# Patient Record
Sex: FEMALE | Race: WHITE | NOT HISPANIC OR LATINO | Employment: OTHER | URBAN - NONMETROPOLITAN AREA
[De-identification: names, ages, dates, MRNs, and addresses within clinical notes are randomized per-mention and may not be internally consistent; named-entity substitution may affect disease eponyms.]

---

## 2018-05-17 ENCOUNTER — TRANSCRIBE ORDERS (OUTPATIENT)
Dept: PHYSICAL THERAPY | Facility: HOSPITAL | Age: 43
End: 2018-05-17

## 2018-05-17 DIAGNOSIS — G89.29 OTHER CHRONIC PAIN: Primary | ICD-10-CM

## 2018-05-17 DIAGNOSIS — M25.511 RIGHT SHOULDER PAIN, UNSPECIFIED CHRONICITY: ICD-10-CM

## 2018-05-17 DIAGNOSIS — M54.5 LOW BACK PAIN, UNSPECIFIED BACK PAIN LATERALITY, UNSPECIFIED CHRONICITY, WITH SCIATICA PRESENCE UNSPECIFIED: ICD-10-CM

## 2018-05-25 ENCOUNTER — HOSPITAL ENCOUNTER (OUTPATIENT)
Dept: PHYSICAL THERAPY | Facility: HOSPITAL | Age: 43
Setting detail: THERAPIES SERIES
Discharge: HOME | End: 2018-05-25

## 2018-05-25 DIAGNOSIS — G89.29 OTHER CHRONIC PAIN: ICD-10-CM

## 2018-05-25 DIAGNOSIS — M54.5 LOW BACK PAIN, UNSPECIFIED BACK PAIN LATERALITY, UNSPECIFIED CHRONICITY, WITH SCIATICA PRESENCE UNSPECIFIED: Primary | ICD-10-CM

## 2018-05-25 DIAGNOSIS — M25.511 RIGHT SHOULDER PAIN, UNSPECIFIED CHRONICITY: ICD-10-CM

## 2018-05-25 PROCEDURE — 97161 PT EVAL LOW COMPLEX 20 MIN: CPT
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
## 2018-05-31 ENCOUNTER — APPOINTMENT (OUTPATIENT)
Dept: PHYSICAL THERAPY | Facility: HOSPITAL | Age: 43
End: 2018-05-31

## 2018-06-06 ENCOUNTER — APPOINTMENT (OUTPATIENT)
Dept: PHYSICAL THERAPY | Facility: HOSPITAL | Age: 43
End: 2018-06-06

## 2018-09-13 ENCOUNTER — OFFICE VISIT (OUTPATIENT)
Dept: CARDIOLOGY | Facility: CLINIC | Age: 43
End: 2018-09-13

## 2018-09-13 VITALS
WEIGHT: 193 LBS | HEIGHT: 62 IN | SYSTOLIC BLOOD PRESSURE: 118 MMHG | HEART RATE: 65 BPM | DIASTOLIC BLOOD PRESSURE: 70 MMHG | BODY MASS INDEX: 35.51 KG/M2

## 2018-09-13 DIAGNOSIS — R07.89 OTHER CHEST PAIN: Primary | ICD-10-CM

## 2018-09-13 DIAGNOSIS — Z72.0 NICOTINE ABUSE: ICD-10-CM

## 2018-09-13 PROCEDURE — 93000 ELECTROCARDIOGRAM COMPLETE: CPT | Performed by: INTERNAL MEDICINE

## 2018-09-13 PROCEDURE — 99204 OFFICE O/P NEW MOD 45 MIN: CPT | Performed by: INTERNAL MEDICINE

## 2018-09-13 RX ORDER — QUETIAPINE FUMARATE 50 MG/1
50 TABLET, FILM COATED ORAL NIGHTLY
COMMUNITY
End: 2019-02-21

## 2018-09-13 RX ORDER — TIZANIDINE 4 MG/1
4 TABLET ORAL NIGHTLY PRN
COMMUNITY
End: 2021-03-09 | Stop reason: HOSPADM

## 2018-09-13 RX ORDER — OXCARBAZEPINE 600 MG/1
600 TABLET, FILM COATED ORAL 2 TIMES DAILY
COMMUNITY
End: 2019-02-21

## 2018-09-13 NOTE — PROGRESS NOTES
[facility] Cardiology  OFFICE NOTE    Myles Shannon  43 y.o. female    09/13/2018  1. Other chest pain    2. Nicotine abuse        Chief complaint -chest pain    History of present Lajpaip-[id number]-old lady who is on multiple medications for back problems and muscle spasm and neuropathy and she is a smoker has been having some chest pain EKG showed sinus rhythm with nonspecific ST-T changes.  There is family history of CAD in her dad and mom and there are smokers also.  Never had any cardiac process in the past.  She takes medications for headache.  Denies any GI symptoms of CNS symptoms              Allergies   Allergen Reactions   • Penicillins GI Intolerance   • Buspirone Rash         Past Medical History:   Diagnosis Date   • Abdominal pain     Chronic RLQ, RUQ, R flank comes and goes.    • Abdominal pain, right lower quadrant    • Acute bilateral otitis media    • Allergic rhinitis    • Backache     Upper back.   • Candidiasis of skin    • Cellulitis of neck    • Chest pain    • Contraception    • Cough    • Dysfunction of eustachian tube    • Dyspareunia, female    • Excessive or frequent menstruation    • Flank pain    • Generalized aches and pains    • Headache    • Health maintenance examination    • Infection of skin and subcutaneous tissue    • Irregular periods    • Kidney stone     Poss   • Menorrhagia    • Nausea vomiting and diarrhea    • Obesity    • Open wound of abdominal wall    • Otalgia    • Pain in left foot    • Pain in unspecified hand    • Removed Impacted cerumen 06/05/2012   • Rhinitis    • Shoulder pain     right-sided-likely muscle strain      • Shoulder tendinitis    • Spider bite wound    • Staphylococcal infection of skin    • Swollen feet    • Tinea cruris    • Tobacco dependence syndrome    • Upper respiratory infection    • Urinary tract infectious disease    • Vertigo    • Visit for gynecologic examination    • Vulvovaginitis          Past Surgical  History:   Procedure Laterality Date   •  SECTION     • DILATATION AND CURETTAGE      Secondary to incomplete spontaneous    • INCISION AND DRAINAGE ABSCESS  2012   • INJECTION OF MEDICATION  2015    Phenergan (1)     • INJECTION OF MEDICATION  10/10/2013    Toradol (2)      • INJECTION OF MEDICATION  2014    Zofran (1)            Family History   Problem Relation Age of Onset   • Depression Other    • Cancer Other         Colorectal Cancer   • Endometrial cancer Other    • Lung cancer Other          Social History     Social History   • Marital status: Single     Spouse name: N/A   • Number of children: N/A   • Years of education: N/A     Occupational History   • Not on file.     Social History Main Topics   • Smoking status: Current Every Day Smoker     Packs/day: 0.50     Types: Cigarettes   • Smokeless tobacco: Never Used   • Alcohol use No   • Drug use: No   • Sexual activity: Defer     Other Topics Concern   • Not on file     Social History Narrative   • No narrative on file         Current Outpatient Prescriptions   Medication Sig Dispense Refill   • OXcarbazepine (TRILEPTAL) 600 MG tablet Take 600 mg by mouth 2 (Two) Times a Day.     • QUEtiapine (SEROquel) 50 MG tablet Take 50 mg by mouth Every Night.     • tiZANidine (ZANAFLEX) 4 MG tablet Take 4 mg by mouth At Night As Needed for Muscle Spasms.     • diclofenac sodium (VOTAREN XR) 100 MG 24 hr tablet Take 100 mg by mouth Daily.     • gabapentin (NEURONTIN) 600 MG tablet Take 600 mg by mouth 3 (Three) Times a Day.     • topiramate (TOPAMAX) 100 MG tablet Take 100 mg by mouth 2 (Two) Times a Day.     • traMADol (ULTRAM) 50 MG tablet Take 50 mg by mouth Every 6 (Six) Hours As Needed for moderate pain (4-6).       No current facility-administered medications for this visit.          Review of Systems     Constitution: Denies any fatigue, fever or chills    HENT: Denies any headache, hearing impairment,     Eyes: Denies  "any blurring of vision, or photophobia     Cardivascular - As per history of present illness     Respiratory system- denies any COPD,  shortness of breath, Sleep apnea     Endocrine:  No history of hyperlipidemia, diabetes,  or Hypothyroidism                       Musculoskeletal:   history of arthritis with musculoskeletal problems    Gastrointestinal: No nausea, vomiting, or melena    Genitourinary: No dysuria or hematuria    Neurological:   No history of seizure disorder, stroke, memory problems    Psychiatric/Behavioral:        No history of depression    Hematological- no history of easy bruising or any bleeding diathesis            OBJECTIVE    /70   Pulse 65   Ht 157.5 cm (62.01\")   Wt 87.5 kg (193 lb)   BMI 35.29 kg/m²       Physical Exam     Constitutional: is oriented to person, place, and time.     Skin-warm and dry    Well developed and nourished in no acute distress      Head: Normocephalic and atraumatic.     Eyes: Pupils are equal    Neck: Neck supple. No bruit in the carotids    Cardiovascular: Burt in the fifth intercostal space   Regular rate, and  Rhythm,    S1 greater than S2, no S3 or S4, no gallop     Pulmonary/Chest:   Air  Entry is equal on both sides  No wheezing or crackles,      Abdominal: Soft.  No hepatosplenomegaly, bowel sounds are present    Musculoskeletal: No kyphoscoliosis, no significant thickening of the joints    Neurological: is alert and oriented to person, place, and time.    cranial nerve are intact .   No motor or sensory deficit    Extremities-no edema, no radial femoral delay      Psychiatric: He has a normal mood and affect.                  His behavior is normal.             ECG 12 Lead  Date/Time: 9/13/2018 8:46 AM  Performed by: KRISTINE ROBERTSON  Authorized by: KRISTINE ROBERTSON   Comparison: not compared with previous ECG   Rhythm: sinus rhythm  Clinical impression: non-specific ECG                         A/P    Chest pain atypical in nature EKG is " normal was factors of tobacco use and BMI of 35.3 scheduled for exercise treadmill to rule out ischemia.    Tobacco use discussed with her about quitting smoking as it decreases the risk of heart attack.    Back problems and neuropathy she is on multiple other medicines followed by her family doctor.    If the treadmill and echo was unremarkable, then she just needs risk factor modification she can follow with her family doctor            This document has been electronically signed by Julio Baum MD on September 13, 2018 8:45 AM       EMR Dragon/Transcription disclaimer:   Some of this note may be an electronic transcription/translation of spoken language to printed text. The electronic translation of spoken language may permit erroneous, or at times, nonsensical words or phrases to be inadvertently transcribed; Although I have reviewed the note for such errors, some may still exist.

## 2018-09-14 ENCOUNTER — DOCUMENTATION (OUTPATIENT)
Dept: PHYSICAL THERAPY | Facility: HOSPITAL | Age: 43
End: 2018-09-14

## 2019-02-21 ENCOUNTER — APPOINTMENT (OUTPATIENT)
Dept: LAB | Facility: HOSPITAL | Age: 44
End: 2019-02-21

## 2019-02-21 ENCOUNTER — PROCEDURE VISIT (OUTPATIENT)
Dept: OBSTETRICS AND GYNECOLOGY | Facility: CLINIC | Age: 44
End: 2019-02-21

## 2019-02-21 VITALS
HEIGHT: 62 IN | WEIGHT: 193 LBS | HEART RATE: 70 BPM | DIASTOLIC BLOOD PRESSURE: 55 MMHG | BODY MASS INDEX: 35.51 KG/M2 | SYSTOLIC BLOOD PRESSURE: 108 MMHG

## 2019-02-21 DIAGNOSIS — N92.1 MENORRHAGIA WITH IRREGULAR CYCLE: ICD-10-CM

## 2019-02-21 DIAGNOSIS — Z01.419 ENCOUNTER FOR ANNUAL ROUTINE GYNECOLOGICAL EXAMINATION: Primary | ICD-10-CM

## 2019-02-21 DIAGNOSIS — R10.2 PELVIC PAIN: ICD-10-CM

## 2019-02-21 DIAGNOSIS — Z12.39 ENCOUNTER FOR SPECIAL SCREENING EXAMINATION FOR NEOPLASM OF BREAST: ICD-10-CM

## 2019-02-21 DIAGNOSIS — N94.10 DYSPAREUNIA IN FEMALE: ICD-10-CM

## 2019-02-21 DIAGNOSIS — N89.8 VAGINAL ITCHING: ICD-10-CM

## 2019-02-21 LAB
B-HCG UR QL: NEGATIVE
CANDIDA ALBICANS: NEGATIVE
DEPRECATED RDW RBC AUTO: 47.2 FL (ref 37–54)
ERYTHROCYTE [DISTWIDTH] IN BLOOD BY AUTOMATED COUNT: 13.3 % (ref 12.3–15.4)
GARDNERELLA VAGINALIS: POSITIVE
HCT VFR BLD AUTO: 45 % (ref 34–46.6)
HGB BLD-MCNC: 13.8 G/DL (ref 12–15.9)
MCH RBC QN AUTO: 29.5 PG (ref 26.6–33)
MCHC RBC AUTO-ENTMCNC: 30.7 G/DL (ref 31.5–35.7)
MCV RBC AUTO: 96.2 FL (ref 79–97)
PLATELET # BLD AUTO: 309 10*3/MM3 (ref 140–450)
PMV BLD AUTO: 12.8 FL (ref 6–12)
RBC # BLD AUTO: 4.68 10*6/MM3 (ref 3.77–5.28)
TRICHOMONAS VAGINALIS PCR: NEGATIVE
TSH SERPL DL<=0.05 MIU/L-ACNC: 3.17 MIU/ML (ref 0.46–4.68)
WBC NRBC COR # BLD: 8.62 10*3/MM3 (ref 3.4–10.8)

## 2019-02-21 PROCEDURE — 87624 HPV HI-RISK TYP POOLED RSLT: CPT | Performed by: NURSE PRACTITIONER

## 2019-02-21 PROCEDURE — 87660 TRICHOMONAS VAGIN DIR PROBE: CPT | Performed by: NURSE PRACTITIONER

## 2019-02-21 PROCEDURE — 87480 CANDIDA DNA DIR PROBE: CPT | Performed by: NURSE PRACTITIONER

## 2019-02-21 PROCEDURE — G0123 SCREEN CERV/VAG THIN LAYER: HCPCS | Performed by: NURSE PRACTITIONER

## 2019-02-21 PROCEDURE — 84443 ASSAY THYROID STIM HORMONE: CPT | Performed by: NURSE PRACTITIONER

## 2019-02-21 PROCEDURE — 87510 GARDNER VAG DNA DIR PROBE: CPT | Performed by: NURSE PRACTITIONER

## 2019-02-21 PROCEDURE — 99396 PREV VISIT EST AGE 40-64: CPT | Performed by: NURSE PRACTITIONER

## 2019-02-21 PROCEDURE — 88141 CYTOPATH C/V INTERPRET: CPT | Performed by: PATHOLOGY

## 2019-02-21 PROCEDURE — 81025 URINE PREGNANCY TEST: CPT | Performed by: NURSE PRACTITIONER

## 2019-02-21 PROCEDURE — 36415 COLL VENOUS BLD VENIPUNCTURE: CPT | Performed by: NURSE PRACTITIONER

## 2019-02-21 PROCEDURE — 85027 COMPLETE CBC AUTOMATED: CPT | Performed by: NURSE PRACTITIONER

## 2019-02-21 RX ORDER — LITHIUM CARBONATE 450 MG
450 TABLET, EXTENDED RELEASE ORAL DAILY
Refills: 2 | COMMUNITY
Start: 2019-02-06 | End: 2021-12-19 | Stop reason: HOSPADM

## 2019-02-21 RX ORDER — DIVALPROEX SODIUM 500 MG/1
500 TABLET, DELAYED RELEASE ORAL
Refills: 5 | COMMUNITY
Start: 2019-02-12 | End: 2021-12-19 | Stop reason: HOSPADM

## 2019-02-21 RX ORDER — PAROXETINE HYDROCHLORIDE 20 MG/1
TABLET, FILM COATED ORAL
Refills: 0 | COMMUNITY
Start: 2019-02-06 | End: 2021-03-09 | Stop reason: HOSPADM

## 2019-02-21 NOTE — PROGRESS NOTES
"Paulo Shannon is a 43 y.o. Annual exam, pap smear and mammogram, vaginal itching, heavy irregular periods, and pelvic pain.      Pap: 2015, normal  Mammo: 10/05/2016  LMP: 2019  BC: none    Pt reports for the last year she has been having heavy painful periods every couple of weeks lasting 3-4 days. Pt wears regular pad and is changing it every two hours, but sometimes more often.  She was scheduled to have a hysteroscopy with D&C and essure procedure by Dr. Schrader in 2016, but she canceled it because \"it wasn't good timing for me\".  She is also experiencing pelvic pain intermittently which is worsened during intercourse.  Pt is sexually active and not using any form of contraception because, \"the Internet said if you have irregular bleeding then you are sterile\".        Gynecologic Exam   The patient's primary symptoms include genital itching and pelvic pain. The patient's pertinent negatives include no genital lesions, genital odor, genital rash, missed menses, vaginal bleeding or vaginal discharge. Primary symptoms comment: itching started 2 weeks ago . This is a recurrent problem. The current episode started more than 1 year ago. The problem has been waxing and waning. The problem affects both sides. She is not pregnant. Associated symptoms include painful intercourse. Pertinent negatives include no abdominal pain, anorexia, back pain, chills, constipation, diarrhea, discolored urine, dysuria, fever, flank pain, frequency, headaches, hematuria, joint pain, joint swelling, nausea, rash, sore throat, urgency or vomiting. The symptoms are aggravated by intercourse. She has tried acetaminophen for the symptoms. The treatment provided mild relief. She is sexually active. No, her partner does not have an STD. She uses nothing for contraception. Her menstrual history has been irregular. Her past medical history is significant for a  section, miscarriage and vaginosis. There is " no history of an abdominal surgery, an ectopic pregnancy, endometriosis, a gynecological surgery, herpes simplex, menorrhagia, metrorrhagia, ovarian cysts, perineal abscess, PID, an STD or a terminated pregnancy.   Menstrual Problem   This is a recurrent problem. The current episode started more than 1 year ago. The problem occurs intermittently. The problem has been gradually worsening. Pertinent negatives include no abdominal pain, anorexia, chest pain, chills, fatigue, fever, headaches, nausea, rash, sore throat or vomiting. The symptoms are aggravated by intercourse. She has tried acetaminophen for the symptoms. The treatment provided mild relief.   Pelvic Pain   The patient's primary symptoms include genital itching and pelvic pain. The patient's pertinent negatives include no genital lesions, genital odor, genital rash, missed menses, vaginal bleeding or vaginal discharge. Primary symptoms comment: itching started 2 weeks ago . This is a new problem. The current episode started more than 1 year ago. The problem occurs intermittently. The problem has been waxing and waning. The pain is moderate. The problem affects both sides. She is not pregnant. Associated symptoms include painful intercourse. Pertinent negatives include no abdominal pain, anorexia, back pain, chills, constipation, diarrhea, discolored urine, dysuria, fever, flank pain, frequency, headaches, hematuria, joint pain, joint swelling, nausea, rash, sore throat, urgency or vomiting. The symptoms are aggravated by intercourse. She has tried acetaminophen for the symptoms. The treatment provided mild relief. She is sexually active. No, her partner does not have an STD. She uses nothing for contraception. Her menstrual history has been irregular. Her past medical history is significant for a  section, miscarriage and vaginosis. There is no history of an abdominal surgery, an ectopic pregnancy, endometriosis, a gynecological surgery, herpes  simplex, menorrhagia, metrorrhagia, ovarian cysts, perineal abscess, PID, an STD or a terminated pregnancy.       The following portions of the patient's history were reviewed and updated as appropriate: allergies, current medications, past family history, past medical history, past social history, past surgical history and problem list.    Review of Systems   Constitutional: Negative for chills, fatigue, fever and unexpected weight change.   HENT: Negative for sore throat.    Respiratory: Negative for apnea and shortness of breath.    Cardiovascular: Negative for chest pain and palpitations.   Gastrointestinal: Negative for abdominal pain, anorexia, constipation, diarrhea, nausea and vomiting.   Endocrine: Positive for heat intolerance. Negative for cold intolerance.        Night sweats    Genitourinary: Positive for dyspareunia, menstrual problem and pelvic pain. Negative for decreased urine volume, difficulty urinating, dysuria, enuresis, flank pain, frequency, genital sores, hematuria, menorrhagia, missed menses, urgency, vaginal bleeding, vaginal discharge and vaginal pain.        Pelvic pain felt bilaterally during intercourse.     Musculoskeletal: Negative for back pain and joint pain.   Skin: Negative for rash.   Neurological: Negative for headaches.   Psychiatric/Behavioral: Negative for sleep disturbance.         Objective   Physical Exam   Constitutional: She is oriented to person, place, and time. Vital signs are normal. She appears well-developed and well-nourished.   Neck: Normal range of motion. Neck supple. No thyromegaly present.   Cardiovascular: Normal rate, regular rhythm, normal heart sounds and intact distal pulses.   Pulmonary/Chest: Effort normal and breath sounds normal. Right breast exhibits no inverted nipple, no mass, no nipple discharge, no skin change and no tenderness. Left breast exhibits no inverted nipple, no mass, no nipple discharge, no skin change and no tenderness. Breasts are  symmetrical.   Abdominal: Soft. Bowel sounds are normal. She exhibits no distension. There is no tenderness.   Genitourinary: Uterus normal. No breast discharge or bleeding. Pelvic exam was performed with patient supine. There is no rash, tenderness, lesion or injury on the right labia. There is no rash, tenderness, lesion or injury on the left labia. Cervix exhibits no motion tenderness, no discharge and no friability. Right adnexum displays tenderness. Right adnexum displays no mass and no fullness. Left adnexum displays no mass, no tenderness and no fullness. Vaginal discharge found.   Genitourinary Comments: Pap and vag panel obtained.   Lymphadenopathy:     She has no axillary adenopathy.        Right: No inguinal adenopathy present.        Left: No inguinal adenopathy present.   Neurological: She is alert and oriented to person, place, and time. GCS eye subscore is 4. GCS verbal subscore is 5. GCS motor subscore is 6.   Skin: Skin is warm, dry and intact.   Psychiatric: Her speech is normal and behavior is normal. Her mood appears anxious.   Nursing note and vitals reviewed.        Assessment/Plan   Myles was seen today for gynecologic exam.    Diagnoses and all orders for this visit:    Encounter for annual routine gynecological examination  -     Liquid-based Pap Smear, Screening    Encounter for special screening examination for neoplasm of breast  -     Mammo Screening Digital Tomosynthesis Bilateral With CAD    Menorrhagia with irregular cycle  -     TSH  -     US Non-ob Transvaginal; Future  -     CBC (No Diff)    Pelvic pain  -     US Non-ob Transvaginal; Future  -     POC Pregnancy, Urine    Dyspareunia in female    Vaginal itching  -     Gardnerella vaginalis, Trichomonas vaginalis, Candida albicans, PCR - Swab, Vagina      Pt educated/encouraged to do SBE monthly. She can technically still get pregnant if not using any form of contraception.  Her last TVUS in 10/2016 impression: Slightly enlarged  uterus with possible leiomyomas.  Her heavy periods, pelvic pain, and dyspareunia are more than likely due to these leiomyomas.  She can continue to take tylenol for pain, but I would advise against NSAIDs because it could interact with her lithium medication.  If patient is saturating a pad every hour she needs to go to the ED.  Will f/u with plan pending lab and TVUS results.       This document has been electronically signed by ALPHONSE Youngblood on February 21, 2019 3:05 PM

## 2019-02-22 DIAGNOSIS — N76.0 BACTERIAL VAGINOSIS: Primary | ICD-10-CM

## 2019-02-22 DIAGNOSIS — B96.89 BACTERIAL VAGINOSIS: Primary | ICD-10-CM

## 2019-02-22 RX ORDER — METRONIDAZOLE 7.5 MG/G
GEL VAGINAL
Qty: 1 TUBE | Refills: 0 | Status: SHIPPED | OUTPATIENT
Start: 2019-02-22 | End: 2019-10-16

## 2019-02-22 RX ORDER — METRONIDAZOLE 500 MG/1
500 TABLET ORAL 2 TIMES DAILY
Qty: 14 TABLET | Refills: 0 | Status: SHIPPED | OUTPATIENT
Start: 2019-02-22 | End: 2019-03-01

## 2019-02-25 LAB
GEN CATEG CVX/VAG CYTO-IMP: NORMAL
LAB AP CASE REPORT: NORMAL
LAB AP GYN ADDITIONAL INFORMATION: NORMAL
LAB AP GYN OTHER FINDINGS: NORMAL
PATH INTERP SPEC-IMP: NORMAL
STAT OF ADQ CVX/VAG CYTO-IMP: NORMAL

## 2019-02-28 LAB — HPV I/H RISK 4 DNA CVX QL PROBE+SIG AMP: NEGATIVE

## 2019-03-07 ENCOUNTER — OFFICE VISIT (OUTPATIENT)
Dept: OBSTETRICS AND GYNECOLOGY | Facility: CLINIC | Age: 44
End: 2019-03-07

## 2019-03-07 VITALS
WEIGHT: 200 LBS | BODY MASS INDEX: 36.8 KG/M2 | SYSTOLIC BLOOD PRESSURE: 112 MMHG | HEIGHT: 62 IN | DIASTOLIC BLOOD PRESSURE: 65 MMHG

## 2019-03-07 DIAGNOSIS — R10.2 PELVIC PAIN: Primary | ICD-10-CM

## 2019-03-07 PROCEDURE — 99213 OFFICE O/P EST LOW 20 MIN: CPT | Performed by: NURSE PRACTITIONER

## 2019-03-07 NOTE — PROGRESS NOTES
Subjective   Myles Carito Shannon is a 43 y.o. F/u pelvic pain    LMP: 03/05/19    Pt completed metrogel for her bacterial vaginosis and feels like her infection is gone. She has no further questions or concerns today.        The following portions of the patient's history were reviewed and updated as appropriate: allergies, current medications, past family history, past medical history, past social history, past surgical history and problem list.    Review of Systems   Constitutional: Negative for chills, fatigue, fever and unexpected weight change.   Respiratory: Negative for apnea and shortness of breath.    Cardiovascular: Negative for chest pain and palpitations.   Gastrointestinal: Negative for constipation and diarrhea.   Genitourinary: Positive for pelvic pain. Negative for dyspareunia, dysuria, flank pain, menstrual problem and urgency.   Skin: Negative for rash.   Neurological: Negative for headaches.   Psychiatric/Behavioral: Negative for sleep disturbance.         Objective   Physical Exam   Constitutional: She is oriented to person, place, and time. Vital signs are normal. She appears well-developed and well-nourished. No distress.   Cardiovascular: Normal rate, regular rhythm and normal heart sounds.   Pulmonary/Chest: Effort normal and breath sounds normal.   Neurological: She is alert and oriented to person, place, and time.   Skin: Skin is warm and dry. She is not diaphoretic.   Psychiatric: She has a normal mood and affect. Her behavior is normal.   Nursing note and vitals reviewed.        Assessment/Plan   Myles was seen today for scan follow up.    Diagnoses and all orders for this visit:    Pelvic pain      Reviewed pt lab results. Pt cbc does not indicate she is anemic.  TSH was WNL.  TVUS today: uterus size 8.34x 4.35x 6.06cm, endometrium 4.1mm, right ovary 1.69 x 1.57x 1.53, left ovary not visualized, no free fluid noted.  TVUS non-remarkable.  Pt may take tylenol for menstrual cramps and  apply heating pad to lower abdomen for 15 minute intervals. F/u as needed.

## 2019-05-29 PROBLEM — M54.2 NECK PAIN: Status: ACTIVE | Noted: 2017-06-27

## 2019-05-29 PROBLEM — M54.30 SCIATIC LEG PAIN: Status: ACTIVE | Noted: 2017-03-22

## 2019-05-29 PROBLEM — J30.2 SEASONAL ALLERGIES: Status: ACTIVE | Noted: 2019-05-29

## 2019-05-29 PROBLEM — Z13.1 SCREENING FOR DIABETES MELLITUS (DM): Status: ACTIVE | Noted: 2017-03-22

## 2019-05-29 PROBLEM — N39.42 URINARY INCONTINENCE WITHOUT SENSORY AWARENESS: Status: ACTIVE | Noted: 2017-03-22

## 2019-05-29 PROBLEM — R10.9 FLANK PAIN: Status: ACTIVE | Noted: 2018-05-07

## 2019-05-29 PROBLEM — K92.1 HEMATOCHEZIA: Status: ACTIVE | Noted: 2017-03-22

## 2019-05-29 PROBLEM — G43.909 MIGRAINE WITHOUT STATUS MIGRAINOSUS, NOT INTRACTABLE: Status: ACTIVE | Noted: 2019-05-29

## 2019-05-29 PROBLEM — E55.9 VITAMIN D DEFICIENCY: Status: ACTIVE | Noted: 2019-05-29

## 2019-05-29 PROBLEM — J01.00 ACUTE MAXILLARY SINUSITIS: Status: ACTIVE | Noted: 2017-03-22

## 2019-05-29 PROBLEM — N20.0 NEPHROLITHIASIS: Status: ACTIVE | Noted: 2018-05-07

## 2019-05-29 PROBLEM — E78.00 HIGH CHOLESTEROL: Status: ACTIVE | Noted: 2019-05-29

## 2019-05-29 PROBLEM — G89.29 OTHER CHRONIC PAIN: Status: ACTIVE | Noted: 2019-05-29

## 2019-11-26 PROBLEM — G56.01 CARPAL TUNNEL SYNDROME OF RIGHT WRIST: Status: ACTIVE | Noted: 2019-08-15

## 2019-11-26 PROBLEM — R56.9 SEIZURE: Status: ACTIVE | Noted: 2019-08-15

## 2021-02-26 ENCOUNTER — TRANSCRIBE ORDERS (OUTPATIENT)
Dept: ORTHOPEDIC SURGERY | Facility: CLINIC | Age: 46
End: 2021-02-26

## 2021-02-26 DIAGNOSIS — G56.01 CARPAL TUNNEL SYNDROME ON RIGHT: Primary | ICD-10-CM

## 2021-03-01 DIAGNOSIS — M25.531 RIGHT WRIST PAIN: Primary | ICD-10-CM

## 2021-03-07 ENCOUNTER — APPOINTMENT (OUTPATIENT)
Dept: ULTRASOUND IMAGING | Facility: HOSPITAL | Age: 46
End: 2021-03-07

## 2021-03-07 ENCOUNTER — APPOINTMENT (OUTPATIENT)
Dept: CT IMAGING | Facility: HOSPITAL | Age: 46
End: 2021-03-07

## 2021-03-07 ENCOUNTER — HOSPITAL ENCOUNTER (OUTPATIENT)
Facility: HOSPITAL | Age: 46
Setting detail: OBSERVATION
Discharge: HOME HEALTH | End: 2021-03-09
Attending: EMERGENCY MEDICINE | Admitting: INTERNAL MEDICINE

## 2021-03-07 ENCOUNTER — APPOINTMENT (OUTPATIENT)
Dept: GENERAL RADIOLOGY | Facility: HOSPITAL | Age: 46
End: 2021-03-07

## 2021-03-07 DIAGNOSIS — N39.0 E. COLI UTI (URINARY TRACT INFECTION): Primary | ICD-10-CM

## 2021-03-07 DIAGNOSIS — Z74.09 IMPAIRED MOBILITY AND ADLS: ICD-10-CM

## 2021-03-07 DIAGNOSIS — K80.20 CALCULUS OF GALLBLADDER WITHOUT CHOLECYSTITIS WITHOUT OBSTRUCTION: ICD-10-CM

## 2021-03-07 DIAGNOSIS — A49.9 UTI (URINARY TRACT INFECTION), BACTERIAL: ICD-10-CM

## 2021-03-07 DIAGNOSIS — B96.20 E. COLI UTI (URINARY TRACT INFECTION): Primary | ICD-10-CM

## 2021-03-07 DIAGNOSIS — R11.2 INTRACTABLE VOMITING WITH NAUSEA, UNSPECIFIED VOMITING TYPE: ICD-10-CM

## 2021-03-07 DIAGNOSIS — N39.0 UTI (URINARY TRACT INFECTION), BACTERIAL: ICD-10-CM

## 2021-03-07 DIAGNOSIS — Z78.9 IMPAIRED MOBILITY AND ADLS: ICD-10-CM

## 2021-03-07 PROBLEM — R11.10 INTRACTABLE VOMITING: Status: ACTIVE | Noted: 2021-03-07

## 2021-03-07 PROBLEM — K92.2 GI BLEED: Status: ACTIVE | Noted: 2021-03-07

## 2021-03-07 LAB
ALBUMIN SERPL-MCNC: 3.3 G/DL (ref 3.5–5.2)
ALBUMIN/GLOB SERPL: 0.9 G/DL
ALP SERPL-CCNC: 100 U/L (ref 39–117)
ALT SERPL W P-5'-P-CCNC: 41 U/L (ref 1–33)
ANION GAP SERPL CALCULATED.3IONS-SCNC: 14 MMOL/L (ref 5–15)
APTT PPP: 34.9 SECONDS (ref 20–40.3)
AST SERPL-CCNC: 66 U/L (ref 1–32)
BACTERIA UR QL AUTO: ABNORMAL /HPF
BILIRUB SERPL-MCNC: 0.3 MG/DL (ref 0–1.2)
BILIRUB UR QL STRIP: NEGATIVE
BUN SERPL-MCNC: 19 MG/DL (ref 6–20)
BUN/CREAT SERPL: 15.1 (ref 7–25)
CALCIUM SPEC-SCNC: 9.3 MG/DL (ref 8.6–10.5)
CHLORIDE SERPL-SCNC: 102 MMOL/L (ref 98–107)
CLARITY UR: ABNORMAL
CO2 SERPL-SCNC: 19 MMOL/L (ref 22–29)
COLOR UR: ABNORMAL
CREAT SERPL-MCNC: 1.26 MG/DL (ref 0.57–1)
DEPRECATED RDW RBC AUTO: 42.5 FL (ref 37–54)
ERYTHROCYTE [DISTWIDTH] IN BLOOD BY AUTOMATED COUNT: 12.9 % (ref 12.3–15.4)
GFR SERPL CREATININE-BSD FRML MDRD: 46 ML/MIN/1.73
GLOBULIN UR ELPH-MCNC: 3.8 GM/DL
GLUCOSE SERPL-MCNC: 127 MG/DL (ref 65–99)
GLUCOSE UR STRIP-MCNC: NEGATIVE MG/DL
HCG SERPL QL: NEGATIVE
HCT VFR BLD AUTO: 36.3 % (ref 34–46.6)
HGB BLD-MCNC: 12.4 G/DL (ref 12–15.9)
HGB UR QL STRIP.AUTO: ABNORMAL
HYALINE CASTS UR QL AUTO: ABNORMAL /LPF
INR PPP: 1.2 (ref 0.8–1.2)
KETONES UR QL STRIP: NEGATIVE
LEUKOCYTE ESTERASE UR QL STRIP.AUTO: ABNORMAL
LIPASE SERPL-CCNC: 41 U/L (ref 13–60)
LITHIUM SERPL-SCNC: 0.1 MMOL/L (ref 0.6–1.2)
LYMPHOCYTES # BLD MANUAL: 1.22 10*3/MM3 (ref 0.7–3.1)
LYMPHOCYTES NFR BLD MANUAL: 11 % (ref 19.6–45.3)
LYMPHOCYTES NFR BLD MANUAL: 7 % (ref 5–12)
MCH RBC QN AUTO: 30.8 PG (ref 26.6–33)
MCHC RBC AUTO-ENTMCNC: 34.2 G/DL (ref 31.5–35.7)
MCV RBC AUTO: 90.1 FL (ref 79–97)
MONOCYTES # BLD AUTO: 0.77 10*3/MM3 (ref 0.1–0.9)
NEUTROPHILS # BLD AUTO: 9.07 10*3/MM3 (ref 1.7–7)
NEUTROPHILS NFR BLD MANUAL: 79 % (ref 42.7–76)
NEUTS BAND NFR BLD MANUAL: 3 % (ref 0–5)
NITRITE UR QL STRIP: POSITIVE
PH UR STRIP.AUTO: 6 [PH] (ref 5–9)
PLAT MORPH BLD: NORMAL
PLATELET # BLD AUTO: 232 10*3/MM3 (ref 140–450)
PMV BLD AUTO: 11.5 FL (ref 6–12)
POTASSIUM SERPL-SCNC: 3.6 MMOL/L (ref 3.5–5.2)
PROT SERPL-MCNC: 7.1 G/DL (ref 6–8.5)
PROT UR QL STRIP: ABNORMAL
PROTHROMBIN TIME: 15.8 SECONDS (ref 11.1–15.3)
RBC # BLD AUTO: 4.03 10*6/MM3 (ref 3.77–5.28)
RBC # UR: ABNORMAL /HPF
RBC MORPH BLD: NORMAL
REF LAB TEST METHOD: ABNORMAL
SODIUM SERPL-SCNC: 135 MMOL/L (ref 136–145)
SP GR UR STRIP: 1.01 (ref 1–1.03)
SQUAMOUS #/AREA URNS HPF: ABNORMAL /HPF
UROBILINOGEN UR QL STRIP: ABNORMAL
VALPROATE SERPL-MCNC: 3.8 MCG/ML (ref 50–125)
WBC # BLD AUTO: 11.06 10*3/MM3 (ref 3.4–10.8)
WBC MORPH BLD: NORMAL
WBC UR QL AUTO: ABNORMAL /HPF
WHOLE BLOOD HOLD SPECIMEN: NORMAL
YEAST URNS QL MICRO: ABNORMAL /HPF

## 2021-03-07 PROCEDURE — G0378 HOSPITAL OBSERVATION PER HR: HCPCS

## 2021-03-07 PROCEDURE — 74022 RADEX COMPL AQT ABD SERIES: CPT

## 2021-03-07 PROCEDURE — 96361 HYDRATE IV INFUSION ADD-ON: CPT

## 2021-03-07 PROCEDURE — 76705 ECHO EXAM OF ABDOMEN: CPT

## 2021-03-07 PROCEDURE — 25010000002 LEVOFLOXACIN PER 250 MG: Performed by: EMERGENCY MEDICINE

## 2021-03-07 PROCEDURE — 25010000002 CEFTRIAXONE PER 250 MG: Performed by: HOSPITALIST

## 2021-03-07 PROCEDURE — 87186 SC STD MICRODIL/AGAR DIL: CPT | Performed by: HOSPITALIST

## 2021-03-07 PROCEDURE — 85007 BL SMEAR W/DIFF WBC COUNT: CPT | Performed by: EMERGENCY MEDICINE

## 2021-03-07 PROCEDURE — 80053 COMPREHEN METABOLIC PANEL: CPT | Performed by: EMERGENCY MEDICINE

## 2021-03-07 PROCEDURE — 25010000002 ONDANSETRON PER 1 MG: Performed by: EMERGENCY MEDICINE

## 2021-03-07 PROCEDURE — 25010000002 MORPHINE PER 10 MG: Performed by: EMERGENCY MEDICINE

## 2021-03-07 PROCEDURE — 81001 URINALYSIS AUTO W/SCOPE: CPT | Performed by: EMERGENCY MEDICINE

## 2021-03-07 PROCEDURE — 85014 HEMATOCRIT: CPT | Performed by: HOSPITALIST

## 2021-03-07 PROCEDURE — 87181 SC STD AGAR DILUTION PER AGT: CPT | Performed by: HOSPITALIST

## 2021-03-07 PROCEDURE — 74176 CT ABD & PELVIS W/O CONTRAST: CPT

## 2021-03-07 PROCEDURE — 85730 THROMBOPLASTIN TIME PARTIAL: CPT | Performed by: EMERGENCY MEDICINE

## 2021-03-07 PROCEDURE — 96375 TX/PRO/DX INJ NEW DRUG ADDON: CPT

## 2021-03-07 PROCEDURE — 99284 EMERGENCY DEPT VISIT MOD MDM: CPT

## 2021-03-07 PROCEDURE — 80178 ASSAY OF LITHIUM: CPT | Performed by: EMERGENCY MEDICINE

## 2021-03-07 PROCEDURE — 87088 URINE BACTERIA CULTURE: CPT | Performed by: HOSPITALIST

## 2021-03-07 PROCEDURE — 85610 PROTHROMBIN TIME: CPT | Performed by: EMERGENCY MEDICINE

## 2021-03-07 PROCEDURE — 85018 HEMOGLOBIN: CPT | Performed by: HOSPITALIST

## 2021-03-07 PROCEDURE — 84703 CHORIONIC GONADOTROPIN ASSAY: CPT | Performed by: EMERGENCY MEDICINE

## 2021-03-07 PROCEDURE — 96365 THER/PROPH/DIAG IV INF INIT: CPT

## 2021-03-07 PROCEDURE — 83690 ASSAY OF LIPASE: CPT | Performed by: EMERGENCY MEDICINE

## 2021-03-07 PROCEDURE — 85025 COMPLETE CBC W/AUTO DIFF WBC: CPT | Performed by: EMERGENCY MEDICINE

## 2021-03-07 PROCEDURE — 80164 ASSAY DIPROPYLACETIC ACD TOT: CPT | Performed by: EMERGENCY MEDICINE

## 2021-03-07 PROCEDURE — 87086 URINE CULTURE/COLONY COUNT: CPT | Performed by: HOSPITALIST

## 2021-03-07 RX ORDER — LEVOFLOXACIN 5 MG/ML
750 INJECTION, SOLUTION INTRAVENOUS ONCE
Status: COMPLETED | OUTPATIENT
Start: 2021-03-07 | End: 2021-03-07

## 2021-03-07 RX ORDER — SODIUM CHLORIDE 0.9 % (FLUSH) 0.9 %
10 SYRINGE (ML) INJECTION AS NEEDED
Status: DISCONTINUED | OUTPATIENT
Start: 2021-03-07 | End: 2021-03-09 | Stop reason: HOSPADM

## 2021-03-07 RX ORDER — LEVETIRACETAM 250 MG/1
250 TABLET ORAL 2 TIMES DAILY
COMMUNITY
End: 2021-12-19 | Stop reason: HOSPADM

## 2021-03-07 RX ORDER — PAROXETINE HYDROCHLORIDE 20 MG/1
20 TABLET, FILM COATED ORAL DAILY
Status: DISCONTINUED | OUTPATIENT
Start: 2021-03-07 | End: 2021-03-07

## 2021-03-07 RX ORDER — ONDANSETRON 2 MG/ML
4 INJECTION INTRAMUSCULAR; INTRAVENOUS EVERY 6 HOURS PRN
Status: DISCONTINUED | OUTPATIENT
Start: 2021-03-07 | End: 2021-03-09 | Stop reason: HOSPADM

## 2021-03-07 RX ORDER — ACETAMINOPHEN 325 MG/1
650 TABLET ORAL EVERY 4 HOURS PRN
Status: DISCONTINUED | OUTPATIENT
Start: 2021-03-07 | End: 2021-03-09 | Stop reason: HOSPADM

## 2021-03-07 RX ORDER — ONDANSETRON 2 MG/ML
4 INJECTION INTRAMUSCULAR; INTRAVENOUS ONCE
Status: COMPLETED | OUTPATIENT
Start: 2021-03-07 | End: 2021-03-07

## 2021-03-07 RX ORDER — SODIUM CHLORIDE 0.9 % (FLUSH) 0.9 %
10 SYRINGE (ML) INJECTION EVERY 12 HOURS SCHEDULED
Status: DISCONTINUED | OUTPATIENT
Start: 2021-03-07 | End: 2021-03-09 | Stop reason: HOSPADM

## 2021-03-07 RX ORDER — PANTOPRAZOLE SODIUM 40 MG/10ML
40 INJECTION, POWDER, LYOPHILIZED, FOR SOLUTION INTRAVENOUS
Status: DISCONTINUED | OUTPATIENT
Start: 2021-03-07 | End: 2021-03-07

## 2021-03-07 RX ORDER — ACETAMINOPHEN 650 MG/1
650 SUPPOSITORY RECTAL EVERY 4 HOURS PRN
Status: DISCONTINUED | OUTPATIENT
Start: 2021-03-07 | End: 2021-03-09 | Stop reason: HOSPADM

## 2021-03-07 RX ORDER — TIZANIDINE 4 MG/1
4 TABLET ORAL NIGHTLY PRN
Status: DISCONTINUED | OUTPATIENT
Start: 2021-03-07 | End: 2021-03-07

## 2021-03-07 RX ORDER — LEVETIRACETAM 250 MG/1
250 TABLET ORAL 2 TIMES DAILY
Status: DISCONTINUED | OUTPATIENT
Start: 2021-03-07 | End: 2021-03-09 | Stop reason: HOSPADM

## 2021-03-07 RX ORDER — ACETAMINOPHEN 160 MG/5ML
650 SOLUTION ORAL EVERY 4 HOURS PRN
Status: DISCONTINUED | OUTPATIENT
Start: 2021-03-07 | End: 2021-03-09 | Stop reason: HOSPADM

## 2021-03-07 RX ORDER — PANTOPRAZOLE SODIUM 40 MG/10ML
40 INJECTION, POWDER, LYOPHILIZED, FOR SOLUTION INTRAVENOUS ONCE
Status: COMPLETED | OUTPATIENT
Start: 2021-03-07 | End: 2021-03-07

## 2021-03-07 RX ORDER — ONDANSETRON 4 MG/1
4 TABLET, FILM COATED ORAL EVERY 6 HOURS PRN
Status: DISCONTINUED | OUTPATIENT
Start: 2021-03-07 | End: 2021-03-09 | Stop reason: HOSPADM

## 2021-03-07 RX ORDER — HYDROCODONE BITARTRATE AND ACETAMINOPHEN 7.5; 325 MG/1; MG/1
2 TABLET ORAL EVERY 4 HOURS PRN
Status: DISCONTINUED | OUTPATIENT
Start: 2021-03-07 | End: 2021-03-08

## 2021-03-07 RX ORDER — DIVALPROEX SODIUM 250 MG/1
500 TABLET, DELAYED RELEASE ORAL NIGHTLY
Status: DISCONTINUED | OUTPATIENT
Start: 2021-03-07 | End: 2021-03-09 | Stop reason: HOSPADM

## 2021-03-07 RX ORDER — LITHIUM CARBONATE 450 MG
450 TABLET, EXTENDED RELEASE ORAL DAILY
Status: DISCONTINUED | OUTPATIENT
Start: 2021-03-07 | End: 2021-03-09 | Stop reason: HOSPADM

## 2021-03-07 RX ORDER — SODIUM CHLORIDE 9 MG/ML
125 INJECTION, SOLUTION INTRAVENOUS CONTINUOUS
Status: DISCONTINUED | OUTPATIENT
Start: 2021-03-07 | End: 2021-03-09 | Stop reason: HOSPADM

## 2021-03-07 RX ORDER — PANTOPRAZOLE SODIUM 40 MG/10ML
40 INJECTION, POWDER, LYOPHILIZED, FOR SOLUTION INTRAVENOUS
Status: DISCONTINUED | OUTPATIENT
Start: 2021-03-08 | End: 2021-03-09 | Stop reason: HOSPADM

## 2021-03-07 RX ORDER — MAGNESIUM SULFATE HEPTAHYDRATE 40 MG/ML
2 INJECTION, SOLUTION INTRAVENOUS ONCE
Status: DISCONTINUED | OUTPATIENT
Start: 2021-03-07 | End: 2021-03-07

## 2021-03-07 RX ORDER — SUMATRIPTAN 50 MG/1
50 TABLET, FILM COATED ORAL
Status: DISCONTINUED | OUTPATIENT
Start: 2021-03-07 | End: 2021-03-07

## 2021-03-07 RX ADMIN — SODIUM CHLORIDE, PRESERVATIVE FREE 10 ML: 5 INJECTION INTRAVENOUS at 21:32

## 2021-03-07 RX ADMIN — ONDANSETRON 4 MG: 2 INJECTION INTRAMUSCULAR; INTRAVENOUS at 15:53

## 2021-03-07 RX ADMIN — MORPHINE SULFATE 4 MG: 4 INJECTION INTRAVENOUS at 16:01

## 2021-03-07 RX ADMIN — LEVOFLOXACIN 750 MG: 5 INJECTION, SOLUTION INTRAVENOUS at 17:30

## 2021-03-07 RX ADMIN — SODIUM CHLORIDE 1000 ML: 900 INJECTION, SOLUTION INTRAVENOUS at 17:58

## 2021-03-07 RX ADMIN — PANTOPRAZOLE SODIUM 40 MG: 40 INJECTION, POWDER, FOR SOLUTION INTRAVENOUS at 15:54

## 2021-03-07 RX ADMIN — DIVALPROEX SODIUM 500 MG: 250 TABLET, DELAYED RELEASE ORAL at 21:31

## 2021-03-07 RX ADMIN — CEFTRIAXONE 1 G: 1 INJECTION, POWDER, FOR SOLUTION INTRAMUSCULAR; INTRAVENOUS at 19:31

## 2021-03-07 RX ADMIN — LITHIUM CARBONATE 450 MG: 450 TABLET, EXTENDED RELEASE ORAL at 21:32

## 2021-03-07 RX ADMIN — SODIUM CHLORIDE 125 ML/HR: 9 INJECTION, SOLUTION INTRAVENOUS at 19:22

## 2021-03-07 RX ADMIN — SODIUM CHLORIDE 125 ML/HR: 9 INJECTION, SOLUTION INTRAVENOUS at 15:57

## 2021-03-07 NOTE — ED PROVIDER NOTES
Subjective   44yo female pmh significant bipolar disorder/migraine/gerd/seizure presents ED c/o 1wk hx nausea, vomiting, with (1-2) episodes coffee ground emesis yesterday associated with (1) episode hematochoezia.  ROS (+) subjective fever/chills/fatigue.      History provided by:  Patient  Illness  Severity:  Moderate  Duration:  1 week  Chronicity:  New  Associated symptoms: diarrhea, fatigue, nausea and vomiting    Associated symptoms: no abdominal pain        Review of Systems   Constitutional: Positive for chills and fatigue.   HENT: Negative.    Respiratory: Negative.    Cardiovascular: Negative.    Gastrointestinal: Positive for blood in stool, diarrhea, nausea and vomiting. Negative for abdominal pain.   Musculoskeletal: Negative.    Allergic/Immunologic: Negative for immunocompromised state.   All other systems reviewed and are negative.      Past Medical History:   Diagnosis Date   • Abdominal pain     Chronic RLQ, RUQ, R flank comes and goes.    • Abdominal pain, right lower quadrant    • Acute bilateral otitis media    • Allergic rhinitis    • Backache     Upper back.   • Candidiasis of skin    • Cellulitis of neck    • Chest pain    • Contraception    • Cough    • Dysfunction of eustachian tube    • Dyspareunia, female    • Excessive or frequent menstruation    • Flank pain    • Generalized aches and pains    • Headache    • Health maintenance examination    • Infection of skin and subcutaneous tissue    • Irregular periods    • Kidney stone     Poss   • Menorrhagia    • Nausea vomiting and diarrhea    • Obesity    • Open wound of abdominal wall    • Otalgia    • Pain in left foot    • Pain in unspecified hand    • Removed Impacted cerumen 06/05/2012   • Rhinitis    • Shoulder pain     right-sided-likely muscle strain      • Shoulder tendinitis    • Spider bite wound    • Staphylococcal infection of skin    • Swollen feet    • Tinea cruris    • Tobacco dependence syndrome    • Upper respiratory  infection    • Urinary tract infectious disease    • Vertigo    • Visit for gynecologic examination    • Vulvovaginitis        Allergies   Allergen Reactions   • Abilify [Aripiprazole] Nausea Only   • Buspirone Rash   • Penicillins Hives and Nausea And Vomiting       Past Surgical History:   Procedure Laterality Date   •  SECTION     • DILATATION AND CURETTAGE      Secondary to incomplete spontaneous    • INCISION AND DRAINAGE ABSCESS  2012   • INJECTION OF MEDICATION  2015    Phenergan (1)     • INJECTION OF MEDICATION  10/10/2013    Toradol (2)      • INJECTION OF MEDICATION  2014    Zofran (1)          Family History   Problem Relation Age of Onset   • Depression Other    • Cancer Other         Colorectal Cancer   • Endometrial cancer Other    • Lung cancer Other    • Endometrial cancer Sister    • Breast cancer Maternal Grandmother        Social History     Socioeconomic History   • Marital status: Single     Spouse name: Not on file   • Number of children: Not on file   • Years of education: Not on file   • Highest education level: Not on file   Tobacco Use   • Smoking status: Current Every Day Smoker     Packs/day: 0.50     Types: Cigarettes   • Smokeless tobacco: Never Used   Substance and Sexual Activity   • Alcohol use: No   • Drug use: No   • Sexual activity: Defer           Objective   Physical Exam  Vitals and nursing note reviewed. Exam conducted with a chaperone present.   Constitutional:       Appearance: Normal appearance.   HENT:      Head: Normocephalic and atraumatic.      Mouth/Throat:      Mouth: Mucous membranes are moist.   Eyes:      Pupils: Pupils are equal, round, and reactive to light.   Cardiovascular:      Rate and Rhythm: Normal rate and regular rhythm.      Pulses: Normal pulses.      Heart sounds: Normal heart sounds. No murmur. No friction rub. No gallop.    Pulmonary:      Effort: Pulmonary effort is normal. No respiratory distress.      Breath  sounds: Normal breath sounds. No wheezing, rhonchi or rales.   Abdominal:      General: Abdomen is flat. Bowel sounds are normal.      Palpations: Abdomen is soft.      Tenderness: There is abdominal tenderness in the epigastric area. There is no right CVA tenderness, left CVA tenderness, guarding or rebound. Negative signs include Gifford's sign, Rovsing's sign and McBurney's sign.       Genitourinary:     Rectum: Normal. Guaiac result negative.   Musculoskeletal:         General: No swelling or deformity. Normal range of motion.      Cervical back: Normal range of motion and neck supple. No rigidity.   Lymphadenopathy:      Cervical: No cervical adenopathy.   Skin:     General: Skin is warm and dry.   Neurological:      General: No focal deficit present.      Mental Status: She is alert and oriented to person, place, and time.      GCS: GCS eye subscore is 4. GCS verbal subscore is 5. GCS motor subscore is 6.         Procedures           ED Course  ED Course as of Mar 07 1759   Sun Mar 07, 2021   1729 Dr. Hand paged    [SD]   1731 Dr. Hand consulted.    [SD]   1756 Patient evaluated by Dr. Hand. Patient without clinical evidence of cholecystitis.    [SD]      ED Course User Index  [SD] Chano Flynn MD      Labs Reviewed   COMPREHENSIVE METABOLIC PANEL - Abnormal; Notable for the following components:       Result Value    Glucose 127 (*)     Creatinine 1.26 (*)     Sodium 135 (*)     CO2 19.0 (*)     Albumin 3.30 (*)     ALT (SGPT) 41 (*)     AST (SGOT) 66 (*)     eGFR Non  Amer 46 (*)     All other components within normal limits    Narrative:     GFR Normal >60  Chronic Kidney Disease <60  Kidney Failure <15     PROTIME-INR - Abnormal; Notable for the following components:    Protime 15.8 (*)     All other components within normal limits    Narrative:     Therapeutic range for most indications is 2.0-3.0 INR,  or 2.5-3.5 for mechanical heart valves.   URINALYSIS W/ MICROSCOPIC IF  INDICATED (NO CULTURE) - Abnormal; Notable for the following components:    Appearance, UA Cloudy (*)     Blood, UA Large (3+) (*)     Protein,  mg/dL (2+) (*)     Leuk Esterase, UA Large (3+) (*)     Nitrite, UA Positive (*)     Urobilinogen, UA 4.0 E.U./dL (*)     All other components within normal limits   VALPROIC ACID LEVEL, TOTAL - Abnormal; Notable for the following components:    Valproic Acid 3.8 (*)     All other components within normal limits   LITHIUM LEVEL - Abnormal; Notable for the following components:    Lithium 0.1 (*)     All other components within normal limits   CBC WITH AUTO DIFFERENTIAL - Abnormal; Notable for the following components:    WBC 11.06 (*)     All other components within normal limits   URINALYSIS, MICROSCOPIC ONLY - Abnormal; Notable for the following components:    RBC, UA 13-20 (*)     WBC, UA Too Numerous to Count (*)     Bacteria, UA 4+ (*)     Squamous Epithelial Cells, UA 6-12 (*)     All other components within normal limits   MANUAL DIFFERENTIAL - Abnormal; Notable for the following components:    Neutrophil % 79.0 (*)     Lymphocyte % 11.0 (*)     Neutrophils Absolute 9.07 (*)     All other components within normal limits   APTT - Normal    Narrative:     The recommended Heparin therapeutic range is 68-97 seconds.   LIPASE - Normal   HCG, SERUM, QUALITATIVE - Normal   CBC AND DIFFERENTIAL    Narrative:     The following orders were created for panel order CBC & Differential.  Procedure                               Abnormality         Status                     ---------                               -----------         ------                     Scan Slide[[id number]]                                                                  CBC Auto Differential[[id number]]        Abnormal            Final result                 Please view results for these tests on the individual orders.   EXTRA TUBES    Narrative:     The following orders were created for panel order  Extra Tubes.  Procedure                               Abnormality         Status                     ---------                               -----------         ------                     Lavender Top[[id number]]                                     Final result                 Please view results for these tests on the individual orders.   LAVENDER TOP     XR Abdomen 2+ VW with Chest 1 VW    Result Date: 3/7/2021  Narrative: Acute Abdominal Series Withe Upright Chest. History: Vomiting Upright frontal film of the chest and supine and upright films of the abdomen were obtained. Comparison: January 16, 2014 FINDINGS:  The lungs are clear of an acute process. The heart is not enlarged. The pulmonary vasculature is not increased. No pleural effusion. No pneumothorax. No acute osseous abnormality. Degenerative changes are present in the thoracic spine. No free air. Nonspecific air-fluid levels in the small and large bowel. No mechanical bowel obstruction. No organomegaly. 6 mm and smaller bilateral renal calculi. Multiple pelvic phleboliths. No acute osseous abnormality.     Impression: Conclusion: No acute disease in the chest. No free air. Nonspecific air-fluid levels in the small and large bowel. No mechanical bowel obstruction. 6 mm and smaller bilateral renal calculi. [id number] Electronically signed by:  Christopher Self MD  3/7/2021 3:55 PM CST Workstation: [email or web address]    Result Date: 3/7/2021  Narrative: Ultrasound gallbladder HISTORY: Elevated liver function test. Vomiting. Ultrasound examination of the right upper quadrant was performed. COMPARISON: None FINDINGS: The visualized liver is of normal echotexture. No focal intrahepatic lesion. Somewhat contracted gallbladder containing several small calculi. Positive Gifford's sign. No wall thickening or pericholecystic fluid. Common bile duct is within normal limits at 5.0 mm. Images of the right kidney are unremarkable. Right kidney 12.48 cm in length  by 6.05 cm x 5.10 cm transverse. Pancreas partially obscured by bowel gas.     Impression: CONCLUSION: Somewhat contracted gallbladder containing several small calculi. Positive Gifford's sign. [id number] Electronically signed by:  Christopher Self MD  3/7/2021 5:19 PM [facility] Workstation: [id number]    Final diagnoses:   Calculus of gallbladder without cholecystitis without obstruction   UTI (urinary tract infection), bacterial   Intractable vomiting with nausea, unspecified vomiting type            Chano Flynn MD  03/07/21 1399

## 2021-03-08 PROBLEM — N20.0 NEPHROLITHIASIS: Chronic | Status: ACTIVE | Noted: 2021-03-08

## 2021-03-08 PROBLEM — G89.29 CHRONIC PAIN: Chronic | Status: ACTIVE | Noted: 2019-05-29

## 2021-03-08 PROBLEM — K80.20 CHOLELITHIASIS: Chronic | Status: ACTIVE | Noted: 2021-03-07

## 2021-03-08 LAB
ALBUMIN SERPL-MCNC: 3 G/DL (ref 3.5–5.2)
ALBUMIN/GLOB SERPL: 0.8 G/DL
ALP SERPL-CCNC: 107 U/L (ref 39–117)
ALT SERPL W P-5'-P-CCNC: 52 U/L (ref 1–33)
ANION GAP SERPL CALCULATED.3IONS-SCNC: 11 MMOL/L (ref 5–15)
AST SERPL-CCNC: 73 U/L (ref 1–32)
BASOPHILS # BLD AUTO: 0.05 10*3/MM3 (ref 0–0.2)
BASOPHILS NFR BLD AUTO: 0.5 % (ref 0–1.5)
BILIRUB SERPL-MCNC: 0.2 MG/DL (ref 0–1.2)
BUN SERPL-MCNC: 14 MG/DL (ref 6–20)
BUN/CREAT SERPL: 12.5 (ref 7–25)
CALCIUM SPEC-SCNC: 8.4 MG/DL (ref 8.6–10.5)
CHLORIDE SERPL-SCNC: 110 MMOL/L (ref 98–107)
CO2 SERPL-SCNC: 20 MMOL/L (ref 22–29)
CREAT SERPL-MCNC: 1.12 MG/DL (ref 0.57–1)
DEPRECATED RDW RBC AUTO: 43.2 FL (ref 37–54)
EOSINOPHIL # BLD AUTO: 0.12 10*3/MM3 (ref 0–0.4)
EOSINOPHIL NFR BLD AUTO: 1.1 % (ref 0.3–6.2)
ERYTHROCYTE [DISTWIDTH] IN BLOOD BY AUTOMATED COUNT: 13.2 % (ref 12.3–15.4)
GFR SERPL CREATININE-BSD FRML MDRD: 53 ML/MIN/1.73
GLOBULIN UR ELPH-MCNC: 3.8 GM/DL
GLUCOSE SERPL-MCNC: 89 MG/DL (ref 65–99)
HCT VFR BLD AUTO: 32.1 % (ref 34–46.6)
HCT VFR BLD AUTO: 33.2 % (ref 34–46.6)
HCT VFR BLD AUTO: 35.3 % (ref 34–46.6)
HCT VFR BLD AUTO: 37.4 % (ref 34–46.6)
HGB BLD-MCNC: 10.5 G/DL (ref 12–15.9)
HGB BLD-MCNC: 11 G/DL (ref 12–15.9)
HGB BLD-MCNC: 12 G/DL (ref 12–15.9)
HGB BLD-MCNC: 12.3 G/DL (ref 12–15.9)
IMM GRANULOCYTES # BLD AUTO: 0.26 10*3/MM3 (ref 0–0.05)
IMM GRANULOCYTES NFR BLD AUTO: 2.4 % (ref 0–0.5)
LIPASE SERPL-CCNC: 56 U/L (ref 13–60)
LYMPHOCYTES # BLD AUTO: 2.18 10*3/MM3 (ref 0.7–3.1)
LYMPHOCYTES NFR BLD AUTO: 20 % (ref 19.6–45.3)
MCH RBC QN AUTO: 30.8 PG (ref 26.6–33)
MCHC RBC AUTO-ENTMCNC: 34 G/DL (ref 31.5–35.7)
MCV RBC AUTO: 90.5 FL (ref 79–97)
MONOCYTES # BLD AUTO: 0.9 10*3/MM3 (ref 0.1–0.9)
MONOCYTES NFR BLD AUTO: 8.2 % (ref 5–12)
NEUTROPHILS NFR BLD AUTO: 67.8 % (ref 42.7–76)
NEUTROPHILS NFR BLD AUTO: 7.4 10*3/MM3 (ref 1.7–7)
NRBC BLD AUTO-RTO: 0 /100 WBC (ref 0–0.2)
PLATELET # BLD AUTO: 258 10*3/MM3 (ref 140–450)
PMV BLD AUTO: 11.7 FL (ref 6–12)
POTASSIUM SERPL-SCNC: 3.8 MMOL/L (ref 3.5–5.2)
PROT SERPL-MCNC: 6.8 G/DL (ref 6–8.5)
RBC # BLD AUTO: 3.9 10*6/MM3 (ref 3.77–5.28)
SODIUM SERPL-SCNC: 141 MMOL/L (ref 136–145)
WBC # BLD AUTO: 10.91 10*3/MM3 (ref 3.4–10.8)

## 2021-03-08 PROCEDURE — 96366 THER/PROPH/DIAG IV INF ADDON: CPT

## 2021-03-08 PROCEDURE — 36415 COLL VENOUS BLD VENIPUNCTURE: CPT | Performed by: HOSPITALIST

## 2021-03-08 PROCEDURE — 80053 COMPREHEN METABOLIC PANEL: CPT | Performed by: HOSPITALIST

## 2021-03-08 PROCEDURE — 25010000002 CEFTRIAXONE: Performed by: HOSPITALIST

## 2021-03-08 PROCEDURE — 85025 COMPLETE CBC W/AUTO DIFF WBC: CPT | Performed by: HOSPITALIST

## 2021-03-08 PROCEDURE — 85018 HEMOGLOBIN: CPT | Performed by: HOSPITALIST

## 2021-03-08 PROCEDURE — 25010000002 ONDANSETRON PER 1 MG: Performed by: HOSPITALIST

## 2021-03-08 PROCEDURE — 85014 HEMATOCRIT: CPT | Performed by: HOSPITALIST

## 2021-03-08 PROCEDURE — 96376 TX/PRO/DX INJ SAME DRUG ADON: CPT

## 2021-03-08 PROCEDURE — 96361 HYDRATE IV INFUSION ADD-ON: CPT

## 2021-03-08 PROCEDURE — G0378 HOSPITAL OBSERVATION PER HR: HCPCS

## 2021-03-08 PROCEDURE — 83690 ASSAY OF LIPASE: CPT | Performed by: HOSPITALIST

## 2021-03-08 RX ORDER — HYDROCODONE BITARTRATE AND ACETAMINOPHEN 10; 325 MG/1; MG/1
1 TABLET ORAL EVERY 4 HOURS PRN
Status: DISCONTINUED | OUTPATIENT
Start: 2021-03-08 | End: 2021-03-09 | Stop reason: HOSPADM

## 2021-03-08 RX ADMIN — PANTOPRAZOLE SODIUM 40 MG: 40 INJECTION, POWDER, FOR SOLUTION INTRAVENOUS at 08:41

## 2021-03-08 RX ADMIN — PANTOPRAZOLE SODIUM 40 MG: 40 INJECTION, POWDER, FOR SOLUTION INTRAVENOUS at 17:28

## 2021-03-08 RX ADMIN — CEFTRIAXONE 1 G: 1 INJECTION, POWDER, FOR SOLUTION INTRAMUSCULAR; INTRAVENOUS at 17:55

## 2021-03-08 RX ADMIN — LEVETIRACETAM 250 MG: 250 TABLET ORAL at 20:45

## 2021-03-08 RX ADMIN — ONDANSETRON 4 MG: 2 INJECTION INTRAMUSCULAR; INTRAVENOUS at 08:41

## 2021-03-08 RX ADMIN — DIVALPROEX SODIUM 500 MG: 250 TABLET, DELAYED RELEASE ORAL at 20:45

## 2021-03-08 RX ADMIN — SODIUM CHLORIDE 125 ML/HR: 9 INJECTION, SOLUTION INTRAVENOUS at 14:47

## 2021-03-08 RX ADMIN — SODIUM CHLORIDE 125 ML/HR: 9 INJECTION, SOLUTION INTRAVENOUS at 05:35

## 2021-03-08 RX ADMIN — LEVETIRACETAM 250 MG: 250 TABLET ORAL at 08:41

## 2021-03-08 RX ADMIN — LITHIUM CARBONATE 450 MG: 450 TABLET, EXTENDED RELEASE ORAL at 08:41

## 2021-03-08 RX ADMIN — SODIUM CHLORIDE, PRESERVATIVE FREE 10 ML: 5 INJECTION INTRAVENOUS at 08:42

## 2021-03-08 NOTE — PROGRESS NOTES
Progress Note  Aleksandar Jaime MD  Hospitalist    Date of visit: 3/8/2021     LOS: 0 days   Patient Care Team:  Nick Boateng MD as PCP - General (Family Medicine)    Chief Complaint: Nausea and vomiting    Subjective     Interval History:     Patient Complaints: Nausea and vomiting, some better    History taken from: patient    Medication Review:   Current Facility-Administered Medications   Medication Dose Route Frequency Provider Last Rate Last Admin   • acetaminophen (TYLENOL) tablet 650 mg  650 mg Oral Q4H PRN Juan Conroy MD        Or   • acetaminophen (TYLENOL) 160 MG/5ML solution 650 mg  650 mg Oral Q4H PRN Juan Cornoy MD        Or   • acetaminophen (TYLENOL) suppository 650 mg  650 mg Rectal Q4H PRN Juan Conroy MD       • cefTRIAXone (ROCEPHIN) 1 g/100 mL 0.9% NS (MBP)  1 g Intravenous Q24H Juan Conroy MD   Stopped at 03/07/21 2000   • divalproex (DEPAKOTE) DR tablet 500 mg  500 mg Oral Nightly Juan Conroy MD   500 mg at 03/07/21 2131   • HYDROcodone-acetaminophen (NORCO) 7.5-325 MG per tablet 2 tablet  2 tablet Oral Q4H PRN Juan Conroy MD       • influenza vac split quad (FLUZONE,FLUARIX,AFLURIA,FLULAVAL) injection 0.5 mL  0.5 mL Intramuscular During Hospitalization Santy Allison MD       • levETIRAcetam (KEPPRA) tablet 250 mg  250 mg Oral BID Santy Allison MD   250 mg at 03/08/21 0841   • lithium (ESKALITH) CR tablet 450 mg  450 mg Oral Daily Juan Cnoroy MD   450 mg at 03/08/21 0841   • ondansetron (ZOFRAN) tablet 4 mg  4 mg Oral Q6H PRN Juan Conroy MD        Or   • ondansetron (ZOFRAN) injection 4 mg  4 mg Intravenous Q6H PRN Juan Conroy MD   4 mg at 03/08/21 0841   • pantoprazole (PROTONIX) injection 40 mg  40 mg Intravenous BID AC Juan Conroy MD   40 mg at 03/08/21 0841   • sodium chloride 0.9 % flush 10 mL  10 mL Intravenous PRN Chano Flynn MD       • sodium chloride 0.9 % flush 10 mL  10 mL Intravenous Q12H Juan Conroy MD   10 mL at 03/08/21  0842   • sodium chloride 0.9 % flush 10 mL  10 mL Intravenous PRN Juan Conroy MD       • sodium chloride 0.9 % infusion  125 mL/hr Intravenous Continuous Chano Flynn  mL/hr at 03/08/21 0535 125 mL/hr at 03/08/21 0535       Review of Systems:   Review of Systems   Constitutional: Positive for fatigue. Negative for fever.   Respiratory: Negative for cough, shortness of breath, wheezing and stridor.    Cardiovascular: Negative for chest pain, palpitations and leg swelling.   Gastrointestinal: Positive for abdominal pain and nausea. Negative for abdominal distention, constipation and vomiting.   Musculoskeletal: Negative for arthralgias, back pain and gait problem.   Skin: Positive for pallor. Negative for color change and rash.   Neurological: Positive for weakness. Negative for seizures, speech difficulty, numbness and headaches.   Psychiatric/Behavioral: Negative for agitation, behavioral problems and confusion.   All other systems reviewed and are negative.      Objective     Vital Signs  Temp:  [96.1 °F (35.6 °C)-98.2 °F (36.8 °C)] 96.7 °F (35.9 °C)  Heart Rate:  [60-83] 65  Resp:  [18] 18  BP: ()/(55-76) 110/72    Physical Exam:  Physical Exam  Vitals reviewed.   Constitutional:       General: She is not in acute distress.     Appearance: She is ill-appearing.   HENT:      Head: Normocephalic and atraumatic.   Eyes:      General: No scleral icterus.     Extraocular Movements: Extraocular movements intact.      Pupils: Pupils are equal, round, and reactive to light.   Cardiovascular:      Rate and Rhythm: Normal rate and regular rhythm.   Pulmonary:      Effort: No respiratory distress.      Breath sounds: No stridor. No wheezing or rales.   Chest:      Chest wall: No tenderness.   Abdominal:      General: Abdomen is flat. Bowel sounds are normal. There is no distension.      Palpations: Abdomen is soft. There is no mass.      Tenderness: There is no abdominal tenderness. There is no right CVA  tenderness or left CVA tenderness.      Hernia: No hernia is present.   Musculoskeletal:         General: No swelling, tenderness or deformity.      Cervical back: Normal range of motion and neck supple. No rigidity.      Right lower leg: No edema.      Left lower leg: No edema.   Skin:     General: Skin is warm and dry.      Coloration: Skin is pale. Skin is not jaundiced.      Findings: No bruising or lesion.   Neurological:      Mental Status: She is alert and oriented to person, place, and time. Mental status is at baseline.      Cranial Nerves: No cranial nerve deficit.      Sensory: No sensory deficit.      Motor: No weakness.      Coordination: Coordination normal.      Gait: Gait normal.   Psychiatric:         Mood and Affect: Mood normal.         Behavior: Behavior normal.          Results Review:    Lab Results (last 24 hours)     Procedure Component Value Units Date/Time    Comprehensive Metabolic Panel [[id number]]  (Abnormal) Collected: 03/08/21 0536    Specimen: Blood Updated: 03/08/21 0701     Glucose 89 mg/dL      BUN 14 mg/dL      Creatinine 1.12 mg/dL      Sodium 141 mmol/L      Potassium 3.8 mmol/L      Chloride 110 mmol/L      CO2 20.0 mmol/L      Calcium 8.4 mg/dL      Total Protein 6.8 g/dL      Albumin 3.00 g/dL      ALT (SGPT) 52 U/L      AST (SGOT) 73 U/L      Alkaline Phosphatase 107 U/L      Total Bilirubin 0.2 mg/dL      eGFR Non African Amer 53 mL/min/1.73      Globulin 3.8 gm/dL      A/G Ratio 0.8 g/dL      BUN/Creatinine Ratio 12.5     Anion Gap 11.0 mmol/L     Narrative:      GFR Normal >60  Chronic Kidney Disease <60  Kidney Failure <15      Lipase [[id number]]  (Normal) Collected: 03/08/21 0536    Specimen: Blood Updated: 03/08/21 0701     Lipase 56 U/L     CBC & Differential [[id number]]  (Abnormal) Collected: 03/08/21 0536    Specimen: Blood Updated: 03/08/21 0633    Narrative:      The following orders were created for panel order CBC & Differential.  Procedure                                Abnormality         Status                     ---------                               -----------         ------                     Scan Slide[[id number]]                                                                  CBC Auto Differential[[id number]]        Abnormal            Final result                 Please view results for these tests on the individual orders.    CBC Auto Differential [[id number]]  (Abnormal) Collected: 03/08/21 0536    Specimen: Blood Updated: 03/08/21 0633     WBC 10.91 10*3/mm3      RBC 3.90 10*6/mm3      Hemoglobin 12.0 g/dL      Hematocrit 35.3 %      MCV 90.5 fL      MCH 30.8 pg      MCHC 34.0 g/dL      RDW 13.2 %      RDW-SD 43.2 fl      MPV 11.7 fL      Platelets 258 10*3/mm3      Neutrophil % 67.8 %      Lymphocyte % 20.0 %      Monocyte % 8.2 %      Eosinophil % 1.1 %      Basophil % 0.5 %      Immature Grans % 2.4 %      Neutrophils, Absolute 7.40 10*3/mm3      Lymphocytes, Absolute 2.18 10*3/mm3      Monocytes, Absolute 0.90 10*3/mm3      Eosinophils, Absolute 0.12 10*3/mm3      Basophils, Absolute 0.05 10*3/mm3      Immature Grans, Absolute 0.26 10*3/mm3      nRBC 0.0 /100 WBC     Hemoglobin & Hematocrit, Blood [[id number]]  (Normal) Collected: 03/07/21 2354    Specimen: Blood Updated: 03/08/21 0034     Hemoglobin 12.3 g/dL      Hematocrit 37.4 %           Imaging Results (Last 24 Hours)     Procedure Component Value Units Date/Time    CT Abdomen Pelvis Stone Protocol [[id number]] Collected: 03/07/21 2018     Updated: 03/07/21 2037    Narrative:      CT ABDOMEN PELVIS WITHOUT IV CONTRAST:  3/7/2021 8:18 PM CST    HISTORY:  Anemia.    COMPARISON:  None available.    TECHNIQUE: Noncontrast images of the abdomen and pelvis were  obtained.  Dose lowering techniques were utilized which include  adjusting the mA and/or kV to protocol and/or patient size.    ABDOMEN AND PELVIS:  LUNGS:  Visualized lung parenchyma is clear without mass or  infiltrate.  LIVER:  Normal with no focal  lesions.  SPLEEN:  Normal.  PANCREAS:  Normal.  ADRENAL GLANDS:  Normal.  KIDNEYS:  Normal.  GI TRACT:  There are multiple bilateral nonobstructing renal  stones.    There is mild prominence of the right collecting system as well  as mild prominence of the right ureter, the distal aspect of the  right ureter is not well visualized along its course there is a 3  mm stone that appears to be possibly in the region of the right  UVJ, this may be just inferior to the urinary bladder, further  evaluation with a contrast-enhanced CT with delayed images would  be helpful for better evaluation.  AORTA / IVC:  The aorta and IVC are normal in caliber.    URINARY BLADDER:  Normal.  OTHER FINDINGS:  None.    LYMPH NODES:  Normal.    OSSEOUS STRUCTURES AND SOFT TISSUES:  Normal.      Impression:      There are multiple bilateral nonobstructing renal stones.    There is mild prominence of the right collecting system as well  as mild prominence of the right ureter, the distal aspect of the  right ureter is not well visualized along its course there is a 3  mm stone that appears to be possibly in the region of the right  UVJ, this may be just inferior to the urinary bladder, further  evaluation with a contrast-enhanced CT with delayed images would  be helpful for better evaluation.    Electronically signed by:  Abhay Tarango MD  3/7/2021 8:36 PM CST  Workstation: [id number]          Assessment/Plan       Intractable vomiting    UTI (urinary tract infection)    Cholelithiasis    Chronic pain    Bipolar affective disorder (CMS/HCC)    Nephrolithiasis    Continue with the IV antibiotics, IV fluid, symptomatic relief.  Advance diet as tolerated.    Aleksandar Jaime MD  03/08/21  12:46 CST

## 2021-03-08 NOTE — CONSULTS
Adult Nutrition  Assessment    Patient Name:  Myles Shannon  YOB: 1975  MRN: [id number]  Admit Date:  3/7/2021    Assessment Date:  3/8/2021    Comments:  Pt admitted due to intractable vomiting.  Protonix, PRN Zofran prescribed.  GI Soft diet ordered this date.  Intake 100% - 1x, 100% Clear Liquids - 1x.  Pt indicates her appetite has improved and she is hungry.  Reports  6 lb wt loss the past week due to being sick.  Reports weighing 169 lb yesterday.  Reports difficulty chewing but wants regular consistency food.  Labs reviewed.  Will maintain pt on prescribed diet adding chocolate milk to meals.    Reason for Assessment     Row Name 03/08/21 1523          Reason for Assessment    Reason For Assessment  identified at risk by screening criteria     Identified At Risk by Screening Criteria  MST SCORE 2+             Labs/Tests/Procedures/Meds     Row Name 03/08/21 1523          Labs/Procedures/Meds    Lab Results Reviewed  reviewed        Medications    Pertinent Medications Reviewed  reviewed, pertinent           Estimated/Assessed Needs     Row Name 03/08/21 1524          Calculation Measurements    Weight Used For Calculations  54.6 kg (120 lb 5.9 oz)        Estimated/Assessed Needs    Additional Documentation  Calorie Requirements (Group);Fluid Requirements (Group);Ada-St. Jeor Equation (Group);Protein Requirements (Group)        Calorie Requirements    Estimated Calorie Requirement (kcal/day)  1466     Estimated Calorie Need Method  Ada-St Jeor        Ada-St. Jeor Equation    RMR (Ada-St. Jeor Equation)  1128.375        Protein Requirements    Weight Used For Protein Calculations  54.6 kg (120 lb 5.9 oz)     Est Protein Requirement Amount (gms/kg)  1.2 gm protein     Estimated Protein Requirements (gms/day)  65.52        Fluid Requirements    Fluid Requirements (mL/day)  1639     RDA Method (mL)  1639         Nutrition Prescription Ordered     Row Name 03/08/21 152           Nutrition Prescription PO    Current PO Diet  Regular     Common Modifiers  GI Soft/[place]         Evaluation of Received Nutrient/Fluid Intake     Row Name 03/08/21 1529          PO Evaluation    Number of Meals  2     % PO Intake  100% - 1x, 100% Clear Liquids - 1x               Electronically signed by:  Jeanine Barr RD  03/08/21 15:30 CST

## 2021-03-08 NOTE — PLAN OF CARE
Pt reports no pain at this time. Pt has had one episode of diarrhea. Pt is very unsteady and has been ambulating with a walker today.    Goal Outcome Evaluation:

## 2021-03-08 NOTE — PLAN OF CARE
Goal Outcome Evaluation:  Plan of Care Reviewed With: other (see comments)  Progress: no change  Outcome Summary: Initial assessment.  Intak 100% - 1x, 100% ClearLiquids - 1x.  Encoruge intake of meals and supplement.

## 2021-03-08 NOTE — NURSING NOTE
Notified Dr. Allison regarding changes in pt home medication list. Discontinued medications pt states she no longer takes and added one medication per Dr. Allison

## 2021-03-08 NOTE — H&P
HISTORY AND PHYSICAL   [facility]        Patient Identification:  Name: Myles Shannon  Age: 45 y.o.  Sex: female  :  1975  MRN: [id number]                     Primary Care Physician: Nick Boateng MD    Chief Complaint:  Nausea and vomiting with some coffee-ground emesis and possibly some bloody stools with back pain and diffuse abdominal pain    History of Present Illness:           The patient is a 45-year-old white female with history of bipolar disorder and tobacco abuse and history of kidney stones who is admitted with four 5-day history of having some nausea vomiting with some hematemesis and hematochezia with some back pain and diffuse abdominal pain and hurting all over with some chills but no definite fever.  The patient was feeling pretty weak and ended up going to the emergency room for further evaluation was found to have gallstones on gallbladder ultrasound and also had urinary tract infection.  The patient was given some IV Protonix and IV antibiotics and urine was cultured.  The patient is admitted for further evaluation treatment of her symptoms with diffuse abdominal pain back pain nausea and vomiting and generally just not feeling well.  Surgery was consulted from the ER and did not feel the patient had cholecystitis.    Past Medical History:  Past Medical History:   Diagnosis Date   • Abdominal pain     Chronic RLQ, RUQ, R flank comes and goes.    • Abdominal pain, right lower quadrant    • Acute bilateral otitis media    • Allergic rhinitis    • Backache     Upper back.   • Candidiasis of skin    • Cellulitis of neck    • Chest pain    • Contraception    • Cough    • Dysfunction of eustachian tube    • Dyspareunia, female    • Excessive or frequent menstruation    • Flank pain    • Generalized aches and pains    • Headache    • Health maintenance examination    • Infection of skin and subcutaneous tissue    • Irregular periods    • Kidney stone      Poss   • Menorrhagia    • Nausea vomiting and diarrhea    • Obesity    • Open wound of abdominal wall    • Otalgia    • Pain in left foot    • Pain in unspecified hand    • Removed Impacted cerumen 2012   • Rhinitis    • Shoulder pain     right-sided-likely muscle strain      • Shoulder tendinitis    • Spider bite wound    • Staphylococcal infection of skin    • Swollen feet    • Tinea cruris    • Tobacco dependence syndrome    • Upper respiratory infection    • Urinary tract infectious disease    • Vertigo    • Visit for gynecologic examination    • Vulvovaginitis      Past Surgical History:  Past Surgical History:   Procedure Laterality Date   •  SECTION     • DILATATION AND CURETTAGE      Secondary to incomplete spontaneous    • INCISION AND DRAINAGE ABSCESS  2012   • INJECTION OF MEDICATION  2015    Phenergan (1)     • INJECTION OF MEDICATION  10/10/2013    Toradol (2)      • INJECTION OF MEDICATION  2014    Zofran (1)         Home Meds:  Medications Prior to Admission   Medication Sig Dispense Refill Last Dose   • divalproex (DEPAKOTE) 500 MG DR tablet Take 500 mg by mouth every night at bedtime.  5    • lithium (ESKALITH) 450 MG CR tablet Take 450 mg by mouth Daily.  2    • PARoxetine (PAXIL) 20 MG tablet TAKE ONE TABLET BY MOUTH ONCE A DAY IN THE MORNING  0    • SUMAtriptan (IMITREX) 50 MG tablet TAKE ONE TABLET BY MOUTH EVERY TWO HOURS AS NEEDED FOR MIGRAINE  11    • tiZANidine (ZANAFLEX) 4 MG tablet Take 4 mg by mouth At Night As Needed for Muscle Spasms.        CURRENT MEDS    Current Facility-Administered Medications:   •  acetaminophen (TYLENOL) tablet 650 mg, 650 mg, Oral, Q4H PRN **OR** acetaminophen (TYLENOL) 160 MG/5ML solution 650 mg, 650 mg, Oral, Q4H PRN **OR** acetaminophen (TYLENOL) suppository 650 mg, 650 mg, Rectal, Q4H PRN, Juan Conroy MD  •  cefTRIAXone (ROCEPHIN) 1 g/100 mL 0.9% NS (MBP), 1 g, Intravenous, Q24H, Juan Conroy MD  •   HYDROcodone-acetaminophen (NORCO) 7.5-325 MG per tablet 2 tablet, 2 tablet, Oral, Q4H PRN, Juan Conroy MD  •  ondansetron (ZOFRAN) tablet 4 mg, 4 mg, Oral, Q6H PRN **OR** ondansetron (ZOFRAN) injection 4 mg, 4 mg, Intravenous, Q6H PRN, Juan Conroy MD  •  pantoprazole (PROTONIX) injection 40 mg, 40 mg, Intravenous, BID AC, Juan Conroy MD  •  [COMPLETED] Insert peripheral IV, , , Once **AND** sodium chloride 0.9 % flush 10 mL, 10 mL, Intravenous, PRN, Chano Flynn MD  •  sodium chloride 0.9 % flush 10 mL, 10 mL, Intravenous, Q12H, Juan Conroy MD  •  sodium chloride 0.9 % flush 10 mL, 10 mL, Intravenous, PRN, Juan Conroy MD  •  sodium chloride 0.9 % infusion, 125 mL/hr, Intravenous, Continuous, Chano Flynn MD, Last Rate: 125 mL/hr at 03/07/21 1557, 125 mL/hr at 03/07/21 1557  Allergies:  Allergies   Allergen Reactions   • Abilify [Aripiprazole] Nausea Only   • Buspirone Rash   • Penicillins Hives and Nausea And Vomiting     Immunizations:  Immunization History   Administered Date(s) Administered   • DTaP [birth year], 07/08/1978   • IPV 1975, 01/07/1976, 03/30/1976, 07/08/1978   • PPD Test 07/28/1979, 08/12/1983, 08/17/1984, 08/17/1984   • Tdap 08/17/1984, 04/06/1990, 03/21/2012     Social History:   Social History     Social History Narrative   • Not on file     Social History     Socioeconomic History   • Marital status: Single     Spouse name: Not on file   • Number of children: Not on file   • Years of education: Not on file   • Highest education level: Not on file   Tobacco Use   • Smoking status: Current Every Day Smoker     Packs/day: 0.50     Types: Cigarettes   • Smokeless tobacco: Never Used   Substance and Sexual Activity   • Alcohol use: No   • Drug use: No   • Sexual activity: Defer       Family History:  Family History   Problem Relation Age of Onset   • Depression Other    • Cancer Other         Colorectal Cancer   • Endometrial cancer Other    • Lung  "cancer Other    • Endometrial cancer Sister    • Breast cancer Maternal Grandmother         Review of Systems  See history of present illness and past medical history.  Patient denies headache, dizziness, syncope, falls, trauma, change in vision, change in hearing, change in taste, changes in weight, changes in appetite, focal weakness, numbness, or paresthesia.  Patient denies chest pain, palpitations, dyspnea, orthopnea, PND, cough, sinus pressure, rhinorrhea, epistaxis, hemoptysis,  diarrhea, constipation or hematchezia.  Denies cold or heat intolerance, polydipsia, polyuria, polyphagia. Denies hematuria, pyuria, dysuria, hesitancy, frequency or urgency.  Denies fever, chills, sweats, night sweats.  Denies missing any routine medications. Remainder of ROS is negative.    Objective:  tMax 24 hrs: Temp (24hrs), Av.7 °F (36.5 °C), Min:97.1 °F (36.2 °C), Max:98.2 °F (36.8 °C)    Vitals Ranges:   Temp:  [97.1 °F (36.2 °C)-98.2 °F (36.8 °C)] 97.1 °F (36.2 °C)  Heart Rate:  [60-83] 79  Resp:  [18] 18  BP: ()/(65-76) 119/68      Exam:  /68 (BP Location: Left arm, Patient Position: Lying)   Pulse 79   Temp 97.1 °F (36.2 °C) (Temporal)   Resp 18   Ht 154.9 cm (61\")   Wt 76.7 kg (169 lb)   LMP 2021   SpO2 99%   BMI 31.93 kg/m²     General Appearance:    Alert, cooperative, no distress, appears stated age   Head:    Normocephalic, without obvious abnormality, atraumatic   Eyes:    PERRL, conjunctiva/corneas clear, EOM's intact, both eyes   Ears:    Normal external ear canals, both ears   Nose:   Nares normal, septum midline, mucosa normal, no drainage    or sinus tenderness   Throat:   Lips, mucosa, and tongue normal   Neck:   Supple, symmetrical, trachea midline, no adenopathy;     thyroid:  no enlargement/tenderness/nodules; no carotid    bruit or JVD   Back:     Symmetric, no curvature, ROM normal, no CVA tenderness   Lungs:     Clear to auscultation bilaterally, respirations unlabored "   Chest Wall:    No tenderness or deformity    Heart:    Regular rate and rhythm, S1 and S2 normal, no murmur, rub   or gallop   Abdomen:     Soft, mild diffuse abdominal tenderness and some bilateral flank tenderness, bowel sounds active all four quadrants,     no masses, no hepatomegaly, no splenomegaly   Extremities:   Extremities normal, atraumatic, no cyanosis or edema   Pulses:   2+ and symmetric all extremities   Skin:   Skin color, texture, turgor normal, no rashes or lesions   Lymph nodes:   Cervical, supraclavicular, and axillary nodes normal   Neurologic:   CNII-XII intact, normal strength, sensation intact throughout      .    Data Review:  Lab Results (last 72 hours)     Procedure Component Value Units Date/Time    Extra Tubes [[id number]] Collected: 03/07/21 1549    Specimen: Blood, Venous Line Updated: 03/07/21 1700    Narrative:      The following orders were created for panel order Extra Tubes.  Procedure                               Abnormality         Status                     ---------                               -----------         ------                     Lavender Top[[id number]]                                     Final result                 Please view results for these tests on the individual orders.    Lavender Top [[id number]] Collected: 03/07/21 1549    Specimen: Blood Updated: 03/07/21 1700     Extra Tube hold for add-on     Comment: Auto resulted       Manual Differential [[id number]]  (Abnormal) Collected: 03/07/21 1549    Specimen: Blood Updated: 03/07/21 1635     Neutrophil % 79.0 %      Lymphocyte % 11.0 %      Monocyte % 7.0 %      Bands %  3.0 %      Neutrophils Absolute 9.07 10*3/mm3      Lymphocytes Absolute 1.22 10*3/mm3      Monocytes Absolute 0.77 10*3/mm3      RBC Morphology Normal     WBC Morphology Normal     Platelet Morphology Normal    Protime-INR [[id number]]  (Abnormal) Collected: 03/07/21 1549    Specimen: Blood Updated: 03/07/21 1623     Protime 15.8 Seconds       INR 1.20    Narrative:      Therapeutic range for most indications is 2.0-3.0 INR,  or 2.5-3.5 for mechanical heart valves.    aPTT [[id number]]  (Normal) Collected: 03/07/21 1549    Specimen: Blood Updated: 03/07/21 1623     PTT 34.9 seconds     Narrative:      The recommended Heparin therapeutic range is 68-97 seconds.    Urinalysis, Microscopic Only - Urine, Clean Catch [[id number]]  (Abnormal) Collected: 03/07/21 1538    Specimen: Urine, Clean Catch Updated: 03/07/21 1620     RBC, UA 13-20 /HPF      WBC, UA Too Numerous to Count /HPF      Bacteria, UA 4+ /HPF      Squamous Epithelial Cells, UA 6-12 /HPF      Yeast, UA Small/1+ Yeast /HPF      Hyaline Casts, UA 3-6 /LPF      Methodology Manual Light Microscopy    Lithium Level [[id number]]  (Abnormal) Collected: 03/07/21 1549    Specimen: Blood Updated: 03/07/21 1618     Lithium 0.1 mmol/L     Comprehensive Metabolic Panel [[id number]]  (Abnormal) Collected: 03/07/21 1549    Specimen: Blood Updated: 03/07/21 1613     Glucose 127 mg/dL      BUN 19 mg/dL      Creatinine 1.26 mg/dL      Sodium 135 mmol/L      Potassium 3.6 mmol/L      Chloride 102 mmol/L      CO2 19.0 mmol/L      Calcium 9.3 mg/dL      Total Protein 7.1 g/dL      Albumin 3.30 g/dL      ALT (SGPT) 41 U/L      AST (SGOT) 66 U/L      Alkaline Phosphatase 100 U/L      Total Bilirubin 0.3 mg/dL      eGFR Non African Amer 46 mL/min/1.73      Globulin 3.8 gm/dL      A/G Ratio 0.9 g/dL      BUN/Creatinine Ratio 15.1     Anion Gap 14.0 mmol/L     Narrative:      GFR Normal >60  Chronic Kidney Disease <60  Kidney Failure <15      Lipase [[id number]]  (Normal) Collected: 03/07/21 1549    Specimen: Blood Updated: 03/07/21 1613     Lipase 41 U/L     Valproic Acid Level, Total [[id number]]  (Abnormal) Collected: 03/07/21 1549    Specimen: Blood Updated: 03/07/21 1613     Valproic Acid 3.8 mcg/mL     hCG, Serum, Qualitative [[id number]]  (Normal) Collected: 03/07/21 1549    Specimen: Blood Updated: 03/07/21 1608     HCG  Qualitative Negative    CBC & Differential [[id number]]  (Abnormal) Collected: 03/07/21 1549    Specimen: Blood Updated: 03/07/21 1606    Narrative:      The following orders were created for panel order CBC & Differential.  Procedure                               Abnormality         Status                     ---------                               -----------         ------                     Scan Slide[[id number]]                                                                  CBC Auto Differential[[id number]]        Abnormal            Final result                 Please view results for these tests on the individual orders.    CBC Auto Differential [[id number]]  (Abnormal) Collected: 03/07/21 1549    Specimen: Blood Updated: 03/07/21 1606     WBC 11.06 10*3/mm3      RBC 4.03 10*6/mm3      Hemoglobin 12.4 g/dL      Hematocrit 36.3 %      MCV 90.1 fL      MCH 30.8 pg      MCHC 34.2 g/dL      RDW 12.9 %      RDW-SD 42.5 fl      MPV 11.5 fL      Platelets 232 10*3/mm3     Urinalysis With Microscopic If Indicated (No Culture) - Urine, Clean Catch [[id number]]  (Abnormal) Collected: 03/07/21 1538    Specimen: Urine, Clean Catch Updated: 03/07/21 1556     Color, UA Dark Yellow     Appearance, UA Cloudy     pH, UA 6.0     Specific Gravity, UA 1.015     Glucose, UA Negative     Ketones, UA Negative     Bilirubin, UA Negative     Blood, UA Large (3+)     Protein,  mg/dL (2+)     Leuk Esterase, UA Large (3+)     Nitrite, UA Positive     Urobilinogen, UA 4.0 E.U./dL                   Imaging Results (All)     Procedure Component Value Units Date/Time    US Gallbladder [[id number]] Collected: 03/07/21 1640     Updated: 03/07/21 1720    Narrative:        Ultrasound gallbladder    HISTORY: Elevated liver function test. Vomiting.    Ultrasound examination of the right upper quadrant was performed.    COMPARISON: None    FINDINGS:  The visualized liver is of normal echotexture.  No focal intrahepatic lesion.  Somewhat  contracted gallbladder containing several small calculi.  Positive Gifford's sign.  No wall thickening or pericholecystic fluid.  Common bile duct is within normal limits at 5.0 mm.  Images of the right kidney are unremarkable.  Right kidney 12.48 cm in length by 6.05 cm x 5.10 cm transverse.  Pancreas partially obscured by bowel gas.      Impression:      CONCLUSION:  Somewhat contracted gallbladder containing several small calculi.  Positive Gifford's sign.    [id number]    Electronically signed by:  Christopher Self MD  3/7/2021 5:19 PM CST  Workstation: [employer]    XR Abdomen 2+ VW with Chest 1 VW [[id number]] Collected: 03/07/21 1538     Updated: 03/07/21 1556    Narrative:      Acute Abdominal Series Withe Upright Chest.    History: Vomiting    Upright frontal film of the chest and supine and upright films of  the abdomen were obtained.    Comparison: January 16, 2014    FINDINGS:    The lungs are clear of an acute process.  The heart is not enlarged.  The pulmonary vasculature is not increased.  No pleural effusion.  No pneumothorax.  No acute osseous abnormality.  Degenerative changes are present in the thoracic spine.    No free air.  Nonspecific air-fluid levels in the small and large bowel.  No mechanical bowel obstruction.  No organomegaly.  6 mm and smaller bilateral renal calculi.  Multiple pelvic phleboliths.  No acute osseous abnormality.      Impression:      Conclusion:  No acute disease in the chest.  No free air.  Nonspecific air-fluid levels in the small and large bowel.  No mechanical bowel obstruction.  6 mm and smaller bilateral renal calculi.    [id number]    Electronically signed by:  Christopher Self MD  3/7/2021 3:55 PM CST  Workstation: [employer]        Patient Active Problem List   Diagnosis Code   • Nicotine abuse Z72.0   • Other chest pain R07.89   • Acute maxillary sinusitis J01.00   • Bipolar affective disorder (CMS/HCC) F31.9   • Decreased pulses in feet R09.89   • Flank pain R10.9   •  Hematochezia K92.1   • High cholesterol E78.00   • Migraine without aura and with status migrainosus, not intractable G43.001   • Migraine without status migrainosus, not intractable G43.909   • Neck pain M54.2   • Nephrolithiasis N20.0   • Neuropathy G62.9   • Other chronic pain G89.29   • Primary osteoarthritis involving multiple joints M89.49   • Sciatic leg pain M54.30   • Screening for diabetes mellitus (DM) Z13.1   • Seasonal allergies J30.2   • Stress F43.9   • Tobacco use disorder F17.200   • Urinary incontinence without sensory awareness N39.42   • Vitamin D deficiency E55.9   • Carpal tunnel syndrome of right wrist G56.01   • Seizure (CMS/HCC) R56.9   • Intractable vomiting R11.10   • UTI (urinary tract infection) N39.0   • Cholelithiasis K80.20   • GI bleed K92.2   • Kidney stone N20.0       Assessment:  Active Hospital Problems    Diagnosis  POA   • **Intractable vomiting [R11.10]  Yes   • UTI (urinary tract infection) [N39.0]  Unknown   • Cholelithiasis [K80.20]  Unknown   • GI bleed [K92.2]  Unknown   • Kidney stone [N20.0]  Unknown   • High cholesterol [E78.00]  Yes   • Other chronic pain [G89.29]  Yes   • Nicotine abuse [Z72.0]  Yes   • Bipolar affective disorder (CMS/HCC) [F31.9]  Yes   • Tobacco use disorder [F17.200]  Yes      Resolved Hospital Problems   No resolved problems to display.       Plan:  The patient is admitted to hospital we will continue with some IV fluid for hydration and medication for pain and nausea.  We will continue with IV Protonix and some IV antibiotics.  Will get CT abdomen pelvis stone protocol as she has history of kidney stones and blood in her urine.  General surgery has been consulted from the ER for further evaluation treatment.    Juan Conroy MD  3/7/2021  19:06 CST

## 2021-03-08 NOTE — PLAN OF CARE
Goal Outcome Evaluation:        Outcome Summary: Pt voiding frequently, states discomfort is improving while voiding, straining urine; ABX administered; on NS at 125mls/hr, pt unsteady, requires assistance X1; vital signs stable

## 2021-03-09 VITALS
WEIGHT: 166.8 LBS | SYSTOLIC BLOOD PRESSURE: 124 MMHG | HEART RATE: 69 BPM | RESPIRATION RATE: 18 BRPM | OXYGEN SATURATION: 99 % | TEMPERATURE: 97 F | BODY MASS INDEX: 31.49 KG/M2 | HEIGHT: 61 IN | DIASTOLIC BLOOD PRESSURE: 67 MMHG

## 2021-03-09 PROBLEM — B96.20 E. COLI UTI (URINARY TRACT INFECTION): Status: ACTIVE | Noted: 2021-03-07

## 2021-03-09 PROBLEM — N17.9 AKI (ACUTE KIDNEY INJURY) (HCC): Status: ACTIVE | Noted: 2021-03-09

## 2021-03-09 LAB
ANION GAP SERPL CALCULATED.3IONS-SCNC: 9 MMOL/L (ref 5–15)
BASOPHILS # BLD AUTO: 0.03 10*3/MM3 (ref 0–0.2)
BASOPHILS NFR BLD AUTO: 0.2 % (ref 0–1.5)
BUN SERPL-MCNC: 7 MG/DL (ref 6–20)
BUN/CREAT SERPL: 7.4 (ref 7–25)
CALCIUM SPEC-SCNC: 8.7 MG/DL (ref 8.6–10.5)
CHLORIDE SERPL-SCNC: 113 MMOL/L (ref 98–107)
CO2 SERPL-SCNC: 20 MMOL/L (ref 22–29)
CREAT SERPL-MCNC: 0.95 MG/DL (ref 0.57–1)
DEPRECATED RDW RBC AUTO: 47.5 FL (ref 37–54)
EOSINOPHIL # BLD AUTO: 0.28 10*3/MM3 (ref 0–0.4)
EOSINOPHIL NFR BLD AUTO: 2.3 % (ref 0.3–6.2)
ERYTHROCYTE [DISTWIDTH] IN BLOOD BY AUTOMATED COUNT: 13.9 % (ref 12.3–15.4)
GFR SERPL CREATININE-BSD FRML MDRD: 64 ML/MIN/1.73
GLUCOSE SERPL-MCNC: 92 MG/DL (ref 65–99)
HCT VFR BLD AUTO: 37.7 % (ref 34–46.6)
HCT VFR BLD AUTO: 37.7 % (ref 34–46.6)
HGB BLD-MCNC: 12.4 G/DL (ref 12–15.9)
HGB BLD-MCNC: 12.4 G/DL (ref 12–15.9)
IMM GRANULOCYTES # BLD AUTO: 0.15 10*3/MM3 (ref 0–0.05)
IMM GRANULOCYTES NFR BLD AUTO: 1.2 % (ref 0–0.5)
LYMPHOCYTES # BLD AUTO: 2.85 10*3/MM3 (ref 0.7–3.1)
LYMPHOCYTES NFR BLD AUTO: 23.1 % (ref 19.6–45.3)
MCH RBC QN AUTO: 30.8 PG (ref 26.6–33)
MCHC RBC AUTO-ENTMCNC: 32.9 G/DL (ref 31.5–35.7)
MCV RBC AUTO: 93.8 FL (ref 79–97)
MONOCYTES # BLD AUTO: 1.15 10*3/MM3 (ref 0.1–0.9)
MONOCYTES NFR BLD AUTO: 9.3 % (ref 5–12)
NEUTROPHILS NFR BLD AUTO: 63.9 % (ref 42.7–76)
NEUTROPHILS NFR BLD AUTO: 7.9 10*3/MM3 (ref 1.7–7)
NRBC BLD AUTO-RTO: 0 /100 WBC (ref 0–0.2)
PLATELET # BLD AUTO: 315 10*3/MM3 (ref 140–450)
PMV BLD AUTO: 11.4 FL (ref 6–12)
POTASSIUM SERPL-SCNC: 3.7 MMOL/L (ref 3.5–5.2)
RBC # BLD AUTO: 4.02 10*6/MM3 (ref 3.77–5.28)
SODIUM SERPL-SCNC: 142 MMOL/L (ref 136–145)
WBC # BLD AUTO: 12.36 10*3/MM3 (ref 3.4–10.8)

## 2021-03-09 PROCEDURE — 97166 OT EVAL MOD COMPLEX 45 MIN: CPT

## 2021-03-09 PROCEDURE — 80048 BASIC METABOLIC PNL TOTAL CA: CPT | Performed by: INTERNAL MEDICINE

## 2021-03-09 PROCEDURE — 96376 TX/PRO/DX INJ SAME DRUG ADON: CPT

## 2021-03-09 PROCEDURE — 96361 HYDRATE IV INFUSION ADD-ON: CPT

## 2021-03-09 PROCEDURE — 85014 HEMATOCRIT: CPT | Performed by: HOSPITALIST

## 2021-03-09 PROCEDURE — 85018 HEMOGLOBIN: CPT | Performed by: HOSPITALIST

## 2021-03-09 PROCEDURE — 85025 COMPLETE CBC W/AUTO DIFF WBC: CPT | Performed by: INTERNAL MEDICINE

## 2021-03-09 PROCEDURE — G0378 HOSPITAL OBSERVATION PER HR: HCPCS

## 2021-03-09 RX ORDER — LEVOFLOXACIN 500 MG/1
500 TABLET, FILM COATED ORAL DAILY
Qty: 3 TABLET | Refills: 0 | Status: SHIPPED | OUTPATIENT
Start: 2021-03-09 | End: 2021-12-19 | Stop reason: HOSPADM

## 2021-03-09 RX ORDER — ONDANSETRON 4 MG/1
4 TABLET, FILM COATED ORAL EVERY 6 HOURS PRN
Qty: 20 TABLET | Refills: 0 | Status: SHIPPED | OUTPATIENT
Start: 2021-03-09 | End: 2021-12-19 | Stop reason: HOSPADM

## 2021-03-09 RX ADMIN — PANTOPRAZOLE SODIUM 40 MG: 40 INJECTION, POWDER, FOR SOLUTION INTRAVENOUS at 08:30

## 2021-03-09 RX ADMIN — LEVETIRACETAM 250 MG: 250 TABLET ORAL at 08:30

## 2021-03-09 RX ADMIN — SODIUM CHLORIDE, PRESERVATIVE FREE 10 ML: 5 INJECTION INTRAVENOUS at 08:31

## 2021-03-09 RX ADMIN — LITHIUM CARBONATE 450 MG: 450 TABLET, EXTENDED RELEASE ORAL at 08:30

## 2021-03-09 NOTE — DISCHARGE SUMMARY
Discharge Summary  Aleksandar Jaime MD  Hospitalist     Date of Discharge:  3/10/2021    Discharge Diagnosis:      Infection due to ESBL-producing Escherichia coli    Acute UTI (urinary tract infection)    Intractable vomiting    ELBERT (acute kidney injury) (CMS/[facility])    Chronic pain    Cholelithiasis    Bipolar affective disorder (CMS/[facility])    Nephrolithiasis      Presenting Problem/History of Present Illness  Nausea and vomitinng    Hospital Course  Patient is a 45 y.o. female admitted for nausea / vomiting related to an E.Coli UTI, complaints that improved with symptomatic treatment, generous IV hydration, IV antibiotics. Her white count and creatinine have improved, she can tolerate now a regular diet, her vital signs are stable and she will be discharged home.    She will be followed by Home Health and she will see her family doctor within a week.    Consults:   Consults     No orders found from 2/6/2021 to 3/8/2021.        Pertinent Test Results: ESBL E.Coli isolated in the urine culture.    Condition on Discharge:  stable    Vital Signs       Physical Exam:  Physical Exam  Vitals reviewed.   Constitutional:       General: She is not in acute distress.     Appearance: She is obese. She is ill-appearing.   HENT:      Head: Normocephalic and atraumatic.   Eyes:      General: No scleral icterus.     Extraocular Movements: Extraocular movements intact.      Pupils: Pupils are equal, round, and reactive to light.   Cardiovascular:      Rate and Rhythm: Normal rate and regular rhythm.   Pulmonary:      Effort: No respiratory distress.      Breath sounds: No stridor. No wheezing or rales.   Chest:      Chest wall: No tenderness.   Abdominal:      General: Bowel sounds are normal. There is no distension.      Palpations: Abdomen is soft. There is no mass.      Tenderness: There is no abdominal tenderness. There is no right CVA tenderness or left CVA tenderness.   Musculoskeletal:         General: No swelling, tenderness or  deformity. Normal range of motion.      Cervical back: Normal range of motion and neck supple. No rigidity or tenderness.      Right lower leg: No edema.      Left lower leg: No edema.   Skin:     General: Skin is warm and dry.      Coloration: Skin is not jaundiced or pale.      Findings: No bruising, lesion or rash.   Neurological:      General: No focal deficit present.      Mental Status: She is alert and oriented to person, place, and time. Mental status is at baseline.      Cranial Nerves: No cranial nerve deficit.      Sensory: No sensory deficit.      Motor: No weakness.   Psychiatric:         Mood and Affect: Mood normal.         Behavior: Behavior normal.         [place] [facility] [place] Medications     Discharge Medications      New Medications      Instructions Start Date   Fosfomycin 3 Gram   3 Grams, Oral, Every 3 days x 3 doses      ondansetron 4 MG tablet  Commonly known as: ZOFRAN   4 mg, Oral, Every 6 Hours PRN         Continue These Medications      Instructions Start Date   divalproex 500 MG DR tablet  Commonly known as: DEPAKOTE   500 mg, Oral, Every Night at Bedtime      levETIRAcetam 250 MG tablet  Commonly known as: KEPPRA   250 mg, Oral, 2 Times Daily      lithium 450 MG CR tablet  Commonly known as: ESKALITH   450 mg, Oral, Daily      SUMAtriptan 50 MG tablet  Commonly known as: IMITREX   TAKE ONE TABLET BY MOUTH EVERY TWO HOURS AS NEEDED FOR MIGRAINE         Stop These Medications    PARoxetine 20 MG tablet  Commonly known as: PAXIL     tiZANidine 4 MG tablet  Commonly known as: ZANAFLEX            Discharge Diet:   Diet Instructions     Diet: Regular      Discharge Diet: Regular          Activity at Discharge:   Activity Instructions     Activity as Tolerated            Follow-up Appointments  No future appointments.  Additional Instructions for the Follow-ups that You Need to Schedule     Ambulatory Referral to Home Health   As directed      Face to Face  Visit Date: 3/9/2021    Follow-up provider for Plan of Care?: I treated the patient in an acute care facility and will not continue treatment after discharge.    Follow-up provider: MARCELLE BOATENG [[id number]]    Reason/Clinical Findings: uti    Describe mobility limitations that make leaving home difficult: uti    Nursing/Therapeutic Services Requested: Skilled Nursing Physical Therapy Occupational Therapy    Skilled nursing orders: Medication education    PT orders: Therapeutic exercise    Occupational orders: Strengthening    Frequency: 1 Week 1         Discharge Follow-up with PCP   As directed       Currently Documented PCP:    Marcelle Boateng MD    PCP Phone Number:    [phone]     Follow Up Details: in 1 week               Test Results Pending at Discharge       Aleksandar Jaime MD  03/10/21  15:01 CST
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
## 2021-03-09 NOTE — PLAN OF CARE
Goal Outcome Evaluation:  Plan of Care Reviewed With: patient     Outcome Summary: OT randall completed this date. pt alert and cooperative but struggled with attention, safety, direction following and independence. Pt was mod I with bed mobility. Pt SBA to CGA for sit to stand t/f with cues for RW; Pt min assist with RW for mobility. Pt SBA to don socks. Pt CGA for toileting. Pt CGA to min assist to stand at sink and wash hands. Pt could benefit from further skilled OT to reach PLOF/max independence with ADL. Recommend 24/7 care and further OT and PT at d/c. Pt will likely need a RW at d/c.

## 2021-03-09 NOTE — DISCHARGE PLACEMENT REQUEST
"    Please deliver walker to room 320. If questions, call Odilia at [phone]. Thank you    Myles Torres (45 y.o. Female)     Date of Birth Social Security Number Address Home Phone MRN    1975  [address] [id number] [phone] [id number]    Sikh Marital Status          Other Single       Admission Date Admission Type Admitting Provider Attending Provider Department, Room/Bed    3/7/21 Emergency Juan Conroy MD Williams, Kevin L, MD [address], 320/1    Discharge Date Discharge Disposition Discharge Destination         Home-Health Care [facility]              Attending Provider: Santy Allison MD    Allergies: Abilify [Aripiprazole], Buspirone, Penicillins    Isolation: None   Infection: None   Code Status: CPR    Ht: 154.9 cm (61\")   Wt: 75.7 kg (166 lb 12.8 oz)    Admission Cmt: None   Principal Problem: E. coli UTI (urinary tract infection) [N39.0,B96.20]                 Active Insurance as of 3/7/2021     Primary Coverage     Payor Plan Insurance Group Employer/Plan Group    [place]      Payor Plan Address Payor Plan Phone Number Payor Plan Fax Number Effective Dates    [address] [address] [phone]  10/17/2016 - None Entered    [facility] [id number]       Subscriber Name Subscriber Birth Date Member ID       MYLES TORRES 1975 [id number]                 Emergency Contacts      (Rel.) Home Phone Work Phone Mobile Phone    Arleen Torres (Mother) -- -- [phone]            Insurance Information                [facility] MEDICAID Phone: [phone]    Subscriber: Myles Torres Subscriber#: [id number]    Group#:  Precert#:              History & Physical      Juan Conroy MD at 21 1827          HISTORY AND PHYSICAL   [facility]        Patient Identification:  Name: Myles Torres  Age: 45 y.o.  Sex: female  :  1975  MRN: " [id number]                     Primary Care Physician: Nick Boateng MD    Chief Complaint:  Nausea and vomiting with some coffee-ground emesis and possibly some bloody stools with back pain and diffuse abdominal pain    History of Present Illness:           The patient is a 45-year-old white female with history of bipolar disorder and tobacco abuse and history of kidney stones who is admitted with four 5-day history of having some nausea vomiting with some hematemesis and hematochezia with some back pain and diffuse abdominal pain and hurting all over with some chills but no definite fever.  The patient was feeling pretty weak and ended up going to the emergency room for further evaluation was found to have gallstones on gallbladder ultrasound and also had urinary tract infection.  The patient was given some IV Protonix and IV antibiotics and urine was cultured.  The patient is admitted for further evaluation treatment of her symptoms with diffuse abdominal pain back pain nausea and vomiting and generally just not feeling well.  Surgery was consulted from the ER and did not feel the patient had cholecystitis.    Past Medical History:  Past Medical History:   Diagnosis Date   • Abdominal pain     Chronic RLQ, RUQ, R flank comes and goes.    • Abdominal pain, right lower quadrant    • Acute bilateral otitis media    • Allergic rhinitis    • Backache     Upper back.   • Candidiasis of skin    • Cellulitis of neck    • Chest pain    • Contraception    • Cough    • Dysfunction of eustachian tube    • Dyspareunia, female    • Excessive or frequent menstruation    • Flank pain    • Generalized aches and pains    • Headache    • Health maintenance examination    • Infection of skin and subcutaneous tissue    • Irregular periods    • Kidney stone     Poss   • Menorrhagia    • Nausea vomiting and diarrhea    • Obesity    • Open wound of abdominal wall    • Otalgia    • Pain in left foot    • Pain in unspecified  hand    • Removed Impacted cerumen 2012   • Rhinitis    • Shoulder pain     right-sided-likely muscle strain      • Shoulder tendinitis    • Spider bite wound    • Staphylococcal infection of skin    • Swollen feet    • Tinea cruris    • Tobacco dependence syndrome    • Upper respiratory infection    • Urinary tract infectious disease    • Vertigo    • Visit for gynecologic examination    • Vulvovaginitis      Past Surgical History:  Past Surgical History:   Procedure Laterality Date   •  SECTION     • DILATATION AND CURETTAGE      Secondary to incomplete spontaneous    • INCISION AND DRAINAGE ABSCESS  2012   • INJECTION OF MEDICATION  2015    Phenergan (1)     • INJECTION OF MEDICATION  10/10/2013    Toradol (2)      • INJECTION OF MEDICATION  2014    Zofran (1)         Home Meds:  Medications Prior to Admission   Medication Sig Dispense Refill Last Dose   • divalproex (DEPAKOTE) 500 MG DR tablet Take 500 mg by mouth every night at bedtime.  5    • lithium (ESKALITH) 450 MG CR tablet Take 450 mg by mouth Daily.  2    • PARoxetine (PAXIL) 20 MG tablet TAKE ONE TABLET BY MOUTH ONCE A DAY IN THE MORNING  0    • SUMAtriptan (IMITREX) 50 MG tablet TAKE ONE TABLET BY MOUTH EVERY TWO HOURS AS NEEDED FOR MIGRAINE  11    • tiZANidine (ZANAFLEX) 4 MG tablet Take 4 mg by mouth At Night As Needed for Muscle Spasms.        CURRENT MEDS    Current Facility-Administered Medications:   •  acetaminophen (TYLENOL) tablet 650 mg, 650 mg, Oral, Q4H PRN **OR** acetaminophen (TYLENOL) 160 MG/5ML solution 650 mg, 650 mg, Oral, Q4H PRN **OR** acetaminophen (TYLENOL) suppository 650 mg, 650 mg, Rectal, Q4H PRN, Juan Conroy MD  •  cefTRIAXone (ROCEPHIN) 1 g/100 mL 0.9% NS (MBP), 1 g, Intravenous, Q24H, Juan Conroy MD  •  HYDROcodone-acetaminophen (NORCO) 7.5-325 MG per tablet 2 tablet, 2 tablet, Oral, Q4H PRN, Juan Conroy MD  •  ondansetron (ZOFRAN) tablet 4 mg, 4 mg, Oral, Q6H PRN **OR**  ondansetron (ZOFRAN) injection 4 mg, 4 mg, Intravenous, Q6H PRN, Juan Conroy MD  •  pantoprazole (PROTONIX) injection 40 mg, 40 mg, Intravenous, BID AC, Juan Conroy MD  •  [COMPLETED] Insert peripheral IV, , , Once **AND** sodium chloride 0.9 % flush 10 mL, 10 mL, Intravenous, PRN, Chano Flynn MD  •  sodium chloride 0.9 % flush 10 mL, 10 mL, Intravenous, Q12H, Juan Conroy MD  •  sodium chloride 0.9 % flush 10 mL, 10 mL, Intravenous, PRN, Juan Conroy MD  •  sodium chloride 0.9 % infusion, 125 mL/hr, Intravenous, Continuous, Chano Flynn MD, Last Rate: 125 mL/hr at 03/07/21 1557, 125 mL/hr at 03/07/21 1557  Allergies:  Allergies   Allergen Reactions   • Abilify [Aripiprazole] Nausea Only   • Buspirone Rash   • Penicillins Hives and Nausea And Vomiting     Immunizations:  Immunization History   Administered Date(s) Administered   • DTaP [birth year], 07/08/1978   • IPV 1975, 01/07/1976, 03/30/1976, 07/08/1978   • PPD Test 07/28/1979, 08/12/1983, 08/17/1984, 08/17/1984   • Tdap 08/17/1984, 04/06/1990, 03/21/2012     Social History:   Social History     Social History Narrative   • Not on file     Social History     Socioeconomic History   • Marital status: Single     Spouse name: Not on file   • Number of children: Not on file   • Years of education: Not on file   • Highest education level: Not on file   Tobacco Use   • Smoking status: Current Every Day Smoker     Packs/day: 0.50     Types: Cigarettes   • Smokeless tobacco: Never Used   Substance and Sexual Activity   • Alcohol use: No   • Drug use: No   • Sexual activity: Defer       Family History:  Family History   Problem Relation Age of Onset   • Depression Other    • Cancer Other         Colorectal Cancer   • Endometrial cancer Other    • Lung cancer Other    • Endometrial cancer Sister    • Breast cancer Maternal Grandmother         Review of Systems  See history of present illness and past medical history.  Patient  "denies headache, dizziness, syncope, falls, trauma, change in vision, change in hearing, change in taste, changes in weight, changes in appetite, focal weakness, numbness, or paresthesia.  Patient denies chest pain, palpitations, dyspnea, orthopnea, PND, cough, sinus pressure, rhinorrhea, epistaxis, hemoptysis,  diarrhea, constipation or hematchezia.  Denies cold or heat intolerance, polydipsia, polyuria, polyphagia. Denies hematuria, pyuria, dysuria, hesitancy, frequency or urgency.  Denies fever, chills, sweats, night sweats.  Denies missing any routine medications. Remainder of ROS is negative.    Objective:  tMax 24 hrs: Temp (24hrs), Av.7 °F (36.5 °C), Min:97.1 °F (36.2 °C), Max:98.2 °F (36.8 °C)    Vitals Ranges:   Temp:  [97.1 °F (36.2 °C)-98.2 °F (36.8 °C)] 97.1 °F (36.2 °C)  Heart Rate:  [60-83] 79  Resp:  [18] 18  BP: ()/(65-76) 119/68      Exam:  /68 (BP Location: Left arm, Patient Position: Lying)   Pulse 79   Temp 97.1 °F (36.2 °C) (Temporal)   Resp 18   Ht 154.9 cm (61\")   Wt 76.7 kg (169 lb)   LMP 2021   SpO2 99%   BMI 31.93 kg/m²     General Appearance:    Alert, cooperative, no distress, appears stated age   Head:    Normocephalic, without obvious abnormality, atraumatic   Eyes:    PERRL, conjunctiva/corneas clear, EOM's intact, both eyes   Ears:    Normal external ear canals, both ears   Nose:   Nares normal, septum midline, mucosa normal, no drainage    or sinus tenderness   Throat:   Lips, mucosa, and tongue normal   Neck:   Supple, symmetrical, trachea midline, no adenopathy;     thyroid:  no enlargement/tenderness/nodules; no carotid    bruit or JVD   Back:     Symmetric, no curvature, ROM normal, no CVA tenderness   Lungs:     Clear to auscultation bilaterally, respirations unlabored   Chest Wall:    No tenderness or deformity    Heart:    Regular rate and rhythm, S1 and S2 normal, no murmur, rub   or gallop   Abdomen:     Soft, mild diffuse abdominal tenderness " and some bilateral flank tenderness, bowel sounds active all four quadrants,     no masses, no hepatomegaly, no splenomegaly   Extremities:   Extremities normal, atraumatic, no cyanosis or edema   Pulses:   2+ and symmetric all extremities   Skin:   Skin color, texture, turgor normal, no rashes or lesions   Lymph nodes:   Cervical, supraclavicular, and axillary nodes normal   Neurologic:   CNII-XII intact, normal strength, sensation intact throughout      .    Data Review:  Lab Results (last 72 hours)     Procedure Component Value Units Date/Time    Extra Tubes [[id number]] Collected: 03/07/21 1549    Specimen: Blood, Venous Line Updated: 03/07/21 1700    Narrative:      The following orders were created for panel order Extra Tubes.  Procedure                               Abnormality         Status                     ---------                               -----------         ------                     Lavender Top[[id number]]                                     Final result                 Please view results for these tests on the individual orders.    Lavender Top [[id number]] Collected: 03/07/21 1549    Specimen: Blood Updated: 03/07/21 1700     Extra Tube hold for add-on     Comment: Auto resulted       Manual Differential [[id number]]  (Abnormal) Collected: 03/07/21 1549    Specimen: Blood Updated: 03/07/21 1635     Neutrophil % 79.0 %      Lymphocyte % 11.0 %      Monocyte % 7.0 %      Bands %  3.0 %      Neutrophils Absolute 9.07 10*3/mm3      Lymphocytes Absolute 1.22 10*3/mm3      Monocytes Absolute 0.77 10*3/mm3      RBC Morphology Normal     WBC Morphology Normal     Platelet Morphology Normal    Protime-INR [[id number]]  (Abnormal) Collected: 03/07/21 1549    Specimen: Blood Updated: 03/07/21 1623     Protime 15.8 Seconds      INR 1.20    Narrative:      Therapeutic range for most indications is 2.0-3.0 INR,  or 2.5-3.5 for mechanical heart valves.    aPTT [[id number]]  (Normal) Collected: 03/07/21 1549     Specimen: Blood Updated: 03/07/21 1623     PTT 34.9 seconds     Narrative:      The recommended Heparin therapeutic range is 68-97 seconds.    Urinalysis, Microscopic Only - Urine, Clean Catch [[id number]]  (Abnormal) Collected: 03/07/21 1538    Specimen: Urine, Clean Catch Updated: 03/07/21 1620     RBC, UA 13-20 /HPF      WBC, UA Too Numerous to Count /HPF      Bacteria, UA 4+ /HPF      Squamous Epithelial Cells, UA 6-12 /HPF      Yeast, UA Small/1+ Yeast /HPF      Hyaline Casts, UA 3-6 /LPF      Methodology Manual Light Microscopy    Lithium Level [[id number]]  (Abnormal) Collected: 03/07/21 1549    Specimen: Blood Updated: 03/07/21 1618     Lithium 0.1 mmol/L     Comprehensive Metabolic Panel [[id number]]  (Abnormal) Collected: 03/07/21 1549    Specimen: Blood Updated: 03/07/21 1613     Glucose 127 mg/dL      BUN 19 mg/dL      Creatinine 1.26 mg/dL      Sodium 135 mmol/L      Potassium 3.6 mmol/L      Chloride 102 mmol/L      CO2 19.0 mmol/L      Calcium 9.3 mg/dL      Total Protein 7.1 g/dL      Albumin 3.30 g/dL      ALT (SGPT) 41 U/L      AST (SGOT) 66 U/L      Alkaline Phosphatase 100 U/L      Total Bilirubin 0.3 mg/dL      eGFR Non African Amer 46 mL/min/1.73      Globulin 3.8 gm/dL      A/G Ratio 0.9 g/dL      BUN/Creatinine Ratio 15.1     Anion Gap 14.0 mmol/L     Narrative:      GFR Normal >60  Chronic Kidney Disease <60  Kidney Failure <15      Lipase [[id number]]  (Normal) Collected: 03/07/21 1549    Specimen: Blood Updated: 03/07/21 1613     Lipase 41 U/L     Valproic Acid Level, Total [[id number]]  (Abnormal) Collected: 03/07/21 1549    Specimen: Blood Updated: 03/07/21 1613     Valproic Acid 3.8 mcg/mL     hCG, Serum, Qualitative [[id number]]  (Normal) Collected: 03/07/21 1549    Specimen: Blood Updated: 03/07/21 1608     HCG Qualitative Negative    CBC & Differential [[id number]]  (Abnormal) Collected: 03/07/21 1549    Specimen: Blood Updated: 03/07/21 1606    Narrative:      The following orders were  created for panel order CBC & Differential.  Procedure                               Abnormality         Status                     ---------                               -----------         ------                     Scan Slide[[id number]]                                                                  CBC Auto Differential[[id number]]        Abnormal            Final result                 Please view results for these tests on the individual orders.    CBC Auto Differential [[id number]]  (Abnormal) Collected: 03/07/21 1549    Specimen: Blood Updated: 03/07/21 1606     WBC 11.06 10*3/mm3      RBC 4.03 10*6/mm3      Hemoglobin 12.4 g/dL      Hematocrit 36.3 %      MCV 90.1 fL      MCH 30.8 pg      MCHC 34.2 g/dL      RDW 12.9 %      RDW-SD 42.5 fl      MPV 11.5 fL      Platelets 232 10*3/mm3     Urinalysis With Microscopic If Indicated (No Culture) - Urine, Clean Catch [[id number]]  (Abnormal) Collected: 03/07/21 1538    Specimen: Urine, Clean Catch Updated: 03/07/21 1556     Color, UA Dark Yellow     Appearance, UA Cloudy     pH, UA 6.0     Specific Gravity, UA 1.015     Glucose, UA Negative     Ketones, UA Negative     Bilirubin, UA Negative     Blood, UA Large (3+)     Protein,  mg/dL (2+)     Leuk Esterase, UA Large (3+)     Nitrite, UA Positive     Urobilinogen, UA 4.0 E.U./dL                   Imaging Results (All)     Procedure Component Value Units Date/Time    US Gallbladder [[id number]] Collected: 03/07/21 1640     Updated: 03/07/21 1720    Narrative:        Ultrasound gallbladder    HISTORY: Elevated liver function test. Vomiting.    Ultrasound examination of the right upper quadrant was performed.    COMPARISON: None    FINDINGS:  The visualized liver is of normal echotexture.  No focal intrahepatic lesion.  Somewhat contracted gallbladder containing several small calculi.  Positive Giffrod's sign.  No wall thickening or pericholecystic fluid.  Common bile duct is within normal limits at 5.0  mm.  Images of the right kidney are unremarkable.  Right kidney 12.48 cm in length by 6.05 cm x 5.10 cm transverse.  Pancreas partially obscured by bowel gas.      Impression:      CONCLUSION:  Somewhat contracted gallbladder containing several small calculi.  Positive Gifford's sign.    [id number]    Electronically signed by:  Christopher Self MD  3/7/2021 5:19 PM CST  Workstation: [employer]    XR Abdomen 2+ VW with Chest 1 VW [[id number]] Collected: 03/07/21 1538     Updated: 03/07/21 1556    Narrative:      Acute Abdominal Series Withe Upright Chest.    History: Vomiting    Upright frontal film of the chest and supine and upright films of  the abdomen were obtained.    Comparison: January 16, 2014    FINDINGS:    The lungs are clear of an acute process.  The heart is not enlarged.  The pulmonary vasculature is not increased.  No pleural effusion.  No pneumothorax.  No acute osseous abnormality.  Degenerative changes are present in the thoracic spine.    No free air.  Nonspecific air-fluid levels in the small and large bowel.  No mechanical bowel obstruction.  No organomegaly.  6 mm and smaller bilateral renal calculi.  Multiple pelvic phleboliths.  No acute osseous abnormality.      Impression:      Conclusion:  No acute disease in the chest.  No free air.  Nonspecific air-fluid levels in the small and large bowel.  No mechanical bowel obstruction.  6 mm and smaller bilateral renal calculi.    [id number]    Electronically signed by:  Christopher Self MD  3/7/2021 3:55 PM CST  Workstation: [employer]        Patient Active Problem List   Diagnosis Code   • Nicotine abuse Z72.0   • Other chest pain R07.89   • Acute maxillary sinusitis J01.00   • Bipolar affective disorder (CMS/HCC) F31.9   • Decreased pulses in feet R09.89   • Flank pain R10.9   • Hematochezia K92.1   • High cholesterol E78.00   • Migraine without aura and with status migrainosus, not intractable G43.001   • Migraine without status migrainosus, not  intractable G43.909   • Neck pain M54.2   • Nephrolithiasis N20.0   • Neuropathy G62.9   • Other chronic pain G89.29   • Primary osteoarthritis involving multiple joints M89.49   • Sciatic leg pain M54.30   • Screening for diabetes mellitus (DM) Z13.1   • Seasonal allergies J30.2   • Stress F43.9   • Tobacco use disorder F17.200   • Urinary incontinence without sensory awareness N39.42   • Vitamin D deficiency E55.9   • Carpal tunnel syndrome of right wrist G56.01   • Seizure (CMS/HCC) R56.9   • Intractable vomiting R11.10   • UTI (urinary tract infection) N39.0   • Cholelithiasis K80.20   • GI bleed K92.2   • Kidney stone N20.0       Assessment:  Active Hospital Problems    Diagnosis  POA   • **Intractable vomiting [R11.10]  Yes   • UTI (urinary tract infection) [N39.0]  Unknown   • Cholelithiasis [K80.20]  Unknown   • GI bleed [K92.2]  Unknown   • Kidney stone [N20.0]  Unknown   • High cholesterol [E78.00]  Yes   • Other chronic pain [G89.29]  Yes   • Nicotine abuse [Z72.0]  Yes   • Bipolar affective disorder (CMS/HCC) [F31.9]  Yes   • Tobacco use disorder [F17.200]  Yes      Resolved Hospital Problems   No resolved problems to display.       Plan:  The patient is admitted to hospital we will continue with some IV fluid for hydration and medication for pain and nausea.  We will continue with IV Protonix and some IV antibiotics.  Will get CT abdomen pelvis stone protocol as she has history of kidney stones and blood in her urine.  General surgery has been consulted from the ER for further evaluation treatment.    Juan Conroy MD  3/7/2021  19:06 CST        Electronically signed by Juan Conroy MD at 03/07/21 1906     [place] [] (Order [id number])  Order  Date: 3/9/2021 Department: [address] Ordering/Authorizing: Aleksandar Jaime MD   Order History  Outpatient  Date/Time Action Taken User Additional Information   03/09/21 1253 Sign Aleksandar Jaime MD    Order Details    Frequency  Duration Priority Order Class   None None Routine External   Start Date/Time    Start Date   03/09/21   Order Information    Order Date Service Start Date Start Time   03/09/21 Medicine 03/09/21    Reference Links    Associated Reports   View Encounter   Order Questions    Question Answer Comment   Equipment  Walker Folding with Wheels    Length of Need (99 Months = Lifetime) 99 Months = Lifetime    Note:  Custom values to enter length of need for DME          Source Order Set / Preference List    Order Set    IP GEN EXPRESS DISCHARGE [[id number]]   Clinical Indications     ICD-10-CM ICD-9-CM   E. coli UTI (urinary tract infection)  - Primary     N39.0  B96.20 599.0  041.49   Reprint Order Requisition    Walker (Order #[id number]) on 3/9/21   Encounter    View Encounter          Order Provider Info        Office phone Pager E-mail   Ordering User Aleksandar Jaime [phone] -- --   Authorizing Provider Aleksandar Jaime [phone] -- --   Attending Provider Santy Allison [phone] -- --   Linked Charges    No charges linked to this procedure   Tracking Reports  Cosign Tracking Order Transmittal Tracking   Authorized by:  Aleksandar Jaime MD  (NPI: [id number])       Lab Component SmartPhrase Guide    Walker (Order #[id number]) on 3/9/21

## 2021-03-09 NOTE — SIGNIFICANT NOTE
03/09/21 1707   OTHER   Discipline physical therapist   Rehab Time/Intention   Session Not Performed patient/family declined evaluation  (Offered PT to pt. Pt reports she is going home with HH therapy and prefers to wait until she is home to begin PT. Pt further reports her mother will be assisting her at home as well.)

## 2021-03-09 NOTE — DISCHARGE PLACEMENT REQUEST
"  Patient is going to her mothers at discharge. Her address is [address] [place], [place] [address].   Thank you      Myles Torres (45 y.o. Female)     Date of Birth Social Security Number Address Home Phone MRN    1975  [address] [facility] [address] [phone] [id number]    Restoration Marital Status          Other Single       Admission Date Admission Type Admitting Provider Attending Provider Department, Room/Bed    3/7/21 Emergency Juan Conroy MD Williams, Kevin L, MD [address], 320/1    Discharge Date Discharge Disposition Discharge Destination         Home-Health Care [facility] [place]              Attending Provider: Santy Allison MD    Allergies: Abilify [Aripiprazole], Buspirone, Penicillins    Isolation: None   Infection: None   Code Status: CPR    Ht: 154.9 cm (61\")   Wt: 75.7 kg (166 lb 12.8 oz)    Admission Cmt: None   Principal Problem: E. coli UTI (urinary tract infection) [N39.0,B96.20]                 Active Insurance as of 3/7/2021     Primary Coverage     Payor Plan Insurance Group Employer/Plan Group    [place]      Payor Plan Address Payor Plan Phone Number Payor Plan Fax Number Effective Dates    [address] [address] [phone]  10/17/2016 - None Entered    [facility] [id number]       Subscriber Name Subscriber Birth Date Member ID       MYLES TORRES 1975 [id number]                 Emergency Contacts      (Rel.) Home Phone Work Phone Mobile Phone    Arleen Torres (Mother) -- -- [phone]            Insurance Information                [facility] MEDICAID Phone: [phone]    Subscriber: Myles Torresmarc Subscriber#: [id number]    Group#:  Precert#:           "

## 2021-03-10 PROBLEM — A49.8 INFECTION DUE TO ESBL-PRODUCING ESCHERICHIA COLI: Status: ACTIVE | Noted: 2021-03-10

## 2021-03-10 PROBLEM — Z16.12 INFECTION DUE TO ESBL-PRODUCING ESCHERICHIA COLI: Status: ACTIVE | Noted: 2021-03-10

## 2021-03-10 LAB — BACTERIA SPEC AEROBE CULT: ABNORMAL

## 2021-03-11 NOTE — ED NOTES
PRIOR AUTHORIZATION DENIED    Medication: Stelara - Denied    Denial Date: 3/10/2021    Denial Rational: see below    Appeal Information: see below       Pt's mother left, she can be reached at [phone] Diana Asencio RN  03/07/21 3174

## 2021-12-15 ENCOUNTER — APPOINTMENT (OUTPATIENT)
Dept: MRI IMAGING | Facility: HOSPITAL | Age: 46
End: 2021-12-15

## 2021-12-15 ENCOUNTER — APPOINTMENT (OUTPATIENT)
Dept: GENERAL RADIOLOGY | Facility: HOSPITAL | Age: 46
End: 2021-12-15

## 2021-12-15 ENCOUNTER — APPOINTMENT (OUTPATIENT)
Dept: CT IMAGING | Facility: HOSPITAL | Age: 46
End: 2021-12-15

## 2021-12-15 ENCOUNTER — HOSPITAL ENCOUNTER (INPATIENT)
Facility: HOSPITAL | Age: 46
LOS: 3 days | Discharge: ANOTHER HOSPITAL | End: 2021-12-18
Attending: FAMILY MEDICINE | Admitting: HOSPITALIST

## 2021-12-15 DIAGNOSIS — R47.81 SLURRED SPEECH: ICD-10-CM

## 2021-12-15 DIAGNOSIS — I63.9 CEREBROVASCULAR ACCIDENT (CVA), UNSPECIFIED MECHANISM: Primary | ICD-10-CM

## 2021-12-15 DIAGNOSIS — Z74.09 IMPAIRED FUNCTIONAL MOBILITY, BALANCE, GAIT, AND ENDURANCE: ICD-10-CM

## 2021-12-15 DIAGNOSIS — Z78.9 IMPAIRED MOBILITY AND ACTIVITIES OF DAILY LIVING: ICD-10-CM

## 2021-12-15 DIAGNOSIS — R47.01 APHASIA DUE TO ACUTE CEREBROVASCULAR ACCIDENT (CVA): ICD-10-CM

## 2021-12-15 DIAGNOSIS — R13.12 OROPHARYNGEAL DYSPHAGIA: ICD-10-CM

## 2021-12-15 DIAGNOSIS — I63.9 APHASIA DUE TO ACUTE CEREBROVASCULAR ACCIDENT (CVA): ICD-10-CM

## 2021-12-15 DIAGNOSIS — Z74.09 IMPAIRED MOBILITY AND ACTIVITIES OF DAILY LIVING: ICD-10-CM

## 2021-12-15 LAB
ALBUMIN SERPL-MCNC: 4.5 G/DL (ref 3.5–5.2)
ALBUMIN/GLOB SERPL: 1.7 G/DL
ALP SERPL-CCNC: 82 U/L (ref 39–117)
ALT SERPL W P-5'-P-CCNC: 23 U/L (ref 1–33)
AMPHET+METHAMPHET UR QL: POSITIVE
AMPHETAMINES UR QL: POSITIVE
ANION GAP SERPL CALCULATED.3IONS-SCNC: 13 MMOL/L (ref 5–15)
APAP SERPL-MCNC: <5 MCG/ML (ref 0–30)
AST SERPL-CCNC: 34 U/L (ref 1–32)
BACTERIA UR QL AUTO: ABNORMAL /HPF
BARBITURATES UR QL SCN: NEGATIVE
BASOPHILS # BLD AUTO: 0.05 10*3/MM3 (ref 0–0.2)
BASOPHILS NFR BLD AUTO: 0.4 % (ref 0–1.5)
BENZODIAZ UR QL SCN: NEGATIVE
BILIRUB SERPL-MCNC: 0.5 MG/DL (ref 0–1.2)
BILIRUB UR QL STRIP: NEGATIVE
BUN SERPL-MCNC: 12 MG/DL (ref 6–20)
BUN/CREAT SERPL: 11.7 (ref 7–25)
BUPRENORPHINE SERPL-MCNC: NEGATIVE NG/ML
CALCIUM SPEC-SCNC: 9.6 MG/DL (ref 8.6–10.5)
CANNABINOIDS SERPL QL: NEGATIVE
CHLORIDE SERPL-SCNC: 103 MMOL/L (ref 98–107)
CK SERPL-CCNC: 45 U/L (ref 20–180)
CLARITY UR: CLEAR
CO2 SERPL-SCNC: 22 MMOL/L (ref 22–29)
COCAINE UR QL: NEGATIVE
COLOR UR: YELLOW
CREAT SERPL-MCNC: 1.03 MG/DL (ref 0.57–1)
DEPRECATED RDW RBC AUTO: 43.7 FL (ref 37–54)
EOSINOPHIL # BLD AUTO: 0.13 10*3/MM3 (ref 0–0.4)
EOSINOPHIL NFR BLD AUTO: 1.1 % (ref 0.3–6.2)
ERYTHROCYTE [DISTWIDTH] IN BLOOD BY AUTOMATED COUNT: 13.1 % (ref 12.3–15.4)
ETHANOL BLD-MCNC: <10 MG/DL (ref 0–10)
ETHANOL UR QL: <0.01 %
FLUAV RNA RESP QL NAA+PROBE: NOT DETECTED
FLUBV RNA RESP QL NAA+PROBE: NOT DETECTED
GFR SERPL CREATININE-BSD FRML MDRD: 58 ML/MIN/1.73
GLOBULIN UR ELPH-MCNC: 2.7 GM/DL
GLUCOSE SERPL-MCNC: 127 MG/DL (ref 65–99)
GLUCOSE UR STRIP-MCNC: NEGATIVE MG/DL
HCT VFR BLD AUTO: 42.7 % (ref 34–46.6)
HGB BLD-MCNC: 14.1 G/DL (ref 12–15.9)
HGB UR QL STRIP.AUTO: ABNORMAL
HOLD SPECIMEN: NORMAL
HYALINE CASTS UR QL AUTO: ABNORMAL /LPF
IMM GRANULOCYTES # BLD AUTO: 0.06 10*3/MM3 (ref 0–0.05)
IMM GRANULOCYTES NFR BLD AUTO: 0.5 % (ref 0–0.5)
INR PPP: 1 (ref 0.8–1.2)
KETONES UR QL STRIP: NEGATIVE
LEUKOCYTE ESTERASE UR QL STRIP.AUTO: ABNORMAL
LYMPHOCYTES # BLD AUTO: 1.9 10*3/MM3 (ref 0.7–3.1)
LYMPHOCYTES NFR BLD AUTO: 16.3 % (ref 19.6–45.3)
MAGNESIUM SERPL-MCNC: 1.8 MG/DL (ref 1.6–2.6)
MCH RBC QN AUTO: 30.3 PG (ref 26.6–33)
MCHC RBC AUTO-ENTMCNC: 33 G/DL (ref 31.5–35.7)
MCV RBC AUTO: 91.6 FL (ref 79–97)
METHADONE UR QL SCN: NEGATIVE
MONOCYTES # BLD AUTO: 0.8 10*3/MM3 (ref 0.1–0.9)
MONOCYTES NFR BLD AUTO: 6.9 % (ref 5–12)
NEUTROPHILS NFR BLD AUTO: 74.8 % (ref 42.7–76)
NEUTROPHILS NFR BLD AUTO: 8.7 10*3/MM3 (ref 1.7–7)
NITRITE UR QL STRIP: POSITIVE
NRBC BLD AUTO-RTO: 0 /100 WBC (ref 0–0.2)
OPIATES UR QL: NEGATIVE
OXYCODONE UR QL SCN: NEGATIVE
PCP UR QL SCN: NEGATIVE
PH UR STRIP.AUTO: 6 [PH] (ref 5–9)
PLATELET # BLD AUTO: 280 10*3/MM3 (ref 140–450)
PMV BLD AUTO: 10.5 FL (ref 6–12)
POTASSIUM SERPL-SCNC: 3.8 MMOL/L (ref 3.5–5.2)
PROPOXYPH UR QL: NEGATIVE
PROT SERPL-MCNC: 7.2 G/DL (ref 6–8.5)
PROT UR QL STRIP: NEGATIVE
PROTHROMBIN TIME: 13.1 SECONDS (ref 11.1–15.3)
RBC # BLD AUTO: 4.66 10*6/MM3 (ref 3.77–5.28)
RBC # UR STRIP: ABNORMAL /HPF
REF LAB TEST METHOD: ABNORMAL
SALICYLATES SERPL-MCNC: <0.3 MG/DL
SARS-COV-2 RNA RESP QL NAA+PROBE: NOT DETECTED
SODIUM SERPL-SCNC: 138 MMOL/L (ref 136–145)
SP GR UR STRIP: 1.01 (ref 1–1.03)
SQUAMOUS #/AREA URNS HPF: ABNORMAL /HPF
TRICYCLICS UR QL SCN: NEGATIVE
TROPONIN T SERPL-MCNC: <0.01 NG/ML (ref 0–0.03)
UROBILINOGEN UR QL STRIP: ABNORMAL
VALPROATE SERPL-MCNC: <2.8 MCG/ML (ref 50–125)
WBC # UR STRIP: ABNORMAL /HPF
WBC NRBC COR # BLD: 11.64 10*3/MM3 (ref 3.4–10.8)
WHOLE BLOOD HOLD SPECIMEN: NORMAL

## 2021-12-15 PROCEDURE — 85610 PROTHROMBIN TIME: CPT | Performed by: FAMILY MEDICINE

## 2021-12-15 PROCEDURE — 87636 SARSCOV2 & INF A&B AMP PRB: CPT | Performed by: FAMILY MEDICINE

## 2021-12-15 PROCEDURE — 80053 COMPREHEN METABOLIC PANEL: CPT | Performed by: FAMILY MEDICINE

## 2021-12-15 PROCEDURE — 70551 MRI BRAIN STEM W/O DYE: CPT

## 2021-12-15 PROCEDURE — 82962 GLUCOSE BLOOD TEST: CPT

## 2021-12-15 PROCEDURE — 70450 CT HEAD/BRAIN W/O DYE: CPT

## 2021-12-15 PROCEDURE — 70496 CT ANGIOGRAPHY HEAD: CPT

## 2021-12-15 PROCEDURE — 82550 ASSAY OF CK (CPK): CPT | Performed by: FAMILY MEDICINE

## 2021-12-15 PROCEDURE — 82077 ASSAY SPEC XCP UR&BREATH IA: CPT | Performed by: FAMILY MEDICINE

## 2021-12-15 PROCEDURE — 87077 CULTURE AEROBIC IDENTIFY: CPT | Performed by: FAMILY MEDICINE

## 2021-12-15 PROCEDURE — 70498 CT ANGIOGRAPHY NECK: CPT

## 2021-12-15 PROCEDURE — 99285 EMERGENCY DEPT VISIT HI MDM: CPT

## 2021-12-15 PROCEDURE — 84484 ASSAY OF TROPONIN QUANT: CPT | Performed by: FAMILY MEDICINE

## 2021-12-15 PROCEDURE — 87086 URINE CULTURE/COLONY COUNT: CPT | Performed by: FAMILY MEDICINE

## 2021-12-15 PROCEDURE — 99222 1ST HOSP IP/OBS MODERATE 55: CPT | Performed by: NURSE PRACTITIONER

## 2021-12-15 PROCEDURE — 80179 DRUG ASSAY SALICYLATE: CPT | Performed by: FAMILY MEDICINE

## 2021-12-15 PROCEDURE — 25010000002 ONDANSETRON PER 1 MG: Performed by: FAMILY MEDICINE

## 2021-12-15 PROCEDURE — 25010000002 LEVETRIRACETAM PER 10 MG: Performed by: INTERNAL MEDICINE

## 2021-12-15 PROCEDURE — 85025 COMPLETE CBC W/AUTO DIFF WBC: CPT | Performed by: FAMILY MEDICINE

## 2021-12-15 PROCEDURE — 81001 URINALYSIS AUTO W/SCOPE: CPT | Performed by: FAMILY MEDICINE

## 2021-12-15 PROCEDURE — 83735 ASSAY OF MAGNESIUM: CPT | Performed by: FAMILY MEDICINE

## 2021-12-15 PROCEDURE — 80164 ASSAY DIPROPYLACETIC ACD TOT: CPT | Performed by: FAMILY MEDICINE

## 2021-12-15 PROCEDURE — 71045 X-RAY EXAM CHEST 1 VIEW: CPT

## 2021-12-15 PROCEDURE — 80143 DRUG ASSAY ACETAMINOPHEN: CPT | Performed by: FAMILY MEDICINE

## 2021-12-15 PROCEDURE — 0 IOPAMIDOL PER 1 ML: Performed by: FAMILY MEDICINE

## 2021-12-15 PROCEDURE — 36415 COLL VENOUS BLD VENIPUNCTURE: CPT

## 2021-12-15 PROCEDURE — 87186 SC STD MICRODIL/AGAR DIL: CPT | Performed by: FAMILY MEDICINE

## 2021-12-15 PROCEDURE — 87181 SC STD AGAR DILUTION PER AGT: CPT | Performed by: FAMILY MEDICINE

## 2021-12-15 PROCEDURE — 80306 DRUG TEST PRSMV INSTRMNT: CPT | Performed by: FAMILY MEDICINE

## 2021-12-15 PROCEDURE — 25010000002 LORAZEPAM PER 2 MG

## 2021-12-15 PROCEDURE — 93005 ELECTROCARDIOGRAM TRACING: CPT | Performed by: FAMILY MEDICINE

## 2021-12-15 RX ORDER — ASPIRIN 300 MG/1
300 SUPPOSITORY RECTAL DAILY
Status: DISCONTINUED | OUTPATIENT
Start: 2021-12-15 | End: 2021-12-19 | Stop reason: HOSPADM

## 2021-12-15 RX ORDER — PANTOPRAZOLE SODIUM 40 MG/10ML
40 INJECTION, POWDER, LYOPHILIZED, FOR SOLUTION INTRAVENOUS
Status: DISCONTINUED | OUTPATIENT
Start: 2021-12-16 | End: 2021-12-19 | Stop reason: HOSPADM

## 2021-12-15 RX ORDER — ASPIRIN 325 MG
325 TABLET ORAL DAILY
Status: DISCONTINUED | OUTPATIENT
Start: 2021-12-15 | End: 2021-12-19 | Stop reason: HOSPADM

## 2021-12-15 RX ORDER — ONDANSETRON 2 MG/ML
4 INJECTION INTRAMUSCULAR; INTRAVENOUS ONCE
Status: COMPLETED | OUTPATIENT
Start: 2021-12-15 | End: 2021-12-15

## 2021-12-15 RX ORDER — LORAZEPAM 2 MG/ML
1 INJECTION INTRAMUSCULAR ONCE
Status: COMPLETED | OUTPATIENT
Start: 2021-12-15 | End: 2021-12-15

## 2021-12-15 RX ORDER — ACETAMINOPHEN 160 MG/5ML
650 SOLUTION ORAL EVERY 4 HOURS PRN
Status: DISCONTINUED | OUTPATIENT
Start: 2021-12-15 | End: 2021-12-19 | Stop reason: HOSPADM

## 2021-12-15 RX ORDER — SODIUM CHLORIDE 0.9 % (FLUSH) 0.9 %
10 SYRINGE (ML) INJECTION AS NEEDED
Status: DISCONTINUED | OUTPATIENT
Start: 2021-12-15 | End: 2021-12-19 | Stop reason: HOSPADM

## 2021-12-15 RX ORDER — ACETAMINOPHEN 650 MG/1
650 SUPPOSITORY RECTAL EVERY 4 HOURS PRN
Status: DISCONTINUED | OUTPATIENT
Start: 2021-12-15 | End: 2021-12-19 | Stop reason: HOSPADM

## 2021-12-15 RX ORDER — SODIUM CHLORIDE 9 MG/ML
75 INJECTION, SOLUTION INTRAVENOUS CONTINUOUS
Status: DISCONTINUED | OUTPATIENT
Start: 2021-12-15 | End: 2021-12-19 | Stop reason: HOSPADM

## 2021-12-15 RX ORDER — ONDANSETRON 4 MG/1
4 TABLET, FILM COATED ORAL EVERY 6 HOURS PRN
Status: DISCONTINUED | OUTPATIENT
Start: 2021-12-15 | End: 2021-12-19 | Stop reason: HOSPADM

## 2021-12-15 RX ORDER — ONDANSETRON 2 MG/ML
4 INJECTION INTRAMUSCULAR; INTRAVENOUS EVERY 6 HOURS PRN
Status: DISCONTINUED | OUTPATIENT
Start: 2021-12-15 | End: 2021-12-19 | Stop reason: HOSPADM

## 2021-12-15 RX ORDER — SODIUM CHLORIDE 0.9 % (FLUSH) 0.9 %
10 SYRINGE (ML) INJECTION EVERY 12 HOURS SCHEDULED
Status: DISCONTINUED | OUTPATIENT
Start: 2021-12-15 | End: 2021-12-19 | Stop reason: HOSPADM

## 2021-12-15 RX ORDER — ATORVASTATIN CALCIUM 40 MG/1
80 TABLET, FILM COATED ORAL NIGHTLY
Status: DISCONTINUED | OUTPATIENT
Start: 2021-12-15 | End: 2021-12-19 | Stop reason: HOSPADM

## 2021-12-15 RX ORDER — NICOTINE 21 MG/24HR
1 PATCH, TRANSDERMAL 24 HOURS TRANSDERMAL EVERY 24 HOURS
Status: DISCONTINUED | OUTPATIENT
Start: 2021-12-15 | End: 2021-12-19 | Stop reason: HOSPADM

## 2021-12-15 RX ORDER — LORAZEPAM 2 MG/ML
INJECTION INTRAMUSCULAR
Status: COMPLETED
Start: 2021-12-15 | End: 2021-12-15

## 2021-12-15 RX ORDER — ACETAMINOPHEN 325 MG/1
650 TABLET ORAL EVERY 4 HOURS PRN
Status: DISCONTINUED | OUTPATIENT
Start: 2021-12-15 | End: 2021-12-19 | Stop reason: HOSPADM

## 2021-12-15 RX ADMIN — ASPIRIN 300 MG: 300 SUPPOSITORY RECTAL at 17:31

## 2021-12-15 RX ADMIN — NICOTINE 1 PATCH: 21 PATCH, EXTENDED RELEASE TRANSDERMAL at 22:12

## 2021-12-15 RX ADMIN — SODIUM CHLORIDE, PRESERVATIVE FREE 10 ML: 5 INJECTION INTRAVENOUS at 21:14

## 2021-12-15 RX ADMIN — SODIUM CHLORIDE, PRESERVATIVE FREE 10 ML: 5 INJECTION INTRAVENOUS at 22:04

## 2021-12-15 RX ADMIN — LEVETIRACETAM 250 MG: 500 INJECTION, SOLUTION, CONCENTRATE INTRAVENOUS at 21:14

## 2021-12-15 RX ADMIN — ONDANSETRON 4 MG: 2 INJECTION INTRAMUSCULAR; INTRAVENOUS at 14:39

## 2021-12-15 RX ADMIN — LORAZEPAM 1 MG: 2 INJECTION INTRAMUSCULAR at 15:19

## 2021-12-15 RX ADMIN — IOPAMIDOL 90 ML: 755 INJECTION, SOLUTION INTRAVENOUS at 15:35

## 2021-12-15 RX ADMIN — LORAZEPAM 1 MG: 2 INJECTION INTRAMUSCULAR; INTRAVENOUS at 15:19

## 2021-12-15 RX ADMIN — SODIUM CHLORIDE 125 MG: 9 INJECTION, SOLUTION INTRAVENOUS at 17:32

## 2021-12-15 RX ADMIN — SODIUM CHLORIDE 75 ML/HR: 9 INJECTION, SOLUTION INTRAVENOUS at 22:10

## 2021-12-15 RX ADMIN — SODIUM CHLORIDE 1000 ML: 9 INJECTION, SOLUTION INTRAVENOUS at 14:38

## 2021-12-15 RX ADMIN — SODIUM CHLORIDE 125 MG: 9 INJECTION, SOLUTION INTRAVENOUS at 22:12

## 2021-12-16 ENCOUNTER — APPOINTMENT (OUTPATIENT)
Dept: CARDIOLOGY | Facility: HOSPITAL | Age: 46
End: 2021-12-16

## 2021-12-16 ENCOUNTER — APPOINTMENT (OUTPATIENT)
Dept: CT IMAGING | Facility: HOSPITAL | Age: 46
End: 2021-12-16

## 2021-12-16 LAB
ANION GAP SERPL CALCULATED.3IONS-SCNC: 10 MMOL/L (ref 5–15)
BASOPHILS # BLD AUTO: 0.03 10*3/MM3 (ref 0–0.2)
BASOPHILS NFR BLD AUTO: 0.4 % (ref 0–1.5)
BH CV ECHO MEAS - ACS: 1.6 CM
BH CV ECHO MEAS - AO ISTHMUS: 2.9 CM
BH CV ECHO MEAS - AO MAX PG (FULL): 6.6 MMHG
BH CV ECHO MEAS - AO MAX PG: 11.4 MMHG
BH CV ECHO MEAS - AO MEAN PG (FULL): 4 MMHG
BH CV ECHO MEAS - AO MEAN PG: 6 MMHG
BH CV ECHO MEAS - AO ROOT AREA (BSA CORRECTED): 1.6
BH CV ECHO MEAS - AO ROOT AREA: 6.2 CM^2
BH CV ECHO MEAS - AO ROOT DIAM: 2.8 CM
BH CV ECHO MEAS - AO V2 MAX: 169 CM/SEC
BH CV ECHO MEAS - AO V2 MEAN: 117 CM/SEC
BH CV ECHO MEAS - AO V2 VTI: 34.1 CM
BH CV ECHO MEAS - ASC AORTA: 2.9 CM
BH CV ECHO MEAS - AVA(I,A): 1.9 CM^2
BH CV ECHO MEAS - AVA(I,D): 1.9 CM^2
BH CV ECHO MEAS - AVA(V,A): 1.8 CM^2
BH CV ECHO MEAS - AVA(V,D): 1.8 CM^2
BH CV ECHO MEAS - BSA(HAYCOCK): 1.8 M^2
BH CV ECHO MEAS - BSA: 1.7 M^2
BH CV ECHO MEAS - BZI_BMI: 32 KILOGRAMS/M^2
BH CV ECHO MEAS - BZI_METRIC_HEIGHT: 152.4 CM
BH CV ECHO MEAS - BZI_METRIC_WEIGHT: 74.4 KG
BH CV ECHO MEAS - EDV(CUBED): 66.9 ML
BH CV ECHO MEAS - EDV(MOD-SP2): 77.9 ML
BH CV ECHO MEAS - EDV(MOD-SP4): 85 ML
BH CV ECHO MEAS - EDV(TEICH): 72.5 ML
BH CV ECHO MEAS - EF(CUBED): 77.8 %
BH CV ECHO MEAS - EF(MOD-SP2): 53.1 %
BH CV ECHO MEAS - EF(MOD-SP4): 67.3 %
BH CV ECHO MEAS - EF(TEICH): 70.4 %
BH CV ECHO MEAS - ESV(CUBED): 14.9 ML
BH CV ECHO MEAS - ESV(MOD-SP2): 36.5 ML
BH CV ECHO MEAS - ESV(MOD-SP4): 27.8 ML
BH CV ECHO MEAS - ESV(TEICH): 21.4 ML
BH CV ECHO MEAS - FS: 39.4 %
BH CV ECHO MEAS - IVS/LVPW: 1.6
BH CV ECHO MEAS - IVSD: 1.6 CM
BH CV ECHO MEAS - LA DIMENSION: 3.4 CM
BH CV ECHO MEAS - LA/AO: 1.2
BH CV ECHO MEAS - LV DIASTOLIC VOL/BSA (35-75): 49.5 ML/M^2
BH CV ECHO MEAS - LV MASS(C)D: 186.1 GRAMS
BH CV ECHO MEAS - LV MASS(C)DI: 108.5 GRAMS/M^2
BH CV ECHO MEAS - LV MAX PG: 4.8 MMHG
BH CV ECHO MEAS - LV MEAN PG: 2 MMHG
BH CV ECHO MEAS - LV SYSTOLIC VOL/BSA (12-30): 16.2 ML/M^2
BH CV ECHO MEAS - LV V1 MAX: 110 CM/SEC
BH CV ECHO MEAS - LV V1 MEAN: 71.3 CM/SEC
BH CV ECHO MEAS - LV V1 VTI: 22.3 CM
BH CV ECHO MEAS - LVIDD: 4.1 CM
BH CV ECHO MEAS - LVIDS: 2.5 CM
BH CV ECHO MEAS - LVLD AP2: 7.5 CM
BH CV ECHO MEAS - LVLD AP4: 7.5 CM
BH CV ECHO MEAS - LVLS AP2: 5.9 CM
BH CV ECHO MEAS - LVLS AP4: 6.1 CM
BH CV ECHO MEAS - LVOT AREA (M): 2.8 CM^2
BH CV ECHO MEAS - LVOT AREA: 2.8 CM^2
BH CV ECHO MEAS - LVOT DIAM: 1.9 CM
BH CV ECHO MEAS - LVPWD: 1 CM
BH CV ECHO MEAS - MR MAX PG: 81.4 MMHG
BH CV ECHO MEAS - MR MAX VEL: 451 CM/SEC
BH CV ECHO MEAS - MV A MAX VEL: 78.8 CM/SEC
BH CV ECHO MEAS - MV DEC SLOPE: 335 CM/SEC^2
BH CV ECHO MEAS - MV E MAX VEL: 94.3 CM/SEC
BH CV ECHO MEAS - MV E/A: 1.2
BH CV ECHO MEAS - MV MAX PG: 3.7 MMHG
BH CV ECHO MEAS - MV MEAN PG: 1 MMHG
BH CV ECHO MEAS - MV P1/2T MAX VEL: 96.6 CM/SEC
BH CV ECHO MEAS - MV P1/2T: 84.5 MSEC
BH CV ECHO MEAS - MV V2 MAX: 95.9 CM/SEC
BH CV ECHO MEAS - MV V2 MEAN: 49.1 CM/SEC
BH CV ECHO MEAS - MV V2 VTI: 26.4 CM
BH CV ECHO MEAS - MVA P1/2T LCG: 2.3 CM^2
BH CV ECHO MEAS - MVA(P1/2T): 2.6 CM^2
BH CV ECHO MEAS - MVA(VTI): 2.4 CM^2
BH CV ECHO MEAS - PA MAX PG: 4 MMHG
BH CV ECHO MEAS - PA V2 MAX: 100 CM/SEC
BH CV ECHO MEAS - RAP SYSTOLE: 10 MMHG
BH CV ECHO MEAS - RVDD: 2.8 CM
BH CV ECHO MEAS - RVSP: 36.2 MMHG
BH CV ECHO MEAS - SI(AO): 122.4 ML/M^2
BH CV ECHO MEAS - SI(CUBED): 30.3 ML/M^2
BH CV ECHO MEAS - SI(LVOT): 36.9 ML/M^2
BH CV ECHO MEAS - SI(MOD-SP2): 24.1 ML/M^2
BH CV ECHO MEAS - SI(MOD-SP4): 33.3 ML/M^2
BH CV ECHO MEAS - SI(TEICH): 29.8 ML/M^2
BH CV ECHO MEAS - SV(AO): 210 ML
BH CV ECHO MEAS - SV(CUBED): 52 ML
BH CV ECHO MEAS - SV(LVOT): 63.2 ML
BH CV ECHO MEAS - SV(MOD-SP2): 41.4 ML
BH CV ECHO MEAS - SV(MOD-SP4): 57.2 ML
BH CV ECHO MEAS - SV(TEICH): 51.1 ML
BH CV ECHO MEAS - TR MAX VEL: 256 CM/SEC
BUN SERPL-MCNC: 10 MG/DL (ref 6–20)
BUN/CREAT SERPL: 12.2 (ref 7–25)
CALCIUM SPEC-SCNC: 8.9 MG/DL (ref 8.6–10.5)
CHLORIDE SERPL-SCNC: 107 MMOL/L (ref 98–107)
CHOLEST SERPL-MCNC: 166 MG/DL (ref 0–200)
CO2 SERPL-SCNC: 24 MMOL/L (ref 22–29)
CREAT SERPL-MCNC: 0.82 MG/DL (ref 0.57–1)
CRP SERPL-MCNC: 1.95 MG/DL (ref 0–0.5)
DEPRECATED RDW RBC AUTO: 41.6 FL (ref 37–54)
EOSINOPHIL # BLD AUTO: 0.22 10*3/MM3 (ref 0–0.4)
EOSINOPHIL NFR BLD AUTO: 2.9 % (ref 0.3–6.2)
ERYTHROCYTE [DISTWIDTH] IN BLOOD BY AUTOMATED COUNT: 12.7 % (ref 12.3–15.4)
GFR SERPL CREATININE-BSD FRML MDRD: 75 ML/MIN/1.73
GLUCOSE BLDC GLUCOMTR-MCNC: 189 MG/DL (ref 70–130)
GLUCOSE BLDC GLUCOMTR-MCNC: 88 MG/DL (ref 70–130)
GLUCOSE SERPL-MCNC: 88 MG/DL (ref 65–99)
HBA1C MFR BLD: 4.9 % (ref 4.8–5.6)
HCT VFR BLD AUTO: 36.7 % (ref 34–46.6)
HDLC SERPL-MCNC: 40 MG/DL (ref 40–60)
HGB BLD-MCNC: 12.1 G/DL (ref 12–15.9)
IMM GRANULOCYTES # BLD AUTO: 0.02 10*3/MM3 (ref 0–0.05)
IMM GRANULOCYTES NFR BLD AUTO: 0.3 % (ref 0–0.5)
LDLC SERPL CALC-MCNC: 104 MG/DL (ref 0–100)
LDLC/HDLC SERPL: 2.56 {RATIO}
LYMPHOCYTES # BLD AUTO: 1.86 10*3/MM3 (ref 0.7–3.1)
LYMPHOCYTES NFR BLD AUTO: 24.3 % (ref 19.6–45.3)
MAXIMAL PREDICTED HEART RATE: 174 BPM
MCH RBC QN AUTO: 30.1 PG (ref 26.6–33)
MCHC RBC AUTO-ENTMCNC: 33 G/DL (ref 31.5–35.7)
MCV RBC AUTO: 91.3 FL (ref 79–97)
MONOCYTES # BLD AUTO: 0.51 10*3/MM3 (ref 0.1–0.9)
MONOCYTES NFR BLD AUTO: 6.7 % (ref 5–12)
NEUTROPHILS NFR BLD AUTO: 5 10*3/MM3 (ref 1.7–7)
NEUTROPHILS NFR BLD AUTO: 65.4 % (ref 42.7–76)
NRBC BLD AUTO-RTO: 0 /100 WBC (ref 0–0.2)
PLATELET # BLD AUTO: 248 10*3/MM3 (ref 140–450)
PMV BLD AUTO: 10.8 FL (ref 6–12)
POTASSIUM SERPL-SCNC: 3.8 MMOL/L (ref 3.5–5.2)
RBC # BLD AUTO: 4.02 10*6/MM3 (ref 3.77–5.28)
SODIUM SERPL-SCNC: 141 MMOL/L (ref 136–145)
STRESS TARGET HR: 148 BPM
TRIGL SERPL-MCNC: 119 MG/DL (ref 0–150)
VLDLC SERPL-MCNC: 22 MG/DL (ref 5–40)
WBC NRBC COR # BLD: 7.64 10*3/MM3 (ref 3.4–10.8)

## 2021-12-16 PROCEDURE — 93306 TTE W/DOPPLER COMPLETE: CPT | Performed by: INTERNAL MEDICINE

## 2021-12-16 PROCEDURE — 85025 COMPLETE CBC W/AUTO DIFF WBC: CPT | Performed by: INTERNAL MEDICINE

## 2021-12-16 PROCEDURE — 97162 PT EVAL MOD COMPLEX 30 MIN: CPT

## 2021-12-16 PROCEDURE — 80061 LIPID PANEL: CPT | Performed by: NURSE PRACTITIONER

## 2021-12-16 PROCEDURE — 0 LEVETIRACETAM IN NACL 0.75% 1000 MG/100ML SOLUTION: Performed by: NURSE PRACTITIONER

## 2021-12-16 PROCEDURE — 25010000002 LEVOFLOXACIN PER 250 MG: Performed by: INTERNAL MEDICINE

## 2021-12-16 PROCEDURE — 92610 EVALUATE SWALLOWING FUNCTION: CPT | Performed by: SPEECH-LANGUAGE PATHOLOGIST

## 2021-12-16 PROCEDURE — 93306 TTE W/DOPPLER COMPLETE: CPT

## 2021-12-16 PROCEDURE — 92523 SPEECH SOUND LANG COMPREHEN: CPT | Performed by: SPEECH-LANGUAGE PATHOLOGIST

## 2021-12-16 PROCEDURE — 82962 GLUCOSE BLOOD TEST: CPT

## 2021-12-16 PROCEDURE — 80048 BASIC METABOLIC PNL TOTAL CA: CPT | Performed by: INTERNAL MEDICINE

## 2021-12-16 PROCEDURE — 99233 SBSQ HOSP IP/OBS HIGH 50: CPT | Performed by: NURSE PRACTITIONER

## 2021-12-16 PROCEDURE — 83036 HEMOGLOBIN GLYCOSYLATED A1C: CPT | Performed by: NURSE PRACTITIONER

## 2021-12-16 PROCEDURE — 70450 CT HEAD/BRAIN W/O DYE: CPT

## 2021-12-16 PROCEDURE — 97166 OT EVAL MOD COMPLEX 45 MIN: CPT

## 2021-12-16 PROCEDURE — 86140 C-REACTIVE PROTEIN: CPT | Performed by: NURSE PRACTITIONER

## 2021-12-16 RX ORDER — LORAZEPAM 2 MG/ML
INJECTION INTRAMUSCULAR
Status: DISCONTINUED
Start: 2021-12-16 | End: 2021-12-16 | Stop reason: WASHOUT

## 2021-12-16 RX ORDER — DIVALPROEX SODIUM 250 MG/1
500 TABLET, DELAYED RELEASE ORAL EVERY 8 HOURS SCHEDULED
Status: DISCONTINUED | OUTPATIENT
Start: 2021-12-16 | End: 2021-12-17

## 2021-12-16 RX ORDER — LEVETIRACETAM 500 MG/1
500 TABLET ORAL EVERY 12 HOURS SCHEDULED
Status: DISCONTINUED | OUTPATIENT
Start: 2021-12-16 | End: 2021-12-18

## 2021-12-16 RX ORDER — LEVETIRACETAM 10 MG/ML
1000 INJECTION INTRAVASCULAR ONCE
Status: COMPLETED | OUTPATIENT
Start: 2021-12-16 | End: 2021-12-16

## 2021-12-16 RX ORDER — LEVETIRACETAM 5 MG/ML
500 INJECTION INTRAVASCULAR EVERY 12 HOURS SCHEDULED
Status: DISCONTINUED | OUTPATIENT
Start: 2021-12-16 | End: 2021-12-16

## 2021-12-16 RX ORDER — LEVOFLOXACIN 5 MG/ML
750 INJECTION, SOLUTION INTRAVENOUS EVERY 24 HOURS
Status: DISCONTINUED | OUTPATIENT
Start: 2021-12-16 | End: 2021-12-18

## 2021-12-16 RX ADMIN — ATORVASTATIN CALCIUM 80 MG: 40 TABLET, FILM COATED ORAL at 21:28

## 2021-12-16 RX ADMIN — DIVALPROEX SODIUM 500 MG: 250 TABLET, DELAYED RELEASE ORAL at 16:28

## 2021-12-16 RX ADMIN — SODIUM CHLORIDE 125 MG: 9 INJECTION, SOLUTION INTRAVENOUS at 10:43

## 2021-12-16 RX ADMIN — SODIUM CHLORIDE 750 MG: 9 INJECTION, SOLUTION INTRAVENOUS at 11:15

## 2021-12-16 RX ADMIN — SODIUM CHLORIDE, PRESERVATIVE FREE 10 ML: 5 INJECTION INTRAVENOUS at 21:28

## 2021-12-16 RX ADMIN — SODIUM CHLORIDE 75 ML/HR: 9 INJECTION, SOLUTION INTRAVENOUS at 15:07

## 2021-12-16 RX ADMIN — SODIUM CHLORIDE, PRESERVATIVE FREE 10 ML: 5 INJECTION INTRAVENOUS at 21:29

## 2021-12-16 RX ADMIN — ASPIRIN 325 MG: 325 TABLET ORAL at 09:51

## 2021-12-16 RX ADMIN — LEVETIRACETAM 1000 MG: 10 INJECTION INTRAVENOUS at 11:13

## 2021-12-16 RX ADMIN — NICOTINE 1 PATCH: 21 PATCH, EXTENDED RELEASE TRANSDERMAL at 21:29

## 2021-12-16 RX ADMIN — LEVETIRACETAM 500 MG: 500 TABLET ORAL at 21:28

## 2021-12-16 RX ADMIN — LEVOFLOXACIN 750 MG: 5 INJECTION, SOLUTION INTRAVENOUS at 09:51

## 2021-12-16 RX ADMIN — SODIUM CHLORIDE 125 MG: 9 INJECTION, SOLUTION INTRAVENOUS at 04:09

## 2021-12-16 RX ADMIN — SODIUM CHLORIDE 75 ML/HR: 9 INJECTION, SOLUTION INTRAVENOUS at 10:43

## 2021-12-17 ENCOUNTER — APPOINTMENT (OUTPATIENT)
Dept: PULMONOLOGY | Facility: HOSPITAL | Age: 46
End: 2021-12-17

## 2021-12-17 ENCOUNTER — APPOINTMENT (OUTPATIENT)
Dept: CT IMAGING | Facility: HOSPITAL | Age: 46
End: 2021-12-17

## 2021-12-17 LAB
ANION GAP SERPL CALCULATED.3IONS-SCNC: 9 MMOL/L (ref 5–15)
BASOPHILS # BLD AUTO: 0.04 10*3/MM3 (ref 0–0.2)
BASOPHILS NFR BLD AUTO: 0.5 % (ref 0–1.5)
BUN SERPL-MCNC: 7 MG/DL (ref 6–20)
BUN/CREAT SERPL: 8.5 (ref 7–25)
CALCIUM SPEC-SCNC: 9.1 MG/DL (ref 8.6–10.5)
CHLORIDE SERPL-SCNC: 107 MMOL/L (ref 98–107)
CO2 SERPL-SCNC: 24 MMOL/L (ref 22–29)
CREAT SERPL-MCNC: 0.82 MG/DL (ref 0.57–1)
DEPRECATED RDW RBC AUTO: 42.1 FL (ref 37–54)
EOSINOPHIL # BLD AUTO: 0.22 10*3/MM3 (ref 0–0.4)
EOSINOPHIL NFR BLD AUTO: 2.9 % (ref 0.3–6.2)
ERYTHROCYTE [DISTWIDTH] IN BLOOD BY AUTOMATED COUNT: 12.8 % (ref 12.3–15.4)
GFR SERPL CREATININE-BSD FRML MDRD: 75 ML/MIN/1.73
GLUCOSE BLDC GLUCOMTR-MCNC: 83 MG/DL (ref 70–130)
GLUCOSE SERPL-MCNC: 85 MG/DL (ref 65–99)
HCT VFR BLD AUTO: 39.4 % (ref 34–46.6)
HGB BLD-MCNC: 13 G/DL (ref 12–15.9)
IMM GRANULOCYTES # BLD AUTO: 0.03 10*3/MM3 (ref 0–0.05)
IMM GRANULOCYTES NFR BLD AUTO: 0.4 % (ref 0–0.5)
LYMPHOCYTES # BLD AUTO: 2.43 10*3/MM3 (ref 0.7–3.1)
LYMPHOCYTES NFR BLD AUTO: 32.1 % (ref 19.6–45.3)
MCH RBC QN AUTO: 30.1 PG (ref 26.6–33)
MCHC RBC AUTO-ENTMCNC: 33 G/DL (ref 31.5–35.7)
MCV RBC AUTO: 91.2 FL (ref 79–97)
MONOCYTES # BLD AUTO: 0.42 10*3/MM3 (ref 0.1–0.9)
MONOCYTES NFR BLD AUTO: 5.6 % (ref 5–12)
NEUTROPHILS NFR BLD AUTO: 4.42 10*3/MM3 (ref 1.7–7)
NEUTROPHILS NFR BLD AUTO: 58.5 % (ref 42.7–76)
NRBC BLD AUTO-RTO: 0 /100 WBC (ref 0–0.2)
PLATELET # BLD AUTO: 208 10*3/MM3 (ref 140–450)
PMV BLD AUTO: 11.5 FL (ref 6–12)
POTASSIUM SERPL-SCNC: 4 MMOL/L (ref 3.5–5.2)
RBC # BLD AUTO: 4.32 10*6/MM3 (ref 3.77–5.28)
SODIUM SERPL-SCNC: 140 MMOL/L (ref 136–145)
WBC NRBC COR # BLD: 7.56 10*3/MM3 (ref 3.4–10.8)

## 2021-12-17 PROCEDURE — 97110 THERAPEUTIC EXERCISES: CPT

## 2021-12-17 PROCEDURE — 99232 SBSQ HOSP IP/OBS MODERATE 35: CPT | Performed by: NURSE PRACTITIONER

## 2021-12-17 PROCEDURE — 92507 TX SP LANG VOICE COMM INDIV: CPT | Performed by: SPEECH-LANGUAGE PATHOLOGIST

## 2021-12-17 PROCEDURE — 25010000002 LEVOFLOXACIN PER 250 MG: Performed by: INTERNAL MEDICINE

## 2021-12-17 PROCEDURE — 82962 GLUCOSE BLOOD TEST: CPT

## 2021-12-17 PROCEDURE — 95816 EEG AWAKE AND DROWSY: CPT

## 2021-12-17 PROCEDURE — 95816 EEG AWAKE AND DROWSY: CPT | Performed by: NURSE PRACTITIONER

## 2021-12-17 PROCEDURE — 92526 ORAL FUNCTION THERAPY: CPT | Performed by: SPEECH-LANGUAGE PATHOLOGIST

## 2021-12-17 PROCEDURE — 85025 COMPLETE CBC W/AUTO DIFF WBC: CPT | Performed by: INTERNAL MEDICINE

## 2021-12-17 PROCEDURE — 70450 CT HEAD/BRAIN W/O DYE: CPT

## 2021-12-17 PROCEDURE — 36415 COLL VENOUS BLD VENIPUNCTURE: CPT | Performed by: INTERNAL MEDICINE

## 2021-12-17 PROCEDURE — 97530 THERAPEUTIC ACTIVITIES: CPT

## 2021-12-17 PROCEDURE — 80048 BASIC METABOLIC PNL TOTAL CA: CPT | Performed by: INTERNAL MEDICINE

## 2021-12-17 RX ADMIN — NICOTINE 1 PATCH: 21 PATCH, EXTENDED RELEASE TRANSDERMAL at 21:52

## 2021-12-17 RX ADMIN — ATORVASTATIN CALCIUM 80 MG: 40 TABLET, FILM COATED ORAL at 20:46

## 2021-12-17 RX ADMIN — VALPROATE SODIUM 500 MG: 100 INJECTION, SOLUTION INTRAVENOUS at 17:49

## 2021-12-17 RX ADMIN — LEVETIRACETAM 500 MG: 500 TABLET ORAL at 10:22

## 2021-12-17 RX ADMIN — PANTOPRAZOLE SODIUM 40 MG: 40 INJECTION, POWDER, FOR SOLUTION INTRAVENOUS at 05:19

## 2021-12-17 RX ADMIN — LEVETIRACETAM 500 MG: 500 TABLET ORAL at 20:46

## 2021-12-17 RX ADMIN — SODIUM CHLORIDE 75 ML/HR: 9 INJECTION, SOLUTION INTRAVENOUS at 04:29

## 2021-12-17 RX ADMIN — LEVOFLOXACIN 750 MG: 5 INJECTION, SOLUTION INTRAVENOUS at 10:22

## 2021-12-17 RX ADMIN — DIVALPROEX SODIUM 500 MG: 250 TABLET, DELAYED RELEASE ORAL at 05:19

## 2021-12-17 RX ADMIN — ASPIRIN 325 MG: 325 TABLET ORAL at 10:22

## 2021-12-17 RX ADMIN — SODIUM CHLORIDE, PRESERVATIVE FREE 10 ML: 5 INJECTION INTRAVENOUS at 10:22

## 2021-12-18 ENCOUNTER — APPOINTMENT (OUTPATIENT)
Dept: CT IMAGING | Facility: HOSPITAL | Age: 46
End: 2021-12-18

## 2021-12-18 VITALS
SYSTOLIC BLOOD PRESSURE: 164 MMHG | BODY MASS INDEX: 34.67 KG/M2 | HEART RATE: 72 BPM | RESPIRATION RATE: 18 BRPM | TEMPERATURE: 98 F | OXYGEN SATURATION: 100 % | WEIGHT: 176.59 LBS | DIASTOLIC BLOOD PRESSURE: 90 MMHG | HEIGHT: 60 IN

## 2021-12-18 PROBLEM — Z16.12 URINARY TRACT INFECTION DUE TO EXTENDED-SPECTRUM BETA LACTAMASE (ESBL) PRODUCING ESCHERICHIA COLI: Status: ACTIVE | Noted: 2021-12-18

## 2021-12-18 PROBLEM — F15.10 METHAMPHETAMINE ABUSE: Status: ACTIVE | Noted: 2021-12-18

## 2021-12-18 PROBLEM — G40.909 SEIZURE DISORDER: Status: ACTIVE | Noted: 2021-12-18

## 2021-12-18 PROBLEM — I63.9 CEREBROVASCULAR ACCIDENT (CVA): Status: ACTIVE | Noted: 2021-12-18

## 2021-12-18 PROBLEM — R47.81 SLURRED SPEECH: Status: ACTIVE | Noted: 2021-12-18

## 2021-12-18 PROBLEM — R13.12 OROPHARYNGEAL DYSPHAGIA: Status: ACTIVE | Noted: 2021-12-18

## 2021-12-18 PROBLEM — R47.01 APHASIA DUE TO ACUTE CEREBROVASCULAR ACCIDENT (CVA): Status: ACTIVE | Noted: 2021-12-18

## 2021-12-18 PROBLEM — B96.29 URINARY TRACT INFECTION DUE TO EXTENDED-SPECTRUM BETA LACTAMASE (ESBL) PRODUCING ESCHERICHIA COLI: Status: ACTIVE | Noted: 2021-12-18

## 2021-12-18 PROBLEM — T83.511A UTI (URINARY TRACT INFECTION) DUE TO URINARY INDWELLING CATHETER: Status: ACTIVE | Noted: 2021-12-18

## 2021-12-18 PROBLEM — N39.0 URINARY TRACT INFECTION DUE TO EXTENDED-SPECTRUM BETA LACTAMASE (ESBL) PRODUCING ESCHERICHIA COLI: Status: ACTIVE | Noted: 2021-12-18

## 2021-12-18 PROBLEM — N39.0 UTI (URINARY TRACT INFECTION) DUE TO URINARY INDWELLING CATHETER: Status: ACTIVE | Noted: 2021-12-18

## 2021-12-18 PROBLEM — I63.9 APHASIA DUE TO ACUTE CEREBROVASCULAR ACCIDENT (CVA): Status: ACTIVE | Noted: 2021-12-18

## 2021-12-18 LAB
ANION GAP SERPL CALCULATED.3IONS-SCNC: 11 MMOL/L (ref 5–15)
ARTERIAL PATENCY WRIST A: ABNORMAL
ATMOSPHERIC PRESS: 749 MMHG
BACTERIA SPEC AEROBE CULT: ABNORMAL
BASE EXCESS BLDA CALC-SCNC: -0.1 MMOL/L (ref 0–2)
BASOPHILS # BLD AUTO: 0.04 10*3/MM3 (ref 0–0.2)
BASOPHILS NFR BLD AUTO: 0.6 % (ref 0–1.5)
BDY SITE: ABNORMAL
BUN SERPL-MCNC: 8 MG/DL (ref 6–20)
BUN/CREAT SERPL: 9.1 (ref 7–25)
CALCIUM SPEC-SCNC: 9.1 MG/DL (ref 8.6–10.5)
CHLORIDE SERPL-SCNC: 105 MMOL/L (ref 98–107)
CO2 SERPL-SCNC: 24 MMOL/L (ref 22–29)
CREAT SERPL-MCNC: 0.88 MG/DL (ref 0.57–1)
DEPRECATED RDW RBC AUTO: 42 FL (ref 37–54)
EOSINOPHIL # BLD AUTO: 0.22 10*3/MM3 (ref 0–0.4)
EOSINOPHIL NFR BLD AUTO: 3.1 % (ref 0.3–6.2)
ERYTHROCYTE [DISTWIDTH] IN BLOOD BY AUTOMATED COUNT: 12.9 % (ref 12.3–15.4)
GAS FLOW AIRWAY: 2 LPM
GFR SERPL CREATININE-BSD FRML MDRD: 69 ML/MIN/1.73
GLUCOSE SERPL-MCNC: 85 MG/DL (ref 65–99)
HCO3 BLDA-SCNC: 23.4 MMOL/L (ref 20–26)
HCT VFR BLD AUTO: 38.5 % (ref 34–46.6)
HGB BLD-MCNC: 12.8 G/DL (ref 12–15.9)
IMM GRANULOCYTES # BLD AUTO: 0.02 10*3/MM3 (ref 0–0.05)
IMM GRANULOCYTES NFR BLD AUTO: 0.3 % (ref 0–0.5)
LYMPHOCYTES # BLD AUTO: 2.73 10*3/MM3 (ref 0.7–3.1)
LYMPHOCYTES NFR BLD AUTO: 38.2 % (ref 19.6–45.3)
Lab: ABNORMAL
MCH RBC QN AUTO: 30 PG (ref 26.6–33)
MCHC RBC AUTO-ENTMCNC: 33.2 G/DL (ref 31.5–35.7)
MCV RBC AUTO: 90.2 FL (ref 79–97)
MODALITY: ABNORMAL
MONOCYTES # BLD AUTO: 0.45 10*3/MM3 (ref 0.1–0.9)
MONOCYTES NFR BLD AUTO: 6.3 % (ref 5–12)
NEUTROPHILS NFR BLD AUTO: 3.68 10*3/MM3 (ref 1.7–7)
NEUTROPHILS NFR BLD AUTO: 51.5 % (ref 42.7–76)
NRBC BLD AUTO-RTO: 0 /100 WBC (ref 0–0.2)
PCO2 BLDA: 34 MM HG (ref 35–45)
PH BLDA: 7.45 PH UNITS (ref 7.35–7.45)
PLATELET # BLD AUTO: 220 10*3/MM3 (ref 140–450)
PMV BLD AUTO: 11.1 FL (ref 6–12)
PO2 BLDA: 119 MM HG (ref 83–108)
POTASSIUM SERPL-SCNC: 3.9 MMOL/L (ref 3.5–5.2)
RBC # BLD AUTO: 4.27 10*6/MM3 (ref 3.77–5.28)
SAO2 % BLDCOA: 99.2 % (ref 94–99)
SODIUM SERPL-SCNC: 140 MMOL/L (ref 136–145)
VALPROATE SERPL-MCNC: 84.9 MCG/ML (ref 50–125)
VENTILATOR MODE: ABNORMAL
WBC NRBC COR # BLD: 7.14 10*3/MM3 (ref 3.4–10.8)

## 2021-12-18 PROCEDURE — 80164 ASSAY DIPROPYLACETIC ACD TOT: CPT | Performed by: PSYCHIATRY & NEUROLOGY

## 2021-12-18 PROCEDURE — 80048 BASIC METABOLIC PNL TOTAL CA: CPT | Performed by: INTERNAL MEDICINE

## 2021-12-18 PROCEDURE — 25010000002 DEXAMETHASONE PER 1 MG: Performed by: INTERNAL MEDICINE

## 2021-12-18 PROCEDURE — 25010000002 MEROPENEM PER 100 MG: Performed by: HOSPITALIST

## 2021-12-18 PROCEDURE — 25010000002 LORAZEPAM PER 2 MG: Performed by: INTERNAL MEDICINE

## 2021-12-18 PROCEDURE — 70450 CT HEAD/BRAIN W/O DYE: CPT

## 2021-12-18 PROCEDURE — 97530 THERAPEUTIC ACTIVITIES: CPT

## 2021-12-18 PROCEDURE — 85025 COMPLETE CBC W/AUTO DIFF WBC: CPT | Performed by: INTERNAL MEDICINE

## 2021-12-18 PROCEDURE — 36600 WITHDRAWAL OF ARTERIAL BLOOD: CPT

## 2021-12-18 PROCEDURE — 82803 BLOOD GASES ANY COMBINATION: CPT

## 2021-12-18 PROCEDURE — 99233 SBSQ HOSP IP/OBS HIGH 50: CPT | Performed by: PSYCHIATRY & NEUROLOGY

## 2021-12-18 PROCEDURE — 25010000002 LEVOFLOXACIN PER 250 MG: Performed by: INTERNAL MEDICINE

## 2021-12-18 PROCEDURE — 97535 SELF CARE MNGMENT TRAINING: CPT

## 2021-12-18 PROCEDURE — 25010000002 LEVETIRACETAM IN NACL 0.82% 500 MG/100ML SOLUTION: Performed by: HOSPITALIST

## 2021-12-18 RX ORDER — ASPIRIN 300 MG/1
300 SUPPOSITORY RECTAL DAILY
Start: 2021-12-19 | End: 2021-12-23 | Stop reason: HOSPADM

## 2021-12-18 RX ORDER — PANTOPRAZOLE SODIUM 40 MG/10ML
40 INJECTION, POWDER, LYOPHILIZED, FOR SOLUTION INTRAVENOUS
Start: 2021-12-19 | End: 2021-12-23 | Stop reason: HOSPADM

## 2021-12-18 RX ORDER — LEVETIRACETAM 500 MG/1
500 TABLET ORAL EVERY 12 HOURS SCHEDULED
Start: 2021-12-18 | End: 2021-12-18 | Stop reason: HOSPADM

## 2021-12-18 RX ORDER — LORAZEPAM 2 MG/ML
2 INJECTION INTRAMUSCULAR ONCE
Status: COMPLETED | OUTPATIENT
Start: 2021-12-18 | End: 2021-12-18

## 2021-12-18 RX ORDER — ATORVASTATIN CALCIUM 80 MG/1
80 TABLET, FILM COATED ORAL NIGHTLY
Status: ON HOLD
Start: 2021-12-18 | End: 2023-03-09 | Stop reason: SDUPTHER

## 2021-12-18 RX ORDER — LEVETIRACETAM 5 MG/ML
500 INJECTION INTRAVASCULAR EVERY 12 HOURS SCHEDULED
Qty: 4000 ML
Start: 2021-12-18 | End: 2021-12-23 | Stop reason: HOSPADM

## 2021-12-18 RX ORDER — DEXAMETHASONE SODIUM PHOSPHATE 4 MG/ML
4 INJECTION, SOLUTION INTRA-ARTICULAR; INTRALESIONAL; INTRAMUSCULAR; INTRAVENOUS; SOFT TISSUE ONCE
Status: COMPLETED | OUTPATIENT
Start: 2021-12-18 | End: 2021-12-18

## 2021-12-18 RX ORDER — LORAZEPAM 2 MG/ML
2 INJECTION INTRAMUSCULAR ONCE
Status: DISCONTINUED | OUTPATIENT
Start: 2021-12-18 | End: 2021-12-18

## 2021-12-18 RX ORDER — LEVOFLOXACIN 5 MG/ML
750 INJECTION, SOLUTION INTRAVENOUS ONCE
Start: 2021-12-18 | End: 2021-12-18 | Stop reason: HOSPADM

## 2021-12-18 RX ORDER — LEVETIRACETAM 5 MG/ML
500 INJECTION INTRAVASCULAR EVERY 12 HOURS SCHEDULED
Status: DISCONTINUED | OUTPATIENT
Start: 2021-12-18 | End: 2021-12-19 | Stop reason: HOSPADM

## 2021-12-18 RX ADMIN — MEROPENEM 1 G: 1 INJECTION, POWDER, FOR SOLUTION INTRAVENOUS at 20:26

## 2021-12-18 RX ADMIN — DEXAMETHASONE SODIUM PHOSPHATE 4 MG: 4 INJECTION, SOLUTION INTRAMUSCULAR; INTRAVENOUS at 03:51

## 2021-12-18 RX ADMIN — LEVOFLOXACIN 750 MG: 5 INJECTION, SOLUTION INTRAVENOUS at 10:03

## 2021-12-18 RX ADMIN — NICOTINE 1 PATCH: 21 PATCH, EXTENDED RELEASE TRANSDERMAL at 21:21

## 2021-12-18 RX ADMIN — ASPIRIN 325 MG: 325 TABLET ORAL at 08:52

## 2021-12-18 RX ADMIN — LEVETIRACETAM 500 MG: 5 INJECTION INTRAVENOUS at 20:49

## 2021-12-18 RX ADMIN — LORAZEPAM 2 MG: 2 INJECTION INTRAMUSCULAR; INTRAVENOUS at 02:22

## 2021-12-18 RX ADMIN — ATORVASTATIN CALCIUM 80 MG: 40 TABLET, FILM COATED ORAL at 21:14

## 2021-12-18 RX ADMIN — MEROPENEM 1 G: 1 INJECTION, POWDER, FOR SOLUTION INTRAVENOUS at 15:34

## 2021-12-18 RX ADMIN — PANTOPRAZOLE SODIUM 40 MG: 40 INJECTION, POWDER, FOR SOLUTION INTRAVENOUS at 06:07

## 2021-12-18 RX ADMIN — VALPROATE SODIUM 500 MG: 100 INJECTION, SOLUTION INTRAVENOUS at 00:59

## 2021-12-18 RX ADMIN — SODIUM CHLORIDE 75 ML/HR: 9 INJECTION, SOLUTION INTRAVENOUS at 00:46

## 2021-12-18 RX ADMIN — VALPROATE SODIUM 500 MG: 100 INJECTION, SOLUTION INTRAVENOUS at 08:53

## 2021-12-18 RX ADMIN — VALPROATE SODIUM 500 MG: 100 INJECTION, SOLUTION INTRAVENOUS at 16:55

## 2021-12-18 RX ADMIN — LEVETIRACETAM 500 MG: 500 TABLET ORAL at 08:52

## 2021-12-19 ENCOUNTER — HOSPITAL ENCOUNTER (INPATIENT)
Facility: HOSPITAL | Age: 46
LOS: 4 days | Discharge: HOME HEALTH | End: 2021-12-23
Attending: INTERNAL MEDICINE | Admitting: INTERNAL MEDICINE

## 2021-12-19 DIAGNOSIS — I63.9 APHASIA DUE TO ACUTE CEREBROVASCULAR ACCIDENT (CVA): Primary | ICD-10-CM

## 2021-12-19 DIAGNOSIS — R47.01 APHASIA DUE TO ACUTE CEREBROVASCULAR ACCIDENT (CVA): Primary | ICD-10-CM

## 2021-12-19 PROBLEM — E87.20 METABOLIC ACIDOSIS, NAG, BICARBONATE LOSSES: Status: ACTIVE | Noted: 2021-12-19

## 2021-12-19 LAB
ANION GAP SERPL CALCULATED.3IONS-SCNC: 13.7 MMOL/L (ref 5–15)
BUN SERPL-MCNC: 8 MG/DL (ref 6–20)
BUN/CREAT SERPL: 10.7 (ref 7–25)
CALCIUM SPEC-SCNC: 9.4 MG/DL (ref 8.6–10.5)
CHLORIDE SERPL-SCNC: 107 MMOL/L (ref 98–107)
CHOLEST SERPL-MCNC: 136 MG/DL (ref 0–200)
CO2 SERPL-SCNC: 20.3 MMOL/L (ref 22–29)
CREAT SERPL-MCNC: 0.75 MG/DL (ref 0.57–1)
DEPRECATED RDW RBC AUTO: 41.7 FL (ref 37–54)
ERYTHROCYTE [DISTWIDTH] IN BLOOD BY AUTOMATED COUNT: 12.9 % (ref 12.3–15.4)
GFR SERPL CREATININE-BSD FRML MDRD: 83 ML/MIN/1.73
GLUCOSE BLDC GLUCOMTR-MCNC: 83 MG/DL (ref 70–130)
GLUCOSE BLDC GLUCOMTR-MCNC: 86 MG/DL (ref 70–130)
GLUCOSE BLDC GLUCOMTR-MCNC: 90 MG/DL (ref 70–130)
GLUCOSE BLDC GLUCOMTR-MCNC: 92 MG/DL (ref 70–130)
GLUCOSE BLDC GLUCOMTR-MCNC: 96 MG/DL (ref 70–130)
GLUCOSE SERPL-MCNC: 84 MG/DL (ref 65–99)
HCT VFR BLD AUTO: 38.4 % (ref 34–46.6)
HDLC SERPL-MCNC: 42 MG/DL (ref 40–60)
HGB BLD-MCNC: 12.8 G/DL (ref 12–15.9)
LDLC SERPL CALC-MCNC: 73 MG/DL (ref 0–100)
LDLC/HDLC SERPL: 1.69 {RATIO}
MCH RBC QN AUTO: 30.3 PG (ref 26.6–33)
MCHC RBC AUTO-ENTMCNC: 33.3 G/DL (ref 31.5–35.7)
MCV RBC AUTO: 90.8 FL (ref 79–97)
PLATELET # BLD AUTO: 248 10*3/MM3 (ref 140–450)
PMV BLD AUTO: 11.2 FL (ref 6–12)
POTASSIUM SERPL-SCNC: 3.5 MMOL/L (ref 3.5–5.2)
RBC # BLD AUTO: 4.23 10*6/MM3 (ref 3.77–5.28)
SODIUM SERPL-SCNC: 141 MMOL/L (ref 136–145)
T4 FREE SERPL-MCNC: 1.1 NG/DL (ref 0.93–1.7)
TRIGL SERPL-MCNC: 115 MG/DL (ref 0–150)
TSH SERPL DL<=0.05 MIU/L-ACNC: 3.15 UIU/ML (ref 0.27–4.2)
VALPROATE SERPL-MCNC: 18 MCG/ML (ref 50–125)
VLDLC SERPL-MCNC: 21 MG/DL (ref 5–40)
WBC NRBC COR # BLD: 7.76 10*3/MM3 (ref 3.4–10.8)

## 2021-12-19 PROCEDURE — 99233 SBSQ HOSP IP/OBS HIGH 50: CPT | Performed by: PSYCHIATRY & NEUROLOGY

## 2021-12-19 PROCEDURE — 80048 BASIC METABOLIC PNL TOTAL CA: CPT | Performed by: INTERNAL MEDICINE

## 2021-12-19 PROCEDURE — 25010000002 LEVETIRACETAM IN NACL 0.82% 500 MG/100ML SOLUTION: Performed by: PSYCHIATRY & NEUROLOGY

## 2021-12-19 PROCEDURE — 80164 ASSAY DIPROPYLACETIC ACD TOT: CPT | Performed by: INTERNAL MEDICINE

## 2021-12-19 PROCEDURE — 25010000002 HEPARIN (PORCINE) PER 1000 UNITS: Performed by: INTERNAL MEDICINE

## 2021-12-19 PROCEDURE — 84439 ASSAY OF FREE THYROXINE: CPT | Performed by: INTERNAL MEDICINE

## 2021-12-19 PROCEDURE — 25010000002 ERTAPENEM PER 500 MG: Performed by: INTERNAL MEDICINE

## 2021-12-19 PROCEDURE — 82962 GLUCOSE BLOOD TEST: CPT

## 2021-12-19 PROCEDURE — 84443 ASSAY THYROID STIM HORMONE: CPT | Performed by: INTERNAL MEDICINE

## 2021-12-19 PROCEDURE — 85027 COMPLETE CBC AUTOMATED: CPT | Performed by: INTERNAL MEDICINE

## 2021-12-19 PROCEDURE — 80061 LIPID PANEL: CPT | Performed by: INTERNAL MEDICINE

## 2021-12-19 RX ORDER — LEVETIRACETAM 5 MG/ML
500 INJECTION INTRAVASCULAR EVERY 12 HOURS SCHEDULED
Status: DISCONTINUED | OUTPATIENT
Start: 2021-12-19 | End: 2021-12-21

## 2021-12-19 RX ORDER — ACETAMINOPHEN 325 MG/1
650 TABLET ORAL EVERY 4 HOURS PRN
Status: DISCONTINUED | OUTPATIENT
Start: 2021-12-19 | End: 2021-12-23 | Stop reason: HOSPADM

## 2021-12-19 RX ORDER — AMOXICILLIN 250 MG
2 CAPSULE ORAL 2 TIMES DAILY
Status: DISCONTINUED | OUTPATIENT
Start: 2021-12-19 | End: 2021-12-23 | Stop reason: HOSPADM

## 2021-12-19 RX ORDER — BISACODYL 10 MG
10 SUPPOSITORY, RECTAL RECTAL DAILY PRN
Status: DISCONTINUED | OUTPATIENT
Start: 2021-12-19 | End: 2021-12-23 | Stop reason: HOSPADM

## 2021-12-19 RX ORDER — HEPARIN SODIUM 5000 [USP'U]/ML
5000 INJECTION, SOLUTION INTRAVENOUS; SUBCUTANEOUS EVERY 8 HOURS SCHEDULED
Status: DISCONTINUED | OUTPATIENT
Start: 2021-12-19 | End: 2021-12-23 | Stop reason: HOSPADM

## 2021-12-19 RX ORDER — ONDANSETRON 2 MG/ML
4 INJECTION INTRAMUSCULAR; INTRAVENOUS EVERY 6 HOURS PRN
Status: DISCONTINUED | OUTPATIENT
Start: 2021-12-19 | End: 2021-12-23 | Stop reason: HOSPADM

## 2021-12-19 RX ORDER — LEVETIRACETAM 5 MG/ML
500 INJECTION INTRAVASCULAR EVERY 12 HOURS SCHEDULED
Status: DISCONTINUED | OUTPATIENT
Start: 2021-12-19 | End: 2021-12-19

## 2021-12-19 RX ORDER — MAGNESIUM SULFATE HEPTAHYDRATE 40 MG/ML
4 INJECTION, SOLUTION INTRAVENOUS AS NEEDED
Status: DISCONTINUED | OUTPATIENT
Start: 2021-12-19 | End: 2021-12-23 | Stop reason: HOSPADM

## 2021-12-19 RX ORDER — SODIUM CHLORIDE 0.9 % (FLUSH) 0.9 %
10 SYRINGE (ML) INJECTION AS NEEDED
Status: DISCONTINUED | OUTPATIENT
Start: 2021-12-19 | End: 2021-12-23 | Stop reason: HOSPADM

## 2021-12-19 RX ORDER — BISACODYL 5 MG/1
5 TABLET, DELAYED RELEASE ORAL DAILY PRN
Status: DISCONTINUED | OUTPATIENT
Start: 2021-12-19 | End: 2021-12-23 | Stop reason: HOSPADM

## 2021-12-19 RX ORDER — MAGNESIUM SULFATE HEPTAHYDRATE 40 MG/ML
2 INJECTION, SOLUTION INTRAVENOUS AS NEEDED
Status: DISCONTINUED | OUTPATIENT
Start: 2021-12-19 | End: 2021-12-23 | Stop reason: HOSPADM

## 2021-12-19 RX ORDER — ASPIRIN 300 MG/1
300 SUPPOSITORY RECTAL DAILY
Status: DISCONTINUED | OUTPATIENT
Start: 2021-12-19 | End: 2021-12-21

## 2021-12-19 RX ORDER — POTASSIUM CHLORIDE 1.5 G/1.77G
40 POWDER, FOR SOLUTION ORAL AS NEEDED
Status: DISCONTINUED | OUTPATIENT
Start: 2021-12-19 | End: 2021-12-23 | Stop reason: HOSPADM

## 2021-12-19 RX ORDER — POLYETHYLENE GLYCOL 3350 17 G/17G
17 POWDER, FOR SOLUTION ORAL DAILY PRN
Status: DISCONTINUED | OUTPATIENT
Start: 2021-12-19 | End: 2021-12-23 | Stop reason: HOSPADM

## 2021-12-19 RX ORDER — LABETALOL HYDROCHLORIDE 5 MG/ML
20 INJECTION, SOLUTION INTRAVENOUS
Status: DISCONTINUED | OUTPATIENT
Start: 2021-12-19 | End: 2021-12-20

## 2021-12-19 RX ORDER — NICOTINE 21 MG/24HR
1 PATCH, TRANSDERMAL 24 HOURS TRANSDERMAL
Status: DISCONTINUED | OUTPATIENT
Start: 2021-12-19 | End: 2021-12-23 | Stop reason: HOSPADM

## 2021-12-19 RX ORDER — SODIUM CHLORIDE 0.9 % (FLUSH) 0.9 %
10 SYRINGE (ML) INJECTION EVERY 12 HOURS SCHEDULED
Status: DISCONTINUED | OUTPATIENT
Start: 2021-12-19 | End: 2021-12-23 | Stop reason: HOSPADM

## 2021-12-19 RX ORDER — ATORVASTATIN CALCIUM 80 MG/1
80 TABLET, FILM COATED ORAL NIGHTLY
Status: DISCONTINUED | OUTPATIENT
Start: 2021-12-19 | End: 2021-12-23 | Stop reason: HOSPADM

## 2021-12-19 RX ORDER — VALPROIC ACID 250 MG/1
500 CAPSULE, LIQUID FILLED ORAL EVERY 8 HOURS SCHEDULED
Status: DISCONTINUED | OUTPATIENT
Start: 2021-12-19 | End: 2021-12-23 | Stop reason: HOSPADM

## 2021-12-19 RX ORDER — POTASSIUM CHLORIDE 750 MG/1
40 TABLET, FILM COATED, EXTENDED RELEASE ORAL AS NEEDED
Status: DISCONTINUED | OUTPATIENT
Start: 2021-12-19 | End: 2021-12-23 | Stop reason: HOSPADM

## 2021-12-19 RX ORDER — POTASSIUM CHLORIDE 7.45 MG/ML
10 INJECTION INTRAVENOUS
Status: DISCONTINUED | OUTPATIENT
Start: 2021-12-19 | End: 2021-12-23 | Stop reason: HOSPADM

## 2021-12-19 RX ORDER — PANTOPRAZOLE SODIUM 40 MG/10ML
40 INJECTION, POWDER, LYOPHILIZED, FOR SOLUTION INTRAVENOUS
Status: DISCONTINUED | OUTPATIENT
Start: 2021-12-19 | End: 2021-12-23

## 2021-12-19 RX ORDER — CALCIUM GLUCONATE 20 MG/ML
1 INJECTION, SOLUTION INTRAVENOUS AS NEEDED
Status: DISCONTINUED | OUTPATIENT
Start: 2021-12-19 | End: 2021-12-23 | Stop reason: HOSPADM

## 2021-12-19 RX ORDER — SODIUM CHLORIDE, SODIUM LACTATE, POTASSIUM CHLORIDE, CALCIUM CHLORIDE 600; 310; 30; 20 MG/100ML; MG/100ML; MG/100ML; MG/100ML
100 INJECTION, SOLUTION INTRAVENOUS CONTINUOUS
Status: DISCONTINUED | OUTPATIENT
Start: 2021-12-19 | End: 2021-12-21

## 2021-12-19 RX ORDER — ACETAMINOPHEN 650 MG/1
650 SUPPOSITORY RECTAL EVERY 4 HOURS PRN
Status: DISCONTINUED | OUTPATIENT
Start: 2021-12-19 | End: 2021-12-23 | Stop reason: HOSPADM

## 2021-12-19 RX ADMIN — POTASSIUM CHLORIDE 40 MEQ: 1.5 POWDER, FOR SOLUTION ORAL at 20:57

## 2021-12-19 RX ADMIN — DOCUSATE SODIUM 50MG AND SENNOSIDES 8.6MG 2 TABLET: 8.6; 5 TABLET, FILM COATED ORAL at 09:01

## 2021-12-19 RX ADMIN — ERTAPENEM 1 G: 1 INJECTION INTRAMUSCULAR; INTRAVENOUS at 05:49

## 2021-12-19 RX ADMIN — VALPROIC ACID 500 MG: 250 CAPSULE, LIQUID FILLED ORAL at 14:43

## 2021-12-19 RX ADMIN — ASPIRIN 300 MG: 300 SUPPOSITORY RECTAL at 09:01

## 2021-12-19 RX ADMIN — POTASSIUM CHLORIDE 40 MEQ: 1.5 POWDER, FOR SOLUTION ORAL at 10:38

## 2021-12-19 RX ADMIN — Medication 1 PATCH: at 09:18

## 2021-12-19 RX ADMIN — LEVETIRACETAM 500 MG: 5 INJECTION INTRAVASCULAR at 05:40

## 2021-12-19 RX ADMIN — SODIUM CHLORIDE, PRESERVATIVE FREE 10 ML: 5 INJECTION INTRAVENOUS at 09:19

## 2021-12-19 RX ADMIN — SODIUM CHLORIDE, PRESERVATIVE FREE 10 ML: 5 INJECTION INTRAVENOUS at 20:52

## 2021-12-19 RX ADMIN — SODIUM CHLORIDE, POTASSIUM CHLORIDE, SODIUM LACTATE AND CALCIUM CHLORIDE 100 ML/HR: 600; 310; 30; 20 INJECTION, SOLUTION INTRAVENOUS at 14:36

## 2021-12-19 RX ADMIN — VALPROIC ACID 500 MG: 250 CAPSULE, LIQUID FILLED ORAL at 06:02

## 2021-12-19 RX ADMIN — VALPROIC ACID 500 MG: 250 CAPSULE, LIQUID FILLED ORAL at 21:03

## 2021-12-19 RX ADMIN — ATORVASTATIN CALCIUM 80 MG: 80 TABLET, FILM COATED ORAL at 20:57

## 2021-12-19 RX ADMIN — PANTOPRAZOLE SODIUM 40 MG: 40 INJECTION, POWDER, FOR SOLUTION INTRAVENOUS at 05:42

## 2021-12-19 RX ADMIN — DOCUSATE SODIUM 50MG AND SENNOSIDES 8.6MG 2 TABLET: 8.6; 5 TABLET, FILM COATED ORAL at 20:57

## 2021-12-19 RX ADMIN — SODIUM CHLORIDE, POTASSIUM CHLORIDE, SODIUM LACTATE AND CALCIUM CHLORIDE 100 ML/HR: 600; 310; 30; 20 INJECTION, SOLUTION INTRAVENOUS at 05:33

## 2021-12-19 RX ADMIN — HEPARIN SODIUM 5000 UNITS: 5000 INJECTION INTRAVENOUS; SUBCUTANEOUS at 14:43

## 2021-12-19 RX ADMIN — HEPARIN SODIUM 5000 UNITS: 5000 INJECTION INTRAVENOUS; SUBCUTANEOUS at 21:03

## 2021-12-19 RX ADMIN — HEPARIN SODIUM 5000 UNITS: 5000 INJECTION INTRAVENOUS; SUBCUTANEOUS at 05:42

## 2021-12-19 RX ADMIN — LEVETIRACETAM 500 MG: 5 INJECTION INTRAVASCULAR at 20:57

## 2021-12-20 LAB
ANION GAP SERPL CALCULATED.3IONS-SCNC: 12.4 MMOL/L (ref 5–15)
BUN SERPL-MCNC: 6 MG/DL (ref 6–20)
BUN/CREAT SERPL: 8.8 (ref 7–25)
CALCIUM SPEC-SCNC: 9.1 MG/DL (ref 8.6–10.5)
CHLORIDE SERPL-SCNC: 104 MMOL/L (ref 98–107)
CO2 SERPL-SCNC: 23.6 MMOL/L (ref 22–29)
CREAT SERPL-MCNC: 0.68 MG/DL (ref 0.57–1)
DEPRECATED RDW RBC AUTO: 41.6 FL (ref 37–54)
ERYTHROCYTE [DISTWIDTH] IN BLOOD BY AUTOMATED COUNT: 12.8 % (ref 12.3–15.4)
GFR SERPL CREATININE-BSD FRML MDRD: 93 ML/MIN/1.73
GLUCOSE SERPL-MCNC: 75 MG/DL (ref 65–99)
HCT VFR BLD AUTO: 37.9 % (ref 34–46.6)
HGB BLD-MCNC: 12.5 G/DL (ref 12–15.9)
MCH RBC QN AUTO: 29.6 PG (ref 26.6–33)
MCHC RBC AUTO-ENTMCNC: 33 G/DL (ref 31.5–35.7)
MCV RBC AUTO: 89.8 FL (ref 79–97)
PLATELET # BLD AUTO: 249 10*3/MM3 (ref 140–450)
PMV BLD AUTO: 11.7 FL (ref 6–12)
POTASSIUM SERPL-SCNC: 3.6 MMOL/L (ref 3.5–5.2)
RBC # BLD AUTO: 4.22 10*6/MM3 (ref 3.77–5.28)
SARS-COV-2 RNA RESP QL NAA+PROBE: NOT DETECTED
SODIUM SERPL-SCNC: 140 MMOL/L (ref 136–145)
WBC NRBC COR # BLD: 6.2 10*3/MM3 (ref 3.4–10.8)

## 2021-12-20 PROCEDURE — 97116 GAIT TRAINING THERAPY: CPT

## 2021-12-20 PROCEDURE — 97162 PT EVAL MOD COMPLEX 30 MIN: CPT

## 2021-12-20 PROCEDURE — 25010000002 LEVETIRACETAM IN NACL 0.82% 500 MG/100ML SOLUTION: Performed by: PSYCHIATRY & NEUROLOGY

## 2021-12-20 PROCEDURE — 25010000002 ERTAPENEM PER 500 MG: Performed by: INTERNAL MEDICINE

## 2021-12-20 PROCEDURE — 99232 SBSQ HOSP IP/OBS MODERATE 35: CPT | Performed by: STUDENT IN AN ORGANIZED HEALTH CARE EDUCATION/TRAINING PROGRAM

## 2021-12-20 PROCEDURE — 85027 COMPLETE CBC AUTOMATED: CPT | Performed by: NURSE PRACTITIONER

## 2021-12-20 PROCEDURE — 80048 BASIC METABOLIC PNL TOTAL CA: CPT | Performed by: NURSE PRACTITIONER

## 2021-12-20 PROCEDURE — 99222 1ST HOSP IP/OBS MODERATE 55: CPT | Performed by: INTERNAL MEDICINE

## 2021-12-20 PROCEDURE — U0003 INFECTIOUS AGENT DETECTION BY NUCLEIC ACID (DNA OR RNA); SEVERE ACUTE RESPIRATORY SYNDROME CORONAVIRUS 2 (SARS-COV-2) (CORONAVIRUS DISEASE [COVID-19]), AMPLIFIED PROBE TECHNIQUE, MAKING USE OF HIGH THROUGHPUT TECHNOLOGIES AS DESCRIBED BY CMS-2020-01-R: HCPCS | Performed by: INTERNAL MEDICINE

## 2021-12-20 PROCEDURE — 92610 EVALUATE SWALLOWING FUNCTION: CPT

## 2021-12-20 PROCEDURE — 97530 THERAPEUTIC ACTIVITIES: CPT

## 2021-12-20 PROCEDURE — 25010000002 HEPARIN (PORCINE) PER 1000 UNITS: Performed by: INTERNAL MEDICINE

## 2021-12-20 RX ORDER — NITROGLYCERIN 0.4 MG/1
0.4 TABLET SUBLINGUAL
Status: DISCONTINUED | OUTPATIENT
Start: 2021-12-20 | End: 2021-12-23 | Stop reason: HOSPADM

## 2021-12-20 RX ORDER — LABETALOL HYDROCHLORIDE 5 MG/ML
10 INJECTION, SOLUTION INTRAVENOUS
Status: DISCONTINUED | OUTPATIENT
Start: 2021-12-20 | End: 2021-12-23 | Stop reason: HOSPADM

## 2021-12-20 RX ORDER — NITROFURANTOIN 25; 75 MG/1; MG/1
100 CAPSULE ORAL EVERY 12 HOURS SCHEDULED
Status: DISCONTINUED | OUTPATIENT
Start: 2021-12-21 | End: 2021-12-23 | Stop reason: HOSPADM

## 2021-12-20 RX ADMIN — ERTAPENEM 1 G: 1 INJECTION INTRAMUSCULAR; INTRAVENOUS at 06:06

## 2021-12-20 RX ADMIN — LEVETIRACETAM 500 MG: 5 INJECTION INTRAVASCULAR at 09:19

## 2021-12-20 RX ADMIN — VALPROIC ACID 500 MG: 250 CAPSULE, LIQUID FILLED ORAL at 17:48

## 2021-12-20 RX ADMIN — SODIUM CHLORIDE, PRESERVATIVE FREE 10 ML: 5 INJECTION INTRAVENOUS at 09:20

## 2021-12-20 RX ADMIN — DOCUSATE SODIUM 50MG AND SENNOSIDES 8.6MG 2 TABLET: 8.6; 5 TABLET, FILM COATED ORAL at 20:49

## 2021-12-20 RX ADMIN — HEPARIN SODIUM 5000 UNITS: 5000 INJECTION INTRAVENOUS; SUBCUTANEOUS at 06:06

## 2021-12-20 RX ADMIN — LEVETIRACETAM 500 MG: 5 INJECTION INTRAVASCULAR at 20:49

## 2021-12-20 RX ADMIN — ASPIRIN 300 MG: 300 SUPPOSITORY RECTAL at 09:19

## 2021-12-20 RX ADMIN — ATORVASTATIN CALCIUM 80 MG: 80 TABLET, FILM COATED ORAL at 20:49

## 2021-12-20 RX ADMIN — Medication 1 PATCH: at 09:20

## 2021-12-20 RX ADMIN — SODIUM CHLORIDE, POTASSIUM CHLORIDE, SODIUM LACTATE AND CALCIUM CHLORIDE 100 ML/HR: 600; 310; 30; 20 INJECTION, SOLUTION INTRAVENOUS at 00:14

## 2021-12-20 RX ADMIN — HEPARIN SODIUM 5000 UNITS: 5000 INJECTION INTRAVENOUS; SUBCUTANEOUS at 16:58

## 2021-12-20 RX ADMIN — SODIUM CHLORIDE, PRESERVATIVE FREE 10 ML: 5 INJECTION INTRAVENOUS at 20:50

## 2021-12-20 RX ADMIN — HEPARIN SODIUM 5000 UNITS: 5000 INJECTION INTRAVENOUS; SUBCUTANEOUS at 20:51

## 2021-12-20 RX ADMIN — PANTOPRAZOLE SODIUM 40 MG: 40 INJECTION, POWDER, FOR SOLUTION INTRAVENOUS at 06:06

## 2021-12-20 RX ADMIN — VALPROIC ACID 500 MG: 250 CAPSULE, LIQUID FILLED ORAL at 22:26

## 2021-12-21 PROBLEM — I69.391 DYSPHAGIA DUE TO RECENT STROKE: Status: ACTIVE | Noted: 2021-12-18

## 2021-12-21 PROBLEM — R90.89 MIDLINE SHIFT OF BRAIN: Status: ACTIVE | Noted: 2021-12-21

## 2021-12-21 LAB
ANION GAP SERPL CALCULATED.3IONS-SCNC: 9.9 MMOL/L (ref 5–15)
BUN SERPL-MCNC: 7 MG/DL (ref 6–20)
BUN/CREAT SERPL: 9.7 (ref 7–25)
CALCIUM SPEC-SCNC: 9.3 MG/DL (ref 8.6–10.5)
CHLORIDE SERPL-SCNC: 102 MMOL/L (ref 98–107)
CO2 SERPL-SCNC: 24.1 MMOL/L (ref 22–29)
CREAT SERPL-MCNC: 0.72 MG/DL (ref 0.57–1)
DEPRECATED RDW RBC AUTO: 42.1 FL (ref 37–54)
ERYTHROCYTE [DISTWIDTH] IN BLOOD BY AUTOMATED COUNT: 13 % (ref 12.3–15.4)
GFR SERPL CREATININE-BSD FRML MDRD: 87 ML/MIN/1.73
GLUCOSE SERPL-MCNC: 91 MG/DL (ref 65–99)
HCT VFR BLD AUTO: 40.9 % (ref 34–46.6)
HGB BLD-MCNC: 13.5 G/DL (ref 12–15.9)
MAXIMAL PREDICTED HEART RATE: 174 BPM
MCH RBC QN AUTO: 29.9 PG (ref 26.6–33)
MCHC RBC AUTO-ENTMCNC: 33 G/DL (ref 31.5–35.7)
MCV RBC AUTO: 90.7 FL (ref 79–97)
PLATELET # BLD AUTO: 260 10*3/MM3 (ref 140–450)
PMV BLD AUTO: 11.9 FL (ref 6–12)
POTASSIUM SERPL-SCNC: 3.9 MMOL/L (ref 3.5–5.2)
RBC # BLD AUTO: 4.51 10*6/MM3 (ref 3.77–5.28)
SODIUM SERPL-SCNC: 136 MMOL/L (ref 136–145)
STRESS TARGET HR: 148 BPM
WBC NRBC COR # BLD: 8.65 10*3/MM3 (ref 3.4–10.8)

## 2021-12-21 PROCEDURE — 80048 BASIC METABOLIC PNL TOTAL CA: CPT | Performed by: NURSE PRACTITIONER

## 2021-12-21 PROCEDURE — 85027 COMPLETE CBC AUTOMATED: CPT | Performed by: NURSE PRACTITIONER

## 2021-12-21 PROCEDURE — 97530 THERAPEUTIC ACTIVITIES: CPT

## 2021-12-21 PROCEDURE — 97535 SELF CARE MNGMENT TRAINING: CPT

## 2021-12-21 PROCEDURE — 97166 OT EVAL MOD COMPLEX 45 MIN: CPT

## 2021-12-21 PROCEDURE — 97116 GAIT TRAINING THERAPY: CPT

## 2021-12-21 PROCEDURE — 25010000002 HEPARIN (PORCINE) PER 1000 UNITS: Performed by: INTERNAL MEDICINE

## 2021-12-21 PROCEDURE — 99233 SBSQ HOSP IP/OBS HIGH 50: CPT | Performed by: NURSE PRACTITIONER

## 2021-12-21 PROCEDURE — 99232 SBSQ HOSP IP/OBS MODERATE 35: CPT | Performed by: INTERNAL MEDICINE

## 2021-12-21 PROCEDURE — 25010000002 LEVETIRACETAM IN NACL 0.82% 500 MG/100ML SOLUTION: Performed by: PSYCHIATRY & NEUROLOGY

## 2021-12-21 RX ORDER — ASPIRIN 325 MG
325 TABLET ORAL DAILY
Status: DISCONTINUED | OUTPATIENT
Start: 2021-12-22 | End: 2021-12-23 | Stop reason: HOSPADM

## 2021-12-21 RX ORDER — SODIUM CHLORIDE 9 MG/ML
INJECTION, SOLUTION INTRAVENOUS
Status: COMPLETED | OUTPATIENT
Start: 2021-12-21 | End: 2021-12-21

## 2021-12-21 RX ORDER — LEVETIRACETAM 500 MG/1
500 TABLET ORAL EVERY 12 HOURS SCHEDULED
Status: DISCONTINUED | OUTPATIENT
Start: 2021-12-21 | End: 2021-12-23 | Stop reason: HOSPADM

## 2021-12-21 RX ORDER — LIDOCAINE HYDROCHLORIDE 20 MG/ML
SOLUTION OROPHARYNGEAL
Status: COMPLETED | OUTPATIENT
Start: 2021-12-21 | End: 2021-12-21

## 2021-12-21 RX ADMIN — VALPROIC ACID 500 MG: 250 CAPSULE, LIQUID FILLED ORAL at 21:03

## 2021-12-21 RX ADMIN — NITROFURANTOIN MONOHYDRATE/MACROCRYSTALLINE 100 MG: 25; 75 CAPSULE ORAL at 12:03

## 2021-12-21 RX ADMIN — NITROFURANTOIN MONOHYDRATE/MACROCRYSTALLINE 100 MG: 25; 75 CAPSULE ORAL at 21:04

## 2021-12-21 RX ADMIN — HEPARIN SODIUM 5000 UNITS: 5000 INJECTION INTRAVENOUS; SUBCUTANEOUS at 16:47

## 2021-12-21 RX ADMIN — LEVETIRACETAM 500 MG: 5 INJECTION INTRAVASCULAR at 09:43

## 2021-12-21 RX ADMIN — HEPARIN SODIUM 5000 UNITS: 5000 INJECTION INTRAVENOUS; SUBCUTANEOUS at 21:04

## 2021-12-21 RX ADMIN — VALPROIC ACID 500 MG: 250 CAPSULE, LIQUID FILLED ORAL at 06:03

## 2021-12-21 RX ADMIN — ATORVASTATIN CALCIUM 80 MG: 80 TABLET, FILM COATED ORAL at 21:04

## 2021-12-21 RX ADMIN — Medication 1 PATCH: at 12:04

## 2021-12-21 RX ADMIN — SODIUM CHLORIDE, POTASSIUM CHLORIDE, SODIUM LACTATE AND CALCIUM CHLORIDE 100 ML/HR: 600; 310; 30; 20 INJECTION, SOLUTION INTRAVENOUS at 06:07

## 2021-12-21 RX ADMIN — LIDOCAINE HYDROCHLORIDE 10 ML: 20 SOLUTION ORAL; TOPICAL at 08:58

## 2021-12-21 RX ADMIN — VALPROIC ACID 500 MG: 250 CAPSULE, LIQUID FILLED ORAL at 16:47

## 2021-12-21 RX ADMIN — PANTOPRAZOLE SODIUM 40 MG: 40 INJECTION, POWDER, FOR SOLUTION INTRAVENOUS at 06:03

## 2021-12-21 RX ADMIN — DOCUSATE SODIUM 50MG AND SENNOSIDES 8.6MG 2 TABLET: 8.6; 5 TABLET, FILM COATED ORAL at 21:03

## 2021-12-21 RX ADMIN — SODIUM CHLORIDE 50 ML/HR: 9 INJECTION, SOLUTION INTRAVENOUS at 08:58

## 2021-12-21 RX ADMIN — ASPIRIN 300 MG: 300 SUPPOSITORY RECTAL at 09:43

## 2021-12-21 RX ADMIN — LEVETIRACETAM 500 MG: 500 TABLET, FILM COATED ORAL at 21:03

## 2021-12-22 ENCOUNTER — HOME HEALTH ADMISSION (OUTPATIENT)
Dept: HOME HEALTH SERVICES | Facility: HOME HEALTHCARE | Age: 46
End: 2021-12-22

## 2021-12-22 LAB
D DIMER PPP FEU-MCNC: 0.3 MCGFEU/ML (ref 0–0.49)
HCYS SERPL-MCNC: 16 UMOL/L (ref 0–15)

## 2021-12-22 PROCEDURE — 86148 ANTI-PHOSPHOLIPID ANTIBODY: CPT | Performed by: NURSE PRACTITIONER

## 2021-12-22 PROCEDURE — 85379 FIBRIN DEGRADATION QUANT: CPT | Performed by: NURSE PRACTITIONER

## 2021-12-22 PROCEDURE — 83520 IMMUNOASSAY QUANT NOS NONAB: CPT | Performed by: NURSE PRACTITIONER

## 2021-12-22 PROCEDURE — 85613 RUSSELL VIPER VENOM DILUTED: CPT | Performed by: NURSE PRACTITIONER

## 2021-12-22 PROCEDURE — 85300 ANTITHROMBIN III ACTIVITY: CPT | Performed by: NURSE PRACTITIONER

## 2021-12-22 PROCEDURE — 85306 CLOT INHIBIT PROT S FREE: CPT | Performed by: NURSE PRACTITIONER

## 2021-12-22 PROCEDURE — 85220 BLOOC CLOT FACTOR V TEST: CPT | Performed by: NURSE PRACTITIONER

## 2021-12-22 PROCEDURE — 81240 F2 GENE: CPT | Performed by: NURSE PRACTITIONER

## 2021-12-22 PROCEDURE — 86038 ANTINUCLEAR ANTIBODIES: CPT | Performed by: NURSE PRACTITIONER

## 2021-12-22 PROCEDURE — 86146 BETA-2 GLYCOPROTEIN ANTIBODY: CPT | Performed by: NURSE PRACTITIONER

## 2021-12-22 PROCEDURE — 85732 THROMBOPLASTIN TIME PARTIAL: CPT | Performed by: NURSE PRACTITIONER

## 2021-12-22 PROCEDURE — 83090 ASSAY OF HOMOCYSTEINE: CPT | Performed by: NURSE PRACTITIONER

## 2021-12-22 PROCEDURE — 85705 THROMBOPLASTIN INHIBITION: CPT | Performed by: NURSE PRACTITIONER

## 2021-12-22 PROCEDURE — 81241 F5 GENE: CPT | Performed by: NURSE PRACTITIONER

## 2021-12-22 PROCEDURE — 85670 THROMBIN TIME PLASMA: CPT | Performed by: NURSE PRACTITIONER

## 2021-12-22 PROCEDURE — 85302 CLOT INHIBIT PROT C ANTIGEN: CPT | Performed by: NURSE PRACTITIONER

## 2021-12-22 PROCEDURE — 85303 CLOT INHIBIT PROT C ACTIVITY: CPT | Performed by: NURSE PRACTITIONER

## 2021-12-22 PROCEDURE — 25010000002 HEPARIN (PORCINE) PER 1000 UNITS: Performed by: INTERNAL MEDICINE

## 2021-12-22 PROCEDURE — 85305 CLOT INHIBIT PROT S TOTAL: CPT | Performed by: NURSE PRACTITIONER

## 2021-12-22 PROCEDURE — 86147 CARDIOLIPIN ANTIBODY EA IG: CPT | Performed by: NURSE PRACTITIONER

## 2021-12-22 RX ORDER — LEVETIRACETAM 500 MG/1
500 TABLET ORAL EVERY 12 HOURS SCHEDULED
Qty: 60 TABLET | Refills: 0 | Status: SHIPPED | OUTPATIENT
Start: 2021-12-22 | End: 2022-05-05 | Stop reason: HOSPADM

## 2021-12-22 RX ORDER — NICOTINE 21 MG/24HR
1 PATCH, TRANSDERMAL 24 HOURS TRANSDERMAL
Qty: 30 PATCH | Refills: 0 | Status: SHIPPED | OUTPATIENT
Start: 2021-12-23 | End: 2022-01-22

## 2021-12-22 RX ORDER — NITROFURANTOIN 25; 75 MG/1; MG/1
100 CAPSULE ORAL EVERY 12 HOURS SCHEDULED
Qty: 3 CAPSULE | Refills: 0 | Status: SHIPPED | OUTPATIENT
Start: 2021-12-22 | End: 2021-12-24

## 2021-12-22 RX ORDER — ASPIRIN 325 MG
325 TABLET ORAL DAILY
Qty: 30 TABLET | Refills: 0 | Status: SHIPPED | OUTPATIENT
Start: 2021-12-23 | End: 2022-01-22

## 2021-12-22 RX ORDER — PANTOPRAZOLE SODIUM 40 MG/1
40 TABLET, DELAYED RELEASE ORAL DAILY
Qty: 30 TABLET | Refills: 0 | Status: SHIPPED | OUTPATIENT
Start: 2021-12-22 | End: 2023-03-09 | Stop reason: HOSPADM

## 2021-12-22 RX ORDER — VALPROIC ACID 250 MG/1
500 CAPSULE, LIQUID FILLED ORAL EVERY 8 HOURS SCHEDULED
Qty: 180 CAPSULE | Refills: 0 | Status: SHIPPED | OUTPATIENT
Start: 2021-12-22 | End: 2022-03-13 | Stop reason: HOSPADM

## 2021-12-22 RX ADMIN — NITROFURANTOIN MONOHYDRATE/MACROCRYSTALLINE 100 MG: 25; 75 CAPSULE ORAL at 21:09

## 2021-12-22 RX ADMIN — ATORVASTATIN CALCIUM 80 MG: 80 TABLET, FILM COATED ORAL at 21:09

## 2021-12-22 RX ADMIN — SODIUM CHLORIDE, PRESERVATIVE FREE 10 ML: 5 INJECTION INTRAVENOUS at 08:49

## 2021-12-22 RX ADMIN — HEPARIN SODIUM 5000 UNITS: 5000 INJECTION INTRAVENOUS; SUBCUTANEOUS at 05:42

## 2021-12-22 RX ADMIN — VALPROIC ACID 500 MG: 250 CAPSULE, LIQUID FILLED ORAL at 14:51

## 2021-12-22 RX ADMIN — LEVETIRACETAM 500 MG: 500 TABLET, FILM COATED ORAL at 21:09

## 2021-12-22 RX ADMIN — HEPARIN SODIUM 5000 UNITS: 5000 INJECTION INTRAVENOUS; SUBCUTANEOUS at 21:09

## 2021-12-22 RX ADMIN — DOCUSATE SODIUM 50MG AND SENNOSIDES 8.6MG 2 TABLET: 8.6; 5 TABLET, FILM COATED ORAL at 21:09

## 2021-12-22 RX ADMIN — HEPARIN SODIUM 5000 UNITS: 5000 INJECTION INTRAVENOUS; SUBCUTANEOUS at 14:50

## 2021-12-22 RX ADMIN — LEVETIRACETAM 500 MG: 500 TABLET, FILM COATED ORAL at 08:49

## 2021-12-22 RX ADMIN — PANTOPRAZOLE SODIUM 40 MG: 40 INJECTION, POWDER, FOR SOLUTION INTRAVENOUS at 05:37

## 2021-12-22 RX ADMIN — ASPIRIN 325 MG: 325 TABLET ORAL at 08:49

## 2021-12-22 RX ADMIN — NITROFURANTOIN MONOHYDRATE/MACROCRYSTALLINE 100 MG: 25; 75 CAPSULE ORAL at 08:49

## 2021-12-22 RX ADMIN — VALPROIC ACID 500 MG: 250 CAPSULE, LIQUID FILLED ORAL at 05:37

## 2021-12-22 RX ADMIN — Medication 1 PATCH: at 08:50

## 2021-12-23 VITALS
OXYGEN SATURATION: 99 % | DIASTOLIC BLOOD PRESSURE: 83 MMHG | SYSTOLIC BLOOD PRESSURE: 119 MMHG | WEIGHT: 181.88 LBS | HEART RATE: 74 BPM | HEIGHT: 60 IN | BODY MASS INDEX: 35.71 KG/M2 | TEMPERATURE: 97.8 F | RESPIRATION RATE: 18 BRPM

## 2021-12-23 LAB
ANA SER QL: NEGATIVE
CARDIOLIPIN IGG SER IA-ACNC: <9 GPL U/ML (ref 0–14)
CARDIOLIPIN IGM SER IA-ACNC: 12 MPL U/ML (ref 0–12)
FACT V ACT/NOR PPP: 121 % (ref 70–150)

## 2021-12-23 PROCEDURE — 25010000002 HEPARIN (PORCINE) PER 1000 UNITS: Performed by: INTERNAL MEDICINE

## 2021-12-23 RX ORDER — PANTOPRAZOLE SODIUM 40 MG/1
40 TABLET, DELAYED RELEASE ORAL
Status: DISCONTINUED | OUTPATIENT
Start: 2021-12-23 | End: 2021-12-23 | Stop reason: HOSPADM

## 2021-12-23 RX ADMIN — ASPIRIN 325 MG: 325 TABLET ORAL at 08:55

## 2021-12-23 RX ADMIN — VALPROIC ACID 500 MG: 250 CAPSULE, LIQUID FILLED ORAL at 00:19

## 2021-12-23 RX ADMIN — Medication 1 PATCH: at 08:55

## 2021-12-23 RX ADMIN — HEPARIN SODIUM 5000 UNITS: 5000 INJECTION INTRAVENOUS; SUBCUTANEOUS at 06:56

## 2021-12-23 RX ADMIN — NITROFURANTOIN MONOHYDRATE/MACROCRYSTALLINE 100 MG: 25; 75 CAPSULE ORAL at 08:55

## 2021-12-23 RX ADMIN — VALPROIC ACID 500 MG: 250 CAPSULE, LIQUID FILLED ORAL at 09:37

## 2021-12-23 RX ADMIN — LEVETIRACETAM 500 MG: 500 TABLET, FILM COATED ORAL at 08:55

## 2021-12-24 ENCOUNTER — READMISSION MANAGEMENT (OUTPATIENT)
Dept: CALL CENTER | Facility: HOSPITAL | Age: 46
End: 2021-12-24

## 2021-12-24 LAB
APTT SCREEN TO CONFIRM RATIO: 1.06 RATIO (ref 0–1.34)
AT III PPP CHRO-ACNC: 100 % (ref 90–134)
B2 GLYCOPROT1 IGA SER-ACNC: <9 GPI IGA UNITS (ref 0–25)
B2 GLYCOPROT1 IGG SER-ACNC: <9 GPI IGG UNITS (ref 0–20)
B2 GLYCOPROT1 IGM SER-ACNC: <9 GPI IGM UNITS (ref 0–32)
CONFIRM APTT/NORMAL: 35.7 SEC (ref 0–47.6)
F5 GENE MUT ANL BLD/T: NORMAL
FACTOR II, DNA ANALYSIS: NORMAL
LA 2 SCREEN W REFLEX-IMP: NORMAL
PROT C ACT/NOR PPP: 98 % (ref 86–163)
PROT C AG ACT/NOR PPP IA: 87 % (ref 60–150)
PROT S ACT/NOR PPP: 115 % (ref 70–127)
PROT S AG ACT/NOR PPP IA: 128 % (ref 60–150)
PROT S FREE PPP-ACNC: 116 % (ref 49–138)
SCREEN APTT: 32 SEC (ref 0–51.9)
SCREEN DRVVT: 35.9 SEC (ref 0–47)
THROMBIN TIME: 18.4 SEC (ref 0–23)

## 2021-12-24 NOTE — OUTREACH NOTE
Prep Survey      Responses   [facility] patient discharged from? [facility]   Is LACE score < 7 ? No   Emergency Room discharge w/ pulse ox? No   Eligibility Readm Mgmt   Discharge diagnosis cerebral vascular accident   Does the patient have one of the following disease processes/diagnoses(primary or secondary)? Stroke (TIA)   Does the patient have Home health ordered? Yes   What is the Home health agency?  [facility]    Is there a DME ordered? No   Prep survey completed? Yes          Pamela Tapia RN

## 2021-12-25 LAB
PS IGG SER-ACNC: <9 UNITS (ref 0–30)
PS IGM SER-ACNC: <10 UNITS (ref 0–30)

## 2021-12-27 ENCOUNTER — HOME CARE VISIT (OUTPATIENT)
Dept: HOME HEALTH SERVICES | Facility: CLINIC | Age: 46
End: 2021-12-27

## 2021-12-27 PROCEDURE — G0153 HHCP-SVS OF S/L PATH,EA 15MN: HCPCS

## 2021-12-28 ENCOUNTER — HOME CARE VISIT (OUTPATIENT)
Dept: HOME HEALTH SERVICES | Facility: CLINIC | Age: 46
End: 2021-12-28

## 2021-12-28 ENCOUNTER — HOME CARE VISIT (OUTPATIENT)
Dept: HOME HEALTH SERVICES | Facility: HOME HEALTHCARE | Age: 46
End: 2021-12-28

## 2021-12-28 VITALS
OXYGEN SATURATION: 97 % | SYSTOLIC BLOOD PRESSURE: 120 MMHG | HEART RATE: 68 BPM | TEMPERATURE: 97 F | DIASTOLIC BLOOD PRESSURE: 62 MMHG | RESPIRATION RATE: 18 BRPM

## 2021-12-28 LAB
PS IGA SER-ACNC: <1 APS UNITS (ref 0–19)
PS IGG SER-ACNC: 9 UNITS (ref 0–30)
PS IGM SER-ACNC: 11 UNITS (ref 0–30)

## 2021-12-28 PROCEDURE — G0151 HHCP-SERV OF PT,EA 15 MIN: HCPCS

## 2021-12-28 PROCEDURE — G0152 HHCP-SERV OF OT,EA 15 MIN: HCPCS

## 2021-12-28 NOTE — HOME HEALTH
Reason for referral:  Pt presented to [facility] with AMS and speech difficulty.  She was found to have a left MCA CVA.  Pt was transferred to [facility] due to concern for evolving CVA needing further care with neurology.  Pt was found to have aphasia, dysarthria, and dysphagia.      Past medical history:  Pt with hx of bipolar disorder, smoking, migranes, seizures, hx of drug abuse.       PLOF:  Pt was living independently prior to CVA    Pt Goal:  Pt to communicate with others without diffiuclty     Medical Necessity:  Pt with CVA resulting in poor ability to communicate with others and impaired cognition.  Pt requiring skilled ST to assess, implement strategies/compensations, and educate to increase safety and independence.

## 2021-12-29 ENCOUNTER — READMISSION MANAGEMENT (OUTPATIENT)
Dept: CALL CENTER | Facility: HOSPITAL | Age: 46
End: 2021-12-29

## 2021-12-29 VITALS
RESPIRATION RATE: 18 BRPM | OXYGEN SATURATION: 99 % | HEART RATE: 110 BPM | DIASTOLIC BLOOD PRESSURE: 100 MMHG | SYSTOLIC BLOOD PRESSURE: 148 MMHG

## 2021-12-29 LAB
ANTI-PHOSPHATIDIC ACID: NORMAL
ANTI-PHOSPHATIDYL GLYCEROL: NORMAL
ANTI-PHOSPHATIDYL INOSITOL: NORMAL
ANTI-PHOSPHATIDYLETHANOLAMINE: NORMAL
PE IGA SER-ACNC: 2.7 U/ML
PE IGG SER-ACNC: 1.2 U/ML
PE IGM SER-ACNC: 10.8 U/ML
PG IGA SER-ACNC: 1.7 U/ML
PG IGG SER-ACNC: 2.9 U/ML
PG IGM SER-ACNC: 2.2 U/ML
PHOSPHATIDATE IGA SER-ACNC: 3.9 U/ML
PHOSPHATIDATE IGG SER-ACNC: 5.7 U/ML
PHOSPHATIDATE IGM SER-ACNC: 3.5 U/ML
PI IGA SER-ACNC: 2.6 U/ML
PI IGG SER-ACNC: 3.3 U/ML
PI IGM SER-ACNC: 3.3 U/ML

## 2021-12-29 NOTE — OUTREACH NOTE
Stroke Week 1 Survey      Responses   [facility] patient discharged from? [place]   Does the patient have one of the following disease processes/diagnoses(primary or secondary)? Stroke (TIA)   Week 1 attempt successful? No   Unsuccessful attempts Attempt 1          Rosalba Mendoza RN

## 2021-12-29 NOTE — HOME HEALTH
PATIENT WAS HOSPITALIZED SECONDARY TO A CVA. SHE WAS LIVING WITH HER BROTHER IN [place] AND STAYED IN HER ROOM FOR 2 DAYS. HER BROTHER THOUGHT SHE HAD BEEN SLEEPING BUT WHEN HE WENT IN TO FIND HER SHE WAS FOUND TO BE UNRESPONSIVE AND WAS TRANSPORTED TO THE HOSPITAL. FROM THERE SHE WAS TRANSPORTED TO [facility] WHERE SHE WAS KEPT AND TREATED THEN DISCHARGED HOME. UPON DISCHARGE HOME SHE WENT TO LIVE WITH HER FRIEND BUT NOW HER MOM AND HER FRIEND WOULD LIKE REHAB PLACEMENT AS PATIENT IS UNABLE TO REALLY CARE FOR HERSELF COGNITIVELY AND SHE NEEDS MORE ASSISTANCE AS THIS TIME    PMHX: DRUG ABUSE, UTI. SEIZURE    PATIENT GOAL; LIVE BY HERSLEF    PRIOR LEVEL OF FUNCTIONl PATIENT WAS LIVING WITH HER BROTHER IN A MOBILE HOME, SHE PROBABLY WAS NOT SAFE TO LIVE ALONE AT THIS TIME

## 2021-12-30 ENCOUNTER — HOME CARE VISIT (OUTPATIENT)
Dept: HOME HEALTH SERVICES | Facility: HOME HEALTHCARE | Age: 46
End: 2021-12-30

## 2022-01-03 ENCOUNTER — HOME CARE VISIT (OUTPATIENT)
Dept: HOME HEALTH SERVICES | Facility: CLINIC | Age: 47
End: 2022-01-03

## 2022-01-03 ENCOUNTER — HOME CARE VISIT (OUTPATIENT)
Dept: HOME HEALTH SERVICES | Facility: HOME HEALTHCARE | Age: 47
End: 2022-01-03

## 2022-01-03 VITALS
DIASTOLIC BLOOD PRESSURE: 98 MMHG | OXYGEN SATURATION: 99 % | TEMPERATURE: 98.3 F | RESPIRATION RATE: 18 BRPM | HEART RATE: 100 BPM | SYSTOLIC BLOOD PRESSURE: 146 MMHG

## 2022-01-03 PROCEDURE — G0153 HHCP-SVS OF S/L PATH,EA 15MN: HCPCS

## 2022-01-03 NOTE — HOME HEALTH
PATIENT WAS HOSPITALIZED SECONDARY TO A CVA. SHE WAS LIVING WITH HER BROTHER IN [place] AND STAYED IN HER ROOM FOR 2 DAYS. HER BROTHER THOUGHT SHE HAD BEEN SLEEPING BUT WHEN HE WENT IN TO FIND HER SHE WAS FOUND TO BE UNRESPONSIVE AND WAS TRANSPORTED TO THE HOSPITAL. FROM THERE SHE WAS TRANSPORTED TO [facility] WHERE SHE WAS KEPT AND TREATED THEN DISCHARGED HOME. UPON DISCHARGE HOME SHE WENT TO LIVE WITH HER FRIEND BUT NOW HER MOM AND HER FRIEND WOULD LIKE REHAB PLACEMENT AS PATIENT IS UNABLE TO REALLY CARE FOR HERSELF COGNITIVELY AND SHE NEEDS MORE ASSISTANCE AS THIS TIME   PMHX: DRUG ABUSE, UTI. SEIZURE PATIENT   GOAL; LIVE BY HERSELF   PRIOR LEVEL OF FUNCTIONl PATIENT WAS LIVING WITH HER BROTHER IN A MOBILE HOME.  ROM-WFL  MMT-4/5

## 2022-01-04 ENCOUNTER — READMISSION MANAGEMENT (OUTPATIENT)
Dept: CALL CENTER | Facility: HOSPITAL | Age: 47
End: 2022-01-04

## 2022-01-04 NOTE — OUTREACH NOTE
Stroke Week 1 Survey      Responses   [facility] patient discharged from? [place]   Does the patient have one of the following disease processes/diagnoses(primary or secondary)? Stroke (TIA)   Week 1 attempt successful? No   Unsuccessful attempts Attempt 2          Susi Sandoval RN

## 2022-01-05 ENCOUNTER — TELEPHONE (OUTPATIENT)
Dept: NEUROLOGY | Facility: CLINIC | Age: 47
End: 2022-01-05

## 2022-01-05 ENCOUNTER — HOME CARE VISIT (OUTPATIENT)
Dept: HOME HEALTH SERVICES | Facility: CLINIC | Age: 47
End: 2022-01-05

## 2022-01-05 PROCEDURE — G0153 HHCP-SVS OF S/L PATH,EA 15MN: HCPCS

## 2022-01-05 NOTE — TELEPHONE ENCOUNTER
----- Message from ALPHONSE Olivares sent at 1/3/2022 10:53 AM EST -----  Homocysteine level only very mildly elevated, can repeat this in 3 months when she follows up with me.

## 2022-01-06 ENCOUNTER — HOME CARE VISIT (OUTPATIENT)
Dept: HOME HEALTH SERVICES | Facility: CLINIC | Age: 47
End: 2022-01-06

## 2022-01-06 ENCOUNTER — HOME CARE VISIT (OUTPATIENT)
Dept: HOME HEALTH SERVICES | Facility: HOME HEALTHCARE | Age: 47
End: 2022-01-06

## 2022-01-07 NOTE — CASE COMMUNICATION
Pt missed a physical therapy appt on 1/6/22 due to inclement weather and icy road conditions. PT to resume the week of 1/10/22

## 2022-01-10 VITALS
SYSTOLIC BLOOD PRESSURE: 120 MMHG | OXYGEN SATURATION: 97 % | HEART RATE: 74 BPM | TEMPERATURE: 98 F | RESPIRATION RATE: 17 BRPM | DIASTOLIC BLOOD PRESSURE: 72 MMHG

## 2022-01-10 NOTE — HOME HEALTH
Subjective:  Pt was agreeable to ST.  Pt required reassurance frequently.      Medical necessity:  Pt with aphasia required skilled ST to implement strategies and compensations.

## 2022-01-10 NOTE — HOME HEALTH
Subjective:  Pt with increased frustration and anxiety this date.    Medical necessity:  Pt post CVA resulting in cognitive deficits and aphasia requiring skilled ST to improve communication and safety.

## 2022-01-12 ENCOUNTER — HOME CARE VISIT (OUTPATIENT)
Dept: HOME HEALTH SERVICES | Facility: CLINIC | Age: 47
End: 2022-01-12

## 2022-01-12 PROCEDURE — G0153 HHCP-SVS OF S/L PATH,EA 15MN: HCPCS

## 2022-01-13 ENCOUNTER — HOME CARE VISIT (OUTPATIENT)
Dept: HOME HEALTH SERVICES | Facility: HOME HEALTHCARE | Age: 47
End: 2022-01-13

## 2022-01-13 VITALS
TEMPERATURE: 98 F | HEART RATE: 78 BPM | OXYGEN SATURATION: 98 % | RESPIRATION RATE: 18 BRPM | DIASTOLIC BLOOD PRESSURE: 78 MMHG | SYSTOLIC BLOOD PRESSURE: 138 MMHG

## 2022-01-14 NOTE — HOME HEALTH
Subjective:  Pt was aggitated this date.  She was upset stating she didnt know why she had a stroke.      Medical necessity:  Pt with aphasia and dysphagia impacting her ability to understand spoken lanaguage and communicate to increase ability to participate in communication/interaction with others.

## 2022-01-19 ENCOUNTER — HOME CARE VISIT (OUTPATIENT)
Dept: HOME HEALTH SERVICES | Facility: HOME HEALTHCARE | Age: 47
End: 2022-01-19

## 2022-01-19 NOTE — CASE COMMUNICATION
Please forward to bertrand Clarke  MISSED VISIT  PATIENT MISSED A ST VISIT FROM [facility] ON 1/19/2022    REASON:     OTHER: pt mother canceled apt on 1/18 d/t relative in hospital.  Not available for tx on 1/19      For Your records only.  As per home health protocol, MD must be notified of missed/cancelled visits; therefore the prescribed frequency was not met.

## 2022-01-21 ENCOUNTER — OFFICE VISIT (OUTPATIENT)
Dept: NEUROLOGY | Facility: TELEHEALTH | Age: 47
End: 2022-01-21

## 2022-01-21 VITALS
SYSTOLIC BLOOD PRESSURE: 124 MMHG | DIASTOLIC BLOOD PRESSURE: 76 MMHG | HEART RATE: 82 BPM | WEIGHT: 200 LBS | BODY MASS INDEX: 39.27 KG/M2 | HEIGHT: 60 IN

## 2022-01-21 DIAGNOSIS — I63.512 ACUTE ISCHEMIC LEFT MCA STROKE: Primary | ICD-10-CM

## 2022-01-21 PROCEDURE — 99214 OFFICE O/P EST MOD 30 MIN: CPT | Performed by: NURSE PRACTITIONER

## 2022-01-21 NOTE — PROGRESS NOTES
Stroke Progress Note       Chief Complaint: Stroke follow-up    Subjective     HPI: Pt is a 46-yr-old right-handed white female with known diagnosis of hyperlipidemia, Bipolar, seizures, smoker, and mental disability who lives independently with daughter per mother who was hospitalized on 12/15 after a large left MCA stroke and was transferred to [facility] d/t worsening neurologic status r/t cerebral edema and midline shift. Today she states doing better. Her mom is with her and states she is able to do ADL's. Pt still has expressive aphasia but is improving. Her strengths are equal, visual field is intact, and states decreased sensation to right face, arm, and leg.       Review of Systems   HENT: Negative.    Eyes: Negative.    Respiratory: Negative.    Cardiovascular: Negative.    Gastrointestinal: Negative.    Genitourinary: Negative.    Musculoskeletal: Negative.    Skin: Negative.    Neurological: Positive for speech difficulty and numbness.   Psychiatric/Behavioral: Negative.         Objective      Heart Rate:  [82] 82  BP: (124)/(76) 124/76    Neurological Exam  Mental Status  Awake, alert and oriented to person, place and time.Alert. Speech is normal. Expressive aphasia present.    Cranial Nerves  CN II: Visual acuity is normal. Visual fields full to confrontation.  CN III, IV, VI: Extraocular movements intact bilaterally. Normal lids and orbits bilaterally. Pupils equal round and reactive to light bilaterally.  CN V:  Right: Diminished sensation of the entire right side of the face.  CN VII: Full and symmetric facial movement.  CN IX, X: Palate elevates symmetrically. Normal gag reflex.  CN XI: Shoulder shrug strength is normal.  CN XII: Tongue midline without atrophy or fasciculations.    Motor  Normal muscle bulk throughout. No fasciculations present. Strength is 5/5 throughout all four extremities.    Sensory  Light touch abnormality: Right face, arm, and leg.     Reflexes  Not  assessed.    Coordination  Finger-to-nose, rapid alternating movements and heel-to-shin normal bilaterally without dysmetria.    Gait  Normal casual, toe, heel and tandem gait.      Physical Exam  Vitals reviewed.   HENT:      Head: Normocephalic and atraumatic.   Eyes:      General: Lids are normal.      Extraocular Movements: Extraocular movements intact.      Pupils: Pupils are equal, round, and reactive to light.   Cardiovascular:      Rate and Rhythm: Normal rate.   Pulmonary:      Effort: Pulmonary effort is normal.   Musculoskeletal:         General: Normal range of motion.      Cervical back: Normal range of motion.   Skin:     General: Skin is warm and dry.   Neurological:      Mental Status: She is alert.      Sensory: Sensory deficit present.      Coordination: Coordination is intact.      Deep Tendon Reflexes: Strength normal.   Psychiatric:         Mood and Affect: Mood normal.         Speech: Speech normal.         Results Review:    I reviewed the patient's new clinical results.    Lab Results (last 24 hours)     ** No results found for the last 24 hours. **        No radiology results for the last day  Results for orders placed during the hospital encounter of 12/15/21    Adult Transthoracic Echo Complete W/ Cont if Necessary Per Protocol    Interpretation Summary  · Left ventricular ejection fraction appears to be 56 - 60%.  · Left ventricular diastolic function was normal.  · Left ventricular wall thickness is consistent with borderline concentric hypertrophy.  · Saline test results are negative.  · Estimated right ventricular systolic pressure from tricuspid regurgitation is normal (<35 mmHg).        Assessment/Plan     Assessment/Plan: Pt is a 46-yr-old right-handed white female with known diagnosis of hyperlipidemia, Bipolar, seizures, smoker, and mental disability who lives independently with daughter per mother who was hospitalized on 12/15 after a large left MCA stroke and was transferred to  "[facility] d/t worsening neurologic status r/t cerebral edema and midline shift. Today she states doing better. Her mom is with her and states she is able to do ADL's. Pt still has expressive aphasia but is improving. Her strengths are equal, visual field is intact, and states decreased sensation to right face, arm, and leg.   1. Left MCA stroke- Continue aspirin 325 mg/day and Lipitor 80 mg/day for secondary stroke prevention. Pt stated she has quit smoking and meth and \"I'm done with all that!\" She expressed the desire for more aggressive PT. She has been receiving home health PT/OT and SLP. Her mom stated she is in the process of getting her in a personal care home where she will receive rehab as well. Instructed on stroke s/s and prevention. Pt and mom verbalized understanding. Will see her back for a follow-up appointment in March.           Patient Active Problem List   Diagnosis   • Nicotine abuse   • Other chest pain   • Acute maxillary sinusitis   • Bipolar affective disorder (formerly [facility])   • Decreased pulses in feet   • Flank pain   • Hematochezia   • High cholesterol   • Migraine without aura and with status migrainosus, not intractable   • Migraine without status migrainosus, not intractable   • Neck pain   • Nephrolithiasis   • Neuropathy   • Chronic pain   • Primary osteoarthritis involving multiple joints   • Sciatic leg pain   • Screening for diabetes mellitus (DM)   • Seasonal allergies   • Stress   • Tobacco use disorder   • Urinary incontinence without sensory awareness   • Vitamin D deficiency   • Carpal tunnel syndrome of right wrist   • Seizure (formerly [facility])   • Intractable vomiting   • Acute UTI (urinary tract infection)   • Cholelithiasis   • GI bleed   • Kidney stone   • Nephrolithiasis   • ELBERT (acute kidney injury) (formerly [facility])   • Infection due to ESBL-producing Escherichia coli   • CVA (cerebral vascular accident) (formerly [facility])   • Dysphagia due to recent stroke   • Slurred speech   • Cerebrovascular " accident (CVA) ([facility])   • Aphasia due to acute cerebrovascular accident (CVA) ([facility])   • Methamphetamine abuse ([facility])   • Seizure disorder ([facility])   • UTI (urinary tract infection) due to urinary indwelling catheter ([facility])   • Urinary tract infection due to extended-spectrum beta lactamase (ESBL) producing Escherichia coli   • Urinary tract infection due to extended-spectrum beta lactamase (ESBL) producing Escherichia coli   • Metabolic acidosis, NAG, bicarbonate losses   • Midline shift of brain due to stroke           ALPHONSE Martinez  01/21/22  11:09 CST

## 2022-01-24 ENCOUNTER — HOME CARE VISIT (OUTPATIENT)
Dept: HOME HEALTH SERVICES | Facility: CLINIC | Age: 47
End: 2022-01-24

## 2022-01-24 VITALS
RESPIRATION RATE: 17 BRPM | OXYGEN SATURATION: 99 % | DIASTOLIC BLOOD PRESSURE: 70 MMHG | HEART RATE: 73 BPM | TEMPERATURE: 96.2 F | SYSTOLIC BLOOD PRESSURE: 110 MMHG

## 2022-01-24 PROCEDURE — G0153 HHCP-SVS OF S/L PATH,EA 15MN: HCPCS

## 2022-01-24 NOTE — HOME HEALTH
ST Recertification  Subjective: Pt was seen at her mom's house.  mother was present during session and provided information when Myles's communication broke down.  Myles has plans to move to [place] on 2/26/2022 at 11:00.  Her mom will transport her.  Myles stated she wanted to have snacks purchased before she went.    Medical necessity: Pt with aphasia impacting her ability to understand spoken lanaguage and  expressively communicate.  She requires skilled ST  to increase ability to participate in communication/interaction with others and return to her PLOF.    Myles's mom stated that she was independent prior to CVA and did not have speech impediment at baseline.  Myles is known to me from her acute hospitalization just after her CVA.  her speech  has improved since that time, but remains very aphasic with decreased ability to formulate sentences and understand complex directions /conversations.

## 2022-01-31 ENCOUNTER — HOME CARE VISIT (OUTPATIENT)
Dept: HOME HEALTH SERVICES | Facility: HOME HEALTHCARE | Age: 47
End: 2022-01-31

## 2022-01-31 ENCOUNTER — HOME CARE VISIT (OUTPATIENT)
Dept: HOME HEALTH SERVICES | Facility: CLINIC | Age: 47
End: 2022-01-31

## 2022-01-31 PROCEDURE — G0153 HHCP-SVS OF S/L PATH,EA 15MN: HCPCS

## 2022-02-03 VITALS
HEART RATE: 76 BPM | SYSTOLIC BLOOD PRESSURE: 170 MMHG | DIASTOLIC BLOOD PRESSURE: 88 MMHG | OXYGEN SATURATION: 98 % | TEMPERATURE: 98 F

## 2022-02-03 NOTE — HOME HEALTH
Subjective:  Pt was stating she was planning on going home for  a few days.     - SLP discussed concerns with Pt's mother voicing concerns for safety in her own enviornment.  SLP stated concerns for the Pt being able to care for herself.  Pt mother stated that britni had her mind made up and that she had people who could check on britni.  SLP suggested for her to stay with britni in her home for the time or let her be there for very short durations independently.      Medical necessity:  Pt with aphasia and cognitive deficits impacting Pt's safety and decision making. Skilled ST to edcate and implemnet strategies to improve Pt's ability to communicate and understand what is being said to her increasing her safety.

## 2022-02-09 ENCOUNTER — HOME CARE VISIT (OUTPATIENT)
Dept: HOME HEALTH SERVICES | Facility: CLINIC | Age: 47
End: 2022-02-09

## 2022-02-09 PROCEDURE — G0153 HHCP-SVS OF S/L PATH,EA 15MN: HCPCS

## 2022-02-17 ENCOUNTER — HOME CARE VISIT (OUTPATIENT)
Dept: HOME HEALTH SERVICES | Facility: CLINIC | Age: 47
End: 2022-02-17

## 2022-02-17 ENCOUNTER — HOME CARE VISIT (OUTPATIENT)
Dept: HOME HEALTH SERVICES | Facility: HOME HEALTHCARE | Age: 47
End: 2022-02-17

## 2022-02-17 VITALS
SYSTOLIC BLOOD PRESSURE: 140 MMHG | TEMPERATURE: 98 F | HEART RATE: 76 BPM | OXYGEN SATURATION: 98 % | RESPIRATION RATE: 17 BRPM | DIASTOLIC BLOOD PRESSURE: 86 MMHG

## 2022-02-17 PROCEDURE — G0153 HHCP-SVS OF S/L PATH,EA 15MN: HCPCS

## 2022-02-17 NOTE — CASE COMMUNICATION
Signee received message from OVIDIO Lopez to check on placement at [facility] for patient. Signee called and spoke with Wanda MAYORGA who confirmed that patient went to [facility] on 2/16/22. No further MSW needs.

## 2022-02-18 NOTE — HOME HEALTH
Pt was agreeable to tx this date.  Pt in good spirits this date.       Medical necessity:  Pt with aphasia impacting her ability to communicate and understand, requiring skilled ST to implement strategies and compensations to improve her quality of life and independence.

## 2022-02-21 ENCOUNTER — HOME CARE VISIT (OUTPATIENT)
Dept: HOME HEALTH SERVICES | Facility: CLINIC | Age: 47
End: 2022-02-21

## 2022-02-21 VITALS
RESPIRATION RATE: 18 BRPM | SYSTOLIC BLOOD PRESSURE: 140 MMHG | OXYGEN SATURATION: 98 % | TEMPERATURE: 98 F | HEART RATE: 76 BPM | DIASTOLIC BLOOD PRESSURE: 88 MMHG

## 2022-02-21 PROCEDURE — G0153 HHCP-SVS OF S/L PATH,EA 15MN: HCPCS

## 2022-02-23 ENCOUNTER — HOME CARE VISIT (OUTPATIENT)
Dept: HOME HEALTH SERVICES | Facility: CLINIC | Age: 47
End: 2022-02-23

## 2022-02-23 VITALS
SYSTOLIC BLOOD PRESSURE: 138 MMHG | HEART RATE: 82 BPM | DIASTOLIC BLOOD PRESSURE: 78 MMHG | RESPIRATION RATE: 18 BRPM | TEMPERATURE: 97.1 F | OXYGEN SATURATION: 98 %

## 2022-02-23 VITALS — SYSTOLIC BLOOD PRESSURE: 140 MMHG | DIASTOLIC BLOOD PRESSURE: 100 MMHG

## 2022-02-23 PROCEDURE — G0152 HHCP-SERV OF OT,EA 15 MIN: HCPCS

## 2022-02-23 NOTE — HOME HEALTH
REASON FOR REFERRAL:  47 y/o female hospitalzied for CVA 12/15/22-12/23/22 at [facility] after being found unresponsive for 2 days.  She was referred to home health OT after discharged home and then a 2nd time due to concerns for new onset of Right UE weakness.  Patient's mother took to her ED at [facility] however no acute findings noted.  She is currently a resident of a personal care home.  She is a poor historian and has decreased cognitive function and aphasia.  Patient reported that she has been experiencing intermittent muscle spasms in her right arm and right leg.  Her right arm is notable for decreased coordination with uncontrolled motions without intention.  She is also noted to be unsteady and giving to right LE with mobility this date.    PMH: UTI, drug abuse, tobacco abuse, seizure disorder, Bipolar affective disorder.    PLOF:  Independent with ADLs, functional transfers/functional mobility without AD.    HOME ENVIRONMENT:  Currently is a resident of personal care facility.    PRECAUTIONS: Fall risk, Decreased Cognition, Aphasia    MD APPTS:  3/18/22 neurology/Michael Hagan APRN.    DME: None     AROM B UES: L UE: WFL and R UE as follows: shoulder flexion: 100 degrees, shoulder abduction: 90 degrees, elbow/distal AROM: WFL except R hand flexion: unable to actively flex digits #4 and #5.      STRENGTH B UES: L UE: 4+/5 and R UE strength as follows: shoulder: 3-/5, elbow/wrist: 3/5    HAND DOMINANCE: R hand dominant, R  strength: 3/5.  L  strength: 4+/5.    COORDINATION:  R UE GROSS MOTOR COORDINATION: Moderate deficit and L UE: WFL                             R UE FINE MOTOR COORDINATON: Moderate deficit and L UE: WFL    REHAB POTENTIAL: Good for goals    WISH TO ADDRESS BATH/DRESSING WITH OT:  No    PATIENT'S GOAL: Get my arm and hand better.    MEDICAL NECESSITY:  Patient presents with decreased functional use of R dominant UE after recent CVA followed by acute onset of R sided weakness.  Patient  requires skilled home based OT services for R UE strengthening, R UE fine/gross motor coordination training, and patient/caregiver education for R UE HEP.

## 2022-02-23 NOTE — CASE COMMUNICATION
[facility]    Complete between days 46-50    MEDICAL NECESSITY:  Patient presents with decreased functional use of R dominant UE after recent CVA followed by acute onset of R sided weakness.  Patient requires skilled home based OT services for R UE strengthening, R UE fine/gross motor coordination training, and patient/caregiver education for R UE HEP.      Progress toward goals: Only received OT evaluation     Ba rriers to goal achievement: Decreased cognition    Recert or discharge?  Recert    Any payor source changes? No    Any changes needed to the focus of  care? OT for 2 week 2, 1 week 1 (in new cert begining 2/25/22).  PT to eval and treat in new cert beginning 2/25/22.

## 2022-02-24 NOTE — HOME HEALTH
Subjective :  Pt was resting when the SLP arrived.  Pt and mother voiced concerns about the Pt's hand/arm on right side.  Pt having difficulty with this hand/arm not working normally.        Medical necessity:  Pt with aphasia and cognitive deficits impacting her safety and independence.  Pt required skilled ST to assess and educate on strategies and compensations to improve indepdnence in her new environment.

## 2022-03-01 ENCOUNTER — HOME CARE VISIT (OUTPATIENT)
Dept: HOME HEALTH SERVICES | Facility: CLINIC | Age: 47
End: 2022-03-01

## 2022-03-01 VITALS
HEART RATE: 81 BPM | TEMPERATURE: 98.5 F | DIASTOLIC BLOOD PRESSURE: 78 MMHG | RESPIRATION RATE: 18 BRPM | OXYGEN SATURATION: 98 % | SYSTOLIC BLOOD PRESSURE: 112 MMHG

## 2022-03-01 PROCEDURE — G0158 HHC OT ASSISTANT EA 15: HCPCS

## 2022-03-02 ENCOUNTER — HOME CARE VISIT (OUTPATIENT)
Dept: HOME HEALTH SERVICES | Facility: CLINIC | Age: 47
End: 2022-03-02

## 2022-03-02 PROCEDURE — G0153 HHCP-SVS OF S/L PATH,EA 15MN: HCPCS

## 2022-03-03 ENCOUNTER — HOME CARE VISIT (OUTPATIENT)
Dept: HOME HEALTH SERVICES | Facility: CLINIC | Age: 47
End: 2022-03-03

## 2022-03-03 VITALS
RESPIRATION RATE: 18 BRPM | SYSTOLIC BLOOD PRESSURE: 122 MMHG | DIASTOLIC BLOOD PRESSURE: 60 MMHG | OXYGEN SATURATION: 99 % | HEART RATE: 70 BPM | TEMPERATURE: 87.8 F

## 2022-03-03 VITALS
OXYGEN SATURATION: 98 % | DIASTOLIC BLOOD PRESSURE: 78 MMHG | TEMPERATURE: 98 F | SYSTOLIC BLOOD PRESSURE: 136 MMHG | HEART RATE: 72 BPM | RESPIRATION RATE: 17 BRPM

## 2022-03-03 PROCEDURE — G0158 HHC OT ASSISTANT EA 15: HCPCS

## 2022-03-04 ENCOUNTER — APPOINTMENT (OUTPATIENT)
Dept: CT IMAGING | Facility: HOSPITAL | Age: 47
End: 2022-03-04

## 2022-03-04 ENCOUNTER — APPOINTMENT (OUTPATIENT)
Dept: GENERAL RADIOLOGY | Facility: HOSPITAL | Age: 47
End: 2022-03-04

## 2022-03-04 ENCOUNTER — HOSPITAL ENCOUNTER (OUTPATIENT)
Facility: HOSPITAL | Age: 47
Setting detail: OBSERVATION
Discharge: HOME HEALTH | End: 2022-03-05
Attending: STUDENT IN AN ORGANIZED HEALTH CARE EDUCATION/TRAINING PROGRAM | Admitting: FAMILY MEDICINE

## 2022-03-04 DIAGNOSIS — Z74.09 IMPAIRED FUNCTIONAL MOBILITY, BALANCE, GAIT, AND ENDURANCE: ICD-10-CM

## 2022-03-04 DIAGNOSIS — Z78.9 IMPAIRED MOBILITY AND ACTIVITIES OF DAILY LIVING: ICD-10-CM

## 2022-03-04 DIAGNOSIS — Z74.09 IMPAIRED MOBILITY AND ACTIVITIES OF DAILY LIVING: ICD-10-CM

## 2022-03-04 DIAGNOSIS — R51.9 NONINTRACTABLE HEADACHE, UNSPECIFIED CHRONICITY PATTERN, UNSPECIFIED HEADACHE TYPE: Primary | ICD-10-CM

## 2022-03-04 DIAGNOSIS — R47.1 DYSARTHRIA: ICD-10-CM

## 2022-03-04 DIAGNOSIS — R13.13 PHARYNGEAL DYSPHAGIA: ICD-10-CM

## 2022-03-04 LAB
ABO GROUP BLD: NORMAL
ALBUMIN SERPL-MCNC: 4.4 G/DL (ref 3.5–5.2)
ALBUMIN/GLOB SERPL: 2.1 G/DL
ALP SERPL-CCNC: 92 U/L (ref 39–117)
ALT SERPL W P-5'-P-CCNC: 17 U/L (ref 1–33)
ANION GAP SERPL CALCULATED.3IONS-SCNC: 11 MMOL/L (ref 5–15)
APTT PPP: 26.6 SECONDS (ref 20–40.3)
AST SERPL-CCNC: 17 U/L (ref 1–32)
BASOPHILS # BLD AUTO: 0.06 10*3/MM3 (ref 0–0.2)
BASOPHILS NFR BLD AUTO: 0.9 % (ref 0–1.5)
BILIRUB SERPL-MCNC: 0.2 MG/DL (ref 0–1.2)
BLD GP AB SCN SERPL QL: NEGATIVE
BUN SERPL-MCNC: 17 MG/DL (ref 6–20)
BUN/CREAT SERPL: 16.2 (ref 7–25)
CALCIUM SPEC-SCNC: 9.4 MG/DL (ref 8.6–10.5)
CHLORIDE SERPL-SCNC: 99 MMOL/L (ref 98–107)
CO2 SERPL-SCNC: 26 MMOL/L (ref 22–29)
CREAT SERPL-MCNC: 1.05 MG/DL (ref 0.57–1)
DEPRECATED RDW RBC AUTO: 42.2 FL (ref 37–54)
EGFRCR SERPLBLD CKD-EPI 2021: 66.5 ML/MIN/1.73
EOSINOPHIL # BLD AUTO: 0.26 10*3/MM3 (ref 0–0.4)
EOSINOPHIL NFR BLD AUTO: 3.7 % (ref 0.3–6.2)
ERYTHROCYTE [DISTWIDTH] IN BLOOD BY AUTOMATED COUNT: 12.8 % (ref 12.3–15.4)
GLOBULIN UR ELPH-MCNC: 2.1 GM/DL
GLUCOSE SERPL-MCNC: 100 MG/DL (ref 65–99)
HCG INTACT+B SERPL-ACNC: <0.1 MIU/ML
HCT VFR BLD AUTO: 38.9 % (ref 34–46.6)
HGB BLD-MCNC: 13.1 G/DL (ref 12–15.9)
HOLD SPECIMEN: NORMAL
IMM GRANULOCYTES # BLD AUTO: 0.03 10*3/MM3 (ref 0–0.05)
IMM GRANULOCYTES NFR BLD AUTO: 0.4 % (ref 0–0.5)
INR PPP: 1.05 (ref 0.8–1.2)
LYMPHOCYTES # BLD AUTO: 2.94 10*3/MM3 (ref 0.7–3.1)
LYMPHOCYTES NFR BLD AUTO: 42.3 % (ref 19.6–45.3)
MCH RBC QN AUTO: 30.8 PG (ref 26.6–33)
MCHC RBC AUTO-ENTMCNC: 33.7 G/DL (ref 31.5–35.7)
MCV RBC AUTO: 91.5 FL (ref 79–97)
MONOCYTES # BLD AUTO: 0.66 10*3/MM3 (ref 0.1–0.9)
MONOCYTES NFR BLD AUTO: 9.5 % (ref 5–12)
NEUTROPHILS NFR BLD AUTO: 3 10*3/MM3 (ref 1.7–7)
NEUTROPHILS NFR BLD AUTO: 43.2 % (ref 42.7–76)
NRBC BLD AUTO-RTO: 0 /100 WBC (ref 0–0.2)
PLATELET # BLD AUTO: 262 10*3/MM3 (ref 140–450)
PMV BLD AUTO: 11.2 FL (ref 6–12)
POTASSIUM SERPL-SCNC: 4.2 MMOL/L (ref 3.5–5.2)
PROT SERPL-MCNC: 6.5 G/DL (ref 6–8.5)
PROTHROMBIN TIME: 13.6 SECONDS (ref 11.1–15.3)
RBC # BLD AUTO: 4.25 10*6/MM3 (ref 3.77–5.28)
RH BLD: POSITIVE
SODIUM SERPL-SCNC: 136 MMOL/L (ref 136–145)
T&S EXPIRATION DATE: NORMAL
TROPONIN T SERPL-MCNC: <0.01 NG/ML (ref 0–0.03)
WBC NRBC COR # BLD: 6.95 10*3/MM3 (ref 3.4–10.8)
WHOLE BLOOD HOLD SPECIMEN: NORMAL
WHOLE BLOOD HOLD SPECIMEN: NORMAL

## 2022-03-04 PROCEDURE — 84702 CHORIONIC GONADOTROPIN TEST: CPT | Performed by: STUDENT IN AN ORGANIZED HEALTH CARE EDUCATION/TRAINING PROGRAM

## 2022-03-04 PROCEDURE — 80053 COMPREHEN METABOLIC PANEL: CPT | Performed by: STUDENT IN AN ORGANIZED HEALTH CARE EDUCATION/TRAINING PROGRAM

## 2022-03-04 PROCEDURE — 93010 ELECTROCARDIOGRAM REPORT: CPT | Performed by: INTERNAL MEDICINE

## 2022-03-04 PROCEDURE — 0 IOPAMIDOL PER 1 ML: Performed by: STUDENT IN AN ORGANIZED HEALTH CARE EDUCATION/TRAINING PROGRAM

## 2022-03-04 PROCEDURE — 0042T HC CT CEREBRAL PERFUSION W/WO CONTRAST: CPT

## 2022-03-04 PROCEDURE — 70498 CT ANGIOGRAPHY NECK: CPT

## 2022-03-04 PROCEDURE — 85025 COMPLETE CBC W/AUTO DIFF WBC: CPT | Performed by: STUDENT IN AN ORGANIZED HEALTH CARE EDUCATION/TRAINING PROGRAM

## 2022-03-04 PROCEDURE — 86901 BLOOD TYPING SEROLOGIC RH(D): CPT | Performed by: STUDENT IN AN ORGANIZED HEALTH CARE EDUCATION/TRAINING PROGRAM

## 2022-03-04 PROCEDURE — 70450 CT HEAD/BRAIN W/O DYE: CPT

## 2022-03-04 PROCEDURE — 84484 ASSAY OF TROPONIN QUANT: CPT | Performed by: STUDENT IN AN ORGANIZED HEALTH CARE EDUCATION/TRAINING PROGRAM

## 2022-03-04 PROCEDURE — 85610 PROTHROMBIN TIME: CPT | Performed by: STUDENT IN AN ORGANIZED HEALTH CARE EDUCATION/TRAINING PROGRAM

## 2022-03-04 PROCEDURE — 85730 THROMBOPLASTIN TIME PARTIAL: CPT | Performed by: STUDENT IN AN ORGANIZED HEALTH CARE EDUCATION/TRAINING PROGRAM

## 2022-03-04 PROCEDURE — 25010000002 PROCHLORPERAZINE 10 MG/2ML SOLUTION: Performed by: STUDENT IN AN ORGANIZED HEALTH CARE EDUCATION/TRAINING PROGRAM

## 2022-03-04 PROCEDURE — 70496 CT ANGIOGRAPHY HEAD: CPT

## 2022-03-04 PROCEDURE — 96374 THER/PROPH/DIAG INJ IV PUSH: CPT

## 2022-03-04 PROCEDURE — 86900 BLOOD TYPING SEROLOGIC ABO: CPT | Performed by: STUDENT IN AN ORGANIZED HEALTH CARE EDUCATION/TRAINING PROGRAM

## 2022-03-04 PROCEDURE — 86850 RBC ANTIBODY SCREEN: CPT | Performed by: STUDENT IN AN ORGANIZED HEALTH CARE EDUCATION/TRAINING PROGRAM

## 2022-03-04 PROCEDURE — 96361 HYDRATE IV INFUSION ADD-ON: CPT

## 2022-03-04 PROCEDURE — 99285 EMERGENCY DEPT VISIT HI MDM: CPT

## 2022-03-04 PROCEDURE — 71045 X-RAY EXAM CHEST 1 VIEW: CPT

## 2022-03-04 PROCEDURE — 96375 TX/PRO/DX INJ NEW DRUG ADDON: CPT

## 2022-03-04 PROCEDURE — 25010000002 DIPHENHYDRAMINE PER 50 MG: Performed by: STUDENT IN AN ORGANIZED HEALTH CARE EDUCATION/TRAINING PROGRAM

## 2022-03-04 PROCEDURE — 93005 ELECTROCARDIOGRAM TRACING: CPT | Performed by: STUDENT IN AN ORGANIZED HEALTH CARE EDUCATION/TRAINING PROGRAM

## 2022-03-04 PROCEDURE — 25010000002 KETOROLAC TROMETHAMINE PER 15 MG: Performed by: STUDENT IN AN ORGANIZED HEALTH CARE EDUCATION/TRAINING PROGRAM

## 2022-03-04 PROCEDURE — 99214 OFFICE O/P EST MOD 30 MIN: CPT | Performed by: PSYCHIATRY & NEUROLOGY

## 2022-03-04 RX ORDER — SODIUM CHLORIDE 9 MG/ML
125 INJECTION, SOLUTION INTRAVENOUS CONTINUOUS
Status: DISCONTINUED | OUTPATIENT
Start: 2022-03-04 | End: 2022-03-05 | Stop reason: HOSPADM

## 2022-03-04 RX ORDER — PROCHLORPERAZINE EDISYLATE 5 MG/ML
10 INJECTION INTRAMUSCULAR; INTRAVENOUS ONCE
Status: COMPLETED | OUTPATIENT
Start: 2022-03-04 | End: 2022-03-04

## 2022-03-04 RX ORDER — SODIUM CHLORIDE 0.9 % (FLUSH) 0.9 %
10 SYRINGE (ML) INJECTION AS NEEDED
Status: DISCONTINUED | OUTPATIENT
Start: 2022-03-04 | End: 2022-03-05 | Stop reason: HOSPADM

## 2022-03-04 RX ORDER — KETOROLAC TROMETHAMINE 30 MG/ML
30 INJECTION, SOLUTION INTRAMUSCULAR; INTRAVENOUS ONCE
Status: COMPLETED | OUTPATIENT
Start: 2022-03-04 | End: 2022-03-04

## 2022-03-04 RX ORDER — DIPHENHYDRAMINE HYDROCHLORIDE 50 MG/ML
25 INJECTION INTRAMUSCULAR; INTRAVENOUS ONCE
Status: COMPLETED | OUTPATIENT
Start: 2022-03-04 | End: 2022-03-04

## 2022-03-04 RX ADMIN — IOPAMIDOL 90 ML: 755 INJECTION, SOLUTION INTRAVENOUS at 20:16

## 2022-03-04 RX ADMIN — KETOROLAC TROMETHAMINE 30 MG: 30 INJECTION, SOLUTION INTRAMUSCULAR; INTRAVENOUS at 20:34

## 2022-03-04 RX ADMIN — DIPHENHYDRAMINE HYDROCHLORIDE 25 MG: 50 INJECTION INTRAMUSCULAR; INTRAVENOUS at 20:34

## 2022-03-04 RX ADMIN — IOPAMIDOL 50 ML: 755 INJECTION, SOLUTION INTRAVENOUS at 20:28

## 2022-03-04 RX ADMIN — SODIUM CHLORIDE 125 ML/HR: 9 INJECTION, SOLUTION INTRAVENOUS at 20:35

## 2022-03-04 RX ADMIN — PROCHLORPERAZINE EDISYLATE 10 MG: 5 INJECTION INTRAMUSCULAR; INTRAVENOUS at 20:35

## 2022-03-04 NOTE — HOME HEALTH
Subjective:  Pt was in good spirits this date.      Medical necessity:   Pt with cognitive communication deficits requiring skilled ST to educate and implement strategies to increase communication abilities.

## 2022-03-05 ENCOUNTER — APPOINTMENT (OUTPATIENT)
Dept: MRI IMAGING | Facility: HOSPITAL | Age: 47
End: 2022-03-05

## 2022-03-05 VITALS
SYSTOLIC BLOOD PRESSURE: 137 MMHG | TEMPERATURE: 98.5 F | HEIGHT: 61 IN | WEIGHT: 200.62 LBS | RESPIRATION RATE: 20 BRPM | DIASTOLIC BLOOD PRESSURE: 84 MMHG | HEART RATE: 66 BPM | BODY MASS INDEX: 37.88 KG/M2 | OXYGEN SATURATION: 99 %

## 2022-03-05 LAB
ANION GAP SERPL CALCULATED.3IONS-SCNC: 10 MMOL/L (ref 5–15)
BASOPHILS # BLD AUTO: 0.03 10*3/MM3 (ref 0–0.2)
BASOPHILS NFR BLD AUTO: 0.5 % (ref 0–1.5)
BUN SERPL-MCNC: 22 MG/DL (ref 6–20)
BUN/CREAT SERPL: 20.4 (ref 7–25)
CALCIUM SPEC-SCNC: 8.8 MG/DL (ref 8.6–10.5)
CHLORIDE SERPL-SCNC: 103 MMOL/L (ref 98–107)
CHOLEST SERPL-MCNC: 122 MG/DL (ref 0–200)
CO2 SERPL-SCNC: 24 MMOL/L (ref 22–29)
CRE SCREEN PCR: NOT DETECTED
CREAT SERPL-MCNC: 1.08 MG/DL (ref 0.57–1)
DEPRECATED RDW RBC AUTO: 42.7 FL (ref 37–54)
EGFRCR SERPLBLD CKD-EPI 2021: 64.3 ML/MIN/1.73
EOSINOPHIL # BLD AUTO: 0.26 10*3/MM3 (ref 0–0.4)
EOSINOPHIL NFR BLD AUTO: 4.2 % (ref 0.3–6.2)
ERYTHROCYTE [DISTWIDTH] IN BLOOD BY AUTOMATED COUNT: 12.8 % (ref 12.3–15.4)
FLUAV RNA RESP QL NAA+PROBE: NOT DETECTED
FLUBV RNA RESP QL NAA+PROBE: NOT DETECTED
GLUCOSE SERPL-MCNC: 104 MG/DL (ref 65–99)
HCT VFR BLD AUTO: 37.8 % (ref 34–46.6)
HDLC SERPL-MCNC: 61 MG/DL (ref 40–60)
HGB BLD-MCNC: 12.7 G/DL (ref 12–15.9)
IMM GRANULOCYTES # BLD AUTO: 0.02 10*3/MM3 (ref 0–0.05)
IMM GRANULOCYTES NFR BLD AUTO: 0.3 % (ref 0–0.5)
IMP STRAIN: NOT DETECTED
KPC STRAIN: NOT DETECTED
LDLC SERPL CALC-MCNC: 41 MG/DL (ref 0–100)
LDLC/HDLC SERPL: 0.62 {RATIO}
LYMPHOCYTES # BLD AUTO: 2.45 10*3/MM3 (ref 0.7–3.1)
LYMPHOCYTES NFR BLD AUTO: 39.2 % (ref 19.6–45.3)
Lab: NORMAL
MAGNESIUM SERPL-MCNC: 2 MG/DL (ref 1.6–2.6)
MCH RBC QN AUTO: 30.8 PG (ref 26.6–33)
MCHC RBC AUTO-ENTMCNC: 33.6 G/DL (ref 31.5–35.7)
MCV RBC AUTO: 91.5 FL (ref 79–97)
MONOCYTES # BLD AUTO: 0.46 10*3/MM3 (ref 0.1–0.9)
MONOCYTES NFR BLD AUTO: 7.4 % (ref 5–12)
NDM STRAIN: NOT DETECTED
NEUTROPHILS NFR BLD AUTO: 3.03 10*3/MM3 (ref 1.7–7)
NEUTROPHILS NFR BLD AUTO: 48.4 % (ref 42.7–76)
NRBC BLD AUTO-RTO: 0 /100 WBC (ref 0–0.2)
OXA 48 STRAIN: NOT DETECTED
PHOSPHATE SERPL-MCNC: 4.7 MG/DL (ref 2.5–4.5)
PLATELET # BLD AUTO: 245 10*3/MM3 (ref 140–450)
PMV BLD AUTO: 11.2 FL (ref 6–12)
POTASSIUM SERPL-SCNC: 4.1 MMOL/L (ref 3.5–5.2)
RBC # BLD AUTO: 4.13 10*6/MM3 (ref 3.77–5.28)
SARS-COV-2 RNA RESP QL NAA+PROBE: NOT DETECTED
SODIUM SERPL-SCNC: 137 MMOL/L (ref 136–145)
TRIGL SERPL-MCNC: 115 MG/DL (ref 0–150)
VIM STRAIN: NOT DETECTED
VLDLC SERPL-MCNC: 20 MG/DL (ref 5–40)
WBC NRBC COR # BLD: 6.25 10*3/MM3 (ref 3.4–10.8)

## 2022-03-05 PROCEDURE — 87081 CULTURE SCREEN ONLY: CPT | Performed by: INTERNAL MEDICINE

## 2022-03-05 PROCEDURE — 86900 BLOOD TYPING SEROLOGIC ABO: CPT

## 2022-03-05 PROCEDURE — 87636 SARSCOV2 & INF A&B AMP PRB: CPT | Performed by: INTERNAL MEDICINE

## 2022-03-05 PROCEDURE — 94760 N-INVAS EAR/PLS OXIMETRY 1: CPT

## 2022-03-05 PROCEDURE — 84100 ASSAY OF PHOSPHORUS: CPT | Performed by: INTERNAL MEDICINE

## 2022-03-05 PROCEDURE — G0378 HOSPITAL OBSERVATION PER HR: HCPCS

## 2022-03-05 PROCEDURE — 80048 BASIC METABOLIC PNL TOTAL CA: CPT | Performed by: INTERNAL MEDICINE

## 2022-03-05 PROCEDURE — 97162 PT EVAL MOD COMPLEX 30 MIN: CPT

## 2022-03-05 PROCEDURE — 97165 OT EVAL LOW COMPLEX 30 MIN: CPT

## 2022-03-05 PROCEDURE — 96372 THER/PROPH/DIAG INJ SC/IM: CPT

## 2022-03-05 PROCEDURE — 25010000002 ENOXAPARIN PER 10 MG: Performed by: INTERNAL MEDICINE

## 2022-03-05 PROCEDURE — 80061 LIPID PANEL: CPT | Performed by: INTERNAL MEDICINE

## 2022-03-05 PROCEDURE — 92523 SPEECH SOUND LANG COMPREHEN: CPT | Performed by: SPEECH-LANGUAGE PATHOLOGIST

## 2022-03-05 PROCEDURE — 83735 ASSAY OF MAGNESIUM: CPT | Performed by: INTERNAL MEDICINE

## 2022-03-05 PROCEDURE — 96361 HYDRATE IV INFUSION ADD-ON: CPT

## 2022-03-05 PROCEDURE — 92610 EVALUATE SWALLOWING FUNCTION: CPT | Performed by: SPEECH-LANGUAGE PATHOLOGIST

## 2022-03-05 PROCEDURE — 86901 BLOOD TYPING SEROLOGIC RH(D): CPT

## 2022-03-05 PROCEDURE — 85025 COMPLETE CBC W/AUTO DIFF WBC: CPT | Performed by: INTERNAL MEDICINE

## 2022-03-05 PROCEDURE — 70551 MRI BRAIN STEM W/O DYE: CPT

## 2022-03-05 PROCEDURE — 94799 UNLISTED PULMONARY SVC/PX: CPT

## 2022-03-05 RX ORDER — SODIUM CHLORIDE 9 MG/ML
150 INJECTION, SOLUTION INTRAVENOUS CONTINUOUS
Status: DISCONTINUED | OUTPATIENT
Start: 2022-03-05 | End: 2022-03-05 | Stop reason: HOSPADM

## 2022-03-05 RX ORDER — SODIUM CHLORIDE 0.9 % (FLUSH) 0.9 %
10 SYRINGE (ML) INJECTION EVERY 12 HOURS SCHEDULED
Status: DISCONTINUED | OUTPATIENT
Start: 2022-03-05 | End: 2022-03-05 | Stop reason: HOSPADM

## 2022-03-05 RX ORDER — ACETAMINOPHEN 325 MG/1
650 TABLET ORAL EVERY 6 HOURS PRN
Status: DISCONTINUED | OUTPATIENT
Start: 2022-03-05 | End: 2022-03-05 | Stop reason: HOSPADM

## 2022-03-05 RX ORDER — LANOLIN ALCOHOL/MO/W.PET/CERES
5 CREAM (GRAM) TOPICAL NIGHTLY PRN
Status: DISCONTINUED | OUTPATIENT
Start: 2022-03-05 | End: 2022-03-05 | Stop reason: HOSPADM

## 2022-03-05 RX ORDER — OXYCODONE HYDROCHLORIDE 5 MG/1
5 TABLET ORAL EVERY 4 HOURS PRN
Status: DISCONTINUED | OUTPATIENT
Start: 2022-03-05 | End: 2022-03-05 | Stop reason: HOSPADM

## 2022-03-05 RX ORDER — SODIUM CHLORIDE 0.9 % (FLUSH) 0.9 %
10 SYRINGE (ML) INJECTION AS NEEDED
Status: DISCONTINUED | OUTPATIENT
Start: 2022-03-05 | End: 2022-03-05 | Stop reason: HOSPADM

## 2022-03-05 RX ORDER — TRAMADOL HYDROCHLORIDE 50 MG/1
50 TABLET ORAL EVERY 6 HOURS PRN
Status: DISCONTINUED | OUTPATIENT
Start: 2022-03-05 | End: 2022-03-05 | Stop reason: HOSPADM

## 2022-03-05 RX ORDER — TRAZODONE HYDROCHLORIDE 50 MG/1
50 TABLET ORAL NIGHTLY
COMMUNITY
End: 2023-03-09 | Stop reason: HOSPADM

## 2022-03-05 RX ORDER — POTASSIUM CHLORIDE 750 MG/1
10 CAPSULE, EXTENDED RELEASE ORAL DAILY
COMMUNITY
End: 2022-04-27 | Stop reason: SDUPTHER

## 2022-03-05 RX ORDER — FUROSEMIDE 20 MG/1
20 TABLET ORAL DAILY
COMMUNITY
End: 2023-03-09 | Stop reason: HOSPADM

## 2022-03-05 RX ORDER — ONDANSETRON 2 MG/ML
4 INJECTION INTRAMUSCULAR; INTRAVENOUS EVERY 6 HOURS PRN
Status: DISCONTINUED | OUTPATIENT
Start: 2022-03-05 | End: 2022-03-05 | Stop reason: HOSPADM

## 2022-03-05 RX ORDER — ESCITALOPRAM OXALATE 20 MG/1
30 TABLET ORAL DAILY
Status: ON HOLD | COMMUNITY
End: 2023-03-09

## 2022-03-05 RX ORDER — SUMATRIPTAN 50 MG/1
50 TABLET, FILM COATED ORAL
Status: DISCONTINUED | OUTPATIENT
Start: 2022-03-05 | End: 2022-03-05 | Stop reason: HOSPADM

## 2022-03-05 RX ORDER — ACETAMINOPHEN 500 MG
1000 TABLET ORAL 3 TIMES DAILY
Status: DISCONTINUED | OUTPATIENT
Start: 2022-03-05 | End: 2022-03-05

## 2022-03-05 RX ORDER — ASPIRIN 325 MG
325 TABLET ORAL DAILY
COMMUNITY

## 2022-03-05 RX ORDER — HYDRALAZINE HYDROCHLORIDE 20 MG/ML
INJECTION INTRAMUSCULAR; INTRAVENOUS
Status: DISCONTINUED
Start: 2022-03-05 | End: 2022-03-05 | Stop reason: HOSPADM

## 2022-03-05 RX ORDER — CALCIUM CARBONATE 200(500)MG
1 TABLET,CHEWABLE ORAL 2 TIMES DAILY PRN
Status: DISCONTINUED | OUTPATIENT
Start: 2022-03-05 | End: 2022-03-05 | Stop reason: HOSPADM

## 2022-03-05 RX ADMIN — SODIUM CHLORIDE 125 ML/HR: 9 INJECTION, SOLUTION INTRAVENOUS at 10:28

## 2022-03-05 RX ADMIN — ACETAMINOPHEN 650 MG: 325 TABLET, FILM COATED ORAL at 13:52

## 2022-03-05 RX ADMIN — ENOXAPARIN SODIUM 40 MG: 40 INJECTION SUBCUTANEOUS at 03:58

## 2022-03-05 RX ADMIN — SUMATRIPTAN SUCCINATE 50 MG: 50 TABLET ORAL at 12:23

## 2022-03-05 NOTE — EXTERNAL PATIENT INSTRUCTIONS
Patient Education   Table of Contents       General Headache Without Cause     To view videos and all your education online visit,   [email or web address]   or scan this QR code with your smartphone.                  General Headache Without Cause     A headache is pain or discomfort felt around the head or neck area. The specific cause of a headache may not be found. There are many causes and types of headaches. A few common ones are:       Tension headaches.       Migraine headaches.       Cluster headaches.       Chronic daily headaches.     Follow these instructions at home:   Watch your condition for any changes. Let your health care provider know about them. Take these steps to help with your condition:   Managing pain               Take over-the-counter and prescription medicines only as told by your health care provider.       Lie down in a dark, quiet room when you have a headache.      If directed, put ice on your head and neck area:       Put ice in a plastic bag.       Place a towel between your skin and the bag.       Leave the ice on for 20 minutes, 2?3 times per day.      If directed, apply heat to the affected area. Use the heat source that your health care provider recommends, such as a moist heat pack or a heating pad.       Place a towel between your skin and the heat source.       Leave the heat on for 20?30 minutes.       Remove the heat if your skin turns bright red. This is especially important if you are unable to feel pain, heat, or cold. You may have a greater risk of getting burned.       Keep lights dim if bright lights bother you or make your headaches worse.       Eating and drinking         Eat meals on a regular schedule.      If you drink alcohol:      Limit how much you use to:       0?1 drink a day for women.       0?2 drinks a day for men.           Be aware of how much alcohol is in your drink. In the [place] one drink equals one 12 oz bottle of beer (355 mL), one 5 oz  glass of wine (148 mL), or one 1? oz glass of hard liquor (44 mL).         Stop drinking caffeine, or decrease the amount of caffeine you drink.   General instructions           Keep a headache journal to help find out what may trigger your headaches. For example, write down:       What you eat and drink.       How much sleep you get.       Any change to your diet or medicines.       Try massage or other relaxation techniques.       Limit stress.       Sit up straight, and do not  tense your muscles.      Do not  use any products that contain nicotine or tobacco, such as cigarettes, e-cigarettes, and chewing tobacco. If you need help quitting, ask your health care provider.       Exercise regularly as told by your health care provider.       Sleep on a regular schedule. Get 7?9 hours of sleep each night, or the amount recommended by your health care provider.       Keep all follow-up visits as told by your health care provider. This is important.         Contact a health care provider if:         Your symptoms are not helped by medicine.       You have a headache that is different from the usual headache.       You have nausea or you vomit.       You have a fever.     Get help right away if:         Your headache becomes severe quickly.       Your headache gets worse after moderate to intense physical activity.       You have repeated vomiting.       You have a stiff neck.       You have a loss of vision.       You have problems with speech.       You have pain in the eye or ear.       You have muscular weakness or loss of muscle control.       You lose your balance or have trouble walking.       You feel faint or pass out.       You have confusion.       You have a seizure.     Summary         A headache is pain or discomfort felt around the head or neck area.       There are many causes and types of headaches. In some cases, the cause may not be found.       Keep a headache journal to help find out what may  trigger your headaches. Watch your condition for any changes. Let your health care provider know about them.       Contact a health care provider if you have a headache that is different from the usual headache, or if your symptoms are not helped by medicine.       Get help right away if your headache becomes severe, you vomit, you have a loss of vision, you lose your balance, or you have a seizure.     This information is not intended to replace advice given to you by your health care provider. Make sure you discuss any questions you have with your health care provider.     Document Released: 12/18/2006Document Revised: 07/08/2019Document Reviewed: 07/08/2019     Elsevier Patient Education ? 2021 [employer]

## 2022-03-05 NOTE — ED PROVIDER NOTES
"Subjective   46-year-old female comes to the ER chief complaint of headache.  She reports she gets headaches \"all the time\".  Patient is difficulty speaking due to a prior stroke that she had in December 2021.  Patient says she has decreased strength in her arms and legs with decreased sensation on the left side.  She is unable to say if this is new or old.  Last known normal is unknown.  I could not find documented weakness/numbness symptoms from the patient's prior hospitalization for a stroke.      History provided by:  Patient  History limited by: poor historian.   used: No        Review of Systems   Reason unable to perform ROS: poor historian.   Neurological: Positive for headaches.       Past Medical History:   Diagnosis Date   • Abdominal pain     Chronic RLQ, RUQ, R flank comes and goes.    • Abdominal pain, right lower quadrant    • Acute bilateral otitis media    • Allergic rhinitis    • Backache     Upper back.   • Candidiasis of skin    • Cellulitis of neck    • Chest pain    • Contraception    • Cough    • Dysfunction of eustachian tube    • Dyspareunia, female    • Excessive or frequent menstruation    • Flank pain    • Generalized aches and pains    • Headache    • Health maintenance examination    • Infection of skin and subcutaneous tissue    • Irregular periods    • Kidney stone     Poss   • Menorrhagia    • Nausea vomiting and diarrhea    • Obesity    • Open wound of abdominal wall    • Otalgia    • Pain in left foot    • Pain in unspecified hand    • Removed Impacted cerumen 06/05/2012   • Rhinitis    • Seizure ([facility]) 8/15/2019   • Shoulder pain     right-sided-likely muscle strain      • Shoulder tendinitis    • Spider bite wound    • Staphylococcal infection of skin    • Swollen feet    • Tinea cruris    • Tobacco dependence syndrome    • Upper respiratory infection    • Urinary tract infectious disease    • Vertigo    • Visit for gynecologic examination    • Vulvovaginitis  "       Allergies   Allergen Reactions   • Abilify [Aripiprazole] Nausea Only   • Buspirone Rash   • Penicillins Hives and Nausea And Vomiting       Past Surgical History:   Procedure Laterality Date   •  SECTION     • DILATATION AND CURETTAGE      Secondary to incomplete spontaneous    • INCISION AND DRAINAGE ABSCESS  2012   • INJECTION OF MEDICATION  2015    Phenergan (1)     • INJECTION OF MEDICATION  10/10/2013    Toradol (2)      • INJECTION OF MEDICATION  2014    Zofran (1)          Family History   Problem Relation Age of Onset   • Depression Other    • Cancer Other         Colorectal Cancer   • Endometrial cancer Other    • Lung cancer Other    • Endometrial cancer Sister    • Breast cancer Maternal Grandmother        Social History     Socioeconomic History   • Marital status: Single   Tobacco Use   • Smoking status: Current Every Day Smoker     Packs/day: 0.50     Types: Cigarettes   • Smokeless tobacco: Never Used   Substance and Sexual Activity   • Alcohol use: No   • Drug use: No   • Sexual activity: Defer           Objective    Vitals:    22 2043 22 2157 22 2159 22 2328   BP:   114/70    Pulse: 62 64 68 64   Resp:       Temp:       SpO2: 99% 96% 99%    Weight:       Height:           Physical Exam  Vitals and nursing note reviewed.   Constitutional:       General: She is not in acute distress.     Appearance: She is well-developed. She is not ill-appearing, toxic-appearing or diaphoretic.   HENT:      Head: Normocephalic.      Right Ear: External ear normal.      Left Ear: External ear normal.   Eyes:      Extraocular Movements: Extraocular movements intact.      Pupils: Pupils are equal, round, and reactive to light.   Pulmonary:      Effort: Pulmonary effort is normal. No accessory muscle usage or respiratory distress.      Breath sounds: No wheezing.   Chest:      Chest wall: No tenderness.   Abdominal:      General: Bowel sounds are  normal.      Palpations: Abdomen is soft.      Tenderness: There is no abdominal tenderness (deep palpation).   Skin:     General: Skin is warm and dry.      Capillary Refill: Capillary refill takes less than 2 seconds.   Neurological:      Mental Status: She is alert.      GCS: GCS eye subscore is 4. GCS verbal subscore is 5. GCS motor subscore is 6.      Cranial Nerves: Dysarthria present. No facial asymmetry.      Sensory: Sensory deficit present.      Motor: Weakness present.      Coordination: Coordination abnormal.      Comments: Weakness left side. Decrease sensation left side.   Psychiatric:         Behavior: Behavior normal.         ECG 12 Lead      Date/Time: 3/5/2022 12:00 AM  Performed by: Juvenal Chand MD  Authorized by: Juvenal Chand MD   Interpreted by physician  Rhythm: sinus rhythm  Rate: normal  QRS axis: normal  ST Segments: ST segments normal  Clinical impression: normal ECG                 ED Course  ED Course as of 03/05/22 0000   Fri Mar 04, 2022   2002 No answer for tele-neuro. [BH]      ED Course User Index  [BH] Juvenal Chand MD      Results for orders placed or performed during the hospital encounter of 03/04/22   Comprehensive Metabolic Panel    Specimen: Blood   Result Value Ref Range    Glucose 100 (H) 65 - 99 mg/dL    BUN 17 6 - 20 mg/dL    Creatinine 1.05 (H) 0.57 - 1.00 mg/dL    Sodium 136 136 - 145 mmol/L    Potassium 4.2 3.5 - 5.2 mmol/L    Chloride 99 98 - 107 mmol/L    CO2 26.0 22.0 - 29.0 mmol/L    Calcium 9.4 8.6 - 10.5 mg/dL    Total Protein 6.5 6.0 - 8.5 g/dL    Albumin 4.40 3.50 - 5.20 g/dL    ALT (SGPT) 17 1 - 33 U/L    AST (SGOT) 17 1 - 32 U/L    Alkaline Phosphatase 92 39 - 117 U/L    Total Bilirubin 0.2 0.0 - 1.2 mg/dL    Globulin 2.1 gm/dL    A/G Ratio 2.1 g/dL    BUN/Creatinine Ratio 16.2 7.0 - 25.0    Anion Gap 11.0 5.0 - 15.0 mmol/L    eGFR 66.5 >60.0 mL/min/1.73   aPTT    Specimen: Blood   Result Value Ref Range    PTT 26.6 20.0 - 40.3 seconds    Protime-INR    Specimen: Blood   Result Value Ref Range    Protime 13.6 11.1 - 15.3 Seconds    INR 1.05 0.80 - 1.20   CBC Auto Differential    Specimen: Blood   Result Value Ref Range    WBC 6.95 3.40 - 10.80 10*3/mm3    RBC 4.25 3.77 - 5.28 10*6/mm3    Hemoglobin 13.1 12.0 - 15.9 g/dL    Hematocrit 38.9 34.0 - 46.6 %    MCV 91.5 79.0 - 97.0 fL    MCH 30.8 26.6 - 33.0 pg    MCHC 33.7 31.5 - 35.7 g/dL    RDW 12.8 12.3 - 15.4 %    RDW-SD 42.2 37.0 - 54.0 fl    MPV 11.2 6.0 - 12.0 fL    Platelets 262 140 - 450 10*3/mm3    Neutrophil % 43.2 42.7 - 76.0 %    Lymphocyte % 42.3 19.6 - 45.3 %    Monocyte % 9.5 5.0 - 12.0 %    Eosinophil % 3.7 0.3 - 6.2 %    Basophil % 0.9 0.0 - 1.5 %    Immature Grans % 0.4 0.0 - 0.5 %    Neutrophils, Absolute 3.00 1.70 - 7.00 10*3/mm3    Lymphocytes, Absolute 2.94 0.70 - 3.10 10*3/mm3    Monocytes, Absolute 0.66 0.10 - 0.90 10*3/mm3    Eosinophils, Absolute 0.26 0.00 - 0.40 10*3/mm3    Basophils, Absolute 0.06 0.00 - 0.20 10*3/mm3    Immature Grans, Absolute 0.03 0.00 - 0.05 10*3/mm3    nRBC 0.0 0.0 - 0.2 /100 WBC   Troponin    Specimen: Blood   Result Value Ref Range    Troponin T <0.010 0.000 - 0.030 ng/mL   hCG, Quantitative, Pregnancy    Specimen: Blood   Result Value Ref Range    HCG Quantitative <0.10 mIU/mL   Type & Screen    Specimen: Blood   Result Value Ref Range    ABO Type O     RH type Positive     Antibody Screen Negative     T&S Expiration Date 3/7/2022 11:59:59 PM    Green Top (Gel)   Result Value Ref Range    Extra Tube Hold for add-ons.    Lavender Top   Result Value Ref Range    Extra Tube hold for add-on    Light Blue Top   Result Value Ref Range    Extra Tube hold for add-on      XR Chest 1 View   Final Result   1.  Suboptimal evaluation of lung bases as described. Bibasilar   atelectasis/consolidation plus or minus small effusions cannot be   entirely excluded.   2.  Otherwise, no acute cardiopulmonary disease.      Electronically signed by:  Anil Espinal MD   3/4/2022 9:08 PM   CST Workstation: [id number]      CT CEREBRAL PERFUSION WITH & WITHOUT CONTRAST   Final Result   1.  No significant ischemia                     Electronically signed by:  Vinicio Swanson MD  3/4/2022 9:05 PM CST   Workstation: [id number]      CT Angiogram Head w AI Analysis of LVO   Final Result     No acute findings in the arteries of the head and neck. No   acute large vessel occlusion.      Electronically signed by:  Katie Awan MD  3/4/2022 9:00 PM   CST Workstation: [id number]      CT Angiogram Neck   Final Result     No acute findings in the arteries of the head and neck. No   acute large vessel occlusion.      Electronically signed by:  Katie Awan MD  3/4/2022 9:00 PM   CST Workstation: [id number]      CT Head Without Contrast Stroke Protocol   Final Result   No acute intracranial abnormality. Encephalomalacia, left MCA   territory.      Electronically signed by:  Katie Martinez MD  3/4/2022 8:30 PM CST   Workstation: [id number]            NIHSS (NIH Stroke Scale/Score) reviewed and/or performed as part of the patient evaluation and treatment planning process.  The result associated with this review/performance is: 8       MDM  Number of Diagnoses or Management Options  Nonintractable headache, unspecified chronicity pattern, unspecified headache type: new and requires workup  Diagnosis management comments: Vital signs are stable, afebrile.  Patient was a poor historian on exam and unable to say if her weakness and numbness are new or from her prior stroke.  CT head, CTA head and neck, CT perfusion obtained are negative for acute findings.  Chest x-ray shows no acute cardiopulmonary process.  Patient received migraine cocktail.  Neurology was consulted evaluate patient.  Recommended MRI of the brain to rule out ischemic pathology.  Spoke with the on-call hospitalist who agrees to admit due to being unable to obtain an MRI this evening.      Final diagnoses:   Nonintractable  headache, unspecified chronicity pattern, unspecified headache type       ED Disposition  ED Disposition     ED Disposition Condition Comment    Decision to Admit  Level of Care: Stepdown [25]   Diagnosis: Nonintractable headache, unspecified chronicity pattern, unspecified headache type [[id number]]   Admitting Physician: JOHNNIE DELGADO [[id number]]   Attending Physician: JOHNNIE DELGADO [[id number]]            No follow-up provider specified.       Medication List      No changes were made to your prescriptions during this visit.          Juvenal Chand MD  03/05/22 0000

## 2022-03-05 NOTE — THERAPY EVALUATION
Patient Name: Myles Shannon  : 1975    MRN: [id number]                              Today's Date: 3/5/2022     PT Evaluation  Admit Date: 3/4/2022    Visit Dx:     ICD-10-CM ICD-9-CM   1. Nonintractable headache, unspecified chronicity pattern, unspecified headache type  R51.9 784.0   2. Pharyngeal dysphagia  R13.13 787.23   3. Dysarthria  R47.1 784.51   4. Impaired mobility and activities of daily living  Z74.09 V49.89    Z78.9    5. Impaired functional mobility, balance, gait, and endurance  Z74.09 V49.89     Patient Active Problem List   Diagnosis   • Nicotine abuse   • Other chest pain   • Acute maxillary sinusitis   • Bipolar affective disorder ([facility])   • Decreased pulses in feet   • Flank pain   • Hematochezia   • High cholesterol   • Migraine without aura and with status migrainosus, not intractable   • Migraine without status migrainosus, not intractable   • Neck pain   • Nephrolithiasis   • Neuropathy   • Chronic pain   • Primary osteoarthritis involving multiple joints   • Sciatic leg pain   • Screening for diabetes mellitus (DM)   • Seasonal allergies   • Stress   • Tobacco use disorder   • Urinary incontinence without sensory awareness   • Vitamin D deficiency   • Carpal tunnel syndrome of right wrist   • Seizure ([facility])   • Intractable vomiting   • Acute UTI (urinary tract infection)   • Cholelithiasis   • GI bleed   • Kidney stone   • Nephrolithiasis   • ELBERT (acute kidney injury) ([facility])   • Infection due to ESBL-producing Escherichia coli   • CVA (cerebral vascular accident) ([facility])   • Dysphagia due to recent stroke   • Slurred speech   • Cerebrovascular accident (CVA) ([facility])   • Aphasia due to acute cerebrovascular accident (CVA) ([facility])   • Methamphetamine abuse ([facility])   • Seizure disorder ([facility])   • UTI (urinary tract infection) due to urinary indwelling catheter ([facility])   • Urinary tract infection due to extended-spectrum beta lactamase (ESBL) producing Escherichia coli   • Urinary tract  infection due to extended-spectrum beta lactamase (ESBL) producing Escherichia coli   • Metabolic acidosis, NAG, bicarbonate losses   • 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    • Abdominal pain, right lower quadrant    • Acute bilateral otitis media    • Allergic rhinitis    • Backache     Upper back.   • Candidiasis of skin    • Cellulitis of neck    • Chest pain    • Contraception    • Cough    • Dysfunction of eustachian tube    • Dyspareunia, female    • Excessive or frequent menstruation    • Flank pain    • Generalized aches and pains    • Headache    • Health maintenance examination    • Infection of skin and subcutaneous tissue    • Irregular periods    • Kidney stone     Poss   • Menorrhagia    • Nausea vomiting and diarrhea    • Obesity    • Open wound of abdominal wall    • Otalgia    • Pain in left foot    • Pain in unspecified hand    • Removed Impacted cerumen 2012   • Rhinitis    • Seizure ([facility]) 8/15/2019   • Shoulder pain     right-sided-likely muscle strain      • Shoulder tendinitis    • Spider bite wound    • Staphylococcal infection of skin    • Swollen feet    • Tinea cruris    • Tobacco dependence syndrome    • Upper respiratory infection    • Urinary tract infectious disease    • Vertigo    • Visit for gynecologic examination    • Vulvovaginitis      Past Surgical History:   Procedure Laterality Date   •  SECTION     • DILATATION AND CURETTAGE      Secondary to incomplete spontaneous    • INCISION AND DRAINAGE ABSCESS  2012   • INJECTION OF MEDICATION  2015    Phenergan (1)     • INJECTION OF MEDICATION  10/10/2013    Toradol (2)      • INJECTION OF MEDICATION  2014    Zofran (1)         General Information     Row Name 22 5553          Physical Therapy Time and Intention    Document Type evaluation  -GB      Mode of Treatment co-treatment;physical therapy;occupational therapy  -     Row Name 03/05/22 1340 03/05/22 0942       General Information    Patient Profile Reviewed yes  - --    Existing Precautions/Restrictions seizures;fall  [place] post cva 12.21  -GB seizures;fall  hx [place] post CVA 12/21;  -    Row Name 03/05/22 1340          Living Environment    [facility] facility [facility]  [facility]  -           User Key  (r) = Recorded By, (t) = Taken By, (c) = Cosigned By    Initials Name Provider Type    Susie Bundy, PT Physical Therapist               Mobility     Row Name 03/05/22 1416          Bed Mobility    Bed Mobility supine-sit;sit-supine  -GB     Supine-Sit [place] (Bed Mobility) contact guard  -     Assistive Device (Bed Mobility) bed rails;head of bed elevated  -     Row Name 03/05/22 1416          Sit-Stand Transfer    Sit-Stand [place] (Transfers) standby assist  -     Assistive Device (Sit-Stand Transfers) walker, front-wheeled  -GB     Comment, (Sit-Stand Transfer) 16.5,60 ft then 40 ft as mod indep w/ supervision to see if she can do it alone  -     Row Name 03/05/22 1416          Gait/Stairs (Locomotion)    [place] Level (Gait) modified independence;supervision;1 person assist  -     Assistive Device (Gait) walker, front-wheeled  -GB     Deviations/Abnormal Patterns (Gait) gait speed decreased;sammie decreased;stride length decreased  -     Comment, (Gait/Stairs) pt states she is comfortable walking; states she can walk at home  -           User Key  (r) = Recorded By, (t) = Taken By, (c) = Cosigned By    Initials Name Provider Type    Susie Bundy PT Physical Therapist               Obj/Interventions     Row Name 03/05/22 1343          Range of Motion Comprehensive    General Range of Motion no range of motion deficits identified  -     Row Name 03/05/22 1343          Strength Comprehensive (MMT)    Comment,  "General Manual Muscle Testing (MMT) Assessment june LEs grossly 4/5 june hip,knee, ankle flx/ext  -GB     Row Name 03/05/22 1343          Sensory Assessment (Somatosensory)    Sensory Assessment (Somatosensory) LE sensation intact  -GB           User Key  (r) = Recorded By, (t) = Taken By, (c) = Cosigned By    Initials Name Provider Type    GB Susie De La Vega, PT Physical Therapist                 Clinical Impression     Row Name 03/05/22 1343          Pain    Pretreatment Pain Rating 7/10  -GB     Posttreatment Pain Rating 7/10  -GB     Pain Location - Side/Orientation Right  -GB     Pain Location - extremity  -GB     Pain Intervention(s) Ambulation/increased activity;Medication (See MAR)  -GB     Row Name 03/05/22 1343          Plan of Care Review    Plan of Care Reviewed With patient  -GB     Progress improving  -     Outcome Evaluation PT Eval w/ OT completed. Pt is slow in all movement which is concerning for fall risk but she states she feels comfortable w/ her mobility and ready to return to \"home\", She initially was CGA to supervision in mobility but when asked to get up, walk and return to chair indep as if she were home, she demonstrated indep sit<>stand, gait 40 ft in room and stand to sit; She initially was SBA 1 gait to toilet 16.5 ft w FWRW and 60 ft gait w/ FWRW w/ SBA w/out LOB. She was indep w/ toiletting. She reports she wants to go home and felt ready to go there so we can recommend d/c to assisted living but  could benefit from HHPT there for increased mobility skill and tolerance.  -GB     Row Name 03/05/22 1343          Therapy Assessment/Plan (PT)    Patient/Family Therapy Goals Statement (PT) home  -GB     Rehab Potential (PT) good, to achieve stated therapy goals  -     Criteria for Skilled Interventions Met (PT) yes  -GB     Row Name 03/05/22 1343          Vital Signs    Pre Systolic BP Rehab 130  -GB     Pre Treatment Diastolic BP 71  -GB     Post Systolic BP Rehab 123  -GB     " Post Treatment Diastolic BP 86  -GB     Pretreatment Heart Rate (beats/min) 75  -GB     Posttreatment Heart Rate (beats/min) 70  -GB     Pre SpO2 (%) 99  -GB     O2 Delivery Pre Treatment room air  -GB     O2 Delivery Intra Treatment room air  -GB     Post SpO2 (%) 97  -GB     O2 Delivery Post Treatment room air  -GB     Pre Patient Position Supine  -GB     Intra Patient Position Standing  -GB     Post Patient Position Sitting  -GB     Row Name 03/05/22 1343          Positioning and Restraints    Pre-Treatment Position in bed  -GB     Post Treatment Position chair  -GB     In Chair notified nsg;sitting;call light within reach;encouraged to call for assist  -GB           User Key  (r) = Recorded By, (t) = Taken By, (c) = Cosigned By    Initials Name Provider Type    Susie Bundy, PT Physical Therapist               Outcome Measures     Row Name 03/05/22 1705          How much help from another person do you currently need...    Turning from your back to your side while in flat bed without using bedrails? 4  -GB     Moving from lying on back to sitting on the side of a flat bed without bedrails? 3  -GB     Moving to and from a bed to a chair (including a wheelchair)? 4  -GB     Standing up from a chair using your arms (e.g., wheelchair, bedside chair)? 4  -GB     Climbing 3-5 steps with a railing? 3  -GB     To walk in hospital room? 4  -GB     AM-PAC 6 Clicks Score (PT) 22  -GB     Row Name 03/05/22 1706          Modified Rolly Scale    Pre-Stroke Modified Dike Scale 1 - No significant disability despite symptoms.  Able to carry out all usual duties and activities.  -GB     Modified Dike Scale 1 - No significant disability despite symptoms.  Able to carry out all usual duties and activities.  -GB     Row Name 03/05/22 1706 03/05/22 1705       Functional Assessment    Outcome Measure Options Modified Rolly  -GB AM-PAC 6 Clicks Basic Mobility (PT)  -GB    Row Name 03/05/22 1332          Functional  "Assessment    Outcome Measure Options AM-PAC 6 Clicks Daily Activity (OT)  -RB           User Key  (r) = Recorded By, (t) = Taken By, (c) = Cosigned By    Initials Name Provider Type    RB Demian Conklin OT Occupational Therapist    Susie Bundy PT Physical Therapist                             Physical Therapy Education                 Title: PT OT SLP Therapies (In Progress)     Topic: Physical Therapy (Done)     Point: Mobility training (Done)     Learning Progress Summary           Patient Acceptance, E,D, VU,DU,NR by  at 3/5/2022 1708    Comment: POC; encouraged to call for help if needed;                   Point: Home exercise program (Done)     Learning Progress Summary           Patient Acceptance, E,D, VU,DU,NR by ANSELMO at 3/5/2022 1708    Comment: POC; encouraged to call for help if needed;                   Point: Body mechanics (Done)     Learning Progress Summary           Patient Acceptance, E,D, VU,DU,NR by ANSELMO at 3/5/2022 1708    Comment: POC; encouraged to call for help if needed;                   Point: Precautions (Done)     Learning Progress Summary           Patient Acceptance, E,D, VU,DU,NR by ANSELMO at 3/5/2022 1708    Comment: POC; encouraged to call for help if needed;                               User Key     Initials Effective Dates Name Provider Type Discipline    ANSELMO 06/16/21 -  Susie De La Vega PT Physical Therapist PT              PT Recommendation and Plan     Plan of Care Reviewed With: patient  Progress: improving  Outcome Evaluation: PT Eval w/ OT completed. Pt is slow in all movement which is concerning for fall risk but she states she feels comfortable w/ her mobility and ready to return to \"home\", She initially was CGA to supervision in mobility but when asked to get up, walk and return to chair indep as if she were home, she demonstrated indep sit<>stand, gait 40 ft in room and stand to sit; She initially was SBA 1 gait to toilet 16.5 ft w FWRW and 60 ft " gait w/ FWRW w/ SBA w/out LOB. She was indep w/ toiletting. She reports she wants to go home and felt ready to go there so we can recommend d/c to assisted living but  could benefit from HHPT there for increased mobility skill and tolerance.     Time Calculation:    PT Charges     Row Name 03/05/22 1340             Time Calculation    Start Time 1340  -GB      Stop Time 1433  -GB      Time Calculation (min) 53 min  -GB      PT Received On 03/05/22  -GB              Untimed Charges    PT Eval/Re-eval Minutes 53  -GB              Total Minutes    Untimed Charges Total Minutes 53  -GB       Total Minutes 53  -GB            User Key  (r) = Recorded By, (t) = Taken By, (c) = Cosigned By    Initials Name Provider Type    Susie Bundy, PT Physical Therapist              Therapy Charges for Today     Code Description Service Date Service Provider Modifiers Qty    90220946887 HC PT EVAL MOD COMPLEXITY 4 3/5/2022 Susie De La Vega, PT GP 1    [id number]  PT THER SUPP EA 15 MIN 3/5/2022 Susie De La Vega, PT GP 1          PT G-Codes  Outcome Measure Options: Modified Rolly  AM-PAC 6 Clicks Score (PT): 22  AM-PAC 6 Clicks Score (OT): 20  Modified Olga Scale: 1 - No significant disability despite symptoms.  Able to carry out all usual duties and activities.    Susie De La Vega PT  3/5/2022

## 2022-03-05 NOTE — PLAN OF CARE
"Goal Outcome Evaluation:  Plan of Care Reviewed With: patient           Outcome Evaluation: PT Eval w/ OT completed. Pt is slow in all movement which is concerning for fall risk but she states she feels comfortable w/ her mobility and ready to return to \"home\", She initially was CGA to supervision in mobility but when asked to get up, walk and return to chair indep as if she were home, she demonstrated indep sit<>stand, gait 40 ft in room and stand to sit; She initially was SBA 1 gait to toilet 16.5 ft w FWRW and 60 ft gait w/ FWRW w/ SBA w/out LOB. She was indep w/ toiletting. She reports she wants to go home and felt ready to go there so we can recommend d/c to assisted living but  could benefit from HHPT there for increased mobility skill and tolerance.  "

## 2022-03-05 NOTE — THERAPY EVALUATION
Acute Care - Speech Language Pathology Initial Evaluation  [facility]     Patient Name: Myles Shannon  : 1975  MRN: [id number]  Today's Date: 3/5/2022  Onset of Illness/Injury or Date of Surgery: 22     Referring Physician: ARNULFO Hdz MD      Admit Date: 3/4/2022     Visit Dx:    ICD-10-CM ICD-9-CM   1. Nonintractable headache, unspecified chronicity pattern, unspecified headache type  R51.9 784.0   2. Pharyngeal dysphagia  R13.13 787.23   3. Dysarthria  R47.1 784.51     Patient Active Problem List   Diagnosis   • Nicotine abuse   • Other chest pain   • Acute maxillary sinusitis   • Bipolar affective disorder ([facility])   • Decreased pulses in feet   • Flank pain   • Hematochezia   • High cholesterol   • Migraine without aura and with status migrainosus, not intractable   • Migraine without status migrainosus, not intractable   • Neck pain   • Nephrolithiasis   • Neuropathy   • Chronic pain   • Primary osteoarthritis involving multiple joints   • Sciatic leg pain   • Screening for diabetes mellitus (DM)   • Seasonal allergies   • Stress   • Tobacco use disorder   • Urinary incontinence without sensory awareness   • Vitamin D deficiency   • Carpal tunnel syndrome of right wrist   • Seizure ([facility])   • Intractable vomiting   • Acute UTI (urinary tract infection)   • Cholelithiasis   • GI bleed   • Kidney stone   • Nephrolithiasis   • ELBERT (acute kidney injury) ([facility])   • Infection due to ESBL-producing Escherichia coli   • CVA (cerebral vascular accident) ([facility])   • Dysphagia due to recent stroke   • Slurred speech   • Cerebrovascular accident (CVA) ([facility])   • Aphasia due to acute cerebrovascular accident (CVA) ([facility])   • Methamphetamine abuse ([facility])   • Seizure disorder ([facility])   • UTI (urinary tract infection) due to urinary indwelling catheter ([facility])   • Urinary tract infection due to extended-spectrum beta lactamase (ESBL) producing Escherichia coli   • Urinary tract infection due to extended-spectrum  beta lactamase (ESBL) producing Escherichia coli   • Metabolic acidosis, NAG, bicarbonate losses   • 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    • Abdominal pain, right lower quadrant    • Acute bilateral otitis media    • Allergic rhinitis    • Backache     Upper back.   • Candidiasis of skin    • Cellulitis of neck    • Chest pain    • Contraception    • Cough    • Dysfunction of eustachian tube    • Dyspareunia, female    • Excessive or frequent menstruation    • Flank pain    • Generalized aches and pains    • Headache    • Health maintenance examination    • Infection of skin and subcutaneous tissue    • Irregular periods    • Kidney stone     Poss   • Menorrhagia    • Nausea vomiting and diarrhea    • Obesity    • Open wound of abdominal wall    • Otalgia    • Pain in left foot    • Pain in unspecified hand    • Removed Impacted cerumen 2012   • Rhinitis    • Seizure ([facility]) 8/15/2019   • Shoulder pain     right-sided-likely muscle strain      • Shoulder tendinitis    • Spider bite wound    • Staphylococcal infection of skin    • Swollen feet    • Tinea cruris    • Tobacco dependence syndrome    • Upper respiratory infection    • Urinary tract infectious disease    • Vertigo    • Visit for gynecologic examination    • Vulvovaginitis      Past Surgical History:   Procedure Laterality Date   •  SECTION     • DILATATION AND CURETTAGE      Secondary to incomplete spontaneous    • INCISION AND DRAINAGE ABSCESS  2012   • INJECTION OF MEDICATION  2015    Phenergan (1)     • INJECTION OF MEDICATION  10/10/2013    Toradol (2)      • INJECTION OF MEDICATION  2014    Zofran (1)          SLP Recommendation and Plan  SLP Diagnosis: dysarthria (22 0911)        Swallow Criteria for Skilled Therapeutic Interventions Met: demonstrates skilled  criteria (03/05/22 0911)  [facility] Criteria for Skilled Therapy Interventions Met: yes (03/05/22 0911)        Therapy Frequency (Swallow): 1 day per week, 2 days per week (03/05/22 0911)                 Treatment Assessment (SLP): Speech and Swallow evaluation completed. Pt with good tolerance of regular solids and regular liquids by cup. Pt did display mild throat clear x2 with thin liquids via straw. Pt with no s/s of aspiration with thin cup. Pt with mild dysarthria with slow rate of speech, decreased articulation, and slow formulation of sentences. (03/05/22 0911)     Plan of Care Reviewed With: patient (03/05/22 0941)  Progress: improving (03/05/22 0941)  Outcome Evaluation: Value Information    Speech and Swallow evaluation completed. Pt with good tolerance of regular solids and regular liquids by cup. Pt did display mild throat clear x2 with thin liquids via straw. Pt with no s/s of aspiration with thin cup. Pt displays adequate mastication and bolus management with regualr solids however due to pt's right side weakness SLP initiated mechanical soft with chopped meat and thin liquids; no straw. Pt with mild dysarthria with slow rate of speech, decreased articulation, and slow formulation of sentences. (P)     Taken by: Rahel Camp CCC-SLP    at 3/5/22 1011 (today)    Recorded by: Rahel Camp CCC-SLP    at 3/5/22 1040 (today)                                  Flowsheet Information (03/05/22 0941)      SLP EVALUATION (last 72 hours)     SLP [facility] Evaluation     Row Name 03/05/22 0911                   Communication Assessment/Intervention    Document Type evaluation  -EK        Subjective Information no complaints  -EK        Patient Observations alert;cooperative;agree to therapy  -EK        Patient/Family/Caregiver Comments/Observations none  -EK        Patient Effort good  -EK                  General Information    Patient Profile Reviewed yes  -EK        Precautions/Limitations,  Vision WFL with corrective lenses  -EK        Precautions/Limitations, Hearing WFL  -EK        Plans/Goals Discussed with patient  -EK                  Pain    Additional Documentation Pain Scale: FACES Pre/Post-Treatment (Group)  -EK                  Pain Scale: Word Pre/Post-Treatment    Pain: Word Scale, Pretreatment 6 - moderate-severe pain  -EK        Posttreatment Pain Rating 6 - moderate-severe pain  -EK        Pain Location - back  -EK                  Comprehension Assessment/Intervention    Comprehension Assessment/Intervention Auditory Comprehension  -EK                  Auditory Comprehension Assessment/Intervention    Auditory Comprehension (Communication) WFL  -EK        Able to Identify Objects/Pictures (Communication) WFL  -EK        Answers Questions (Communication) WFL  -EK        Able to Follow Commands (Communication) WFL  -EK                  Expression Assessment/Intervention    Expression Assessment/Intervention verbal expression  -EK                  Verbal Expression Assessment/Intervention    Verbal Expression WFL  -EK        Automatic Speech (Communication) WFL  -EK        Repetition WFL  -EK        Phrase Completion WFL  -EK        Responsive Naming WFL  -EK        Confrontational Naming WFL  -EK        Spontaneous/Functional Words WFL  -EK        Sentence Formulation WFL  -EK        Conversational Discourse/Fluency mild impairment  -EK                  Oral Musculature and Cranial Nerve Assessment    Oral Motor General Assessment WFL  -EK                  Motor Speech Assessment/Intervention    Motor Speech Function mild impairment  -EK        Characteristics Consistent with Dysarthria decreased articulation;slow rate  -EK        Automatic Speech (Communication) mild impairment  -EK        Verbal Repetition (Communication) mild impairment  -EK        Conversational Speech (Communication) mild impairment  -EK                  Cognitive Assessment Intervention- SLP    Cognition, Comment  Unsure of cognitive baseline  -EK                  SLP Evaluation Clinical Impressions    SLP Diagnosis dysarthria  -EK        Rehab Potential/Prognosis good  -EK        SLC Criteria for Skilled Therapy Interventions Met yes  -EK                  Recommendations    Therapy Frequency (SLP SLC) 3 days per week;5 days per week  -EK                  Articulation Goal 1 (SLP)    Improve Articulation Goal 1 (SLP) by over-articulating at phrase level;100%;independently (over 90% accuracy)  -EK        Time Frame (Articulation Goal 1, SLP) by discharge  -EK        Barriers (Articulation Goal 1, SLP) old left mca; lack of dentition is affecting speech intelligiblity as well  -EK        Progress/Outcomes (Articulation Goal 1, SLP) other (see comments)  new goal  -EK                  Prosody Goal 1 (SLP)    Improve Prosody by Goal 1 (SLP) increasing rate;90%;with minimal cues (75-90%)  -EK        Time Frame (Prosody Goal 1, SLP) by discharge  -EK        Progress/Outcomes (Prosody Goal 1, SLP) other (see comments)  new goal  -EK              User Key  (r) = Recorded By, (t) = Taken By, (c) = Cosigned By    Initials Name Effective Dates    Rahel Edwards, CCC-SLP 06/16/21 -                    EDUCATION  The patient has been educated in the following areas:     Dysphagia (Swallowing Impairment).           SLP GOALS     Row Name 03/05/22 0911             Oral Nutrition/Hydration Goal 1 (SLP)    Oral Nutrition/Hydration Goal 1, SLP Pt to tolerate least restrictive diet with no s/s of aspiration for adequate nutrition and hydration.  -EK      Time Frame (Oral Nutrition/Hydration Goal 1, SLP) by discharge  -EK      Progress/Outcomes (Oral Nutrition/Hydration Goal 1, SLP) --  new goal  -EK              Articulation Goal 1 (SLP)    Improve Articulation Goal 1 (SLP) by over-articulating at phrase level;100%;independently (over 90% accuracy)  -EK      Time Frame (Articulation Goal 1, SLP) by discharge  -EK      Barriers  (Articulation Goal 1, SLP) old left mca; lack of dentition is affecting speech intelligiblity as well  -EK      Progress/Outcomes (Articulation Goal 1, SLP) other (see comments)  new goal  -EK              Prosody Goal 1 (SLP)    Improve Prosody by Goal 1 (SLP) increasing rate;90%;with minimal cues (75-90%)  -EK      Time Frame (Prosody Goal 1, SLP) by discharge  -EK      Progress/Outcomes (Prosody Goal 1, SLP) other (see comments)  new goal  -EK            User Key  (r) = Recorded By, (t) = Taken By, (c) = Cosigned By    Initials Name Provider Type    Rahel Edwards CCC-SLP Speech and Language Pathologist                        Time Calculation:      Time Calculation- SLP     Row Name 22 1358             Time Calculation- SLP    SLP Start Time 911  -EK      SLP Stop Time 942  -EK      SLP Time Calculation (min) 31 min  -EK      Total Timed Code Minutes- SLP 31 minute(s)  -EK      SLP Received On 22  -EK      SLP Goal Re-Cert Due Date 22  -EK            User Key  (r) = Recorded By, (t) = Taken By, (c) = Cosigned By    Initials Name Provider Type    Rahel Edwards CCC-SLP Speech and Language Pathologist                Therapy Charges for Today     Code Description Service Date Service Provider Modifiers Qty    03638653332 HC ST EVAL ORAL PHARYNG SWALLOW 1 3/5/2022 Rahel Camp CCC-SLP GN 1    [id number] HC ST EVAL SPEECH AND PROD W LANG  1 3/5/2022 Rahel Camp CCC-SLP GN 1                     DAHLIA Fine  3/5/2022 and Acute Care - Speech Language Pathology   Swallow Initial Evaluation [facility]     Patient Name: Myles Shannon  : 1975  MRN: [id number]  Today's Date: 3/5/2022  Onset of Illness/Injury or Date of Surgery: 22     Referring Physician: ARNULFO Hdz MD      Admit Date: 3/4/2022    Visit Dx:     ICD-10-CM ICD-9-CM   1. Nonintractable headache, unspecified chronicity  pattern, unspecified headache type  R51.9 784.0   2. Pharyngeal dysphagia  R13.13 787.23   3. Dysarthria  R47.1 784.51     Patient Active Problem List   Diagnosis   • Nicotine abuse   • Other chest pain   • Acute maxillary sinusitis   • Bipolar affective disorder ([facility])   • Decreased pulses in feet   • Flank pain   • Hematochezia   • High cholesterol   • Migraine without aura and with status migrainosus, not intractable   • Migraine without status migrainosus, not intractable   • Neck pain   • Nephrolithiasis   • Neuropathy   • Chronic pain   • Primary osteoarthritis involving multiple joints   • Sciatic leg pain   • Screening for diabetes mellitus (DM)   • Seasonal allergies   • Stress   • Tobacco use disorder   • Urinary incontinence without sensory awareness   • Vitamin D deficiency   • Carpal tunnel syndrome of right wrist   • Seizure ([facility])   • Intractable vomiting   • Acute UTI (urinary tract infection)   • Cholelithiasis   • GI bleed   • Kidney stone   • Nephrolithiasis   • ELBERT (acute kidney injury) ([facility])   • Infection due to ESBL-producing Escherichia coli   • CVA (cerebral vascular accident) ([facility])   • Dysphagia due to recent stroke   • Slurred speech   • Cerebrovascular accident (CVA) ([facility])   • Aphasia due to acute cerebrovascular accident (CVA) ([facility])   • Methamphetamine abuse ([facility])   • Seizure disorder ([facility])   • UTI (urinary tract infection) due to urinary indwelling catheter ([facility])   • Urinary tract infection due to extended-spectrum beta lactamase (ESBL) producing Escherichia coli   • Urinary tract infection due to extended-spectrum beta lactamase (ESBL) producing Escherichia coli   • Metabolic acidosis, NAG, bicarbonate losses   • 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    • Abdominal pain, right lower quadrant    • Acute bilateral otitis media    • Allergic rhinitis    •  Backache     Upper back.   • Candidiasis of skin    • Cellulitis of neck    • Chest pain    • Contraception    • Cough    • Dysfunction of eustachian tube    • Dyspareunia, female    • Excessive or frequent menstruation    • Flank pain    • Generalized aches and pains    • Headache    • Health maintenance examination    • Infection of skin and subcutaneous tissue    • Irregular periods    • Kidney stone     Poss   • Menorrhagia    • Nausea vomiting and diarrhea    • Obesity    • Open wound of abdominal wall    • Otalgia    • Pain in left foot    • Pain in unspecified hand    • Removed Impacted cerumen 2012   • Rhinitis    • Seizure ([facility]) 8/15/2019   • Shoulder pain     right-sided-likely muscle strain      • Shoulder tendinitis    • Spider bite wound    • Staphylococcal infection of skin    • Swollen feet    • Tinea cruris    • Tobacco dependence syndrome    • Upper respiratory infection    • Urinary tract infectious disease    • Vertigo    • Visit for gynecologic examination    • Vulvovaginitis      Past Surgical History:   Procedure Laterality Date   •  SECTION     • DILATATION AND CURETTAGE      Secondary to incomplete spontaneous    • INCISION AND DRAINAGE ABSCESS  2012   • INJECTION OF MEDICATION  2015    Phenergan (1)     • INJECTION OF MEDICATION  10/10/2013    Toradol (2)      • INJECTION OF MEDICATION  2014    Zofran (1)          SLP Recommendation and Plan  SLP Swallowing Diagnosis: mild, oral dysphagia (22)  SLP Diet Recommendation: soft textures, chopped, thin liquids (22)  Recommended Precautions and Strategies: no straw (22)  SLP Rec. for Method of Medication Administration: meds whole, with thin liquids, as tolerated (22)     Monitor for Signs of Aspiration: yes, notify SLP if any concerns (22)     Swallow Criteria for Skilled Therapeutic Interventions Met: demonstrates skilled criteria (22)      Rehab Potential/Prognosis, Swallowing: good, to achieve stated therapy goals (03/05/22 0911)  Therapy Frequency (Swallow): 1 day per week, 2 days per week (03/05/22 0911)                        Treatment Assessment (SLP): Speech and Swallow evaluation completed. Pt with good tolerance of regular solids and regular liquids by cup. Pt did display mild throat clear x2 with thin liquids via straw. Pt with no s/s of aspiration with thin cup. Pt with mild dysarthria with slow rate of speech, decreased articulation, and slow formulation of sentences. (03/05/22 0911)            Plan of Care Reviewed With: patient  Progress: improving  Outcome Evaluation: Value Information    Speech and Swallow evaluation completed. Pt with good tolerance of regular solids and regular liquids by cup. Pt did display mild throat clear x2 with thin liquids via straw. Pt with no s/s of aspiration with thin cup. Pt displays adequate mastication and bolus management with regualr solids however due to pt's right side weakness SLP initiated mechanical soft with chopped meat and thin liquids; no straw. Pt with mild dysarthria with slow rate of speech, decreased articulation, and slow formulation of sentences. (P)     Taken by: Rahel Camp CCC-SLP    at 3/5/22 1011 (today)    Recorded by: Rahel Camp CCC-SLP    at 3/5/22 1040 (today)                                  Flowsheet Information      SWALLOW EVALUATION (last 72 hours)     SLP Adult Swallow Evaluation     Row Name 03/05/22 0911                   Oral Motor Structure and Function    Dentition Assessment edentulous  -EK                  General Eating/Swallowing Observations    Respiratory Support Currently in Use room air  -EK        Eating/Swallowing Skills self-fed;fed by SLP  -EK        Positioning During Eating upright 90 degree;upright in bed  -EK        Utensils Used spoon;cup;straw  -EK        Consistencies Trialed regular textures;thin  liquids;pureed  -EK        Pre SpO2 (%) 100  -EK        Post SpO2 (%) 100  -EK                  Respiratory    Respiratory Status room air  -EK                  Clinical Swallow Eval    Oral Prep Phase WFL  -EK        Oral Transit WFL  -EK        Oral Residue WFL  -EK        Pharyngeal Phase suspected pharyngeal impairment  -EK        Esophageal Phase unremarkable  -EK                  Pharyngeal Phase Concerns    Pharyngeal Phase Concerns throat clear  -EK        Throat Clear other (see comments)  -EK        Pharyngeal Phase Concerns, Comment throat clear x 2 with no straw  -EK                  SLP Evaluation Clinical Impression    SLP Swallowing Diagnosis mild;oral dysphagia  -EK        Functional Impact no impact on function  -EK        Rehab Potential/Prognosis, Swallowing good, to achieve stated therapy goals  -EK        Swallow Criteria for Skilled Therapeutic Interventions Met demonstrates skilled criteria  -EK                  SLP Treatment Clinical Impressions    Treatment Assessment (SLP) Speech and Swallow evaluation completed. Pt with good tolerance of regular solids and regular liquids by cup. Pt did display mild throat clear x2 with thin liquids via straw. Pt with no s/s of aspiration with thin cup. Pt with mild dysarthria with slow rate of speech, decreased articulation, and slow formulation of sentences.  -EK        Barriers to Overall Progress (SLP) Baseline deficits  lack of dentition  -EK        Care Plan Review evaluation/treatment results reviewed  -EK                  Recommendations    Therapy Frequency (Swallow) 1 day per week;2 days per week  -EK        SLP Diet Recommendation soft textures;chopped;thin liquids  -EK        Recommended Precautions and Strategies no straw  -EK        Oral Care Recommendations Oral Care BID/PRN  -EK        SLP Rec. for Method of Medication Administration meds whole;with thin liquids;as tolerated  -EK        Monitor for Signs of Aspiration yes;notify SLP if any  concerns  -EK                  Swallow Goals (SLP)    Oral Nutrition/Hydration Goal Selection (SLP) oral nutrition/hydration, SLP goal 1  -EK                  Oral Nutrition/Hydration Goal 1 (SLP)    Oral Nutrition/Hydration Goal 1, SLP Pt to tolerate least restrictive diet with no s/s of aspiration for adequate nutrition and hydration.  -EK        Time Frame (Oral Nutrition/Hydration Goal 1, SLP) by discharge  -EK        Progress/Outcomes (Oral Nutrition/Hydration Goal 1, SLP) --  new goal  -EK              User Key  (r) = Recorded By, (t) = Taken By, (c) = Cosigned By    Initials Name Effective Dates    Rahel Edwards, CCC-SLP 06/16/21 -                 EDUCATION  The patient has been educated in the following areas:   Cognitive Impairment Communication Impairment Dysphagia (Swallowing Impairment).        SLP GOALS     Row Name 03/05/22 0911             Oral Nutrition/Hydration Goal 1 (SLP)    Oral Nutrition/Hydration Goal 1, SLP Pt to tolerate least restrictive diet with no s/s of aspiration for adequate nutrition and hydration.  -EK      Time Frame (Oral Nutrition/Hydration Goal 1, SLP) by discharge  -EK      Progress/Outcomes (Oral Nutrition/Hydration Goal 1, SLP) --  new goal  -EK              Articulation Goal 1 (SLP)    Improve Articulation Goal 1 (SLP) by over-articulating at phrase level;100%;independently (over 90% accuracy)  -EK      Time Frame (Articulation Goal 1, SLP) by discharge  -EK      Barriers (Articulation Goal 1, SLP) old left mca; lack of dentition is affecting speech intelligiblity as well  -EK      Progress/Outcomes (Articulation Goal 1, SLP) other (see comments)  new goal  -EK              Prosody Goal 1 (SLP)    Improve Prosody by Goal 1 (SLP) increasing rate;90%;with minimal cues (75-90%)  -EK      Time Frame (Prosody Goal 1, SLP) by discharge  -EK      Progress/Outcomes (Prosody Goal 1, SLP) other (see comments)  new goal  -EK            User Key  (r) = Recorded By, (t)  = Taken By, (c) = Cosigned By    Initials Name Provider Type    Rahel Edwards CCC-SLP Speech and Language Pathologist                   Time Calculation:    Time Calculation- SLP     Row Name 03/05/22 1358             Time Calculation- SLP    SLP Start Time 0911  -EK      SLP Stop Time 0942  -EK      SLP Time Calculation (min) 31 min  -EK      Total Timed Code Minutes- SLP 31 minute(s)  -EK      SLP Received On 03/05/22  -EK      SLP Goal Re-Cert Due Date 03/18/22  -EK            User Key  (r) = Recorded By, (t) = Taken By, (c) = Cosigned By    Initials Name Provider Type    Rahel Edwards CCC-SLP Speech and Language Pathologist                Therapy Charges for Today     Code Description Service Date Service Provider Modifiers Qty    62530846292 HC ST EVAL ORAL PHARYNG SWALLOW 1 3/5/2022 Rahel Camp CCC-SLP GN 1    [id number] HC ST EVAL SPEECH AND PROD W LANG  1 3/5/2022 Rahel Camp CCC-SLP GN 1               DAHLIA Fine  3/5/2022

## 2022-03-05 NOTE — PLAN OF CARE
Goal Outcome Evaluation:  Plan of Care Reviewed With: patient        Progress: improving  Outcome Evaluation: Value Information    Speech and Swallow evaluation completed. Pt with good tolerance of regular solids and regular liquids by cup. Pt did display mild throat clear x2 with thin liquids via straw. Pt with no s/s of aspiration with thin cup. Pt displays adequate mastication and bolus management with regualr solids however due to pt's right side weakness SLP initiated mechanical soft with chopped meat and thin liquids; no straw. Pt with mild dysarthria with slow rate of speech, decreased articulation, and slow formulation of sentences. (P)     Taken by: Rahel Camp CCC-SLP    at 3/5/22 1011 (today)    Recorded by: Rahel Camp CCC-SLP    at 3/5/22 1040 (today)                                  Flowsheet Information

## 2022-03-05 NOTE — DISCHARGE SUMMARY
[facility] Medicine Services  DISCHARGE SUMMARY       Date of Admission: 3/4/2022  Date of Discharge:  3/5/2022  Primary Care Physician: Nick Boateng MD    Presenting Problem/History of Present Illness:  Nonintractable headache, unspecified chronicity pattern, unspecified headache type [R51.9]     Final Discharge Diagnoses:  Active Hospital Problems    Diagnosis    • Nonintractable headache        Consults:   Consults     Date and Time Order Name Status Description    3/4/2022  8:09 PM Inpatient Neurology Consult Stroke            Procedures Performed:                 Pertinent Test Results:   Lab Results (most recent)     Procedure Component Value Units Date/Time    Basic Metabolic Panel [[id number]]  (Abnormal) Collected: 03/05/22 0402    Specimen: Blood Updated: 03/05/22 0446     Glucose 104 mg/dL      BUN 22 mg/dL      Creatinine 1.08 mg/dL      Sodium 137 mmol/L      Potassium 4.1 mmol/L      Chloride 103 mmol/L      CO2 24.0 mmol/L      Calcium 8.8 mg/dL      BUN/Creatinine Ratio 20.4     Anion Gap 10.0 mmol/L      eGFR 64.3 mL/min/1.73      Comment: National Kidney Foundation and American Society of Nephrology (ASN) Task Force recommended calculation based on the Chronic Kidney Disease Epidemiology Collaboration (CKD-EPI) equation refit without adjustment for race.       Narrative:      GFR Normal >60  Chronic Kidney Disease <60  Kidney Failure <15      Lipid Panel [[id number]]  (Abnormal) Collected: 03/05/22 0402    Specimen: Blood Updated: 03/05/22 0446     Total Cholesterol 122 mg/dL      Triglycerides 115 mg/dL      HDL Cholesterol 61 mg/dL      LDL Cholesterol  41 mg/dL      VLDL Cholesterol 20 mg/dL      LDL/HDL Ratio 0.62    Narrative:      Cholesterol Reference Ranges  (U.S. Department of Health and Human Services ATP III Classifications)    Desirable          <200 mg/dL  Borderline High    200-239 mg/dL  High Risk          >240  mg/dL      Triglyceride Reference Ranges  (U.S. Department of Health and Human Services ATP III Classifications)    Normal           <150 mg/dL  Borderline High  150-199 mg/dL  High             200-499 mg/dL  Very High        >500 mg/dL    HDL Reference Ranges  (U.S. Department of Health and Human Services ATP III Classifications)    Low     <40 mg/dl (major risk factor for CHD)  High    >60 mg/dl ('negative' risk factor for CHD)        LDL Reference Ranges  (U.S. Department of Health and Human Services ATP III Classifications)    Optimal          <100 mg/dL  Near Optimal     100-129 mg/dL  Borderline High  130-159 mg/dL  High             160-189 mg/dL  Very High        >189 mg/dL    Magnesium [[id number]]  (Normal) Collected: 03/05/22 0402    Specimen: Blood Updated: 03/05/22 0446     Magnesium 2.0 mg/dL     Phosphorus [[id number]]  (Abnormal) Collected: 03/05/22 0402    Specimen: Blood Updated: 03/05/22 0446     Phosphorus 4.7 mg/dL     CRE Screen by PCR - Swab, Large Intestine, Rectum [[id number]] Collected: 03/05/22 0248    Specimen: Swab from Large Intestine, Rectum Updated: 03/05/22 0419     CRE SCREEN Not Detected     Comment: Test performed by real-time polymerase chain reaction (qPCR).        OXA 48 Strain Not Detected     IMP STRAIN Not Detected     VIM STRAIN Not Detected     NDM Strain Not Detected     KPC Strain Not Detected    CBC & Differential [[id number]]  (Normal) Collected: 03/05/22 0402    Specimen: Blood Updated: 03/05/22 0418    Narrative:      The following orders were created for panel order CBC & Differential.  Procedure                               Abnormality         Status                     ---------                               -----------         ------                     CBC Auto Differential[[id number]]        Normal              Final result                 Please view results for these tests on the individual orders.    CBC Auto Differential [[id number]]  (Normal) Collected: 03/05/22  0402    Specimen: Blood Updated: 03/05/22 0418     WBC 6.25 10*3/mm3      RBC 4.13 10*6/mm3      Hemoglobin 12.7 g/dL      Hematocrit 37.8 %      MCV 91.5 fL      MCH 30.8 pg      MCHC 33.6 g/dL      RDW 12.8 %      RDW-SD 42.7 fl      MPV 11.2 fL      Platelets 245 10*3/mm3      Neutrophil % 48.4 %      Lymphocyte % 39.2 %      Monocyte % 7.4 %      Eosinophil % 4.2 %      Basophil % 0.5 %      Immature Grans % 0.3 %      Neutrophils, Absolute 3.03 10*3/mm3      Lymphocytes, Absolute 2.45 10*3/mm3      Monocytes, Absolute 0.46 10*3/mm3      Eosinophils, Absolute 0.26 10*3/mm3      Basophils, Absolute 0.03 10*3/mm3      Immature Grans, Absolute 0.02 10*3/mm3      nRBC 0.0 /100 WBC     COVID-19 and FLU A/B PCR - Swab, Nasopharynx [[id number]]  (Normal) Collected: 03/05/22 0310    Specimen: Swab from Nasopharynx Updated: 03/05/22 0343     COVID19 Not Detected     Influenza A PCR Not Detected     Influenza B PCR Not Detected    Narrative:      Fact sheet for providers: https://www.fda.gov/media/827109/download    Fact sheet for patients: https://www.fda.gov/media/416870/download    Test performed by PCR.    [place] Draw [[id number]] Collected: 03/04/22 2016    Specimen: Blood Updated: 03/04/22 2145    Narrative:      The following orders were created for panel order [place] Draw.  Procedure                               Abnormality         Status                     ---------                               -----------         ------                     Green Top (Gel)[[id number]]                                  Final result               Lavender Top[[id number]]                                     Final result               Gold Top - SST[[id number]]                                                              Light Blue Top[[id number]]                                   Final result                 Please view results for these tests on the individual orders.    Light Blue Top [[id number]] Collected: 03/04/22 2042     Specimen: Blood Updated: 03/04/22 2145     Extra Tube hold for add-on     Comment: Auto resulted       aPTT [[id number]]  (Normal) Collected: 03/04/22 2042    Specimen: Blood Updated: 03/04/22 2136     PTT 26.6 seconds     Narrative:      The recommended Heparin therapeutic range is 68-97 seconds.    Protime-INR [[id number]]  (Normal) Collected: 03/04/22 2042    Specimen: Blood Updated: 03/04/22 2136     Protime 13.6 Seconds      INR 1.05    Narrative:      Therapeutic range for most indications is 2.0-3.0 INR,  or 2.5-3.5 for mechanical heart valves.    Lavender Top [[id number]] Collected: 03/04/22 2016    Specimen: Blood Updated: 03/04/22 2130     Extra Tube hold for add-on     Comment: Auto resulted       Green Top (Gel) [[id number]] Collected: 03/04/22 2016    Specimen: Blood Updated: 03/04/22 2130     Extra Tube Hold for add-ons.     Comment: Auto resulted.       hCG, Quantitative, Pregnancy [[id number]] Collected: 03/04/22 2016    Specimen: Blood Updated: 03/04/22 2050     HCG Quantitative <0.10 mIU/mL     Narrative:      HCG Ranges by Gestational Age    Females - non-pregnant premenopausal   </= 1mIU/mL HCG  Females - postmenopausal               </= 7mIU/mL HCG    3 Weeks         5.8 -    71.2 mIU/mL  4 Weeks         9.5 -     750 mIU/mL  5 Weeks         217 -   7,138 mIU/mL  6 Weeks         158 -  31,795 mIU/mL  7 Weeks       3,697 - 163,563 mIU/mL  8 Weeks      32,065 - 149,571 mIU/mL  9 Weeks      63,803 - 151,410 mIU/mL  10 Weeks     46,509 - 186,977 mIU/mL  12 Weeks     27,832 - 210,612 mIU/mL  14 Weeks     13,950 -  62,530 mIU/mL  15 Weeks     12,039 -  70,971 mIU/mL  16 Weeks      9,040 -  56,451 mIU/mL  17 Weeks      8,175 -  55,868 mIU/mL  18 Weeks      8,099 -  58,176 mIU/mL  Results may be falsely decreased if patient taking Biotin.      Comprehensive Metabolic Panel [[id number]]  (Abnormal) Collected: 03/04/22 2016    Specimen: Blood Updated: 03/04/22 2044     Glucose 100 mg/dL      BUN 17 mg/dL       Creatinine 1.05 mg/dL      Sodium 136 mmol/L      Potassium 4.2 mmol/L      Chloride 99 mmol/L      CO2 26.0 mmol/L      Calcium 9.4 mg/dL      Total Protein 6.5 g/dL      Albumin 4.40 g/dL      ALT (SGPT) 17 U/L      AST (SGOT) 17 U/L      Alkaline Phosphatase 92 U/L      Total Bilirubin 0.2 mg/dL      Globulin 2.1 gm/dL      A/G Ratio 2.1 g/dL      BUN/Creatinine Ratio 16.2     Anion Gap 11.0 mmol/L      eGFR 66.5 mL/min/1.73      Comment: National Kidney Foundation and American Society of Nephrology (ASN) Task Force recommended calculation based on the Chronic Kidney Disease Epidemiology Collaboration (CKD-EPI) equation refit without adjustment for race.       Narrative:      GFR Normal >60  Chronic Kidney Disease <60  Kidney Failure <15      Troponin [[id number]]  (Normal) Collected: 03/04/22 2016    Specimen: Blood Updated: 03/04/22 2044     Troponin T <0.010 ng/mL     Narrative:      Troponin T Reference Range:  <= 0.03 ng/mL-   Negative for AMI  >0.03 ng/mL-     Abnormal for myocardial necrosis.  Clinicians would have to utilize clinical acumen, EKG, Troponin and serial changes to determine if it is an Acute Myocardial Infarction or myocardial injury due to an underlying chronic condition.       Results may be falsely decreased if patient taking Biotin.      CBC & Differential [[id number]]  (Normal) Collected: 03/04/22 2016    Specimen: Blood Updated: 03/04/22 2022    Narrative:      The following orders were created for panel order CBC & Differential.  Procedure                               Abnormality         Status                     ---------                               -----------         ------                     CBC Auto Differential[[id number]]        Normal              Final result                 Please view results for these tests on the individual orders.    CBC Auto Differential [[id number]]  (Normal) Collected: 03/04/22 2016    Specimen: Blood Updated: 03/04/22 2022     WBC 6.95 10*3/mm3       RBC 4.25 10*6/mm3      Hemoglobin 13.1 g/dL      Hematocrit 38.9 %      MCV 91.5 fL      MCH 30.8 pg      MCHC 33.7 g/dL      RDW 12.8 %      RDW-SD 42.2 fl      MPV 11.2 fL      Platelets 262 10*3/mm3      Neutrophil % 43.2 %      Lymphocyte % 42.3 %      Monocyte % 9.5 %      Eosinophil % 3.7 %      Basophil % 0.9 %      Immature Grans % 0.4 %      Neutrophils, Absolute 3.00 10*3/mm3      Lymphocytes, Absolute 2.94 10*3/mm3      Monocytes, Absolute 0.66 10*3/mm3      Eosinophils, Absolute 0.26 10*3/mm3      Basophils, Absolute 0.06 10*3/mm3      Immature Grans, Absolute 0.03 10*3/mm3      nRBC 0.0 /100 WBC         Imaging Results (Most Recent)     Procedure Component Value Units Date/Time    MRI Brain Without Contrast [[id number]] Collected: 03/05/22 0740     Updated: 03/05/22 1229    Narrative:        MRI of the brain without contrast    HISTORY: Headaches. Papilledema.    Multisequence multiplanar images of the brain were obtained  without contrast.    COMPARISON: December 15, 2021. Correlation CT March 4, 2022..     FINDINGS:   Minimal mucosal thickening maxillary, sphenoid and ethmoid  sinuses.    Old left middle cerebral artery distribution infarct.  Diffusion images do not demonstrate an acute infarct.  No hemorrhage.  No mass.  No abnormal areas of increased or decreased signal.  No midline shift and no abnormal extra-axial fluid collections.  Normal signal flow voids are present in the major vessels and  venous sinuses.      Impression:      CONCLUSION:  Old left middle cerebral artery distribution infarct.    [id number]    Electronically signed by:  Christopher Self MD  3/5/2022 12:27 PM [facility]  Workstation: [id number]    CT Head Without Contrast Stroke Protocol [[id number]] Collected: 03/04/22 2007     Updated: 03/05/22 0154    Addenda:         ADDENDUM   ADDENDUM #1       ADDENDUM: THIS REPORT CONTAINS FINDINGS THAT MAY BE CRITICAL TO  PATIENT CARE: The findings were verbally discussed via telephone  conference with  Dr. TAB CHARLES  by Dr. Katie Martinez on March 4, 2022 at 8:32 PM CST .The results were acknowledged and  understood.    Electronically signed by:  Katie Martinez MD  3/5/2022 1:13 AM CST  Workstation: [id number]    Signed: 03/05/22 0113 by Katie Martinez MD    Narrative:      EXAM:  CT Head Without Intravenous Contrast    CLINICAL HISTORY:  headache, hx of stroke    TECHNIQUE:  Axial computed tomography images of the head/brain  without intravenous contrast.  Sagittal and coronal reformatted  images were created and reviewed.  This CT exam was performed  using one or more of the following dose reduction techniques:   automated exposure control, adjustment of the mA and/or kV  according to patient size, and/or use of iterative reconstruction  technique.    COMPARISON: December 18, 2021    FINDINGS:    Brain:  Evolution of the prior left MCA infarction;  encephalomalacia. No intracranial hemorrhage or edema. No mass  effect.    Ventricles:  No ventriculomegaly.    Bones/joints:  No acute fracture or dislocation.  No aggressive  lesion.    Soft tissues:  Unremarkable.    Sinuses:  Clear.    Mastoid air cells:  Clear.      Impression:      No acute intracranial abnormality. Encephalomalacia, left MCA  territory.    Electronically signed by:  Katie Martinez MD  3/4/2022 8:30 PM CST  Workstation: [id number]    XR Chest 1 View [[id number]] Collected: 03/04/22 2028     Updated: 03/04/22 2111    Narrative:      EXAM DESCRIPTION:  XR CHEST 1 VW     CLINICAL HISTORY:  46 years Female, Acute Stroke Protocol (onset < 12 hrs)    TECHNIQUE: 1 view  (Single frontal view of the chest)    COMPARISON: 12/15/2021      FINDINGS:  LINES AND TUBES: Shallow inspiratory effort limiting evaluation  of lung bases.  CARDIOVASCULAR STRUCTURES:Normal heart size. No pulmonary venous  congestion.    LUNGS: No confluent areas of acute consolidation.    PLEURA:  No layering pleural effusions. No pneumothorax.  BONES: No acute osseous abnormality  of the thorax.           Impression:      1.  Suboptimal evaluation of lung bases as described. Bibasilar  atelectasis/consolidation plus or minus small effusions cannot be  entirely excluded.  2.  Otherwise, no acute cardiopulmonary disease.    Electronically signed by:  Anil Espinal MD  3/4/2022 9:08 PM  CST Workstation: [id number]    CT CEREBRAL PERFUSION WITH & WITHOUT CONTRAST [[id number]] Collected: 03/04/22 2010     Updated: 03/04/22 2107    Narrative:      EXAM DESCRIPTION:  Site:  CT CEREBRAL PERFUSION W WO CONTRAST  RP: CT HEAD PERFUSION WITHOUT AND WITH IV CONTRAST     CLINICAL HISTORY:   46 years  Female;  Neuro deficit, acute, stroke suspected    TECHNIQUE:   CT perfusion protocol performed through a limited portion of the  brain using intravenous  contrast.. The data acquired was  analyzed using deconvolution algorithms to find cerebral blood  volume(CBV), mean transit time(MTT), and cerebral blood flow(CBF)  for each voxel.   All CT scans at this facility use dose modulation, iterative  reconstruction, and/or weight based dosing when appropriate to  reduce radiation dose to as low as reasonably achievable.    COMPARISON: CT 03/04/2022    FINDINGS:     MTT:  No abnormal delay in mean transit time.      CBV:  There is no significant decrease in cerebral blood volume.       No significant perfusion asymmetry      Impression:      1.  No significant ischemia              Electronically signed by:  Vinicio Swanson MD  3/4/2022 9:05 PM CST  Workstation: [id number]    CT Angiogram Head w AI Analysis of LVO [[id number]] Collected: 03/04/22 2009     Updated: 03/04/22 2102    Narrative:      EXAM:    CT Angiography Head and Neck With Intravenous Contrast    CLINICAL HISTORY:    The patient is 46 years old and is Female; Acute Stroke    TECHNIQUE:    Axial computed tomographic angiography images of the head and  neck with intravenous contrast.  Sagittal and coronal reformatted  images were created and reviewed.   This CT exam was performed  using one or more of the following dose reduction techniques:   automated exposure control, adjustment of the mA and/or kV  according to patient size, and/or use of iterative reconstruction  technique.    MIP reconstructed images were created and reviewed.    COMPARISON:    CT head without contrast dated 3/4/2022, CTA had and neck dated  12/15/2021    FINDINGS:   HEAD:    RIGHT ANTERIOR CEREBRAL ARTERY:  Unremarkable.  No occlusion or  significant stenosis.  No aneurysm.    RIGHT MIDDLE CEREBRAL ARTERY:  Unremarkable.  No occlusion or  significant stenosis.  No aneurysm.    RIGHT POSTERIOR CEREBRAL ARTERY:  Unremarkable.  No occlusion  or significant stenosis.  No aneurysm.    RIGHT INTRACRANIAL INTERNAL CAROTID ARTERY:  Unremarkable.  No  significant stenosis.  No dissection or occlusion.    RIGHT INTRACRANIAL VERTEBRAL ARTERY:  Unremarkable.  No  significant stenosis.  No dissection or occlusion.      LEFT ANTERIOR CEREBRAL ARTERY:  Unremarkable.  No occlusion or  significant stenosis.  No aneurysm.    LEFT MIDDLE CEREBRAL ARTERY:  Decreased blood vessel density in  region of known chronic encephalomalacia in the left MCA  territory. No occlusion or significant stenosis.  No aneurysm.    LEFT POSTERIOR CEREBRAL ARTERY:  Unremarkable.  No occlusion or  significant stenosis.  No aneurysm.    LEFT INTRACRANIAL INTERNAL CAROTID ARTERY:  Unremarkable.  No  significant stenosis.  No dissection or occlusion.    LEFT INTRACRANIAL VERTEBRAL ARTERY:  Unremarkable.  No  significant stenosis.  No dissection or occlusion.      BASILAR ARTERY:  Unremarkable.  No occlusion or significant  stenosis.  No aneurysm.     NECK:    RIGHT COMMON CAROTID ARTERY:  Unremarkable.  No significant  stenosis.  No dissection or occlusion.    RIGHT EXTRACRANIAL INTERNAL CAROTID ARTERY:  Unremarkable.  No  significant stenosis.  No dissection or occlusion.    RIGHT EXTERNAL CAROTID ARTERY:  Unremarkable.  No  occlusion.    RIGHT EXTRACRANIAL VERTEBRAL ARTERY:  Unremarkable.  No  significant stenosis.  No dissection or occlusion.      LEFT COMMON CAROTID ARTERY:  Unremarkable.  No significant  stenosis.  No dissection or occlusion.    LEFT EXTRACRANIAL INTERNAL CAROTID ARTERY:  Unremarkable.  No  significant stenosis.  No dissection or occlusion.    LEFT EXTERNAL CAROTID ARTERY:  Unremarkable.  No occlusion.    LEFT EXTRACRANIAL VERTEBRAL ARTERY:  Unremarkable.  No  significant stenosis.  No dissection or occlusion.         HEAD and NECK:    BONES/JOINTS:  Degenerative changes. No acute fracture.  No  dislocation.    SOFT TISSUES:  Unremarkable as visualized.  No mass.    THYROID:  Right thyroid nodule measuring 0.3 cm in maximum  dimension.  No follow-up is necessary.     CAROTID STENOSIS REFERENCE USING NASCET CRITERIA:    % ICA stenosis = (1 - narrowest ICA diameter/diameter of distal  cervical ICA) x 100.    Mild - <50% stenosis.    Moderate - 50-69% stenosis.    Severe - 70-94% stenosis.    Near occlusion - 95-99% stenosis.    Occluded - 100% stenosis.      Impression:        No acute findings in the arteries of the head and neck. No  acute large vessel occlusion.    Electronically signed by:  Katie Awan MD  3/4/2022 9:00 PM  [facility] Workstation: [id number]    CT Angiogram Neck [[id number]] Collected: 03/04/22 2009     Updated: 03/04/22 2102    Narrative:      EXAM:    CT Angiography Head and Neck With Intravenous Contrast    CLINICAL HISTORY:    The patient is 46 years old and is Female; Acute Stroke    TECHNIQUE:    Axial computed tomographic angiography images of the head and  neck with intravenous contrast.  Sagittal and coronal reformatted  images were created and reviewed.  This CT exam was performed  using one or more of the following dose reduction techniques:   automated exposure control, adjustment of the mA and/or kV  according to patient size, and/or use of iterative reconstruction  technique.    MIP  reconstructed images were created and reviewed.    COMPARISON:    CT head without contrast dated 3/4/2022, CTA had and neck dated  12/15/2021    FINDINGS:   HEAD:    RIGHT ANTERIOR CEREBRAL ARTERY:  Unremarkable.  No occlusion or  significant stenosis.  No aneurysm.    RIGHT MIDDLE CEREBRAL ARTERY:  Unremarkable.  No occlusion or  significant stenosis.  No aneurysm.    RIGHT POSTERIOR CEREBRAL ARTERY:  Unremarkable.  No occlusion  or significant stenosis.  No aneurysm.    RIGHT INTRACRANIAL INTERNAL CAROTID ARTERY:  Unremarkable.  No  significant stenosis.  No dissection or occlusion.    RIGHT INTRACRANIAL VERTEBRAL ARTERY:  Unremarkable.  No  significant stenosis.  No dissection or occlusion.      LEFT ANTERIOR CEREBRAL ARTERY:  Unremarkable.  No occlusion or  significant stenosis.  No aneurysm.    LEFT MIDDLE CEREBRAL ARTERY:  Decreased blood vessel density in  region of known chronic encephalomalacia in the left MCA  territory. No occlusion or significant stenosis.  No aneurysm.    LEFT POSTERIOR CEREBRAL ARTERY:  Unremarkable.  No occlusion or  significant stenosis.  No aneurysm.    LEFT INTRACRANIAL INTERNAL CAROTID ARTERY:  Unremarkable.  No  significant stenosis.  No dissection or occlusion.    LEFT INTRACRANIAL VERTEBRAL ARTERY:  Unremarkable.  No  significant stenosis.  No dissection or occlusion.      BASILAR ARTERY:  Unremarkable.  No occlusion or significant  stenosis.  No aneurysm.     NECK:    RIGHT COMMON CAROTID ARTERY:  Unremarkable.  No significant  stenosis.  No dissection or occlusion.    RIGHT EXTRACRANIAL INTERNAL CAROTID ARTERY:  Unremarkable.  No  significant stenosis.  No dissection or occlusion.    RIGHT EXTERNAL CAROTID ARTERY:  Unremarkable.  No occlusion.    RIGHT EXTRACRANIAL VERTEBRAL ARTERY:  Unremarkable.  No  significant stenosis.  No dissection or occlusion.      LEFT COMMON CAROTID ARTERY:  Unremarkable.  No significant  stenosis.  No dissection or occlusion.    LEFT EXTRACRANIAL  "INTERNAL CAROTID ARTERY:  Unremarkable.  No  significant stenosis.  No dissection or occlusion.    LEFT EXTERNAL CAROTID ARTERY:  Unremarkable.  No occlusion.    LEFT EXTRACRANIAL VERTEBRAL ARTERY:  Unremarkable.  No  significant stenosis.  No dissection or occlusion.         HEAD and NECK:    BONES/JOINTS:  Degenerative changes. No acute fracture.  No  dislocation.    SOFT TISSUES:  Unremarkable as visualized.  No mass.    THYROID:  Right thyroid nodule measuring 0.3 cm in maximum  dimension.  No follow-up is necessary.     CAROTID STENOSIS REFERENCE USING NASCET CRITERIA:    % ICA stenosis = (1 - narrowest ICA diameter/diameter of distal  cervical ICA) x 100.    Mild - <50% stenosis.    Moderate - 50-69% stenosis.    Severe - 70-94% stenosis.    Near occlusion - 95-99% stenosis.    Occluded - 100% stenosis.      Impression:        No acute findings in the arteries of the head and neck. No  acute large vessel occlusion.    Electronically signed by:  Katie Awan MD  3/4/2022 9:00 PM  [facility] Workstation: [id number]          Chief Complaint on Day of Discharge: No new complaints    Hospital Course:  The patient is a 46 y.o. female with history of CVA December 2021, anxiety, seizures, and hyperlipidemia who presented to [facility] with worsening headache and concern for recurrent stroke.  Patient was evaluated by Neurology and stroke work up was negative for acute infarction.  She noted improvement in her symptoms on the day following admission. She was cleared by therapy for return to SNF and will be discharged in stable condition.       Condition on Discharge:  Stable    Physical Exam on Discharge:  /84   Pulse 66   Temp 98.5 °F (36.9 °C) (Oral)   Resp 20   Ht 154 cm (60.63\")   Wt 91 kg (200 lb 9.9 oz)   SpO2 99%   BMI 38.37 kg/m²   Physical Exam  Constitutional:       General: She is not in acute distress.     Appearance: She is not toxic-appearing.   HENT:      Head: Normocephalic " and atraumatic.      Right Ear: External ear normal.      Left Ear: External ear normal.      Nose: Nose normal.      Mouth/Throat:      Mouth: Mucous membranes are moist.      Pharynx: Oropharynx is clear.   Eyes:      Conjunctiva/sclera: Conjunctivae normal.   Cardiovascular:      Rate and Rhythm: Normal rate and regular rhythm.      Pulses: Normal pulses.      Heart sounds: Normal heart sounds.   Pulmonary:      Effort: Pulmonary effort is normal. No respiratory distress.      Breath sounds: Normal breath sounds.   Abdominal:      General: Bowel sounds are normal.      Palpations: Abdomen is soft.      Tenderness: There is no abdominal tenderness.   Musculoskeletal:         General: Normal range of motion.      Cervical back: Neck supple.   Skin:     General: Skin is warm and dry.      Capillary Refill: Capillary refill takes less than 2 seconds.   Neurological:      General: No focal deficit present.      Mental Status: She is alert and oriented to person, place, and time. Mental status is at baseline.   Psychiatric:         Behavior: Behavior normal.       Discharge Disposition:  Skilled Nursing Facility (DC - External)    Discharge Medications:     Discharge Medications      Continue These Medications      Instructions Start Date   aspirin 325 MG tablet   325 mg, Oral, Daily      atorvastatin 80 MG tablet  Commonly known as: LIPITOR   80 mg, Oral, Nightly      busPIRone 5 MG tablet  Commonly known as: BUSPAR   5 mg, Oral, 3 Times Daily      escitalopram 10 MG tablet  Commonly known as: LEXAPRO   10 mg, Oral, Daily      furosemide 20 MG tablet  Commonly known as: LASIX   20 mg, Oral, Daily      levETIRAcetam 500 MG tablet  Commonly known as: KEPPRA   500 mg, Oral, Every 12 Hours Scheduled      LOSARTAN POTASSIUM PO   50 mg, Oral      pantoprazole 40 MG EC tablet  Commonly known as: Protonix   40 mg, Oral, Daily      potassium chloride 10 MEQ CR capsule  Commonly known as: MICRO-K   10 mEq, Oral, Daily       traZODone 50 MG tablet  Commonly known as: DESYREL   50 mg, Oral, Nightly      valproic acid 250 MG capsule  Commonly known as: DEPAKENE   500 mg, Oral, Every 8 Hours Scheduled             Discharge Diet:   Diet Instructions     Diet: Soft Texture; Thin Liquids, No Restrictions; Chopped      Discharge Diet: Soft Texture    Fluid Consistency: Thin Liquids, No Restrictions    Soft Options: Chopped          Activity at Discharge:   Activity Instructions     Other Activity Instructions      Activity Instructions: per therapy at facility          Discharge Care Plan/Instructions: Take medications as prescribed, follow up with PCP, return for worsening symptoms.     Follow-up Appointments:   Future Appointments   Date Time Provider Department Center   3/7/2022 [place] [facility] [place]   3/7/2022 To Be Determined Edel Kerns [facility] [place]   3/9/2022 [place]ined [facility] [place]   3/14/2022 [place]ined [facility] [place]   3/14/2022 To Be Determined Edel Kerns [facility] [place]   3/18/2022 11:30 AM Michael Hagan APRN [facility] None   3/21/2022 To Be Determined Edel Kerns [facility] [place]   3/24/2022 10:30 AM Sola Power APRN [facility]   6/17/2022 11:30 AM Michael Hagan APRN [facility] None   12/16/2022 11:30 AM Michael Hagan APRN [facility] None       Test Results Pending at Discharge: None    Jaziel Posada MD    Time: 29 minutes

## 2022-03-05 NOTE — CONSULTS
Stroke Consult Note    Patient Name: Myles Shannon   MRN: [id number]  Age: 46 y.o.  Sex: female  : 1975    Primary Care Physician: Nick Boateng MD  Referring Physician:  Chano Flynn MD      Chief Complaint/Reason for Consultation: Headache    Subjective:  History obtained from patient and from medical staff, No family members at bedside.  No previous history of recent stroke or transient ischemic attack, no history of seizures or passing out. No history of focal weakness suggesting relapsing remitting MS.  Patient taken no anticoagulation therapy or antiplatelet therapy.  No history of head trauma or meningitis or strong family history of epilepsy.  No strong family history of aneurysm. No recent change in patient daily routine.  No recent new medication her symptoms started unprovoked.  Remote history of stroke, affecting her right side, patient came to the hospital for nonspecific headache for more than 48 hours, and slow speech.    Past Medical History:   Diagnosis Date   • Abdominal pain     Chronic RLQ, RUQ, R flank comes and goes.    • Abdominal pain, right lower quadrant    • Acute bilateral otitis media    • Allergic rhinitis    • Backache     Upper back.   • Candidiasis of skin    • Cellulitis of neck    • Chest pain    • Contraception    • Cough    • Dysfunction of eustachian tube    • Dyspareunia, female    • Excessive or frequent menstruation    • Flank pain    • Generalized aches and pains    • Headache    • Health maintenance examination    • Infection of skin and subcutaneous tissue    • Irregular periods    • Kidney stone     Poss   • Menorrhagia    • Nausea vomiting and diarrhea    • Obesity    • Open wound of abdominal wall    • Otalgia    • Pain in left foot    • Pain in unspecified hand    • Removed Impacted cerumen 2012   • Rhinitis    • Seizure ([facility]) 8/15/2019   • Shoulder pain     right-sided-likely muscle strain      • Shoulder tendinitis    • Spider  bite wound    • Staphylococcal infection of skin    • Swollen feet    • Tinea cruris    • Tobacco dependence syndrome    • Upper respiratory infection    • Urinary tract infectious disease    • Vertigo    • Visit for gynecologic examination    • Vulvovaginitis      Past Surgical History:   Procedure Laterality Date   •  SECTION     • DILATATION AND CURETTAGE      Secondary to incomplete spontaneous    • INCISION AND DRAINAGE ABSCESS  2012   • INJECTION OF MEDICATION  2015    Phenergan (1)     • INJECTION OF MEDICATION  10/10/2013    Toradol (2)      • INJECTION OF MEDICATION  2014    Zofran (1)        Family History   Problem Relation Age of Onset   • Depression Other    • Cancer Other         Colorectal Cancer   • Endometrial cancer Other    • Lung cancer Other    • Endometrial cancer Sister    • Breast cancer Maternal Grandmother      Social History     Socioeconomic History   • Marital status: Single   Tobacco Use   • Smoking status: Current Every Day Smoker     Packs/day: 0.50     Types: Cigarettes   • Smokeless tobacco: Never Used   Substance and Sexual Activity   • Alcohol use: No   • Drug use: No   • Sexual activity: Defer       Allergies   Allergen Reactions   • Abilify [Aripiprazole] Nausea Only   • Buspirone Rash   • Penicillins Hives and Nausea And Vomiting     Prior to Admission medications    Medication Sig Start Date End Date Taking? Authorizing Provider   atorvastatin (LIPITOR) 80 MG tablet Take 1 tablet by mouth Every Night. 21   Juan Conroy MD   busPIRone (BUSPAR) 5 MG tablet Take 5 mg by mouth 3 (Three) Times a Day. 1/10/22   ProviderRadha MD   levETIRAcetam (KEPPRA) 500 MG tablet Take 1 tablet by mouth Every 12 (Twelve) Hours for 30 days. 21  Buzz Vasquez APRN   pantoprazole (Protonix) 40 MG EC tablet Take 1 tablet by mouth Daily. 21   Buzz Vasquez APRN   valproic acid (DEPAKENE) 250 MG capsule Take 2 capsules by  mouth Every 8 (Eight) Hours for 30 days. 21  Buzz VasquezALPHONSE       Objective:    Temp:  [98 °F (36.7 °C)] 98 °F (36.7 °C)  Heart Rate:  [62-73] 64  Resp:  [18] 18  BP: (134-140)/(85-86) 134/86    Neuro Exam:   Drowsy lethargic follow simple command only, Minimal Tracking, No gaze deviation.  No visual filed cut . Pupils equal an reactive. No clear facial droop. No nystagmus. Verbal speak short sentences. Mod dysarthria. Power: Move all extremities without limitation, upper faster than lower.  Didn’t  do finger nose finger due to her mental state. Response to sensory stimulation. Didn't evaluate gait at this time. No abnormal movements.    Time: 22:03 CST  Person Administering Scale: Bashar A Mohsen, MD    1a  Level of consciousness: 1=not alert but arousable by minor stimulation to obey, answer or respond   1b. LOC questions:  1=Performs one task correctly   1c. LOC commands: 1=Performs one task correctly   2.  Best Gaze: 0=normal   3.  Visual: 0=No visual loss   4. Facial Palsy: 0=Normal symmetric movement   5a.  Motor left arm: 1=Drift, limb holds 90 (or 45) degrees but drifts down before full 10 seconds: does not hit bed   5b.  Motor right arm: 1=Drift, limb holds 90 (or 45) degrees but drifts down before full 10 seconds: does not hit bed   6a. motor left le=Drift, limb holds 90 (or 45) degrees but drifts down before full 10 seconds: does not hit bed   6b  Motor right le=Drift, limb holds 90 (or 45) degrees but drifts down before full 10 seconds: does not hit bed   7. Limb Ataxia: 0=Absent   8.  Sensory: 0=Normal; no sensory loss   9. Best Language:  0=No aphasia, normal   10. Dysarthria: 1=Mild to moderate, patient slurs at least some words and at worst, can be understood with some difficulty   11. Extinction and Inattention: 0=No abnormality    Total:   8       This was an audio and video enabled telemedicine encounter.       Hospital Meds:  Scheduled-    Infusions- sodium chloride,  125 mL/hr, Last Rate: 125 mL/hr (03/04/22 2119)       PRNs- •  sodium chloride    Results Reviewed:  I have personally reviewed current lab, radiology, and data and agree with results.    Radiology Results  Results for orders placed during the hospital encounter of 12/15/21    Adult Transthoracic Echo Complete W/ Cont if Necessary Per Protocol    Interpretation Summary  · Left ventricular ejection fraction appears to be 56 - 60%.  · Left ventricular diastolic function was normal.  · Left ventricular wall thickness is consistent with borderline concentric hypertrophy.  · Saline test results are negative.  · Estimated right ventricular systolic pressure from tricuspid regurgitation is normal (<35 mmHg).    Results for orders placed during the hospital encounter of 03/04/22    CT CEREBRAL PERFUSION WITH & WITHOUT CONTRAST    Narrative  EXAM DESCRIPTION:  Site:  CT CEREBRAL PERFUSION W WO CONTRAST  RP: CT HEAD PERFUSION WITHOUT AND WITH IV CONTRAST    CLINICAL HISTORY:  46 years  Female;  Neuro deficit, acute, stroke suspected    TECHNIQUE:  CT perfusion protocol performed through a limited portion of the  brain using intravenous  contrast.. The data acquired was  analyzed using deconvolution algorithms to find cerebral blood  volume(CBV), mean transit time(MTT), and cerebral blood flow(CBF)  for each voxel.  All CT scans at this facility use dose modulation, iterative  reconstruction, and/or weight based dosing when appropriate to  reduce radiation dose to as low as reasonably achievable.    COMPARISON: CT 03/04/2022    FINDINGS:    MTT:  No abnormal delay in mean transit time.    CBV:  There is no significant decrease in cerebral blood volume.      No significant perfusion asymmetry    Impression  1.  No significant ischemia              Electronically signed by:  Vinicio Swanson MD  3/4/2022 9:05 PM [facility]  Workstation: [id number]    Results for orders placed during the hospital encounter of 12/15/21    MRI Brain Without  Contrast    Narrative  PROCEDURE: MR BRAIN WITHOUT IV CONTRAST    COMPARISON: No comparison    HISTORY: Stroke, follow up, I63.9 Cerebral infarction,  unspecified R47.81 Slurred speech  TECHNIQUE: Multisequence multiplanar MR imaging of the brain is  obtained  CONTRAST: None    FINDINGS:  Large area of restricted diffusion in the left frontal, parietal,  and temporal lobes. Smaller area of restricted diffusion in the  left occipital region. Findings are compatible with  acute/subacute infarcts. There is mild effacement of some of the  cerebral sulci in the area.  There is no acute intracranial hemorrhage, hydrocephalus or  intracranial mass.  There is no vasogenic edema or mass effect.  Orbits appear unremarkable. Partial opacification of all of the  paranasal sinuses.    Impression  CONCLUSION:  Large acute/subacute infarct in the left frontal, temporal, and  parietal lobe. Small infarct in the posterior left  parieto-occipital region.  There is mild effacement of some of the cerebral sulci in the  area which likely indicates edema.  Partial opacification of all of the paranasal sinuses.    Critical results discussed with ROMEO JESSICA on 12/15/2021 5:03  PM CST    Electronically signed by:  Sunil Contreras MD  12/15/2021 5:03 PM  CST Workstation: [id number]      Lab Results  Lab Results   Lab Value Date/Time    CHOL 136 12/19/2021 0531    HDL 42 12/19/2021 0531    LDL 73 12/19/2021 0531    TRIG 115 12/19/2021 0531     Results from last 7 days   Lab Units 03/04/22 2016   WBC 10*3/mm3 6.95   HEMOGLOBIN g/dL 13.1   MCV fL 91.5   PLATELETS 10*3/mm3 262   NEUTROPHIL % % 43.2   LYMPHOCYTE % % 42.3   EOSINOPHIL % % 3.7   IMM GRAN % % 0.4   NEUTROS ABS 10*3/mm3 3.00   LYMPHS ABS 10*3/mm3 2.94   EOS ABS 10*3/mm3 0.26   IMMATURE GRANS (ABS) 10*3/mm3 0.03     Results from last 7 days   Lab Units 03/04/22 2016   SODIUM mmol/L 136   POTASSIUM mmol/L 4.2   CHLORIDE mmol/L 99   CO2 mmol/L 26.0   BUN mg/dL 17   CREATININE  mg/dL 1.05*   GLUCOSE mg/dL 100*   CALCIUM mg/dL 9.4       Assessment:  Headaches, for more than 48 hours, suspected status Migraine.    Plan:  MRI of the brain.  CT angiogram brain and neck noted.  Symptomatic management.  If symptoms persisted consider spinal tap.  Otherwise no further recommendation needed, please contact neurology for any question.  Time: 22:03 CST  Person Administering Scale: Bashar A Mohsen, MD        This was an audio and video enabled telemedicine encounter.       Electronically signed by Bashar A Mohsen, MD, 03/04/22, 10:03 PM CST.

## 2022-03-05 NOTE — NURSING NOTE
Report called to Stacie BURNETTE at SNF. Dc appointments, medications and instructions reviewed. No questions. IV dc per protocol without issues. Pt family made aware of dc. Awaiting transportation from SNF.

## 2022-03-05 NOTE — EXTERNAL PATIENT INSTRUCTIONS
Patient Education   Table of Contents       General Headache Without Cause     To view videos and all your education online visit,   [email or web address]   or scan this QR code with your smartphone.                  General Headache Without Cause     A headache is pain or discomfort that is felt around the head or neck area. There are many causes and types of headaches. In some cases, the cause may not be found.   Follow these instructions at home:   Watch your condition for any changes. Let your doctor know about them. Take these steps to help with your condition:   Managing pain               Take over-the-counter and prescription medicines only as told by your doctor.       Lie down in a dark, quiet room when you have a headache.      If told, put ice on your head and neck area:       Put ice in a plastic bag.       Place a towel between your skin and the bag.       Leave the ice on for 20 minutes, 2?3 times per day.      If told, put heat on the affected area. Use the heat source that your doctor recommends, such as a moist heat pack or a heating pad.       Place a towel between your skin and the heat source.       Leave the heat on for 20?30 minutes.       Remove the heat if your skin turns bright red. This is very important if you are unable to feel pain, heat, or cold. You may have a greater risk of getting burned.       Keep lights dim if bright lights bother you or make your headaches worse.       Eating and drinking         Eat meals on a regular schedule.      If you drink alcohol:      Limit how much you use to:       0?1 drink a day for women.       0?2 drinks a day for men.           Be aware of how much alcohol is in your drink. In the [place] one drink equals one 12 oz bottle of beer (355 mL), one 5 oz glass of wine (148 mL), or one 1? oz glass of hard liquor (44 mL).         Stop drinking caffeine, or reduce how much caffeine you drink.   General instructions           Keep a journal to  find out if certain things bring on headaches. For example, write down:       What you eat and drink.       How much sleep you get.       Any change to your diet or medicines.       Get a massage or try other ways to relax.       Limit stress.       Sit up straight. Do not  tighten (tense) your muscles.      Do not  use any products that contain nicotine or tobacco. This includes cigarettes, e-cigarettes, and chewing tobacco. If you need help quitting, ask your doctor.       Exercise regularly as told by your doctor.       Get enough sleep. This often means 7?9 hours of sleep each night.       Keep all follow-up visits as told by your doctor. This is important.         Contact a doctor if:         Your symptoms are not helped by medicine.       You have a headache that feels different than the other headaches.       You feel sick to your stomach (nauseous) or you throw up (vomit).       You have a fever.     Get help right away if:         Your headache gets very bad quickly.       Your headache gets worse after a lot of physical activity.       You keep throwing up.       You have a stiff neck.       You have trouble seeing.       You have trouble speaking.       You have pain in the eye or ear.       Your muscles are weak or you lose muscle control.       You lose your balance or have trouble walking.       You feel like you will pass out (faint) or you pass out.       You are mixed up (confused).       You have a seizure.     Summary         A headache is pain or discomfort that is felt around the head or neck area.       There are many causes and types of headaches. In some cases, the cause may not be found.       Keep a journal to help find out what causes your headaches. Watch your condition for any changes. Let your doctor know about them.       Contact a doctor if you have a headache that is different from usual, or if your headache is not helped by medicine.       Get help right away if your headache gets  very bad, you throw up, you have trouble seeing, you lose your balance, or you have a seizure.     This information is not intended to replace advice given to you by your health care provider. Make sure you discuss any questions you have with your health care provider.     Document Released: 09/26/2009Document Revised: 07/08/2019Document Reviewed: 07/08/2019     Elsevier Patient Education ? 2021 [employer]

## 2022-03-05 NOTE — THERAPY EVALUATION
Acute Care - Occupational Therapy Initial Evaluation  [facility]     Patient Name: Myles Shannon  : 1975  MRN: [id number]  Today's Date: 3/5/2022  Onset of Illness/Injury or Date of Surgery: 22  Date of Referral to OT: 22  Referring Physician: ARNULFO Hdz MD    Admit Date: 3/4/2022       ICD-10-CM ICD-9-CM   1. Nonintractable headache, unspecified chronicity pattern, unspecified headache type  R51.9 784.0   2. Pharyngeal dysphagia  R13.13 787.23   3. Dysarthria  R47.1 784.51   4. Impaired mobility and activities of daily living  Z74.09 V49.89    Z78.9      Patient Active Problem List   Diagnosis   • Nicotine abuse   • Other chest pain   • Acute maxillary sinusitis   • Bipolar affective disorder ([facility])   • Decreased pulses in feet   • Flank pain   • Hematochezia   • High cholesterol   • Migraine without aura and with status migrainosus, not intractable   • Migraine without status migrainosus, not intractable   • Neck pain   • Nephrolithiasis   • Neuropathy   • Chronic pain   • Primary osteoarthritis involving multiple joints   • Sciatic leg pain   • Screening for diabetes mellitus (DM)   • Seasonal allergies   • Stress   • Tobacco use disorder   • Urinary incontinence without sensory awareness   • Vitamin D deficiency   • Carpal tunnel syndrome of right wrist   • Seizure ([facility])   • Intractable vomiting   • Acute UTI (urinary tract infection)   • Cholelithiasis   • GI bleed   • Kidney stone   • Nephrolithiasis   • ELBERT (acute kidney injury) ([facility])   • Infection due to ESBL-producing Escherichia coli   • CVA (cerebral vascular accident) ([facility])   • Dysphagia due to recent stroke   • Slurred speech   • Cerebrovascular accident (CVA) ([facility])   • Aphasia due to acute cerebrovascular accident (CVA) ([facility])   • Methamphetamine abuse ([facility])   • Seizure disorder ([facility])   • UTI (urinary tract infection) due to urinary indwelling catheter ([facility])   • Urinary tract infection due to extended-spectrum beta  lactamase (ESBL) producing Escherichia coli   • Urinary tract infection due to extended-spectrum beta lactamase (ESBL) producing Escherichia coli   • Metabolic acidosis, NAG, bicarbonate losses   • 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    • Abdominal pain, right lower quadrant    • Acute bilateral otitis media    • Allergic rhinitis    • Backache     Upper back.   • Candidiasis of skin    • Cellulitis of neck    • Chest pain    • Contraception    • Cough    • Dysfunction of eustachian tube    • Dyspareunia, female    • Excessive or frequent menstruation    • Flank pain    • Generalized aches and pains    • Headache    • Health maintenance examination    • Infection of skin and subcutaneous tissue    • Irregular periods    • Kidney stone     Poss   • Menorrhagia    • Nausea vomiting and diarrhea    • Obesity    • Open wound of abdominal wall    • Otalgia    • Pain in left foot    • Pain in unspecified hand    • Removed Impacted cerumen 2012   • Rhinitis    • Seizure ([facility]) 8/15/2019   • Shoulder pain     right-sided-likely muscle strain      • Shoulder tendinitis    • Spider bite wound    • Staphylococcal infection of skin    • Swollen feet    • Tinea cruris    • Tobacco dependence syndrome    • Upper respiratory infection    • Urinary tract infectious disease    • Vertigo    • Visit for gynecologic examination    • Vulvovaginitis      Past Surgical History:   Procedure Laterality Date   •  SECTION     • DILATATION AND CURETTAGE      Secondary to incomplete spontaneous    • INCISION AND DRAINAGE ABSCESS  2012   • INJECTION OF MEDICATION  2015    Phenergan (1)     • INJECTION OF MEDICATION  10/10/2013    Toradol (2)      • INJECTION OF MEDICATION  2014    Zofran (1)            OT ASSESSMENT FLOWSHEET (last 12 hours)     OT Evaluation and  Treatment     Row Name 03/05/22 1332                   OT Time and Intention    Subjective Information complains of;pain  -RB        Document Type evaluation  -RB        Mode of Treatment co-treatment;physical therapy;occupational therapy  -RB        Patient Effort good  -RB                  General Information    Patient Profile Reviewed yes  -RB        Onset of Illness/Injury or Date of Surgery 03/04/22  -RB        Referring Physician ARNULFO Hdz MD  -RB        General Observations of Patient Supine in bed with IV and room air.  -RB        Prior Level of Function independent:;gait;ADL's  -RB        Existing Precautions/Restrictions fall  -RB        Equipment Issued to Patient gait belt  -RB                  Living Environment    Current Living Arrangements assisted living facility  -RB        People in Home facility resident  -RB                  Pain Assessment    Pretreatment Pain Rating 7/10  -RB        Posttreatment Pain Rating 7/10  -RB        Pain Location - Side/Orientation Right  -RB        Pain Location lateral  -RB        Pain Location - extremity  -RB        Pain Intervention(s) Ambulation/increased activity  -RB                  Cognition    Affect/Mental Status (Cognition) agitated  -RB        Orientation Status (Cognition) oriented x 4;verbal cues/prompts needed for orientation  -RB        Follows Commands (Cognition) WNL  -RB                  Range of Motion (ROM)    Range of Motion ROM is WNL;bilateral upper extremities  -RB                  Strength Comprehensive (MMT)    General Manual Muscle Testing (MMT) Assessment other (see comments)  -RB        Comment, General Manual Muscle Testing (MMT) Assessment B UE strength was grossly 4-/5.  -RB                  Bed Mobility    Bed Mobility supine-sit;sit-supine  -RB        Supine-Sit [place] (Bed Mobility) contact guard  -RB        Bed Mobility, Safety Issues impaired trunk control for bed mobility  -RB        Assistive Device (Bed Mobility) bed  rails;head of bed elevated  -RB                  Functional Mobility    Functional Mobility- Ind. Level contact guard assist  -RB        Functional Mobility- Device rolling walker  -RB        Functional Mobility- Safety Issues balance decreased during turns  -RB                  Transfer Assessment/Treatment    Transfers sit-stand transfer;stand-sit transfer;toilet transfer  -RB                  Transfers    Sit-Stand [place] (Transfers) contact guard  -RB        Stand-Sit [place] (Transfers) contact guard  -RB        [place] Level (Toilet Transfer) contact guard  -RB        Assistive Device (Toilet Transfer) grab bars/safety frame;raised toilet seat  -RB                  Sit-Stand Transfer    Assistive Device (Sit-Stand Transfers) walker, front-wheeled  -RB                  Stand-Sit Transfer    Assistive Device (Stand-Sit Transfers) walker, front-wheeled  -RB                  Toilet Transfer    Type (Toilet Transfer) sit-stand;stand-sit  -RB                  Safety Issues, Functional Mobility    Safety Issues Affecting Function (Mobility) judgment;safety precaution awareness;safety precautions follow-through/compliance  -RB                  Vital Signs    Pre Systolic BP Rehab 130  -RB        Pre Treatment Diastolic BP 71  -RB        Post Systolic BP Rehab 123  -RB        Post Treatment Diastolic BP 86  -RB        Pretreatment Heart Rate (beats/min) 70  -RB        Posttreatment Heart Rate (beats/min) 70  -RB        Pre SpO2 (%) 98  -RB        O2 Delivery Pre Treatment room air  -RB        Post SpO2 (%) 97  -RB        O2 Delivery Post Treatment room air  -RB        Pre Patient Position Supine  -RB        Intra Patient Position Standing  -RB        Post Patient Position Sitting  -RB                  Positioning and Restraints    Pre-Treatment Position in bed  -RB        Post Treatment Position chair  -RB        In Chair notified nsg;reclined;call light within reach  -RB                  Therapy  Assessment/Plan (OT)    Date of Referral to OT 03/05/22  -RB        Functional Level at Time of Evaluation (OT) Imp mob and ADLs.  -RB        OT Diagnosis Imp mob and ADLs.  -RB        Rehab Potential (OT) good, to achieve stated therapy goals  -RB        Criteria for Skilled Therapeutic Interventions Met (OT) yes;meets criteria  -RB        Therapy Frequency (OT) other (see comments)  3-7 days/wk  -RB        Predicted Duration of Therapy Intervention (OT) Until D/C or goals met.  -RB        Problem List (OT) problems related to;cognition;balance;coordination;mobility;strength;inability to direct their own care  -RB        Activity Limitations Related to Problem List (OT) unable to ambulate safely;unable to transfer safely;IADLs not performed adequately or safely;BADLs not performed adequately or safely  -RB        Planned Therapy Interventions (OT) activity tolerance training;adaptive equipment training;BADL retraining;cognitive/visual perception retraining;functional balance retraining;occupation/activity based interventions;patient/caregiver education/training;ROM/therapeutic exercise;strengthening exercise;transfer/mobility retraining  -RB                  Evaluation Complexity (OT)    Review Occupational Profile/Medical/Therapy History Complexity expanded/moderate complexity  -RB        Assessment, Occupational Performance/Identification of Deficit Complexity 3-5 performance deficits  -RB        Clinical Decision Making Complexity (OT) detailed assessment/moderate complexity  -RB        Overall Complexity of Evaluation (OT) moderate complexity  -RB                  Therapy Plan Review/Discharge Plan (OT)    Anticipated Discharge Disposition (OT) assisted living  -RB                  OT Goals    Transfer Goal Selection (OT) transfer, OT goal 1  -RB        Bathing Goal Selection (OT) bathing, OT goal 1  -RB        Dressing Goal Selection (OT) dressing, OT goal 1  -RB        Toileting Goal Selection (OT) toileting,  OT goal 1  -RB                  Transfer Goal 1 (OT)    Activity/Assistive Device (Transfer Goal 1, OT) transfers, all  -RB        [place] Level/Cues Needed (Transfer Goal 1, OT) modified independence  -RB        Time Frame (Transfer Goal 1, OT) long term goal (LTG)  -RB        Progress/Outcome (Transfer Goal 1, OT) goal not met  -RB                  Bathing Goal 1 (OT)    Activity/Device (Bathing Goal 1, OT) bathing skills, all  -RB        [place] Level/Cues Needed (Bathing Goal 1, OT) supervision required  -RB        Time Frame (Bathing Goal 1, OT) long term goal (LTG)  -RB        Progress/Outcomes (Bathing Goal 1, OT) goal not met  -RB                  Dressing Goal 1 (OT)    Activity/Device (Dressing Goal 1, OT) dressing skills, all  -RB        [place]/Cues Needed (Dressing Goal 1, OT) supervision required  -RB        Time Frame (Dressing Goal 1, OT) long term goal (LTG)  -RB        Progress/Outcome (Dressing Goal 1, OT) goal not met  -RB                  Toileting Goal 1 (OT)    Activity/Device (Toileting Goal 1, OT) toileting skills, all  -RB        [place] Level/Cues Needed (Toileting Goal 1, OT) modified independence;supervision required  -RB        Time Frame (Toileting Goal 1, OT) long term goal (LTG)  -RB        Progress/Outcome (Toileting Goal 1, OT) goal not met  -RB              User Key  (r) = Recorded By, (t) = Taken By, (c) = Cosigned By    Initials Name Effective Dates    RB Demian Conklin OT 06/16/21 -                  Occupational Therapy Education                 Title: PT OT SLP Therapies (In Progress)     Topic: Occupational Therapy (In Progress)     Point: ADL training (Not Started)     Description:   Instruct learner(s) on proper safety adaptation and remediation techniques during self care or transfers.   Instruct in proper use of assistive devices.              Learner Progress:  Not documented in this visit.          Point: Home exercise program (Not Started)      Description:   Instruct learner(s) on appropriate technique for monitoring, assisting and/or progressing therapeutic exercises/activities.              Learner Progress:  Not documented in this visit.          Point: Precautions (Done)     Description:   Instruct learner(s) on prescribed precautions during self-care and functional transfers.              Learning Progress Summary           Patient Acceptance, E, DU,NR by RB at 3/5/2022 5885    Comment: Edu pt on use of gait belt and non skid socks when OOB and no OOB without assist.                   Point: Body mechanics (Not Started)     Description:   Instruct learner(s) on proper positioning and spine alignment during self-care, functional mobility activities and/or exercises.              Learner Progress:  Not documented in this visit.                      User Key     Initials Effective Dates Name Provider Type Discipline     06/16/21 -  Demian Conklin, SAM Occupational Therapist OT                  OT Recommendation and Plan  Planned Therapy Interventions (OT): activity tolerance training, adaptive equipment training, BADL retraining, cognitive/visual perception retraining, functional balance retraining, occupation/activity based interventions, patient/caregiver education/training, ROM/therapeutic exercise, strengthening exercise, transfer/mobility retraining  Therapy Frequency (OT): other (see comments) (3-7 days/wk)  Plan of Care Review  Plan of Care Reviewed With: (P) patient  Outcome Evaluation: OT eval on this date as co-eval with PT.  Pt required sup to CGA for bed mobility.  She was CGA then supervision for 2nd ambulation trip.  Supervision for donning sock.  Plan of Care Reviewed With: (P) patient  Outcome Evaluation: OT eval on this date as co-eval with PT.  Pt required sup to CGA for bed mobility.  She was CGA then supervision for 2nd ambulation trip.  Supervision for donning sock.     Outcome Measures     Row Name 03/05/22 0292             How  much help from another is currently needed...    Putting on and taking off regular lower body clothing? 3  -RB      Bathing (including washing, rinsing, and drying) 3  -RB      Toileting (which includes using toilet bed pan or urinal) 3  -RB      Putting on and taking off regular upper body clothing 3  -RB      Taking care of personal grooming (such as brushing teeth) 4  -RB      Eating meals 4  -RB      AM-PAC 6 Clicks Score (OT) 20  -RB              Functional Assessment    Outcome Measure Options AM-PAC 6 Clicks Daily Activity (OT)  -RB            User Key  (r) = Recorded By, (t) = Taken By, (c) = Cosigned By    Initials Name Provider Type    RB Demian Conklin OT Occupational Therapist                Time Calculation:    Time Calculation- OT     Row Name 03/05/22 1655             Time Calculation- OT    OT Start Time 1332  -RB      OT Stop Time 1433  -RB      OT Time Calculation (min) 61 min  -RB      OT Received On 03/05/22  -RB      OT Goal Re-Cert Due Date 03/18/22  -RB            User Key  (r) = Recorded By, (t) = Taken By, (c) = Cosigned By    Initials Name Provider Type    RB Demian Conklin OT Occupational Therapist              Therapy Charges for Today     Code Description Service Date Service Provider Modifiers Qty    [id number] HC OT EVAL LOW COMPLEXITY 4 3/5/2022 Demian Conklin OT GO 1               Demian Conklin OT  3/5/2022

## 2022-03-05 NOTE — H&P
"    [facility]  HISTORY AND PHYSICAL      Date of Admission: 3/4/2022  Primary Care Physician: Nick Boateng MD    Subjective     Chief Complaint: Headache, slurred speech, weakness and history recent stroke    History of Present Illness  Patient with history of CVA December 2021, anxiety, seizures, and hyperlipidemia presents with headache and new weakness.  Patient extremely dysarthric and poor historian with no one present in emergency room at time of evaluation.  Patient states she regularly gets headaches, but was concerned about headache occurring tonight.  Patient describes headache as 8/10, sharp.  \"I thought I was having a stroke.\"  Patient also complains of new weakness, but also admits to residual right-sided weakness after December 2021 CVA episode.  Denies chest pain, palpitations, shortness of breath, abdominal pain, fevers, chills.  Very hard to interview, but able to follow commands and answer questions with 1-2 word sentences.  Patient evaluated by teleneurology in emergency room, with observation admission recommended for MRI in a.m.        Review of Systems   Constitutional: Positive for activity change and appetite change. Negative for chills and fever.   HENT: Negative for congestion, ear pain, nosebleeds, sinus pressure and sore throat.    Eyes: Negative for photophobia, discharge and visual disturbance.   Respiratory: Negative for chest tightness, shortness of breath and wheezing.    Cardiovascular: Negative for chest pain and palpitations.   Gastrointestinal: Negative for abdominal distention, abdominal pain, nausea and vomiting.   Endocrine: Negative for cold intolerance and heat intolerance.   Genitourinary: Negative for decreased urine volume, difficulty urinating and dysuria.   Musculoskeletal: Positive for gait problem.   Skin: Negative for color change and rash.   Allergic/Immunologic: Positive for environmental " allergies. Negative for immunocompromised state.   Neurological: Positive for dizziness, speech difficulty, weakness and light-headedness.   Hematological: Negative for adenopathy.   Psychiatric/Behavioral: Positive for decreased concentration. Negative for behavioral problems, hallucinations, self-injury and suicidal ideas.        Otherwise complete ROS reviewed and negative except as mentioned in the HPI.    Past Medical History:   Past Medical History:   Diagnosis Date   • Abdominal pain     Chronic RLQ, RUQ, R flank comes and goes.    • Abdominal pain, right lower quadrant    • Acute bilateral otitis media    • Allergic rhinitis    • Backache     Upper back.   • Candidiasis of skin    • Cellulitis of neck    • Chest pain    • Contraception    • Cough    • Dysfunction of eustachian tube    • Dyspareunia, female    • Excessive or frequent menstruation    • Flank pain    • Generalized aches and pains    • Headache    • Health maintenance examination    • Infection of skin and subcutaneous tissue    • Irregular periods    • Kidney stone     Poss   • Menorrhagia    • Nausea vomiting and diarrhea    • Obesity    • Open wound of abdominal wall    • Otalgia    • Pain in left foot    • Pain in unspecified hand    • Removed Impacted cerumen 2012   • Rhinitis    • Seizure ([facility]) 8/15/2019   • Shoulder pain     right-sided-likely muscle strain      • Shoulder tendinitis    • Spider bite wound    • Staphylococcal infection of skin    • Swollen feet    • Tinea cruris    • Tobacco dependence syndrome    • Upper respiratory infection    • Urinary tract infectious disease    • Vertigo    • Visit for gynecologic examination    • Vulvovaginitis      Past Surgical History:  Past Surgical History:   Procedure Laterality Date   •  SECTION     • DILATATION AND CURETTAGE      Secondary to incomplete spontaneous    • INCISION AND DRAINAGE ABSCESS  2012   • INJECTION OF MEDICATION  2015    Phenergan  "(1)     • INJECTION OF MEDICATION  10/10/2013    Toradol (2)      • INJECTION OF MEDICATION  01/16/2014    Zofran (1)        Social History:  reports that she has been smoking cigarettes. She has been smoking about 0.50 packs per day. She has never used smokeless tobacco. She reports that she does not drink alcohol and does not use drugs.    Family History: family history includes Breast cancer in her maternal grandmother; Cancer in an other family member; Depression in an other family member; Endometrial cancer in her sister and another family member; Lung cancer in an other family member.       Allergies:  Allergies   Allergen Reactions   • Abilify [Aripiprazole] Nausea Only   • Buspirone Rash   • Penicillins Hives and Nausea And Vomiting       Medications:  Prior to Admission medications    Medication Sig Start Date End Date Taking? Authorizing Provider   atorvastatin (LIPITOR) 80 MG tablet Take 1 tablet by mouth Every Night. 12/18/21   Juan Conroy MD   busPIRone (BUSPAR) 5 MG tablet Take 5 mg by mouth 3 (Three) Times a Day. 1/10/22   Provider, MD Radha   levETIRAcetam (KEPPRA) 500 MG tablet Take 1 tablet by mouth Every 12 (Twelve) Hours for 30 days. 12/22/21 1/21/22  Buzz Vasquez APRN   pantoprazole (Protonix) 40 MG EC tablet Take 1 tablet by mouth Daily. 12/22/21   Buzz Vasquez APRN   valproic acid (DEPAKENE) 250 MG capsule Take 2 capsules by mouth Every 8 (Eight) Hours for 30 days. 12/22/21 1/21/22  Buzz Vasquez APRN I have utilized all available immediate resources to obtain, update, and review the patient's current medications.    Objective     Vital Signs: /72   Pulse 63   Temp 98 °F (36.7 °C) (Oral)   Resp 18   Ht 154 cm (60.63\")   Wt 90.9 kg (200 lb 6.4 oz)   SpO2 99%   BMI 38.33 kg/m²   Physical Exam  Constitutional:       Appearance: Normal appearance. She is obese.   HENT:      Head: Normocephalic and atraumatic.      Nose: Nose normal.      Mouth/Throat:      " Mouth: Mucous membranes are moist.   Eyes:      Extraocular Movements: Extraocular movements intact.      Conjunctiva/sclera: Conjunctivae normal.      Pupils: Pupils are equal, round, and reactive to light.   Cardiovascular:      Rate and Rhythm: Normal rate and regular rhythm.      Pulses: Normal pulses.      Heart sounds: Normal heart sounds.   Pulmonary:      Effort: Pulmonary effort is normal.      Breath sounds: Normal breath sounds.   Abdominal:      General: Abdomen is flat. Bowel sounds are normal.      Palpations: Abdomen is soft.   Musculoskeletal:         General: Normal range of motion.      Cervical back: Normal range of motion and neck supple.   Skin:     General: Skin is warm and dry.      Capillary Refill: Capillary refill takes less than 2 seconds.   Neurological:      Mental Status: She is alert.      Sensory: Sensory deficit present.      Motor: Weakness present.      Gait: Gait abnormal.      Deep Tendon Reflexes: Reflexes abnormal.      Comments: Right-sided muscle strength markedly weaker than left sided muscle strength.  Decreased sensation left upper extremity.   Psychiatric:         Mood and Affect: Mood normal.         Behavior: Behavior normal.         Thought Content: Thought content normal.         Judgment: Judgment normal.              Results Reviewed:  Lab Results (last 24 hours)     Procedure Component Value Units Date/Time    COVID-19 and FLU A/B PCR - Swab, Nasopharynx [[id number]] Collected: 03/05/22 0310    Specimen: Swab from Nasopharynx Updated: 03/05/22 0310    [place] Draw [[id number]] Collected: 03/04/22 2016    Specimen: Blood Updated: 03/04/22 2145    Narrative:      The following orders were created for panel order [place] Draw.  Procedure                               Abnormality         Status                     ---------                               -----------         ------                     Green Top (Gel)[[id number]]                                  Final result                Lavender Top[[id number]]                                     Final result               Gold Top - SST[[id number]]                                                              Light Blue Top[[id number]]                                   Final result                 Please view results for these tests on the individual orders.    Light Blue Top [[id number]] Collected: 03/04/22 2042    Specimen: Blood Updated: 03/04/22 2145     Extra Tube hold for add-on     Comment: Auto resulted       aPTT [[id number]]  (Normal) Collected: 03/04/22 2042    Specimen: Blood Updated: 03/04/22 2136     PTT 26.6 seconds     Narrative:      The recommended Heparin therapeutic range is 68-97 seconds.    Protime-INR [[id number]]  (Normal) Collected: 03/04/22 2042    Specimen: Blood Updated: 03/04/22 2136     Protime 13.6 Seconds      INR 1.05    Narrative:      Therapeutic range for most indications is 2.0-3.0 INR,  or 2.5-3.5 for mechanical heart valves.    Lavender Top [[id number]] Collected: 03/04/22 2016    Specimen: Blood Updated: 03/04/22 2130     Extra Tube hold for add-on     Comment: Auto resulted       Green Top (Gel) [[id number]] Collected: 03/04/22 2016    Specimen: Blood Updated: 03/04/22 2130     Extra Tube Hold for add-ons.     Comment: Auto resulted.       hCG, Quantitative, Pregnancy [[id number]] Collected: 03/04/22 2016    Specimen: Blood Updated: 03/04/22 2050     HCG Quantitative <0.10 mIU/mL     Narrative:      HCG Ranges by Gestational Age    Females - non-pregnant premenopausal   </= 1mIU/mL HCG  Females - postmenopausal               </= 7mIU/mL HCG    3 Weeks         5.8 -    71.2 mIU/mL  4 Weeks         9.5 -     750 mIU/mL  5 Weeks         217 -   7,138 mIU/mL  6 Weeks         158 -  31,795 mIU/mL  7 Weeks       3,697 - 163,563 mIU/mL  8 Weeks      32,065 - 149,571 mIU/mL  9 Weeks      63,803 - 151,410 mIU/mL  10 Weeks     46,509 - 186,977 mIU/mL  12 Weeks     27,832 - 210,612 mIU/mL  14 Weeks     13,950 -   62,530 mIU/mL  15 Weeks     12,039 -  70,971 mIU/mL  16 Weeks      9,040 -  56,451 mIU/mL  17 Weeks      8,175 -  55,868 mIU/mL  18 Weeks      8,099 -  58,176 mIU/mL  Results may be falsely decreased if patient taking Biotin.      Comprehensive Metabolic Panel [[id number]]  (Abnormal) Collected: 03/04/22 2016    Specimen: Blood Updated: 03/04/22 2044     Glucose 100 mg/dL      BUN 17 mg/dL      Creatinine 1.05 mg/dL      Sodium 136 mmol/L      Potassium 4.2 mmol/L      Chloride 99 mmol/L      CO2 26.0 mmol/L      Calcium 9.4 mg/dL      Total Protein 6.5 g/dL      Albumin 4.40 g/dL      ALT (SGPT) 17 U/L      AST (SGOT) 17 U/L      Alkaline Phosphatase 92 U/L      Total Bilirubin 0.2 mg/dL      Globulin 2.1 gm/dL      A/G Ratio 2.1 g/dL      BUN/Creatinine Ratio 16.2     Anion Gap 11.0 mmol/L      eGFR 66.5 mL/min/1.73      Comment: National Kidney Foundation and American Society of Nephrology (ASN) Task Force recommended calculation based on the Chronic Kidney Disease Epidemiology Collaboration (CKD-EPI) equation refit without adjustment for race.       Narrative:      GFR Normal >60  Chronic Kidney Disease <60  Kidney Failure <15      Troponin [[id number]]  (Normal) Collected: 03/04/22 2016    Specimen: Blood Updated: 03/04/22 2044     Troponin T <0.010 ng/mL     Narrative:      Troponin T Reference Range:  <= 0.03 ng/mL-   Negative for AMI  >0.03 ng/mL-     Abnormal for myocardial necrosis.  Clinicians would have to utilize clinical acumen, EKG, Troponin and serial changes to determine if it is an Acute Myocardial Infarction or myocardial injury due to an underlying chronic condition.       Results may be falsely decreased if patient taking Biotin.      CBC & Differential [[id number]]  (Normal) Collected: 03/04/22 2016    Specimen: Blood Updated: 03/04/22 2022    Narrative:      The following orders were created for panel order CBC & Differential.  Procedure                               Abnormality         Status                      ---------                               -----------         ------                     CBC Auto Differential[[id number]]        Normal              Final result                 Please view results for these tests on the individual orders.    CBC Auto Differential [[id number]]  (Normal) Collected: 03/04/22 2016    Specimen: Blood Updated: 03/04/22 2022     WBC 6.95 10*3/mm3      RBC 4.25 10*6/mm3      Hemoglobin 13.1 g/dL      Hematocrit 38.9 %      MCV 91.5 fL      MCH 30.8 pg      MCHC 33.7 g/dL      RDW 12.8 %      RDW-SD 42.2 fl      MPV 11.2 fL      Platelets 262 10*3/mm3      Neutrophil % 43.2 %      Lymphocyte % 42.3 %      Monocyte % 9.5 %      Eosinophil % 3.7 %      Basophil % 0.9 %      Immature Grans % 0.4 %      Neutrophils, Absolute 3.00 10*3/mm3      Lymphocytes, Absolute 2.94 10*3/mm3      Monocytes, Absolute 0.66 10*3/mm3      Eosinophils, Absolute 0.26 10*3/mm3      Basophils, Absolute 0.06 10*3/mm3      Immature Grans, Absolute 0.03 10*3/mm3      nRBC 0.0 /100 WBC         Imaging Results (Last 24 Hours)     Procedure Component Value Units Date/Time    CT Head Without Contrast Stroke Protocol [[id number]] Collected: 03/04/22 2007     Updated: 03/05/22 0154    Addenda:         ADDENDUM   ADDENDUM #1       ADDENDUM: THIS REPORT CONTAINS FINDINGS THAT MAY BE CRITICAL TO  PATIENT CARE: The findings were verbally discussed via telephone  conference with Dr. TAB CHARLES  by Dr. Katie Martinez on March 4, 2022 at 8:32 PM [facility] .The results were acknowledged and  understood.    Electronically signed by:  Katie Martinez MD  3/5/2022 1:13 AM [facility]  Workstation: [id number]    Signed: 03/05/22 0113 by Katie Martinez MD    Narrative:      EXAM:  CT Head Without Intravenous Contrast    CLINICAL HISTORY:  headache, hx of stroke    TECHNIQUE:  Axial computed tomography images of the head/brain  without intravenous contrast.  Sagittal and coronal reformatted  images were created and reviewed.  This CT  exam was performed  using one or more of the following dose reduction techniques:   automated exposure control, adjustment of the mA and/or kV  according to patient size, and/or use of iterative reconstruction  technique.    COMPARISON: December 18, 2021    FINDINGS:    Brain:  Evolution of the prior left MCA infarction;  encephalomalacia. No intracranial hemorrhage or edema. No mass  effect.    Ventricles:  No ventriculomegaly.    Bones/joints:  No acute fracture or dislocation.  No aggressive  lesion.    Soft tissues:  Unremarkable.    Sinuses:  Clear.    Mastoid air cells:  Clear.      Impression:      No acute intracranial abnormality. Encephalomalacia, left MCA  territory.    Electronically signed by:  Katie Martinez MD  3/4/2022 8:30 PM [facility]  Workstation: [id number]    XR Chest 1 View [[id number]] Collected: 03/04/22 2028     Updated: 03/04/22 2111    Narrative:      EXAM DESCRIPTION:  XR CHEST 1 VW     CLINICAL HISTORY:  46 years Female, Acute Stroke Protocol (onset < 12 hrs)    TECHNIQUE: 1 view  (Single frontal view of the chest)    COMPARISON: 12/15/2021      FINDINGS:  LINES AND TUBES: Shallow inspiratory effort limiting evaluation  of lung bases.  CARDIOVASCULAR STRUCTURES:Normal heart size. No pulmonary venous  congestion.    LUNGS: No confluent areas of acute consolidation.    PLEURA:  No layering pleural effusions. No pneumothorax.  BONES: No acute osseous abnormality of the thorax.           Impression:      1.  Suboptimal evaluation of lung bases as described. Bibasilar  atelectasis/consolidation plus or minus small effusions cannot be  entirely excluded.  2.  Otherwise, no acute cardiopulmonary disease.    Electronically signed by:  Anil Espinal MD  3/4/2022 9:08 PM  [facility] Workstation: [id number]    CT CEREBRAL PERFUSION WITH & WITHOUT CONTRAST [[id number]] Collected: 03/04/22 2010     Updated: 03/04/22 2107    Narrative:      EXAM DESCRIPTION:  Site:  CT CEREBRAL PERFUSION W WO CONTRAST  RP: CT  HEAD PERFUSION WITHOUT AND WITH IV CONTRAST     CLINICAL HISTORY:   46 years  Female;  Neuro deficit, acute, stroke suspected    TECHNIQUE:   CT perfusion protocol performed through a limited portion of the  brain using intravenous  contrast.. The data acquired was  analyzed using deconvolution algorithms to find cerebral blood  volume(CBV), mean transit time(MTT), and cerebral blood flow(CBF)  for each voxel.   All CT scans at this facility use dose modulation, iterative  reconstruction, and/or weight based dosing when appropriate to  reduce radiation dose to as low as reasonably achievable.    COMPARISON: CT 03/04/2022    FINDINGS:     MTT:  No abnormal delay in mean transit time.      CBV:  There is no significant decrease in cerebral blood volume.       No significant perfusion asymmetry      Impression:      1.  No significant ischemia              Electronically signed by:  Vinicio Swanson MD  3/4/2022 9:05 PM [facility]  Workstation: [id number]    CT Angiogram Head w AI Analysis of LVO [[id number]] Collected: 03/04/22 2009     Updated: 03/04/22 2102    Narrative:      EXAM:    CT Angiography Head and Neck With Intravenous Contrast    CLINICAL HISTORY:    The patient is 46 years old and is Female; Acute Stroke    TECHNIQUE:    Axial computed tomographic angiography images of the head and  neck with intravenous contrast.  Sagittal and coronal reformatted  images were created and reviewed.  This CT exam was performed  using one or more of the following dose reduction techniques:   automated exposure control, adjustment of the mA and/or kV  according to patient size, and/or use of iterative reconstruction  technique.    MIP reconstructed images were created and reviewed.    COMPARISON:    CT head without contrast dated 3/4/2022, CTA had and neck dated  12/15/2021    FINDINGS:   HEAD:    RIGHT ANTERIOR CEREBRAL ARTERY:  Unremarkable.  No occlusion or  significant stenosis.  No aneurysm.    RIGHT MIDDLE CEREBRAL ARTERY:   Unremarkable.  No occlusion or  significant stenosis.  No aneurysm.    RIGHT POSTERIOR CEREBRAL ARTERY:  Unremarkable.  No occlusion  or significant stenosis.  No aneurysm.    RIGHT INTRACRANIAL INTERNAL CAROTID ARTERY:  Unremarkable.  No  significant stenosis.  No dissection or occlusion.    RIGHT INTRACRANIAL VERTEBRAL ARTERY:  Unremarkable.  No  significant stenosis.  No dissection or occlusion.      LEFT ANTERIOR CEREBRAL ARTERY:  Unremarkable.  No occlusion or  significant stenosis.  No aneurysm.    LEFT MIDDLE CEREBRAL ARTERY:  Decreased blood vessel density in  region of known chronic encephalomalacia in the left MCA  territory. No occlusion or significant stenosis.  No aneurysm.    LEFT POSTERIOR CEREBRAL ARTERY:  Unremarkable.  No occlusion or  significant stenosis.  No aneurysm.    LEFT INTRACRANIAL INTERNAL CAROTID ARTERY:  Unremarkable.  No  significant stenosis.  No dissection or occlusion.    LEFT INTRACRANIAL VERTEBRAL ARTERY:  Unremarkable.  No  significant stenosis.  No dissection or occlusion.      BASILAR ARTERY:  Unremarkable.  No occlusion or significant  stenosis.  No aneurysm.     NECK:    RIGHT COMMON CAROTID ARTERY:  Unremarkable.  No significant  stenosis.  No dissection or occlusion.    RIGHT EXTRACRANIAL INTERNAL CAROTID ARTERY:  Unremarkable.  No  significant stenosis.  No dissection or occlusion.    RIGHT EXTERNAL CAROTID ARTERY:  Unremarkable.  No occlusion.    RIGHT EXTRACRANIAL VERTEBRAL ARTERY:  Unremarkable.  No  significant stenosis.  No dissection or occlusion.      LEFT COMMON CAROTID ARTERY:  Unremarkable.  No significant  stenosis.  No dissection or occlusion.    LEFT EXTRACRANIAL INTERNAL CAROTID ARTERY:  Unremarkable.  No  significant stenosis.  No dissection or occlusion.    LEFT EXTERNAL CAROTID ARTERY:  Unremarkable.  No occlusion.    LEFT EXTRACRANIAL VERTEBRAL ARTERY:  Unremarkable.  No  significant stenosis.  No dissection or occlusion.         HEAD and NECK:     BONES/JOINTS:  Degenerative changes. No acute fracture.  No  dislocation.    SOFT TISSUES:  Unremarkable as visualized.  No mass.    THYROID:  Right thyroid nodule measuring 0.3 cm in maximum  dimension.  No follow-up is necessary.     CAROTID STENOSIS REFERENCE USING NASCET CRITERIA:    % ICA stenosis = (1 - narrowest ICA diameter/diameter of distal  cervical ICA) x 100.    Mild - <50% stenosis.    Moderate - 50-69% stenosis.    Severe - 70-94% stenosis.    Near occlusion - 95-99% stenosis.    Occluded - 100% stenosis.      Impression:        No acute findings in the arteries of the head and neck. No  acute large vessel occlusion.    Electronically signed by:  Katie Awan MD  3/4/2022 9:00 PM  [facility] Workstation: [id number]    CT Angiogram Neck [[id number]] Collected: 03/04/22 2009     Updated: 03/04/22 2102    Narrative:      EXAM:    CT Angiography Head and Neck With Intravenous Contrast    CLINICAL HISTORY:    The patient is 46 years old and is Female; Acute Stroke    TECHNIQUE:    Axial computed tomographic angiography images of the head and  neck with intravenous contrast.  Sagittal and coronal reformatted  images were created and reviewed.  This CT exam was performed  using one or more of the following dose reduction techniques:   automated exposure control, adjustment of the mA and/or kV  according to patient size, and/or use of iterative reconstruction  technique.    MIP reconstructed images were created and reviewed.    COMPARISON:    CT head without contrast dated 3/4/2022, CTA had and neck dated  12/15/2021    FINDINGS:   HEAD:    RIGHT ANTERIOR CEREBRAL ARTERY:  Unremarkable.  No occlusion or  significant stenosis.  No aneurysm.    RIGHT MIDDLE CEREBRAL ARTERY:  Unremarkable.  No occlusion or  significant stenosis.  No aneurysm.    RIGHT POSTERIOR CEREBRAL ARTERY:  Unremarkable.  No occlusion  or significant stenosis.  No aneurysm.    RIGHT INTRACRANIAL INTERNAL CAROTID ARTERY:  Unremarkable.   No  significant stenosis.  No dissection or occlusion.    RIGHT INTRACRANIAL VERTEBRAL ARTERY:  Unremarkable.  No  significant stenosis.  No dissection or occlusion.      LEFT ANTERIOR CEREBRAL ARTERY:  Unremarkable.  No occlusion or  significant stenosis.  No aneurysm.    LEFT MIDDLE CEREBRAL ARTERY:  Decreased blood vessel density in  region of known chronic encephalomalacia in the left MCA  territory. No occlusion or significant stenosis.  No aneurysm.    LEFT POSTERIOR CEREBRAL ARTERY:  Unremarkable.  No occlusion or  significant stenosis.  No aneurysm.    LEFT INTRACRANIAL INTERNAL CAROTID ARTERY:  Unremarkable.  No  significant stenosis.  No dissection or occlusion.    LEFT INTRACRANIAL VERTEBRAL ARTERY:  Unremarkable.  No  significant stenosis.  No dissection or occlusion.      BASILAR ARTERY:  Unremarkable.  No occlusion or significant  stenosis.  No aneurysm.     NECK:    RIGHT COMMON CAROTID ARTERY:  Unremarkable.  No significant  stenosis.  No dissection or occlusion.    RIGHT EXTRACRANIAL INTERNAL CAROTID ARTERY:  Unremarkable.  No  significant stenosis.  No dissection or occlusion.    RIGHT EXTERNAL CAROTID ARTERY:  Unremarkable.  No occlusion.    RIGHT EXTRACRANIAL VERTEBRAL ARTERY:  Unremarkable.  No  significant stenosis.  No dissection or occlusion.      LEFT COMMON CAROTID ARTERY:  Unremarkable.  No significant  stenosis.  No dissection or occlusion.    LEFT EXTRACRANIAL INTERNAL CAROTID ARTERY:  Unremarkable.  No  significant stenosis.  No dissection or occlusion.    LEFT EXTERNAL CAROTID ARTERY:  Unremarkable.  No occlusion.    LEFT EXTRACRANIAL VERTEBRAL ARTERY:  Unremarkable.  No  significant stenosis.  No dissection or occlusion.         HEAD and NECK:    BONES/JOINTS:  Degenerative changes. No acute fracture.  No  dislocation.    SOFT TISSUES:  Unremarkable as visualized.  No mass.    THYROID:  Right thyroid nodule measuring 0.3 cm in maximum  dimension.  No follow-up is necessary.      CAROTID STENOSIS REFERENCE USING NASCET CRITERIA:    % ICA stenosis = (1 - narrowest ICA diameter/diameter of distal  cervical ICA) x 100.    Mild - <50% stenosis.    Moderate - 50-69% stenosis.    Severe - 70-94% stenosis.    Near occlusion - 95-99% stenosis.    Occluded - 100% stenosis.      Impression:        No acute findings in the arteries of the head and neck. No  acute large vessel occlusion.    Electronically signed by:  Katie Awan MD  3/4/2022 9:00 PM  [facility] Workstation: [id number]        I have personally reviewed and interpreted the radiology studies and ECG obtained at time of admission.     Assessment / Plan     Assessment:   Active Hospital Problems    Diagnosis    • Nonintractable headache      Plan:   Headache/weakness from migraine versus TIA versus acute on chronic CVA:  MRI in a.m., therapy, Occupational Therapy, speech therapy.  Fall precautions, neurochecks every 4x4, telemetry.    FEN clears until speech therapy evaluation  PPX Lovenox subcutaneous          Code Status/Advanced Care Plan: Full    The patient's surrogate decision maker is known.     I discussed my findings and recommendations with the patient.    Estimated length of stay is 1 to 3 days.     The patient was seen and examined by me on 3/4/2022 at 2345.    Electronically signed by Frederick Verdin MD, 03/05/22, 03:11 CST.

## 2022-03-07 ENCOUNTER — HOME CARE VISIT (OUTPATIENT)
Dept: HOME HEALTH SERVICES | Facility: CLINIC | Age: 47
End: 2022-03-07

## 2022-03-07 VITALS
DIASTOLIC BLOOD PRESSURE: 70 MMHG | HEART RATE: 69 BPM | OXYGEN SATURATION: 98 % | RESPIRATION RATE: 16 BRPM | TEMPERATURE: 97.8 F | SYSTOLIC BLOOD PRESSURE: 110 MMHG

## 2022-03-07 PROCEDURE — G0158 HHC OT ASSISTANT EA 15: HCPCS

## 2022-03-09 ENCOUNTER — HOME CARE VISIT (OUTPATIENT)
Dept: HOME HEALTH SERVICES | Facility: CLINIC | Age: 47
End: 2022-03-09

## 2022-03-09 VITALS
TEMPERATURE: 98.8 F | DIASTOLIC BLOOD PRESSURE: 80 MMHG | OXYGEN SATURATION: 98 % | SYSTOLIC BLOOD PRESSURE: 116 MMHG | HEART RATE: 71 BPM | RESPIRATION RATE: 18 BRPM

## 2022-03-09 PROCEDURE — G0158 HHC OT ASSISTANT EA 15: HCPCS

## 2022-03-10 ENCOUNTER — APPOINTMENT (OUTPATIENT)
Dept: CT IMAGING | Facility: HOSPITAL | Age: 47
End: 2022-03-10

## 2022-03-10 ENCOUNTER — APPOINTMENT (OUTPATIENT)
Dept: MRI IMAGING | Facility: HOSPITAL | Age: 47
End: 2022-03-10

## 2022-03-10 ENCOUNTER — HOSPITAL ENCOUNTER (OUTPATIENT)
Facility: HOSPITAL | Age: 47
Setting detail: OBSERVATION
Discharge: HOME | End: 2022-03-13
Attending: EMERGENCY MEDICINE | Admitting: FAMILY MEDICINE

## 2022-03-10 ENCOUNTER — APPOINTMENT (OUTPATIENT)
Dept: GENERAL RADIOLOGY | Facility: HOSPITAL | Age: 47
End: 2022-03-10

## 2022-03-10 ENCOUNTER — HOME CARE VISIT (OUTPATIENT)
Dept: HOME HEALTH SERVICES | Facility: CLINIC | Age: 47
End: 2022-03-10

## 2022-03-10 ENCOUNTER — HOME CARE VISIT (OUTPATIENT)
Dept: HOME HEALTH SERVICES | Facility: HOME HEALTHCARE | Age: 47
End: 2022-03-10

## 2022-03-10 DIAGNOSIS — R26.9 GAIT DISTURBANCE: ICD-10-CM

## 2022-03-10 DIAGNOSIS — R53.1 WEAKNESS: Primary | ICD-10-CM

## 2022-03-10 DIAGNOSIS — Z78.9 IMPAIRED MOBILITY AND ADLS: ICD-10-CM

## 2022-03-10 DIAGNOSIS — N39.0 URINARY TRACT INFECTION WITHOUT HEMATURIA, SITE UNSPECIFIED: ICD-10-CM

## 2022-03-10 DIAGNOSIS — Z74.09 IMPAIRED FUNCTIONAL MOBILITY, BALANCE, GAIT, AND ENDURANCE: ICD-10-CM

## 2022-03-10 DIAGNOSIS — Z74.09 IMPAIRED MOBILITY AND ADLS: ICD-10-CM

## 2022-03-10 DIAGNOSIS — R29.90 STROKE-LIKE SYMPTOMS: ICD-10-CM

## 2022-03-10 LAB
ABO GROUP BLD: NORMAL
ALBUMIN SERPL-MCNC: 4.8 G/DL (ref 3.5–5.2)
ALBUMIN/GLOB SERPL: 1.8 G/DL
ALP SERPL-CCNC: 109 U/L (ref 39–117)
ALT SERPL W P-5'-P-CCNC: 24 U/L (ref 1–33)
AMPHET+METHAMPHET UR QL: NEGATIVE
AMPHETAMINES UR QL: NEGATIVE
ANION GAP SERPL CALCULATED.3IONS-SCNC: 14 MMOL/L (ref 5–15)
AST SERPL-CCNC: 24 U/L (ref 1–32)
BACTERIA UR QL AUTO: ABNORMAL /HPF
BARBITURATES UR QL SCN: NEGATIVE
BASOPHILS # BLD AUTO: 0.04 10*3/MM3 (ref 0–0.2)
BASOPHILS NFR BLD AUTO: 0.6 % (ref 0–1.5)
BENZODIAZ UR QL SCN: NEGATIVE
BILIRUB SERPL-MCNC: 0.2 MG/DL (ref 0–1.2)
BILIRUB UR QL STRIP: NEGATIVE
BLD GP AB SCN SERPL QL: NEGATIVE
BUN SERPL-MCNC: 15 MG/DL (ref 6–20)
BUN/CREAT SERPL: 12.2 (ref 7–25)
BUPRENORPHINE SERPL-MCNC: NEGATIVE NG/ML
CALCIUM SPEC-SCNC: 9.9 MG/DL (ref 8.6–10.5)
CANNABINOIDS SERPL QL: NEGATIVE
CHLORIDE SERPL-SCNC: 103 MMOL/L (ref 98–107)
CLARITY UR: CLEAR
CO2 SERPL-SCNC: 22 MMOL/L (ref 22–29)
COCAINE UR QL: NEGATIVE
COLOR UR: YELLOW
CREAT SERPL-MCNC: 1.23 MG/DL (ref 0.57–1)
DEPRECATED RDW RBC AUTO: 41.3 FL (ref 37–54)
EGFRCR SERPLBLD CKD-EPI 2021: 55 ML/MIN/1.73
EOSINOPHIL # BLD AUTO: 0.22 10*3/MM3 (ref 0–0.4)
EOSINOPHIL NFR BLD AUTO: 3.5 % (ref 0.3–6.2)
ERYTHROCYTE [DISTWIDTH] IN BLOOD BY AUTOMATED COUNT: 12.6 % (ref 12.3–15.4)
FLUAV SUBTYP SPEC NAA+PROBE: NOT DETECTED
FLUBV RNA ISLT QL NAA+PROBE: NOT DETECTED
GLOBULIN UR ELPH-MCNC: 2.7 GM/DL
GLUCOSE SERPL-MCNC: 119 MG/DL (ref 65–99)
GLUCOSE UR STRIP-MCNC: NEGATIVE MG/DL
HCT VFR BLD AUTO: 42 % (ref 34–46.6)
HGB BLD-MCNC: 14.3 G/DL (ref 12–15.9)
HGB UR QL STRIP.AUTO: NEGATIVE
HOLD SPECIMEN: NORMAL
HYALINE CASTS UR QL AUTO: ABNORMAL /LPF
IMM GRANULOCYTES # BLD AUTO: 0.02 10*3/MM3 (ref 0–0.05)
IMM GRANULOCYTES NFR BLD AUTO: 0.3 % (ref 0–0.5)
INR PPP: 0.96 (ref 0.8–1.2)
KETONES UR QL STRIP: NEGATIVE
LEUKOCYTE ESTERASE UR QL STRIP.AUTO: ABNORMAL
LYMPHOCYTES # BLD AUTO: 2.09 10*3/MM3 (ref 0.7–3.1)
LYMPHOCYTES NFR BLD AUTO: 33.3 % (ref 19.6–45.3)
Lab: NORMAL
MAGNESIUM SERPL-MCNC: 2.2 MG/DL (ref 1.6–2.6)
MCH RBC QN AUTO: 30.6 PG (ref 26.6–33)
MCHC RBC AUTO-ENTMCNC: 34 G/DL (ref 31.5–35.7)
MCV RBC AUTO: 89.7 FL (ref 79–97)
METHADONE UR QL SCN: NEGATIVE
MONOCYTES # BLD AUTO: 0.38 10*3/MM3 (ref 0.1–0.9)
MONOCYTES NFR BLD AUTO: 6.1 % (ref 5–12)
NEUTROPHILS NFR BLD AUTO: 3.53 10*3/MM3 (ref 1.7–7)
NEUTROPHILS NFR BLD AUTO: 56.2 % (ref 42.7–76)
NITRITE UR QL STRIP: NEGATIVE
NRBC BLD AUTO-RTO: 0 /100 WBC (ref 0–0.2)
OPIATES UR QL: NEGATIVE
OXYCODONE UR QL SCN: NEGATIVE
PCP UR QL SCN: NEGATIVE
PH UR STRIP.AUTO: 6.5 [PH] (ref 5–9)
PLATELET # BLD AUTO: 322 10*3/MM3 (ref 140–450)
PMV BLD AUTO: 11.7 FL (ref 6–12)
POTASSIUM SERPL-SCNC: 4.4 MMOL/L (ref 3.5–5.2)
PROPOXYPH UR QL: NEGATIVE
PROT SERPL-MCNC: 7.5 G/DL (ref 6–8.5)
PROT UR QL STRIP: NEGATIVE
PROTHROMBIN TIME: 12.7 SECONDS (ref 11.1–15.3)
QT INTERVAL: 456 MS
QTC INTERVAL: 451 MS
RBC # BLD AUTO: 4.68 10*6/MM3 (ref 3.77–5.28)
RBC # UR STRIP: ABNORMAL /HPF
REF LAB TEST METHOD: ABNORMAL
RH BLD: POSITIVE
SARS-COV-2 RNA PNL SPEC NAA+PROBE: NOT DETECTED
SODIUM SERPL-SCNC: 139 MMOL/L (ref 136–145)
SP GR UR STRIP: 1.01 (ref 1–1.03)
SQUAMOUS #/AREA URNS HPF: ABNORMAL /HPF
T&S EXPIRATION DATE: NORMAL
TRICYCLICS UR QL SCN: NEGATIVE
UROBILINOGEN UR QL STRIP: ABNORMAL
VALPROATE SERPL-MCNC: <2.8 MCG/ML (ref 50–125)
WBC # UR STRIP: ABNORMAL /HPF
WBC NRBC COR # BLD: 6.28 10*3/MM3 (ref 3.4–10.8)
WHOLE BLOOD HOLD SPECIMEN: NORMAL

## 2022-03-10 PROCEDURE — 93005 ELECTROCARDIOGRAM TRACING: CPT | Performed by: EMERGENCY MEDICINE

## 2022-03-10 PROCEDURE — 36415 COLL VENOUS BLD VENIPUNCTURE: CPT

## 2022-03-10 PROCEDURE — 70450 CT HEAD/BRAIN W/O DYE: CPT

## 2022-03-10 PROCEDURE — 96365 THER/PROPH/DIAG IV INF INIT: CPT

## 2022-03-10 PROCEDURE — 99285 EMERGENCY DEPT VISIT HI MDM: CPT

## 2022-03-10 PROCEDURE — 86900 BLOOD TYPING SEROLOGIC ABO: CPT | Performed by: EMERGENCY MEDICINE

## 2022-03-10 PROCEDURE — C9803 HOPD COVID-19 SPEC COLLECT: HCPCS

## 2022-03-10 PROCEDURE — G0378 HOSPITAL OBSERVATION PER HR: HCPCS

## 2022-03-10 PROCEDURE — 25010000002 CEFTRIAXONE PER 250 MG: Performed by: EMERGENCY MEDICINE

## 2022-03-10 PROCEDURE — 99282 EMERGENCY DEPT VISIT SF MDM: CPT | Performed by: NURSE PRACTITIONER

## 2022-03-10 PROCEDURE — 80164 ASSAY DIPROPYLACETIC ACD TOT: CPT | Performed by: EMERGENCY MEDICINE

## 2022-03-10 PROCEDURE — 71045 X-RAY EXAM CHEST 1 VIEW: CPT

## 2022-03-10 PROCEDURE — 70551 MRI BRAIN STEM W/O DYE: CPT

## 2022-03-10 PROCEDURE — 87636 SARSCOV2 & INF A&B AMP PRB: CPT | Performed by: EMERGENCY MEDICINE

## 2022-03-10 PROCEDURE — 86850 RBC ANTIBODY SCREEN: CPT | Performed by: EMERGENCY MEDICINE

## 2022-03-10 PROCEDURE — 85025 COMPLETE CBC W/AUTO DIFF WBC: CPT

## 2022-03-10 PROCEDURE — 85610 PROTHROMBIN TIME: CPT | Performed by: EMERGENCY MEDICINE

## 2022-03-10 PROCEDURE — 93010 ELECTROCARDIOGRAM REPORT: CPT | Performed by: INTERNAL MEDICINE

## 2022-03-10 PROCEDURE — 83735 ASSAY OF MAGNESIUM: CPT | Performed by: FAMILY MEDICINE

## 2022-03-10 PROCEDURE — 80053 COMPREHEN METABOLIC PANEL: CPT

## 2022-03-10 PROCEDURE — 80306 DRUG TEST PRSMV INSTRMNT: CPT | Performed by: EMERGENCY MEDICINE

## 2022-03-10 PROCEDURE — 86901 BLOOD TYPING SEROLOGIC RH(D): CPT | Performed by: EMERGENCY MEDICINE

## 2022-03-10 PROCEDURE — 81001 URINALYSIS AUTO W/SCOPE: CPT

## 2022-03-10 RX ORDER — DIVALPROEX SODIUM 250 MG/1
1500 TABLET, DELAYED RELEASE ORAL ONCE
Status: COMPLETED | OUTPATIENT
Start: 2022-03-10 | End: 2022-03-10

## 2022-03-10 RX ORDER — ONDANSETRON 2 MG/ML
4 INJECTION INTRAMUSCULAR; INTRAVENOUS EVERY 6 HOURS PRN
Status: DISCONTINUED | OUTPATIENT
Start: 2022-03-10 | End: 2022-03-13 | Stop reason: HOSPADM

## 2022-03-10 RX ORDER — MORPHINE SULFATE 2 MG/ML
1 INJECTION, SOLUTION INTRAMUSCULAR; INTRAVENOUS EVERY 4 HOURS PRN
Status: DISCONTINUED | OUTPATIENT
Start: 2022-03-10 | End: 2022-03-13 | Stop reason: HOSPADM

## 2022-03-10 RX ORDER — ATORVASTATIN CALCIUM 40 MG/1
80 TABLET, FILM COATED ORAL NIGHTLY
Status: DISCONTINUED | OUTPATIENT
Start: 2022-03-10 | End: 2022-03-13 | Stop reason: HOSPADM

## 2022-03-10 RX ORDER — ASPIRIN 325 MG
325 TABLET ORAL DAILY
Status: DISCONTINUED | OUTPATIENT
Start: 2022-03-11 | End: 2022-03-13 | Stop reason: HOSPADM

## 2022-03-10 RX ORDER — ACETAMINOPHEN 500 MG
1000 TABLET ORAL EVERY 8 HOURS PRN
COMMUNITY
End: 2023-03-09 | Stop reason: HOSPADM

## 2022-03-10 RX ORDER — FUROSEMIDE 20 MG/1
20 TABLET ORAL DAILY
Status: DISCONTINUED | OUTPATIENT
Start: 2022-03-11 | End: 2022-03-13 | Stop reason: HOSPADM

## 2022-03-10 RX ORDER — SODIUM CHLORIDE 0.9 % (FLUSH) 0.9 %
10 SYRINGE (ML) INJECTION EVERY 12 HOURS SCHEDULED
Status: DISCONTINUED | OUTPATIENT
Start: 2022-03-10 | End: 2022-03-13 | Stop reason: HOSPADM

## 2022-03-10 RX ORDER — TRAZODONE HYDROCHLORIDE 50 MG/1
50 TABLET ORAL NIGHTLY
Status: DISCONTINUED | OUTPATIENT
Start: 2022-03-10 | End: 2022-03-13 | Stop reason: HOSPADM

## 2022-03-10 RX ORDER — BUSPIRONE HYDROCHLORIDE 5 MG/1
5 TABLET ORAL 3 TIMES DAILY
Status: DISCONTINUED | OUTPATIENT
Start: 2022-03-10 | End: 2022-03-13 | Stop reason: HOSPADM

## 2022-03-10 RX ORDER — LEVETIRACETAM 500 MG/1
500 TABLET ORAL EVERY 12 HOURS SCHEDULED
Status: DISCONTINUED | OUTPATIENT
Start: 2022-03-10 | End: 2022-03-13 | Stop reason: HOSPADM

## 2022-03-10 RX ORDER — PANTOPRAZOLE SODIUM 40 MG/1
40 TABLET, DELAYED RELEASE ORAL DAILY
Status: DISCONTINUED | OUTPATIENT
Start: 2022-03-11 | End: 2022-03-13 | Stop reason: HOSPADM

## 2022-03-10 RX ORDER — SODIUM CHLORIDE 0.9 % (FLUSH) 0.9 %
10 SYRINGE (ML) INJECTION AS NEEDED
Status: DISCONTINUED | OUTPATIENT
Start: 2022-03-10 | End: 2022-03-13 | Stop reason: HOSPADM

## 2022-03-10 RX ORDER — NALOXONE HCL 0.4 MG/ML
0.4 VIAL (ML) INJECTION
Status: DISCONTINUED | OUTPATIENT
Start: 2022-03-10 | End: 2022-03-13 | Stop reason: HOSPADM

## 2022-03-10 RX ORDER — DICLOFENAC SODIUM AND MISOPROSTOL 75; 200 MG/1; UG/1
1 TABLET, DELAYED RELEASE ORAL 2 TIMES DAILY
COMMUNITY
End: 2022-03-13 | Stop reason: HOSPADM

## 2022-03-10 RX ORDER — CLONIDINE HYDROCHLORIDE 0.1 MG/1
0.1 TABLET ORAL AS NEEDED
COMMUNITY
End: 2022-03-13 | Stop reason: HOSPADM

## 2022-03-10 RX ORDER — ESCITALOPRAM OXALATE 10 MG/1
10 TABLET ORAL DAILY
Status: DISCONTINUED | OUTPATIENT
Start: 2022-03-11 | End: 2022-03-13 | Stop reason: HOSPADM

## 2022-03-10 RX ORDER — LEVETIRACETAM 500 MG/1
500 TABLET ORAL 2 TIMES DAILY
COMMUNITY
End: 2022-03-18

## 2022-03-10 RX ORDER — CHOLECALCIFEROL (VITAMIN D3) 125 MCG
5 CAPSULE ORAL NIGHTLY PRN
COMMUNITY
End: 2022-03-18

## 2022-03-10 RX ADMIN — CEFTRIAXONE SODIUM 1 G: 10 INJECTION, POWDER, FOR SOLUTION INTRAVENOUS at 15:59

## 2022-03-10 RX ADMIN — TRAZODONE HYDROCHLORIDE 50 MG: 50 TABLET ORAL at 22:09

## 2022-03-10 RX ADMIN — LEVETIRACETAM 500 MG: 500 TABLET, FILM COATED ORAL at 22:09

## 2022-03-10 RX ADMIN — BUSPIRONE HYDROCHLORIDE 5 MG: 5 TABLET ORAL at 22:09

## 2022-03-10 RX ADMIN — Medication 10 ML: at 22:10

## 2022-03-10 RX ADMIN — ATORVASTATIN CALCIUM 80 MG: 40 TABLET, FILM COATED ORAL at 22:09

## 2022-03-10 RX ADMIN — DIVALPROEX SODIUM 1500 MG: 250 TABLET, DELAYED RELEASE ORAL at 15:59

## 2022-03-10 NOTE — CONSULTS
Stroke Consult Note    Patient Name: Myles Shannon   MRN: [id number]  Age: 46 y.o.  Sex: female  : 1975    Primary Care Physician: Nick Boateng MD  Referring Physician:  Juvenal Chand MD    TIME STROKE TEAM CALLED: 13:30 CST     TIME PATIENT SEEN: 13:40 CST    Handedness: Right  Race: White    Chief Complaint/Reason for Consultation: Difficulty walking    HPI: Pt is a 46-year-old right-handed white female with known diagnosis of Left MCA stroke in December with right side deficits and expressive aphasia, bipolar disorder, smoking, migraines, seizures, and meth use who resides at [facility] presented stating at 8:45 this morning she became unable to walk. Upon exam her speech has improved some since seeing her in the clinic. Right strengths are 4/5 with no drift. Denies numbness, and visual field is intact.     Last Known Normal Date/Time: 8:45     Review of Systems   HENT: Negative.    Eyes: Negative.    Respiratory: Negative.    Cardiovascular: Negative.    Gastrointestinal: Negative.    Genitourinary: Negative.    Musculoskeletal: Negative.    Skin: Negative.    Neurological: Positive for speech difficulty and weakness.   Psychiatric/Behavioral: Negative.         Temp:  [97 °F (36.1 °C)] 97 °F (36.1 °C)  Heart Rate:  [86] 86  Resp:  [20] 20  BP: (147)/(88) 147/88    Neurological Exam  Mental Status  Awake, alert and oriented to person, place and time.Alert. Mild dysarthria present. Expressive aphasia present.    Cranial Nerves  CN II: Visual acuity is normal. Visual fields full to confrontation.  CN III, IV, VI: Extraocular movements intact bilaterally. Normal lids and orbits bilaterally. Pupils equal round and reactive to light bilaterally.  CN V: Facial sensation is normal.  CN VII: Full and symmetric facial movement.  CN IX, X: Palate elevates symmetrically. Normal gag reflex.  CN XI: Shoulder shrug strength is normal.  CN XII: Tongue midline without atrophy  or fasciculations.    Motor   Strength is 5/5 in all four extremities except as noted.  Right strengths 4/5.    Sensory  Sensation is intact to light touch, pinprick, vibration and proprioception in all four extremities.    Reflexes  Not assessed.    Coordination    Finger-to-nose, rapid alternating movements and heel-to-shin normal bilaterally without dysmetria.    Gait    Not assessed.      Physical Exam  Vitals and nursing note reviewed.   HENT:      Head: Normocephalic and atraumatic.   Eyes:      General: Lids are normal.      Extraocular Movements: Extraocular movements intact.      Pupils: Pupils are equal, round, and reactive to light.   Cardiovascular:      Rate and Rhythm: Normal rate.   Pulmonary:      Effort: Pulmonary effort is normal.   Musculoskeletal:         General: Normal range of motion.      Cervical back: Normal range of motion.   Skin:     General: Skin is warm and dry.   Neurological:      Mental Status: She is alert.      Cranial Nerves: Dysarthria present.      Motor: Weakness present.      Coordination: Coordination is intact.   Psychiatric:         Mood and Affect: Mood normal.         Acute Stroke Data    Alteplase (tPA) Inclusion / Exclusion Criteria    Time: 14:25 CST  Person Administering Scale: ALPHONSE Martinez    Inclusion Criteria  [x]   18 years of age or greater   []   Onset of symptoms < 4.5 hours before beginning treatment (stroke onset = time patient was last seen well or without symptoms).   []   Diagnosis of acute ischemic stroke causing measurable disabling deficit (Complete Hemianopia, Any Aphasia, Visual or Sensory Extinction, Any weakness limiting sustained effort against gravity)   []   Any remaining deficit considered potentially disabling in view of patient and practitioner   Exclusion criteria (Do not proceed with Alteplase if any are checked under exclusion criteria)  [x]   Onset unknown or GREATER than 4.5 hours   []   ICH on CT/MRI   []   CT demonstrates  hypodensity representing acute or subacute infarct   []   Significant head trauma or prior stroke in the previous 3 months   []   Symptoms suggestive of subarachnoid hemorrhage   []   History of un-ruptured intracranial aneurysm GREATER than 10 mm   []   Recent intracranial or intraspinal surgery within the last 3 months   []   Arterial puncture at a non-compressible site in the previous 7 days   []   Active internal bleeding   []   Acute bleeding tendency   []   Platelet count LESS than 100,000 for known hematological diseases such as leukemia, thrombocytopenia or chronic cirrhosis   []   Current use of anticoagulant with INR GREATER than 1.7 or PT GREATER than 15 seconds, aPTT GREATER than 40 seconds   []   Heparin received within 48 hours, resulting in abnormally elevated aPTT GREATER than upper limit of normal   []   Current use of direct thrombin inhibitors or direct factor Xa inhibitors in the past 48 hours   []   Elevated blood pressure refractory to treatment (systolic GREATER than 185 mm/Hg or diastolic  GREATER than 110 mm/Hg   []   Suspected infective endocarditis and aortic arch dissection   []   Current use of therapeutic treatment dose of low-molecular-weight heparin (LMWH) within the previous 24 hours   []   Structural GI malignancy or bleed   Relative exclusion for all patients  []   Only minor non-disabling symptoms   []   Pregnancy   []   Seizure at onset with postictal residual neurological impairments   []   Major surgery or previous trauma within past 14 days   []   History of previous spontaneous ICH, intracranial neoplasm, or AV malformation   []   Postpartum (within previous 14 days)   []   Recent GI or urinary tract hemorrhage (within previous 21 days)   []   Recent acute MI (within previous 3 months)   []   History of un-ruptured intracranial aneurysm LESS than 10 mm   []   History of ruptured intracranial aneurysm   []   Blood glucose LESS than 50 mg/dL (2.7 mmol/L)   []   Dural puncture  within the last 7 days   []   Known GREATER than 10 cerebral microbleeds   Additional exclusions for patients with symptoms onset between 3 and 4.5 hours.  []   Age > 80.   []   On any anticoagulants regardless of INR  >>> Warfarin (Coumadin), Heparin, Enoxaparin (Lovenox), fondaparinux (Arixtra), bivalirudin (Angiomax), Argatroban, dabigatran (Pradaxa), rivaroxaban (Xarelto), or apixaban (Eliquis)   []   Severe stroke (NIHSS > 25).   []   History of BOTH diabetes and previous ischemic stroke.   []   The risks and benefits have been discussed with the patient or family related to the administration of IV Alteplase for stroke symptoms.   []   I have discussed and reviewed the patient's case and imaging with the attending prior to IV Alteplase.   N/A Time Alteplase administered       Past Medical History:   Diagnosis Date   • Abdominal pain     Chronic RLQ, RUQ, R flank comes and goes.    • Abdominal pain, right lower quadrant    • Acute bilateral otitis media    • Allergic rhinitis    • Backache     Upper back.   • Candidiasis of skin    • Cellulitis of neck    • Chest pain    • Contraception    • Cough    • Dysfunction of eustachian tube    • Dyspareunia, female    • Excessive or frequent menstruation    • Flank pain    • Generalized aches and pains    • Headache    • Health maintenance examination    • Infection of skin and subcutaneous tissue    • Irregular periods    • Kidney stone     Poss   • Menorrhagia    • Nausea vomiting and diarrhea    • Obesity    • Open wound of abdominal wall    • Otalgia    • Pain in left foot    • Pain in unspecified hand    • Removed Impacted cerumen 06/05/2012   • Rhinitis    • Seizure (HCC) 8/15/2019   • Shoulder pain     right-sided-likely muscle strain      • Shoulder tendinitis    • Spider bite wound    • Staphylococcal infection of skin    • Swollen feet    • Tinea cruris    • Tobacco dependence syndrome    • Upper respiratory infection    • Urinary tract infectious disease     • Vertigo    • Visit for gynecologic examination    • Vulvovaginitis      Past Surgical History:   Procedure Laterality Date   •  SECTION     • DILATATION AND CURETTAGE      Secondary to incomplete spontaneous    • INCISION AND DRAINAGE ABSCESS  2012   • INJECTION OF MEDICATION  2015    Phenergan (1)     • INJECTION OF MEDICATION  10/10/2013    Toradol (2)      • INJECTION OF MEDICATION  2014    Zofran (1)        Family History   Problem Relation Age of Onset   • Depression Other    • Cancer Other         Colorectal Cancer   • Endometrial cancer Other    • Lung cancer Other    • Endometrial cancer Sister    • Breast cancer Maternal Grandmother      Social History     Socioeconomic History   • Marital status: Single   Tobacco Use   • Smoking status: Current Every Day Smoker     Packs/day: 0.50     Types: Cigarettes   • Smokeless tobacco: Never Used   Substance and Sexual Activity   • Alcohol use: No   • Drug use: No   • Sexual activity: Defer     Allergies   Allergen Reactions   • Abilify [Aripiprazole] Nausea Only   • Buspirone Rash   • Penicillins Hives and Nausea And Vomiting     Prior to Admission medications    Medication Sig Start Date End Date Taking? Authorizing Provider   aspirin 325 MG tablet Take 325 mg by mouth Daily.    Radha Zaragoza MD   atorvastatin (LIPITOR) 80 MG tablet Take 1 tablet by mouth Every Night. 21   Juan Conroy MD   busPIRone (BUSPAR) 5 MG tablet Take 5 mg by mouth 3 (Three) Times a Day. 1/10/22   Radha Zaragoza MD   escitalopram (LEXAPRO) 10 MG tablet Take 10 mg by mouth Daily.    Radha Zaragoza MD   furosemide (LASIX) 20 MG tablet Take 20 mg by mouth Daily.    Radha Zaragoza MD   levETIRAcetam (KEPPRA) 500 MG tablet Take 1 tablet by mouth Every 12 (Twelve) Hours for 30 days. 21  Buzz Vasquez APRN   LOSARTAN POTASSIUM PO Take 50 mg by mouth.    Radha Zaragoza MD   pantoprazole (Protonix)  40 MG EC tablet Take 1 tablet by mouth Daily. 21   Buzz Vasquez APRN   potassium chloride (MICRO-K) 10 MEQ CR capsule Take 10 mEq by mouth Daily.    Provider, MD Radha   traZODone (DESYREL) 50 MG tablet Take 50 mg by mouth Every Night.    Provider, MD Radha   valproic acid (DEPAKENE) 250 MG capsule Take 2 capsules by mouth Every 8 (Eight) Hours for 30 days. 21  Buzz Vasquez APRN       [facility] Meds:  Scheduled-    Infusions-     PRNs- •  sodium chloride    Functional Status Prior to Current Stroke/Rolly Score: 4    NIH Stroke Scale  Time: 14:25 CST  Person Administering Scale: ALPHONSE Martinez    1a  Level of consciousness: 0=alert; keenly responsive   1b. LOC questions:  0=Performs both tasks correctly   1c. LOC commands: 0=Performs both tasks correctly   2.  Best Gaze: 0=normal   3.  Visual: 0=No visual loss   4. Facial Palsy: 0=Normal symmetric movement   5a.  Motor left arm: 0=No drift, limb holds 90 (or 45) degrees for full 10 seconds   5b.  Motor right arm: 0=No drift, limb holds 90 (or 45) degrees for full 10 seconds   6a. motor left le=No drift, limb holds 90 (or 45) degrees for full 10 seconds   6b  Motor right le=No drift, limb holds 90 (or 45) degrees for full 10 seconds   7. Limb Ataxia: 0=Absent   8.  Sensory: 0=Normal; no sensory loss   9. Best Language:  1=Mild to moderate aphasia; some obvious loss of fluency or facility of comprehension without significant limitation on ideas expressed or form of expression.   10. Dysarthria: 1=Mild to moderate, patient slurs at least some words and at worst, can be understood with some difficulty   11. Extinction and Inattention: 0=No abnormality    Total:   2       Results Reviewed:  I have personally reviewed current lab, radiology, and data and agree with results.  Lab Results (last 24 hours)     Procedure Component Value Units Date/Time    Valproic Acid Level, Total [[id number]]  (Abnormal) Collected: 03/10/22  1300    Specimen: Blood from Hand, Right Updated: 03/10/22 1410     Valproic Acid <2.8 mcg/mL     Comprehensive Metabolic Panel [[id number]]  (Abnormal) Collected: 03/10/22 1300    Specimen: Blood from Hand, Right Updated: 03/10/22 1401     Glucose 119 mg/dL      BUN 15 mg/dL      Creatinine 1.23 mg/dL      Sodium 139 mmol/L      Potassium 4.4 mmol/L      Chloride 103 mmol/L      CO2 22.0 mmol/L      Calcium 9.9 mg/dL      Total Protein 7.5 g/dL      Albumin 4.80 g/dL      ALT (SGPT) 24 U/L      AST (SGOT) 24 U/L      Alkaline Phosphatase 109 U/L      Total Bilirubin 0.2 mg/dL      Globulin 2.7 gm/dL      A/G Ratio 1.8 g/dL      BUN/Creatinine Ratio 12.2     Anion Gap 14.0 mmol/L      eGFR 55.0 mL/min/1.73      Comment: National Kidney Foundation and American Society of Nephrology (ASN) Task Force recommended calculation based on the Chronic Kidney Disease Epidemiology Collaboration (CKD-EPI) equation refit without adjustment for race.       Narrative:      GFR Normal >60  Chronic Kidney Disease <60  Kidney Failure <15      Urinalysis, Microscopic Only - Urine, Clean Catch [[id number]]  (Abnormal) Collected: 03/10/22 1302    Specimen: Urine, Clean Catch Updated: 03/10/22 1333     RBC, UA 0-2 /HPF      WBC, UA 31-50 /HPF      Bacteria, UA 1+ /HPF      Squamous Epithelial Cells, UA 0-2 /HPF      Hyaline Casts, UA None Seen /LPF      Methodology Automated Microscopy    Urinalysis With Microscopic If Indicated (No Culture) - Urine, Clean Catch [[id number]]  (Abnormal) Collected: 03/10/22 1302    Specimen: Urine, Clean Catch Updated: 03/10/22 1328     Color, UA Yellow     Appearance, UA Clear     pH, UA 6.5     Specific Gravity, UA 1.008     Glucose, UA Negative     Ketones, UA Negative     Bilirubin, UA Negative     Blood, UA Negative     Protein, UA Negative     Leuk Esterase, UA Moderate (2+)     Nitrite, UA Negative     Urobilinogen, UA 0.2 E.U./dL    Extra Tubes [[id number]] Collected: 03/10/22 1325    Specimen: Blood,  Venous Line Updated: 03/10/22 1325    Narrative:      The following orders were created for panel order Extra Tubes.  Procedure                               Abnormality         Status                     ---------                               -----------         ------                     Gold Top - SST[[id number]]                                   In process                 Light Blue Top[[id number]]                                   In process                   Please view results for these tests on the individual orders.    Gold Top - SST [[id number]] Collected: 03/10/22 1325    Specimen: Blood Updated: 03/10/22 1325    Light Blue Top [[id number]] Collected: 03/10/22 1325    Specimen: Blood Updated: 03/10/22 1325    CBC & Differential [[id number]]  (Normal) Collected: 03/10/22 1300    Specimen: Blood from Hand, Right Updated: 03/10/22 1325    Narrative:      The following orders were created for panel order CBC & Differential.  Procedure                               Abnormality         Status                     ---------                               -----------         ------                     CBC Auto Differential[[id number]]        Normal              Final result                 Please view results for these tests on the individual orders.    CBC Auto Differential [[id number]]  (Normal) Collected: 03/10/22 1300    Specimen: Blood from Hand, Right Updated: 03/10/22 1325     WBC 6.28 10*3/mm3      RBC 4.68 10*6/mm3      Hemoglobin 14.3 g/dL      Hematocrit 42.0 %      MCV 89.7 fL      MCH 30.6 pg      MCHC 34.0 g/dL      RDW 12.6 %      RDW-SD 41.3 fl      MPV 11.7 fL      Platelets 322 10*3/mm3      Neutrophil % 56.2 %      Lymphocyte % 33.3 %      Monocyte % 6.1 %      Eosinophil % 3.5 %      Basophil % 0.6 %      Immature Grans % 0.3 %      Neutrophils, Absolute 3.53 10*3/mm3      Lymphocytes, Absolute 2.09 10*3/mm3      Monocytes, Absolute 0.38 10*3/mm3      Eosinophils, Absolute 0.22 10*3/mm3       Basophils, Absolute 0.04 10*3/mm3      Immature Grans, Absolute 0.02 10*3/mm3      nRBC 0.0 /100 WBC         Imaging Results (Last 24 Hours)     Procedure Component Value Units Date/Time    CT Head Without Contrast Stroke Protocol [[id number]] Collected: 03/10/22 1359     Updated: 03/10/22 1423    Narrative:      COMPARISON:     3/5/2022, 3/4/2022    INDICATION: Stroke follow-up    TECHNIQUE:  CT of the head was performed from the vertex thru the  skull base.  Reformats are obtained.    All CT scans at this location are performed using dose modulation  techniques as appropriate to a performed exam including the  following: automated exposure control; adjustment of the mA  and/or kV according to patient size (this includes techniques or  standardized protocols for targeted exams where dose is matched  to indication / reason for exam; i.e. extremities or head); use  of iterative reconstruction technique.    FINDINGS:     The ventricles sulci and CSF spaces are normal size  configuration.  There is encephalomalacia within the left MCA  distribution. There is no mass, mass effect, or midline shift.   There is no abnormal intra-axial or extra-axial fluid collection  or hematoma.  There is preservation of normal gray-white  differentiation.  The bony calvarium is intact.  Partial  opacification of left posterior ethmoid air cells. Remaining  paranasal sinuses and mastoid air cells are well-aerated.      Impression:      Left MCA distribution encephalomalacia. There is  decreased surrounding edema with resolution of previously noted  shift.    Electronically signed by:  Cameron Mcmahon MD  3/10/2022 2:21 PM CST  Workstation: [id number]    XR Chest 1 View [[id number]] Collected: 03/10/22 0130     Updated: 03/10/22 1419    Narrative:      Chest x-ray single view.         CLINICAL INDICATION: Shortness of breath. Stroke protocol    COMPARISON: Chest March 4, 2022    FINDINGS: Cardiac silhouette is normal in size. Pulmonary  vascularity  is unremarkable.     No focal infiltrate or consolidation.  No pleural effusion.  No  pneumothorax.  Increased markings right lung base similar to prior examination  probably related to poor inspiratory effort.        Impression:      No evidence of active disease. No change since prior  exam.       Electronically signed by:  John Galeano MD  3/10/2022 2:17 PM CST  Workstation: [id number]        Results for orders placed during the hospital encounter of 12/15/21    Adult Transthoracic Echo Complete W/ Cont if Necessary Per Protocol    Interpretation Summary  · Left ventricular ejection fraction appears to be 56 - 60%.  · Left ventricular diastolic function was normal.  · Left ventricular wall thickness is consistent with borderline concentric hypertrophy.  · Saline test results are negative.  · Estimated right ventricular systolic pressure from tricuspid regurgitation is normal (<35 mmHg).      Assessment/Plan: Pt is a 46-year-old right-handed white female with known diagnosis of Left MCA stroke in December with right side deficits and expressive aphasia, bipolar disorder, smoking, migraines, seizures, and meth use  who resides at [facility] presented stating at 8:45 this morning she became unable to walk. Upon exam her speech has improved some since seeing her in the clinic. Right strengths are 4/5 with no drift. Denies numbness, and visual field is intact.   1. Gait difficulty- CT shows no acute changes and decreased surrounding edema with resolution of previously noted shift. MRI is neg for acute stroke. No other stroke work up is needed.  2. Urinalysis shows UTI. IV antibiotics have been started by the ER physician.  3. Valproic level is <2.8, will give Depakote 1500 mg IV and continue home dose.    4. Seizures- Pt stated having a seizure last week that lasted about 5 minutes. Instructed on the importance of med compliance to reduce seizures.   5. Smoker- Instructed on the importance of cessation  to reduce stroke risk. Pt stated she is trying to quit.  Case was discussed with pt, Dr. Squires, ER physician, and nursing. Thank you for the consult.              ALPHONSE Martinez  March 10, 2022  14:25 CST            I spent 60 minutes caring for Myles  on this date of service. This time includes time spent by me in the following activities: reviewing tests, obtaining and/or reviewing a separately obtained history, performing a medically appropriate examination and/or evaluation, counseling and educating the patient/family/caregiver, ordering medications, tests, or procedures, referring and communicating with other health care professionals, documenting information in the medical record, independently interpreting results and communicating that information with the patient/family/caregiver and care coordination

## 2022-03-10 NOTE — ED PROVIDER NOTES
Subjective   Patient presents to the emergency department with complaint of right lower extremity weakness since about 8:45 AM central time.  Patient states she has been unable to ambulate since that time.  Patient also with speech difficulties right upper extremity symptoms low per the mother at the bedside the speech difficulties and right upper extremity weakness have been present since her stroke in December.  Patient appears to be on aspirin at this time and no other blood thinners.  Patient appears to be outside the TPA window and is in eligible with her recent stroke in December.          Review of Systems   Constitutional: Negative for appetite change, chills and fever.   HENT: Negative.  Negative for congestion.    Eyes: Negative.  Negative for photophobia and visual disturbance.   Respiratory: Negative.  Negative for cough, chest tightness and shortness of breath.    Cardiovascular: Negative.  Negative for chest pain and palpitations.   Gastrointestinal: Negative.  Negative for abdominal pain, constipation, diarrhea, nausea and vomiting.   Endocrine: Negative.    Genitourinary: Negative.  Negative for decreased urine volume, dysuria, flank pain and hematuria.   Musculoskeletal: Positive for gait problem. Negative for arthralgias, back pain, myalgias, neck pain and neck stiffness.   Skin: Negative.  Negative for pallor.   Neurological: Positive for weakness. Negative for dizziness, syncope, light-headedness, numbness and headaches.   Psychiatric/Behavioral: Negative.  Negative for confusion and suicidal ideas. The patient is not nervous/anxious.    All other systems reviewed and are negative.      Past Medical History:   Diagnosis Date   • Abdominal pain     Chronic RLQ, RUQ, R flank comes and goes.    • Abdominal pain, right lower quadrant    • Acute bilateral otitis media    • Allergic rhinitis    • Backache     Upper back.   • Candidiasis of skin    • Cellulitis of neck    • Chest pain    • Contraception     • Cough    • Dysfunction of eustachian tube    • Dyspareunia, female    • Excessive or frequent menstruation    • Flank pain    • Generalized aches and pains    • Headache    • Health maintenance examination    • Infection of skin and subcutaneous tissue    • Irregular periods    • Kidney stone     Poss   • Menorrhagia    • Nausea vomiting and diarrhea    • Obesity    • Open wound of abdominal wall    • Otalgia    • Pain in left foot    • Pain in unspecified hand    • Removed Impacted cerumen 2012   • Rhinitis    • Seizure (HCC) 8/15/2019   • Shoulder pain     right-sided-likely muscle strain      • Shoulder tendinitis    • Spider bite wound    • Staphylococcal infection of skin    • Swollen feet    • Tinea cruris    • Tobacco dependence syndrome    • Upper respiratory infection    • Urinary tract infectious disease    • Vertigo    • Visit for gynecologic examination    • Vulvovaginitis        Allergies   Allergen Reactions   • Abilify [Aripiprazole] Nausea Only   • Buspirone Rash   • Penicillins Hives and Nausea And Vomiting       Past Surgical History:   Procedure Laterality Date   •  SECTION     • DILATATION AND CURETTAGE      Secondary to incomplete spontaneous    • INCISION AND DRAINAGE ABSCESS  2012   • INJECTION OF MEDICATION  2015    Phenergan (1)     • INJECTION OF MEDICATION  10/10/2013    Toradol (2)      • INJECTION OF MEDICATION  2014    Zofran (1)          Family History   Problem Relation Age of Onset   • Depression Other    • Cancer Other         Colorectal Cancer   • Endometrial cancer Other    • Lung cancer Other    • Endometrial cancer Sister    • Breast cancer Maternal Grandmother        Social History     Socioeconomic History   • Marital status: Single   Tobacco Use   • Smoking status: Current Every Day Smoker     Packs/day: 0.50     Types: Cigarettes   • Smokeless tobacco: Never Used   Substance and Sexual Activity   • Alcohol use: No   • Drug  use: No   • Sexual activity: Defer           Objective   Physical Exam  Vitals and nursing note reviewed.   Constitutional:       General: She is not in acute distress.     Appearance: Normal appearance. She is well-developed. She is not ill-appearing or diaphoretic.   HENT:      Head: Normocephalic and atraumatic.      Nose: Nose normal.      Mouth/Throat:      Mouth: Mucous membranes are moist.   Eyes:      General: No scleral icterus.     Extraocular Movements: Extraocular movements intact.      Conjunctiva/sclera: Conjunctivae normal.   Neck:      Vascular: No JVD.   Cardiovascular:      Rate and Rhythm: Normal rate and regular rhythm.      Heart sounds: Normal heart sounds. No murmur heard.    No friction rub. No gallop.   Pulmonary:      Effort: Pulmonary effort is normal. No respiratory distress.      Breath sounds: No wheezing or rales.   Chest:      Chest wall: No tenderness.   Abdominal:      General: There is no distension.      Palpations: Abdomen is soft. There is no mass.      Tenderness: There is no abdominal tenderness. There is no guarding or rebound.   Musculoskeletal:         General: No tenderness or deformity. Normal range of motion.      Cervical back: Normal range of motion and neck supple.   Lymphadenopathy:      Cervical: No cervical adenopathy.   Skin:     General: Skin is warm and dry.      Capillary Refill: Capillary refill takes less than 2 seconds.      Coloration: Skin is not pale.      Findings: No erythema or rash.   Neurological:      General: No focal deficit present.      Mental Status: She is confused.      GCS: GCS eye subscore is 4. GCS verbal subscore is 4. GCS motor subscore is 6.      Cranial Nerves: No cranial nerve deficit.      Motor: No abnormal muscle tone.      Comments: Patient with foot drop on the right, though able to hold the leg at the hip for 10 seconds.  Patient with decreased  strength on the upper extremity with some drift, again this appears old per  discussion with the mother.  Patient with a mild to moderate word salad type verbal response to questioning.   Psychiatric:         Behavior: Behavior normal.         Thought Content: Thought content normal.         Judgment: Judgment normal.         Procedures           ED Course  ED Course as of 03/10/22 1602   Thu Mar 10, 2022   1347 Case discusssed with Dr. Wilks and Michael Hagan for evaluation.   [PC]      ED Course User Index  [PC] Vinay Hanna MD                                         Labs Reviewed   COMPREHENSIVE METABOLIC PANEL - Abnormal; Notable for the following components:       Result Value    Glucose 119 (*)     Creatinine 1.23 (*)     eGFR 55.0 (*)     All other components within normal limits    Narrative:     GFR Normal >60  Chronic Kidney Disease <60  Kidney Failure <15     URINALYSIS W/ MICROSCOPIC IF INDICATED (NO CULTURE) - Abnormal; Notable for the following components:    Leuk Esterase, UA Moderate (2+) (*)     All other components within normal limits   URINALYSIS, MICROSCOPIC ONLY - Abnormal; Notable for the following components:    RBC, UA 0-2 (*)     WBC, UA 31-50 (*)     Bacteria, UA 1+ (*)     All other components within normal limits   VALPROIC ACID LEVEL, TOTAL - Abnormal; Notable for the following components:    Valproic Acid <2.8 (*)     All other components within normal limits   CBC WITH AUTO DIFFERENTIAL - Normal   PROTIME-INR   RAINBOW DRAW    Narrative:     The following orders were created for panel order [facility] Draw.  Procedure                               Abnormality         Status                     ---------                               -----------         ------                     Green Top (Gel)[[id number]]                                                             Lavender Top[[id number]]                                                                Gold Top - SST[[id number]]                                                              Light Blue  Top[[id number]]                                                                Please view results for these tests on the individual orders.   URINE DRUG SCREEN   POCT GLUCOSE FINGERSTICK   TYPE AND SCREEN   CBC AND DIFFERENTIAL    Narrative:     The following orders were created for panel order CBC & Differential.  Procedure                               Abnormality         Status                     ---------                               -----------         ------                     CBC Auto Differential[[id number]]        Normal              Final result                 Please view results for these tests on the individual orders.   EXTRA TUBES    Narrative:     The following orders were created for panel order Extra Tubes.  Procedure                               Abnormality         Status                     ---------                               -----------         ------                     Gold Top - SST[[id number]]                                   Final result               Light Blue Top[[id number]]                                   Final result                 Please view results for these tests on the individual orders.   GOLD TOP - SST   LIGHT BLUE TOP   GREEN TOP   LAVENDER TOP   GOLD TOP - SST   LIGHT BLUE TOP       MRI Brain Without Contrast   Final Result   Old left middle cerebral artery distribution infarct.   No acute abnormality appreciated.      Electronically signed by:  Ernesto Jacobs MD  3/10/2022 3:57 PM   CST Workstation: [id number]      CT Head Without Contrast Stroke Protocol   Final Result   Left MCA distribution encephalomalacia. There is   decreased surrounding edema with resolution of previously noted   shift.      Electronically signed by:  Cameron Mcmahon MD  3/10/2022 2:21 PM CST   Workstation: [id number]      XR Chest 1 View   Final Result   No evidence of active disease. No change since prior   exam.          Electronically signed by:  John Galeano MD  3/10/2022 2:17 PM CST   Workstation:  [id number]                    [facility]    Final diagnoses:   Weakness   Gait disturbance   Urinary tract infection without hematuria, site unspecified   Stroke-like symptoms       ED Disposition  ED Disposition     ED Disposition   Decision to Admit    Condition   --    Comment   Level of Care: Telemetry [5]   Diagnosis: Weakness [[id number]]   Admitting Physician: TANIA COOK [[id number]]   Attending Physician: TANIA COOK [[id number]]               No follow-up provider specified.       Medication List      No changes were made to your prescriptions during this visit.          Vinay Hanna MD  03/10/22 1170

## 2022-03-10 NOTE — H&P
[facility] Medicine Admission      Date of Admission: 3/10/2022      Primary Care Physician: Nick Boateng MD      Chief Complaint: weakness     HPI:    This is a 46-year-old female with concurrent medical history of hypertension, hyperlipidemia, CVA presenting with expressive aphasia and unable to ambulate today at [place].  She denies any numbness tingling.  She has had previous left-sided MCA.    Past Medical History:  has a past medical history of Abdominal pain, Abdominal pain, right lower quadrant, Acute bilateral otitis media, Allergic rhinitis, Backache, Candidiasis of skin, Cellulitis of neck, Chest pain, Contraception, Cough, Dysfunction of eustachian tube, Dyspareunia, female, Excessive or frequent menstruation, Flank pain, Generalized aches and pains, Headache, Health maintenance examination, Infection of skin and subcutaneous tissue, Irregular periods, Kidney stone, Menorrhagia, Nausea vomiting and diarrhea, Obesity, Open wound of abdominal wall, Otalgia, Pain in left foot, Pain in unspecified hand, Removed Impacted cerumen (2012), Rhinitis, Seizure (HCC) (8/15/2019), Shoulder pain, Shoulder tendinitis, Spider bite wound, Staphylococcal infection of skin, Swollen feet, Tinea cruris, Tobacco dependence syndrome, Upper respiratory infection, Urinary tract infectious disease, Vertigo, Visit for gynecologic examination, and Vulvovaginitis.    Past Surgical History:  has a past surgical history that includes  section; Dilation and curettage of uterus (); Incision and Drainage Abscess (2012); Injection of Medication (2015); Injection of Medication (10/10/2013); and Injection of Medication (2014).    Family History: family history includes Breast cancer in her maternal grandmother; Cancer in an other family member; Depression in an other family member; Endometrial cancer in her sister and another family member; Lung  cancer in an other family member.    Social History:  reports that she has been smoking cigarettes. She has been smoking about 0.50 packs per day. She has never used smokeless tobacco. She reports that she does not drink alcohol and does not use drugs.    Allergies:   Allergies   Allergen Reactions   • Abilify [Aripiprazole] Nausea Only   • Buspirone Rash   • Penicillins Hives and Nausea And Vomiting       Medications: Scheduled Meds:   Continuous Infusions:   PRN Meds:.•  sodium chloride  No current facility-administered medications on file prior to encounter.     Current Outpatient Medications on File Prior to Encounter   Medication Sig Dispense Refill   • aspirin 325 MG tablet Take 325 mg by mouth Daily.     • atorvastatin (LIPITOR) 80 MG tablet Take 1 tablet by mouth Every Night.     • busPIRone (BUSPAR) 5 MG tablet Take 5 mg by mouth 3 (Three) Times a Day.     • escitalopram (LEXAPRO) 10 MG tablet Take 10 mg by mouth Daily.     • furosemide (LASIX) 20 MG tablet Take 20 mg by mouth Daily.     • levETIRAcetam (KEPPRA) 500 MG tablet Take 1 tablet by mouth Every 12 (Twelve) Hours for 30 days. 60 tablet 0   • LOSARTAN POTASSIUM PO Take 50 mg by mouth.     • pantoprazole (Protonix) 40 MG EC tablet Take 1 tablet by mouth Daily. 30 tablet 0   • potassium chloride (MICRO-K) 10 MEQ CR capsule Take 10 mEq by mouth Daily.     • traZODone (DESYREL) 50 MG tablet Take 50 mg by mouth Every Night.     • valproic acid (DEPAKENE) 250 MG capsule Take 2 capsules by mouth Every 8 (Eight) Hours for 30 days. 180 capsule 0       Review of Systems:  Review of Systems   Respiratory: Negative for shortness of breath.    Cardiovascular: Negative for chest pain.      Otherwise complete ROS is negative except as mentioned above.    Physical Exam:   Temp:  [97 °F (36.1 °C)] 97 °F (36.1 °C)  Heart Rate:  [64-86] 64  Resp:  [20] 20  BP: (118-147)/(67-88) 118/67  Physical Exam  Constitutional:       General: She is not in acute distress.      Appearance: She is well-developed. She is not diaphoretic.   HENT:      Head: Normocephalic and atraumatic.   Cardiovascular:      Rate and Rhythm: Normal rate.   Pulmonary:      Effort: Pulmonary effort is normal. No respiratory distress.      Breath sounds: No wheezing.   Abdominal:      General: There is no distension.      Palpations: Abdomen is soft.   Musculoskeletal:         General: Normal range of motion.   Skin:     General: Skin is warm and dry.   Neurological:      Mental Status: She is alert.      Cranial Nerves: No cranial nerve deficit.   Psychiatric:         Behavior: Behavior normal.         Thought Content: Thought content normal.         Judgment: Judgment normal.           Results Reviewed:  I have personally reviewed current lab, radiology, and data and agree with results.  Lab Results (last 24 hours)     Procedure Component Value Units Date/Time    Urine Drug Screen - Urine, Clean Catch [[id number]]  (Normal) Collected: 03/10/22 1302    Specimen: Urine, Clean Catch Updated: 03/10/22 1626     THC, Screen, Urine Negative     Phencyclidine (PCP), Urine Negative     Cocaine Screen, Urine Negative     Methamphetamine, Ur Negative     Opiate Screen Negative     Amphetamine Screen, Urine Negative     Benzodiazepine Screen, Urine Negative     Tricyclic Antidepressants Screen Negative     Methadone Screen, Urine Negative     Barbiturates Screen, Urine Negative     Oxycodone Screen, Urine Negative     Propoxyphene Screen Negative     Buprenorphine, Screen, Urine Negative    Narrative:      Cutoff For Drugs Screened:    Amphetamines               500 ng/ml  Barbiturates               200 ng/ml  Benzodiazepines            150 ng/ml  Cocaine                    150 ng/ml  Methadone                  200 ng/ml  Opiates                    100 ng/ml  Phencyclidine               25 ng/ml  THC                            50 ng/ml  Methamphetamine            500 ng/ml  Tricyclic Antidepressants  300 ng/ml  Oxycodone                   100 ng/ml  Propoxyphene               300 ng/ml  Buprenorphine               10 ng/ml    The normal value for all drugs tested is negative. This report includes unconfirmed screening results, with the cutoff values listed, to be used for medical treatment purposes only.  Unconfirmed results must not be used for non-medical purposes such as employment or legal testing.  Clinical consideration should be applied to any drug of abuse test, particularly when unconfirmed results are used.      Protime-INR [[id number]]  (Normal) Collected: 03/10/22 1325    Specimen: Blood Updated: 03/10/22 1618     Protime 12.7 Seconds      INR 0.96    Narrative:      Therapeutic range for most indications is 2.0-3.0 INR,  or 2.5-3.5 for mechanical heart valves.    Extra Tubes [[id number]] Collected: 03/10/22 1325    Specimen: Blood, Venous Line Updated: 03/10/22 1433    Narrative:      The following orders were created for panel order Extra Tubes.  Procedure                               Abnormality         Status                     ---------                               -----------         ------                     Gold Top - SST[[id number]]                                   Final result               Light Blue Top[[id number]]                                   Final result                 Please view results for these tests on the individual orders.    Gold Top - SST [[id number]] Collected: 03/10/22 1325    Specimen: Blood Updated: 03/10/22 1433     Extra Tube Hold for add-ons.     Comment: Auto resulted.       Light Blue Top [[id number]] Collected: 03/10/22 1325    Specimen: Blood Updated: 03/10/22 1433     Extra Tube hold for add-on     Comment: Auto resulted       Valproic Acid Level, Total [[id number]]  (Abnormal) Collected: 03/10/22 1300    Specimen: Blood from Hand, Right Updated: 03/10/22 1410     Valproic Acid <2.8 mcg/mL     Comprehensive Metabolic Panel [[id number]]  (Abnormal) Collected: 03/10/22 1300    Specimen:  Blood from Hand, Right Updated: 03/10/22 1401     Glucose 119 mg/dL      BUN 15 mg/dL      Creatinine 1.23 mg/dL      Sodium 139 mmol/L      Potassium 4.4 mmol/L      Chloride 103 mmol/L      CO2 22.0 mmol/L      Calcium 9.9 mg/dL      Total Protein 7.5 g/dL      Albumin 4.80 g/dL      ALT (SGPT) 24 U/L      AST (SGOT) 24 U/L      Alkaline Phosphatase 109 U/L      Total Bilirubin 0.2 mg/dL      Globulin 2.7 gm/dL      A/G Ratio 1.8 g/dL      BUN/Creatinine Ratio 12.2     Anion Gap 14.0 mmol/L      eGFR 55.0 mL/min/1.73      Comment: National Kidney Foundation and American Society of Nephrology (ASN) Task Force recommended calculation based on the Chronic Kidney Disease Epidemiology Collaboration (CKD-EPI) equation refit without adjustment for race.       Narrative:      GFR Normal >60  Chronic Kidney Disease <60  Kidney Failure <15      Urinalysis, Microscopic Only - Urine, Clean Catch [[id number]]  (Abnormal) Collected: 03/10/22 1302    Specimen: Urine, Clean Catch Updated: 03/10/22 1333     RBC, UA 0-2 /HPF      WBC, UA 31-50 /HPF      Bacteria, UA 1+ /HPF      Squamous Epithelial Cells, UA 0-2 /HPF      Hyaline Casts, UA None Seen /LPF      Methodology Automated Microscopy    Urinalysis With Microscopic If Indicated (No Culture) - Urine, Clean Catch [[id number]]  (Abnormal) Collected: 03/10/22 1302    Specimen: Urine, Clean Catch Updated: 03/10/22 1328     Color, UA Yellow     Appearance, UA Clear     pH, UA 6.5     Specific Gravity, UA 1.008     Glucose, UA Negative     Ketones, UA Negative     Bilirubin, UA Negative     Blood, UA Negative     Protein, UA Negative     Leuk Esterase, UA Moderate (2+)     Nitrite, UA Negative     Urobilinogen, UA 0.2 E.U./dL    CBC & Differential [[id number]]  (Normal) Collected: 03/10/22 1300    Specimen: Blood from Hand, Right Updated: 03/10/22 1325    Narrative:      The following orders were created for panel order CBC & Differential.  Procedure                                Abnormality         Status                     ---------                               -----------         ------                     CBC Auto Differential[[id number]]        Normal              Final result                 Please view results for these tests on the individual orders.    CBC Auto Differential [[id number]]  (Normal) Collected: 03/10/22 1300    Specimen: Blood from Hand, Right Updated: 03/10/22 1325     WBC 6.28 10*3/mm3      RBC 4.68 10*6/mm3      Hemoglobin 14.3 g/dL      Hematocrit 42.0 %      MCV 89.7 fL      MCH 30.6 pg      MCHC 34.0 g/dL      RDW 12.6 %      RDW-SD 41.3 fl      MPV 11.7 fL      Platelets 322 10*3/mm3      Neutrophil % 56.2 %      Lymphocyte % 33.3 %      Monocyte % 6.1 %      Eosinophil % 3.5 %      Basophil % 0.6 %      Immature Grans % 0.3 %      Neutrophils, Absolute 3.53 10*3/mm3      Lymphocytes, Absolute 2.09 10*3/mm3      Monocytes, Absolute 0.38 10*3/mm3      Eosinophils, Absolute 0.22 10*3/mm3      Basophils, Absolute 0.04 10*3/mm3      Immature Grans, Absolute 0.02 10*3/mm3      nRBC 0.0 /100 WBC         Imaging Results (Last 24 Hours)     Procedure Component Value Units Date/Time    MRI Brain Without Contrast [[id number]] Collected: 03/10/22 1425     Updated: 03/10/22 1559    Narrative:      MRI brain without IV contrast March 10, 2022    INDICATION: Acute onset neurologic deficit    Pulse sequences: Sagittal, coronal and multisequence axial  imaging without IV contrast    FINDINGS:  No mass effect, midline shift, hemorrhage, hydrocephalus or  extra-axial fluid collections.  Old left middle cerebral artery distribution perisylvian infarct  with encephalomalacia.  Mild asymmetric left-sided ventriculomegaly likely ex vacuo.  No acute ischemia noted on diffusion-weighted images.  No other areas of abnormal signal intensity.  Visualized mastoids and sinuses grossly clear.      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  No acute abnormality  appreciated.    Electronically signed by:  Ernesto Jacobs MD  3/10/2022 3:57 PM  CST Workstation: [id number]    CT Head Without Contrast Stroke Protocol [[id number]] Collected: 03/10/22 1359     Updated: 03/10/22 1423    Narrative:      COMPARISON:     3/5/2022, 3/4/2022    INDICATION: Stroke follow-up    TECHNIQUE:  CT of the head was performed from the vertex thru the  skull base.  Reformats are obtained.    All CT scans at this location are performed using dose modulation  techniques as appropriate to a performed exam including the  following: automated exposure control; adjustment of the mA  and/or kV according to patient size (this includes techniques or  standardized protocols for targeted exams where dose is matched  to indication / reason for exam; i.e. extremities or head); use  of iterative reconstruction technique.    FINDINGS:     The ventricles sulci and CSF spaces are normal size  configuration.  There is encephalomalacia within the left MCA  distribution. There is no mass, mass effect, or midline shift.   There is no abnormal intra-axial or extra-axial fluid collection  or hematoma.  There is preservation of normal gray-white  differentiation.  The bony calvarium is intact.  Partial  opacification of left posterior ethmoid air cells. Remaining  paranasal sinuses and mastoid air cells are well-aerated.      Impression:      Left MCA distribution encephalomalacia. There is  decreased surrounding edema with resolution of previously noted  shift.    Electronically signed by:  Cameron Mcmahon MD  3/10/2022 2:21 PM CST  Workstation: [id number]    XR Chest 1 View [[id number]] Collected: 03/10/22 0130     Updated: 03/10/22 1419    Narrative:      Chest x-ray single view.         CLINICAL INDICATION: Shortness of breath. Stroke protocol    COMPARISON: Chest March 4, 2022    FINDINGS: Cardiac silhouette is normal in size. Pulmonary  vascularity is unremarkable.     No focal infiltrate or consolidation.  No pleural  effusion.  No  pneumothorax.  Increased markings right lung base similar to prior examination  probably related to poor inspiratory effort.        Impression:      No evidence of active disease. No change since prior  exam.       Electronically signed by:  John Galeano MD  3/10/2022 2:17 PM CST  Workstation: [id number]            Assessment:    Active Hospital Problems    Diagnosis    • Weakness          Possible CVA-neurology has seen the patient and patient will be kept for monitoring.  CT shows no acute changes.  MRI negative for stroke.    Hypertension-continue to monitor blood pressure and resume home medication    Seizure disorder-continue monitoring and start on Depakote    Tobacco dependence-cessation advised    DVT prophylaxis-SCD    Patient full code    I confirmed that the patient's Advance Care Plan is present, code status is documented, or surrogate decision maker is listed in the patient's medical record.           Joel Bhakta MD  03/10/22  17:18 CST

## 2022-03-11 ENCOUNTER — APPOINTMENT (OUTPATIENT)
Dept: GENERAL RADIOLOGY | Facility: HOSPITAL | Age: 47
End: 2022-03-11

## 2022-03-11 LAB
ANION GAP SERPL CALCULATED.3IONS-SCNC: 14 MMOL/L (ref 5–15)
BASOPHILS # BLD AUTO: 0.06 10*3/MM3 (ref 0–0.2)
BASOPHILS NFR BLD AUTO: 0.4 % (ref 0–1.5)
BUN SERPL-MCNC: 19 MG/DL (ref 6–20)
BUN/CREAT SERPL: 15.3 (ref 7–25)
CALCIUM SPEC-SCNC: 9.2 MG/DL (ref 8.6–10.5)
CHLORIDE SERPL-SCNC: 102 MMOL/L (ref 98–107)
CO2 SERPL-SCNC: 22 MMOL/L (ref 22–29)
CREAT SERPL-MCNC: 1.24 MG/DL (ref 0.57–1)
DEPRECATED RDW RBC AUTO: 42.5 FL (ref 37–54)
EGFRCR SERPLBLD CKD-EPI 2021: 54.5 ML/MIN/1.73
EOSINOPHIL # BLD AUTO: 0.16 10*3/MM3 (ref 0–0.4)
EOSINOPHIL NFR BLD AUTO: 1 % (ref 0.3–6.2)
ERYTHROCYTE [DISTWIDTH] IN BLOOD BY AUTOMATED COUNT: 12.7 % (ref 12.3–15.4)
GLUCOSE SERPL-MCNC: 91 MG/DL (ref 65–99)
HCT VFR BLD AUTO: 42.1 % (ref 34–46.6)
HGB BLD-MCNC: 14 G/DL (ref 12–15.9)
IMM GRANULOCYTES # BLD AUTO: 0.07 10*3/MM3 (ref 0–0.05)
IMM GRANULOCYTES NFR BLD AUTO: 0.5 % (ref 0–0.5)
LYMPHOCYTES # BLD AUTO: 1.19 10*3/MM3 (ref 0.7–3.1)
LYMPHOCYTES NFR BLD AUTO: 7.8 % (ref 19.6–45.3)
MAGNESIUM SERPL-MCNC: 2.1 MG/DL (ref 1.6–2.6)
MCH RBC QN AUTO: 30.4 PG (ref 26.6–33)
MCHC RBC AUTO-ENTMCNC: 33.3 G/DL (ref 31.5–35.7)
MCV RBC AUTO: 91.3 FL (ref 79–97)
MONOCYTES # BLD AUTO: 0.8 10*3/MM3 (ref 0.1–0.9)
MONOCYTES NFR BLD AUTO: 5.2 % (ref 5–12)
NEUTROPHILS NFR BLD AUTO: 12.98 10*3/MM3 (ref 1.7–7)
NEUTROPHILS NFR BLD AUTO: 85.1 % (ref 42.7–76)
NRBC BLD AUTO-RTO: 0 /100 WBC (ref 0–0.2)
PLATELET # BLD AUTO: 257 10*3/MM3 (ref 140–450)
PMV BLD AUTO: 11 FL (ref 6–12)
POTASSIUM SERPL-SCNC: 4.6 MMOL/L (ref 3.5–5.2)
RBC # BLD AUTO: 4.61 10*6/MM3 (ref 3.77–5.28)
SODIUM SERPL-SCNC: 138 MMOL/L (ref 136–145)
WBC NRBC COR # BLD: 15.26 10*3/MM3 (ref 3.4–10.8)

## 2022-03-11 PROCEDURE — 96375 TX/PRO/DX INJ NEW DRUG ADDON: CPT

## 2022-03-11 PROCEDURE — 96376 TX/PRO/DX INJ SAME DRUG ADON: CPT

## 2022-03-11 PROCEDURE — 80048 BASIC METABOLIC PNL TOTAL CA: CPT | Performed by: FAMILY MEDICINE

## 2022-03-11 PROCEDURE — 25010000002 ONDANSETRON PER 1 MG: Performed by: FAMILY MEDICINE

## 2022-03-11 PROCEDURE — 85025 COMPLETE CBC W/AUTO DIFF WBC: CPT | Performed by: FAMILY MEDICINE

## 2022-03-11 PROCEDURE — 73560 X-RAY EXAM OF KNEE 1 OR 2: CPT

## 2022-03-11 PROCEDURE — 97166 OT EVAL MOD COMPLEX 45 MIN: CPT

## 2022-03-11 PROCEDURE — 97162 PT EVAL MOD COMPLEX 30 MIN: CPT

## 2022-03-11 PROCEDURE — G0378 HOSPITAL OBSERVATION PER HR: HCPCS

## 2022-03-11 PROCEDURE — 83735 ASSAY OF MAGNESIUM: CPT | Performed by: FAMILY MEDICINE

## 2022-03-11 RX ADMIN — ASPIRIN 325 MG: 325 TABLET ORAL at 09:10

## 2022-03-11 RX ADMIN — ESCITALOPRAM OXALATE 10 MG: 10 TABLET ORAL at 09:10

## 2022-03-11 RX ADMIN — ONDANSETRON 4 MG: 2 INJECTION INTRAMUSCULAR; INTRAVENOUS at 03:29

## 2022-03-11 RX ADMIN — BUSPIRONE HYDROCHLORIDE 5 MG: 5 TABLET ORAL at 15:54

## 2022-03-11 RX ADMIN — LEVETIRACETAM 500 MG: 500 TABLET, FILM COATED ORAL at 09:10

## 2022-03-11 RX ADMIN — ATORVASTATIN CALCIUM 80 MG: 40 TABLET, FILM COATED ORAL at 20:38

## 2022-03-11 RX ADMIN — ONDANSETRON 4 MG: 2 INJECTION INTRAMUSCULAR; INTRAVENOUS at 09:12

## 2022-03-11 RX ADMIN — BUSPIRONE HYDROCHLORIDE 5 MG: 5 TABLET ORAL at 09:10

## 2022-03-11 RX ADMIN — LEVETIRACETAM 500 MG: 500 TABLET, FILM COATED ORAL at 20:38

## 2022-03-11 RX ADMIN — Medication 10 ML: at 20:39

## 2022-03-11 RX ADMIN — BUSPIRONE HYDROCHLORIDE 5 MG: 5 TABLET ORAL at 20:38

## 2022-03-11 RX ADMIN — PANTOPRAZOLE SODIUM 40 MG: 40 TABLET, DELAYED RELEASE ORAL at 09:10

## 2022-03-11 RX ADMIN — TRAZODONE HYDROCHLORIDE 50 MG: 50 TABLET ORAL at 20:38

## 2022-03-11 NOTE — PLAN OF CARE
Problem: Adult Inpatient Plan of Care  Goal: Plan of Care Review  Outcome: Ongoing, Progressing  Flowsheets (Taken 3/11/2022 1434)  Plan of Care Reviewed With: patient   Goal Outcome Evaluation:  Plan of Care Reviewed With: patient             cardiac ADA diet, ate 75% lunch. Labs and meds noted. Noted 3/2021 166# and #? Accuracy and BMI 33.0- no edema noted. Pt likes 2%  Milk and requested at all meals. RD following.

## 2022-03-11 NOTE — THERAPY EVALUATION
Patient Name: Myles Shannon  : 1975    MRN: [id number]                              Today's Date: 3/11/2022       Admit Date: 3/10/2022    Visit Dx:     ICD-10-CM ICD-9-CM   1. Weakness  R53.1 780.79   2. Gait disturbance  R26.9 781.2   3. Urinary tract infection without hematuria, site unspecified  N39.0 599.0   4. Stroke-like symptoms  R29.90 781.99   5. Impaired mobility and ADLs  Z74.09 V49.89    Z78.9      Patient Active Problem List   Diagnosis   • Nicotine abuse   • Other chest pain   • Acute maxillary sinusitis   • Bipolar affective disorder ([facility])   • Decreased pulses in feet   • Flank pain   • Hematochezia   • High cholesterol   • Migraine without aura and with status migrainosus, not intractable   • Migraine without status migrainosus, not intractable   • Neck pain   • Nephrolithiasis   • Neuropathy   • Chronic pain   • Primary osteoarthritis involving multiple joints   • Sciatic leg pain   • Screening for diabetes mellitus (DM)   • Seasonal allergies   • Stress   • Tobacco use disorder   • Urinary incontinence without sensory awareness   • Vitamin D deficiency   • Carpal tunnel syndrome of right wrist   • Seizure ([facility])   • Intractable vomiting   • Acute UTI (urinary tract infection)   • Cholelithiasis   • GI bleed   • Kidney stone   • Nephrolithiasis   • ELBERT (acute kidney injury) ([facility])   • Infection due to ESBL-producing Escherichia coli   • CVA (cerebral vascular accident) ([facility])   • Dysphagia due to recent stroke   • Slurred speech   • Cerebrovascular accident (CVA) ([facility])   • Aphasia due to acute cerebrovascular accident (CVA) ([facility])   • Methamphetamine abuse ([facility])   • Seizure disorder ([facility])   • UTI (urinary tract infection) due to urinary indwelling catheter ([facility])   • Urinary tract infection due to extended-spectrum beta lactamase (ESBL) producing Escherichia coli   • Urinary tract infection due to extended-spectrum beta lactamase (ESBL) producing Escherichia coli   • Metabolic acidosis,  NAG, bicarbonate losses   • Midline shift of brain due to stroke   • Nonintractable headache   • Weakness     Past Medical History:   Diagnosis Date   • Abdominal pain     Chronic RLQ, RUQ, R flank comes and goes.    • Abdominal pain, right lower quadrant    • Acute bilateral otitis media    • Allergic rhinitis    • Backache     Upper back.   • Candidiasis of skin    • Cellulitis of neck    • Chest pain    • Contraception    • Cough    • Dysfunction of eustachian tube    • Dyspareunia, female    • Excessive or frequent menstruation    • Flank pain    • Generalized aches and pains    • Headache    • Health maintenance examination    • Infection of skin and subcutaneous tissue    • Irregular periods    • Kidney stone     Poss   • Menorrhagia    • Nausea vomiting and diarrhea    • Obesity    • Open wound of abdominal wall    • Otalgia    • Pain in left foot    • Pain in unspecified hand    • Removed Impacted cerumen 2012   • Rhinitis    • Seizure ([facility]) 8/15/2019   • Shoulder pain     right-sided-likely muscle strain      • Shoulder tendinitis    • Spider bite wound    • Staphylococcal infection of skin    • Swollen feet    • Tinea cruris    • Tobacco dependence syndrome    • Upper respiratory infection    • Urinary tract infectious disease    • Vertigo    • Visit for gynecologic examination    • Vulvovaginitis      Past Surgical History:   Procedure Laterality Date   •  SECTION     • DILATATION AND CURETTAGE      Secondary to incomplete spontaneous    • INCISION AND DRAINAGE ABSCESS  2012   • INJECTION OF MEDICATION  2015    Phenergan (1)     • INJECTION OF MEDICATION  10/10/2013    Toradol (2)      • INJECTION OF MEDICATION  2014    Zofran (1)         General Information     Row Name 22 0800          OT Time and Intention    Document Type evaluation  -ME     Mode of Treatment individual therapy;occupational therapy  -ME     Row Name 22 0800          General  Information    Patient Profile Reviewed yes  -ME     Existing Precautions/Restrictions fall  -ME     Barriers to Rehab medically complex;previous functional deficit;cognitive status  -ME     Row Name 03/11/22 0800          Living Environment    People in Home facility resident  -ME     Row Name 03/11/22 0800          Home Main Entrance    Number of Stairs, Main Entrance none  -ME     Row Name 03/11/22 0800          Stairs Within Home, Primary    Stairs, Within Home, Primary homestead; states that she is able to ambulate typically with no AD; requires some assistance with bathing and dressing  -ME     Number of Stairs, Within Home, Primary none  -ME     Row Name 03/11/22 0800          Cognition    Orientation Status (Cognition) oriented to;person;place;situation  -ME     Row Name 03/11/22 0800          Safety Issues, Functional Mobility    Safety Issues Affecting Function (Mobility) safety precaution awareness;safety precautions follow-through/compliance;awareness of need for assistance;ability to follow commands;impulsivity;insight into deficits/self-awareness;judgment;problem-solving  -ME     Impairments Affecting Function (Mobility) balance;endurance/activity tolerance;strength  -ME           User Key  (r) = Recorded By, (t) = Taken By, (c) = Cosigned By    Initials Name Provider Type    ME Lauren Fleming OTR/L Occupational Therapist                 Mobility/ADL's     Row Name 03/11/22 0800          Bed Mobility    Bed Mobility supine-sit;sit-supine  -ME     Supine-Sit [place] (Bed Mobility) minimum assist (75% patient effort)  -ME     Sit-Supine [place] (Bed Mobility) minimum assist (75% patient effort)  -ME     Assistive Device (Bed Mobility) bed rails;head of bed elevated  -ME     Row Name 03/11/22 0800          Transfers    Transfers toilet transfer;sit-stand transfer  -ME     Sit-Stand [place] (Transfers) moderate assist (50% patient effort)  -ME     [place] Level (Toilet Transfer)  minimum assist (75% patient effort);moderate assist (50% patient effort)  -ME     Assistive Device (Toilet Transfer) commode, bedside without drop arms  -ME     Row Name 03/11/22 0800          Toilet Transfer    Type (Toilet Transfer) sit-stand;stand-sit  -ME     Comment, (Toilet Transfer) [facility] transfer; attempted to transfer to chair but in the middle of the transfer pt stopped talking, and knees buckled, immediately returned back to bed, notified RN and charge nurse  -ME     Row Name 03/11/22 0800          Functional Mobility    Functional Mobility- Ind. Level minimum assist (75% patient effort)  -ME     Row Name 03/11/22 0800          Activities of Daily Living    BADL Assessment/Intervention toileting;upper body dressing  -ME     Row Name 03/11/22 0800          Toileting Assessment/Training    [place] Level (Toileting) adjust/manage clothing;perform perineal hygiene;minimum assist (75% patient effort)  -ME     Assistive Devices (Toileting) commode, bedside without drop arms  -ME     Row Name 03/11/22 0800          Upper Body Dressing Assessment/Training    [place] Level (Upper Body Dressing) doff;don;minimum assist (75% patient effort)  hospital gown  -ME     Position (Upper Body Dressing) edge of bed sitting  -ME           User Key  (r) = Recorded By, (t) = Taken By, (c) = Cosigned By    Initials Name Provider Type    Lauren Orozco, OTR/L Occupational Therapist               Obj/Interventions     Row Name 03/11/22 0800          Sensory Assessment (Somatosensory)    Sensory Assessment (Somatosensory) UE sensation intact  -ME     Row Name 03/11/22 0800          Range of Motion Comprehensive    General Range of Motion other (see comments)  -ME     Comment, General Range of Motion BUE ROM WFL grossly, pt does have residual R sided weakness/deficit from previous CVA  -ME     Row Name 03/11/22 0800          Strength Comprehensive (MMT)    General Manual Muscle Testing (MMT) Assessment other (see  comments)  -ME     Comment, General Manual Muscle Testing (MMT) Assessment BUE strength and bilateral  strength are grossly 3+ to 4-/5; pt is right handed  -ME           User Key  (r) = Recorded By, (t) = Taken By, (c) = Cosigned By    Initials Name Provider Type    ME Lauren Fleming, OTR/L Occupational Therapist               Goals/Plan     Row Name 03/11/22 0800          Transfer Goal 1 (OT)    Activity/Assistive Device (Transfer Goal 1, OT) toilet  -ME     [place] Level/Cues Needed (Transfer Goal 1, OT) supervision required  -ME     Time Frame (Transfer Goal 1, OT) long term goal (LTG);by discharge  -ME     Progress/Outcome (Transfer Goal 1, OT) goal not met  -ME     Row Name 03/11/22 0800          Bathing Goal 1 (OT)    Activity/Device (Bathing Goal 1, OT) lower body bathing  -ME     [place] Level/Cues Needed (Bathing Goal 1, OT) minimum assist (75% or more patient effort)  -ME     Time Frame (Bathing Goal 1, OT) long term goal (LTG);by discharge  -ME     Progress/Outcomes (Bathing Goal 1, OT) goal not met  -ME     Row Name 03/11/22 0800          Dressing Goal 1 (OT)    Activity/Device (Dressing Goal 1, OT) lower body dressing  -ME     [place]/Cues Needed (Dressing Goal 1, OT) minimum assist (75% or more patient effort)  -ME     Time Frame (Dressing Goal 1, OT) long term goal (LTG);by discharge  -ME     Progress/Outcome (Dressing Goal 1, OT) goal not met  -ME     Row Name 03/11/22 0800          Therapy Assessment/Plan (OT)    Planned Therapy Interventions (OT) activity tolerance training;adaptive equipment training;BADL retraining;functional balance retraining;IADL retraining;occupation/activity based interventions;patient/caregiver education/training;ROM/therapeutic exercise;strengthening exercise;transfer/mobility retraining;cognitive/visual perception retraining  -ME           User Key  (r) = Recorded By, (t) = Taken By, (c) = Cosigned By    Initials Name Provider Type    Lauren Orozco,  OTR/L Occupational Therapist               Clinical Impression     Row Name 03/11/22 0800          Pain Assessment    Pretreatment Pain Rating 7/10  -ME     Posttreatment Pain Rating 7/10  -ME     Pain Location - Side/Orientation Right  -ME     Pain Location - knee;hand  -ME     Pain Intervention(s) Repositioned;Ambulation/increased activity;Distraction  -ME     Row Name 03/11/22 0800          Plan of Care Review    Plan of Care Reviewed With patient  -[place] [facility] Name 03/11/22 0800          Therapy Assessment/Plan (OT)    Rehab Potential (OT) good, to achieve stated therapy goals  -ME     Criteria for Skilled Therapeutic Interventions Met (OT) yes;meets criteria;skilled treatment is necessary  -ME     Therapy Frequency (OT) other (see comments)  3-7 days per week  -ME     Predicted Duration of Therapy Intervention (OT) until d/c or all goals met  -[place] [facility] Name 03/11/22 0800          Therapy Plan Review/Discharge Plan (OT)    Anticipated Discharge Disposition (OT[facility]     Row Name 03/11/22 0800          Vital Signs    Pre Systolic BP Rehab 106  -ME     Pre Treatment Diastolic BP 75  -ME     Post Systolic BP Rehab 96  -ME     Post Treatment Diastolic BP 67  -ME     Pretreatment Heart Rate (beats/min) 87  -ME     Posttreatment Heart Rate (beats/min) 83  -ME     Pre SpO2 (%) 98  -ME     O2 Delivery Pre Treatment room air  -ME     Post SpO2 (%) 97  -ME     O2 Delivery Post Treatment room air  -ME     Pre Patient Position Supine  -ME     Post Patient Position Supine  -ME     Row Name 03/11/22 0800          Positioning and Restraints    Pre-Treatment Position in bed  -ME     Post Treatment Position bed  -ME     In Bed notified nsg;fowlers;call light within reach;encouraged to call for assist;exit alarm on  -ME           User Key  (r) = Recorded By, (t) = Taken By, (c) = Cosigned By    Initials Name Provider Type    ME Lauren Fleming OTR/L Occupational Therapist                Outcome Measures     Row Name 03/11/22 0800          How much help from another is currently needed...    Putting on and taking off regular lower body clothing? 2  -ME     Bathing (including washing, rinsing, and drying) 2  -ME     Toileting (which includes using toilet bed pan or urinal) 2  -ME     Putting on and taking off regular upper body clothing 3  -ME     Taking care of personal grooming (such as brushing teeth) 4  -ME     Eating meals 4  -ME     AM-PAC 6 Clicks Score (OT) 17  -ME     Row Name 03/11/22 0800          Functional Assessment    Outcome Measure Options AM-PAC 6 Clicks Daily Activity (OT)  -ME           User Key  (r) = Recorded By, (t) = Taken By, (c) = Cosigned By    Initials Name Provider Type    ME Lauren Fleming OTR/L Occupational Therapist                Occupational Therapy Education                 Title: PT OT SLP Therapies (In Progress)     Topic: Occupational Therapy (In Progress)     Point: ADL training (Not Started)     Description:   Instruct learner(s) on proper safety adaptation and remediation techniques during self care or transfers.   Instruct in proper use of assistive devices.              Learner Progress:  Not documented in this visit.          Point: Home exercise program (Not Started)     Description:   Instruct learner(s) on appropriate technique for monitoring, assisting and/or progressing therapeutic exercises/activities.              Learner Progress:  Not documented in this visit.          Point: Precautions (Done)     Description:   Instruct learner(s) on prescribed precautions during self-care and functional transfers.              Learning Progress Summary           Patient Acceptance, E, VU,NR by ME at 3/11/2022 1926    Comment: Educated on OT and POC. Educated to call for assistance. Educated on safety precautions.                   Point: Body mechanics (Done)     Description:   Instruct learner(s) on proper positioning and spine alignment during  self-care, functional mobility activities and/or exercises.              Learning Progress Summary           Patient Acceptance, E, VU,NR by ME at 3/11/2022 [id number]    Comment: Educated on OT and POC. Educated to call for assistance. Educated on safety precautions.                               User Key     Initials Effective Dates Name Provider Type Discipline    ME 06/16/21 -  Lauren Fleming, OTR/L Occupational Therapist OT              OT Recommendation and Plan  Planned Therapy Interventions (OT): activity tolerance training, adaptive equipment training, BADL retraining, functional balance retraining, IADL retraining, occupation/activity based interventions, patient/caregiver education/training, ROM/therapeutic exercise, strengthening exercise, transfer/mobility retraining, cognitive/visual perception retraining  Therapy Frequency (OT): other (see comments) (3-7 days per week)  Plan of Care Review  Plan of Care Reviewed With: patient  Outcome Evaluation: initial OT evaluation complete. pt was pleasant and cooperative throughout. speech is difficult to understand at times, and pt does become frustrated with this at times. BUE ROM WFL grossly. pt does continue to have some R sided strength deficit from previous CVA. pt was min A for bed mobility. mod A for transfers with no AD. mod I for rolling L And R. min A for toileting tasks. attempted to transfer to chair, in the middle of transfer pt became unable to respond, knees buckled, returned to supine at that time. vitals signs stable. RN aware. pt back to baseline at end of session. recommend further skilled OT services to address functional mobility and ADL deficits, as well as balance, strength, and endurance. recommend return to homestead at discharge. goals established.     Time Calculation:    Time Calculation- OT     Row Name 03/11/22 1336             Time Calculation- OT    OT Start Time 0800  -ME      OT Stop Time 0853  -ME      OT Time Calculation (min) 53  min  -ME      OT Received On 03/11/22  -ME      OT Goal Re-Cert Due Date 03/24/22  -ME              Untimed Charges    OT Eval/Re-eval Minutes 53  -ME              Total Minutes    Untimed Charges Total Minutes 53  -ME       Total Minutes 53  -ME            User Key  (r) = Recorded By, (t) = Taken By, (c) = Cosigned By    Initials Name Provider Type    ME Lauren Fleming, OTR/L Occupational Therapist              Therapy Charges for Today     Code Description Service Date Service Provider Modifiers Qty    [id number]  OT EVAL MOD COMPLEXITY 4 3/11/2022 Lauren Fleming, OTR/L GO 1               Lauren Fleming OTR/L  3/11/2022

## 2022-03-11 NOTE — PROGRESS NOTES
Adult Nutrition  Assessment    Patient Name:  Myles Shannon  YOB: 1975  MRN: [id number]  Admit Date:  3/10/2022    Assessment Date:  3/11/2022    Comments:  47yo female from [facility] admit w/ weakness, +UTI and possible stroke workup pending. Hx CVA Dec 2021, substance abuse.  Just advanced to cardiac ADA diet, ate 75% lunch. Labs and meds noted. Noted 3/2021 166# and #? Accuracy and BMI 33.0- no edema noted. Pt likes 2%  Milk and requested at all meals. RD to follow hospital course.       Reason for Assessment     Row Name 03/11/22 1426          Reason for Assessment    Reason For Assessment identified at risk by screening criteria     Diagnosis infection/sepsis;other (see comments)                Nutrition/Diet History     Row Name 03/11/22 1426          Nutrition/Diet History    Typical Intake (Food/Fluid/EN/PN) Pt states nkfa, no c/s problems. Ate approx 75% lunch. Had n/v this am but is better now.     Food Preferences Likes 2% milk and juice (OJ is the least liked)                Anthropometrics     Row Name 03/11/22 1427          Anthropometrics    Weight for Calculation 95.7 kg (211 lb)                Labs/Tests/Procedures/Meds     Row Name 03/11/22 1427          Labs/Procedures/Meds    Lab Results Reviewed reviewed     Lab Results Comments Glu 91, BUN 19/Cr 1.2, phos 4.7H and alb 4.8            Diagnostic Tests/Procedures    Diagnostic Test/Procedure Reviewed reviewed            Medications    Pertinent Medications Reviewed reviewed     Pertinent Medications Comments lasix 20 Qd                  Estimated/Assessed Needs - Anthropometrics     Row Name 03/11/22 1427          Anthropometrics    Weight for Calculation 95.7 kg (211 lb)            Estimated/Assessed Needs    Additional Documentation Fluid Requirements (Group);Estimated Calorie Needs (Group);KCAL/KG (Group);Protein Requirements (Group)            Estimated Calorie Needs    Estimated Calorie Requirement  (kcal/day) 1800            KCAL/KG    KCAL/KG 18 Kcal/Kg (kcal);20 Kcal/Kg (kcal)     18 Kcal/Kg (kcal) 1722.762     20 Kcal/Kg (kcal) 1914.18            Protein Requirements    Weight Used For Protein Calculations 95.7 kg (211 lb)     Est Protein Requirement Amount (gms/kg) 0.8 gm protein     Estimated Protein Requirements (gms/day) 76.57            Fluid Requirements    Fluid Requirements (mL/day) 1800     RDA Method (mL) 1800                Nutrition Prescription Ordered     Row Name 03/11/22 1429          Nutrition Prescription PO    Current PO Diet Regular     Common Modifiers Cardiac;Consistent Carbohydrate                       Electronically signed by:  Carmen Cardona RD  03/11/22 14:31 CST

## 2022-03-11 NOTE — PROGRESS NOTES
[facility]  INPATIENT PROGRESS NOTE    Length of Stay: 0  Date of Admission: 3/10/2022  Primary Care Physician: Nick Boateng MD    Subjective   Chief Complaint: Weakness  HPI:    Doing stable today.  Reports weakness in her arm on the right side.  Having difficulty with her speech which is baseline.    Review of Systems   Respiratory: Negative for shortness of breath.    Cardiovascular: Negative for chest pain.        All pertinent negatives and positives are as above. All other systems have been reviewed and are negative unless otherwise stated.     Objective    Temp:  [96.6 °F (35.9 °C)-98 °F (36.7 °C)] 97.9 °F (36.6 °C)  Heart Rate:  [64-88] 79  Resp:  [17-20] 18  BP: ()/(56-88) 96/56  Physical Exam  Vitals and nursing note reviewed.   Constitutional:       General: She is not in acute distress.     Appearance: She is well-developed. She is not diaphoretic.   HENT:      Head: Normocephalic and atraumatic.   Cardiovascular:      Rate and Rhythm: Normal rate.   Pulmonary:      Effort: Pulmonary effort is normal. No respiratory distress.      Breath sounds: No wheezing.   Abdominal:      General: There is no distension.      Palpations: Abdomen is soft.   Musculoskeletal:         General: Normal range of motion.   Skin:     General: Skin is warm and dry.   Neurological:      Mental Status: She is alert.      Cranial Nerves: No cranial nerve deficit.   Psychiatric:         Behavior: Behavior normal.         Thought Content: Thought content normal.         Judgment: Judgment normal.             Results Review:  I have reviewed the labs, radiology results, and diagnostic studies.    Laboratory Data:   Lab Results (last 24 hours)     Procedure Component Value Units Date/Time    Basic Metabolic Panel [[id number]]  (Abnormal) Collected: 03/11/22 0546    Specimen: Blood Updated: 03/11/22 0621     Glucose 91 mg/dL      BUN 19 mg/dL      Creatinine  1.24 mg/dL      Sodium 138 mmol/L      Potassium 4.6 mmol/L      Comment: Slight hemolysis detected by analyzer. Results may be affected.        Chloride 102 mmol/L      CO2 22.0 mmol/L      Calcium 9.2 mg/dL      BUN/Creatinine Ratio 15.3     Anion Gap 14.0 mmol/L      eGFR 54.5 mL/min/1.73      Comment: National Kidney Foundation and American Society of Nephrology (ASN) Task Force recommended calculation based on the Chronic Kidney Disease Epidemiology Collaboration (CKD-EPI) equation refit without adjustment for race.       Narrative:      GFR Normal >60  Chronic Kidney Disease <60  Kidney Failure <15      Magnesium [[id number]]  (Normal) Collected: 03/11/22 0546    Specimen: Blood Updated: 03/11/22 0618     Magnesium 2.1 mg/dL     CBC & Differential [[id number]]  (Abnormal) Collected: 03/11/22 0546    Specimen: Blood Updated: 03/11/22 0556    Narrative:      The following orders were created for panel order CBC & Differential.  Procedure                               Abnormality         Status                     ---------                               -----------         ------                     CBC Auto Differential[[id number]]        Abnormal            Final result                 Please view results for these tests on the individual orders.    CBC Auto Differential [[id number]]  (Abnormal) Collected: 03/11/22 0546    Specimen: Blood Updated: 03/11/22 0556     WBC 15.26 10*3/mm3      RBC 4.61 10*6/mm3      Hemoglobin 14.0 g/dL      Hematocrit 42.1 %      MCV 91.3 fL      MCH 30.4 pg      MCHC 33.3 g/dL      RDW 12.7 %      RDW-SD 42.5 fl      MPV 11.0 fL      Platelets 257 10*3/mm3      Neutrophil % 85.1 %      Lymphocyte % 7.8 %      Monocyte % 5.2 %      Eosinophil % 1.0 %      Basophil % 0.4 %      Immature Grans % 0.5 %      Neutrophils, Absolute 12.98 10*3/mm3      Lymphocytes, Absolute 1.19 10*3/mm3      Monocytes, Absolute 0.80 10*3/mm3      Eosinophils, Absolute 0.16 10*3/mm3      Basophils, Absolute  0.06 10*3/mm3      Immature Grans, Absolute 0.07 10*3/mm3      nRBC 0.0 /100 WBC     Magnesium [[id number]]  (Normal) Collected: 03/10/22 1300    Specimen: Blood from Hand, Right Updated: 03/10/22 2036     Magnesium 2.2 mg/dL     COVID-19 and FLU A/B PCR - Swab, Nasopharynx [[id number]]  (Normal) Collected: 03/10/22 1800    Specimen: Swab from Nasopharynx Updated: 03/10/22 1834     COVID19 Not Detected     Influenza A PCR Not Detected     Influenza B PCR Not Detected    Narrative:      Fact sheet for providers: https://www.fda.gov/media/287064/download    Fact sheet for patients: https://www.fda.gov/media/662383/download    Test performed by PCR.    Urine Drug Screen - Urine, Clean Catch [[id number]]  (Normal) Collected: 03/10/22 1302    Specimen: Urine, Clean Catch Updated: 03/10/22 1626     THC, Screen, Urine Negative     Phencyclidine (PCP), Urine Negative     Cocaine Screen, Urine Negative     Methamphetamine, Ur Negative     Opiate Screen Negative     Amphetamine Screen, Urine Negative     Benzodiazepine Screen, Urine Negative     Tricyclic Antidepressants Screen Negative     Methadone Screen, Urine Negative     Barbiturates Screen, Urine Negative     Oxycodone Screen, Urine Negative     Propoxyphene Screen Negative     Buprenorphine, Screen, Urine Negative    Narrative:      Cutoff For Drugs Screened:    Amphetamines               500 ng/ml  Barbiturates               200 ng/ml  Benzodiazepines            150 ng/ml  Cocaine                    150 ng/ml  Methadone                  200 ng/ml  Opiates                    100 ng/ml  Phencyclidine               25 ng/ml  THC                            50 ng/ml  Methamphetamine            500 ng/ml  Tricyclic Antidepressants  300 ng/ml  Oxycodone                  100 ng/ml  Propoxyphene               300 ng/ml  Buprenorphine               10 ng/ml    The normal value for all drugs tested is negative. This report includes unconfirmed screening results, with the cutoff  values listed, to be used for medical treatment purposes only.  Unconfirmed results must not be used for non-medical purposes such as employment or legal testing.  Clinical consideration should be applied to any drug of abuse test, particularly when unconfirmed results are used.      Protime-INR [[id number]]  (Normal) Collected: 03/10/22 1325    Specimen: Blood Updated: 03/10/22 1618     Protime 12.7 Seconds      INR 0.96    Narrative:      Therapeutic range for most indications is 2.0-3.0 INR,  or 2.5-3.5 for mechanical heart valves.    Extra Tubes [[id number]] Collected: 03/10/22 1325    Specimen: Blood, Venous Line Updated: 03/10/22 1433    Narrative:      The following orders were created for panel order Extra Tubes.  Procedure                               Abnormality         Status                     ---------                               -----------         ------                     Gold Top - SST[[id number]]                                   Final result               Light Blue Top[[id number]]                                   Final result                 Please view results for these tests on the individual orders.    Gold Top - SST [[id number]] Collected: 03/10/22 1325    Specimen: Blood Updated: 03/10/22 1433     Extra Tube Hold for add-ons.     Comment: Auto resulted.       Light Blue Top [[id number]] Collected: 03/10/22 1325    Specimen: Blood Updated: 03/10/22 1433     Extra Tube hold for add-on     Comment: Auto resulted       Valproic Acid Level, Total [[id number]]  (Abnormal) Collected: 03/10/22 1300    Specimen: Blood from Hand, Right Updated: 03/10/22 1410     Valproic Acid <2.8 mcg/mL     Comprehensive Metabolic Panel [[id number]]  (Abnormal) Collected: 03/10/22 1300    Specimen: Blood from Hand, Right Updated: 03/10/22 1401     Glucose 119 mg/dL      BUN 15 mg/dL      Creatinine 1.23 mg/dL      Sodium 139 mmol/L      Potassium 4.4 mmol/L      Chloride 103 mmol/L      CO2 22.0 mmol/L      Calcium 9.9  mg/dL      Total Protein 7.5 g/dL      Albumin 4.80 g/dL      ALT (SGPT) 24 U/L      AST (SGOT) 24 U/L      Alkaline Phosphatase 109 U/L      Total Bilirubin 0.2 mg/dL      Globulin 2.7 gm/dL      A/G Ratio 1.8 g/dL      BUN/Creatinine Ratio 12.2     Anion Gap 14.0 mmol/L      eGFR 55.0 mL/min/1.73      Comment: National Kidney Foundation and American Society of Nephrology (ASN) Task Force recommended calculation based on the Chronic Kidney Disease Epidemiology Collaboration (CKD-EPI) equation refit without adjustment for race.       Narrative:      GFR Normal >60  Chronic Kidney Disease <60  Kidney Failure <15      Urinalysis, Microscopic Only - Urine, Clean Catch [[id number]]  (Abnormal) Collected: 03/10/22 1302    Specimen: Urine, Clean Catch Updated: 03/10/22 1333     RBC, UA 0-2 /HPF      WBC, UA 31-50 /HPF      Bacteria, UA 1+ /HPF      Squamous Epithelial Cells, UA 0-2 /HPF      Hyaline Casts, UA None Seen /LPF      Methodology Automated Microscopy    Urinalysis With Microscopic If Indicated (No Culture) - Urine, Clean Catch [[id number]]  (Abnormal) Collected: 03/10/22 1302    Specimen: Urine, Clean Catch Updated: 03/10/22 1328     Color, UA Yellow     Appearance, UA Clear     pH, UA 6.5     Specific Gravity, UA 1.008     Glucose, UA Negative     Ketones, UA Negative     Bilirubin, UA Negative     Blood, UA Negative     Protein, UA Negative     Leuk Esterase, UA Moderate (2+)     Nitrite, UA Negative     Urobilinogen, UA 0.2 E.U./dL    CBC & Differential [[id number]]  (Normal) Collected: 03/10/22 1300    Specimen: Blood from Hand, Right Updated: 03/10/22 1325    Narrative:      The following orders were created for panel order CBC & Differential.  Procedure                               Abnormality         Status                     ---------                               -----------         ------                     CBC Auto Differential[[id number]]        Normal              Final result                 Please  view results for these tests on the individual orders.    CBC Auto Differential [[id number]]  (Normal) Collected: 03/10/22 1300    Specimen: Blood from Hand, Right Updated: 03/10/22 1325     WBC 6.28 10*3/mm3      RBC 4.68 10*6/mm3      Hemoglobin 14.3 g/dL      Hematocrit 42.0 %      MCV 89.7 fL      MCH 30.6 pg      MCHC 34.0 g/dL      RDW 12.6 %      RDW-SD 41.3 fl      MPV 11.7 fL      Platelets 322 10*3/mm3      Neutrophil % 56.2 %      Lymphocyte % 33.3 %      Monocyte % 6.1 %      Eosinophil % 3.5 %      Basophil % 0.6 %      Immature Grans % 0.3 %      Neutrophils, Absolute 3.53 10*3/mm3      Lymphocytes, Absolute 2.09 10*3/mm3      Monocytes, Absolute 0.38 10*3/mm3      Eosinophils, Absolute 0.22 10*3/mm3      Basophils, Absolute 0.04 10*3/mm3      Immature Grans, Absolute 0.02 10*3/mm3      nRBC 0.0 /100 WBC           Culture Data:   No results found for: BLOODCX  No results found for: URINECX  No results found for: RESPCX  No results found for: WOUNDCX  No results found for: STOOLCX  No components found for: BODYFLD    Radiology Data:   Imaging Results (Last 24 Hours)     Procedure Component Value Units Date/Time    MRI Brain Without Contrast [[id number]] Collected: 03/10/22 1425     Updated: 03/10/22 1559    Narrative:      MRI brain without IV contrast March 10, 2022    INDICATION: Acute onset neurologic deficit    Pulse sequences: Sagittal, coronal and multisequence axial  imaging without IV contrast    FINDINGS:  No mass effect, midline shift, hemorrhage, hydrocephalus or  extra-axial fluid collections.  Old left middle cerebral artery distribution perisylvian infarct  with encephalomalacia.  Mild asymmetric left-sided ventriculomegaly likely ex vacuo.  No acute ischemia noted on diffusion-weighted images.  No other areas of abnormal signal intensity.  Visualized mastoids and sinuses grossly clear.      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  No acute abnormality  appreciated.    Electronically signed by:  Ernesto Jacobs MD  3/10/2022 3:57 PM  CST Workstation: [id number]    CT Head Without Contrast Stroke Protocol [[id number]] Collected: 03/10/22 1359     Updated: 03/10/22 1423    Narrative:      COMPARISON:     3/5/2022, 3/4/2022    INDICATION: Stroke follow-up    TECHNIQUE:  CT of the head was performed from the vertex thru the  skull base.  Reformats are obtained.    All CT scans at this location are performed using dose modulation  techniques as appropriate to a performed exam including the  following: automated exposure control; adjustment of the mA  and/or kV according to patient size (this includes techniques or  standardized protocols for targeted exams where dose is matched  to indication / reason for exam; i.e. extremities or head); use  of iterative reconstruction technique.    FINDINGS:     The ventricles sulci and CSF spaces are normal size  configuration.  There is encephalomalacia within the left MCA  distribution. There is no mass, mass effect, or midline shift.   There is no abnormal intra-axial or extra-axial fluid collection  or hematoma.  There is preservation of normal gray-white  differentiation.  The bony calvarium is intact.  Partial  opacification of left posterior ethmoid air cells. Remaining  paranasal sinuses and mastoid air cells are well-aerated.      Impression:      Left MCA distribution encephalomalacia. There is  decreased surrounding edema with resolution of previously noted  shift.    Electronically signed by:  Cameron Mcmahon MD  3/10/2022 2:21 PM CST  Workstation: [id number]    XR Chest 1 View [[id number]] Collected: 03/10/22 0130     Updated: 03/10/22 1419    Narrative:      Chest x-ray single view.         CLINICAL INDICATION: Shortness of breath. Stroke protocol    COMPARISON: Chest March 4, 2022    FINDINGS: Cardiac silhouette is normal in size. Pulmonary  vascularity is unremarkable.     No focal infiltrate or consolidation.  No pleural  effusion.  No  pneumothorax.  Increased markings right lung base similar to prior examination  probably related to poor inspiratory effort.        Impression:      No evidence of active disease. No change since prior  exam.       Electronically signed by:  John Galeano MD  3/10/2022 2:17 PM CST  Workstation: [id number]          I have reviewed the patient's current medications.     Assessment/Plan     Active Hospital Problems    Diagnosis    • Weakness        Possible CVA-neurology has seen the patient and patient will be kept for monitoring.  CT shows no acute changes.  MRI negative for stroke.  No more stroke work-up has been recommended by neurology.  Will have PT OT evaluate her for discharge placement.     Hypertension-continue to monitor blood pressure and resume home medication     Seizure disorder-continue monitoring and start on Depakote     Tobacco dependence-cessation advised     DVT prophylaxis-SCD     Patient full code     I confirmed that the patient's Advance Care Plan is present, code status is documented, or surrogate decision maker is listed in the patient's medical record.             Joel Bhakta MD   03/11/22   09:46 CST

## 2022-03-11 NOTE — PLAN OF CARE
Goal Outcome Evaluation:           Progress: no change  Outcome Evaluation: New admit. Episode of N/V with emisis. PRN meds given. Will continue to monitor.

## 2022-03-11 NOTE — PLAN OF CARE
Goal Outcome Evaluation:  Plan of Care Reviewed With: patient           Outcome Evaluation: initial OT evaluation complete. pt was pleasant and cooperative throughout. speech is difficult to understand at times, and pt does become frustrated with this at times. BUE ROM WFL grossly. pt does continue to have some R sided strength deficit from previous CVA. pt was min A for bed mobility. mod A for transfers with no AD. mod I for rolling L And R. min A for toileting tasks. attempted to transfer to chair, in the middle of transfer pt became unable to respond, knees buckled, returned to supine at that time. vitals signs stable. RN aware. pt back to baseline at end of session. recommend further skilled OT services to address functional mobility and ADL deficits, as well as balance, strength, and endurance. recommend [facility] to [facility] at [facility]. goals established.

## 2022-03-12 LAB
ANION GAP SERPL CALCULATED.3IONS-SCNC: 14 MMOL/L (ref 5–15)
BASOPHILS # BLD AUTO: 0.02 10*3/MM3 (ref 0–0.2)
BASOPHILS NFR BLD AUTO: 0.3 % (ref 0–1.5)
BUN SERPL-MCNC: 19 MG/DL (ref 6–20)
BUN/CREAT SERPL: 16.2 (ref 7–25)
CALCIUM SPEC-SCNC: 8.7 MG/DL (ref 8.6–10.5)
CHLORIDE SERPL-SCNC: 101 MMOL/L (ref 98–107)
CO2 SERPL-SCNC: 23 MMOL/L (ref 22–29)
CREAT SERPL-MCNC: 1.17 MG/DL (ref 0.57–1)
DEPRECATED RDW RBC AUTO: 41.8 FL (ref 37–54)
EGFRCR SERPLBLD CKD-EPI 2021: 58.4 ML/MIN/1.73
EOSINOPHIL # BLD AUTO: 0.15 10*3/MM3 (ref 0–0.4)
EOSINOPHIL NFR BLD AUTO: 1.9 % (ref 0.3–6.2)
ERYTHROCYTE [DISTWIDTH] IN BLOOD BY AUTOMATED COUNT: 12.7 % (ref 12.3–15.4)
GLUCOSE SERPL-MCNC: 91 MG/DL (ref 65–99)
HCT VFR BLD AUTO: 37.9 % (ref 34–46.6)
HGB BLD-MCNC: 12.6 G/DL (ref 12–15.9)
IMM GRANULOCYTES # BLD AUTO: 0.04 10*3/MM3 (ref 0–0.05)
IMM GRANULOCYTES NFR BLD AUTO: 0.5 % (ref 0–0.5)
LYMPHOCYTES # BLD AUTO: 1.63 10*3/MM3 (ref 0.7–3.1)
LYMPHOCYTES NFR BLD AUTO: 21 % (ref 19.6–45.3)
MAGNESIUM SERPL-MCNC: 2.1 MG/DL (ref 1.6–2.6)
MCH RBC QN AUTO: 30.2 PG (ref 26.6–33)
MCHC RBC AUTO-ENTMCNC: 33.2 G/DL (ref 31.5–35.7)
MCV RBC AUTO: 90.9 FL (ref 79–97)
MONOCYTES # BLD AUTO: 0.53 10*3/MM3 (ref 0.1–0.9)
MONOCYTES NFR BLD AUTO: 6.8 % (ref 5–12)
NEUTROPHILS NFR BLD AUTO: 5.4 10*3/MM3 (ref 1.7–7)
NEUTROPHILS NFR BLD AUTO: 69.5 % (ref 42.7–76)
NRBC BLD AUTO-RTO: 0 /100 WBC (ref 0–0.2)
PLATELET # BLD AUTO: 222 10*3/MM3 (ref 140–450)
PMV BLD AUTO: 11.8 FL (ref 6–12)
POTASSIUM SERPL-SCNC: 4.1 MMOL/L (ref 3.5–5.2)
QT INTERVAL: 446 MS
QTC INTERVAL: 452 MS
RBC # BLD AUTO: 4.17 10*6/MM3 (ref 3.77–5.28)
SODIUM SERPL-SCNC: 138 MMOL/L (ref 136–145)
WBC NRBC COR # BLD: 7.77 10*3/MM3 (ref 3.4–10.8)

## 2022-03-12 PROCEDURE — 97110 THERAPEUTIC EXERCISES: CPT

## 2022-03-12 PROCEDURE — 25010000002 ONDANSETRON PER 1 MG: Performed by: FAMILY MEDICINE

## 2022-03-12 PROCEDURE — 85025 COMPLETE CBC W/AUTO DIFF WBC: CPT | Performed by: FAMILY MEDICINE

## 2022-03-12 PROCEDURE — 97116 GAIT TRAINING THERAPY: CPT

## 2022-03-12 PROCEDURE — G0378 HOSPITAL OBSERVATION PER HR: HCPCS

## 2022-03-12 PROCEDURE — 96376 TX/PRO/DX INJ SAME DRUG ADON: CPT

## 2022-03-12 PROCEDURE — 97530 THERAPEUTIC ACTIVITIES: CPT

## 2022-03-12 PROCEDURE — 83735 ASSAY OF MAGNESIUM: CPT | Performed by: FAMILY MEDICINE

## 2022-03-12 PROCEDURE — 97535 SELF CARE MNGMENT TRAINING: CPT

## 2022-03-12 PROCEDURE — 80048 BASIC METABOLIC PNL TOTAL CA: CPT | Performed by: FAMILY MEDICINE

## 2022-03-12 RX ADMIN — BUSPIRONE HYDROCHLORIDE 5 MG: 5 TABLET ORAL at 17:07

## 2022-03-12 RX ADMIN — TRAZODONE HYDROCHLORIDE 50 MG: 50 TABLET ORAL at 20:43

## 2022-03-12 RX ADMIN — LEVETIRACETAM 500 MG: 500 TABLET, FILM COATED ORAL at 09:15

## 2022-03-12 RX ADMIN — LEVETIRACETAM 500 MG: 500 TABLET, FILM COATED ORAL at 20:42

## 2022-03-12 RX ADMIN — ONDANSETRON 4 MG: 2 INJECTION INTRAMUSCULAR; INTRAVENOUS at 20:43

## 2022-03-12 RX ADMIN — Medication 10 ML: at 09:15

## 2022-03-12 RX ADMIN — ATORVASTATIN CALCIUM 80 MG: 40 TABLET, FILM COATED ORAL at 20:43

## 2022-03-12 RX ADMIN — BUSPIRONE HYDROCHLORIDE 5 MG: 5 TABLET ORAL at 20:43

## 2022-03-12 RX ADMIN — FUROSEMIDE 20 MG: 20 TABLET ORAL at 09:15

## 2022-03-12 RX ADMIN — PANTOPRAZOLE SODIUM 40 MG: 40 TABLET, DELAYED RELEASE ORAL at 09:15

## 2022-03-12 RX ADMIN — ESCITALOPRAM OXALATE 10 MG: 10 TABLET ORAL at 09:15

## 2022-03-12 RX ADMIN — Medication 10 ML: at 20:44

## 2022-03-12 RX ADMIN — ASPIRIN 325 MG: 325 TABLET ORAL at 09:15

## 2022-03-12 RX ADMIN — BUSPIRONE HYDROCHLORIDE 5 MG: 5 TABLET ORAL at 09:15

## 2022-03-12 NOTE — THERAPY EVALUATION
Patient Name: Myles Shannon  : 1975    MRN: [id number]                              Today's Date: 3/11/2022     PT Evaluation  Admit Date: 3/10/2022    Visit Dx:     ICD-10-CM ICD-9-CM   1. Weakness  R53.1 780.79   2. Gait disturbance  R26.9 781.2   3. Urinary tract infection without hematuria, site unspecified  N39.0 599.0   4. Stroke-like symptoms  R29.90 781.99   5. Impaired mobility and ADLs  Z74.09 V49.89    Z78.9    6. Impaired functional mobility, balance, gait, and endurance  Z74.09 V49.89     Patient Active Problem List   Diagnosis   • Nicotine abuse   • Other chest pain   • Acute maxillary sinusitis   • Bipolar affective disorder ([facility])   • Decreased pulses in feet   • Flank pain   • Hematochezia   • High cholesterol   • Migraine without aura and with status migrainosus, not intractable   • Migraine without status migrainosus, not intractable   • Neck pain   • Nephrolithiasis   • Neuropathy   • Chronic pain   • Primary osteoarthritis involving multiple joints   • Sciatic leg pain   • Screening for diabetes mellitus (DM)   • Seasonal allergies   • Stress   • Tobacco use disorder   • Urinary incontinence without sensory awareness   • Vitamin D deficiency   • Carpal tunnel syndrome of right wrist   • Seizure ([facility])   • Intractable vomiting   • Acute UTI (urinary tract infection)   • Cholelithiasis   • GI bleed   • Kidney stone   • Nephrolithiasis   • ELBERT (acute kidney injury) ([facility])   • Infection due to ESBL-producing Escherichia coli   • CVA (cerebral vascular accident) ([facility])   • Dysphagia due to recent stroke   • Slurred speech   • Cerebrovascular accident (CVA) ([facility])   • Aphasia due to acute cerebrovascular accident (CVA) ([facility])   • Methamphetamine abuse ([facility])   • Seizure disorder ([facility])   • UTI (urinary tract infection) due to urinary indwelling catheter ([facility])   • Urinary tract infection due to extended-spectrum beta lactamase (ESBL) producing Escherichia coli   • Urinary tract infection due  to extended-spectrum beta lactamase (ESBL) producing Escherichia coli   • Metabolic acidosis, NAG, bicarbonate losses   • Midline shift of brain due to stroke   • Nonintractable headache   • Weakness     Past Medical History:   Diagnosis Date   • Abdominal pain     Chronic RLQ, RUQ, R flank comes and goes.    • Abdominal pain, right lower quadrant    • Acute bilateral otitis media    • Allergic rhinitis    • Backache     Upper back.   • Candidiasis of skin    • Cellulitis of neck    • Chest pain    • Contraception    • Cough    • Dysfunction of eustachian tube    • Dyspareunia, female    • Excessive or frequent menstruation    • Flank pain    • Generalized aches and pains    • Headache    • Health maintenance examination    • Infection of skin and subcutaneous tissue    • Irregular periods    • Kidney stone     Poss   • Menorrhagia    • Nausea vomiting and diarrhea    • Obesity    • Open wound of abdominal wall    • Otalgia    • Pain in left foot    • Pain in unspecified hand    • Removed Impacted cerumen 2012   • Rhinitis    • Seizure ([facility]) 8/15/2019   • Shoulder pain     right-sided-likely muscle strain      • Shoulder tendinitis    • Spider bite wound    • Staphylococcal infection of skin    • Swollen feet    • Tinea cruris    • Tobacco dependence syndrome    • Upper respiratory infection    • Urinary tract infectious disease    • Vertigo    • Visit for gynecologic examination    • Vulvovaginitis      Past Surgical History:   Procedure Laterality Date   •  SECTION     • DILATATION AND CURETTAGE      Secondary to incomplete spontaneous    • INCISION AND DRAINAGE ABSCESS  2012   • INJECTION OF MEDICATION  2015    Phenergan (1)     • INJECTION OF MEDICATION  10/10/2013    Toradol (2)      • INJECTION OF MEDICATION  2014    Zofran (1)         General Information     Row Name 22 8638          Physical Therapy Time and Intention    Document Type evaluation  -GB     Mode  of Treatment physical therapy  -     Row Name 03/11/22 1455          General Information    Patient Profile Reviewed yes  -GB     Prior Level of Function independent:;all household mobility  -GB     Existing Precautions/Restrictions fall  R knee arthritis; beware of knee collapsing  -     Barriers to Rehab medically complex;previous functional deficit;cognitive status  -     Row Name 03/11/22 1455          Living Environment    People in Home facility resident  -     Row Name 03/11/22 1455          Stairs Within Home, Primary    Stairs, Within Home, Primary Home stead ; not using AD for gait ;states she doesn't have one; thinks it will help to have one  -     Row Name 03/11/22 1455          Cognition    Orientation Status (Cognition) oriented to;person;place;situation  -[facility] Name 03/11/22 1455          Safety Issues, Functional Mobility    Impairments Affecting Function (Mobility) balance;endurance/activity tolerance;strength  -GB     Comment, Safety Issues/Impairments (Mobility) states she had really bad weakness R hand and RLE anf fell on R knee which now hurts  -GB           User Key  (r) = Recorded By, (t) = Taken By, (c) = Cosigned By    Initials Name Provider Type    Susie Bundy, PT Physical Therapist               Mobility     Row Name 03/11/22 1519          Bed Mobility    Bed Mobility supine-sit;sit-supine  -GB     Supine-Sit [place] (Bed Mobility) minimum assist (75% patient effort)  -GB     Sit-Supine [place] (Bed Mobility) minimum assist (75% patient effort)  -GB     Assistive Device (Bed Mobility) bed rails;head of bed elevated  -     Comment, (Bed Mobility) sitting indep at EOB but guarded R knee w/ c/o pain;  -     Row Name 03/11/22 1519          Bed-Chair Transfer    Bed-Chair [place] (Transfers) not tested  -     Row Name 03/11/22 1519          Sit-Stand Transfer    Sit-Stand [place] (Transfers) not tested  -GB     Comment, (Sit-Stand  Transfer) deferred due to R knee pain  -     Row Name 03/11/22 1519          Gait/Stairs (Locomotion)    [place] Level (Gait) not tested  -           User Key  (r) = Recorded By, (t) = Taken By, (c) = Cosigned By    Initials Name Provider Type    Susie Bunyd PT Physical Therapist               Obj/Interventions     Row Name 03/11/22 1520          Range of Motion Comprehensive    Comment, General Range of Motion LLE AROM WFL: R hip flx/ext and DF/PF WFL: guarding R knee flx allowing only ~ 70 deg flc while sitting EOB ; R knee not hot but slighly more edema around joint line than L and c/o w any motion of RLE/R knee;  -     Row Name 03/11/22 1520          Strength Comprehensive (MMT)    Comment, General Manual Muscle Testing (MMT) Assessment inconsistent resistance in all LE MMT but was able to see some resistance LLE at 3+-4-/5 for hip, knee, ankle flx/ext; RLE did not complete R knee flx nor ext;  -     Row Name 03/11/22 1520          Sensory Assessment (Somatosensory)    Sensory Assessment (Somatosensory) LE sensation intact  -           User Key  (r) = Recorded By, (t) = Taken By, (c) = Cosigned By    Initials Name Provider Type    Susie Bundy PT Physical Therapist               Goals/Plan     Row Name 03/11/22 1925          Bed Mobility Goal 1 (PT)    Activity/Assistive Device (Bed Mobility Goal 1, PT) bed mobility activities, all  -GB     [place] Level/Cues Needed (Bed Mobility Goal 1, PT) independent  -GB     Progress/Outcomes (Bed Mobility Goal 1, PT) goal ongoing  -     Row Name 03/11/22 1925          Transfer Goal 1 (PT)    Activity/Assistive Device (Transfer Goal 1, PT) bed-to-chair/chair-to-bed;toilet  -     [place] Level/Cues Needed (Transfer Goal 1, PT) modified independence  -GB     Time Frame (Transfer Goal 1, PT) by discharge  -     Progress/Outcome (Transfer Goal 1, PT) goal not met  -     Row Name 03/11/22 1925          Gait  "Training Goal 1 (PT)    Activity/Assistive Device (Gait Training Goal 1, PT) gait (walking locomotion);assistive device use;walker, rolling  -GB     [place] Level (Gait Training Goal 1, PT) modified independence  -GB     Distance (Gait Training Goal 1, PT) 300 ft or more w/out LOB and w/ appropriate use of FWRW in narrow and broad spaces  -GB     Time Frame (Gait Training Goal 1, PT) 10 days  -GB     Progress/Outcome (Gait Training Goal 1, PT) goal not met  -GB     Row Name 03/11/22 1925          Stairs Goal 1 (PT)    Activity/Assistive Device (Stairs Goal 1, PT) ascending stairs;descending stairs  -GB     [place] Level/Cues Needed (Stairs Goal 1, PT) modified independence;supervision required  -GB     Number of Stairs (Stairs Goal 1, PT) 2 or more but watch for R knee giving way; only try steps after she is consistently strong in gait w/ FWRW 200 ft or more  -GB     Progress/Outcome (Stairs Goal 1, PT) goal not met  -GB     Row Name 03/11/22 1925          Problem Specific Goal 1 (PT)    Problem Specific Goal 1 (PT) if john w/out increased R knee pain may try sit to stand ex for eccentric control in mobilty as john;  -GB     Time Frame (Problem Specific Goal 1, PT) 2 weeks  -GB     Progress/Outcome (Problem Specific Goal 1, PT) goal not met  -GB           User Key  (r) = Recorded By, (t) = Taken By, (c) = Cosigned By    Initials Name Provider Type    Susie Bundy, PT Physical Therapist               Clinical Impression     Row Name 03/11/22 1501          Pain    Pretreatment Pain Rating 8/10  -GB     Pain Location - Side/Orientation Right  -GB     Pain Location - knee;head  -GB     Pre/Posttreatment Pain Comment has \"bad headaches again\"  -GB     Pain Intervention(s) Repositioned  -GB     Row Name 03/11/22 1501          Plan of Care Review    Plan of Care Reviewed With patient  -GB     Outcome Evaluation PT Eval with pt able to move in bed for mobility but hindered by her c/o R knee pain post " fall at assisted living; She also speaks of frustration w/ repeat hospitial visits. She had limited sustained contractions for MMT making scoring it difficult but demonstrated at least 4-/5 functionally in bed mobility, bridging and sup<>sit. She will need to be as indep at home as possible for assisted living facility. R knee films indicate tri compartment arthritis.  May benefit from more rehab to home before assisted living;She states she does not have a walker but would like one. Will follow for gait training and ther ex with caution for R knee in process as well as fall risk. Consider SNF for rehab vs assisted living w/ FWRW if she shows consistent indep w/ FWRW and mobilty;  -GB     Row Name 03/11/22 1501          Therapy Assessment/Plan (PT)    Rehab Potential (PT) good, to achieve stated therapy goals  -GB     Criteria for Skilled Interventions Met (PT) yes  -GB     Row Name 03/11/22 1501          Vital Signs    Pre Systolic BP Rehab 125  -GB     Pre Treatment Diastolic BP 75  -GB     Post Systolic BP Rehab 111  -GB     Post Treatment Diastolic BP 77  -GB     Pretreatment Heart Rate (beats/min) 70  -GB     Posttreatment Heart Rate (beats/min) 67  -GB     Pre SpO2 (%) 97  -GB     Post SpO2 (%) 98  -GB     O2 Delivery Post Treatment room air  -GB     Pre Patient Position Supine  -GB     Intra Patient Position Sitting  -GB     Post Patient Position Supine  -GB     Row Name 03/11/22 1501          Positioning and Restraints    Pre-Treatment Position in bed  -GB     Post Treatment Position bed  -GB     In Bed call light within reach;encouraged to call for assist;exit alarm on;side rails up x2;supine;notified nsg  -GB           User Key  (r) = Recorded By, (t) = Taken By, (c) = Cosigned By    Initials Name Provider Type    Susie Bundy, PT Physical Therapist               Outcome Measures     Row Name 03/11/22 1917          How much help from another person do you currently need...    Turning from  your back to your side while in flat bed without using bedrails? 3  -GB     Moving from lying on back to sitting on the side of a flat bed without bedrails? 3  -GB     Moving to and from a bed to a chair (including a wheelchair)? 3  -GB     Standing up from a chair using your arms (e.g., wheelchair, bedside chair)? 2  R knee pain  -GB     Climbing 3-5 steps with a railing? 2  knee pain R new  -GB     To walk in hospital room? 2  Knee pain R new  -GB     AM-PAC 6 Clicks Score (PT) 15  -GB     Row Name 03/11/22 1917 03/11/22 0800       Functional Assessment    Outcome Measure Options AM-PAC 6 Clicks Basic Mobility (PT)  - AM-PAC 6 Clicks Daily Activity (OT)  -ME          User Key  (r) = Recorded By, (t) = Taken By, (c) = Cosigned By    Initials Name Provider Type     Susie De La Vega, PT Physical Therapist    Lauren Orozco, OTR/L Occupational Therapist                             Physical Therapy Education                 Title: PT OT SLP Therapies (In Progress)     Topic: Physical Therapy (Done)     Point: Mobility training (Done)     Learning Progress Summary           Patient Acceptance, E, VU,NR by  at 3/11/2022 1918    Comment: POC;                   Point: Home exercise program (Done)     Learning Progress Summary           Patient Acceptance, E, VU,NR by  at 3/11/2022 1918    Comment: POC;                   Point: Body mechanics (Done)     Learning Progress Summary           Patient Acceptance, E, VU,NR by  at 3/11/2022 1918    Comment: POC;                   Point: Precautions (Done)     Learning Progress Summary           Patient Acceptance, E, VU,NR by  at 3/11/2022 1918    Comment: POC;                               User Key     Initials Effective Dates Name Provider Type Discipline     06/16/21 -  Susie De La Vega, PT Physical Therapist PT              PT Recommendation and Plan  Planned Therapy Interventions (PT): balance training, bed mobility training, gait training,  neuromuscular re-education, home exercise program, patient/family education, strengthening, stair training, transfer training  Plan of Care Reviewed With: patient  Outcome Evaluation: PT Eval with pt able to move in bed for mobility but hindered by her c/o R knee pain post fall at assisted living; She also speaks of frustration w/ repeat hospitial visits. She had limited sustained contractions for MMT making scoring it difficult but demonstrated at least 4-/5 functionally in bed mobility, bridging and sup<>sit. She will need to be as indep at home as possible for assisted living facility. R knee films indicate tri compartment arthritis.  May benefit from more rehab to home before assisted living;She states she does not have a walker but would like one. Will follow for gait training and ther ex with caution for R knee in process as well as fall risk. Consider SNF for rehab vs assisted living w/ FWRW if she shows consistent indep w/ FWRW and mobilty;     Time Calculation:    PT Charges     Row Name 03/11/22 1455             Time Calculation    Start Time 1455  -GB      Stop Time 1522  -GB      Time Calculation (min) 27 min  -GB      PT Received On 03/11/22  -GB      PT Goal Re-Cert Due Date 03/20/22  -GB              Untimed Charges    PT Eval/Re-eval Minutes 27  -GB              Total Minutes    Untimed Charges Total Minutes 27  -GB       Total Minutes 27  -GB            User Key  (r) = Recorded By, (t) = Taken By, (c) = Cosigned By    Initials Name Provider Type    GB Susie De La Vega, PT Physical Therapist              Therapy Charges for Today     Code Description Service Date Service Provider Modifiers Qty    59198355555 HC PT EVAL MOD COMPLEXITY 2 3/11/2022 Susie De La Vega, PT GP 1    27500077176 HC PT THER SUPP EA 15 MIN 3/11/2022 Susie De La Vega, PT GP 1          PT G-Codes  Outcome Measure Options: AM-PAC 6 Clicks Basic Mobility (PT)  AM-PAC 6 Clicks Score (PT): 15  AM-PAC 6  Clicks Score (OT): 17    Susie De La Vega, PT  3/11/2022

## 2022-03-12 NOTE — PLAN OF CARE
Goal Outcome Evaluation:  Plan of Care Reviewed With: patient        Progress: improving  Outcome Evaluation: Pt john tx well with improved effort and participation. Pt t/f sup-sit-sup with SBAx1 with HOB down and no bedrail. Sit-stand-sit with CGAx1. PT amb 2x110' with FWRW with CGA/Min Ax1 while followed with chair with R knee buckling x3 occassions all of which was self corrected. Pt performed B LE therex 2x10 each with max vcs for comprehension of therex for proper technique.

## 2022-03-12 NOTE — THERAPY TREATMENT NOTE
Patient Name: Myles Shannon  : 1975    MRN: [id number]                              Today's Date: 3/12/2022       Admit Date: 3/10/2022    Visit Dx:     ICD-10-CM ICD-9-CM   1. Weakness  R53.1 780.79   2. Gait disturbance  R26.9 781.2   3. Urinary tract infection without hematuria, site unspecified  N39.0 599.0   4. Stroke-like symptoms  R29.90 781.99   5. Impaired mobility and ADLs  Z74.09 V49.89    Z78.9    6. Impaired functional mobility, balance, gait, and endurance  Z74.09 V49.89     Patient Active Problem List   Diagnosis   • Nicotine abuse   • Other chest pain   • Acute maxillary sinusitis   • Bipolar affective disorder ([facility])   • Decreased pulses in feet   • Flank pain   • Hematochezia   • High cholesterol   • Migraine without aura and with status migrainosus, not intractable   • Migraine without status migrainosus, not intractable   • Neck pain   • Nephrolithiasis   • Neuropathy   • Chronic pain   • Primary osteoarthritis involving multiple joints   • Sciatic leg pain   • Screening for diabetes mellitus (DM)   • Seasonal allergies   • Stress   • Tobacco use disorder   • Urinary incontinence without sensory awareness   • Vitamin D deficiency   • Carpal tunnel syndrome of right wrist   • Seizure ([facility])   • Intractable vomiting   • Acute UTI (urinary tract infection)   • Cholelithiasis   • GI bleed   • Kidney stone   • Nephrolithiasis   • ELBERT (acute kidney injury) ([facility])   • Infection due to ESBL-producing Escherichia coli   • CVA (cerebral vascular accident) ([facility])   • Dysphagia due to recent stroke   • Slurred speech   • Cerebrovascular accident (CVA) ([facility])   • Aphasia due to acute cerebrovascular accident (CVA) ([facility])   • Methamphetamine abuse ([facility])   • Seizure disorder ([facility])   • UTI (urinary tract infection) due to urinary indwelling catheter ([facility])   • Urinary tract infection due to extended-spectrum beta lactamase (ESBL) producing Escherichia coli   • Urinary tract infection due to  extended-spectrum beta lactamase (ESBL) producing Escherichia coli   • Metabolic acidosis, NAG, bicarbonate losses   • Midline shift of brain due to stroke   • Nonintractable headache   • Weakness     Past Medical History:   Diagnosis Date   • Abdominal pain     Chronic RLQ, RUQ, R flank comes and goes.    • Abdominal pain, right lower quadrant    • Acute bilateral otitis media    • Allergic rhinitis    • Backache     Upper back.   • Candidiasis of skin    • Cellulitis of neck    • Chest pain    • Contraception    • Cough    • Dysfunction of eustachian tube    • Dyspareunia, female    • Excessive or frequent menstruation    • Flank pain    • Generalized aches and pains    • Headache    • Health maintenance examination    • Infection of skin and subcutaneous tissue    • Irregular periods    • Kidney stone     Poss   • Menorrhagia    • Nausea vomiting and diarrhea    • Obesity    • Open wound of abdominal wall    • Otalgia    • Pain in left foot    • Pain in unspecified hand    • Removed Impacted cerumen 2012   • Rhinitis    • Seizure ([facility]) 8/15/2019   • Shoulder pain     right-sided-likely muscle strain      • Shoulder tendinitis    • Spider bite wound    • Staphylococcal infection of skin    • Swollen feet    • Tinea cruris    • Tobacco dependence syndrome    • Upper respiratory infection    • Urinary tract infectious disease    • Vertigo    • Visit for gynecologic examination    • Vulvovaginitis      Past Surgical History:   Procedure Laterality Date   •  SECTION     • DILATATION AND CURETTAGE      Secondary to incomplete spontaneous    • INCISION AND DRAINAGE ABSCESS  2012   • INJECTION OF MEDICATION  2015    Phenergan (1)     • INJECTION OF MEDICATION  10/10/2013    Toradol (2)      • INJECTION OF MEDICATION  2014    Zofran (1)         General Information     Row Name 22 1446          OT Time and Intention    Mode of Treatment occupational therapy;individual  therapy  -     Row Name 03/12/22 1446          General Information    Patient Profile Reviewed yes  -     Existing Precautions/Restrictions fall  -     Row Name 03/12/22 1446          Cognition    Orientation Status (Cognition) oriented x 3  -     Row Name 03/12/22 1446          Safety Issues, Functional Mobility    Impairments Affecting Function (Mobility) balance;endurance/activity tolerance;strength  -           User Key  (r) = Recorded By, (t) = Taken By, (c) = Cosigned By    Initials Name Provider Type    Latisha Hutchison COTA Occupational Therapy Assistant                 Mobility/ADL's     Row Name 03/12/22 1125          Bed Mobility    Bed Mobility supine-sit;sit-supine  -LW     Supine-Sit [place] (Bed Mobility) supervision  -     Sit-Supine [place] (Bed Mobility) supervision  -     Assistive Device (Bed Mobility) bed rails;head of bed elevated  -     Row Name 03/12/22 1125          Transfers    Transfers toilet transfer  -     Sit-Stand [place] (Transfers) contact guard  -     [place] Level (Toilet Transfer) contact guard  -     Assistive Device (Toilet Transfer) commode;commode, bedside with drop arms  -     Row Name 03/12/22 1125          Toilet Transfer    Type (Toilet Transfer) stand-sit;sit-stand  -[facility] Name 03/12/22 1125          Activities of Daily Living    BADL Assessment/Intervention grooming;toileting  -     Row Name 03/12/22 1125          Toileting Assessment/Training    [place] Level (Toileting) adjust/manage clothing;perform perineal hygiene;contact guard assist  -     Row Name 03/12/22 1125          Grooming Assessment/Training    [place] Level (Grooming) hair care, combing/brushing;wash face, hands;supervision  -     Position (Grooming) long sitting  -           User Key  (r) = Recorded By, (t) = Taken By, (c) = Cosigned By    Initials Name Provider Type    Latisha Hutchison COTA Occupational Therapy Assistant                Obj/Interventions    No documentation.                Goals/Plan     Row Name 03/12/22 1125          Transfer Goal 1 (OT)    Activity/Assistive Device (Transfer Goal 1, OT) toilet  -LW     [place] Level/Cues Needed (Transfer Goal 1, OT) supervision required  -LW     Time Frame (Transfer Goal 1, OT) long term goal (LTG);by discharge  -LW     Progress/Outcome (Transfer Goal 1, OT) goal not met  -     Row Name 03/12/22 1125          Bathing Goal 1 (OT)    Activity/Device (Bathing Goal 1, OT) lower body bathing  -LW     [place] Level/Cues Needed (Bathing Goal 1, OT) minimum assist (75% or more patient effort)  -LW     Time Frame (Bathing Goal 1, OT) long term goal (LTG);by discharge  -LW     Progress/Outcomes (Bathing Goal 1, OT) goal not met  -     Row Name 03/12/22 1125          Dressing Goal 1 (OT)    Activity/Device (Dressing Goal 1, OT) lower body dressing  -LW     [place]/Cues Needed (Dressing Goal 1, OT) minimum assist (75% or more patient effort)  -LW     Time Frame (Dressing Goal 1, OT) long term goal (LTG);by discharge  -LW     Progress/Outcome (Dressing Goal 1, OT) goal not met  -LW           User Key  (r) = Recorded By, (t) = Taken By, (c) = Cosigned By    Initials Name Provider Type    LW Latisha Pratt COTA Occupational Therapy Assistant               Clinical Impression     Row Name 03/12/22 1125          Pain Assessment    Pretreatment Pain Rating 4/10  -     Posttreatment Pain Rating 4/10  -     Pain Location - knee  -LW     Pain Intervention(s) Repositioned  -LW     Row Name 03/12/22 1452 03/12/22 1126       Plan of Care Review    Plan of Care Reviewed With patient  -LW patient  -LW    Progress -- improving  -LW    Outcome Evaluation Pt supine to sit to EOB SBA. T/f to toilet with CGA. Pt completed toilet tasks with CGA. Grooming with SBA. Pt long sitting in bed all needs in reach.  -LW --    Row Name 03/12/22 1350          Positioning and Restraints    Pre-Treatment  Position in bed  -LW     Post Treatment Position bed  -LW     In Bed supine;call light within reach;encouraged to call for assist;exit alarm on;side rails up x2  -LW           User Key  (r) = Recorded By, (t) = Taken By, (c) = Cosigned By    Initials Name Provider Type    Latisha Hutchison COTA Occupational Therapy Assistant               Outcome Measures     Row Name 03/12/22 1125          How much help from another is currently needed...    Putting on and taking off regular lower body clothing? 2  -LW     Bathing (including washing, rinsing, and drying) 2  -LW     Toileting (which includes using toilet bed pan or urinal) 3  -LW     Putting on and taking off regular upper body clothing 3  -LW     Taking care of personal grooming (such as brushing teeth) 4  -LW     Eating meals 4  -LW     AM-PAC 6 Clicks Score (OT) 18  -LW           User Key  (r) = Recorded By, (t) = Taken By, (c) = Cosigned By    Initials Name Provider Type    Latisha Hutchison COTA Occupational Therapy Assistant                Occupational Therapy Education                 Title: PT OT SLP Therapies (Done)     Topic: Occupational Therapy (Done)     Point: ADL training (Done)     Description:   Instruct learner(s) on proper safety adaptation and remediation techniques during self care or transfers.   Instruct in proper use of assistive devices.              Learning Progress Summary           Patient Acceptance, E,TB, VU by LW at 3/12/2022 1455                   Point: Home exercise program (Done)     Description:   Instruct learner(s) on appropriate technique for monitoring, assisting and/or progressing therapeutic exercises/activities.              Learning Progress Summary           Patient Acceptance, E,TB, VU by ALIVIA at 3/12/2022 1455                   Point: Precautions (Done)     Description:   Instruct learner(s) on prescribed precautions during self-care and functional transfers.              Learning Progress Summary           Patient  Acceptance, E,TB, VU by  at 3/12/2022 1455    Acceptance, E, VU,NR by ME at 3/11/2022 1324    Comment: Educated on OT and POC. Educated to call for assistance. Educated on safety precautions.                   Point: Body mechanics (Done)     Description:   Instruct learner(s) on proper positioning and spine alignment during self-care, functional mobility activities and/or exercises.              Learning Progress Summary           Patient Acceptance, E,TB, VU by LW at 3/12/2022 1455    Acceptance, E, VU,NR by ME at 3/11/2022 1324    Comment: Educated on OT and POC. Educated to call for assistance. Educated on safety precautions.                               User Key     Initials Effective Dates Name Provider Type Discipline     06/16/21 -  Latisha Pratt COTA Occupational Therapy Assistant OT    ME 06/16/21 -  Lauren Fleming OTR/L Occupational Therapist OT              OT Recommendation and Plan     Plan of Care Review  Plan of Care Reviewed With: patient  Progress: improving  Outcome Evaluation: Pt supine to sit to EOB SBA. T/f to toilet with CGA. Pt completed toilet tasks with CGA. Grooming with SBA. Pt long sitting in bed all needs in reach.     Time Calculation:    Time Calculation- OT     Row Name 03/12/22 1125             Time Calculation- OT    OT Start Time 1125  -LW      OT Stop Time 1150  -LW      OT Time Calculation (min) 25 min  -LW      Total Timed Code Minutes- OT 25 minute(s)  -LW      OT Received On 03/12/22  -LW              Timed Charges    82934 - OT Self Care/Mgmt Minutes 25  -LW              Total Minutes    Timed Charges Total Minutes 25  -LW       Total Minutes 25  -LW            User Key  (r) = Recorded By, (t) = Taken By, (c) = Cosigned By    Initials Name Provider Type    LW Latisha Pratt COTA Occupational Therapy Assistant              Therapy Charges for Today     Code Description Service Date Service Provider Modifiers Qty    [id number]  OT SELF CARE/MGMT/TRAIN EA 15 MIN  3/12/2022 Latisha Pratt, SANDRA  2               SANDRA Mcpherson  3/12/2022

## 2022-03-12 NOTE — PLAN OF CARE
Problem: Adult Inpatient Plan of Care  Goal: Plan of Care Review  Outcome: Ongoing, Progressing  Flowsheets  Taken 3/12/2022 1455  Progress: improving  Plan of Care Reviewed With: patient  Taken 3/12/2022 1452  Plan of Care Reviewed With: patient  Outcome Evaluation: Pt supine to sit to EOB SBA. T/f to toilet with CGA. Pt completed toilet tasks with CGA. Grooming with SBA. Pt long sitting in bed all needs in reach.  Taken 3/12/2022 1125  Progress: improving  Plan of Care Reviewed With: patient   Goal Outcome Evaluation:  Plan of Care Reviewed With: patient        Progress: improving  Outcome Evaluation: Pt supine to sit to EOB SBA. T/f to toilet with CGA. Pt completed toilet tasks with CGA. Grooming with SBA. Pt long sitting in bed all needs in reach.

## 2022-03-12 NOTE — THERAPY TREATMENT NOTE
Acute Care - Physical Therapy Treatment Note  [facility]     Patient Name: Myles Shannon  : 1975  MRN: [id number]  Today's Date: 3/12/2022      Visit Dx:     ICD-10-CM ICD-9-CM   1. Weakness  R53.1 780.79   2. Gait disturbance  R26.9 781.2   3. Urinary tract infection without hematuria, site unspecified  N39.0 599.0   4. Stroke-like symptoms  R29.90 781.99   5. Impaired mobility and ADLs  Z74.09 V49.89    Z78.9    6. Impaired functional mobility, balance, gait, and endurance  Z74.09 V49.89     Patient Active Problem List   Diagnosis   • Nicotine abuse   • Other chest pain   • Acute maxillary sinusitis   • Bipolar affective disorder ([facility])   • Decreased pulses in feet   • Flank pain   • Hematochezia   • High cholesterol   • Migraine without aura and with status migrainosus, not intractable   • Migraine without status migrainosus, not intractable   • Neck pain   • Nephrolithiasis   • Neuropathy   • Chronic pain   • Primary osteoarthritis involving multiple joints   • Sciatic leg pain   • Screening for diabetes mellitus (DM)   • Seasonal allergies   • Stress   • Tobacco use disorder   • Urinary incontinence without sensory awareness   • Vitamin D deficiency   • Carpal tunnel syndrome of right wrist   • Seizure ([facility])   • Intractable vomiting   • Acute UTI (urinary tract infection)   • Cholelithiasis   • GI bleed   • Kidney stone   • Nephrolithiasis   • ELBERT (acute kidney injury) ([facility])   • Infection due to ESBL-producing Escherichia coli   • CVA (cerebral vascular accident) ([facility])   • Dysphagia due to recent stroke   • Slurred speech   • Cerebrovascular accident (CVA) ([facility])   • Aphasia due to acute cerebrovascular accident (CVA) ([facility])   • Methamphetamine abuse ([facility])   • Seizure disorder ([facility])   • UTI (urinary tract infection) due to urinary indwelling catheter ([facility])   • Urinary tract infection due to extended-spectrum beta lactamase (ESBL) producing Escherichia coli   • Urinary tract infection due to  extended-spectrum beta lactamase (ESBL) producing Escherichia coli   • Metabolic acidosis, NAG, bicarbonate losses   • Midline shift of brain due to stroke   • Nonintractable headache   • Weakness     Past Medical History:   Diagnosis Date   • Abdominal pain     Chronic RLQ, RUQ, R flank comes and goes.    • Abdominal pain, right lower quadrant    • Acute bilateral otitis media    • Allergic rhinitis    • Backache     Upper back.   • Candidiasis of skin    • Cellulitis of neck    • Chest pain    • Contraception    • Cough    • Dysfunction of eustachian tube    • Dyspareunia, female    • Excessive or frequent menstruation    • Flank pain    • Generalized aches and pains    • Headache    • Health maintenance examination    • Infection of skin and subcutaneous tissue    • Irregular periods    • Kidney stone     Poss   • Menorrhagia    • Nausea vomiting and diarrhea    • Obesity    • Open wound of abdominal wall    • Otalgia    • Pain in left foot    • Pain in unspecified hand    • Removed Impacted cerumen 2012   • Rhinitis    • Seizure ([facility]) 8/15/2019   • Shoulder pain     right-sided-likely muscle strain      • Shoulder tendinitis    • Spider bite wound    • Staphylococcal infection of skin    • Swollen feet    • Tinea cruris    • Tobacco dependence syndrome    • Upper respiratory infection    • Urinary tract infectious disease    • Vertigo    • Visit for gynecologic examination    • Vulvovaginitis      Past Surgical History:   Procedure Laterality Date   •  SECTION     • DILATATION AND CURETTAGE      Secondary to incomplete spontaneous    • INCISION AND DRAINAGE ABSCESS  2012   • INJECTION OF MEDICATION  2015    Phenergan (1)     • INJECTION OF MEDICATION  10/10/2013    Toradol (2)      • INJECTION OF MEDICATION  2014    Zofran (1)        PT Assessment (last 12 hours)     PT Evaluation and Treatment     Row Name 22 0932          Physical Therapy Time and Intention     Subjective Information no complaints  -EM     Document Type therapy note (daily note)  -EM     Mode of Treatment physical therapy;individual therapy  -EM     Patient Effort good  -EM     Row Name 03/12/22 0932          Pain    Pretreatment Pain Rating 5/10  -EM     Posttreatment Pain Rating 5/10  -EM     Pain Location - Side/Orientation Right  -EM     Pain Location - knee  -EM     Row Name 03/12/22 0932          Cognition    Affect/Mental Status (Cognition) unable/difficult to assess  -EM     Orientation Status (Cognition) oriented to;person;place;situation  -EM     Personal Safety Interventions fall prevention program maintained;gait belt;nonskid shoes/slippers when out of bed;supervised activity  -EM     Row Name 03/12/22 0932          Bed Mobility    Supine-Sit [place] (Bed Mobility) supervision  -EM     Sit-Supine [place] (Bed Mobility) supervision  -EM     Comment, (Bed Mobility) HOB down, no bedrail  -EM     Row Name 03/12/22 0932          Transfers    Sit-Stand [place] (Transfers) contact guard  -EM     Stand-Sit [place] (Transfers) contact guard  -EM     Row Name 03/12/22 0932          Stand-Sit Transfer    Assistive Device (Stand-Sit Transfers) walker, front-wheeled  -EM     Row Name 03/12/22 0932          Gait/Stairs (Locomotion)    [place] Level (Gait) contact guard;minimum assist (75% patient effort)  -EM     Assistive Device (Gait) walker, front-wheeled  -EM     Distance in Feet (Gait) 110', 110'  followed with chair  -EM     Pattern (Gait) step-through  -EM     Comment, (Gait/Stairs) 3 episodes of knee buckling-all self corrected.  -EM     Row Name 03/12/22 0932          Hip (Therapeutic Exercise)    Hip Strengthening (Therapeutic Exercise) 2 sets;10 repetitions;supine  Bridges  -EM     Row Name 03/12/22 0932          Knee (Therapeutic Exercise)    Knee Strengthening (Therapeutic Exercise) bilateral;SLR (straight leg raise);supine;LAQ (long arc quad);2 sets;10 repetitions  -EM      Row Name 03/12/22 0932          Vital Signs    Pre Systolic BP Rehab 112  -EM     Pre Treatment Diastolic BP 85  -EM     Post Systolic BP Rehab 109  -EM     Post Treatment Diastolic BP 70  -EM     Pretreatment Heart Rate (beats/min) 82  -EM     Posttreatment Heart Rate (beats/min) 74  -EM     Pre SpO2 (%) 100  -EM     O2 Delivery Pre Treatment room air  -EM     Post SpO2 (%) 97  -EM     O2 Delivery Post Treatment room air  -EM     Pre Patient Position Supine  -EM     Intra Patient Position Standing  -EM     Post Patient Position Supine  -EM     Row Name 03/12/22 0932          Positioning and Restraints    Pre-Treatment Position in bed  -EM     Post Treatment Position bed  -EM     Row Name 03/12/22 0932          Bed Mobility Goal 1 (PT)    Activity/Assistive Device (Bed Mobility Goal 1, PT) bed mobility activities, all  -EM     [place] Level/Cues Needed (Bed Mobility Goal 1, PT) independent  -EM     Progress/Outcomes (Bed Mobility Goal 1, PT) goal ongoing  -EM     Row Name 03/12/22 0932          Transfer Goal 1 (PT)    Activity/Assistive Device (Transfer Goal 1, PT) bed-to-chair/chair-to-bed;toilet  -EM     [place] Level/Cues Needed (Transfer Goal 1, PT) modified independence  -EM     Time Frame (Transfer Goal 1, PT) by discharge  -EM     Progress/Outcome (Transfer Goal 1, PT) goal not met  -EM     Row Name 03/12/22 0932          Gait Training Goal 1 (PT)    Activity/Assistive Device (Gait Training Goal 1, PT) gait (walking locomotion);assistive device use;walker, rolling  -EM     [place] Level (Gait Training Goal 1, PT) modified independence  -EM     Distance (Gait Training Goal 1, PT) 300 ft or more w/out LOB and w/ appropriate use of FWRW in narrow and broad spaces  -EM     Time Frame (Gait Training Goal 1, PT) 10 days  -EM     Progress/Outcome (Gait Training Goal 1, PT) goal not met  -EM     Row Name 03/12/22 0932          Stairs Goal 1 (PT)    Activity/Assistive Device (Stairs Goal 1, PT)  ascending stairs;descending stairs  -EM     [place] Level/Cues Needed (Stairs Goal 1, PT) modified independence;supervision required  -EM     Number of Stairs (Stairs Goal 1, PT) 2 or more but watch for R knee giving way; only try steps after she is consistently strong in gait w/ FWRW 200 ft or more  -EM     Progress/Outcome (Stairs Goal 1, PT) goal not met  -EM     Row Name 03/12/22 0932          Problem Specific Goal 1 (PT)    Problem Specific Goal 1 (PT) if john w/out increased R knee pain may try sit to stand ex for eccentric control in mobilty as john;  -EM     Time Frame (Problem Specific Goal 1, PT) 2 weeks  -EM     Progress/Outcome (Problem Specific Goal 1, PT) goal not met  -EM           User Key  (r) = Recorded By, (t) = Taken By, (c) = Cosigned By    Initials Name Provider Type    EM Hans Self, PTA Physical Therapy Assistant                Physical Therapy Education                 Title: PT OT SLP Therapies (In Progress)     Topic: Physical Therapy (Done)     Point: Mobility training (Done)     Learning Progress Summary           Patient Acceptance, E, VU,NR by  at 3/11/2022 1918    Comment: POC;                   Point: Home exercise program (Done)     Learning Progress Summary           Patient Acceptance, E, VU,NR by  at 3/11/2022 1918    Comment: POC;                   Point: Body mechanics (Done)     Learning Progress Summary           Patient Acceptance, E, VU,NR by  at 3/11/2022 1918    Comment: POC;                   Point: Precautions (Done)     Learning Progress Summary           Patient Acceptance, E, VU,NR by  at 3/11/2022 1918    Comment: POC;                               User Key     Initials Effective Dates Name Provider Type Discipline     06/16/21 -  Susie De La Vega E, PT Physical Therapist PT              PT Recommendation and Plan  Anticipated Discharge Disposition (PT): skilled nursing facility, home with 24/7 care, home with home health  Plan of Care  Reviewed With: patient  Progress: improving  Outcome Evaluation: Pt john tx well with improved effort and participation. Pt t/f sup-sit-sup with SBAx1 with HOB down and no bedrail. Sit-stand-sit with CGAx1. PT amb 2x110' with FWRW with CGA/Min Ax1 while followed with chair with R knee buckling x3 occassions all of which was self corrected. Pt performed B LE therex 2x10 each with max vcs for comprehension of therex for proper technique.       Time Calculation:    PT Charges     Row Name 03/12/22 1158             Time Calculation    Start Time 0932  -EM      Stop Time 1017  -EM      Time Calculation (min) 45 min  -EM              Time Calculation- PT    Total Timed Code Minutes- PT 45 minute(s)  -EM            User Key  (r) = Recorded By, (t) = Taken By, (c) = Cosigned By    Initials Name Provider Type    EM Hans Self PTA Physical Therapy Assistant              Therapy Charges for Today     Code Description Service Date Service Provider Modifiers Qty    05806619132 HC PT THER PROC EA 15 MIN 3/12/2022 Hans Self PTA GP, CQ 1    98279788730 HC PT THERAPEUTIC ACT EA 15 MIN 3/12/2022 Hans Self PTA GP, CQ 1    83161118675 HC GAIT TRAINING EA 15 MIN 3/12/2022 Hans Self PTA GP, CQ 1          PT G-Codes  Outcome Measure Options: AM-PAC 6 Clicks Basic Mobility (PT)  AM-PAC 6 Clicks Score (PT): 15  AM-PAC 6 Clicks Score (OT): 17    Hans Self PTA  3/12/2022

## 2022-03-12 NOTE — PLAN OF CARE
Goal Outcome Evaluation:  Plan of Care Reviewed With: patient           Outcome Evaluation: PT Eval with pt able to move in bed for mobility but hindered by her c/o R knee pain post fall at assisted living; She also speaks of frustration w/ repeat hospitial visits. She had limited sustained contractions for MMT making scoring it difficult but demonstrated at least 4-/5 functionally in bed mobility, bridging and sup<>sit. She will need to be as indep at home as possible for assisted living facility. R knee films indicate tri compartment arthritis.  May benefit from more rehab to home before assisted living;She states she does not have a walker but would like one. Will follow for gait training and ther ex with caution for R knee in process as well as fall risk. Consider SNF for rehab vs assisted living w/ FWRW if she shows consistent indep w/ FWRW and mobilty;

## 2022-03-13 VITALS
OXYGEN SATURATION: 100 % | DIASTOLIC BLOOD PRESSURE: 81 MMHG | TEMPERATURE: 97.4 F | HEIGHT: 67 IN | SYSTOLIC BLOOD PRESSURE: 116 MMHG | WEIGHT: 206.2 LBS | RESPIRATION RATE: 18 BRPM | HEART RATE: 68 BPM | BODY MASS INDEX: 32.36 KG/M2

## 2022-03-13 PROCEDURE — G0378 HOSPITAL OBSERVATION PER HR: HCPCS

## 2022-03-13 PROCEDURE — 97116 GAIT TRAINING THERAPY: CPT

## 2022-03-13 PROCEDURE — 97530 THERAPEUTIC ACTIVITIES: CPT

## 2022-03-13 RX ORDER — BUSPIRONE HYDROCHLORIDE 5 MG/1
5 TABLET ORAL 3 TIMES DAILY
Start: 2022-03-13 | End: 2022-04-27 | Stop reason: SDUPTHER

## 2022-03-13 RX ORDER — NITROFURANTOIN 25; 75 MG/1; MG/1
100 CAPSULE ORAL 2 TIMES DAILY
Qty: 10 CAPSULE | Refills: 0 | Status: SHIPPED | OUTPATIENT
Start: 2022-03-13 | End: 2022-04-07

## 2022-03-13 RX ADMIN — PANTOPRAZOLE SODIUM 40 MG: 40 TABLET, DELAYED RELEASE ORAL at 08:39

## 2022-03-13 RX ADMIN — Medication 10 ML: at 08:41

## 2022-03-13 RX ADMIN — FUROSEMIDE 20 MG: 20 TABLET ORAL at 08:39

## 2022-03-13 RX ADMIN — BUSPIRONE HYDROCHLORIDE 5 MG: 5 TABLET ORAL at 08:40

## 2022-03-13 RX ADMIN — ASPIRIN 325 MG: 325 TABLET ORAL at 08:40

## 2022-03-13 RX ADMIN — LEVETIRACETAM 500 MG: 500 TABLET, FILM COATED ORAL at 08:40

## 2022-03-13 RX ADMIN — ESCITALOPRAM OXALATE 10 MG: 10 TABLET ORAL at 08:39

## 2022-03-13 NOTE — PLAN OF CARE
Goal Outcome Evaluation:  Plan of Care Reviewed With: patient        Progress: improving  Outcome Evaluation: Pt john tx well. She reports she is eager to d/c and is feeling much better today. 0/10 pain noted with R knee pre and post tx. Pt t/f sup-sit with SBAx1. Sit-stand-sit with SBAx1. Pt amb 300' with FWRW with CGAx1 with no knee buckling noted this date. Pt up in chair post tx with tag alarm on. No goals met this tx, however, good progress is noted. Pt will benefit from home with 24/7 care and HHPT initially with possible OP PT as appropriate. Pt was issued a donated FWRW yesterday and will benefit from a elevated toilet seat and possibly a tub bench upon d/c.

## 2022-03-13 NOTE — DISCHARGE SUMMARY
DISCHARGE SUMMARY    NAME: Myles Shannon   PHYSICIAN: Umer Gillis MD  : 1975  MRN: [id number]    ADMITTED: 3/10/2022   DISCHARGED:  22    ADMISSION DIAGNOSES:   Present on Admission:  • Weakness    DISCHARGE DIAGNOSES:     Weakness      SERVICE: Medicine. Attending  Umer Gillis MD    CONSULTS:   Consult Orders (all) (From admission, onward)     Start     Ordered    03/10/22 2227  Inpatient Nutrition Consult  Once        Provider:  (Not yet assigned)    03/10/22 2226    03/10/22 2223  Inpatient Case Management  Consult  Once        Provider:  (Not yet assigned)    03/10/22 2223    03/10/22 1357  Inpatient Neurology Consult Stroke  Once        Specialty:  Neurology  Provider:  Kuldeep Beard MD    03/10/22 1356                PROCEDURES:   Imaging Results (Last 7 Days)     Procedure Component Value Units Date/Time    XR Knee 1 or 2 View Right [[id number]] Collected: 22 170     Updated: 22 172    Narrative:      Comparison: None    Indication: Knee pain    Findings: Two views of the right knee are obtained. There is  normal alignment. No fracture or dislocation. Tricompartmental  osteophytes. No significant effusion. Anterior soft tissue  swelling.      Impression:      Impression: Mild tricompartmental osteoarthritis right knee.    Electronically signed by:  Cameron Mcmahon MD  3/11/2022 5:19 PM CST  Workstation: [id number]    MRI Brain Without Contrast [[id number]] Collected: 03/10/22 1425     Updated: 03/10/22 1559    Narrative:      MRI brain without IV contrast March 10, 2022    INDICATION: Acute onset neurologic deficit    Pulse sequences: Sagittal, coronal and multisequence axial  imaging without IV contrast    FINDINGS:  No mass effect, midline shift, hemorrhage, hydrocephalus or  extra-axial fluid collections.  Old left middle cerebral artery distribution perisylvian infarct  with encephalomalacia.  Mild asymmetric left-sided ventriculomegaly likely ex  vacuo.  No acute ischemia noted on diffusion-weighted images.  No other areas of abnormal signal intensity.  Visualized mastoids and sinuses grossly clear.      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  No acute abnormality appreciated.    Electronically signed by:  Ernesto Jacobs MD  3/10/2022 3:57 PM  CST Workstation: [id number]    CT Head Without Contrast Stroke Protocol [[id number]] Collected: 03/10/22 1359     Updated: 03/10/22 1423    Narrative:      COMPARISON:     3/5/2022, 3/4/2022    INDICATION: Stroke follow-up    TECHNIQUE:  CT of the head was performed from the vertex thru the  skull base.  Reformats are obtained.    All CT scans at this location are performed using dose modulation  techniques as appropriate to a performed exam including the  following: automated exposure control; adjustment of the mA  and/or kV according to patient size (this includes techniques or  standardized protocols for targeted exams where dose is matched  to indication / reason for exam; i.e. extremities or head); use  of iterative reconstruction technique.    FINDINGS:     The ventricles sulci and CSF spaces are normal size  configuration.  There is encephalomalacia within the left MCA  distribution. There is no mass, mass effect, or midline shift.   There is no abnormal intra-axial or extra-axial fluid collection  or hematoma.  There is preservation of normal gray-white  differentiation.  The bony calvarium is intact.  Partial  opacification of left posterior ethmoid air cells. Remaining  paranasal sinuses and mastoid air cells are well-aerated.      Impression:      Left MCA distribution encephalomalacia. There is  decreased surrounding edema with resolution of previously noted  shift.    Electronically signed by:  Cameron Mcmahon MD  3/10/2022 2:21 PM CST  Workstation: [id number]    XR Chest 1 View [[id number]] Collected: 03/10/22 0130     Updated: 03/10/22 1419    Narrative:      Chest x-ray single view.          CLINICAL INDICATION: Shortness of breath. Stroke protocol    COMPARISON: Chest March 4, 2022    FINDINGS: Cardiac silhouette is normal in size. Pulmonary  vascularity is unremarkable.     No focal infiltrate or consolidation.  No pleural effusion.  No  pneumothorax.  Increased markings right lung base similar to prior examination  probably related to poor inspiratory effort.        Impression:      No evidence of active disease. No change since prior  exam.       Electronically signed by:  John Galeano MD  3/10/2022 2:17 PM CST  Workstation: [id number]          HISTORY OF PRESENT ILLNESS: *Copied from Joel Bhakta MD's H&P*  This is a 46-year-old female with concurrent medical history of hypertension, hyperlipidemia, CVA presenting with expressive aphasia and unable to ambulate today at [facility].  She denies any numbness tingling.  She has had previous left-sided MCA.    DIAGNOSTIC DATA:   Lab Results (last 7 days)     Procedure Component Value Units Date/Time    Basic Metabolic Panel [[id number]]  (Abnormal) Collected: 03/12/22 0550    Specimen: Blood Updated: 03/12/22 0656     Glucose 91 mg/dL      BUN 19 mg/dL      Creatinine 1.17 mg/dL      Sodium 138 mmol/L      Potassium 4.1 mmol/L      Comment: Slight hemolysis detected by analyzer. Results may be affected.        Chloride 101 mmol/L      CO2 23.0 mmol/L      Calcium 8.7 mg/dL      BUN/Creatinine Ratio 16.2     Anion Gap 14.0 mmol/L      eGFR 58.4 mL/min/1.73      Comment: National Kidney Foundation and American Society of Nephrology (ASN) Task Force recommended calculation based on the Chronic Kidney Disease Epidemiology Collaboration (CKD-EPI) equation refit without adjustment for race.       Narrative:      GFR Normal >60  Chronic Kidney Disease <60  Kidney Failure <15      Magnesium [[id number]]  (Normal) Collected: 03/12/22 0550    Specimen: Blood Updated: 03/12/22 0656     Magnesium 2.1 mg/dL     CBC & Differential [[id number]]  (Normal) Collected:  03/12/22 0550    Specimen: Blood Updated: 03/12/22 0631    Narrative:      The following orders were created for panel order CBC & Differential.  Procedure                               Abnormality         Status                     ---------                               -----------         ------                     CBC Auto Differential[[id number]]        Normal              Final result                 Please view results for these tests on the individual orders.    CBC Auto Differential [[id number]]  (Normal) Collected: 03/12/22 0550    Specimen: Blood Updated: 03/12/22 0631     WBC 7.77 10*3/mm3      RBC 4.17 10*6/mm3      Hemoglobin 12.6 g/dL      Hematocrit 37.9 %      MCV 90.9 fL      MCH 30.2 pg      MCHC 33.2 g/dL      RDW 12.7 %      RDW-SD 41.8 fl      MPV 11.8 fL      Platelets 222 10*3/mm3      Neutrophil % 69.5 %      Lymphocyte % 21.0 %      Monocyte % 6.8 %      Eosinophil % 1.9 %      Basophil % 0.3 %      Immature Grans % 0.5 %      Neutrophils, Absolute 5.40 10*3/mm3      Lymphocytes, Absolute 1.63 10*3/mm3      Monocytes, Absolute 0.53 10*3/mm3      Eosinophils, Absolute 0.15 10*3/mm3      Basophils, Absolute 0.02 10*3/mm3      Immature Grans, Absolute 0.04 10*3/mm3      nRBC 0.0 /100 WBC     Basic Metabolic Panel [[id number]]  (Abnormal) Collected: 03/11/22 0546    Specimen: Blood Updated: 03/11/22 0621     Glucose 91 mg/dL      BUN 19 mg/dL      Creatinine 1.24 mg/dL      Sodium 138 mmol/L      Potassium 4.6 mmol/L      Comment: Slight hemolysis detected by analyzer. Results may be affected.        Chloride 102 mmol/L      CO2 22.0 mmol/L      Calcium 9.2 mg/dL      BUN/Creatinine Ratio 15.3     Anion Gap 14.0 mmol/L      eGFR 54.5 mL/min/1.73      Comment: National Kidney Foundation and American Society of Nephrology (ASN) Task Force recommended calculation based on the Chronic Kidney Disease Epidemiology Collaboration (CKD-EPI) equation refit without adjustment for race.       Narrative:       GFR Normal >60  Chronic Kidney Disease <60  Kidney Failure <15      Magnesium [[id number]]  (Normal) Collected: 03/11/22 0546    Specimen: Blood Updated: 03/11/22 0618     Magnesium 2.1 mg/dL     CBC & Differential [[id number]]  (Abnormal) Collected: 03/11/22 0546    Specimen: Blood Updated: 03/11/22 0556    Narrative:      The following orders were created for panel order CBC & Differential.  Procedure                               Abnormality         Status                     ---------                               -----------         ------                     CBC Auto Differential[[id number]]        Abnormal            Final result                 Please view results for these tests on the individual orders.    CBC Auto Differential [[id number]]  (Abnormal) Collected: 03/11/22 0546    Specimen: Blood Updated: 03/11/22 0556     WBC 15.26 10*3/mm3      RBC 4.61 10*6/mm3      Hemoglobin 14.0 g/dL      Hematocrit 42.1 %      MCV 91.3 fL      MCH 30.4 pg      MCHC 33.3 g/dL      RDW 12.7 %      RDW-SD 42.5 fl      MPV 11.0 fL      Platelets 257 10*3/mm3      Neutrophil % 85.1 %      Lymphocyte % 7.8 %      Monocyte % 5.2 %      Eosinophil % 1.0 %      Basophil % 0.4 %      Immature Grans % 0.5 %      Neutrophils, Absolute 12.98 10*3/mm3      Lymphocytes, Absolute 1.19 10*3/mm3      Monocytes, Absolute 0.80 10*3/mm3      Eosinophils, Absolute 0.16 10*3/mm3      Basophils, Absolute 0.06 10*3/mm3      Immature Grans, Absolute 0.07 10*3/mm3      nRBC 0.0 /100 WBC     Magnesium [[id number]]  (Normal) Collected: 03/10/22 1300    Specimen: Blood from Hand, Right Updated: 03/10/22 2036     Magnesium 2.2 mg/dL     COVID-19 and FLU A/B PCR - Swab, Nasopharynx [[id number]]  (Normal) Collected: 03/10/22 1800    Specimen: Swab from Nasopharynx Updated: 03/10/22 1834     COVID19 Not Detected     Influenza A PCR Not Detected     Influenza B PCR Not Detected    Narrative:      Fact sheet for providers:  https://www.fda.gov/media/028744/download    Fact sheet for patients: https://www.fda.gov/media/521390/download    Test performed by PCR.    Urine Drug Screen - Urine, Clean Catch [[id number]]  (Normal) Collected: 03/10/22 1302    Specimen: Urine, Clean Catch Updated: 03/10/22 1626     THC, Screen, Urine Negative     Phencyclidine (PCP), Urine Negative     Cocaine Screen, Urine Negative     Methamphetamine, Ur Negative     Opiate Screen Negative     Amphetamine Screen, Urine Negative     Benzodiazepine Screen, Urine Negative     Tricyclic Antidepressants Screen Negative     Methadone Screen, Urine Negative     Barbiturates Screen, Urine Negative     Oxycodone Screen, Urine Negative     Propoxyphene Screen Negative     Buprenorphine, Screen, Urine Negative    Narrative:      Cutoff For Drugs Screened:    Amphetamines               500 ng/ml  Barbiturates               200 ng/ml  Benzodiazepines            150 ng/ml  Cocaine                    150 ng/ml  Methadone                  200 ng/ml  Opiates                    100 ng/ml  Phencyclidine               25 ng/ml  THC                            50 ng/ml  Methamphetamine            500 ng/ml  Tricyclic Antidepressants  300 ng/ml  Oxycodone                  100 ng/ml  Propoxyphene               300 ng/ml  Buprenorphine               10 ng/ml    The normal value for all drugs tested is negative. This report includes unconfirmed screening results, with the cutoff values listed, to be used for medical treatment purposes only.  Unconfirmed results must not be used for non-medical purposes such as employment or legal testing.  Clinical consideration should be applied to any drug of abuse test, particularly when unconfirmed results are used.      Protime-INR [[id number]]  (Normal) Collected: 03/10/22 1325    Specimen: Blood Updated: 03/10/22 1618     Protime 12.7 Seconds      INR 0.96    Narrative:      Therapeutic range for most indications is 2.0-3.0 INR,  or 2.5-3.5 for  mechanical heart valves.    Extra Tubes [[id number]] Collected: 03/10/22 1325    Specimen: Blood, Venous Line Updated: 03/10/22 1433    Narrative:      The following orders were created for panel order Extra Tubes.  Procedure                               Abnormality         Status                     ---------                               -----------         ------                     Gold Top - SST[[id number]]                                   Final result               Light Blue Top[[id number]]                                   Final result                 Please view results for these tests on the individual orders.    Gold Top - SST [[id number]] Collected: 03/10/22 1325    Specimen: Blood Updated: 03/10/22 1433     Extra Tube Hold for add-ons.     Comment: Auto resulted.       Light Blue Top [[id number]] Collected: 03/10/22 1325    Specimen: Blood Updated: 03/10/22 1433     Extra Tube hold for add-on     Comment: Auto resulted       Valproic Acid Level, Total [[id number]]  (Abnormal) Collected: 03/10/22 1300    Specimen: Blood from Hand, Right Updated: 03/10/22 1410     Valproic Acid <2.8 mcg/mL     Comprehensive Metabolic Panel [[id number]]  (Abnormal) Collected: 03/10/22 1300    Specimen: Blood from Hand, Right Updated: 03/10/22 1401     Glucose 119 mg/dL      BUN 15 mg/dL      Creatinine 1.23 mg/dL      Sodium 139 mmol/L      Potassium 4.4 mmol/L      Chloride 103 mmol/L      CO2 22.0 mmol/L      Calcium 9.9 mg/dL      Total Protein 7.5 g/dL      Albumin 4.80 g/dL      ALT (SGPT) 24 U/L      AST (SGOT) 24 U/L      Alkaline Phosphatase 109 U/L      Total Bilirubin 0.2 mg/dL      Globulin 2.7 gm/dL      A/G Ratio 1.8 g/dL      BUN/Creatinine Ratio 12.2     Anion Gap 14.0 mmol/L      eGFR 55.0 mL/min/1.73      Comment: National Kidney Foundation and American Society of Nephrology (ASN) Task Force recommended calculation based on the Chronic Kidney Disease Epidemiology Collaboration (CKD-EPI) equation refit without  adjustment for race.       Narrative:      GFR Normal >60  Chronic Kidney Disease <60  Kidney Failure <15      Urinalysis, Microscopic Only - Urine, Clean Catch [[id number]]  (Abnormal) Collected: 03/10/22 1302    Specimen: Urine, Clean Catch Updated: 03/10/22 1333     RBC, UA 0-2 /HPF      WBC, UA 31-50 /HPF      Bacteria, UA 1+ /HPF      Squamous Epithelial Cells, UA 0-2 /HPF      Hyaline Casts, UA None Seen /LPF      Methodology Automated Microscopy    Urinalysis With Microscopic If Indicated (No Culture) - Urine, Clean Catch [[id number]]  (Abnormal) Collected: 03/10/22 1302    Specimen: Urine, Clean Catch Updated: 03/10/22 1328     Color, UA Yellow     Appearance, UA Clear     pH, UA 6.5     Specific Gravity, UA 1.008     Glucose, UA Negative     Ketones, UA Negative     Bilirubin, UA Negative     Blood, UA Negative     Protein, UA Negative     Leuk Esterase, UA Moderate (2+)     Nitrite, UA Negative     Urobilinogen, UA 0.2 E.U./dL    CBC & Differential [[id number]]  (Normal) Collected: 03/10/22 1300    Specimen: Blood from Hand, Right Updated: 03/10/22 1325    Narrative:      The following orders were created for panel order CBC & Differential.  Procedure                               Abnormality         Status                     ---------                               -----------         ------                     CBC Auto Differential[[id number]]        Normal              Final result                 Please view results for these tests on the individual orders.    CBC Auto Differential [[id number]]  (Normal) Collected: 03/10/22 1300    Specimen: Blood from Hand, Right Updated: 03/10/22 1325     WBC 6.28 10*3/mm3      RBC 4.68 10*6/mm3      Hemoglobin 14.3 g/dL      Hematocrit 42.0 %      MCV 89.7 fL      MCH 30.6 pg      MCHC 34.0 g/dL      RDW 12.6 %      RDW-SD 41.3 fl      MPV 11.7 fL      Platelets 322 10*3/mm3      Neutrophil % 56.2 %      Lymphocyte % 33.3 %      Monocyte % 6.1 %      Eosinophil % 3.5 %   "    Basophil % 0.6 %      Immature Grans % 0.3 %      Neutrophils, Absolute 3.53 10*3/mm3      Lymphocytes, Absolute 2.09 10*3/mm3      Monocytes, Absolute 0.38 10*3/mm3      Eosinophils, Absolute 0.22 10*3/mm3      Basophils, Absolute 0.04 10*3/mm3      Immature Grans, Absolute 0.02 10*3/mm3      nRBC 0.0 /100 WBC           HOSPITAL COURSE:  Active Hospital Problems    Diagnosis  POA   • Weakness [R53.1]  Yes     The patient was admitted with stroke like symptoms. She was evaluated by neuro and had a negative CT or MRI. PT/OT evaluated the patient. No further workup recommended by neuro. She was continued on most of her home medications and was requesting discharge on 3/13 as she felt at baseline.     Macrobid sent on discharge to complete course for possible acute cystitis.    PHYSICAL EXAM ON DISCHARGE:  /81 (BP Location: Left arm, Patient Position: Lying)   Pulse 68   Temp 97.4 °F (36.3 °C) (Axillary)   Resp 18   Ht 170.2 cm (67\")   Wt 93.5 kg (206 lb 3.2 oz)   LMP  (LMP Unknown)   SpO2 100%   BMI 32.30 kg/m²      Physical Exam  Vitals and nursing note reviewed.   Constitutional:       Appearance: Normal appearance.   HENT:      Head: Normocephalic and atraumatic.   Cardiovascular:      Rate and Rhythm: Normal rate and regular rhythm.   Pulmonary:      Breath sounds: Normal breath sounds.   Abdominal:      General: Bowel sounds are normal.      Palpations: Abdomen is soft.   Skin:     General: Skin is warm and dry.   Neurological:      Mental Status: She is alert.   Psychiatric:         Mood and Affect: Mood normal.         Behavior: Behavior normal.         CONDITION ON DISCHARGE:   Stable    Review of Systems   Constitutional: Positive for activity change.   Gastrointestinal: Negative for abdominal pain.   Musculoskeletal: Positive for arthralgias.   Skin: Negative for color change and rash.   Psychiatric/Behavioral: Negative for agitation.       DISPOSITION:  Home or Self Care    DISCHARGE " MEDICATIONS     Discharge Medications      New Medications      Instructions Start Date   nitrofurantoin (macrocrystal-monohydrate) 100 MG capsule  Commonly known as: Macrobid   100 mg, Oral, 2 Times Daily         Changes to Medications      Instructions Start Date   busPIRone 5 MG tablet  Commonly known as: BUSPAR  What changed: how much to take   5 mg, Oral, 3 Times Daily         Continue These Medications      Instructions Start Date   acetaminophen 500 MG tablet  Commonly known as: TYLENOL   500 mg, Oral, Every 8 Hours PRN      aspirin 325 MG tablet   325 mg, Oral, Daily      atorvastatin 80 MG tablet  Commonly known as: LIPITOR   80 mg, Oral, Nightly      escitalopram 10 MG tablet  Commonly known as: LEXAPRO   10 mg, Oral, Daily      furosemide 20 MG tablet  Commonly known as: LASIX   20 mg, Oral, Daily      levETIRAcetam 500 MG tablet  Commonly known as: KEPPRA   500 mg, Oral, 2 Times Daily      levETIRAcetam 500 MG tablet  Commonly known as: KEPPRA   500 mg, Oral, Every 12 Hours Scheduled      melatonin 5 MG tablet tablet   5 mg, Oral, Nightly PRN      pantoprazole 40 MG EC tablet  Commonly known as: Protonix   40 mg, Oral, Daily      potassium chloride 10 MEQ CR capsule  Commonly known as: MICRO-K   10 mEq, Oral, Daily      traZODone 50 MG tablet  Commonly known as: DESYREL   50 mg, Oral, Nightly         Stop These Medications    cloNIDine 0.1 MG tablet  Commonly known as: CATAPRES     diclofenac-miSOPROStol 75-0.2 MG EC tablet  Commonly known as: ARTHROTEC 75     LOSARTAN POTASSIUM PO     valproic acid 250 MG capsule  Commonly known as: DEPAKENE            INSTRUCTIONS:  Activity:   Activity Instructions     Activity as Tolerated          Diet:   Diet Instructions     Advance Diet As Tolerated            Special instructions: Patient instructed to call MD or return to ED with worsening shortness of breath, chest pain, fever greater than 100.4 degrees F or any other medical concerns..    FOLLOW UP:    Additional Instructions for the Follow-ups that You Need to Schedule     Ambulatory Referral to Home Health   As directed      Face to Face Visit Date: 3/13/2022    Follow-up provider for Plan of Care?: I treated the patient in an acute care facility and will not continue treatment after discharge.    Follow-up provider: MARCELLE BOATENG [[id number]]    Reason/Clinical Findings: Deconditioning    Describe mobility limitations that make leaving home difficult: Deconditioning    Nursing/Therapeutic Services Requested: Physical Therapy Occupational Therapy    PT orders: Gait Training    Weight Bearing Status: As Tolerated    Occupational orders: Activities of daily living    Frequency: 1 Week 1            Contact information for follow-up providers     Marcelle Boateng MD Follow up.    Specialty: Family Medicine  Why: INFORMATION SENT TO OFFICE, PLEASE CALL MONDAY TO VERIFY  Contact information:  [address] [place] [id number] [phone]                   Contact information for after-discharge care     Home Medical Care     [facility] .    Service: Home Health Services  Contact information:  [address] [place] [address] [phone]                             PENDING TEST RESULTS AT DISCHARGE      Time: Discharge 30 min          This document has been electronically signed by Umer Gillis MD on March 13, 2022 13:13 CDT

## 2022-03-13 NOTE — PROGRESS NOTES
[facility] Medicine   INPATIENT PROGRESS NOTE      Patient Name: Myles Shannon  Date of Admission: 3/10/2022  Today's Date: 03/12/22  Length of Stay: 0  Primary Care Physician: Nick Boateng MD    Subjective   Chief Complaint: acute on chronic weakness  HPI   Patient suffering from acute on chronic weakness.  Repeat MRI brain shows no acute CVA.  She is scared if she goes home tonight that she return back to the emergency room immediately.        Review of Systems     All pertinent negatives and positives are as above. All other systems have been reviewed and are negative unless otherwise stated.     Objective    Temp:  [97 °F (36.1 °C)-98.2 °F (36.8 °C)] 97 °F (36.1 °C)  Heart Rate:  [67-84] 67  Resp:  [18-20] 18  BP: (105-133)/(53-85) 133/77  Physical Exam  Constitutional:       Appearance: Normal appearance. She is obese.   HENT:      Head: Normocephalic and atraumatic.      Nose: Nose normal.      Mouth/Throat:      Mouth: Mucous membranes are moist.      Pharynx: Oropharynx is clear.   Eyes:      Extraocular Movements: Extraocular movements intact.      Pupils: Pupils are equal, round, and reactive to light.   Cardiovascular:      Rate and Rhythm: Normal rate and regular rhythm.      Pulses: Normal pulses.      Heart sounds: Normal heart sounds.   Pulmonary:      Effort: Pulmonary effort is normal.      Breath sounds: Normal breath sounds.   Abdominal:      General: Abdomen is flat. Bowel sounds are normal.      Palpations: Abdomen is soft.   Musculoskeletal:         General: Normal range of motion.      Cervical back: Normal range of motion and neck supple.   Skin:     General: Skin is warm.      Capillary Refill: Capillary refill takes less than 2 seconds.   Neurological:      Mental Status: She is alert.      Comments: Muscle strength +4/5 RUE, -4/5 RLE             Results Review:  I have reviewed the labs, radiology results, and  diagnostic studies.    Laboratory Data:   Results from last 7 days   Lab Units 03/12/22  0550 03/11/22  0546 03/10/22  1300   WBC 10*3/mm3 7.77 15.26* 6.28   HEMOGLOBIN g/dL 12.6 14.0 14.3   HEMATOCRIT % 37.9 42.1 42.0   PLATELETS 10*3/mm3 222 257 322        Results from last 7 days   Lab Units 03/12/22  0550 03/11/22  0546 03/10/22  1300   SODIUM mmol/L 138 138 139   POTASSIUM mmol/L 4.1 4.6 4.4   CHLORIDE mmol/L 101 102 103   CO2 mmol/L 23.0 22.0 22.0   BUN mg/dL 19 19 15   CREATININE mg/dL 1.17* 1.24* 1.23*   CALCIUM mg/dL 8.7 9.2 9.9   BILIRUBIN mg/dL  --   --  0.2   ALK PHOS U/L  --   --  109   ALT (SGPT) U/L  --   --  24   AST (SGOT) U/L  --   --  24   GLUCOSE mg/dL 91 91 119*       Culture Data:   [unfilled]    Radiology Data:   Imaging Results (Last 24 Hours)     ** No results found for the last 24 hours. **          I have reviewed the patient's current medications.     Assessment/Plan     Active Hospital Problems    Diagnosis    • Weakness        Acute on chronic CVA:  Status post neurology evaluation with no further interventions recommended.  Patient worried that if she goes home she returned to emergency room complaining of similar symptoms.  S/p recent readmission within the past week with similar story.  Patient states she did feel comfortable going home tomorrow if no issues occur overnight.    HTN: cont home meds  Seizures: cont home meds      PPX SCD  Code Full  FEN diabetic             Discharge Planning: I expect the patient to be discharged to assisted living facility  in 1 to 3 days.    Electronically signed by Frederick Verdin MD, 03/12/22, 19:49 CST.

## 2022-03-13 NOTE — PLAN OF CARE
Goal Outcome Evaluation:         Pt did complain of some nausea at start of this shift, zofran given. No acute changes, vitals stable, 2 bms.

## 2022-03-13 NOTE — THERAPY TREATMENT NOTE
Acute Care - Physical Therapy Treatment Note  [facility]     Patient Name: Myles Shannon  : 1975  MRN: [id number]  Today's Date: 3/13/2022      Visit Dx:     ICD-10-CM ICD-9-CM   1. Weakness  R53.1 780.79   2. Gait disturbance  R26.9 781.2   3. Urinary tract infection without hematuria, site unspecified  N39.0 599.0   4. Stroke-like symptoms  R29.90 781.99   5. Impaired mobility and ADLs  Z74.09 V49.89    Z78.9    6. Impaired functional mobility, balance, gait, and endurance  Z74.09 V49.89     Patient Active Problem List   Diagnosis   • Nicotine abuse   • Other chest pain   • Acute maxillary sinusitis   • Bipolar affective disorder ([facility])   • Decreased pulses in feet   • Flank pain   • Hematochezia   • High cholesterol   • Migraine without aura and with status migrainosus, not intractable   • Migraine without status migrainosus, not intractable   • Neck pain   • Nephrolithiasis   • Neuropathy   • Chronic pain   • Primary osteoarthritis involving multiple joints   • Sciatic leg pain   • Screening for diabetes mellitus (DM)   • Seasonal allergies   • Stress   • Tobacco use disorder   • Urinary incontinence without sensory awareness   • Vitamin D deficiency   • Carpal tunnel syndrome of right wrist   • Seizure ([facility])   • Intractable vomiting   • Acute UTI (urinary tract infection)   • Cholelithiasis   • GI bleed   • Kidney stone   • Nephrolithiasis   • ELBERT (acute kidney injury) ([facility])   • Infection due to ESBL-producing Escherichia coli   • CVA (cerebral vascular accident) ([facility])   • Dysphagia due to recent stroke   • Slurred speech   • Cerebrovascular accident (CVA) ([facility])   • Aphasia due to acute cerebrovascular accident (CVA) ([facility])   • Methamphetamine abuse ([facility])   • Seizure disorder ([facility])   • UTI (urinary tract infection) due to urinary indwelling catheter ([facility])   • Urinary tract infection due to extended-spectrum beta lactamase (ESBL) producing Escherichia coli   • Urinary tract infection due to  extended-spectrum beta lactamase (ESBL) producing Escherichia coli   • Metabolic acidosis, NAG, bicarbonate losses   • Midline shift of brain due to stroke   • Nonintractable headache   • Weakness     Past Medical History:   Diagnosis Date   • Abdominal pain     Chronic RLQ, RUQ, R flank comes and goes.    • Abdominal pain, right lower quadrant    • Acute bilateral otitis media    • Allergic rhinitis    • Backache     Upper back.   • Candidiasis of skin    • Cellulitis of neck    • Chest pain    • Contraception    • Cough    • Dysfunction of eustachian tube    • Dyspareunia, female    • Excessive or frequent menstruation    • Flank pain    • Generalized aches and pains    • Headache    • Health maintenance examination    • Infection of skin and subcutaneous tissue    • Irregular periods    • Kidney stone     Poss   • Menorrhagia    • Nausea vomiting and diarrhea    • Obesity    • Open wound of abdominal wall    • Otalgia    • Pain in left foot    • Pain in unspecified hand    • Removed Impacted cerumen 2012   • Rhinitis    • Seizure ([facility]) 8/15/2019   • Shoulder pain     right-sided-likely muscle strain      • Shoulder tendinitis    • Spider bite wound    • Staphylococcal infection of skin    • Swollen feet    • Tinea cruris    • Tobacco dependence syndrome    • Upper respiratory infection    • Urinary tract infectious disease    • Vertigo    • Visit for gynecologic examination    • Vulvovaginitis      Past Surgical History:   Procedure Laterality Date   •  SECTION     • DILATATION AND CURETTAGE      Secondary to incomplete spontaneous    • INCISION AND DRAINAGE ABSCESS  2012   • INJECTION OF MEDICATION  2015    Phenergan (1)     • INJECTION OF MEDICATION  10/10/2013    Toradol (2)      • INJECTION OF MEDICATION  2014    Zofran (1)        PT Assessment (last 12 hours)     PT Evaluation and Treatment     Row Name 22 0853          Physical Therapy Time and Intention     "Subjective Information no complaints  -EM     Document Type therapy note (daily note)  -EM     Mode of Treatment individual therapy;physical therapy  -EM     Patient Effort excellent  -EM     Comment \"Im feeling good today. I'm ready to go home\"  -EM     Row Name 03/13/22 0853          Pain    Pretreatment Pain Rating 0/10 - no pain  -EM     Posttreatment Pain Rating 0/10 - no pain  -EM     Row Name 03/13/22 0853          Cognition    Affect/Mental Status (Cognition) WFL  -EM     Orientation Status (Cognition) oriented x 3  -EM     Follows Commands (Cognition) follows one-step commands  -EM     Personal Safety Interventions fall prevention program maintained;gait belt;nonskid shoes/slippers when out of bed;supervised activity  -EM     Row Name 03/13/22 0853          Bed Mobility    Supine-Sit Reynolds (Bed Mobility) standby assist  -EM     Sit-Supine Reynolds (Bed Mobility) standby assist  -EM     Assistive Device (Bed Mobility) bed rails;head of bed elevated  -EM     Row Name 03/13/22 0853          Transfers    Sit-Stand Reynolds (Transfers) standby assist  -EM     Stand-Sit Reynolds (Transfers) standby assist  -EM     Row Name 03/13/22 0853          Stand-Sit Transfer    Assistive Device (Stand-Sit Transfers) walker, front-wheeled  -EM     Row Name 03/13/22 0853          Gait/Stairs (Locomotion)    Reynolds Level (Gait) contact guard  -EM     Assistive Device (Gait) walker, front-wheeled  followed with chair  -EM     Distance in Feet (Gait) 300'  -EM     Comment, (Gait/Stairs) no knee buckling noted this date  -EM     Row Name 03/13/22 0853          Vital Signs    Pre Systolic BP Rehab 116  -EM     Pre Treatment Diastolic BP 81  -EM     Pretreatment Heart Rate (beats/min) 74  -EM     Pre SpO2 (%) 100  -EM     O2 Delivery Pre Treatment room air  -EM     Pre Patient Position Sitting  -EM     Row Name 03/13/22 0853          Bed Mobility Goal 1 (PT)    Activity/Assistive Device (Bed Mobility Goal " 1, PT) bed mobility activities, all  -EM     [place] Level/Cues Needed (Bed Mobility Goal 1, PT) independent  -EM     Progress/Outcomes (Bed Mobility Goal 1, PT) goal ongoing  -EM     Row Name 03/13/22 0853          Transfer Goal 1 (PT)    Activity/Assistive Device (Transfer Goal 1, PT) bed-to-chair/chair-to-bed;toilet  -EM     [place] Level/Cues Needed (Transfer Goal 1, PT) modified independence  -EM     Time Frame (Transfer Goal 1, PT) by discharge  -EM     Progress/Outcome (Transfer Goal 1, PT) goal not met  -EM     Row Name 03/13/22 0853          Gait Training Goal 1 (PT)    Activity/Assistive Device (Gait Training Goal 1, PT) gait (walking locomotion);assistive device use;walker, rolling  -EM     [place] Level (Gait Training Goal 1, PT) modified independence  -EM     Distance (Gait Training Goal 1, PT) 300 ft or more w/out LOB and w/ appropriate use of FWRW in narrow and broad spaces  -EM     Time Frame (Gait Training Goal 1, PT) 10 days  -EM     Progress/Outcome (Gait Training Goal 1, PT) goal not met  -EM     Row Name 03/13/22 0853          Stairs Goal 1 (PT)    Activity/Assistive Device (Stairs Goal 1, PT) ascending stairs;descending stairs  -EM     [place] Level/Cues Needed (Stairs Goal 1, PT) modified independence;supervision required  -EM     Number of Stairs (Stairs Goal 1, PT) 2 or more but watch for R knee giving way; only try steps after she is consistently strong in gait w/ FWRW 200 ft or more  -EM     Progress/Outcome (Stairs Goal 1, PT) goal not met  -EM     Row Name 03/13/22 0853          Problem Specific Goal 1 (PT)    Problem Specific Goal 1 (PT) if john w/out increased R knee pain may try sit to stand ex for eccentric control in mobilty as john;  -EM     Time Frame (Problem Specific Goal 1, PT) 2 weeks  -EM     Progress/Outcome (Problem Specific Goal 1, PT) goal not met  -EM           User Key  (r) = Recorded By, (t) = Taken By, (c) = Cosigned By    Initials Name Provider Type     Hans Hendrix, PTA Physical Therapy Assistant                Physical Therapy Education                 Title: PT OT SLP Therapies (Done)     Topic: Physical Therapy (Done)     Point: Mobility training (Done)     Learning Progress Summary           Patient Acceptance, E, VU,NR by  at 3/11/2022 1918    Comment: POC;                   Point: Home exercise program (Done)     Learning Progress Summary           Patient Acceptance, E, VU,NR by  at 3/11/2022 1918    Comment: POC;                   Point: Body mechanics (Done)     Learning Progress Summary           Patient Acceptance, E, VU,NR by  at 3/11/2022 1918    Comment: POC;                   Point: Precautions (Done)     Learning Progress Summary           Patient Acceptance, E, VU,NR by  at 3/11/2022 1918    Comment: POC;                               User Key     Initials Effective Dates Name Provider Type Discipline     06/16/21 -  Susie De La Vega, PT Physical Therapist PT              PT Recommendation and Plan  Anticipated Discharge Disposition (PT): home with 24/7 care, home with home health, home with outpatient therapy services  Plan of Care Reviewed With: patient  Progress: improving  Outcome Evaluation: Pt john tx well. She reports she is eager to d/c and is feeling much better today. 0/10 pain noted with R knee pre and post tx. Pt t/f sup-sit with SBAx1. Sit-stand-sit with SBAx1. Pt amb 300' with FWRW with CGAx1 with no knee buckling noted this date. Pt up in chair post tx with tag alarm on. No goals met this tx, however, good progress is noted. Pt will benefit from home with 24/7 care and HHPT initially with possible OP PT as appropriate. Pt was issued a donated FWRW yesterday and will benefit from a elevated toilet seat and possibly a tub bench upon d/c.       Time Calculation:    PT Charges     Row Name 03/13/22 1154             Time Calculation    Start Time 0853  -EM      Stop Time 0922  -EM      Time Calculation (min) 29  min  -EM              Time Calculation- PT    Total Timed Code Minutes- PT 29 minute(s)  -EM            User Key  (r) = Recorded By, (t) = Taken By, (c) = Cosigned By    Initials Name Provider Type    EM Hans Self PTA Physical Therapy Assistant              Therapy Charges for Today     Code Description Service Date Service Provider Modifiers Qty    03143031751 HC PT THER PROC EA 15 MIN 3/12/2022 Hans Self PTA GP, CQ 1    77153682815 HC PT THERAPEUTIC ACT EA 15 MIN 3/12/2022 Hans Self, BERTIN GP, CQ 1    56773028685 HC GAIT TRAINING EA 15 MIN 3/12/2022 Hans Self, BERTIN GP, CQ 1    70659962321 HC PT THERAPEUTIC ACT EA 15 MIN 3/13/2022 Hans Self, BERTIN GP, CQ 1    40617487927 HC GAIT TRAINING EA 15 MIN 3/13/2022 Hans Self, BERTIN GP, CQ 1          PT G-Codes  Outcome Measure Options: AM-PAC 6 Clicks Basic Mobility (PT)  AM-PAC 6 Clicks Score (PT): 15  AM-PAC 6 Clicks Score (OT): 18    Hans Self PTA  3/13/2022

## 2022-03-14 ENCOUNTER — HOME CARE VISIT (OUTPATIENT)
Dept: HOME HEALTH SERVICES | Facility: CLINIC | Age: 47
End: 2022-03-14

## 2022-03-14 VITALS
DIASTOLIC BLOOD PRESSURE: 48 MMHG | HEART RATE: 76 BPM | RESPIRATION RATE: 16 BRPM | TEMPERATURE: 97.9 F | SYSTOLIC BLOOD PRESSURE: 102 MMHG | OXYGEN SATURATION: 97 %

## 2022-03-14 PROCEDURE — G0158 HHC OT ASSISTANT EA 15: HCPCS

## 2022-03-14 NOTE — CASE COMMUNICATION
Wanting to add a few more visits to Mrs. Shannon if possible.  She was in the hospital over the weekend d/t weakness and med issues(depacote?).  She is inconsistent, however, she works hard and is wanting more.  She's making gains with strength and GMC, minimal gains with FMC.  She is currently using a R/W and will be getting PT.    Chris Dent

## 2022-03-15 ENCOUNTER — HOME CARE VISIT (OUTPATIENT)
Dept: HOME HEALTH SERVICES | Facility: CLINIC | Age: 47
End: 2022-03-15

## 2022-03-15 ENCOUNTER — HOME CARE VISIT (OUTPATIENT)
Dept: HOME HEALTH SERVICES | Facility: HOME HEALTHCARE | Age: 47
End: 2022-03-15

## 2022-03-15 VITALS
SYSTOLIC BLOOD PRESSURE: 152 MMHG | HEART RATE: 76 BPM | OXYGEN SATURATION: 97 % | TEMPERATURE: 97.4 F | DIASTOLIC BLOOD PRESSURE: 76 MMHG | RESPIRATION RATE: 18 BRPM

## 2022-03-15 PROCEDURE — G0152 HHCP-SERV OF OT,EA 15 MIN: HCPCS

## 2022-03-15 NOTE — HOME HEALTH
REASON FOR REASSESSMENT: 30 DAY REASSESSMENT    REASON FOR REFERRAL: 47 y/o female hospitalzied for CVA 12/15/22-12/23/22 at [facility] after being found unresponsive for 2 days. She was referred to home health OT after discharged home and then a 2nd time due to concerns for new onset of Right UE weakness. Patient's mother took to her ED at [facility] however no acute findings noted. She is currently a resident of a personal care home. She is a poor historian and has decreased cognitive function and aphasia. Patient reported that she has been experiencing intermittent muscle spasms in her right arm and right leg. Her right arm is notable for decreased coordination with uncontrolled motions without intention. She is also noted to be unsteady and giving to right LE with functional mobility/transfers.  Patient was participating well with OT services and she then was readmitted to [facility] 3/8/22-3/10/22 for new onset of worsening weakness and stroke-like symptoms however no additional problems noted with further testing and she was discharged back to personal care home.  She reported that she wants to work more on her R UE with OT.     REASSESSMENT SUMMARY    OBJECTIVE ASSESSMENT MEASURES / PATIENT'S CURRENT LEVEL OF PERFORMANCE COMPARED WITH  STATUS AT EVALUATION:  LEVEL AT EVALUATION:  AROM B UES: L UE: WFL and R UE as follows: shoulder flexion: 100 degrees, shoulder abduction: 90 degrees, elbow/distal AROM: WFL except R hand flexion: unable to actively flex digits #4 and #5.   STRENGTH B UES: L UE: 4+/5 and R UE strength as follows: shoulder: 3-/5, elbow/wrist: 3/5   HAND DOMINANCE: R hand dominant, R  strength: 3/5. L  strength: 4+/5.   COORDINATION: R UE GROSS MOTOR COORDINATION: Moderate deficit and L UE: WFL R UE FINE MOTOR COORDINATON: Moderate deficit and L UE: WFL   BATHING: MIN(A)  DRESSING: MIN(A)  FUNCTIONAL TRANSFERS/FUNCTIONAL MOBILITY: MIN(A)  GROOMING: MIN(A)  TOILETING: CG-MIN(A)    CURRENT  LEVEL:  AROM B UES: L UE: WFL and R UE as follows: shoulder flexion: 100 degrees, shoulder abduction: 90 degrees, elbow/distal AROM: WFL except R hand flexion: unable to actively flex digits #4 and #5.   STRENGTH B UES: L UE: 5/5 and R UE strength as follows: shoulder: 3/5, elbow/wrist: 3+/5   HAND DOMINANCE: R hand dominant, R  strength: 3+/5. L  strength: 4+/5.   COORDINATION: R UE GROSS MOTOR COORDINATION: MILD- Moderate deficit and L UE: WFL R UE FINE MOTOR COORDINATON: MILD-Moderate deficit and L UE: WFL   BATHING: CG(A)  DRESSING: CG-SB(A)  FUNCTIONAL TRANSFERS/FUNCTIONAL MOBILITY: SB(A)  GROOMING: MODIFIED INDEPENDENT  TOILETING: MODIFIED INDEPENDENT    3BARTHEL INDEX OF ACTIVITIES OF DAILY LIVING SCORE AT EVALUATION: 83/100  CURRENT BARTHEL INDEX SCORE AT REASSESSMENT: 89/100    SUMMARY OF CONTINUING FUNCTIONAL DEFICITS:  -DECREASED R AFFECTED UE STRENGTH  -DECREASED R AFFECTED UE FINE MOTOR COORDINATION  -DECREASED R AFFECTED UE GROSS MOTOR COORDINATION  -DECREASED BATHING INDEPENDENCE  -DECREASED DRESSING INDEPENDENCE    PLANS:  PLAN OF CARE REMAINS APPROPRIATE: YES WITH UPGRADED GOALS/POC  REVISIONS NEEDED: UPGRADED GOALS/POC  RECEIPT OF NEW ORDERS FROM MD: YES    FLORES SUPERVISORY NOTE  Was this patient seen by a FLORES during this 30 day period of care? YES

## 2022-03-16 ENCOUNTER — HOME CARE VISIT (OUTPATIENT)
Dept: HOME HEALTH SERVICES | Facility: CLINIC | Age: 47
End: 2022-03-16

## 2022-03-16 PROCEDURE — G0153 HHCP-SVS OF S/L PATH,EA 15MN: HCPCS

## 2022-03-18 ENCOUNTER — OFFICE VISIT (OUTPATIENT)
Dept: NEUROLOGY | Facility: TELEHEALTH | Age: 47
End: 2022-03-18

## 2022-03-18 ENCOUNTER — HOME CARE VISIT (OUTPATIENT)
Dept: HOME HEALTH SERVICES | Facility: CLINIC | Age: 47
End: 2022-03-18

## 2022-03-18 VITALS
SYSTOLIC BLOOD PRESSURE: 114 MMHG | BODY MASS INDEX: 32.33 KG/M2 | WEIGHT: 206 LBS | DIASTOLIC BLOOD PRESSURE: 74 MMHG | HEART RATE: 61 BPM | HEIGHT: 67 IN

## 2022-03-18 DIAGNOSIS — Z86.73 HISTORY OF ISCHEMIC LEFT MCA STROKE: Primary | ICD-10-CM

## 2022-03-18 PROCEDURE — G0151 HHCP-SERV OF PT,EA 15 MIN: HCPCS

## 2022-03-18 PROCEDURE — 99215 OFFICE O/P EST HI 40 MIN: CPT | Performed by: NURSE PRACTITIONER

## 2022-03-18 NOTE — PROGRESS NOTES
Stroke Progress Note       Chief Complaint: Stroke 3 month follow-up    Subjective     HPI: Pt is a 46-yr-old right-handed white female with known diagnosis of hyperlipidemia, Bipolar, seizures, smoker, and mental disability who lives independently with daughter per mother who was hospitalized on 12/15 after a large left MCA stroke. She currently resides at [facility] and enjoys being there. She is alert and oriented X3, strengths are equal 5/5, denies numbness, visual field is intact, and has expressive aphasia which has improved. NIHSS is 2, MRS 3, and PQ9 0.       Review of Systems   Eyes: Negative.    Respiratory: Negative.    Cardiovascular: Negative.    Gastrointestinal: Negative.    Genitourinary: Negative.    Musculoskeletal:        Right knee soreness   Skin: Negative.    Neurological: Positive for speech difficulty.   Psychiatric/Behavioral: Negative.         Objective      Heart Rate:  [61] 61  BP: (114)/(74) 114/74    Neurological Exam  Mental Status  Awake, alert and oriented to person, place and time.Alert. Oriented to person, place, and time. Mild dysarthria present. Expressive aphasia present.    Cranial Nerves  CN II: Visual acuity is normal. Visual fields full to confrontation.  CN III, IV, VI: Extraocular movements intact bilaterally. Normal lids and orbits bilaterally. Pupils equal round and reactive to light bilaterally.  CN V: Facial sensation is normal.  CN VII: Full and symmetric facial movement.  CN IX, X: Palate elevates symmetrically. Normal gag reflex.  CN XI: Shoulder shrug strength is normal.  CN XII: Tongue midline without atrophy or fasciculations.    Motor  Normal muscle bulk throughout. No fasciculations present. Strength is 5/5 throughout all four extremities.    Sensory  Sensation is intact to light touch, pinprick, vibration and proprioception in all four extremities.    Reflexes                                            Right                       Left  Brachioradialis                    1+                         1+  Biceps                                 1+                         1+  Patellar                                1+                         1+    Coordination    Finger-to-nose, rapid alternating movements and heel-to-shin normal bilaterally without dysmetria.    Gait    Not assessed today, she was in a wheelchair d/t sore right knee from fall..      Physical Exam  Vitals reviewed.   Constitutional:       Appearance: Normal appearance.   HENT:      Head: Normocephalic and atraumatic.   Eyes:      General: Lids are normal.      Extraocular Movements: Extraocular movements intact.      Pupils: Pupils are equal, round, and reactive to light.   Cardiovascular:      Rate and Rhythm: Normal rate.   Pulmonary:      Effort: Pulmonary effort is normal.   Musculoskeletal:         General: Normal range of motion.      Cervical back: Normal range of motion.   Skin:     General: Skin is warm and dry.   Neurological:      Mental Status: She is alert and oriented to person, place, and time.      Cranial Nerves: Dysarthria present.      Coordination: Coordination is intact.      Deep Tendon Reflexes: Strength normal.      Reflex Scores:       Bicep reflexes are 1+ on the right side and 1+ on the left side.       Brachioradialis reflexes are 1+ on the right side and 1+ on the left side.       Patellar reflexes are 1+ on the right side and 1+ on the left side.  Psychiatric:         Mood and Affect: Mood normal.         Results Review:    I reviewed the patient's new clinical results.    Lab Results (last 24 hours)     ** No results found for the last 24 hours. **        No radiology results for the last day  Results for orders placed during the hospital encounter of 12/15/21    Adult Transthoracic Echo Complete W/ Cont if Necessary Per Protocol    Interpretation Summary  · Left ventricular ejection fraction appears to be 56 - 60%.  · Left ventricular diastolic  function was normal.  · Left ventricular wall thickness is consistent with borderline concentric hypertrophy.  · Saline test results are negative.  · Estimated right ventricular systolic pressure from tricuspid regurgitation is normal (<35 mmHg).        Assessment/Plan     Assessment/Plan: Pt is a 46-yr-old right-handed white female with known diagnosis of hyperlipidemia, Bipolar, seizures, smoker, and mental disability who lives independently with daughter per mother who was hospitalized on 12/15 after a large left MCA stroke. She currently resides at [facility] and enjoys being there. She is alert and oriented X3, strengths are equal 5/5, denies numbness, visual field is intact, and has expressive aphasia which has improved. NIHSS is 2, MRS 3, and PQ9 0. She states she is compliant will all her medications. Her mom is with her today and states they help her with her medications at [facility]. Instructed her to continue taking aspirin 325 mg/day and Lipitor 80 mg/day for secondary stroke prevention. She stated having a recent fall and her right knee is sore but has been walking on it. Today she is in a wheelchair because she had so far to walk and her knee is tender. Instructed her to follow-up with PCP if it doesn't start improving and she stated she has an appointment soon. Instructed on stroke risk factors, prevention, and s/s to call 911. Pt and mother verbalized understanding. All questions and concerns were answered.           Patient Active Problem List   Diagnosis   • Nicotine abuse   • Other chest pain   • Acute maxillary sinusitis   • Bipolar affective disorder (HCC)   • Decreased pulses in feet   • Flank pain   • Hematochezia   • High cholesterol   • Migraine without aura and with status migrainosus, not intractable   • Migraine without status migrainosus, not intractable   • Neck pain   • Nephrolithiasis   • Neuropathy   • Chronic pain   • Primary osteoarthritis involving multiple  joints   • Sciatic leg pain   • Screening for diabetes mellitus (DM)   • Seasonal allergies   • Stress   • Tobacco use disorder   • Urinary incontinence without sensory awareness   • Vitamin D deficiency   • Carpal tunnel syndrome of right wrist   • Seizure ([facility])   • Intractable vomiting   • Acute UTI (urinary tract infection)   • Cholelithiasis   • GI bleed   • Kidney stone   • Nephrolithiasis   • ELBERT (acute kidney injury) ([facility])   • Infection due to ESBL-producing Escherichia coli   • CVA (cerebral vascular accident) ([facility])   • Dysphagia due to recent stroke   • Slurred speech   • Cerebrovascular accident (CVA) ([facility])   • Aphasia due to acute cerebrovascular accident (CVA) ([facility])   • Methamphetamine abuse ([facility])   • Seizure disorder ([facility])   • UTI (urinary tract infection) due to urinary indwelling catheter ([facility])   • Urinary tract infection due to extended-spectrum beta lactamase (ESBL) producing Escherichia coli   • Urinary tract infection due to extended-spectrum beta lactamase (ESBL) producing Escherichia coli   • Metabolic acidosis, NAG, bicarbonate losses   • Midline shift of brain due to stroke   • Nonintractable headache   • Weakness           ALPHONSE Martinez  03/18/22  11:46 CDT        I spent 40 minutes caring for Myles Shannon  on this date of service. This time includes time spent by me in the following activities: preparing for the visit, reviewing tests, obtaining and/or reviewing a separately obtained history, performing a medically appropriate examination and/or evaluation, counseling and educating the patient/family/caregiver, ordering medications, tests, or procedures, referring and communicating with other health care professionals, documenting information in the medical record, independently interpreting results and communicating that information with the patient/family/caregiver and care coordination

## 2022-03-20 VITALS
TEMPERATURE: 97.8 F | RESPIRATION RATE: 17 BRPM | DIASTOLIC BLOOD PRESSURE: 63 MMHG | OXYGEN SATURATION: 98 % | HEART RATE: 76 BPM | SYSTOLIC BLOOD PRESSURE: 128 MMHG

## 2022-03-21 ENCOUNTER — HOME CARE VISIT (OUTPATIENT)
Dept: HOME HEALTH SERVICES | Facility: CLINIC | Age: 47
End: 2022-03-21

## 2022-03-21 VITALS
DIASTOLIC BLOOD PRESSURE: 88 MMHG | OXYGEN SATURATION: 98 % | RESPIRATION RATE: 17 BRPM | HEART RATE: 74 BPM | TEMPERATURE: 98 F | SYSTOLIC BLOOD PRESSURE: 132 MMHG

## 2022-03-21 VITALS
HEART RATE: 68 BPM | RESPIRATION RATE: 16 BRPM | DIASTOLIC BLOOD PRESSURE: 80 MMHG | OXYGEN SATURATION: 98 % | SYSTOLIC BLOOD PRESSURE: 142 MMHG

## 2022-03-21 PROCEDURE — G0153 HHCP-SVS OF S/L PATH,EA 15MN: HCPCS

## 2022-03-21 PROCEDURE — G0158 HHC OT ASSISTANT EA 15: HCPCS

## 2022-03-21 NOTE — HOME HEALTH
"REASON FOR REFERRAL: PATIENT HAS A HISTORY OF A CVA. SHE WAS THEN PLACED AT THE [facility] AND WAS THOUGHT TO HAVE HAD A NOTHER CVA WHILE HERE. SHE HAS SOME INCREASED DIFFCILUTY WITH RIGHT LE, DIFFICULTY WITH AMBULATION AND DIFFICULTY WITH TRANSFERS. PATIENT HAS RECENTLY GOTTEN A WALKER AND SHE NEEDS SOME SKILLED PT SERVICES TO HELP HER WITH RIGHT LE STRENGTH AND SAFETY AND USE OF WALKER WITH GAIT    PMHX: CVA/HTN    PRIOR LEVEL OF FUNCTION: PATIENT WAS LIVING INDEPENDENTLY IN AN APARTMENT PRIOR TO HER FIRST CVA. SHE WAS DOING ALL THINGS INDEPENDENTLY AT THE TIME     PATIENT GOAL: \" GET WALKING BETTER, GET STRONG AGAIN, GET HER PROBLEMS WITH HER KNEES BETTER\"    PATIENT HAS A WALKER SHE LIVES AT FACILITY WITH [facility] AND WALK IN SHOWER"

## 2022-03-22 ENCOUNTER — HOME CARE VISIT (OUTPATIENT)
Dept: HOME HEALTH SERVICES | Facility: CLINIC | Age: 47
End: 2022-03-22

## 2022-03-22 VITALS
SYSTOLIC BLOOD PRESSURE: 100 MMHG | OXYGEN SATURATION: 98 % | HEART RATE: 62 BPM | DIASTOLIC BLOOD PRESSURE: 64 MMHG | RESPIRATION RATE: 18 BRPM | TEMPERATURE: 98.5 F

## 2022-03-22 PROCEDURE — G0157 HHC PT ASSISTANT EA 15: HCPCS

## 2022-03-23 NOTE — HOME HEALTH
Subjective:  Pt was agreeable to ST.  Pt felt she was making progress.      Medical necessity:  Pt with aphasia and cognitive deficits impacting safety and independence skilled ST required to implement strategies and compensations.

## 2022-03-27 ENCOUNTER — APPOINTMENT (OUTPATIENT)
Dept: GENERAL RADIOLOGY | Facility: HOSPITAL | Age: 47
End: 2022-03-27

## 2022-03-27 ENCOUNTER — HOSPITAL ENCOUNTER (EMERGENCY)
Facility: HOSPITAL | Age: 47
Discharge: HOME | End: 2022-03-27
Attending: STUDENT IN AN ORGANIZED HEALTH CARE EDUCATION/TRAINING PROGRAM | Admitting: STUDENT IN AN ORGANIZED HEALTH CARE EDUCATION/TRAINING PROGRAM

## 2022-03-27 VITALS
SYSTOLIC BLOOD PRESSURE: 110 MMHG | DIASTOLIC BLOOD PRESSURE: 80 MMHG | WEIGHT: 200 LBS | HEIGHT: 61 IN | BODY MASS INDEX: 37.76 KG/M2 | RESPIRATION RATE: 18 BRPM | HEART RATE: 67 BPM | TEMPERATURE: 98.3 F | OXYGEN SATURATION: 100 %

## 2022-03-27 DIAGNOSIS — N30.00 ACUTE CYSTITIS WITHOUT HEMATURIA: Primary | ICD-10-CM

## 2022-03-27 LAB
ALBUMIN SERPL-MCNC: 4.7 G/DL (ref 3.5–5.2)
ALBUMIN/GLOB SERPL: 2.2 G/DL
ALP SERPL-CCNC: 91 U/L (ref 39–117)
ALT SERPL W P-5'-P-CCNC: 27 U/L (ref 1–33)
ANION GAP SERPL CALCULATED.3IONS-SCNC: 9 MMOL/L (ref 5–15)
AST SERPL-CCNC: 22 U/L (ref 1–32)
BACTERIA UR QL AUTO: ABNORMAL /HPF
BASOPHILS # BLD AUTO: 0.04 10*3/MM3 (ref 0–0.2)
BASOPHILS NFR BLD AUTO: 0.6 % (ref 0–1.5)
BILIRUB SERPL-MCNC: 0.3 MG/DL (ref 0–1.2)
BILIRUB UR QL STRIP: NEGATIVE
BUN SERPL-MCNC: 17 MG/DL (ref 6–20)
BUN/CREAT SERPL: 15.3 (ref 7–25)
CALCIUM SPEC-SCNC: 9.3 MG/DL (ref 8.6–10.5)
CHLORIDE SERPL-SCNC: 104 MMOL/L (ref 98–107)
CLARITY UR: CLEAR
CO2 SERPL-SCNC: 27 MMOL/L (ref 22–29)
COLOR UR: YELLOW
CREAT SERPL-MCNC: 1.11 MG/DL (ref 0.57–1)
DEPRECATED RDW RBC AUTO: 42.7 FL (ref 37–54)
EGFRCR SERPLBLD CKD-EPI 2021: 62.2 ML/MIN/1.73
EOSINOPHIL # BLD AUTO: 0.27 10*3/MM3 (ref 0–0.4)
EOSINOPHIL NFR BLD AUTO: 4 % (ref 0.3–6.2)
ERYTHROCYTE [DISTWIDTH] IN BLOOD BY AUTOMATED COUNT: 12.8 % (ref 12.3–15.4)
GLOBULIN UR ELPH-MCNC: 2.1 GM/DL
GLUCOSE SERPL-MCNC: 96 MG/DL (ref 65–99)
GLUCOSE UR STRIP-MCNC: NEGATIVE MG/DL
HCG SERPL QL: NEGATIVE
HCT VFR BLD AUTO: 40 % (ref 34–46.6)
HGB BLD-MCNC: 13.4 G/DL (ref 12–15.9)
HGB UR QL STRIP.AUTO: NEGATIVE
HYALINE CASTS UR QL AUTO: ABNORMAL /LPF
IMM GRANULOCYTES # BLD AUTO: 0.01 10*3/MM3 (ref 0–0.05)
IMM GRANULOCYTES NFR BLD AUTO: 0.1 % (ref 0–0.5)
KETONES UR QL STRIP: NEGATIVE
LEUKOCYTE ESTERASE UR QL STRIP.AUTO: ABNORMAL
LYMPHOCYTES # BLD AUTO: 2.26 10*3/MM3 (ref 0.7–3.1)
LYMPHOCYTES NFR BLD AUTO: 33.3 % (ref 19.6–45.3)
MCH RBC QN AUTO: 30.7 PG (ref 26.6–33)
MCHC RBC AUTO-ENTMCNC: 33.5 G/DL (ref 31.5–35.7)
MCV RBC AUTO: 91.5 FL (ref 79–97)
MONOCYTES # BLD AUTO: 0.33 10*3/MM3 (ref 0.1–0.9)
MONOCYTES NFR BLD AUTO: 4.9 % (ref 5–12)
NEUTROPHILS NFR BLD AUTO: 3.88 10*3/MM3 (ref 1.7–7)
NEUTROPHILS NFR BLD AUTO: 57.1 % (ref 42.7–76)
NITRITE UR QL STRIP: NEGATIVE
NRBC BLD AUTO-RTO: 0 /100 WBC (ref 0–0.2)
PH UR STRIP.AUTO: 6 [PH] (ref 5–9)
PLATELET # BLD AUTO: 273 10*3/MM3 (ref 140–450)
PMV BLD AUTO: 10.8 FL (ref 6–12)
POTASSIUM SERPL-SCNC: 4.2 MMOL/L (ref 3.5–5.2)
PROT SERPL-MCNC: 6.8 G/DL (ref 6–8.5)
PROT UR QL STRIP: NEGATIVE
RBC # BLD AUTO: 4.37 10*6/MM3 (ref 3.77–5.28)
RBC # UR STRIP: ABNORMAL /HPF
REF LAB TEST METHOD: ABNORMAL
SARS-COV-2 RNA PNL SPEC NAA+PROBE: NOT DETECTED
SODIUM SERPL-SCNC: 140 MMOL/L (ref 136–145)
SP GR UR STRIP: 1.01 (ref 1–1.03)
SQUAMOUS #/AREA URNS HPF: ABNORMAL /HPF
UROBILINOGEN UR QL STRIP: ABNORMAL
WBC # UR STRIP: ABNORMAL /HPF
WBC NRBC COR # BLD: 6.79 10*3/MM3 (ref 3.4–10.8)

## 2022-03-27 PROCEDURE — 99283 EMERGENCY DEPT VISIT LOW MDM: CPT

## 2022-03-27 PROCEDURE — 72040 X-RAY EXAM NECK SPINE 2-3 VW: CPT

## 2022-03-27 PROCEDURE — 87186 SC STD MICRODIL/AGAR DIL: CPT | Performed by: STUDENT IN AN ORGANIZED HEALTH CARE EDUCATION/TRAINING PROGRAM

## 2022-03-27 PROCEDURE — 84703 CHORIONIC GONADOTROPIN ASSAY: CPT | Performed by: STUDENT IN AN ORGANIZED HEALTH CARE EDUCATION/TRAINING PROGRAM

## 2022-03-27 PROCEDURE — 85025 COMPLETE CBC W/AUTO DIFF WBC: CPT | Performed by: STUDENT IN AN ORGANIZED HEALTH CARE EDUCATION/TRAINING PROGRAM

## 2022-03-27 PROCEDURE — 71046 X-RAY EXAM CHEST 2 VIEWS: CPT

## 2022-03-27 PROCEDURE — 87077 CULTURE AEROBIC IDENTIFY: CPT | Performed by: STUDENT IN AN ORGANIZED HEALTH CARE EDUCATION/TRAINING PROGRAM

## 2022-03-27 PROCEDURE — 36415 COLL VENOUS BLD VENIPUNCTURE: CPT

## 2022-03-27 PROCEDURE — 73564 X-RAY EXAM KNEE 4 OR MORE: CPT

## 2022-03-27 PROCEDURE — 87086 URINE CULTURE/COLONY COUNT: CPT | Performed by: STUDENT IN AN ORGANIZED HEALTH CARE EDUCATION/TRAINING PROGRAM

## 2022-03-27 PROCEDURE — 81001 URINALYSIS AUTO W/SCOPE: CPT | Performed by: STUDENT IN AN ORGANIZED HEALTH CARE EDUCATION/TRAINING PROGRAM

## 2022-03-27 PROCEDURE — 87635 SARS-COV-2 COVID-19 AMP PRB: CPT | Performed by: STUDENT IN AN ORGANIZED HEALTH CARE EDUCATION/TRAINING PROGRAM

## 2022-03-27 PROCEDURE — 73502 X-RAY EXAM HIP UNI 2-3 VIEWS: CPT

## 2022-03-27 PROCEDURE — 99284 EMERGENCY DEPT VISIT MOD MDM: CPT

## 2022-03-27 PROCEDURE — 80053 COMPREHEN METABOLIC PANEL: CPT | Performed by: STUDENT IN AN ORGANIZED HEALTH CARE EDUCATION/TRAINING PROGRAM

## 2022-03-27 RX ORDER — CEPHALEXIN 500 MG/1
500 CAPSULE ORAL 2 TIMES DAILY
Qty: 10 CAPSULE | Refills: 0 | Status: SHIPPED | OUTPATIENT
Start: 2022-03-27 | End: 2022-03-27 | Stop reason: SDUPTHER

## 2022-03-27 RX ORDER — DICLOFENAC SODIUM AND MISOPROSTOL 75; 200 MG/1; UG/1
1 TABLET, DELAYED RELEASE ORAL 2 TIMES DAILY
Status: ON HOLD | COMMUNITY
End: 2022-05-03

## 2022-03-27 RX ORDER — IBUPROFEN 600 MG/1
600 TABLET ORAL ONCE
Status: COMPLETED | OUTPATIENT
Start: 2022-03-27 | End: 2022-03-27

## 2022-03-27 RX ORDER — CEPHALEXIN 500 MG/1
500 CAPSULE ORAL 2 TIMES DAILY
Qty: 10 CAPSULE | Refills: 0 | Status: SHIPPED | OUTPATIENT
Start: 2022-03-27 | End: 2022-04-01

## 2022-03-27 RX ADMIN — IBUPROFEN 600 MG: 600 TABLET, FILM COATED ORAL at 13:57

## 2022-03-29 ENCOUNTER — HOME CARE VISIT (OUTPATIENT)
Dept: HOME HEALTH SERVICES | Facility: CLINIC | Age: 47
End: 2022-03-29

## 2022-03-29 VITALS
RESPIRATION RATE: 18 BRPM | OXYGEN SATURATION: 97 % | SYSTOLIC BLOOD PRESSURE: 128 MMHG | DIASTOLIC BLOOD PRESSURE: 76 MMHG | HEART RATE: 74 BPM | TEMPERATURE: 98 F

## 2022-03-29 PROCEDURE — G0158 HHC OT ASSISTANT EA 15: HCPCS

## 2022-03-29 PROCEDURE — G0157 HHC PT ASSISTANT EA 15: HCPCS

## 2022-03-29 NOTE — CASE COMMUNICATION
Patient up for OT DC next session ([facility]).  Patient is inconsistent with all goals and requires both V/C and T/Cs to complete most task with RUE.  She does display minimal gains with [facility] with activities such as using phone, however, she is not consistent.  She is agreeable to a therapy stay at SNF if insurance would authorize.    Chris JENSEN  3/29/22

## 2022-03-30 LAB — BACTERIA SPEC AEROBE CULT: ABNORMAL

## 2022-03-31 VITALS
DIASTOLIC BLOOD PRESSURE: 70 MMHG | RESPIRATION RATE: 18 BRPM | TEMPERATURE: 98.9 F | HEART RATE: 84 BPM | SYSTOLIC BLOOD PRESSURE: 142 MMHG | OXYGEN SATURATION: 97 %

## 2022-04-02 ENCOUNTER — HOSPITAL ENCOUNTER (EMERGENCY)
Facility: HOSPITAL | Age: 47
Discharge: HOME | End: 2022-04-02
Attending: FAMILY MEDICINE | Admitting: FAMILY MEDICINE

## 2022-04-02 ENCOUNTER — APPOINTMENT (OUTPATIENT)
Dept: GENERAL RADIOLOGY | Facility: HOSPITAL | Age: 47
End: 2022-04-02

## 2022-04-02 VITALS
OXYGEN SATURATION: 99 % | RESPIRATION RATE: 18 BRPM | DIASTOLIC BLOOD PRESSURE: 69 MMHG | TEMPERATURE: 97.8 F | BODY MASS INDEX: 43.05 KG/M2 | SYSTOLIC BLOOD PRESSURE: 115 MMHG | HEART RATE: 64 BPM | HEIGHT: 61 IN | WEIGHT: 228 LBS

## 2022-04-02 DIAGNOSIS — N30.01 ACUTE CYSTITIS WITH HEMATURIA: Primary | ICD-10-CM

## 2022-04-02 DIAGNOSIS — R11.2 NAUSEA AND VOMITING, UNSPECIFIED VOMITING TYPE: ICD-10-CM

## 2022-04-02 LAB
ALBUMIN SERPL-MCNC: 4.2 G/DL (ref 3.5–5.2)
ALBUMIN/GLOB SERPL: 2 G/DL
ALP SERPL-CCNC: 87 U/L (ref 39–117)
ALT SERPL W P-5'-P-CCNC: 20 U/L (ref 1–33)
ANION GAP SERPL CALCULATED.3IONS-SCNC: 10 MMOL/L (ref 5–15)
AST SERPL-CCNC: 18 U/L (ref 1–32)
BACTERIA UR QL AUTO: ABNORMAL /HPF
BASOPHILS # BLD AUTO: 0.05 10*3/MM3 (ref 0–0.2)
BASOPHILS NFR BLD AUTO: 0.8 % (ref 0–1.5)
BILIRUB SERPL-MCNC: 0.2 MG/DL (ref 0–1.2)
BILIRUB UR QL STRIP: NEGATIVE
BUN SERPL-MCNC: 16 MG/DL (ref 6–20)
BUN/CREAT SERPL: 13.4 (ref 7–25)
CALCIUM SPEC-SCNC: 9.3 MG/DL (ref 8.6–10.5)
CHLORIDE SERPL-SCNC: 103 MMOL/L (ref 98–107)
CK SERPL-CCNC: 118 U/L (ref 20–180)
CLARITY UR: CLEAR
CO2 SERPL-SCNC: 26 MMOL/L (ref 22–29)
COLOR UR: YELLOW
CREAT SERPL-MCNC: 1.19 MG/DL (ref 0.57–1)
DEPRECATED RDW RBC AUTO: 42.7 FL (ref 37–54)
EGFRCR SERPLBLD CKD-EPI 2021: 57.2 ML/MIN/1.73
EOSINOPHIL # BLD AUTO: 0.3 10*3/MM3 (ref 0–0.4)
EOSINOPHIL NFR BLD AUTO: 4.9 % (ref 0.3–6.2)
ERYTHROCYTE [DISTWIDTH] IN BLOOD BY AUTOMATED COUNT: 12.9 % (ref 12.3–15.4)
GLOBULIN UR ELPH-MCNC: 2.1 GM/DL
GLUCOSE SERPL-MCNC: 98 MG/DL (ref 65–99)
GLUCOSE UR STRIP-MCNC: NEGATIVE MG/DL
HCG SERPL QL: NEGATIVE
HCT VFR BLD AUTO: 38.2 % (ref 34–46.6)
HGB BLD-MCNC: 12.6 G/DL (ref 12–15.9)
HGB UR QL STRIP.AUTO: ABNORMAL
HYALINE CASTS UR QL AUTO: ABNORMAL /LPF
IMM GRANULOCYTES # BLD AUTO: 0.01 10*3/MM3 (ref 0–0.05)
IMM GRANULOCYTES NFR BLD AUTO: 0.2 % (ref 0–0.5)
KETONES UR QL STRIP: ABNORMAL
LEUKOCYTE ESTERASE UR QL STRIP.AUTO: ABNORMAL
LIPASE SERPL-CCNC: 43 U/L (ref 13–60)
LYMPHOCYTES # BLD AUTO: 2.69 10*3/MM3 (ref 0.7–3.1)
LYMPHOCYTES NFR BLD AUTO: 43.5 % (ref 19.6–45.3)
MAGNESIUM SERPL-MCNC: 1.9 MG/DL (ref 1.6–2.6)
MCH RBC QN AUTO: 30.3 PG (ref 26.6–33)
MCHC RBC AUTO-ENTMCNC: 33 G/DL (ref 31.5–35.7)
MCV RBC AUTO: 91.8 FL (ref 79–97)
MONOCYTES # BLD AUTO: 0.46 10*3/MM3 (ref 0.1–0.9)
MONOCYTES NFR BLD AUTO: 7.4 % (ref 5–12)
NEUTROPHILS NFR BLD AUTO: 2.67 10*3/MM3 (ref 1.7–7)
NEUTROPHILS NFR BLD AUTO: 43.2 % (ref 42.7–76)
NITRITE UR QL STRIP: POSITIVE
NRBC BLD AUTO-RTO: 0 /100 WBC (ref 0–0.2)
PH UR STRIP.AUTO: 6.5 [PH] (ref 5–9)
PLATELET # BLD AUTO: 248 10*3/MM3 (ref 140–450)
PMV BLD AUTO: 11 FL (ref 6–12)
POTASSIUM SERPL-SCNC: 4.4 MMOL/L (ref 3.5–5.2)
PROT SERPL-MCNC: 6.3 G/DL (ref 6–8.5)
PROT UR QL STRIP: NEGATIVE
RBC # BLD AUTO: 4.16 10*6/MM3 (ref 3.77–5.28)
RBC # UR STRIP: ABNORMAL /HPF
REF LAB TEST METHOD: ABNORMAL
SODIUM SERPL-SCNC: 139 MMOL/L (ref 136–145)
SP GR UR STRIP: 1.02 (ref 1–1.03)
SQUAMOUS #/AREA URNS HPF: ABNORMAL /HPF
UROBILINOGEN UR QL STRIP: ABNORMAL
WBC # UR STRIP: ABNORMAL /HPF
WBC NRBC COR # BLD: 6.18 10*3/MM3 (ref 3.4–10.8)

## 2022-04-02 PROCEDURE — 99283 EMERGENCY DEPT VISIT LOW MDM: CPT

## 2022-04-02 PROCEDURE — 80053 COMPREHEN METABOLIC PANEL: CPT | Performed by: FAMILY MEDICINE

## 2022-04-02 PROCEDURE — 87088 URINE BACTERIA CULTURE: CPT | Performed by: FAMILY MEDICINE

## 2022-04-02 PROCEDURE — 83735 ASSAY OF MAGNESIUM: CPT | Performed by: FAMILY MEDICINE

## 2022-04-02 PROCEDURE — 96374 THER/PROPH/DIAG INJ IV PUSH: CPT

## 2022-04-02 PROCEDURE — 81001 URINALYSIS AUTO W/SCOPE: CPT | Performed by: FAMILY MEDICINE

## 2022-04-02 PROCEDURE — 82550 ASSAY OF CK (CPK): CPT | Performed by: FAMILY MEDICINE

## 2022-04-02 PROCEDURE — 87186 SC STD MICRODIL/AGAR DIL: CPT | Performed by: FAMILY MEDICINE

## 2022-04-02 PROCEDURE — 74022 RADEX COMPL AQT ABD SERIES: CPT

## 2022-04-02 PROCEDURE — 85025 COMPLETE CBC W/AUTO DIFF WBC: CPT | Performed by: FAMILY MEDICINE

## 2022-04-02 PROCEDURE — 84703 CHORIONIC GONADOTROPIN ASSAY: CPT | Performed by: FAMILY MEDICINE

## 2022-04-02 PROCEDURE — 83690 ASSAY OF LIPASE: CPT | Performed by: FAMILY MEDICINE

## 2022-04-02 PROCEDURE — 87086 URINE CULTURE/COLONY COUNT: CPT | Performed by: FAMILY MEDICINE

## 2022-04-02 PROCEDURE — 25010000002 ONDANSETRON PER 1 MG: Performed by: FAMILY MEDICINE

## 2022-04-02 RX ORDER — SULFAMETHOXAZOLE AND TRIMETHOPRIM 800; 160 MG/1; MG/1
1 TABLET ORAL ONCE
Status: COMPLETED | OUTPATIENT
Start: 2022-04-02 | End: 2022-04-02

## 2022-04-02 RX ORDER — LOSARTAN POTASSIUM 50 MG/1
50 TABLET ORAL DAILY
COMMUNITY

## 2022-04-02 RX ORDER — ONDANSETRON 2 MG/ML
4 INJECTION INTRAMUSCULAR; INTRAVENOUS ONCE
Status: COMPLETED | OUTPATIENT
Start: 2022-04-02 | End: 2022-04-02

## 2022-04-02 RX ORDER — SULFAMETHOXAZOLE AND TRIMETHOPRIM 800; 160 MG/1; MG/1
1 TABLET ORAL 2 TIMES DAILY
Qty: 14 TABLET | Refills: 0 | Status: SHIPPED | OUTPATIENT
Start: 2022-04-02 | End: 2022-04-09

## 2022-04-02 RX ORDER — CHOLECALCIFEROL (VITAMIN D3) 125 MCG
10 CAPSULE ORAL
COMMUNITY

## 2022-04-02 RX ORDER — ONDANSETRON 4 MG/1
4 TABLET, ORALLY DISINTEGRATING ORAL EVERY 6 HOURS PRN
Qty: 10 TABLET | Refills: 0 | Status: SHIPPED | OUTPATIENT
Start: 2022-04-02 | End: 2022-10-16

## 2022-04-02 RX ADMIN — ONDANSETRON 4 MG: 2 INJECTION INTRAMUSCULAR; INTRAVENOUS at 20:11

## 2022-04-02 RX ADMIN — SULFAMETHOXAZOLE AND TRIMETHOPRIM 1 TABLET: 800; 160 TABLET ORAL at 20:33

## 2022-04-02 RX ADMIN — SODIUM CHLORIDE 1000 ML: 9 INJECTION, SOLUTION INTRAVENOUS at 20:12

## 2022-04-03 NOTE — ED PROVIDER NOTES
Subjective   Patient presents to the emergency department with nausea and vomiting x4 days.  She also complains of back pain which is chronic.  Patient is a resident at the [facility].          Vomiting  The primary symptoms include nausea and vomiting. Primary symptoms do not include fever, fatigue, abdominal pain, diarrhea, dysuria, myalgias or rash. The illness began 3 to 5 days ago.   The illness does not include chills.   Abdominal Pain  Associated symptoms: nausea and vomiting    Associated symptoms: no chest pain, no chills, no cough, no diarrhea, no dysuria, no fatigue, no fever, no shortness of breath and no sore throat        Review of Systems   Constitutional: Positive for activity change. Negative for appetite change, chills, diaphoresis, fatigue and fever.   HENT: Negative for congestion, ear discharge, ear pain, nosebleeds, rhinorrhea, sinus pressure, sore throat and trouble swallowing.    Eyes: Negative for discharge and redness.   Respiratory: Negative for apnea, cough, chest tightness, shortness of breath and wheezing.    Cardiovascular: Negative for chest pain.   Gastrointestinal: Positive for nausea and vomiting. Negative for abdominal pain and diarrhea.   Endocrine: Negative for polyuria.   Genitourinary: Negative for dysuria, frequency and urgency.   Musculoskeletal: Negative for myalgias and neck pain.   Skin: Negative for color change and rash.   Allergic/Immunologic: Negative for immunocompromised state.   Neurological: Positive for dizziness. Negative for seizures, syncope, weakness, light-headedness and headaches.   Hematological: Negative for adenopathy. Does not bruise/bleed easily.   Psychiatric/Behavioral: Negative for behavioral problems and confusion.   All other systems reviewed and are negative.      Past Medical History:   Diagnosis Date   • Abdominal pain     Chronic RLQ, RUQ, R flank comes and goes.    • Abdominal pain, right lower quadrant    • Acute bilateral otitis media    •  Allergic rhinitis    • Backache     Upper back.   • Candidiasis of skin    • Cellulitis of neck    • Chest pain    • Contraception    • Cough    • Dysfunction of eustachian tube    • Dyspareunia, female    • Excessive or frequent menstruation    • Flank pain    • Generalized aches and pains    • Headache    • Health maintenance examination    • Infection of skin and subcutaneous tissue    • Irregular periods    • Kidney stone     Poss   • Menorrhagia    • Nausea vomiting and diarrhea    • Obesity    • Open wound of abdominal wall    • Otalgia    • Pain in left foot    • Pain in unspecified hand    • Removed Impacted cerumen 2012   • Rhinitis    • Seizure ([facility]) 8/15/2019   • Shoulder pain     right-sided-likely muscle strain      • Shoulder tendinitis    • Spider bite wound    • Staphylococcal infection of skin    • Swollen feet    • Tinea cruris    • Tobacco dependence syndrome    • Upper respiratory infection    • Urinary tract infectious disease    • Vertigo    • Visit for gynecologic examination    • Vulvovaginitis        Allergies   Allergen Reactions   • Abilify [Aripiprazole] Nausea Only   • Buspirone Rash   • Penicillins Hives and Nausea And Vomiting       Past Surgical History:   Procedure Laterality Date   •  SECTION     • DILATATION AND CURETTAGE      Secondary to incomplete spontaneous    • INCISION AND DRAINAGE ABSCESS  2012   • INJECTION OF MEDICATION  2015    Phenergan (1)     • INJECTION OF MEDICATION  10/10/2013    Toradol (2)      • INJECTION OF MEDICATION  2014    Zofran (1)          Family History   Problem Relation Age of Onset   • Breast cancer Mother    • Thyroid disease Mother    • Hypertension Mother    • Heart disease Mother    • Arthritis Mother    • Liver disease Father    • Endometrial cancer Sister    • Anxiety disorder Brother    • Mental illness Daughter    • Depression Daughter    • Diabetes Maternal Aunt    • Diabetes Maternal Uncle    •  Stroke Maternal Grandmother    • Breast cancer Maternal Grandmother    • Cancer Maternal Grandfather    • Stroke Paternal Grandmother    • Depression Other    • Cancer Other         Colorectal Cancer   • Endometrial cancer Other    • Lung cancer Other        Social History     Socioeconomic History   • Marital status: Single   Tobacco Use   • Smoking status: Current Every Day Smoker     Packs/day: 0.50     Types: Cigarettes   • Smokeless tobacco: Never Used   Vaping Use   • Vaping Use: Some days   • Substances: Nicotine   • Devices: Refillable tank   • Passive vaping exposure: Yes   Substance and Sexual Activity   • Alcohol use: No   • Drug use: No   • Sexual activity: Not Currently           Objective   Physical Exam  Vitals and nursing note reviewed.   Constitutional:       Appearance: She is well-developed.   HENT:      Head: Normocephalic and atraumatic.      Nose: Nose normal.   Eyes:      General: No scleral icterus.        Right eye: No discharge.         Left eye: No discharge.      Conjunctiva/sclera: Conjunctivae normal.      Pupils: Pupils are equal, round, and reactive to light.   Neck:      Trachea: No tracheal deviation.   Cardiovascular:      Rate and Rhythm: Normal rate and regular rhythm.      Heart sounds: Normal heart sounds. No murmur heard.  Pulmonary:      Effort: Pulmonary effort is normal. No respiratory distress.      Breath sounds: Normal breath sounds. No stridor. No wheezing or rales.   Abdominal:      General: Bowel sounds are normal. There is no distension.      Palpations: Abdomen is soft. There is no mass.      Tenderness: There is generalized abdominal tenderness (mild). There is no guarding or rebound.   Musculoskeletal:      Cervical back: Normal range of motion and neck supple.   Skin:     General: Skin is warm and dry.      Findings: No erythema or rash.   Neurological:      Mental Status: She is alert and oriented to person, place, and time.      Coordination: Coordination  normal.   Psychiatric:         Behavior: Behavior normal.         Thought Content: Thought content normal.         Procedures           ED Course  ED Course as of 04/30/22 0823   Thu Apr 07, 2022   1017 Spoke with [facility]. Advise according to culture patient will need to start Nirtrofuantoin and may stop her current antibiotics.  [SH]      ED Course User Index  [SH] Nanette aMi, ALPHONSE              Labs Reviewed   URINE CULTURE - Abnormal; Notable for the following components:       Result Value    Urine Culture >100,000 CFU/mL Escherichia coli ESBL (*)     All other components within normal limits   COMPREHENSIVE METABOLIC PANEL - Abnormal; Notable for the following components:    Creatinine 1.19 (*)     eGFR 57.2 (*)     All other components within normal limits    Narrative:     GFR Normal >60  Chronic Kidney Disease <60  Kidney Failure <15     URINALYSIS W/ CULTURE IF INDICATED - Abnormal; Notable for the following components:    Ketones, UA Trace (*)     Blood, UA Moderate (2+) (*)     Leuk Esterase, UA Moderate (2+) (*)     Nitrite, UA Positive (*)     All other components within normal limits   URINALYSIS, MICROSCOPIC ONLY - Abnormal; Notable for the following components:    RBC, UA 21-30 (*)     WBC, UA Too Numerous to Count (*)     Bacteria, UA 3+ (*)     Squamous Epithelial Cells, UA 6-12 (*)     All other components within normal limits   LIPASE - Normal   CBC WITH AUTO DIFFERENTIAL - Normal   HCG, SERUM, QUALITATIVE - Normal   CK - Normal   MAGNESIUM - Normal   CBC AND DIFFERENTIAL    Narrative:     The following orders were created for panel order CBC & Differential.  Procedure                               Abnormality         Status                     ---------                               -----------         ------                     CBC Auto Differential[[id number]]        Normal              Final result                 Please view results for these tests on the individual orders.        XR Abdomen 2+ VW with Chest 1 VW   Final Result      1.  Scattered air-fluid levels in the nondilated dilated colon   suggests underlying diarrheal illness/gastroenteritis.    2.  Left nephrolithiasis.   3.  No acute cardiopulmonary findings.      Electronically signed by:  Kendra Lyles  4/2/2022 8:23 PM CDT   Workstation: [id number]                                                  [facility]    Final diagnoses:   Acute cystitis with hematuria   Nausea and vomiting, unspecified vomiting type       ED Disposition  ED Disposition     ED Disposition   Discharge    Condition   Stable    Comment   --             Nick Boateng MD  [address] [address]  [phone]    In 2 days  Urine culture resuts         Medication List      New Prescriptions    ondansetron ODT 4 MG disintegrating tablet  Commonly known as: ZOFRAN-ODT  Place 1 tablet on the tongue Every 6 (Six) Hours As Needed for Nausea or Vomiting.        Stop    cephalexin 500 MG capsule  Commonly known as: KEFLEX        ASK your doctor about these medications    nitrofurantoin (macrocrystal-monohydrate) 100 MG capsule  Commonly known as: MACROBID  Take 1 capsule by mouth 2 (Two) Times a Day for 5 days.  Ask about: Should I take this medication?     sulfamethoxazole-trimethoprim 800-160 MG per tablet  Commonly known as: BACTRIM DS,SEPTRA DS  Take 1 tablet by mouth 2 (Two) Times a Day for 7 days.  Ask about: Should I take this medication?           Where to Get Your Medications      These medications were sent to [facility] - [place], [place] - [address] [place] [address] - [phone]  - [phone] [place] [address]    Phone: [phone]   · nitrofurantoin (macrocrystal-monohydrate) 100 MG capsule     These medications were sent to [facility] [place], [place] - [address] - [phone] PH - [phone]   [address] [address]    Phone: [phone]    · ondansetron ODT 4 MG disintegrating tablet  · sulfamethoxazole-trimethoprim 800-160 MG per tablet          Asher Grande MD  04/02/22 7839       Asher Grande MD  04/30/22 [id number]

## 2022-04-03 NOTE — ED NOTES
Patient is at risk for falls. Yellow arm band placed. Yellow non-skid socks applied. Gait belt is readidly accessible. Yellow light above door on. Call light within reach.

## 2022-04-05 LAB — BACTERIA SPEC AEROBE CULT: ABNORMAL

## 2022-04-06 ENCOUNTER — HOME CARE VISIT (OUTPATIENT)
Dept: HOME HEALTH SERVICES | Facility: CLINIC | Age: 47
End: 2022-04-06

## 2022-04-06 PROCEDURE — G0152 HHCP-SERV OF OT,EA 15 MIN: HCPCS

## 2022-04-07 ENCOUNTER — TELEPHONE (OUTPATIENT)
Dept: EMERGENCY DEPT | Facility: HOSPITAL | Age: 47
End: 2022-04-07

## 2022-04-07 RX ORDER — NITROFURANTOIN 25; 75 MG/1; MG/1
100 CAPSULE ORAL 2 TIMES DAILY
Qty: 10 CAPSULE | Refills: 0 | Status: SHIPPED | OUTPATIENT
Start: 2022-04-07 | End: 2022-04-12

## 2022-04-13 ENCOUNTER — APPOINTMENT (OUTPATIENT)
Dept: CT IMAGING | Facility: HOSPITAL | Age: 47
End: 2022-04-13

## 2022-04-13 ENCOUNTER — HOSPITAL ENCOUNTER (EMERGENCY)
Facility: HOSPITAL | Age: 47
Discharge: HOME | End: 2022-04-13
Attending: EMERGENCY MEDICINE | Admitting: EMERGENCY MEDICINE

## 2022-04-13 VITALS
RESPIRATION RATE: 18 BRPM | BODY MASS INDEX: 43.27 KG/M2 | DIASTOLIC BLOOD PRESSURE: 88 MMHG | SYSTOLIC BLOOD PRESSURE: 153 MMHG | OXYGEN SATURATION: 99 % | TEMPERATURE: 97.2 F | HEART RATE: 58 BPM | WEIGHT: 229 LBS

## 2022-04-13 DIAGNOSIS — N20.1 RIGHT URETERAL STONE: Primary | ICD-10-CM

## 2022-04-13 DIAGNOSIS — M62.830 LUMBAR PARASPINAL MUSCLE SPASM: ICD-10-CM

## 2022-04-13 LAB
ALBUMIN SERPL-MCNC: 4.2 G/DL (ref 3.5–5.2)
ALBUMIN/GLOB SERPL: 2 G/DL
ALP SERPL-CCNC: 77 U/L (ref 39–117)
ALT SERPL W P-5'-P-CCNC: 20 U/L (ref 1–33)
ANION GAP SERPL CALCULATED.3IONS-SCNC: 8 MMOL/L (ref 5–15)
AST SERPL-CCNC: 18 U/L (ref 1–32)
BACTERIA UR QL AUTO: ABNORMAL /HPF
BASOPHILS # BLD AUTO: 0.04 10*3/MM3 (ref 0–0.2)
BASOPHILS NFR BLD AUTO: 0.6 % (ref 0–1.5)
BILIRUB SERPL-MCNC: 0.2 MG/DL (ref 0–1.2)
BILIRUB UR QL STRIP: NEGATIVE
BUN SERPL-MCNC: 17 MG/DL (ref 6–20)
BUN/CREAT SERPL: 15.3 (ref 7–25)
CALCIUM SPEC-SCNC: 9.5 MG/DL (ref 8.6–10.5)
CHLORIDE SERPL-SCNC: 104 MMOL/L (ref 98–107)
CLARITY UR: ABNORMAL
CO2 SERPL-SCNC: 25 MMOL/L (ref 22–29)
COLOR UR: YELLOW
CREAT SERPL-MCNC: 1.11 MG/DL (ref 0.57–1)
DEPRECATED RDW RBC AUTO: 42.3 FL (ref 37–54)
EGFRCR SERPLBLD CKD-EPI 2021: 62.2 ML/MIN/1.73
EOSINOPHIL # BLD AUTO: 0.24 10*3/MM3 (ref 0–0.4)
EOSINOPHIL NFR BLD AUTO: 3.8 % (ref 0.3–6.2)
ERYTHROCYTE [DISTWIDTH] IN BLOOD BY AUTOMATED COUNT: 13 % (ref 12.3–15.4)
GLOBULIN UR ELPH-MCNC: 2.1 GM/DL
GLUCOSE SERPL-MCNC: 104 MG/DL (ref 65–99)
GLUCOSE UR STRIP-MCNC: NEGATIVE MG/DL
HCG SERPL QL: NEGATIVE
HCT VFR BLD AUTO: 36.2 % (ref 34–46.6)
HGB BLD-MCNC: 12.2 G/DL (ref 12–15.9)
HGB UR QL STRIP.AUTO: ABNORMAL
HOLD SPECIMEN: NORMAL
HYALINE CASTS UR QL AUTO: ABNORMAL /LPF
IMM GRANULOCYTES # BLD AUTO: 0.02 10*3/MM3 (ref 0–0.05)
IMM GRANULOCYTES NFR BLD AUTO: 0.3 % (ref 0–0.5)
KETONES UR QL STRIP: NEGATIVE
LEUKOCYTE ESTERASE UR QL STRIP.AUTO: ABNORMAL
LIPASE SERPL-CCNC: 38 U/L (ref 13–60)
LYMPHOCYTES # BLD AUTO: 2.45 10*3/MM3 (ref 0.7–3.1)
LYMPHOCYTES NFR BLD AUTO: 38.8 % (ref 19.6–45.3)
MCH RBC QN AUTO: 30.7 PG (ref 26.6–33)
MCHC RBC AUTO-ENTMCNC: 33.7 G/DL (ref 31.5–35.7)
MCV RBC AUTO: 91 FL (ref 79–97)
MONOCYTES # BLD AUTO: 0.35 10*3/MM3 (ref 0.1–0.9)
MONOCYTES NFR BLD AUTO: 5.5 % (ref 5–12)
NEUTROPHILS NFR BLD AUTO: 3.22 10*3/MM3 (ref 1.7–7)
NEUTROPHILS NFR BLD AUTO: 51 % (ref 42.7–76)
NITRITE UR QL STRIP: NEGATIVE
NRBC BLD AUTO-RTO: 0 /100 WBC (ref 0–0.2)
PH UR STRIP.AUTO: 6 [PH] (ref 5–9)
PLATELET # BLD AUTO: 226 10*3/MM3 (ref 140–450)
PMV BLD AUTO: 11 FL (ref 6–12)
POTASSIUM SERPL-SCNC: 4.2 MMOL/L (ref 3.5–5.2)
PROT SERPL-MCNC: 6.3 G/DL (ref 6–8.5)
PROT UR QL STRIP: NEGATIVE
RBC # BLD AUTO: 3.98 10*6/MM3 (ref 3.77–5.28)
RBC # UR STRIP: ABNORMAL /HPF
REF LAB TEST METHOD: ABNORMAL
SODIUM SERPL-SCNC: 137 MMOL/L (ref 136–145)
SP GR UR STRIP: 1.01 (ref 1–1.03)
SQUAMOUS #/AREA URNS HPF: ABNORMAL /HPF
UROBILINOGEN UR QL STRIP: ABNORMAL
WBC # UR STRIP: ABNORMAL /HPF
WBC NRBC COR # BLD: 6.32 10*3/MM3 (ref 3.4–10.8)
WHOLE BLOOD HOLD SPECIMEN: NORMAL
WHOLE BLOOD HOLD SPECIMEN: NORMAL

## 2022-04-13 PROCEDURE — 25010000002 ORPHENADRINE CITRATE PER 60 MG: Performed by: NURSE PRACTITIONER

## 2022-04-13 PROCEDURE — 74176 CT ABD & PELVIS W/O CONTRAST: CPT

## 2022-04-13 PROCEDURE — 25010000002 ONDANSETRON PER 1 MG: Performed by: NURSE PRACTITIONER

## 2022-04-13 PROCEDURE — 25010000002 KETOROLAC TROMETHAMINE PER 15 MG: Performed by: NURSE PRACTITIONER

## 2022-04-13 PROCEDURE — 96374 THER/PROPH/DIAG INJ IV PUSH: CPT

## 2022-04-13 PROCEDURE — 83690 ASSAY OF LIPASE: CPT | Performed by: NURSE PRACTITIONER

## 2022-04-13 PROCEDURE — 85025 COMPLETE CBC W/AUTO DIFF WBC: CPT | Performed by: NURSE PRACTITIONER

## 2022-04-13 PROCEDURE — 80053 COMPREHEN METABOLIC PANEL: CPT | Performed by: NURSE PRACTITIONER

## 2022-04-13 PROCEDURE — 81001 URINALYSIS AUTO W/SCOPE: CPT | Performed by: NURSE PRACTITIONER

## 2022-04-13 PROCEDURE — 84703 CHORIONIC GONADOTROPIN ASSAY: CPT | Performed by: NURSE PRACTITIONER

## 2022-04-13 PROCEDURE — 99283 EMERGENCY DEPT VISIT LOW MDM: CPT

## 2022-04-13 PROCEDURE — 96375 TX/PRO/DX INJ NEW DRUG ADDON: CPT

## 2022-04-13 RX ORDER — ONDANSETRON 2 MG/ML
4 INJECTION INTRAMUSCULAR; INTRAVENOUS ONCE
Status: COMPLETED | OUTPATIENT
Start: 2022-04-13 | End: 2022-04-13

## 2022-04-13 RX ORDER — ORPHENADRINE CITRATE 30 MG/ML
60 INJECTION INTRAMUSCULAR; INTRAVENOUS ONCE
Status: COMPLETED | OUTPATIENT
Start: 2022-04-13 | End: 2022-04-13

## 2022-04-13 RX ORDER — ORPHENADRINE CITRATE 100 MG/1
100 TABLET, EXTENDED RELEASE ORAL 2 TIMES DAILY PRN
Qty: 10 TABLET | Refills: 0 | Status: SHIPPED | OUTPATIENT
Start: 2022-04-13 | End: 2023-03-09 | Stop reason: HOSPADM

## 2022-04-13 RX ORDER — SODIUM CHLORIDE 0.9 % (FLUSH) 0.9 %
10 SYRINGE (ML) INJECTION AS NEEDED
Status: DISCONTINUED | OUTPATIENT
Start: 2022-04-13 | End: 2022-04-13 | Stop reason: HOSPADM

## 2022-04-13 RX ORDER — KETOROLAC TROMETHAMINE 30 MG/ML
30 INJECTION, SOLUTION INTRAMUSCULAR; INTRAVENOUS ONCE
Status: COMPLETED | OUTPATIENT
Start: 2022-04-13 | End: 2022-04-13

## 2022-04-13 RX ADMIN — ONDANSETRON 4 MG: 2 INJECTION INTRAMUSCULAR; INTRAVENOUS at 15:42

## 2022-04-13 RX ADMIN — SODIUM CHLORIDE 1000 ML: 9 INJECTION, SOLUTION INTRAVENOUS at 15:42

## 2022-04-13 RX ADMIN — ORPHENADRINE CITRATE 60 MG: 30 INJECTION INTRAMUSCULAR; INTRAVENOUS at 18:25

## 2022-04-13 RX ADMIN — KETOROLAC TROMETHAMINE 30 MG: 30 INJECTION, SOLUTION INTRAMUSCULAR; INTRAVENOUS at 17:00

## 2022-04-13 NOTE — DISCHARGE INSTRUCTIONS
Continue your home medications. Follow up with Dr. Flanagan for reevaluation of your right flank pain. Return to the ER if symptoms worsen or change in presentation.

## 2022-04-13 NOTE — ED PROVIDER NOTES
Subjective   Patient presents to the ER with c/o bilateral lower back pain (R>L) and generalized abdominal pain that started this morning and gradually worsened. Patient reports history of kidney stones. Reports LMP was in April. Reports nausea without vomiting. Denies changes to urination. Medication includes Tylenol at 1100 today without relief.           Review of Systems   Constitutional: Negative for chills, fatigue and fever.   HENT: Negative.    Respiratory: Negative.    Cardiovascular: Negative.    Gastrointestinal: Positive for abdominal pain and nausea. Negative for diarrhea and vomiting.   Genitourinary: Negative.    Musculoskeletal: Positive for back pain.   Skin: Negative.    Neurological: Negative.    Psychiatric/Behavioral: Negative.        Past Medical History:   Diagnosis Date   • Abdominal pain     Chronic RLQ, RUQ, R flank comes and goes.    • Abdominal pain, right lower quadrant    • Acute bilateral otitis media    • Allergic rhinitis    • Backache     Upper back.   • Candidiasis of skin    • Cellulitis of neck    • Chest pain    • Contraception    • Cough    • Dysfunction of eustachian tube    • Dyspareunia, female    • Excessive or frequent menstruation    • Flank pain    • Generalized aches and pains    • Headache    • Health maintenance examination    • Infection of skin and subcutaneous tissue    • Irregular periods    • Kidney stone     Poss   • Menorrhagia    • Nausea vomiting and diarrhea    • Obesity    • Open wound of abdominal wall    • Otalgia    • Pain in left foot    • Pain in unspecified hand    • Removed Impacted cerumen 06/05/2012   • Rhinitis    • Seizure ([facility]) 8/15/2019   • Shoulder pain     right-sided-likely muscle strain      • Shoulder tendinitis    • Spider bite wound    • Staphylococcal infection of skin    • Swollen feet    • Tinea cruris    • Tobacco dependence syndrome    • Upper respiratory infection    • Urinary tract infectious disease    • Vertigo    • Visit for  gynecologic examination    • Vulvovaginitis        Allergies   Allergen Reactions   • Abilify [Aripiprazole] Nausea Only   • Buspirone Rash   • Penicillins Hives and Nausea And Vomiting       Past Surgical History:   Procedure Laterality Date   •  SECTION     • DILATATION AND CURETTAGE      Secondary to incomplete spontaneous    • INCISION AND DRAINAGE ABSCESS  2012   • INJECTION OF MEDICATION  2015    Phenergan (1)     • INJECTION OF MEDICATION  10/10/2013    Toradol (2)      • INJECTION OF MEDICATION  2014    Zofran (1)          Family History   Problem Relation Age of Onset   • Depression Other    • Cancer Other         Colorectal Cancer   • Endometrial cancer Other    • Lung cancer Other    • Endometrial cancer Sister    • Breast cancer Maternal Grandmother        Social History     Socioeconomic History   • Marital status: Single   Tobacco Use   • Smoking status: Current Every Day Smoker     Packs/day: 0.50     Types: Cigarettes   • Smokeless tobacco: Never Used   Substance and Sexual Activity   • Alcohol use: No   • Drug use: No   • Sexual activity: Defer           Objective    /88   Pulse 58   Temp 97.2 °F (36.2 °C)   Resp 18   Wt 104 kg (229 lb)   LMP 2022 (Approximate)   SpO2 99%   BMI 43.27 kg/m²     Physical Exam  Vitals and nursing note reviewed.   Constitutional:       General: She is not in acute distress.     Appearance: She is well-developed. She is obese. She is not ill-appearing.   HENT:      Head: Normocephalic and atraumatic.   Cardiovascular:      Rate and Rhythm: Normal rate and regular rhythm.      Heart sounds: Normal heart sounds. No murmur heard.  Pulmonary:      Effort: Pulmonary effort is normal. No respiratory distress.      Breath sounds: Normal breath sounds. No wheezing.   Abdominal:      General: Bowel sounds are normal. There is no distension.      Palpations: Abdomen is soft.      Tenderness: There is generalized abdominal  tenderness. There is right CVA tenderness and left CVA tenderness. There is no guarding or rebound.   Musculoskeletal:         General: Normal range of motion.      Cervical back: Normal range of motion and neck supple.        Back:       Comments: Tenderness with light palpation.    Skin:     General: Skin is warm and dry.      Capillary Refill: Capillary refill takes less than 2 seconds.   Neurological:      Mental Status: She is alert and oriented to person, place, and time.      Coordination: Coordination normal.   Psychiatric:         Behavior: Behavior normal.         Thought Content: Thought content normal.         Judgment: Judgment normal.         Procedures  Results for orders placed or performed during the hospital encounter of 04/13/22   Urinalysis With Microscopic If Indicated (No Culture) - Urine, Clean Catch    Specimen: Urine, Clean Catch   Result Value Ref Range    Color, UA Yellow Yellow, Straw, Dark Yellow, Kavita    Appearance, UA Cloudy (A) Clear    pH, UA 6.0 5.0 - 9.0    Specific [place]oh, UA 1.015 1.003 - 1.030    Glucose, UA Negative Negative    Ketones, UA Negative Negative    Bilirubin, UA Negative Negative    Blood, UA Trace (A) Negative    Protein, UA Negative Negative    Leuk Esterase, UA Moderate (2+) (A) Negative    Nitrite, UA Negative Negative    Urobilinogen, UA 0.2 E.U./dL 0.2 - 1.0 E.U./dL   Comprehensive Metabolic Panel    Specimen: Blood   Result Value Ref Range    Glucose 104 (H) 65 - 99 mg/dL    BUN 17 6 - 20 mg/dL    Creatinine 1.11 (H) 0.57 - 1.00 mg/dL    Sodium 137 136 - 145 mmol/L    Potassium 4.2 3.5 - 5.2 mmol/L    Chloride 104 98 - 107 mmol/L    CO2 25.0 22.0 - 29.0 mmol/L    Calcium 9.5 8.6 - 10.5 mg/dL    Total Protein 6.3 6.0 - 8.5 g/dL    Albumin 4.20 3.50 - 5.20 g/dL    ALT (SGPT) 20 1 - 33 U/L    AST (SGOT) 18 1 - 32 U/L    Alkaline Phosphatase 77 39 - 117 U/L    Total Bilirubin 0.2 0.0 - 1.2 mg/dL    Globulin 2.1 gm/dL    A/G Ratio 2.0 g/dL    BUN/Creatinine  Ratio 15.3 7.0 - 25.0    Anion Gap 8.0 5.0 - 15.0 mmol/L    eGFR 62.2 >60.0 mL/min/1.73   hCG, Serum, Qualitative    Specimen: Blood   Result Value Ref Range    HCG Qualitative Negative Negative   CBC Auto Differential    Specimen: Blood   Result Value Ref Range    WBC 6.32 3.40 - 10.80 10*3/mm3    RBC 3.98 3.77 - 5.28 10*6/mm3    Hemoglobin 12.2 12.0 - 15.9 g/dL    Hematocrit 36.2 34.0 - 46.6 %    MCV 91.0 79.0 - 97.0 fL    MCH 30.7 26.6 - 33.0 pg    MCHC 33.7 31.5 - 35.7 g/dL    RDW 13.0 12.3 - 15.4 %    RDW-SD 42.3 37.0 - 54.0 fl    MPV 11.0 6.0 - 12.0 fL    Platelets 226 140 - 450 10*3/mm3    Neutrophil % 51.0 42.7 - 76.0 %    Lymphocyte % 38.8 19.6 - 45.3 %    Monocyte % 5.5 5.0 - 12.0 %    Eosinophil % 3.8 0.3 - 6.2 %    Basophil % 0.6 0.0 - 1.5 %    Immature Grans % 0.3 0.0 - 0.5 %    Neutrophils, Absolute 3.22 1.70 - 7.00 10*3/mm3    Lymphocytes, Absolute 2.45 0.70 - 3.10 10*3/mm3    Monocytes, Absolute 0.35 0.10 - 0.90 10*3/mm3    Eosinophils, Absolute 0.24 0.00 - 0.40 10*3/mm3    Basophils, Absolute 0.04 0.00 - 0.20 10*3/mm3    Immature Grans, Absolute 0.02 0.00 - 0.05 10*3/mm3    nRBC 0.0 0.0 - 0.2 /100 WBC   Lipase    Specimen: Blood   Result Value Ref Range    Lipase 38 13 - 60 U/L   Urinalysis, Microscopic Only - Urine, Clean Catch    Specimen: Urine, Clean Catch   Result Value Ref Range    RBC, UA 6-12 (A) None Seen /HPF    WBC, UA 21-30 (A) None Seen, 0-2, 3-5 /HPF    Bacteria, UA None Seen None Seen /HPF    Squamous Epithelial Cells, UA 3-5 (A) None Seen, 0-2 /HPF    Hyaline Casts, UA None Seen None Seen /LPF    Methodology Automated Microscopy    Lavender Top   Result Value Ref Range    Extra Tube hold for add-on    Gray Top   Result Value Ref Range    Extra Tube Hold for add-ons.    Light Blue Top   Result Value Ref Range    Extra Tube hold for add-on      CT Abdomen Pelvis Without Contrast    Result Date: 4/13/2022  Narrative: CT abdomen, pelvis without contrast. CLINICAL INDICATION: Right flank  pain.    COMPARISON: CT March 7, 2021.   TECHNIQUE: Noncontrast helical scanning with axial and coronal reformations. Soft tissue, lung, and bone windows reviewed. This exam was performed according to our departmental dose-optimization program, which includes automated exposure control, adjustment of the mA and/or kV according to patient size and/or use of iterative reconstruction technique. ABDOMEN CT FINDINGS: The visualized lung bases show minor dependent changes. Scan sensitivity for abnormalities of the organs is limited by the lack of IV contrast. Within this limitation the following observations are made. Liver, gallbladder, spleen, pancreas, adrenal glands are normal in appearance within the limitations of nonenhanced scanning. Nonobstructing stone left kidney. Tiny nonshadowing stone right kidney. There are no hydronephrotic changes. Tiny stone distal right ureter at the ureterovesical junction. Series 2 image 129. 0.39 cm. This is similar to prior exam. Bowel grossly unremarkable. Normal appendix. No evidence of pathologically enlarged nodes, free air, or free fluid. Normal lumbar  spine. The bones are grossly unremarkable. PELVIS CT FINDINGS: There are pelvic calcifications, phleboliths, unchanged since prior exam. No evidence of pathologically enlarged nodes, free air, or free fluid. The bones are grossly unremarkable.     Impression: Nonobstructing stones in both the right and left kidneys. CT abdomen pelvis without contrast is otherwise unremarkable. There are no hydronephrotic changes. Electronically signed by:  John Galeano MD  4/13/2022 5:22 PM CDT Workstation: [id number]    XR Spine Cervical 2 or 3 View    Result Date: 3/27/2022  Narrative: EXAM: XR CERVICAL SPINE 2-3 VIEWS ORDERING PROVIDER: MAL PALAFOX CLINICAL HISTORY: Pain COMPARISON STUDY: None. TECHNIQUE:  views of the cervical spine. FINDINGS: Mild reversal of the cervical lordosis which may be due to spasm. No fracture and normal posterior  element alignment. Cervical vertebral bodies are normally aligned. Disc spaces are narrowed with small osteophytes in the lower cervical spine, and vertebral body heights are well maintained. There are no lytic or sclerotic lesions. Craniocervical junction and odontoid are unremarkable. Perivertebral soft tissues are normal.     Impression: No acute displaced fracture, or traumatic subluxation based on current assessment.. Multilevel degenerative disc disease. Mild reversal of the cervical lordosis which may be due to spasm. Electronically signed by:  Aniket Solis MD  3/27/2022 3:26 PM CDT Workstation: [id number]    XR Knee 4+ View Left    Result Date: 3/27/2022  Narrative: EXAM: XR KNEE 4 OR MORE VIEWS ORDERING PROVIDER: MAL PALAFOX CLINICAL HISTORY: Pain TECHNIQUE: 4 images of the left knee were obtained. COMPARISON: FINDINGS: The soft tissues appear within normal limits. There is no significant suprapatellar effusion The patella is intact. The joint spaces are mildly narrowed. There is no loose body. The cortical surfaces and trabecular pattern appear within normal limits.     Impression: No acute displaced fracture, or traumatic subluxation based on current assessment. Mild osteoarthritis. Electronically signed by:  Aniket Solis MD  3/27/2022 3:17 PM CDT Workstation: [id number]    XR Abdomen 2+ VW with Chest 1 VW    Result Date: 4/2/2022  Narrative: XR ABDOMEN SUPINE AND ERECT WITH CHEST (ABD ACUTE SERIES) INDICATION: 46 years Female; abdominal pain, NVD TECHNIQUE: 2 views of the abdomen and single view of the chest were performed. Comparison: 3/27/2022. FINDINGS: Lungs/Pleura: Bilateral low lung volumes and vascular crowding. No acute airspace opacities. No pleural effusion or pneumothorax. Heart/Mediastinum: Unremarkable. Bowel: No bowel dilatation. Scattered air-fluid levels in the nondilated dilated colon suggests underlying diarrheal illness/gastroenteritis. No free air. Calcifications: Multiple  calcifications in the pelvis probably represent phleboliths. Calcifications in the left upper abdomen measuring 5 mm likely represents a left kidney stone. Lines/Tubes: None. Bones: Unremarkable. Soft tissues: Unremarkable. Incidental findings: None.     Impression: 1.  Scattered air-fluid levels in the nondilated dilated colon suggests underlying diarrheal illness/gastroenteritis. 2.  Left nephrolithiasis. 3.  No acute cardiopulmonary findings. Electronically signed by:  Kendra Lyles  4/2/2022 8:23 PM CDT Workstation: [id number]    XR Chest PA & Lateral    Result Date: 3/27/2022  Narrative: EXAM: XR CHEST 2 VIEWS HISTORY: lightheaded COMPARISON: 3/10/2022 TECHNIQUE: PA and left lateral views of the chest. FINDINGS: The lungs are well aerated. There is no pleural effusion. The heart size is within normal limits. The mediastinal contours are within normal limits. . No evidence of focal consolidation. Unremarkable bronchovascular markings. Unremarkable bilateral hemidiaphragms. Unremarkable visualized osseous structures.     Impression: 1.  There is no acute cardiopulmonary disease. Electronically signed by:  Aniket Solis MD  3/27/2022 3:23 PM CDT Workstation: [id number]    XR Hip With or Without Pelvis 2 - 3 View Left    Result Date: 3/27/2022  Narrative: EXAM: XR HIP 2 OR MORE VIEWS UNILATERAL WITH PELVIS ORDERING PROVIDER: MAL PALAFOX CLINICAL HISTORY: Left hip pain due to fall COMPARISON STUDY: TECHNIQUE: Three views of the pelvic girdle and also left hip joint. FINDINGS: There is no acute displaced fracture.  The alignment is anatomic.  No soft tissue abnormality, or radiopaque foreign body is seen. There is no joint effusion.     Impression: No acute displaced fracture, or traumatic subluxation based on current assessment. Electronically signed by:  Aniket Solis MD  3/27/2022 3:33 PM CDT Workstation: [id number]             ED Course  ED Course as of 04/13/22 1947 Wed Apr 13, 2022 1809 Work up reviewed with  patient. Pain has some improvement with treatment. Denies nausea. Continues to have back pain now presents more as spasms. Will provide muscle relaxer. Advised to follow up with urology for stable stones. Patient verbalized understanding.  [SH]      ED Course User Index  [SH] Nanette Mai APRN                                                 [facility]    Final diagnoses:   Right ureteral stone   Lumbar paraspinal muscle spasm       ED Disposition  ED Disposition     ED Disposition   Discharge    Condition   Stable    Comment   --             Chris Deshpande MD  [address]  [address]  [place] [address]  [phone]    Schedule an appointment as soon as possible for a visit   Flank pain    Nick Boateng MD  [address] [place] [address]  [phone]      ER follow up         Medication List      New Prescriptions    orphenadrine 100 MG 12 hr tablet  Commonly known as: NORFLEX  Take 1 tablet by mouth 2 (Two) Times a Day As Needed for Muscle Spasms or Mild Pain .        Changed    busPIRone 5 MG tablet  Commonly known as: BUSPAR  Take 1 tablet by mouth 3 (Three) Times a Day.  What changed: how much to take        Stop    nitrofurantoin (macrocrystal-monohydrate) 100 MG capsule  Commonly known as: MACROBID           Where to Get Your Medications      These medications were sent to [facility] [place], [place] - [address] [facility] - [phone] [facility] [phone]   [address] [place] [id number]    Phone: [phone]   · orphenadrine 100 MG 12 hr tablet          Nanette Mai APRN  04/13/22 1947

## 2022-04-18 VITALS
HEART RATE: 76 BPM | OXYGEN SATURATION: 97 % | DIASTOLIC BLOOD PRESSURE: 76 MMHG | RESPIRATION RATE: 18 BRPM | TEMPERATURE: 97.6 F | SYSTOLIC BLOOD PRESSURE: 150 MMHG

## 2022-04-18 NOTE — HOME HEALTH
REASON FOR REFERRAL: 45 y/o female hospitalized for CVA 12/15/22-12/23/22 at [facility] [place] after being found unresponsive for 2 days. She was referred to home health OT after discharged home and then a 2nd time due to concerns for new onset of Right UE weakness. Patient's mother took to her ED at [facility] however no acute findings noted. She is currently a resident of a personal care home. She is a poor historian and has decreased cognitive function and aphasia. Patient reported that she has been experiencing intermittent muscle spasms in her right arm and right leg. Her right arm is notable for decreased coordination with uncontrolled motions without intention. She is also noted to be unsteady and giving to right LE with functional mobility/transfers. Patient was participating well with OT services and she then was readmitted to [facility] [place] 3/8/22-3/10/22 for new onset of worsening weakness and stroke-like symptoms however no additional problems noted with further testing and she was discharged back to personal care home. She reported that she wants to work more on her R UE with OT DC SUMMARY OBJECTIVE ASSESSMENT MEASURES / PATIENT'S CURRENT LEVEL OF PERFORMANCE COMPARED WITH STATUS AT EVALUATION: LEVEL AT EVALUATION: AROM B UES: L UE: WFL and R UE as follows: shoulder flexion: 100 degrees, shoulder abduction: 90 degrees, elbow/distal AROM: WFL except R hand flexion: unable to actively flex digits #4 and #5. STRENGTH B UES: L UE: 4+/5 and R UE strength as follows: shoulder: 3-/5, elbow/wrist: 3/5 HAND DOMINANCE: R hand dominant, R  strength: 3/5. L  strength: 4+/5. COORDINATION: R UE GROSS MOTOR COORDINATION: Moderate deficit and L UE: WFL R UE FINE MOTOR COORDINATON: Moderate deficit and L UE: WFL BATHING: MIN(A) DRESSING: MIN(A) FUNCTIONAL TRANSFERS/FUNCTIONAL MOBILITY: MIN(A) GROOMING: MIN(A) TOILETING: CG-MIN(A) CURRENT LEVEL: AROM B UES: L UE: WFL and R UE as follows: shoulder flexion: 100 degrees, shoulder  abduction: 90 degrees, elbow/distal AROM: WFL except R hand flexion: unable to actively flex digits #4 and #5. STRENGTH B UES: L UE: 5/5 and R UE strength as follows: shoulder: 3/5, elbow/wrist: 3+/5 HAND DOMINANCE: R hand dominant, R  strength: 3+/5. L  strength: 4+/5. COORDINATION: R UE GROSS MOTOR COORDINATION: MILD- Moderate deficit and L UE: WFL R UE FINE MOTOR COORDINATON: MILD-Moderate deficit and L UE: WFL BATHING: CG(A) DRESSING: CG-SB(A) FUNCTIONAL TRANSFERS/FUNCTIONAL MOBILITY: SB(A) GROOMING: MODIFIED INDEPENDENT TOILETING: MODIFIED INDEPENDENT 3BARTHEL INDEX OF ACTIVITIES OF DAILY LIVING SCORE AT EVALUATION: 83/100 CURRENT BARTHEL INDEX SCORE AT REASSESSMENT: 89/100 SUMMARY OF CONTINUING FUNCTIONAL DEFICITS: -DECREASED R AFFECTED UE STRENGTH -DECREASED R AFFECTED UE FINE MOTOR COORDINATION -DECREASED R AFFECTED UE GROSS MOTOR COORDINATION -DECREASED BATHING INDEPENDENCE -DECREASED DRESSING INDEPENDENCE

## 2022-04-27 ENCOUNTER — OFFICE VISIT (OUTPATIENT)
Dept: FAMILY MEDICINE CLINIC | Facility: CLINIC | Age: 47
End: 2022-04-27

## 2022-04-27 VITALS
HEIGHT: 61 IN | WEIGHT: 216.19 LBS | DIASTOLIC BLOOD PRESSURE: 80 MMHG | BODY MASS INDEX: 40.82 KG/M2 | OXYGEN SATURATION: 99 % | SYSTOLIC BLOOD PRESSURE: 136 MMHG | HEART RATE: 83 BPM

## 2022-04-27 DIAGNOSIS — F41.9 ANXIETY: ICD-10-CM

## 2022-04-27 DIAGNOSIS — E78.00 HIGH CHOLESTEROL: Primary | ICD-10-CM

## 2022-04-27 DIAGNOSIS — G47.00 INSOMNIA, UNSPECIFIED TYPE: ICD-10-CM

## 2022-04-27 DIAGNOSIS — G40.909 SEIZURE DISORDER: ICD-10-CM

## 2022-04-27 DIAGNOSIS — F31.9 BIPOLAR AFFECTIVE DISORDER, REMISSION STATUS UNSPECIFIED: Chronic | ICD-10-CM

## 2022-04-27 PROCEDURE — 99214 OFFICE O/P EST MOD 30 MIN: CPT | Performed by: FAMILY MEDICINE

## 2022-04-27 RX ORDER — BUSPIRONE HYDROCHLORIDE 15 MG/1
15 TABLET ORAL 3 TIMES DAILY
Status: ON HOLD | COMMUNITY
Start: 2022-03-09 | End: 2022-12-10

## 2022-04-27 RX ORDER — DICLOFENAC SODIUM 75 MG/1
TABLET, DELAYED RELEASE ORAL
Status: ON HOLD | COMMUNITY
Start: 2022-03-09 | End: 2022-05-03

## 2022-04-27 RX ORDER — MECLIZINE HCL 12.5 MG/1
12.5 TABLET ORAL 3 TIMES DAILY PRN
COMMUNITY
Start: 2022-04-19 | End: 2023-03-09 | Stop reason: HOSPADM

## 2022-04-27 RX ORDER — POTASSIUM CHLORIDE 750 MG/1
10 TABLET, EXTENDED RELEASE ORAL DAILY
COMMUNITY
Start: 2022-03-01 | End: 2023-03-09 | Stop reason: HOSPADM

## 2022-04-27 RX ORDER — HYDROXYZINE PAMOATE 50 MG/1
50 CAPSULE ORAL 2 TIMES DAILY PRN
COMMUNITY
Start: 2022-04-05

## 2022-04-27 RX ORDER — SOLIFENACIN SUCCINATE 10 MG/1
10 TABLET, FILM COATED ORAL DAILY
Status: ON HOLD | COMMUNITY
Start: 2022-03-02 | End: 2022-12-10

## 2022-04-27 NOTE — PROGRESS NOTES
Paulo Shannon is a 46 y.o. female.   Cc: follow up of chronic medical issues    Hyperlipidemia  This is a chronic problem. The current episode started more than 1 year ago. The problem is resistant. Associated symptoms include myalgias. Pertinent negatives include no chest pain or shortness of breath. Current antihyperlipidemic treatment includes statins.   Anxiety  Presents for follow-up visit. Symptoms include compulsions, depressed mood, dry mouth, excessive worry, insomnia, muscle tension, nausea, nervous/anxious behavior and obsessions. Patient reports no chest pain, confusion, decreased concentration, dizziness, feeling of choking, irritability, malaise, palpitations, restlessness, shortness of breath or suicidal ideas.       Insomnia  The current episode started more than 1 year ago. The problem occurs daily. Associated symptoms include arthralgias, headaches, myalgias, nausea and urinary symptoms. Pertinent negatives include no abdominal pain, anorexia, change in bowel habit, chest pain, chills, congestion, coughing, diaphoresis, fatigue, fever, neck pain, numbness, rash, sore throat, swollen glands, vertigo or vomiting.     The patient comes in to [facility] care.She has a history of Seizure Disorder and Bipolar Disorder. This is stable.  The following portions of the patient's history were reviewed and updated as appropriate: allergies, current medications, past family history, past medical history, past social history, past surgical history and problem list.    Review of Systems   Constitutional: Negative for activity change, appetite change, chills, diaphoresis, fatigue, fever, irritability and unexpected weight change.   HENT: Positive for sinus pressure. Negative for congestion, ear pain, facial swelling, hearing loss, mouth sores, nosebleeds, postnasal drip, rhinorrhea, sneezing, sore throat and tinnitus.    Eyes: Negative for photophobia, pain, redness and itching.   Respiratory:  Negative for cough, choking, shortness of breath and wheezing.    Cardiovascular: Negative for chest pain, palpitations and leg swelling.   Gastrointestinal: Positive for nausea. Negative for abdominal pain, anorexia, blood in stool, change in bowel habit, constipation, diarrhea and vomiting.   Endocrine: Negative for cold intolerance, heat intolerance, polydipsia, polyphagia and polyuria.   Genitourinary: Positive for flank pain and frequency. Negative for dysuria, enuresis, hematuria, pelvic pain, vaginal bleeding, vaginal discharge and vaginal pain.   Musculoskeletal: Positive for arthralgias, back pain, gait problem and myalgias. Negative for neck pain.   Skin: Negative for rash.   Allergic/Immunologic: Negative for environmental allergies and food allergies.   Neurological: Positive for headaches. Negative for dizziness, vertigo, tremors, seizures, syncope, speech difficulty, light-headedness and numbness.   Psychiatric/Behavioral: Negative for agitation, behavioral problems, confusion, decreased concentration and suicidal ideas. The patient is nervous/anxious and has insomnia. The patient is not hyperactive.        Objective   Physical Exam  Vitals reviewed.   Constitutional:       Appearance: Normal appearance.   HENT:      Head: Normocephalic and atraumatic.      Right Ear: Tympanic membrane, ear canal and external ear normal.      Left Ear: Tympanic membrane, ear canal and external ear normal.      Nose: Nose normal.      Mouth/Throat:      Mouth: Mucous membranes are moist.      Pharynx: Oropharynx is clear.      Comments: Patient is edentulus.  Eyes:      Extraocular Movements: Extraocular movements intact.      Pupils: Pupils are equal, round, and reactive to light.   Cardiovascular:      Rate and Rhythm: Normal rate and regular rhythm.      Heart sounds: Normal heart sounds. No murmur heard.    No friction rub. No gallop.   Pulmonary:      Effort: Pulmonary effort is normal. No respiratory distress.      " Breath sounds: Normal breath sounds. No stridor. No wheezing, rhonchi or rales.   Chest:      Chest wall: No tenderness.   Abdominal:      General: Abdomen is flat. There is no distension.      Palpations: Abdomen is soft. There is no mass.      Tenderness: There is no abdominal tenderness. There is no guarding or rebound.      Hernia: No hernia is present.   Musculoskeletal:      Cervical back: Normal range of motion.      Comments: Back is non tender.   Skin:     General: Skin is warm and dry.   Neurological:      Mental Status: She is alert.      Cranial Nerves: No cranial nerve deficit.      Comments: She has a good  bilaterally. She walks with a walker and does well.           Visit Vitals  /80   Pulse 83   Ht 154.9 cm (61\")   Wt 98.1 kg (216 lb 3 oz)   LMP 02/15/2022 (Within Days)   SpO2 99%   Breastfeeding No   BMI 40.85 kg/m²     Body mass index is 40.85 kg/m².      Assessment/Plan   Diagnoses and all orders for this visit:    1. High cholesterol (Primary)    2. Anxiety    3. Bipolar affective disorder, remission status unspecified (HCC)    4. Seizure disorder (HCC)    5. Insomnia, unspecified type    I reviewed Magnesium,CBC,CMP,and Lipid Profile with the patient..  Return to the clinic in 3 month/s.  Will contact with results as needed.    "

## 2022-05-03 ENCOUNTER — APPOINTMENT (OUTPATIENT)
Dept: CT IMAGING | Facility: HOSPITAL | Age: 47
End: 2022-05-03

## 2022-05-03 ENCOUNTER — HOSPITAL ENCOUNTER (OUTPATIENT)
Facility: HOSPITAL | Age: 47
Setting detail: OBSERVATION
Discharge: HOME HEALTH | End: 2022-05-05
Attending: EMERGENCY MEDICINE | Admitting: INTERNAL MEDICINE

## 2022-05-03 ENCOUNTER — APPOINTMENT (OUTPATIENT)
Dept: MRI IMAGING | Facility: HOSPITAL | Age: 47
End: 2022-05-03

## 2022-05-03 ENCOUNTER — APPOINTMENT (OUTPATIENT)
Dept: GENERAL RADIOLOGY | Facility: HOSPITAL | Age: 47
End: 2022-05-03

## 2022-05-03 DIAGNOSIS — R53.1 WEAKNESS: ICD-10-CM

## 2022-05-03 DIAGNOSIS — Z74.09 IMPAIRED FUNCTIONAL MOBILITY, BALANCE, GAIT, AND ENDURANCE: ICD-10-CM

## 2022-05-03 DIAGNOSIS — R47.1 DYSARTHRIA: Primary | ICD-10-CM

## 2022-05-03 DIAGNOSIS — R41.82 ALTERED MENTAL STATUS, UNSPECIFIED ALTERED MENTAL STATUS TYPE: ICD-10-CM

## 2022-05-03 DIAGNOSIS — R13.10 DYSPHAGIA, UNSPECIFIED TYPE: ICD-10-CM

## 2022-05-03 LAB
ALBUMIN SERPL-MCNC: 4.2 G/DL (ref 3.5–5.2)
ALBUMIN/GLOB SERPL: 1.8 G/DL
ALP SERPL-CCNC: 87 U/L (ref 39–117)
ALT SERPL W P-5'-P-CCNC: 22 U/L (ref 1–33)
ANION GAP SERPL CALCULATED.3IONS-SCNC: 12 MMOL/L (ref 5–15)
APTT PPP: 25.3 SECONDS (ref 20–40.3)
AST SERPL-CCNC: 21 U/L (ref 1–32)
BASOPHILS # BLD AUTO: 0.04 10*3/MM3 (ref 0–0.2)
BASOPHILS NFR BLD AUTO: 0.5 % (ref 0–1.5)
BILIRUB SERPL-MCNC: 0.2 MG/DL (ref 0–1.2)
BUN SERPL-MCNC: 15 MG/DL (ref 6–20)
BUN/CREAT SERPL: 13.8 (ref 7–25)
CALCIUM SPEC-SCNC: 8.9 MG/DL (ref 8.6–10.5)
CHLORIDE SERPL-SCNC: 105 MMOL/L (ref 98–107)
CO2 SERPL-SCNC: 23 MMOL/L (ref 22–29)
CREAT SERPL-MCNC: 1.09 MG/DL (ref 0.57–1)
DEPRECATED RDW RBC AUTO: 44 FL (ref 37–54)
EGFRCR SERPLBLD CKD-EPI 2021: 63.6 ML/MIN/1.73
EOSINOPHIL # BLD AUTO: 0.4 10*3/MM3 (ref 0–0.4)
EOSINOPHIL NFR BLD AUTO: 5.5 % (ref 0.3–6.2)
ERYTHROCYTE [DISTWIDTH] IN BLOOD BY AUTOMATED COUNT: 13.1 % (ref 12.3–15.4)
FLUAV RNA RESP QL NAA+PROBE: NOT DETECTED
FLUBV RNA RESP QL NAA+PROBE: NOT DETECTED
GLOBULIN UR ELPH-MCNC: 2.4 GM/DL
GLUCOSE SERPL-MCNC: 94 MG/DL (ref 65–99)
HCG SERPL QL: NEGATIVE
HCT VFR BLD AUTO: 39.4 % (ref 34–46.6)
HGB BLD-MCNC: 13.4 G/DL (ref 12–15.9)
HOLD SPECIMEN: NORMAL
IMM GRANULOCYTES # BLD AUTO: 0.02 10*3/MM3 (ref 0–0.05)
IMM GRANULOCYTES NFR BLD AUTO: 0.3 % (ref 0–0.5)
INR PPP: 0.97 (ref 0.8–1.2)
LYMPHOCYTES # BLD AUTO: 2.25 10*3/MM3 (ref 0.7–3.1)
LYMPHOCYTES NFR BLD AUTO: 30.8 % (ref 19.6–45.3)
MCH RBC QN AUTO: 31.2 PG (ref 26.6–33)
MCHC RBC AUTO-ENTMCNC: 34 G/DL (ref 31.5–35.7)
MCV RBC AUTO: 91.8 FL (ref 79–97)
MONOCYTES # BLD AUTO: 0.51 10*3/MM3 (ref 0.1–0.9)
MONOCYTES NFR BLD AUTO: 7 % (ref 5–12)
NEUTROPHILS NFR BLD AUTO: 4.08 10*3/MM3 (ref 1.7–7)
NEUTROPHILS NFR BLD AUTO: 55.9 % (ref 42.7–76)
NRBC BLD AUTO-RTO: 0 /100 WBC (ref 0–0.2)
PLATELET # BLD AUTO: 248 10*3/MM3 (ref 140–450)
PMV BLD AUTO: 11.5 FL (ref 6–12)
POTASSIUM SERPL-SCNC: 4.6 MMOL/L (ref 3.5–5.2)
PROT SERPL-MCNC: 6.6 G/DL (ref 6–8.5)
PROTHROMBIN TIME: 12.7 SECONDS (ref 11.1–15.3)
RBC # BLD AUTO: 4.29 10*6/MM3 (ref 3.77–5.28)
SARS-COV-2 RNA RESP QL NAA+PROBE: NOT DETECTED
SODIUM SERPL-SCNC: 140 MMOL/L (ref 136–145)
TROPONIN T SERPL-MCNC: <0.01 NG/ML (ref 0–0.03)
TROPONIN T SERPL-MCNC: <0.01 NG/ML (ref 0–0.03)
WBC NRBC COR # BLD: 7.3 10*3/MM3 (ref 3.4–10.8)
WHOLE BLOOD HOLD SPECIMEN: NORMAL

## 2022-05-03 PROCEDURE — 85610 PROTHROMBIN TIME: CPT | Performed by: EMERGENCY MEDICINE

## 2022-05-03 PROCEDURE — G0378 HOSPITAL OBSERVATION PER HR: HCPCS

## 2022-05-03 PROCEDURE — 70498 CT ANGIOGRAPHY NECK: CPT

## 2022-05-03 PROCEDURE — 85025 COMPLETE CBC W/AUTO DIFF WBC: CPT | Performed by: EMERGENCY MEDICINE

## 2022-05-03 PROCEDURE — 71045 X-RAY EXAM CHEST 1 VIEW: CPT

## 2022-05-03 PROCEDURE — 0 IOPAMIDOL PER 1 ML: Performed by: EMERGENCY MEDICINE

## 2022-05-03 PROCEDURE — C9803 HOPD COVID-19 SPEC COLLECT: HCPCS

## 2022-05-03 PROCEDURE — 25010000002 ENOXAPARIN PER 10 MG: Performed by: HOSPITALIST

## 2022-05-03 PROCEDURE — 93005 ELECTROCARDIOGRAM TRACING: CPT | Performed by: EMERGENCY MEDICINE

## 2022-05-03 PROCEDURE — 85730 THROMBOPLASTIN TIME PARTIAL: CPT | Performed by: EMERGENCY MEDICINE

## 2022-05-03 PROCEDURE — 84703 CHORIONIC GONADOTROPIN ASSAY: CPT | Performed by: EMERGENCY MEDICINE

## 2022-05-03 PROCEDURE — 80053 COMPREHEN METABOLIC PANEL: CPT | Performed by: EMERGENCY MEDICINE

## 2022-05-03 PROCEDURE — 96361 HYDRATE IV INFUSION ADD-ON: CPT

## 2022-05-03 PROCEDURE — 70496 CT ANGIOGRAPHY HEAD: CPT

## 2022-05-03 PROCEDURE — 70450 CT HEAD/BRAIN W/O DYE: CPT

## 2022-05-03 PROCEDURE — 0042T HC CT CEREBRAL PERFUSION W/WO CONTRAST: CPT

## 2022-05-03 PROCEDURE — 96372 THER/PROPH/DIAG INJ SC/IM: CPT

## 2022-05-03 PROCEDURE — 93010 ELECTROCARDIOGRAM REPORT: CPT | Performed by: INTERNAL MEDICINE

## 2022-05-03 PROCEDURE — 87636 SARSCOV2 & INF A&B AMP PRB: CPT | Performed by: EMERGENCY MEDICINE

## 2022-05-03 PROCEDURE — 99284 EMERGENCY DEPT VISIT MOD MDM: CPT

## 2022-05-03 PROCEDURE — 36415 COLL VENOUS BLD VENIPUNCTURE: CPT | Performed by: EMERGENCY MEDICINE

## 2022-05-03 PROCEDURE — 96360 HYDRATION IV INFUSION INIT: CPT

## 2022-05-03 PROCEDURE — 93005 ELECTROCARDIOGRAM TRACING: CPT

## 2022-05-03 PROCEDURE — 84484 ASSAY OF TROPONIN QUANT: CPT | Performed by: EMERGENCY MEDICINE

## 2022-05-03 PROCEDURE — 70551 MRI BRAIN STEM W/O DYE: CPT

## 2022-05-03 RX ORDER — SODIUM CHLORIDE 9 MG/ML
100 INJECTION, SOLUTION INTRAVENOUS
Status: COMPLETED | OUTPATIENT
Start: 2022-05-03 | End: 2022-05-03

## 2022-05-03 RX ORDER — LOSARTAN POTASSIUM 50 MG/1
50 TABLET ORAL DAILY
Status: DISCONTINUED | OUTPATIENT
Start: 2022-05-04 | End: 2022-05-05 | Stop reason: HOSPADM

## 2022-05-03 RX ORDER — FUROSEMIDE 20 MG/1
20 TABLET ORAL DAILY
Status: DISCONTINUED | OUTPATIENT
Start: 2022-05-04 | End: 2022-05-05 | Stop reason: HOSPADM

## 2022-05-03 RX ORDER — SODIUM CHLORIDE 9 MG/ML
125 INJECTION, SOLUTION INTRAVENOUS CONTINUOUS
Status: DISCONTINUED | OUTPATIENT
Start: 2022-05-03 | End: 2022-05-04

## 2022-05-03 RX ORDER — TRAZODONE HYDROCHLORIDE 50 MG/1
50 TABLET ORAL NIGHTLY
Status: DISCONTINUED | OUTPATIENT
Start: 2022-05-03 | End: 2022-05-05 | Stop reason: HOSPADM

## 2022-05-03 RX ORDER — SODIUM CHLORIDE 0.9 % (FLUSH) 0.9 %
10 SYRINGE (ML) INJECTION AS NEEDED
Status: DISCONTINUED | OUTPATIENT
Start: 2022-05-03 | End: 2022-05-05 | Stop reason: HOSPADM

## 2022-05-03 RX ORDER — LANOLIN ALCOHOL/MO/W.PET/CERES
6 CREAM (GRAM) TOPICAL NIGHTLY
Status: DISCONTINUED | OUTPATIENT
Start: 2022-05-03 | End: 2022-05-05 | Stop reason: HOSPADM

## 2022-05-03 RX ORDER — MECLIZINE HCL 12.5 MG/1
12.5 TABLET ORAL 3 TIMES DAILY PRN
Status: DISCONTINUED | OUTPATIENT
Start: 2022-05-03 | End: 2022-05-05 | Stop reason: HOSPADM

## 2022-05-03 RX ORDER — ACETAMINOPHEN 500 MG
500 TABLET ORAL EVERY 8 HOURS PRN
Status: DISCONTINUED | OUTPATIENT
Start: 2022-05-03 | End: 2022-05-05 | Stop reason: HOSPADM

## 2022-05-03 RX ORDER — ENOXAPARIN SODIUM 100 MG/ML
40 INJECTION SUBCUTANEOUS EVERY 24 HOURS
Status: DISCONTINUED | OUTPATIENT
Start: 2022-05-03 | End: 2022-05-05 | Stop reason: HOSPADM

## 2022-05-03 RX ORDER — ONDANSETRON 4 MG/1
4 TABLET, ORALLY DISINTEGRATING ORAL EVERY 6 HOURS PRN
Status: DISCONTINUED | OUTPATIENT
Start: 2022-05-03 | End: 2022-05-05 | Stop reason: HOSPADM

## 2022-05-03 RX ORDER — ESCITALOPRAM OXALATE 10 MG/1
10 TABLET ORAL DAILY
Status: DISCONTINUED | OUTPATIENT
Start: 2022-05-04 | End: 2022-05-05 | Stop reason: HOSPADM

## 2022-05-03 RX ORDER — ASPIRIN 325 MG
325 TABLET ORAL DAILY
Status: DISCONTINUED | OUTPATIENT
Start: 2022-05-04 | End: 2022-05-05 | Stop reason: HOSPADM

## 2022-05-03 RX ORDER — BUSPIRONE HYDROCHLORIDE 15 MG/1
15 TABLET ORAL EVERY 12 HOURS SCHEDULED
Status: DISCONTINUED | OUTPATIENT
Start: 2022-05-03 | End: 2022-05-05 | Stop reason: HOSPADM

## 2022-05-03 RX ORDER — ORPHENADRINE CITRATE 100 MG/1
100 TABLET, EXTENDED RELEASE ORAL 2 TIMES DAILY PRN
Status: DISCONTINUED | OUTPATIENT
Start: 2022-05-03 | End: 2022-05-05 | Stop reason: HOSPADM

## 2022-05-03 RX ORDER — PANTOPRAZOLE SODIUM 40 MG/1
40 TABLET, DELAYED RELEASE ORAL DAILY
Status: DISCONTINUED | OUTPATIENT
Start: 2022-05-04 | End: 2022-05-05 | Stop reason: HOSPADM

## 2022-05-03 RX ORDER — OXYBUTYNIN CHLORIDE 10 MG/1
10 TABLET, EXTENDED RELEASE ORAL DAILY
Status: DISCONTINUED | OUTPATIENT
Start: 2022-05-04 | End: 2022-05-05 | Stop reason: HOSPADM

## 2022-05-03 RX ORDER — SODIUM CHLORIDE 0.9 % (FLUSH) 0.9 %
10 SYRINGE (ML) INJECTION EVERY 12 HOURS SCHEDULED
Status: DISCONTINUED | OUTPATIENT
Start: 2022-05-03 | End: 2022-05-05 | Stop reason: HOSPADM

## 2022-05-03 RX ORDER — ASPIRIN 300 MG/1
300 SUPPOSITORY RECTAL ONCE
Status: COMPLETED | OUTPATIENT
Start: 2022-05-03 | End: 2022-05-03

## 2022-05-03 RX ORDER — HYDROXYZINE PAMOATE 25 MG/1
50 CAPSULE ORAL 3 TIMES DAILY PRN
Status: DISCONTINUED | OUTPATIENT
Start: 2022-05-03 | End: 2022-05-05 | Stop reason: HOSPADM

## 2022-05-03 RX ORDER — ATORVASTATIN CALCIUM 40 MG/1
80 TABLET, FILM COATED ORAL NIGHTLY
Status: DISCONTINUED | OUTPATIENT
Start: 2022-05-03 | End: 2022-05-05 | Stop reason: HOSPADM

## 2022-05-03 RX ADMIN — IOPAMIDOL 90 ML: 755 INJECTION, SOLUTION INTRAVENOUS at 15:06

## 2022-05-03 RX ADMIN — MELATONIN 6 MG: 3 TAB ORAL at 20:41

## 2022-05-03 RX ADMIN — ENOXAPARIN SODIUM 40 MG: 40 INJECTION SUBCUTANEOUS at 20:43

## 2022-05-03 RX ADMIN — SODIUM CHLORIDE 100 ML: 9 INJECTION, SOLUTION INTRAVENOUS at 15:20

## 2022-05-03 RX ADMIN — ATORVASTATIN CALCIUM 80 MG: 40 TABLET, FILM COATED ORAL at 20:41

## 2022-05-03 RX ADMIN — SODIUM CHLORIDE 125 ML/HR: 9 INJECTION, SOLUTION INTRAVENOUS at 15:25

## 2022-05-03 RX ADMIN — IOPAMIDOL 50 ML: 755 INJECTION, SOLUTION INTRAVENOUS at 15:20

## 2022-05-03 RX ADMIN — ASPIRIN 300 MG: 300 SUPPOSITORY RECTAL at 17:41

## 2022-05-03 RX ADMIN — Medication 10 ML: at 20:42

## 2022-05-03 RX ADMIN — TRAZODONE HYDROCHLORIDE 50 MG: 50 TABLET ORAL at 20:41

## 2022-05-03 NOTE — ED NOTES
This RN called [facility] rehab center. Nurse reports from [place] pt's tongue started to swell around 1:20pm and appeared swollen. They report the pt had her tongue sticking out, speech incomprehensible, and had a near fall. Pt is normally able to speak clearly and walks independently after receiving therapy from previous stroke that affected her R side.

## 2022-05-03 NOTE — CONSULTS
Teleneurology Phone Consultation Note    Date of Consultation: 5/3/2022  Requesting Attending: Chano Flynn MD  Chief Complaint: Speech difficulty and Slurred speech  Location of patient: Emergency Room  Last known well date/Time if applicable: 5/3/2022 09:00  Date/Time Telestroke Doctor on phone: 5/3/2022 14:28 (I didn't get call back after CT return. Tried to connect with video around 1445 but cart was not connected on other side and waited for 5-10 minutes, eventually provided phone recs    Assessment and Plan:  TNK decision: No Outside window for tPA  Intervention Decision: No No LVO    Impression:   1) Myles Shannon is a 46 y.o. known diagnosis of bipolar disorder, smoking, migraines, seizures and recent left MCA ischemic stroke in December 2021 presented to ER with c/o altered mental status, slurred speech and difficulty speaking. Seems like from her recent stroke she has baseline aphasia but today became more altered. Initially I was told that her LKW is 0900 AM. I personally reviewed CTH and CTA head/neck. CTH shows moderate size encephalomalacia in left MCA territory consistent with old stroke. No acute abnormality. CTA head/neck negative for LVO or major stenosis. As per ER provider she has no other focal finding on exam.     Later on I got call around 1600 that as per sister LKW is probably around 1300 but given her minimal symptoms and that could be from previous stroke itself, we both agree that she is not a candidate for TPA.     D/d at this time could be recrudesnce of old stroke symptoms vs seizure vs toxic metabolic encephalopathy     Recommendation:   - Please obtain MRI brain wo contrast to r/o stroke.   - check TSH, B12, folate, UA, UDS  - - If MR negative then consider doing EEG  - Continue home AEDs  - PT/OT and speech  - Continue with home antiplateletes. Statin.     - If Mri brain shows stroke then consider doing TTE as well.     Amy Novak MD  If you have any questions about  the patient recommendations, please call [phone] at anytime and ask to speak with the neurologist on call. Press 1 for an emergent consult and 2 for a non-emergent consult.    I, Amy Novak MD, was consulted about this patient on 5/3/2022 for discussion over the phone regarding management of the patient's care via a provider to provider discussion for 10 minutes. The location of the patient was at [facility] My location was [place]. The recommendations are based off information I received from the consulting provider.

## 2022-05-03 NOTE — ED NOTES
"Pt presents to ED from [facility] d/t aphasia and \"tongue feeling weird\" since 0900. Pt is able to answer some questions appropriately and is able to follow commands. Pt keeping tongue out of mouth.  "

## 2022-05-03 NOTE — H&P
[facility] Medicine Admission      Date of Admission: 5/3/2022      Primary Care Physician: Jonel Cr MD      Chief Complaint: Difficulty speaking    HPI: Patient is a 46-year-old female with multiple medical problems including recent large left MCA stroke.  She has aphasia at baseline from the previous stroke, but apparently had some worsening aphasia today.  She was brought into the emergency department.  She has difficulty in expressing what her symptoms were.  From the medical record it appears that she had relatively sudden onset of worsening aphasia that is nearly back to normal.  It was difficult to tell if there are any alleviating or exacerbating factors, but there appear to be none.    Concurrent Medical History:  has a past medical history of Abdominal pain, Abdominal pain, right lower quadrant, Acute bilateral otitis media, Allergic rhinitis, Backache, Candidiasis of skin, Cellulitis of neck, Chest pain, Contraception, Cough, Dysfunction of eustachian tube, Dyspareunia, female, Elevated cholesterol, Excessive or frequent menstruation, Flank pain, Generalized aches and pains, Headache, Health maintenance examination, Hypertension, Infection of skin and subcutaneous tissue, Irregular periods, Kidney stone, Menorrhagia, Nausea vomiting and diarrhea, Obesity, Open wound of abdominal wall, Otalgia, Pain in left foot, Pain in unspecified hand, Removed Impacted cerumen (2012), Rhinitis, Seizure ([facility]) (08/15/2019), Shoulder pain, Shoulder tendinitis, Spider bite wound, Staphylococcal infection of skin, Stroke ([facility]), Swollen feet, Tinea cruris, Tobacco dependence syndrome, Upper respiratory infection, Urinary tract infectious disease, Vertigo, Visit for gynecologic examination, and Vulvovaginitis.    Past Surgical History:  has a past surgical history that includes  section; Dilation and curettage of uterus (); Incision and  Drainage Abscess (03/21/2012); Injection of Medication (05/08/2015); Injection of Medication (10/10/2013); and Injection of Medication (01/16/2014).    Family History: family history includes Anxiety disorder in her brother; Arthritis in her mother; Breast cancer in her maternal grandmother and mother; Cancer in her maternal grandfather and another family member; Depression in her daughter and another family member; Diabetes in her maternal aunt and maternal uncle; Endometrial cancer in her sister and another family member; Heart disease in her mother; Hypertension in her mother; Liver disease in her father; Lung cancer in an other family member; Mental illness in her daughter; Stroke in her maternal grandmother and paternal grandmother; Thyroid disease in her mother.     Social History:  reports that she has been smoking cigarettes. She has been smoking about 0.50 packs per day. She has never used smokeless tobacco. She reports that she does not drink alcohol and does not use drugs.    Allergies:   Allergies   Allergen Reactions   • Abilify [Aripiprazole] Nausea Only   • Buspirone Rash   • Penicillins Hives and Nausea And Vomiting       Medications:   Prior to Admission medications    Medication Sig Start Date End Date Taking? Authorizing Provider   acetaminophen (TYLENOL) 500 MG tablet Take 500 mg by mouth Every 8 (Eight) Hours As Needed for Mild Pain .   Yes Radha Zaragoza MD   aspirin 325 MG tablet Take 325 mg by mouth Daily.   Yes Radha Zaragoza MD   atorvastatin (LIPITOR) 80 MG tablet Take 1 tablet by mouth Every Night. 12/18/21  Yes Juan Conroy MD   busPIRone (BUSPAR) 15 MG tablet  3/9/22  Yes Sonya Crooks APRN   escitalopram (LEXAPRO) 10 MG tablet Take 10 mg by mouth Daily.   Yes Radha Zaragoza MD   furosemide (LASIX) 20 MG tablet Take 20 mg by mouth Daily.   Yes Radha Zaragoza MD   hydrOXYzine pamoate (VISTARIL) 50 MG capsule  4/5/22  Yes Sonya Crooks APRN    losartan (COZAAR) 50 MG tablet Take 50 mg by mouth Daily.   Yes Radha Zaragoza MD   meclizine (ANTIVERT) 12.5 MG tablet  4/19/22  Yes Sonya Crooks APRN   melatonin 5 MG tablet tablet Take 10 mg by mouth every night at bedtime.   Yes Radha Zaragoza MD   ondansetron ODT (ZOFRAN-ODT) 4 MG disintegrating tablet Place 1 tablet on the tongue Every 6 (Six) Hours As Needed for Nausea or Vomiting. 4/2/22  Yes Asher Grande MD   orphenadrine (NORFLEX) 100 MG 12 hr tablet Take 1 tablet by mouth 2 (Two) Times a Day As Needed for Muscle Spasms or Mild Pain . 4/13/22  Yes Nanette Mai APRN   pantoprazole (Protonix) 40 MG EC tablet Take 1 tablet by mouth Daily. 12/22/21  Yes Buzz Vasquez APRN   potassium chloride (K-DUR,KLOR-CON) 10 MEQ CR tablet  3/1/22  Yes Sonya Crooks APRN   traZODone (DESYREL) 50 MG tablet Take 50 mg by mouth Every Night.   Yes Radha Zaragoza MD   diclofenac (VOLTAREN) 75 MG EC tablet  3/9/22 5/3/22 Yes Sonya Crooks APRN   levETIRAcetam (KEPPRA) 500 MG tablet Take 1 tablet by mouth Every 12 (Twelve) Hours for 30 days. 12/22/21 1/21/22  Buzz Vasquez APRN   solifenacin (VESICARE) 10 MG tablet  3/2/22   Sonya Crooks APRN   diclofenac-miSOPROStol (ARTHROTEC 75) 75-0.2 MG EC tablet Take 1 tablet by mouth 2 (Two) Times a Day.  5/3/22  Radha Zaragoza MD       Review of Systems:  Review of Systems   Unable to perform ROS: Other   Expressive aphasia      Physical Exam:   Temp:  [97.6 °F (36.4 °C)-98.4 °F (36.9 °C)] 97.6 °F (36.4 °C)  Heart Rate:  [59-64] 62  Resp:  [18-19] 19  BP: (104-138)/(65-78) 124/78  Physical Exam  Constitutional:       Appearance: She is well-developed.   HENT:      Head: Normocephalic and atraumatic.      Nose: Nose normal.   Eyes:      General: Lids are normal. No scleral icterus.     Conjunctiva/sclera: Conjunctivae normal.      Pupils: Pupils are equal, round, and reactive to light.   Neck:       Vascular: No JVD.      Trachea: No tracheal tenderness or tracheal deviation.   Cardiovascular:      Rate and Rhythm: Normal rate and regular rhythm.      Pulses: Normal pulses.      Heart sounds: Normal heart sounds, S1 normal and S2 normal. No murmur heard.    No friction rub. No gallop.   Pulmonary:      Effort: Pulmonary effort is normal. No accessory muscle usage or respiratory distress.      Breath sounds: Normal breath sounds. No decreased breath sounds, wheezing or rales.   Chest:      Chest wall: No tenderness.   Abdominal:      General: Bowel sounds are normal. There is no distension.      Palpations: Abdomen is soft. There is no mass.      Tenderness: There is no abdominal tenderness. There is no guarding or rebound.   Musculoskeletal:         General: No tenderness.      Cervical back: Normal range of motion and neck supple. No spinous process tenderness.   Skin:     General: Skin is warm.      Coloration: Skin is not pale.      Findings: No rash.   Neurological:      Mental Status: She is alert and oriented to person, place, and time. Mental status is at baseline.      Cranial Nerves: Facial asymmetry present. No cranial nerve deficit.      Sensory: No sensory deficit.      Motor: Weakness present. No atrophy, abnormal muscle tone or seizure activity.      Coordination: Coordination abnormal. Finger-Nose-Finger Test abnormal.      Deep Tendon Reflexes: Reflexes are normal and symmetric. Reflexes normal.      Comments: Expressive aphasia   Psychiatric:         Behavior: Behavior normal.         Thought Content: Thought content normal.         Judgment: Judgment normal.       Results Reviewed:  I have personally reviewed current lab, radiology, and data and agree with results.  Lab Results (last 24 hours)     Procedure Component Value Units Date/Time    Troponin [[id number]]  (Normal) Collected: 05/03/22 1719    Specimen: Blood Updated: 05/03/22 1803     Troponin T <0.010 ng/mL     Narrative:      Troponin  T Reference Range:  <= 0.03 ng/mL-   Negative for AMI  >0.03 ng/mL-     Abnormal for myocardial necrosis.  Clinicians would have to utilize clinical acumen, EKG, Troponin and serial changes to determine if it is an Acute Myocardial Infarction or myocardial injury due to an underlying chronic condition.       Results may be falsely decreased if patient taking Biotin.      Extra Tubes [[id number]] Collected: 05/03/22 1447    Specimen: Blood, Venous Line Updated: 05/03/22 1604    Narrative:      The following orders were created for panel order Extra Tubes.  Procedure                               Abnormality         Status                     ---------                               -----------         ------                     Lavender Top[[id number]]                                     Final result               Verdin Top[[id number]]                                         In process                   Please view results for these tests on the individual orders.    Lavender Top [[id number]] Collected: 05/03/22 1447    Specimen: Blood Updated: 05/03/22 1604     Extra Tube hold for add-on     Comment: Auto resulted       Comprehensive Metabolic Panel [[id number]]  (Abnormal) Collected: 05/03/22 1437    Specimen: Blood Updated: 05/03/22 1506     Glucose 94 mg/dL      BUN 15 mg/dL      Creatinine 1.09 mg/dL      Sodium 140 mmol/L      Potassium 4.6 mmol/L      Comment: Slight hemolysis detected by analyzer. Results may be affected.        Chloride 105 mmol/L      CO2 23.0 mmol/L      Calcium 8.9 mg/dL      Total Protein 6.6 g/dL      Albumin 4.20 g/dL      ALT (SGPT) 22 U/L      AST (SGOT) 21 U/L      Alkaline Phosphatase 87 U/L      Total Bilirubin 0.2 mg/dL      Globulin 2.4 gm/dL      A/G Ratio 1.8 g/dL      BUN/Creatinine Ratio 13.8     Anion Gap 12.0 mmol/L      eGFR 63.6 mL/min/1.73      Comment: National Kidney Foundation and American Society of Nephrology (ASN) Task Force recommended calculation based on the Chronic  Kidney Disease Epidemiology Collaboration (CKD-EPI) equation refit without adjustment for race.       Narrative:      GFR Normal >60  Chronic Kidney Disease <60  Kidney Failure <15      Troponin [[id number]]  (Normal) Collected: 05/03/22 1437    Specimen: Blood Updated: 05/03/22 1506     Troponin T <0.010 ng/mL     Narrative:      Troponin T Reference Range:  <= 0.03 ng/mL-   Negative for AMI  >0.03 ng/mL-     Abnormal for myocardial necrosis.  Clinicians would have to utilize clinical acumen, EKG, Troponin and serial changes to determine if it is an Acute Myocardial Infarction or myocardial injury due to an underlying chronic condition.       Results may be falsely decreased if patient taking Biotin.      hCG, Serum, Qualitative [[id number]]  (Normal) Collected: 05/03/22 1437    Specimen: Blood Updated: 05/03/22 1506     HCG Qualitative Negative    Protime-INR [[id number]]  (Normal) Collected: 05/03/22 1437    Specimen: Blood Updated: 05/03/22 1505     Protime 12.7 Seconds      INR 0.97    Narrative:      Therapeutic range for most indications is 2.0-3.0 INR,  or 2.5-3.5 for mechanical heart valves.    aPTT [[id number]]  (Normal) Collected: 05/03/22 1437    Specimen: Blood Updated: 05/03/22 1505     PTT 25.3 seconds     Narrative:      The recommended Heparin therapeutic range is 68-97 seconds.    COVID-19 and FLU A/B PCR - Swab, Nasopharynx [[id number]]  (Normal) Collected: 05/03/22 1434    Specimen: Swab from Nasopharynx Updated: 05/03/22 1503     COVID19 Not Detected     Influenza A PCR Not Detected     Influenza B PCR Not Detected    Narrative:      Fact sheet for providers: https://www.fda.gov/media/748970/download    Fact sheet for patients: https://www.fda.gov/media/763257/download    Test performed by PCR.    CBC & Differential [[id number]]  (Normal) Collected: 05/03/22 1437    Specimen: Blood Updated: 05/03/22 1448    Narrative:      The following orders were created for panel order CBC &  Differential.  Procedure                               Abnormality         Status                     ---------                               -----------         ------                     CBC Auto Differential[[id number]]        Normal              Final result                 Please view results for these tests on the individual orders.    CBC Auto Differential [[id number]]  (Normal) Collected: 05/03/22 1437    Specimen: Blood Updated: 05/03/22 1448     WBC 7.30 10*3/mm3      RBC 4.29 10*6/mm3      Hemoglobin 13.4 g/dL      Hematocrit 39.4 %      MCV 91.8 fL      MCH 31.2 pg      MCHC 34.0 g/dL      RDW 13.1 %      RDW-SD 44.0 fl      MPV 11.5 fL      Platelets 248 10*3/mm3      Neutrophil % 55.9 %      Lymphocyte % 30.8 %      Monocyte % 7.0 %      Eosinophil % 5.5 %      Basophil % 0.5 %      Immature Grans % 0.3 %      Neutrophils, Absolute 4.08 10*3/mm3      Lymphocytes, Absolute 2.25 10*3/mm3      Monocytes, Absolute 0.51 10*3/mm3      Eosinophils, Absolute 0.40 10*3/mm3      Basophils, Absolute 0.04 10*3/mm3      Immature Grans, Absolute 0.02 10*3/mm3      nRBC 0.0 /100 WBC     Verdin Top [[id number]] Collected: 05/03/22 1447    Specimen: Blood Updated: 05/03/22 1448        Imaging Results (Last 24 Hours)     Procedure Component Value Units Date/Time    CT CEREBRAL PERFUSION WITH & WITHOUT CONTRAST [[id number]] Collected: 05/03/22 1440     Updated: 05/03/22 1746    Addenda:         ADDENDUM   ADDENDUM #1       Critical findings were communicated to  at 5:40 PM on  5/3/2022.    Electronically signed by:  Fernando Kirkpatrick MD  5/3/2022 5:44 PM CDT  Workstation: [id number]    Signed: 05/03/22 1744 by Fernando Kirkpatrick MD    Narrative:      INDICATION: cva    EXAMINATION: CT BRAIN PERFUSION WITH CONTRAST    TECHNIQUE:  Routine CT brain cerebral perfusion study was performed using IV  contrast.  Reformats and postprocessing were performed by the  technologist. A radiation dose optimization technique was used  for this  scan.  Study performed with AI or machine learning  algorithms  IV Contrast dosage and agent:    COMPARISON: None.    FINDINGS:     rCBF<30% vol = 7 mL in the posterior left frontal region  corresponding to chronic area of ischemia previously described.    Tmax<6sec vol = 0 mL    There is no evidence of any acute ischemia or infarct core      Impression:        Abnormal perfusion corresponds to chronic infarct in the left  hemisphere.    Electronically signed by:  Fernando Kirkpatrick MD  5/3/2022 5:30 PM CDT  Workstation: [id number]    MRI Brain Without Contrast [[id number]] Collected: 05/03/22 1601     Updated: 05/03/22 1722    Narrative:      EXAM:  MR BRAIN WITHOUT IV CONTRAST    ORDERING PROVIDER:  PEARL YANG    CLINICAL HISTORY:   Stroke    COMPARISON:   3/10/2022    TECHNIQUE:   Axial T1-weighted, T2-weighted and diffusion weighted, FLAIR, and  sagittal T1-weighted images were obtained without administration  of gadolinium .    FINDINGS:    CEREBRAL PARENCHYMA: Stable chronic ischemia of the left MCA  distribution with encephalomalacic change. No abnormal signal. No  encephalomalacia, mass or gliosis. No atrophy.      POSTERIOR FOSSA: No mass or abnormal signal. Unremarkable  craniocervical junction.    EXTRA-AXIAL SPACES: No mass.     PITUITARY: No mass or parasellar abnormality.    ORBITS: No mass. Unremarkable extraocular muscles, globe and  optic nerve.    CALVARIA AND SOFT TISSUES:  No mass, adenopathy, or abnormal  signal.    TEMPORAL BONE AND SKULL BASE: Unremarkable mastoid air cells,  inner and middle ear.    PARANASAL SINUSES: Unremarkable.     VASCULAR STRUCTURES: Expected flow voids. No aneurysm, stenosis  or dissection seen.    DIFFUSION WEIGHTED IMAGES: No restricted diffusion.      Impression:      Stable chronic ischemia of the left MCA distribution with  encephalomalacic change.   No acute ischemia.  No acute intracranial hemorrhage.  No significant change compared to recent MRI  study.    Electronically signed by:  Aniket Solis MD  5/3/2022 5:20 PM CDT  Workstation: [id number]    CT Angiogram Head w AI Analysis of LVO [[id number]] Collected: 05/03/22 1445     Updated: 05/03/22 1708    Narrative:      EXAM:  CT HEAD ANGIOGRAPHY WITHOUT THEN WITH IV CONTRAST, CT NECK  ANGIOGRAPHY WITH IV CONTRAST    ORDERING PROVIDER:  PEARL YANG    CLINICAL HISTORY:   Stroke    COMPARISON:      TECHNIQUE:   Nonenhanced CT of the head was performed and reformatted in the  sagittal and coronal planes, followed by high-resolution CT head  after administration of 90 mL of Isovue-370 contrast. 3-D MIP  images were created.     This examination was performed according to our departmental dose  optimization program which includes automated exposure control,  adjustment of the MA and kV according to patient size, and/or use  of iterative reconstruction technique.    FINDINGS:     Normal bilateral petrous carotid arteries.   No significant atherosclerotic change of right cavernous carotid  artery with a normal supraclinoid bifurcation.   No significant atherosclerotic change of left cavernous carotid  artery with a normal supraclinoid bifurcation.    Normal right A1 segments of the anterior cerebral artery.   Normal left A1 segments of the anterior cerebral artery.   Normal intact anterior communicating artery (ACOM). Normal  bilateral A2 segments of the anterior cerebral arteries.    Normal right M1 and M2 segments of the right middle cerebral  artery, with a normal M1 bifurcation. Normal left M1 and overall  less robust flow of left M2 segments of the left middle cerebral  artery, and this may be due to chronic ischemia also offset from  prior CT scan from 3/10/2022.    Normal right posterior communicating artery (PCOM). Normal left  posterior communicating artery (PCOM).    Normal bilateral vertebral arteries. Normal basilar artery with a  normal basilar bifurcation. The visualized bilateral superior  cerebellar  (SCA) arteries are normal.    Normal bilateral P1, P2 and visualized P3 segments of the  posterior cerebral arteries.    There is no demonstrated aneurysm of the [place].     EXAM:  CT HEAD ANGIOGRAPHY WITHOUT THEN WITH IV CONTRAST, CT NECK  ANGIOGRAPHY WITH IV CONTRAST    ORDERING PROVIDER:  PEARL YANG    CLINICAL HISTORY:   Stroke    COMPARISON:   None.    TECHNIQUE:    CT of the neck was obtained from the skullbase to the aortic arch  with sagittal and coronal reformats  after administration of 90  ml of Isovue 370 contrast. 3-D MIP images were created.      This examination was performed according to our departmental dose  optimization program which includes automated exposure control,  adjustment of the MA and kV according to patient size, and/or use  of iterative reconstruction technique.    Degree of stenoses in the cervical carotid systems determined  using NASCET criteria.    FINDINGS:    AORTIC ARCH:  Unremarkable visualized aortic arch. Bovine origins of the  brachiocephalic, and left common carotid together, and  unremarkable origin of left subclavian arteries.    RIGHT CAROTID ARTERIES:  Normal right common carotid artery (CCA). Normal right common  carotid bulb.  Normal origin of the right internal carotid (ICA) artery without  a  hemodynamically significant stenosis. Normal visualized cervical  portion  of the right internal carotid artery.  Normal origin of the right external carotid artery (ECA).    LEFT CAROTID ARTERIES:  Normal left common carotid artery (CCA). Normal left common  carotid bulb.  Normal origin of the left internal carotid (ICA) artery without a  hemodynamically significant stenosis. Normal visualized cervical  portion of the left internal carotid artery.  Normal origin of the left external carotid artery (ECA).    VERTEBRAL ARTERIES:  Normal bilateral vertebral arteries.]      Impression:        CT of the brain:  Less robust flow of left M2 segments of the left MCA due  to  chronic ischemia.  Normal flow in the left MONY, and left M1 segment of the left MCA.  Unremarkable right anterior circulation, and the bilateral  vertebral basilar system.   No evidence of aneurysm within the [place] of [place].    CTA of the neck:  No evidence of hemodynamically significant stenosis of the  visualized ICAs and bilateral vertebral arteries.  No evidence of dissection within the bilateral carotid or  vertebral arteries.          Electronically signed by:  Aniket Solis MD  5/3/2022 5:06 PM CDT  Workstation: [id number]    CT Angiogram Carotids [[id number]] Collected: 05/03/22 1445     Updated: 05/03/22 1708    Narrative:      EXAM:  CT HEAD ANGIOGRAPHY WITHOUT THEN WITH IV CONTRAST, CT NECK  ANGIOGRAPHY WITH IV CONTRAST    ORDERING PROVIDER:  PEARL YANG    CLINICAL HISTORY:   Stroke    COMPARISON:      TECHNIQUE:   Nonenhanced CT of the head was performed and reformatted in the  sagittal and coronal planes, followed by high-resolution CT head  after administration of 90 mL of Isovue-370 contrast. 3-D MIP  images were created.     This examination was performed according to our departmental dose  optimization program which includes automated exposure control,  adjustment of the MA and kV according to patient size, and/or use  of iterative reconstruction technique.    FINDINGS:     Normal bilateral petrous carotid arteries.   No significant atherosclerotic change of right cavernous carotid  artery with a normal supraclinoid bifurcation.   No significant atherosclerotic change of left cavernous carotid  artery with a normal supraclinoid bifurcation.    Normal right A1 segments of the anterior cerebral artery.   Normal left A1 segments of the anterior cerebral artery.   Normal intact anterior communicating artery (ACOM). Normal  bilateral A2 segments of the anterior cerebral arteries.    Normal right M1 and M2 segments of the right middle cerebral  artery, with a normal M1 bifurcation. Normal left M1 and  overall  less robust flow of left M2 segments of the left middle cerebral  artery, and this may be due to chronic ischemia also offset from  prior CT scan from 3/10/2022.    Normal right posterior communicating artery (PCOM). Normal left  posterior communicating artery (PCOM).    Normal bilateral vertebral arteries. Normal basilar artery with a  normal basilar bifurcation. The visualized bilateral superior  cerebellar (SCA) arteries are normal.    Normal bilateral P1, P2 and visualized P3 segments of the  posterior cerebral arteries.    There is no demonstrated aneurysm of the [place].     EXAM:  CT HEAD ANGIOGRAPHY WITHOUT THEN WITH IV CONTRAST, CT NECK  ANGIOGRAPHY WITH IV CONTRAST    ORDERING PROVIDER:  PEARL YANG    CLINICAL HISTORY:   Stroke    COMPARISON:   None.    TECHNIQUE:    CT of the neck was obtained from the skullbase to the aortic arch  with sagittal and coronal reformats  after administration of 90  ml of Isovue 370 contrast. 3-D MIP images were created.      This examination was performed according to our departmental dose  optimization program which includes automated exposure control,  adjustment of the MA and kV according to patient size, and/or use  of iterative reconstruction technique.    Degree of stenoses in the cervical carotid systems determined  using NASCET criteria.    FINDINGS:    AORTIC ARCH:  Unremarkable visualized aortic arch. Bovine origins of the  brachiocephalic, and left common carotid together, and  unremarkable origin of left subclavian arteries.    RIGHT CAROTID ARTERIES:  Normal right common carotid artery (CCA). Normal right common  carotid bulb.  Normal origin of the right internal carotid (ICA) artery without  a  hemodynamically significant stenosis. Normal visualized cervical  portion  of the right internal carotid artery.  Normal origin of the right external carotid artery (ECA).    LEFT CAROTID ARTERIES:  Normal left common carotid artery (CCA). Normal  left common  carotid bulb.  Normal origin of the left internal carotid (ICA) artery without a  hemodynamically significant stenosis. Normal visualized cervical  portion of the left internal carotid artery.  Normal origin of the left external carotid artery (ECA).    VERTEBRAL ARTERIES:  Normal bilateral vertebral arteries.]      Impression:        CT of the brain:  Less robust flow of left M2 segments of the left MCA due to  chronic ischemia.  Normal flow in the left MONY, and left M1 segment of the left MCA.  Unremarkable right anterior circulation, and the bilateral  vertebral basilar system.   No evidence of aneurysm within the [place] of [place].    CTA of the neck:  No evidence of hemodynamically significant stenosis of the  visualized ICAs and bilateral vertebral arteries.  No evidence of dissection within the bilateral carotid or  vertebral arteries.          Electronically signed by:  Aniket Solis MD  5/3/2022 5:06 PM CDT  Workstation: [id number]    XR Chest 1 View [[id number]] Collected: 05/03/22 1511     Updated: 05/03/22 1534    Narrative:      2:46 PM    INDICATION: Stroke protocol. Acute neurologic symptoms    FINDINGS:  Limited near expiratory film.  No significant areas of consolidation, acute congestive changes,  pneumothorax or pleural fluid.  Heart and mediastinal contours within normal limits.      Impression:      Limited near expiratory film.  No definite acute cardiopulmonary process.    Electronically signed by:  Ernesto Jacobs MD  5/3/2022 3:32 PM  CDT Workstation: [id number]    CT Head Without Contrast Stroke Protocol [[id number]] Collected: 05/03/22 1445     Updated: 05/03/22 1521    Narrative:      CT head without IV contrast stroke protocol May 3, 2022    INDICATION: Acute cerebrovascular disease/acute neurologic  symptoms    TECHNIQUE: Spiral images obtained from foramen magnum through  vertex without IV contrast. Sagittal, axial and coronal  reformatted images generated  retrospectively.  Encephalomalacia consistent with old infarct left perisylvian  region.  Mild atrophy for a patient of this age.  No definite acute parenchymal pathology.  Right sphenoid and left ethmoid sinus inflammation.  Mastoids grossly clear.  No focal or acute bony abnormality.      Impression:      Old left middle cerebral artery distribution infarct with  associated encephalomalacia stable compared with March 10 CT  scan.  Atrophy for a patient of this age.  No acute intracranial pathology.  Sinus inflammation as above.    Electronically signed by:  Ernesto Jacobs MD  5/3/2022 3:19 PM  CDT Workstation: [id number]            Assessment:    Active Hospital Problems    Diagnosis    • Dysarthria              Plan:  1.  Dysphasia:  Difficulty speaking due to previous stroke.  Apparently was some worse temporarily this afternoon.  Seems to be back to baseline now.  CT scan including perfusion does not show any new perfusion deficits.  Plan MRI in the morning.  2.  Hypertension: Continue home medication.  3.  Hyperlipidemia: Continue home medication.  4.  Recent left MCA stroke: Continue dual antiplatelet therapy.  5.  DVT prophylaxis: Lovenox.    I confirmed that the patient's Advance Care Plan is present, code status is documented, or surrogate decision maker is listed in the patient's medical record.     I have utilized all available immediate resources to obtain, update, or review the patient's current medications.     I discussed the patient's findings and my recommendations with: Patient        This document has been electronically signed by Santy Allison MD on May 3, 2022 18:22 CDT

## 2022-05-03 NOTE — ED PROVIDER NOTES
"Subjective   47yo female pmh significant htn/hyperlipidemia/seizure/(L) MCA CVA/expressive aphasia/bipolar disorder presents ED via EMS reported onset 0900hrs \"tongue feeling abnormal\", altered mental status, presents ED via stroke alert.  Pt notably poor historian, and is noted to keep mouth open with tongue protruding during exam.      History provided by:  Patient and EMS personnel  Neurologic Problem  The patient's primary symptoms include an altered mental status.       Review of Systems   Unable to perform ROS: Mental status change       Past Medical History:   Diagnosis Date   • Abdominal pain     Chronic RLQ, RUQ, R flank comes and goes.    • Abdominal pain, right lower quadrant    • Acute bilateral otitis media    • Allergic rhinitis    • Backache     Upper back.   • Candidiasis of skin    • Cellulitis of neck    • Chest pain    • Contraception    • Cough    • Dysfunction of eustachian tube    • Dyspareunia, female    • Excessive or frequent menstruation    • Flank pain    • Generalized aches and pains    • Headache    • Health maintenance examination    • Infection of skin and subcutaneous tissue    • Irregular periods    • Kidney stone     Poss   • Menorrhagia    • Nausea vomiting and diarrhea    • Obesity    • Open wound of abdominal wall    • Otalgia    • Pain in left foot    • Pain in unspecified hand    • Removed Impacted cerumen 06/05/2012   • Rhinitis    • Seizure ([facility]) 8/15/2019   • Shoulder pain     right-sided-likely muscle strain      • Shoulder tendinitis    • Spider bite wound    • Staphylococcal infection of skin    • Swollen feet    • Tinea cruris    • Tobacco dependence syndrome    • Upper respiratory infection    • Urinary tract infectious disease    • Vertigo    • Visit for gynecologic examination    • Vulvovaginitis        Allergies   Allergen Reactions   • Abilify [Aripiprazole] Nausea Only   • Buspirone Rash   • Penicillins Hives and Nausea And Vomiting       Past Surgical History: "   Procedure Laterality Date   •  SECTION     • DILATATION AND CURETTAGE      Secondary to incomplete spontaneous    • INCISION AND DRAINAGE ABSCESS  2012   • INJECTION OF MEDICATION  2015    Phenergan (1)     • INJECTION OF MEDICATION  10/10/2013    Toradol (2)      • INJECTION OF MEDICATION  2014    Zofran (1)          Family History   Problem Relation Age of Onset   • Breast cancer Mother    • Thyroid disease Mother    • Hypertension Mother    • Heart disease Mother    • Arthritis Mother    • Liver disease Father    • Endometrial cancer Sister    • Anxiety disorder Brother    • Mental illness Daughter    • Depression Daughter    • Diabetes Maternal Aunt    • Diabetes Maternal Uncle    • Stroke Maternal Grandmother    • Breast cancer Maternal Grandmother    • Cancer Maternal Grandfather    • Stroke Paternal Grandmother    • Depression Other    • Cancer Other         Colorectal Cancer   • Endometrial cancer Other    • Lung cancer Other        Social History     Socioeconomic History   • Marital status: Single   Tobacco Use   • Smoking status: Current Every Day Smoker     Packs/day: 0.50     Types: Cigarettes   • Smokeless tobacco: Never Used   Vaping Use   • Vaping Use: Some days   • Substances: Nicotine   • Devices: Refillable tank   • Passive vaping exposure: Yes   Substance and Sexual Activity   • Alcohol use: No   • Drug use: No   • Sexual activity: Not Currently           Objective   Physical Exam  Vitals and nursing note reviewed.   Constitutional:       Appearance: Normal appearance.   HENT:      Head: Normocephalic and atraumatic.      Right Ear: Tympanic membrane, ear canal and external ear normal.      Left Ear: Tympanic membrane, ear canal and external ear normal.      Mouth/Throat:      Mouth: Mucous membranes are moist.   Eyes:      General: No visual field deficit.     Extraocular Movements: Extraocular movements intact.      Pupils: Pupils are equal, round, and  reactive to light.   Cardiovascular:      Rate and Rhythm: Normal rate and regular rhythm.      Pulses: Normal pulses.      Heart sounds: Normal heart sounds. No murmur heard.    No friction rub. No gallop.   Pulmonary:      Effort: Pulmonary effort is normal. No respiratory distress.      Breath sounds: Normal breath sounds. No wheezing, rhonchi or rales.   Abdominal:      General: Abdomen is flat. Bowel sounds are normal. There is no distension.      Palpations: Abdomen is soft.      Tenderness: There is no abdominal tenderness. There is no guarding or rebound.   Musculoskeletal:      Cervical back: Normal range of motion and neck supple. No rigidity.      Right lower leg: No edema.      Left lower leg: No edema.   Lymphadenopathy:      Cervical: No cervical adenopathy.   Skin:     General: Skin is warm and dry.   Neurological:      Mental Status: She is alert.      GCS: GCS eye subscore is 4. GCS verbal subscore is 4. GCS motor subscore is 6.      Sensory: Sensation is intact.      Motor: Motor function is intact.      Coordination: Finger-Nose-Finger Test abnormal and Heel to Frey Test abnormal.         ECG 12 Lead      Date/Time: 5/3/2022 2:35 PM  Performed by: Chano Flynn MD  Authorized by: Chano Flynn MD   Interpreted by physician  Rhythm: sinus rhythm  Rate: normal  BPM: 65  QRS axis: normal  Conduction: conduction normal  ST Segments: ST segments normal  T Waves: T waves normal  Other findings: PRWP  Clinical impression: abnormal ECG                 ED Course  ED Course as of 05/03/22 1549   Tue May 03, 2022   1428 Teleneurology consulted [SD]   1521 D/w Dr. Novak, states will review CT angiography head/neck et callback.  Recommends admit for MRI brain. [SD]   1529 Dr. Novak states CTA head/neck: negative [SD]      ED Course User Index  [SD] Chano Flynn MD      Labs Reviewed   COMPREHENSIVE METABOLIC PANEL - Abnormal; Notable for the following components:       Result Value    Creatinine 1.09 (*)      All other components within normal limits    Narrative:     GFR Normal >60  Chronic Kidney Disease <60  Kidney Failure <15     COVID-19 AND FLU A/B, NP SWAB IN TRANSPORT MEDIA 8-12 HR TAT - Normal    Narrative:     Fact sheet for providers: https://www.fda.gov/media/581419/download    Fact sheet for patients: https://www.fda.gov/media/099460/download    Test performed by PCR.   PROTIME-INR - Normal    Narrative:     Therapeutic range for most indications is 2.0-3.0 INR,  or 2.5-3.5 for mechanical heart valves.   APTT - Normal    Narrative:     The recommended Heparin therapeutic range is 68-97 seconds.   HCG, SERUM, QUALITATIVE - Normal   TROPONIN (IN-HOUSE) - Normal    Narrative:     Troponin T Reference Range:  <= 0.03 ng/mL-   Negative for AMI  >0.03 ng/mL-     Abnormal for myocardial necrosis.  Clinicians would have to utilize clinical acumen, EKG, Troponin and serial changes to determine if it is an Acute Myocardial Infarction or myocardial injury due to an underlying chronic condition.       Results may be falsely decreased if patient taking Biotin.     CBC WITH AUTO DIFFERENTIAL - Normal   TROPONIN (IN-HOUSE)   POCT GLUCOSE FINGERSTICK   CBC AND DIFFERENTIAL    Narrative:     The following orders were created for panel order CBC & Differential.  Procedure                               Abnormality         Status                     ---------                               -----------         ------                     CBC Auto Differential[[id number]]        Normal              Final result                 Please view results for these tests on the individual orders.   EXTRA TUBES    Narrative:     The following orders were created for panel order Extra Tubes.  Procedure                               Abnormality         Status                     ---------                               -----------         ------                     Lavender Top[[id number]]                                     In process                  Verdin Top[[id number]]                                         In process                   Please view results for these tests on the individual orders.   LAVENDER TOP   GRAY TOP     CT Abdomen Pelvis Without Contrast    Result Date: 4/13/2022  Narrative: CT abdomen, pelvis without contrast. CLINICAL INDICATION: Right flank pain.    COMPARISON: CT March 7, 2021.   TECHNIQUE: Noncontrast helical scanning with axial and coronal reformations. Soft tissue, lung, and bone windows reviewed. This exam was performed according to our departmental dose-optimization program, which includes automated exposure control, adjustment of the mA and/or kV according to patient size and/or use of iterative reconstruction technique. ABDOMEN CT FINDINGS: The visualized lung bases show minor dependent changes. Scan sensitivity for abnormalities of the organs is limited by the lack of IV contrast. Within this limitation the following observations are made. Liver, gallbladder, spleen, pancreas, adrenal glands are normal in appearance within the limitations of nonenhanced scanning. Nonobstructing stone left kidney. Tiny nonshadowing stone right kidney. There are no hydronephrotic changes. Tiny stone distal right ureter at the ureterovesical junction. Series 2 image 129. 0.39 cm. This is similar to prior exam. Bowel grossly unremarkable. Normal appendix. No evidence of pathologically enlarged nodes, free air, or free fluid. Normal lumbar  spine. The bones are grossly unremarkable. PELVIS CT FINDINGS: There are pelvic calcifications, phleboliths, unchanged since prior exam. No evidence of pathologically enlarged nodes, free air, or free fluid. The bones are grossly unremarkable.     Impression: Nonobstructing stones in both the right and left kidneys. CT abdomen pelvis without contrast is otherwise unremarkable. There are no hydronephrotic changes. Electronically signed by:  John Galeano MD  4/13/2022 5:22 PM CDT Workstation:  [id number]    XR Chest 1 View    Result Date: 5/3/2022  Narrative: 2:46 PM INDICATION: Stroke protocol. Acute neurologic symptoms FINDINGS: Limited near expiratory film. No significant areas of consolidation, acute congestive changes, pneumothorax or pleural fluid. Heart and mediastinal contours within normal limits.     Impression: Limited near expiratory film. No definite acute cardiopulmonary process. Electronically signed by:  Ernesto Jacobs MD  5/3/2022 3:32 PM CDT Workstation: [id number]    CT Head Without Contrast Stroke Protocol    Result Date: 5/3/2022  Narrative: CT head without IV contrast stroke protocol May 3, 2022 INDICATION: Acute cerebrovascular disease/acute neurologic symptoms TECHNIQUE: Spiral images obtained from foramen magnum through vertex without IV contrast. Sagittal, axial and coronal reformatted images generated retrospectively. Encephalomalacia consistent with old infarct left perisylvian region. Mild atrophy for a patient of this age. No definite acute parenchymal pathology. Right sphenoid and left ethmoid sinus inflammation. Mastoids grossly clear. No focal or acute bony abnormality.     Impression: Old left middle cerebral artery distribution infarct with associated encephalomalacia stable compared with March 10 CT scan. Atrophy for a patient of this age. No acute intracranial pathology. Sinus inflammation as above. Electronically signed by:  Ernesto Jacobs MD  5/3/2022 3:19 PM CDT Workstation: [id number]                                               [facility]    Final diagnoses:   Dysarthria   Altered mental status, unspecified altered mental status type       ED Disposition  ED Disposition     ED Disposition   Decision to Admit    Condition   --    Comment   Level of Care: Stepdown [25]   Admitting Physician: SHARRI REDDING [[id number]]   Attending Physician: SHARRI REDDING [[id number]]   Patient Class: Observation [104]               No follow-up provider specified.       Medication  List      No changes were made to your prescriptions during this visit.          Chano Flynn MD  05/03/22 [id number]

## 2022-05-03 NOTE — ED NOTES
This RN called tele neuro. No answer. Will attempt again to get consult after updated time of LKW per Dr. Flynn.

## 2022-05-04 ENCOUNTER — APPOINTMENT (OUTPATIENT)
Dept: PULMONOLOGY | Facility: HOSPITAL | Age: 47
End: 2022-05-04

## 2022-05-04 LAB
ANION GAP SERPL CALCULATED.3IONS-SCNC: 10 MMOL/L (ref 5–15)
BASOPHILS # BLD AUTO: 0.03 10*3/MM3 (ref 0–0.2)
BASOPHILS NFR BLD AUTO: 0.5 % (ref 0–1.5)
BUN SERPL-MCNC: 12 MG/DL (ref 6–20)
BUN/CREAT SERPL: 14.8 (ref 7–25)
CALCIUM SPEC-SCNC: 8.2 MG/DL (ref 8.6–10.5)
CHLORIDE SERPL-SCNC: 108 MMOL/L (ref 98–107)
CO2 SERPL-SCNC: 21 MMOL/L (ref 22–29)
CREAT SERPL-MCNC: 0.81 MG/DL (ref 0.57–1)
DEPRECATED RDW RBC AUTO: 42.7 FL (ref 37–54)
EGFRCR SERPLBLD CKD-EPI 2021: 90.8 ML/MIN/1.73
EOSINOPHIL # BLD AUTO: 0.29 10*3/MM3 (ref 0–0.4)
EOSINOPHIL NFR BLD AUTO: 4.8 % (ref 0.3–6.2)
ERYTHROCYTE [DISTWIDTH] IN BLOOD BY AUTOMATED COUNT: 13.1 % (ref 12.3–15.4)
GLUCOSE SERPL-MCNC: 81 MG/DL (ref 65–99)
HCT VFR BLD AUTO: 35.1 % (ref 34–46.6)
HGB BLD-MCNC: 12 G/DL (ref 12–15.9)
IMM GRANULOCYTES # BLD AUTO: 0.02 10*3/MM3 (ref 0–0.05)
IMM GRANULOCYTES NFR BLD AUTO: 0.3 % (ref 0–0.5)
LYMPHOCYTES # BLD AUTO: 2.34 10*3/MM3 (ref 0.7–3.1)
LYMPHOCYTES NFR BLD AUTO: 38.9 % (ref 19.6–45.3)
MCH RBC QN AUTO: 30.9 PG (ref 26.6–33)
MCHC RBC AUTO-ENTMCNC: 34.2 G/DL (ref 31.5–35.7)
MCV RBC AUTO: 90.5 FL (ref 79–97)
MONOCYTES # BLD AUTO: 0.36 10*3/MM3 (ref 0.1–0.9)
MONOCYTES NFR BLD AUTO: 6 % (ref 5–12)
NEUTROPHILS NFR BLD AUTO: 2.98 10*3/MM3 (ref 1.7–7)
NEUTROPHILS NFR BLD AUTO: 49.5 % (ref 42.7–76)
NRBC BLD AUTO-RTO: 0 /100 WBC (ref 0–0.2)
PLATELET # BLD AUTO: 208 10*3/MM3 (ref 140–450)
PMV BLD AUTO: 11.4 FL (ref 6–12)
POTASSIUM SERPL-SCNC: 4.2 MMOL/L (ref 3.5–5.2)
RBC # BLD AUTO: 3.88 10*6/MM3 (ref 3.77–5.28)
SODIUM SERPL-SCNC: 139 MMOL/L (ref 136–145)
WBC NRBC COR # BLD: 6.02 10*3/MM3 (ref 3.4–10.8)

## 2022-05-04 PROCEDURE — G0378 HOSPITAL OBSERVATION PER HR: HCPCS

## 2022-05-04 PROCEDURE — 99213 OFFICE O/P EST LOW 20 MIN: CPT | Performed by: NURSE PRACTITIONER

## 2022-05-04 PROCEDURE — 85025 COMPLETE CBC W/AUTO DIFF WBC: CPT | Performed by: HOSPITALIST

## 2022-05-04 PROCEDURE — 92610 EVALUATE SWALLOWING FUNCTION: CPT | Performed by: SPEECH-LANGUAGE PATHOLOGIST

## 2022-05-04 PROCEDURE — 96361 HYDRATE IV INFUSION ADD-ON: CPT

## 2022-05-04 PROCEDURE — 95816 EEG AWAKE AND DROWSY: CPT

## 2022-05-04 PROCEDURE — 80048 BASIC METABOLIC PNL TOTAL CA: CPT | Performed by: HOSPITALIST

## 2022-05-04 PROCEDURE — 95816 EEG AWAKE AND DROWSY: CPT | Performed by: NURSE PRACTITIONER

## 2022-05-04 PROCEDURE — 25010000002 ENOXAPARIN PER 10 MG: Performed by: HOSPITALIST

## 2022-05-04 PROCEDURE — 96372 THER/PROPH/DIAG INJ SC/IM: CPT

## 2022-05-04 RX ADMIN — MELATONIN 6 MG: 3 TAB ORAL at 20:25

## 2022-05-04 RX ADMIN — LOSARTAN POTASSIUM 50 MG: 50 TABLET, FILM COATED ORAL at 08:36

## 2022-05-04 RX ADMIN — SODIUM CHLORIDE 125 ML/HR: 9 INJECTION, SOLUTION INTRAVENOUS at 00:00

## 2022-05-04 RX ADMIN — ASPIRIN 325 MG: 325 TABLET ORAL at 08:35

## 2022-05-04 RX ADMIN — ACETAMINOPHEN 500 MG: 500 TABLET, FILM COATED ORAL at 16:22

## 2022-05-04 RX ADMIN — TRAZODONE HYDROCHLORIDE 50 MG: 50 TABLET ORAL at 20:25

## 2022-05-04 RX ADMIN — ESCITALOPRAM OXALATE 10 MG: 10 TABLET ORAL at 08:35

## 2022-05-04 RX ADMIN — ATORVASTATIN CALCIUM 80 MG: 40 TABLET, FILM COATED ORAL at 20:25

## 2022-05-04 RX ADMIN — LEVETIRACETAM 750 MG: 250 TABLET ORAL at 20:24

## 2022-05-04 RX ADMIN — ENOXAPARIN SODIUM 40 MG: 40 INJECTION SUBCUTANEOUS at 20:25

## 2022-05-04 RX ADMIN — SODIUM CHLORIDE 125 ML/HR: 9 INJECTION, SOLUTION INTRAVENOUS at 08:35

## 2022-05-04 RX ADMIN — PANTOPRAZOLE SODIUM 40 MG: 40 TABLET, DELAYED RELEASE ORAL at 08:36

## 2022-05-04 RX ADMIN — OXYBUTYNIN CHLORIDE 10 MG: 10 TABLET, EXTENDED RELEASE ORAL at 08:35

## 2022-05-04 RX ADMIN — Medication 10 ML: at 20:25

## 2022-05-04 RX ADMIN — FUROSEMIDE 20 MG: 20 TABLET ORAL at 08:35

## 2022-05-04 RX ADMIN — LEVETIRACETAM 750 MG: 250 TABLET ORAL at 10:18

## 2022-05-04 NOTE — THERAPY DISCHARGE NOTE
CCU/SDU - Speech Language Pathology   Swallow Initial Evaluation/Discharge [facility]     Patient Name: Myles Shannon  : 1975  MRN: [id number]  Today's Date: 2022               Admit Date: 5/3/2022     Pt seen for skilled ST services this date to assess swallow function s/p tongue swelling and episode of confusion.  Pt reports no hx of dysphagia, but does have residual aphasia and dysarthria from previous CVA that has returned to baseline.  Pt denied any overt s/s of aspiration at this time.  Pt consumed regular/mixed consistency solids (pineapples, cantelope) w/efficient mastication, complete oral clearance, and timely AP transit.  Pt consumed approx. 320 cc of thin liquids via straw w/no overt s/s of aspiration.  SLP recommends continued current diet of regular solids/thin liquids and skilled ST services not necessary at this time.  SLP educated pt on results and recommendations, s/s of aspiration, and when to seek assistance for possible dysphagia.  Pt nodded in response to education.  Pt's dtr arrived at end of session and SLP educated pt's dtr on results and recommendations and she verbalized understanding.    Visit Dx:    ICD-10-CM ICD-9-CM   1. Dysarthria  R47.1 784.51   2. Altered mental status, unspecified altered mental status type  R41.82 780.97   3. Dysphagia, unspecified type  R13.10 787.20     Patient Active Problem List   Diagnosis   • Nicotine abuse   • Other chest pain   • Acute maxillary sinusitis   • Bipolar affective disorder (HCC)   • Decreased pulses in feet   • Flank pain   • Hematochezia   • High cholesterol   • Migraine without aura and with status migrainosus, not intractable   • Migraine without status migrainosus, not intractable   • Neck pain   • Nephrolithiasis   • Neuropathy   • Chronic pain   • Primary osteoarthritis involving multiple joints   • Sciatic leg pain   • Screening for diabetes mellitus (DM)   • Seasonal allergies   • Stress   • Tobacco use  disorder   • Urinary incontinence without sensory awareness   • Vitamin D deficiency   • Carpal tunnel syndrome of right wrist   • Seizure ([facility])   • Intractable vomiting   • Acute UTI (urinary tract infection)   • Cholelithiasis   • GI bleed   • Kidney stone   • Nephrolithiasis   • ELBERT (acute kidney injury) ([facility])   • Infection due to ESBL-producing Escherichia coli   • CVA (cerebral vascular accident) ([facility])   • Dysphagia due to recent stroke   • Slurred speech   • Cerebrovascular accident (CVA) ([facility])   • Aphasia due to acute cerebrovascular accident (CVA) ([facility])   • Methamphetamine abuse ([facility])   • Seizure disorder ([facility])   • UTI (urinary tract infection) due to urinary indwelling catheter ([facility])   • Urinary tract infection due to extended-spectrum beta lactamase (ESBL) producing Escherichia coli   • Urinary tract infection due to extended-spectrum beta lactamase (ESBL) producing Escherichia coli   • Metabolic acidosis, NAG, bicarbonate losses   • Midline shift of brain due to stroke   • Nonintractable headache   • Weakness   • Dysarthria     Past Medical History:   Diagnosis Date   • Abdominal pain     Chronic RLQ, RUQ, R flank comes and goes.    • Abdominal pain, right lower quadrant    • Acute bilateral otitis media    • Allergic rhinitis    • Backache     Upper back.   • Candidiasis of skin    • Cellulitis of neck    • Chest pain    • Contraception    • Cough    • Dysfunction of eustachian tube    • Dyspareunia, female    • Elevated cholesterol    • Excessive or frequent menstruation    • Flank pain    • Generalized aches and pains    • Headache    • Health maintenance examination    • Hypertension    • Infection of skin and subcutaneous tissue    • Irregular periods    • Kidney stone     Poss   • Menorrhagia    • Nausea vomiting and diarrhea    • Obesity    • Open wound of abdominal wall    • Otalgia    • Pain in left foot    • Pain in unspecified hand    • Removed Impacted cerumen 06/05/2012   • Rhinitis    •  Seizure ([facility]) 08/15/2019   • Shoulder pain     right-sided-likely muscle strain      • Shoulder tendinitis    • Spider bite wound    • Staphylococcal infection of skin    • Stroke ([facility])    • Swollen feet    • Tinea cruris    • Tobacco dependence syndrome    • Upper respiratory infection    • Urinary tract infectious disease    • Vertigo    • Visit for gynecologic examination    • Vulvovaginitis      Past Surgical History:   Procedure Laterality Date   •  SECTION     • DILATATION AND CURETTAGE      Secondary to incomplete spontaneous    • INCISION AND DRAINAGE ABSCESS  2012   • INJECTION OF MEDICATION  2015    Phenergan (1)     • INJECTION OF MEDICATION  10/10/2013    Toradol (2)      • INJECTION OF MEDICATION  2014    Zofran (1)          SLP Recommendation and Plan  SLP Swallowing Diagnosis: swallow WFL (22 103)  SLP Diet Recommendation: regular textures, thin liquids (22 1035)     Monitor for Signs of Aspiration: notify SLP if any concerns (22 1035)  Recommended Diagnostics: No further SLP services recommended (22 103)  Swallow Criteria for Skilled Therapeutic Interventions Met: no problems identified which require skilled intervention (22 1035)  Anticipated Discharge Disposition (SLP): skilled nursing facility (22 1106)     Therapy Frequency (Swallow): evaluation only (22 103)              Anticipated Discharge Disposition (SLP): skilled nursing facility (22 1106)        Reason for Discharge: other (see comments) (eval only) (22 110)             Plan of Care Reviewed With: patient, daughter (22 1109)  Outcome Evaluation: Pt seen for skilled ST services this date to assess swallow function s/p tongue swelling and episode of confusion.  Pt reports no hx of dysphagia, but does have residual aphasia and dysarthria from previous CVA that has returned to baseline.  Pt denied any overt s/s of aspiration at this time.  Pt  consumed regular/mixed consistency solids (pineapples, cantelope) w/efficient mastication, complete oral clearance, and timely AP transit.  Pt consumed approx. 320 cc of thin liquids via straw w/no overt s/s of aspiration.  SLP recommends continued current diet of regular solids/thin liquids and skilled ST services not necessary at this time.  SLP educated pt on results and recommendations, s/s of aspiration, and when to seek assistance for possible dysphagia.  Pt nodded in response to education.  Pt's dtr arrived at end of session and SLP educated pt's dtr on results and recommendations and she verbalized understanding. (05/04/22 5408)    SWALLOW EVALUATION (last 72 hours)     SLP Adult Swallow Evaluation     Row Name 05/04/22 1038                   Rehab Evaluation    Document Type evaluation  -CK        Subjective Information no complaints  -CK        Patient Observations alert;cooperative;agree to therapy  -CK        Patient/Family/Caregiver Comments/Observations Pt's dtr arrived at bedside at end of eval.  -CK        Patient Effort good  -CK        Symptoms Noted During/After Treatment none  -CK                  General Information    Patient Profile Reviewed yes  -CK        Pertinent History Of Current Problem Pt admitted w/episodes of confusion and tongue swelling causing speech difficulties.  Pt has pmh of cva w/aphasia and dysarthria, but reports she feels like she is back to baseline.  She also communicated this w/Neuro APRN Michael Hagan.  -CK        Current Method of Nutrition regular textures;thin liquids  -CK        Precautions/Limitations, Vision corrective lenses needed for reading  -CK        Precautions/Limitations, Hearing WFL;for purposes of eval  -CK        Prior Level of Function-Communication expressive language impairment;motor speech impairment  -CK        Prior Level of Function-Swallowing no diet consistency restrictions  -CK        Patient's Goals for Discharge return home  -CK                   Pain    Additional Documentation Pain Scale: Numbers Pre/Post-Treatment (Group)  -CK                  Pain Scale: Numbers Pre/Post-Treatment    Pretreatment Pain Rating 0/10 - no pain  -CK        Posttreatment Pain Rating 0/10 - no pain  -CK                  Oral Motor Structure and Function    Dentition Assessment edentulous, does not have dentures  -CK        Secretion Management WNL/WFL  -CK        Mucosal Quality moist, healthy  -CK        Volitional Swallow WFL  -CK        Volitional Cough WFL  -CK                  General Eating/Swallowing Observations    Respiratory Support Currently in Use room air  -CK        Eating/Swallowing Skills self-fed;appropriate self-feeding skills observed  -CK        Positioning During Eating upright 90 degree;upright in bed  -CK        Utensils Used straw  -CK        Consistencies Trialed regular textures;mixed consistency;thin liquids  -CK        Pre SpO2 (%) 99  -CK        Post SpO2 (%) 100  -CK                  Clinical Swallow Eval    Oral Prep Phase WFL  -CK        Oral Transit WFL  -CK        Oral Residue WFL  -CK        Pharyngeal Phase no overt signs/symptoms of pharyngeal impairment  -CK        Esophageal Phase unremarkable  -CK        Clinical Swallow Evaluation Summary Pt seen for skilled ST services this date to assess swallow function s/p tongue swelling and episode of confusion.  Pt reports no hx of dysphagia, but does have residual aphasia and dysarthria from previous CVA that has returned to baseline.  Pt denied any overt s/s of aspiration at this time.  Pt consumed regular/mixed consistency solids (pineapples, cantelope) w/efficient mastication, complete oral clearance, and timely AP transit.  Pt consumed approx. 320 cc of thin liquids via straw w/no overt s/s of aspiration.  SLP recommends continued current diet of regular solids/thin liquids and skilled ST services not necessary at this time.  SLP educated pt on results and recommendations, s/s of  aspiration, and when to seek assistance for possible dysphagia.  Pt nodded in response to education.  Pt's dtr arrived at end of session and SLP educated pt's dtr on results and recommendations and she verbalized understanding.  -CK                  SLP Evaluation Clinical Impression    SLP Swallowing Diagnosis swallow WFL  -CK        Functional Impact no impact on function  -CK        Swallow Criteria for Skilled Therapeutic Interventions Met no problems identified which require skilled intervention  -CK                  SLP Treatment Clinical Impressions    Care Plan Review evaluation/treatment results reviewed;care plan/treatment goals reviewed;current/potential barriers reviewed;risks/benefits reviewed;patient/other agree to care plan  -CK        Care Plan Review, Other Participant(s) daughter  -CK                  Recommendations    Therapy Frequency (Swallow) evaluation only  -CK        SLP Diet Recommendation regular textures;thin liquids  -CK        Recommended Diagnostics No further SLP services recommended  -CK        SLP Rec. for Method of Medication Administration as tolerated  -        Monitor for Signs of Aspiration notify SLP if any concerns  -CK        Anticipated Discharge Disposition (SLP) skilled nursing facility  -              User Key  (r) = Recorded By, (t) = Taken By, (c) = Cosigned By    Initials Name Effective Dates    CK Amanda Camp MS CCC-SLP 06/16/21 -                 EDUCATION  The patient has been educated in the following areas:   Results and recommendations.                 Time Calculation:    Time Calculation- SLP     Row Name 05/04/22 1106             Time Calculation- SLP    SLP Start Time 1035  -CK      SLP Stop Time 1059  -      SLP Time Calculation (min) 24 min  -CK      Total Timed Code Minutes- SLP 24 minute(s)  -      SLP Received On 05/04/22  -CK              Untimed Charges    SLP Eval/Re-eval  ST Eval Oral Pharyng Swallow - 34949  -CK      [id number] Eval  Oral Pharyng Swallow Minutes 24  -CK              Total Minutes    Untimed Charges Total Minutes 24  -CK       Total Minutes 24  -CK            User Key  (r) = Recorded By, (t) = Taken By, (c) = Cosigned By    Initials Name Provider Type    Amanda Villalobos MS CCC-SLP Speech and Language Pathologist                Therapy Charges for Today     Code Description Service Date Service Provider Modifiers Qty    [id number]  ST EVAL ORAL PHARYNG SWALLOW 2 5/4/2022 Amanda Camp MS CCC-SLP GN 1               SLP Discharge Summary  Anticipated Discharge Disposition (SLP): skilled nursing facility  Reason for Discharge: other (see comments) (eval only)    Amanda Camp MS CCC-SLP  5/4/2022

## 2022-05-04 NOTE — PLAN OF CARE
Goal Outcome Evaluation:              Outcome Evaluation: patient transfered to floor, meds given as orderd, no one sided weakness noted, no dizzness or episodes at this time, no falls

## 2022-05-04 NOTE — PLAN OF CARE
Goal Outcome Evaluation:      VSS, afebrile. Adequate urine output noted this shift. No c/o pain or no distress noted. Notified [facility] earlier this shift regarding allergy to Buspar and pt has an order for Buspar, per [facility] MD, hold HS dose.

## 2022-05-04 NOTE — NURSING NOTE
Report given. Telemetry obtained and placed on patient; confirmed placement with monitor tech. Transport requested.

## 2022-05-04 NOTE — PLAN OF CARE
Goal Outcome Evaluation:  Plan of Care Reviewed With: patient, daughter           Outcome Evaluation: Pt seen for skilled ST services this date to assess swallow function s/p tongue swelling and episode of confusion.  Pt reports no hx of dysphagia, but does have residual aphasia and dysarthria from previous CVA that has returned to baseline.  Pt denied any overt s/s of aspiration at this time.  Pt consumed regular/mixed consistency solids (pineapples, cantelope) w/efficient mastication, complete oral clearance, and timely AP transit.  Pt consumed approx. 320 cc of thin liquids via straw w/no overt s/s of aspiration.  SLP recommends continued current diet of regular solids/thin liquids and skilled ST services not necessary at this time.  SLP educated pt on results and recommendations, s/s of aspiration, and when to seek assistance for possible dysphagia.  Pt nodded in response to education.  Pt's dtr arrived at end of session and SLP educated pt's dtr on results and recommendations and she verbalized understanding.

## 2022-05-04 NOTE — PROGRESS NOTES
Stroke Progress Note       Chief Complaint: No complaints    Subjective     HPI: Pt is a 46-yr-old right-handed white female with known diagnosis of bipolar disorder, smoker, previous stroke in Dec, and seizures who presented yesterday after an episode of confusion. Pt states she felt strange and confused. This morning she states feeling back to her baseline. She is alert and oriented, she has some expressive aphasia from her previous stroke, right strength is 4/5, denies numbness, and visual field is intact.       Review of Systems   HENT: Negative.    Eyes: Negative.    Respiratory: Negative.    Cardiovascular: Negative.    Gastrointestinal: Negative.    Genitourinary: Negative.    Musculoskeletal: Negative.    Skin: Negative.    Neurological: Positive for speech difficulty.   Psychiatric/Behavioral: Negative.         Objective      Temp:  [96.6 °F (35.9 °C)-98.4 °F (36.9 °C)] 96.6 °F (35.9 °C)  Heart Rate:  [53-72] 67  Resp:  [16-19] 16  BP: ()/(55-79) 116/79    Neurological Exam  Mental Status  Awake, alert and oriented to person, place and time.Alert. Oriented to person, place, and time. Mild dysarthria present. Expressive aphasia present.    Cranial Nerves  CN II: Visual acuity is normal. Visual fields full to confrontation.  CN III, IV, VI: Extraocular movements intact bilaterally. Normal lids and orbits bilaterally. Pupils equal round and reactive to light bilaterally.  CN V: Facial sensation is normal.  CN VII: Full and symmetric facial movement.  CN IX, X: Palate elevates symmetrically. Normal gag reflex.  CN XI: Shoulder shrug strength is normal.  CN XII: Tongue midline without atrophy or fasciculations.    Motor   Strength is 5/5 in all four extremities except as noted.  Right strength 4/5.    Sensory  Sensation is intact to light touch, pinprick, vibration and proprioception in all four extremities.    Reflexes  Not assessed.    Coordination    Finger-to-nose, rapid alternating movements and  heel-to-shin normal bilaterally without dysmetria.    Gait    Not assessed.      Physical Exam  Vitals and nursing note reviewed.   Constitutional:       Appearance: Normal appearance.   HENT:      Head: Normocephalic and atraumatic.   Eyes:      General: Lids are normal.      Extraocular Movements: Extraocular movements intact.      Pupils: Pupils are equal, round, and reactive to light.   Cardiovascular:      Rate and Rhythm: Normal rate.   Pulmonary:      Effort: Pulmonary effort is normal.   Musculoskeletal:         General: Normal range of motion.      Cervical back: Normal range of motion.   Skin:     General: Skin is warm and dry.   Neurological:      Mental Status: She is alert and oriented to person, place, and time.      Cranial Nerves: Dysarthria present.      Coordination: Coordination is intact.   Psychiatric:         Mood and Affect: Mood normal.         Results Review:    I reviewed the patient's new clinical results.    Lab Results (last 24 hours)     Procedure Component Value Units Date/Time    Basic Metabolic Panel [[id number]]  (Abnormal) Collected: 05/04/22 0355    Specimen: Blood Updated: 05/04/22 0431     Glucose 81 mg/dL      BUN 12 mg/dL      Creatinine 0.81 mg/dL      Sodium 139 mmol/L      Potassium 4.2 mmol/L      Chloride 108 mmol/L      CO2 21.0 mmol/L      Calcium 8.2 mg/dL      BUN/Creatinine Ratio 14.8     Anion Gap 10.0 mmol/L      eGFR 90.8 mL/min/1.73      Comment: National Kidney Foundation and American Society of Nephrology (ASN) Task Force recommended calculation based on the Chronic Kidney Disease Epidemiology Collaboration (CKD-EPI) equation refit without adjustment for race.       Narrative:      GFR Normal >60  Chronic Kidney Disease <60  Kidney Failure <15      CBC & Differential [[id number]]  (Normal) Collected: 05/04/22 0355    Specimen: Blood Updated: 05/04/22 0401    Narrative:      The following orders were created for panel order CBC & Differential.  Procedure                                Abnormality         Status                     ---------                               -----------         ------                     CBC Auto Differential[[id number]]        Normal              Final result                 Please view results for these tests on the individual orders.    CBC Auto Differential [[id number]]  (Normal) Collected: 05/04/22 0355    Specimen: Blood Updated: 05/04/22 0401     WBC 6.02 10*3/mm3      RBC 3.88 10*6/mm3      Hemoglobin 12.0 g/dL      Hematocrit 35.1 %      MCV 90.5 fL      MCH 30.9 pg      MCHC 34.2 g/dL      RDW 13.1 %      RDW-SD 42.7 fl      MPV 11.4 fL      Platelets 208 10*3/mm3      Neutrophil % 49.5 %      Lymphocyte % 38.9 %      Monocyte % 6.0 %      Eosinophil % 4.8 %      Basophil % 0.5 %      Immature Grans % 0.3 %      Neutrophils, Absolute 2.98 10*3/mm3      Lymphocytes, Absolute 2.34 10*3/mm3      Monocytes, Absolute 0.36 10*3/mm3      Eosinophils, Absolute 0.29 10*3/mm3      Basophils, Absolute 0.03 10*3/mm3      Immature Grans, Absolute 0.02 10*3/mm3      nRBC 0.0 /100 WBC     Extra Tubes [[id number]] Collected: 05/03/22 1447    Specimen: Blood, Venous Line Updated: 05/03/22 1903    Narrative:      The following orders were created for panel order Extra Tubes.  Procedure                               Abnormality         Status                     ---------                               -----------         ------                     Lavender Top[[id number]]                                     Final result               Verdin Top[[id number]]                                         Final result                 Please view results for these tests on the individual orders.    Gray Top [[id number]] Collected: 05/03/22 1447    Specimen: Blood Updated: 05/03/22 1903     Extra Tube Hold for add-ons.     Comment: Auto resulted.       Troponin [[id number]]  (Normal) Collected: 05/03/22 1719    Specimen: Blood Updated: 05/03/22 1803     Troponin T <0.010 ng/mL      Narrative:      Troponin T Reference Range:  <= 0.03 ng/mL-   Negative for AMI  >0.03 ng/mL-     Abnormal for myocardial necrosis.  Clinicians would have to utilize clinical acumen, EKG, Troponin and serial changes to determine if it is an Acute Myocardial Infarction or myocardial injury due to an underlying chronic condition.       Results may be falsely decreased if patient taking Biotin.      Valeria Weinstein [[id number]] Collected: 05/03/22 1447    Specimen: Blood Updated: 05/03/22 1604     Extra Tube hold for add-on     Comment: Auto resulted       Comprehensive Metabolic Panel [[id number]]  (Abnormal) Collected: 05/03/22 1437    Specimen: Blood Updated: 05/03/22 1506     Glucose 94 mg/dL      BUN 15 mg/dL      Creatinine 1.09 mg/dL      Sodium 140 mmol/L      Potassium 4.6 mmol/L      Comment: Slight hemolysis detected by analyzer. Results may be affected.        Chloride 105 mmol/L      CO2 23.0 mmol/L      Calcium 8.9 mg/dL      Total Protein 6.6 g/dL      Albumin 4.20 g/dL      ALT (SGPT) 22 U/L      AST (SGOT) 21 U/L      Alkaline Phosphatase 87 U/L      Total Bilirubin 0.2 mg/dL      Globulin 2.4 gm/dL      A/G Ratio 1.8 g/dL      BUN/Creatinine Ratio 13.8     Anion Gap 12.0 mmol/L      eGFR 63.6 mL/min/1.73      Comment: National Kidney Foundation and American Society of Nephrology (ASN) Task Force recommended calculation based on the Chronic Kidney Disease Epidemiology Collaboration (CKD-EPI) equation refit without adjustment for race.       Narrative:      GFR Normal >60  Chronic Kidney Disease <60  Kidney Failure <15      Troponin [[id number]]  (Normal) Collected: 05/03/22 1437    Specimen: Blood Updated: 05/03/22 1506     Troponin T <0.010 ng/mL     Narrative:      Troponin T Reference Range:  <= 0.03 ng/mL-   Negative for AMI  >0.03 ng/mL-     Abnormal for myocardial necrosis.  Clinicians would have to utilize clinical acumen, EKG, Troponin and serial changes to determine if it is an Acute Myocardial  Infarction or myocardial injury due to an underlying chronic condition.       Results may be falsely decreased if patient taking Biotin.      hCG, Serum, Qualitative [[id number]]  (Normal) Collected: 05/03/22 1437    Specimen: Blood Updated: 05/03/22 1506     HCG Qualitative Negative    Protime-INR [[id number]]  (Normal) Collected: 05/03/22 1437    Specimen: Blood Updated: 05/03/22 1505     Protime 12.7 Seconds      INR 0.97    Narrative:      Therapeutic range for most indications is 2.0-3.0 INR,  or 2.5-3.5 for mechanical heart valves.    aPTT [[id number]]  (Normal) Collected: 05/03/22 1437    Specimen: Blood Updated: 05/03/22 1505     PTT 25.3 seconds     Narrative:      The recommended Heparin therapeutic range is 68-97 seconds.    COVID-19 and FLU A/B PCR - Swab, Nasopharynx [[id number]]  (Normal) Collected: 05/03/22 1434    Specimen: Swab from Nasopharynx Updated: 05/03/22 1503     COVID19 Not Detected     Influenza A PCR Not Detected     Influenza B PCR Not Detected    Narrative:      Fact sheet for providers: https://www.fda.gov/media/831076/download    Fact sheet for patients: https://www.fda.gov/media/972220/download    Test performed by PCR.    CBC & Differential [[id number]]  (Normal) Collected: 05/03/22 1437    Specimen: Blood Updated: 05/03/22 1448    Narrative:      The following orders were created for panel order CBC & Differential.  Procedure                               Abnormality         Status                     ---------                               -----------         ------                     CBC Auto Differential[[id number]]        Normal              Final result                 Please view results for these tests on the individual orders.    CBC Auto Differential [[id number]]  (Normal) Collected: 05/03/22 1437    Specimen: Blood Updated: 05/03/22 1448     WBC 7.30 10*3/mm3      RBC 4.29 10*6/mm3      Hemoglobin 13.4 g/dL      Hematocrit 39.4 %      MCV 91.8 fL      MCH 31.2 pg      MCHC 34.0  g/dL      RDW 13.1 %      RDW-SD 44.0 fl      MPV 11.5 fL      Platelets 248 10*3/mm3      Neutrophil % 55.9 %      Lymphocyte % 30.8 %      Monocyte % 7.0 %      Eosinophil % 5.5 %      Basophil % 0.5 %      Immature Grans % 0.3 %      Neutrophils, Absolute 4.08 10*3/mm3      Lymphocytes, Absolute 2.25 10*3/mm3      Monocytes, Absolute 0.51 10*3/mm3      Eosinophils, Absolute 0.40 10*3/mm3      Basophils, Absolute 0.04 10*3/mm3      Immature Grans, Absolute 0.02 10*3/mm3      nRBC 0.0 /100 WBC         MRI Brain Without Contrast    Result Date: 5/3/2022  Stable chronic ischemia of the left MCA distribution with encephalomalacic change. No acute ischemia. No acute intracranial hemorrhage. No significant change compared to recent MRI study. Electronically signed by:  Aniket Solis MD  5/3/2022 5:20 PM CDT Workstation: [id number]    XR Chest 1 View    Result Date: 5/3/2022  Limited near expiratory film. No definite acute cardiopulmonary process. Electronically signed by:  Ernesto Jacobs MD  5/3/2022 3:32 PM CDT Workstation: [id number]    CT Angiogram Carotids    Result Date: 5/3/2022  CT of the brain: Less robust flow of left M2 segments of the left MCA due to chronic ischemia. Normal flow in the left MONY, and left M1 segment of the left MCA. Unremarkable right anterior circulation, and the bilateral vertebral basilar system. No evidence of aneurysm within the [place] of [place]. CTA of the neck: No evidence of hemodynamically significant stenosis of the visualized ICAs and bilateral vertebral arteries. No evidence of dissection within the bilateral carotid or vertebral arteries. Electronically signed by:  Aniket Solis MD  5/3/2022 5:06 PM CDT Workstation: [id number]    CT Head Without Contrast Stroke Protocol    Result Date: 5/3/2022  Old left middle cerebral artery distribution infarct with associated encephalomalacia stable compared with March 10 CT scan. Atrophy for a patient of this age. No acute intracranial pathology.  Sinus inflammation as above. Electronically signed by:  Ernesto Jacobs MD  5/3/2022 3:19 PM CDT Workstation: [id number]    CT Angiogram Head w AI Analysis of LVO    Result Date: 5/3/2022  CT of the brain: Less robust flow of left M2 segments of the left MCA due to chronic ischemia. Normal flow in the left MONY, and left M1 segment of the left MCA. Unremarkable right anterior circulation, and the bilateral vertebral basilar system. No evidence of aneurysm within the [place] of [place]. CTA of the neck: No evidence of hemodynamically significant stenosis of the visualized ICAs and bilateral vertebral arteries. No evidence of dissection within the bilateral carotid or vertebral arteries. Electronically signed by:  Aniket Solis MD  5/3/2022 5:06 PM CDT Workstation: [id number]    CT CEREBRAL PERFUSION WITH & WITHOUT CONTRAST    Addendum Date: 5/3/2022     ADDENDUM ADDENDUM #1 Critical findings were communicated to  at 5:40 PM on 5/3/2022. Electronically signed by:  Fernando Kirkpatrick MD  5/3/2022 5:44 PM CDT Workstation: [id number]     Result Date: 5/3/2022  Abnormal perfusion corresponds to chronic infarct in the left hemisphere. Electronically signed by:  Fernando Kirkpatrick MD  5/3/2022 5:30 PM CDT Workstation: [id number]    Results for orders placed during the hospital encounter of 12/15/21    Adult Transthoracic Echo Complete W/ Cont if Necessary Per Protocol    Interpretation Summary  · Left ventricular ejection fraction appears to be 56 - 60%.  · Left ventricular diastolic function was normal.  · Left ventricular wall thickness is consistent with borderline concentric hypertrophy.  · Saline test results are negative.  · Estimated right ventricular systolic pressure from tricuspid regurgitation is normal (<35 mmHg).        Assessment/Plan     Assessment/Plan: Pt is a 46-yr-old right-handed white female with known diagnosis of bipolar disorder, smoker, previous stroke in Dec, and seizures who presented yesterday after an episode  of confusion. Pt states she felt strange and confused. This morning she states feeling back to her baseline. She is alert and oriented, she has some expressive aphasia from her previous stroke, right strength is 4/5, denies numbness, and visual field is intact.   1. Confusion- Resolved, MRI is neg for acute stroke. Could have been seizure. Will get an EEG. Increased Keppra to 750 BID.   2. Bipolar disorder- Continue current medications.   3. Smoker- Instructed on the importance of cessation to reduce stroke risk. Pt verbalized understanding and states she wants to quit.   4. Activity- Okay to work with PT/OT.  5. Diet- Heart-healthy diet.  Case was discussed with pt, Dr. Connelly, Hospitalist team, and nursing. Neurology will sign off.          Patient Active Problem List   Diagnosis   • Nicotine abuse   • Other chest pain   • Acute maxillary sinusitis   • Bipolar affective disorder (HCC)   • Decreased pulses in feet   • Flank pain   • Hematochezia   • High cholesterol   • Migraine without aura and with status migrainosus, not intractable   • Migraine without status migrainosus, not intractable   • Neck pain   • Nephrolithiasis   • Neuropathy   • Chronic pain   • Primary osteoarthritis involving multiple joints   • Sciatic leg pain   • Screening for diabetes mellitus (DM)   • Seasonal allergies   • Stress   • Tobacco use disorder   • Urinary incontinence without sensory awareness   • Vitamin D deficiency   • Carpal tunnel syndrome of right wrist   • Seizure ([facility])   • Intractable vomiting   • Acute UTI (urinary tract infection)   • Cholelithiasis   • GI bleed   • Kidney stone   • Nephrolithiasis   • ELBERT (acute kidney injury) ([facility])   • Infection due to ESBL-producing Escherichia coli   • CVA (cerebral vascular accident) ([facility])   • Dysphagia due to recent stroke   • Slurred speech   • Cerebrovascular accident (CVA) ([facility])   • Aphasia due to acute cerebrovascular accident (CVA) ([facility])   • Methamphetamine abuse ([facility])   •  Seizure disorder (HCC)   • UTI (urinary tract infection) due to urinary indwelling catheter (HCC)   • Urinary tract infection due to extended-spectrum beta lactamase (ESBL) producing Escherichia coli   • Urinary tract infection due to extended-spectrum beta lactamase (ESBL) producing Escherichia coli   • Metabolic acidosis, NAG, bicarbonate losses   • Midline shift of brain due to stroke   • Nonintractable headache   • Weakness   • Dysarthria           ALPHONSE Martinez  05/04/22  09:43 CDT        I spent 45 minutes caring for Myles Shannon  on this date of service. This time includes time spent by me in the following activities: preparing for the visit, reviewing tests, obtaining and/or reviewing a separately obtained history, performing a medically appropriate examination and/or evaluation, counseling and educating the patient/family/caregiver, ordering medications, tests, or procedures, referring and communicating with other health care professionals, documenting information in the medical record, independently interpreting results and communicating that information with the patient/family/caregiver and care coordination

## 2022-05-05 ENCOUNTER — READMISSION MANAGEMENT (OUTPATIENT)
Dept: CALL CENTER | Facility: HOSPITAL | Age: 47
End: 2022-05-05

## 2022-05-05 ENCOUNTER — HOME HEALTH ADMISSION (OUTPATIENT)
Dept: HOME HEALTH SERVICES | Facility: HOME HEALTHCARE | Age: 47
End: 2022-05-05

## 2022-05-05 VITALS
HEIGHT: 60 IN | BODY MASS INDEX: 41.03 KG/M2 | WEIGHT: 209 LBS | DIASTOLIC BLOOD PRESSURE: 68 MMHG | TEMPERATURE: 97.1 F | SYSTOLIC BLOOD PRESSURE: 99 MMHG | HEART RATE: 57 BPM | OXYGEN SATURATION: 97 % | RESPIRATION RATE: 16 BRPM

## 2022-05-05 LAB
ANION GAP SERPL CALCULATED.3IONS-SCNC: 11 MMOL/L (ref 5–15)
BASOPHILS # BLD AUTO: 0.04 10*3/MM3 (ref 0–0.2)
BASOPHILS NFR BLD AUTO: 0.7 % (ref 0–1.5)
BUN SERPL-MCNC: 13 MG/DL (ref 6–20)
BUN/CREAT SERPL: 14.4 (ref 7–25)
CALCIUM SPEC-SCNC: 9.1 MG/DL (ref 8.6–10.5)
CHLORIDE SERPL-SCNC: 104 MMOL/L (ref 98–107)
CO2 SERPL-SCNC: 22 MMOL/L (ref 22–29)
CREAT SERPL-MCNC: 0.9 MG/DL (ref 0.57–1)
DEPRECATED RDW RBC AUTO: 41.6 FL (ref 37–54)
EGFRCR SERPLBLD CKD-EPI 2021: 80 ML/MIN/1.73
EOSINOPHIL # BLD AUTO: 0.28 10*3/MM3 (ref 0–0.4)
EOSINOPHIL NFR BLD AUTO: 4.7 % (ref 0.3–6.2)
ERYTHROCYTE [DISTWIDTH] IN BLOOD BY AUTOMATED COUNT: 12.5 % (ref 12.3–15.4)
GLUCOSE SERPL-MCNC: 81 MG/DL (ref 65–99)
HCT VFR BLD AUTO: 36.5 % (ref 34–46.6)
HGB BLD-MCNC: 12.5 G/DL (ref 12–15.9)
IMM GRANULOCYTES # BLD AUTO: 0.02 10*3/MM3 (ref 0–0.05)
IMM GRANULOCYTES NFR BLD AUTO: 0.3 % (ref 0–0.5)
LYMPHOCYTES # BLD AUTO: 2.08 10*3/MM3 (ref 0.7–3.1)
LYMPHOCYTES NFR BLD AUTO: 35.1 % (ref 19.6–45.3)
MCH RBC QN AUTO: 30.9 PG (ref 26.6–33)
MCHC RBC AUTO-ENTMCNC: 34.2 G/DL (ref 31.5–35.7)
MCV RBC AUTO: 90.1 FL (ref 79–97)
MONOCYTES # BLD AUTO: 0.39 10*3/MM3 (ref 0.1–0.9)
MONOCYTES NFR BLD AUTO: 6.6 % (ref 5–12)
NEUTROPHILS NFR BLD AUTO: 3.12 10*3/MM3 (ref 1.7–7)
NEUTROPHILS NFR BLD AUTO: 52.6 % (ref 42.7–76)
NRBC BLD AUTO-RTO: 0 /100 WBC (ref 0–0.2)
PLATELET # BLD AUTO: 216 10*3/MM3 (ref 140–450)
PMV BLD AUTO: 12.3 FL (ref 6–12)
POTASSIUM SERPL-SCNC: 4.3 MMOL/L (ref 3.5–5.2)
RBC # BLD AUTO: 4.05 10*6/MM3 (ref 3.77–5.28)
SARS-COV-2 RNA RESP QL NAA+PROBE: NOT DETECTED
SODIUM SERPL-SCNC: 137 MMOL/L (ref 136–145)
WBC NRBC COR # BLD: 5.93 10*3/MM3 (ref 3.4–10.8)

## 2022-05-05 PROCEDURE — 97161 PT EVAL LOW COMPLEX 20 MIN: CPT

## 2022-05-05 PROCEDURE — 87635 SARS-COV-2 COVID-19 AMP PRB: CPT | Performed by: FAMILY MEDICINE

## 2022-05-05 PROCEDURE — G0378 HOSPITAL OBSERVATION PER HR: HCPCS

## 2022-05-05 PROCEDURE — 85025 COMPLETE CBC W/AUTO DIFF WBC: CPT | Performed by: HOSPITALIST

## 2022-05-05 PROCEDURE — 80048 BASIC METABOLIC PNL TOTAL CA: CPT | Performed by: HOSPITALIST

## 2022-05-05 RX ORDER — LEVETIRACETAM 750 MG/1
750 TABLET ORAL EVERY 12 HOURS SCHEDULED
Qty: 60 TABLET | Refills: 1 | Status: SHIPPED | OUTPATIENT
Start: 2022-05-05 | End: 2022-11-01

## 2022-05-05 RX ADMIN — LEVETIRACETAM 750 MG: 250 TABLET ORAL at 08:05

## 2022-05-05 RX ADMIN — Medication 10 ML: at 08:08

## 2022-05-05 RX ADMIN — ESCITALOPRAM OXALATE 10 MG: 10 TABLET ORAL at 08:05

## 2022-05-05 RX ADMIN — ASPIRIN 325 MG: 325 TABLET ORAL at 08:05

## 2022-05-05 RX ADMIN — OXYBUTYNIN CHLORIDE 10 MG: 10 TABLET, EXTENDED RELEASE ORAL at 08:05

## 2022-05-05 RX ADMIN — PANTOPRAZOLE SODIUM 40 MG: 40 TABLET, DELAYED RELEASE ORAL at 08:05

## 2022-05-05 RX ADMIN — FUROSEMIDE 20 MG: 20 TABLET ORAL at 08:05

## 2022-05-05 RX ADMIN — LOSARTAN POTASSIUM 50 MG: 50 TABLET, FILM COATED ORAL at 08:05

## 2022-05-05 NOTE — THERAPY EVALUATION
Patient Name: Myles Shannon  : 1975    MRN: [id number]                              Today's Date: 2022       Admit Date: 5/3/2022    Visit Dx:     ICD-10-CM ICD-9-CM   1. Dysarthria  R47.1 784.51   2. Altered mental status, unspecified altered mental status type  R41.82 780.97   3. Dysphagia, unspecified type  R13.10 787.20   4. Weakness  R53.1 780.79   5. Impaired functional mobility, balance, gait, and endurance  Z74.09 V49.89     Patient Active Problem List   Diagnosis   • Nicotine abuse   • Other chest pain   • Acute maxillary sinusitis   • Bipolar affective disorder ([facility])   • Decreased pulses in feet   • Flank pain   • Hematochezia   • High cholesterol   • Migraine without aura and with status migrainosus, not intractable   • Migraine without status migrainosus, not intractable   • Neck pain   • Nephrolithiasis   • Neuropathy   • Chronic pain   • Primary osteoarthritis involving multiple joints   • Sciatic leg pain   • Screening for diabetes mellitus (DM)   • Seasonal allergies   • Stress   • Tobacco use disorder   • Urinary incontinence without sensory awareness   • Vitamin D deficiency   • Carpal tunnel syndrome of right wrist   • Seizure ([facility])   • Intractable vomiting   • Acute UTI (urinary tract infection)   • Cholelithiasis   • GI bleed   • Kidney stone   • Nephrolithiasis   • ELBERT (acute kidney injury) ([facility])   • Infection due to ESBL-producing Escherichia coli   • CVA (cerebral vascular accident) ([facility])   • Dysphagia due to recent stroke   • Slurred speech   • Cerebrovascular accident (CVA) ([facility])   • Aphasia due to acute cerebrovascular accident (CVA) ([facility])   • Methamphetamine abuse ([facility])   • Seizure disorder ([facility])   • UTI (urinary tract infection) due to urinary indwelling catheter ([facility])   • Urinary tract infection due to extended-spectrum beta lactamase (ESBL) producing Escherichia coli   • Urinary tract infection due to extended-spectrum beta lactamase (ESBL) producing Escherichia  coli   • Metabolic acidosis, NAG, bicarbonate losses   • Midline shift of brain due to stroke   • Nonintractable headache   • Weakness   • Dysarthria     Past Medical History:   Diagnosis Date   • Abdominal pain     Chronic RLQ, RUQ, R flank comes and goes.    • Abdominal pain, right lower quadrant    • Acute bilateral otitis media    • Allergic rhinitis    • Backache     Upper back.   • Candidiasis of skin    • Cellulitis of neck    • Chest pain    • Contraception    • Cough    • Dysfunction of eustachian tube    • Dyspareunia, female    • Elevated cholesterol    • Excessive or frequent menstruation    • Flank pain    • Generalized aches and pains    • Headache    • Health maintenance examination    • Hypertension    • Infection of skin and subcutaneous tissue    • Irregular periods    • Kidney stone     Poss   • Menorrhagia    • Nausea vomiting and diarrhea    • Obesity    • Open wound of abdominal wall    • Otalgia    • Pain in left foot    • Pain in unspecified hand    • Removed Impacted cerumen 2012   • Rhinitis    • Seizure ([facility]) 08/15/2019   • Shoulder pain     right-sided-likely muscle strain      • Shoulder tendinitis    • Spider bite wound    • Staphylococcal infection of skin    • Stroke ([facility])    • Swollen feet    • Tinea cruris    • Tobacco dependence syndrome    • Upper respiratory infection    • Urinary tract infectious disease    • Vertigo    • Visit for gynecologic examination    • Vulvovaginitis      Past Surgical History:   Procedure Laterality Date   •  SECTION     • DILATATION AND CURETTAGE      Secondary to incomplete spontaneous    • INCISION AND DRAINAGE ABSCESS  2012   • INJECTION OF MEDICATION  2015    Phenergan (1)     • INJECTION OF MEDICATION  10/10/2013    Toradol (2)      • INJECTION OF MEDICATION  2014    Zofran (1)         General Information     Row Name 22 0956          Physical Therapy Time and Intention    Document Type evaluation   -CZ     Mode of Treatment individual therapy;physical therapy  -CZ     Row Name 05/05/22 0956          General Information    Patient Profile Reviewed yes  -CZ     Prior Level of Function independent:;all household mobility  -CZ     Barriers to Rehab cognitive status  -CZ     Row Name 05/05/22 0956          Living Environment    People in Home facility resident  -CZ     Row Name 05/05/22 0956          Home Main Entrance    Stair Railings, Main Entrance none  -CZ     Row Name 05/05/22 0956          Stairs Within Home, Primary    Stairs, Within Home, Primary Ambulates with FWW. Lives at [place].  -CZ     Number of Stairs, Within Home, Primary none  -CZ     Row Name 05/05/22 0956          Cognition    Orientation Status (Cognition) oriented x 3  -CZ     Row Name 05/05/22 0956          Safety Issues, Functional Mobility    Impairments Affecting Function (Mobility) strength;endurance/activity tolerance;balance  -CZ     Comment, Safety Issues/Impairments (Mobility) Some difficulty with year. Difficulty following commands for novel tasks.  -CZ           User Key  (r) = Recorded By, (t) = Taken By, (c) = Cosigned By    Initials Name Provider Type    CZ Mitchel Vasquez, PT Physical Therapist               Mobility     Row Name 05/05/22 0956          Bed Mobility    Bed Mobility supine-sit;sit-supine  -CZ     Supine-Sit [place] (Bed Mobility) modified independence  -CZ     Sit-Supine [place] (Bed Mobility) modified independence  -CZ     Assistive Device (Bed Mobility) head of bed elevated;bed rails  -CZ     Row Name 05/05/22 0956          Transfers    Comment, (Transfers) Toilet transfers, (I).  -CZ     Row Name 05/05/22 0956          Sit-Stand Transfer    Sit-Stand [place] (Transfers) supervision  -CZ     Assistive Device (Sit-Stand Transfers) walker, front-wheeled  -     Row Name 05/05/22 0956          Gait/Stairs (Locomotion)    [place] Level (Gait) supervision  -     Assistive Device (Gait)  walker, front-wheeled  -CZ     Distance in Feet (Gait) 30'x2, 150'x1.  -CZ     Comment, (Gait/Stairs) Slow sammie, good use of walker, no LOB, RLE weakness.  -CZ           User Key  (r) = Recorded By, (t) = Taken By, (c) = Cosigned By    Initials Name Provider Type    CZ Mitchel Vasquez, PT Physical Therapist               Obj/Interventions     Row Name 05/05/22 0956          Range of Motion Comprehensive    General Range of Motion bilateral lower extremity ROM WFL  -CZ     Row Name 05/05/22 0956          Strength Comprehensive (MMT)    General Manual Muscle Testing (MMT) Assessment other (see comments)  -CZ     Comment, General Manual Muscle Testing (MMT) Assessment RLE: 3/5, LLE: 3+/5 grossly.  -CZ     Row Name 05/05/22 0956          Sensory Assessment (Somatosensory)    Sensory Assessment (Somatosensory) LE sensation intact  -CZ           User Key  (r) = Recorded By, (t) = Taken By, (c) = Cosigned By    Initials Name Provider Type    CZ Mitchel Vasquez, PT Physical Therapist               Goals/Plan     Row Name 05/05/22 0956          Bed Mobility Goal 1 (PT)    Activity/Assistive Device (Bed Mobility Goal 1, PT) sit to supine/supine to sit  -CZ     [place] Level/Cues Needed (Bed Mobility Goal 1, PT) independent  -CZ     Time Frame (Bed Mobility Goal 1, PT) by discharge  -CZ     Strategies/Barriers (Bed Mobility Goal 1, PT) HOB flat, no bed rails.  -CZ     Progress/Outcomes (Bed Mobility Goal 1, PT) goal not met  -CZ     Row Name 05/05/22 0956          Transfer Goal 1 (PT)    Activity/Assistive Device (Transfer Goal 1, PT) bed-to-chair/chair-to-bed;sit-to-stand/stand-to-sit;walker, rolling  -CZ     [place] Level/Cues Needed (Transfer Goal 1, PT) modified independence  -CZ     Time Frame (Transfer Goal 1, PT) by discharge  -CZ     Strategies/Barriers (Transfers Goal 1, PT) RLE weakness.  -CZ     Progress/Outcome (Transfer Goal 1, PT) goal not met  -CZ     Row Name 05/05/22 0956          Gait Training  Goal 1 (PT)    Activity/Assistive Device (Gait Training Goal 1, PT) walker, rolling  -CZ     [place] Level (Gait Training Goal 1, PT) modified independence  -CZ     Distance (Gait Training Goal 1, PT) 150'x1.  -CZ     Time Frame (Gait Training Goal 1, PT) by discharge  -CZ     Strategies/Barriers (Gait Training Goal 1, PT) RLE weakness.  -CZ     Progress/Outcome (Gait Training Goal 1, PT) goal not met  -CZ     Row Name 05/05/22 0956          Therapy Assessment/Plan (PT)    Planned Therapy Interventions (PT) balance training;bed mobility training;gait training;patient/family education;transfer training;strengthening;stretching  -CZ           User Key  (r) = Recorded By, (t) = Taken By, (c) = Cosigned By    Initials Name Provider Type    CZ Mitchel Vasquez, PT Physical Therapist               Clinical Impression     Row Name 05/05/22 1000 05/05/22 0956       Pain    Pretreatment Pain Rating 0/10 - no pain  -CZ --  -CZ    Posttreatment Pain Rating 0/10 - no pain  -CZ --    Row Name 05/05/22 1000          Plan of Care Review    Plan of Care Reviewed With patient  -CZ     Outcome Evaluation Initial PT evaluation complete.  Patient is alert and cooperative.  She demonstrates (I) with bed mobility, requires SPV with transfers and gait, ambulating 150'x1 with FWW, slow sammie, good use of walker. Patient's RLE is much weaker than L, patient reports CVA last December.  [facility], to increase RLE strength, improve activity tolerance and lower fall risk; RN notified.  Patient has own FWW, she is to discharge home today.  -CZ     Row Name 05/05/22 1000          Therapy Assessment/Plan (PT)    Rehab Potential (PT) good, to achieve stated therapy goals  -CZ     Criteria for Skilled Interventions Met (PT) yes;skilled treatment is necessary  -CZ     Therapy Frequency (PT) 5 times/wk  -CZ     Row Name 05/05/22 1000          Vital Signs    Pre Systolic BP Rehab 113  -CZ     Pre  Treatment Diastolic BP 73  -CZ     Post Systolic BP Rehab 114  -CZ     Post Treatment Diastolic BP 75  -CZ     Pretreatment Heart Rate (beats/min) 64  -CZ     Posttreatment Heart Rate (beats/min) 65  -CZ     Pre SpO2 (%) 98  -CZ     O2 Delivery Pre Treatment room air  -CZ     Post SpO2 (%) 98  -CZ     Pre Patient Position Sitting  -CZ     Post Patient Position Sitting  -CZ     Row Name 05/05/22 1000          Positioning and Restraints    Pre-Treatment Position in bed  -CZ     Post Treatment Position bed  -CZ     In Bed supine;call light within reach;encouraged to call for assist;exit alarm on  -CZ           User Key  (r) = Recorded By, (t) = Taken By, (c) = Cosigned By    Initials Name Provider Type    CZ Mitchel Vasquez, PT Physical Therapist               Outcome Measures     Row Name 05/05/22 1028          How much help from another person do you currently need...    Turning from your back to your side while in flat bed without using bedrails? 3  -CZ     Moving from lying on back to sitting on the side of a flat bed without bedrails? 3  -CZ     Moving to and from a bed to a chair (including a wheelchair)? 3  -CZ     Standing up from a chair using your arms (e.g., wheelchair, bedside chair)? 3  -CZ     Climbing 3-5 steps with a railing? 3  -CZ     To walk in hospital room? 3  -CZ     AM-PAC 6 Clicks Score (PT) 18  -CZ     Highest level of mobility 6 --> Walked 10 steps or more  -     Row Name 05/05/22 1028          Functional Assessment    Outcome Measure Options AM-PAC 6 Clicks Basic Mobility (PT)  -CZ           User Key  (r) = Recorded By, (t) = Taken By, (c) = Cosigned By    Initials Name Provider Type    Mitchel Tejada, PT Physical Therapist                             Physical Therapy Education                 Title: PT OT SLP Therapies (In Progress)     Topic: Physical Therapy (In Progress)     Point: Mobility training (Done)     Learning Progress Summary           Patient Acceptance, E, VU by JENNY at  5/5/2022 1028    Comment: Hand placement with transfers, HHPT.                   Point: Home exercise program (Not Started)     Learner Progress:  Not documented in this visit.          Point: Body mechanics (Not Started)     Learner Progress:  Not documented in this visit.          Point: Precautions (Not Started)     Learner Progress:  Not documented in this visit.                      User Key     Initials Effective Dates Name Provider Type Discipline     06/16/21 -  Mitchel Vasquez, PT Physical Therapist PT              PT Recommendation and Plan  Planned Therapy Interventions (PT): balance training, bed mobility training, gait training, patient/family education, transfer training, strengthening, stretching  Plan of Care Reviewed With: patient  Outcome Evaluation: Initial PT evaluation complete.  Patient is alert and cooperative.  She demonstrates (I) with bed mobility, requires SPV with transfers and gait, ambulating 150'x1 with FWW, slow sammie, good use of walker. Patient's RLE is much weaker than L, patient reports CVA last December.  She would benefit from HHPT upon return to [facility], to increase RLE strength, improve activity tolerance and lower fall risk; RN notified.  Patient has own FWW, she is to discharge home today.     Time Calculation:    PT Charges     Row Name 05/05/22 1049             Time Calculation    Start Time 0956  -CZ      Stop Time 1049  -CZ      Time Calculation (min) 53 min  -CZ      PT Received On 05/05/22  -CZ      PT Goal Re-Cert Due Date 05/18/22  -CZ              Untimed Charges    PT Eval/Re-eval Minutes 53  -CZ              Total Minutes    Untimed Charges Total Minutes 53  -CZ       Total Minutes 53  -CZ            User Key  (r) = Recorded By, (t) = Taken By, (c) = Cosigned By    Initials Name Provider Type    CZ Mitchel Vasquez, PT Physical Therapist              Therapy Charges for Today     Code Description Service Date Service Provider Modifiers Qty    [id number]  HC PT EVAL LOW COMPLEXITY 4 5/5/2022 Mitchel Vasquez, PT GP 1          PT G-Codes  Outcome Measure Options: AM-PAC 6 Clicks Basic Mobility (PT)  AM-PAC 6 Clicks Score (PT): 18    Mitchel Vasquez, PT  5/5/2022

## 2022-05-05 NOTE — DISCHARGE PLACEMENT REQUEST
"Myles Torres (46 y.o. Female)             Date of Birth   1975    Social Security Number       Address   [address] [place] [address]    Home Phone   [phone]    MRN   [id number]       Orthodox   Other    Marital Status   Single                            Admission Date   5/3/22    Admission Type   Emergency    Admitting Provider   Eulogio Dunn MD    Attending Provider   Eulogio Dunn MD    Department, Room/Bed   [address], 411/1       Discharge Date       Discharge Disposition   Home-Health Care [facility]    Discharge Destination                               Attending Provider: Eulogio Dunn MD    Allergies: Abilify [Aripiprazole], Buspirone, Penicillins    Isolation: None   Infection: None   Code Status: CPR   Advance Care Planning Activity    Ht: 152.4 cm (60\")   Wt: 94.8 kg (209 lb)    Admission Cmt: None   Principal Problem: None                Active Insurance as of 5/3/2022     Primary Coverage     Payor Plan Insurance Group Employer/Plan Group    [employer] [place] [employer] [place]      Payor Plan Address Payor Plan Phone Number Payor Plan Fax Number Effective Dates    [address] [address] [phone]  10/17/2016 - None Entered    [facility] [id number]       Subscriber Name Subscriber Birth Date Member ID       MYLES TORRES 1975 [id number]                 Emergency Contacts      (Rel.) Home Phone Work Phone Mobile Phone    Arleen Torres (Mother) [phone] -- [phone]              "

## 2022-05-05 NOTE — PLAN OF CARE
Goal Outcome Evaluation:  Plan of Care Reviewed With: patient        Progress: improving  Outcome Evaluation: VSS. Remains on room air. Assist x1 to the bathroom with the walker. Covid swab completed for hopeful discharge today. No complaitns of pain.

## 2022-05-05 NOTE — DISCHARGE SUMMARY
[facility] HOSPITALIST MEDICINE DISCHARGE SUMMARY    Patient Identification:  Name:  Myles Shannon  Age:  46 y.o.  Sex:  female  :  1975  MRN:  [id number]  Visit Number:  [id number]    Date of Admission: 5/3/2022  Date of Discharge:  2022     PCP: Jonel Cr MD    DISCHARGE DIAGNOSIS  Acute confusional state  Seizure disorder  Expressive aphasia  Bipolar disorder  Tobacco abuse  Hypertension  Hyperlipidemia  Previous left MCA stroke    CONSULTS   Dr. FAUZIA Connelly  Neurology    PROCEDURES PERFORMED  None    HOSPITAL COURSE  Patient is a 46 y.o. female presented to [facility] complaining of of sudden onset of acute confusion and feeling strange.  Her past medical history include recent left MCA stroke with residual expressive aphasia as well as seizure disorder.  On arrival in the emergency room patient had stroke work-up-CT, CTA and MRI.  Imaging studies did not show any acute findings.  Neurology was consulted and recommended EEG.  EEG did not show any evidence of seizure disorder.  Neurology recommended increasing her Keppra to 750 mg twice a day.  PT/OT evaluation was done.  She returned quickly to her baseline and was cleared by neurology for discharge..      VITAL SIGNS:      22  1745 22  1319 22  0417   Weight: 94.9 kg (209 lb 3.5 oz) 95.2 kg (209 lb 12.8 oz) 94.8 kg (209 lb)     Vital Signs (last 24 hours)        0700   0659  0700   0834   Most Recent      Temp (°F) 96.4 -  98.7      97.1     97.1 (36.2)  0802    Heart Rate 53 -  73    57 -  68     57  0802    Resp 16 -  18      16     16  0802    /56 -  149/88      99/68     99/68  0802    SpO2 (%) 97 -  100      97     97  0802          Body mass index is 40.82 kg/m².    PHYSICAL EXAM:  Constitutional: Young woman not in acute distress.  No respiratory distress.      HENT:  Head:  Normocephalic and atraumatic.   Mouth:  Moist mucous membranes.    Eyes:  Conjunctivae and EOM are normal.  Pupils are equal, round, and reactive to light.  No scleral icterus.    Neck:  Neck supple.  No JVD present.    Cardiovascular:  Normal rate, regular rhythm and normal heart sounds with no murmur.  Pulmonary/Chest:  No respiratory distress, no wheezes, no crackles, with normal breath sounds and good air movement.  Abdominal:  Soft.  Bowel sounds are normal.  No distension and no tenderness.   Musculoskeletal:  No edema, no tenderness, and no deformity.  No red or swollen joints anywhere.    Neurological:  Alert and oriented to person, place, and time.  Mild expressive aphasia with mild facial droop but was still right side.    Skin:  Skin is warm and dry. No rash noted. No pallor.   Peripheral vascular:  Strong pulses in all 4 extremities with no clubbing, no cyanosis, no edema.  Genitourinary: Deferred    Lab Results (last 72 hours)     Procedure Component Value Units Date/Time    Basic Metabolic Panel [[id number]]  (Normal) Collected: 05/05/22 0528    Specimen: Blood Updated: 05/05/22 0714     Glucose 81 mg/dL      BUN 13 mg/dL      Creatinine 0.90 mg/dL      Sodium 137 mmol/L      Potassium 4.3 mmol/L      Comment: Slight hemolysis detected by analyzer. Results may be affected.        Chloride 104 mmol/L      CO2 22.0 mmol/L      Calcium 9.1 mg/dL      BUN/Creatinine Ratio 14.4     Anion Gap 11.0 mmol/L      eGFR 80.0 mL/min/1.73      Comment: National Kidney Foundation and American Society of Nephrology (ASN) Task Force recommended calculation based on the Chronic Kidney Disease Epidemiology Collaboration (CKD-EPI) equation refit without adjustment for race.       Narrative:      GFR Normal >60  Chronic Kidney Disease <60  Kidney Failure <15      CBC & Differential [[id number]]  (Abnormal) Collected: 05/05/22 0528    Specimen: Blood Updated: 05/05/22 0658    Narrative:      The following orders were created for panel order CBC &  Differential.  Procedure                               Abnormality         Status                     ---------                               -----------         ------                     CBC Auto Differential[[id number]]        Abnormal            Final result                 Please view results for these tests on the individual orders.    CBC Auto Differential [[id number]]  (Abnormal) Collected: 05/05/22 0528    Specimen: Blood Updated: 05/05/22 0658     WBC 5.93 10*3/mm3      RBC 4.05 10*6/mm3      Hemoglobin 12.5 g/dL      Hematocrit 36.5 %      MCV 90.1 fL      MCH 30.9 pg      MCHC 34.2 g/dL      RDW 12.5 %      RDW-SD 41.6 fl      MPV 12.3 fL      Platelets 216 10*3/mm3      Neutrophil % 52.6 %      Lymphocyte % 35.1 %      Monocyte % 6.6 %      Eosinophil % 4.7 %      Basophil % 0.7 %      Immature Grans % 0.3 %      Neutrophils, Absolute 3.12 10*3/mm3      Lymphocytes, Absolute 2.08 10*3/mm3      Monocytes, Absolute 0.39 10*3/mm3      Eosinophils, Absolute 0.28 10*3/mm3      Basophils, Absolute 0.04 10*3/mm3      Immature Grans, Absolute 0.02 10*3/mm3      nRBC 0.0 /100 WBC     COVID PRE-OP / PRE-PROCEDURE SCREENING ORDER (NO ISOLATION) - Swab, Nasopharynx [[id number]]  (Normal) Collected: 05/05/22 0356    Specimen: Swab from Nasopharynx Updated: 05/05/22 0426    Narrative:      The following orders were created for panel order COVID PRE-OP / PRE-PROCEDURE SCREENING ORDER (NO ISOLATION) - Swab, Nasopharynx.  Procedure                               Abnormality         Status                     ---------                               -----------         ------                     COVID-19, [facility]...[[id number]]  Normal              Final result                 Please view results for these tests on the individual orders.    COVID-19, [facility], NP SWAB IN TRANSPORT MEDIA 8-10 HR TAT - Swab, Nasopharynx [[id number]]  (Normal) Collected: 05/05/22 0356    Specimen: Swab from Nasopharynx Updated:  05/05/22 0426     COVID19 Not Detected    Narrative:      Testing performed by Real Time RT-PCR  This test has not been approved by the [facility] but is authorized under the Emergency Use Act (EUA)    Fact sheet for providers: https://www.fda.gov/media/691923/download    Fact sheet for patients: https://www.fda.gov/media/087336/download        Basic Metabolic Panel [[id number]]  (Abnormal) Collected: 05/04/22 0355    Specimen: Blood Updated: 05/04/22 0431     Glucose 81 mg/dL      BUN 12 mg/dL      Creatinine 0.81 mg/dL      Sodium 139 mmol/L      Potassium 4.2 mmol/L      Chloride 108 mmol/L      CO2 21.0 mmol/L      Calcium 8.2 mg/dL      BUN/Creatinine Ratio 14.8     Anion Gap 10.0 mmol/L      eGFR 90.8 mL/min/1.73      Comment: National Kidney Foundation and American Society of Nephrology (ASN) Task Force recommended calculation based on the Chronic Kidney Disease Epidemiology Collaboration (CKD-EPI) equation refit without adjustment for race.       Narrative:      GFR Normal >60  Chronic Kidney Disease <60  Kidney Failure <15      CBC & Differential [[id number]]  (Normal) Collected: 05/04/22 0355    Specimen: Blood Updated: 05/04/22 0401    Narrative:      The following orders were created for panel order CBC & Differential.  Procedure                               Abnormality         Status                     ---------                               -----------         ------                     CBC Auto Differential[[id number]]        Normal              Final result                 Please view results for these tests on the individual orders.    CBC Auto Differential [[id number]]  (Normal) Collected: 05/04/22 0355    Specimen: Blood Updated: 05/04/22 0401     WBC 6.02 10*3/mm3      RBC 3.88 10*6/mm3      Hemoglobin 12.0 g/dL      Hematocrit 35.1 %      MCV 90.5 fL      MCH 30.9 pg      MCHC 34.2 g/dL      RDW 13.1 %      RDW-SD 42.7 fl      MPV 11.4 fL      Platelets 208 10*3/mm3      Neutrophil % 49.5 %       Lymphocyte % 38.9 %      Monocyte % 6.0 %      Eosinophil % 4.8 %      Basophil % 0.5 %      Immature Grans % 0.3 %      Neutrophils, Absolute 2.98 10*3/mm3      Lymphocytes, Absolute 2.34 10*3/mm3      Monocytes, Absolute 0.36 10*3/mm3      Eosinophils, Absolute 0.29 10*3/mm3      Basophils, Absolute 0.03 10*3/mm3      Immature Grans, Absolute 0.02 10*3/mm3      nRBC 0.0 /100 WBC     Extra Tubes [[id number]] Collected: 05/03/22 1447    Specimen: Blood, Venous Line Updated: 05/03/22 1903    Narrative:      The following orders were created for panel order Extra Tubes.  Procedure                               Abnormality         Status                     ---------                               -----------         ------                     Lavender Top[[id number]]                                     Final result               Verdin Top[[id number]]                                         Final result                 Please view results for these tests on the individual orders.    Gray Top [[id number]] Collected: 05/03/22 1447    Specimen: Blood Updated: 05/03/22 1903     Extra Tube Hold for add-ons.     Comment: Auto resulted.       Troponin [[id number]]  (Normal) Collected: 05/03/22 1719    Specimen: Blood Updated: 05/03/22 1803     Troponin T <0.010 ng/mL     Narrative:      Troponin T Reference Range:  <= 0.03 ng/mL-   Negative for AMI  >0.03 ng/mL-     Abnormal for myocardial necrosis.  Clinicians would have to utilize clinical acumen, EKG, Troponin and serial changes to determine if it is an Acute Myocardial Infarction or myocardial injury due to an underlying chronic condition.       Results may be falsely decreased if patient taking Biotin.      Lavender Top [[id number]] Collected: 05/03/22 1447    Specimen: Blood Updated: 05/03/22 1604     Extra Tube hold for add-on     Comment: Auto resulted       Comprehensive Metabolic Panel [[id number]]  (Abnormal) Collected: 05/03/22 1437    Specimen: Blood Updated: 05/03/22  1506     Glucose 94 mg/dL      BUN 15 mg/dL      Creatinine 1.09 mg/dL      Sodium 140 mmol/L      Potassium 4.6 mmol/L      Comment: Slight hemolysis detected by analyzer. Results may be affected.        Chloride 105 mmol/L      CO2 23.0 mmol/L      Calcium 8.9 mg/dL      Total Protein 6.6 g/dL      Albumin 4.20 g/dL      ALT (SGPT) 22 U/L      AST (SGOT) 21 U/L      Alkaline Phosphatase 87 U/L      Total Bilirubin 0.2 mg/dL      Globulin 2.4 gm/dL      A/G Ratio 1.8 g/dL      BUN/Creatinine Ratio 13.8     Anion Gap 12.0 mmol/L      eGFR 63.6 mL/min/1.73      Comment: National Kidney Foundation and American Society of Nephrology (ASN) Task Force recommended calculation based on the Chronic Kidney Disease Epidemiology Collaboration (CKD-EPI) equation refit without adjustment for race.       Narrative:      GFR Normal >60  Chronic Kidney Disease <60  Kidney Failure <15      Troponin [[id number]]  (Normal) Collected: 05/03/22 1437    Specimen: Blood Updated: 05/03/22 1506     Troponin T <0.010 ng/mL     Narrative:      Troponin T Reference Range:  <= 0.03 ng/mL-   Negative for AMI  >0.03 ng/mL-     Abnormal for myocardial necrosis.  Clinicians would have to utilize clinical acumen, EKG, Troponin and serial changes to determine if it is an Acute Myocardial Infarction or myocardial injury due to an underlying chronic condition.       Results may be falsely decreased if patient taking Biotin.      hCG, Serum, Qualitative [[id number]]  (Normal) Collected: 05/03/22 1437    Specimen: Blood Updated: 05/03/22 1506     HCG Qualitative Negative    Protime-INR [[id number]]  (Normal) Collected: 05/03/22 1437    Specimen: Blood Updated: 05/03/22 1505     Protime 12.7 Seconds      INR 0.97    Narrative:      Therapeutic range for most indications is 2.0-3.0 INR,  or 2.5-3.5 for mechanical heart valves.    aPTT [[id number]]  (Normal) Collected: 05/03/22 1437    Specimen: Blood Updated: 05/03/22 1505     PTT 25.3 seconds     Narrative:       The recommended Heparin therapeutic range is 68-97 seconds.    COVID-19 and FLU A/B PCR - Swab, Nasopharynx [[id number]]  (Normal) Collected: 05/03/22 1434    Specimen: Swab from Nasopharynx Updated: 05/03/22 1503     COVID19 Not Detected     Influenza A PCR Not Detected     Influenza B PCR Not Detected    Narrative:      Fact sheet for providers: https://www.fda.gov/media/926302/download    Fact sheet for patients: https://www.fda.gov/media/179143/download    Test performed by PCR.    CBC & Differential [[id number]]  (Normal) Collected: 05/03/22 1437    Specimen: Blood Updated: 05/03/22 1448    Narrative:      The following orders were created for panel order CBC & Differential.  Procedure                               Abnormality         Status                     ---------                               -----------         ------                     CBC Auto Differential[[id number]]        Normal              Final result                 Please view results for these tests on the individual orders.    CBC Auto Differential [[id number]]  (Normal) Collected: 05/03/22 1437    Specimen: Blood Updated: 05/03/22 1448     WBC 7.30 10*3/mm3      RBC 4.29 10*6/mm3      Hemoglobin 13.4 g/dL      Hematocrit 39.4 %      MCV 91.8 fL      MCH 31.2 pg      MCHC 34.0 g/dL      RDW 13.1 %      RDW-SD 44.0 fl      MPV 11.5 fL      Platelets 248 10*3/mm3      Neutrophil % 55.9 %      Lymphocyte % 30.8 %      Monocyte % 7.0 %      Eosinophil % 5.5 %      Basophil % 0.5 %      Immature Grans % 0.3 %      Neutrophils, Absolute 4.08 10*3/mm3      Lymphocytes, Absolute 2.25 10*3/mm3      Monocytes, Absolute 0.51 10*3/mm3      Eosinophils, Absolute 0.40 10*3/mm3      Basophils, Absolute 0.04 10*3/mm3      Immature Grans, Absolute 0.02 10*3/mm3      nRBC 0.0 /100 WBC           Imaging Results (All)     Procedure Component Value Units Date/Time    CT CEREBRAL PERFUSION WITH & WITHOUT CONTRAST [[id number]] Collected: 05/03/22 1440     Updated:  05/03/22 1746    Addenda:         ADDENDUM   ADDENDUM #1       Critical findings were communicated to  at 5:40 PM on  5/3/2022.    Electronically signed by:  Fernando Kirkpatrick MD  5/3/2022 5:44 PM CDT  Workstation: [id number]    Signed: 05/03/22 1744 by Fernando Kirkpatrick MD    Narrative:      INDICATION: cva    EXAMINATION: CT BRAIN PERFUSION WITH CONTRAST    TECHNIQUE:  Routine CT brain cerebral perfusion study was performed using IV  contrast.  Reformats and postprocessing were performed by the  technologist. A radiation dose optimization technique was used  for this scan.  Study performed with AI or machine learning  algorithms  IV Contrast dosage and agent:    COMPARISON: None.    FINDINGS:     rCBF<30% vol = 7 mL in the posterior left frontal region  corresponding to chronic area of ischemia previously described.    Tmax<6sec vol = 0 mL    There is no evidence of any acute ischemia or infarct core      Impression:        Abnormal perfusion corresponds to chronic infarct in the left  hemisphere.    Electronically signed by:  Fernando Kirkpatrick MD  5/3/2022 5:30 PM CDT  Workstation: [id number]    MRI Brain Without Contrast [[id number]] Collected: 05/03/22 1601     Updated: 05/03/22 1722    Narrative:      EXAM:  MR BRAIN WITHOUT IV CONTRAST    ORDERING PROVIDER:  PEARL YANG    CLINICAL HISTORY:   Stroke    COMPARISON:   3/10/2022    TECHNIQUE:   Axial T1-weighted, T2-weighted and diffusion weighted, FLAIR, and  sagittal T1-weighted images were obtained without administration  of gadolinium .    FINDINGS:    CEREBRAL PARENCHYMA: Stable chronic ischemia of the left MCA  distribution with encephalomalacic change. No abnormal signal. No  encephalomalacia, mass or gliosis. No atrophy.      POSTERIOR FOSSA: No mass or abnormal signal. Unremarkable  craniocervical junction.    EXTRA-AXIAL SPACES: No mass.     PITUITARY: No mass or parasellar abnormality.    ORBITS: No mass. Unremarkable extraocular muscles, globe and  optic  nerve.    CALVARIA AND SOFT TISSUES:  No mass, adenopathy, or abnormal  signal.    TEMPORAL BONE AND SKULL BASE: Unremarkable mastoid air cells,  inner and middle ear.    PARANASAL SINUSES: Unremarkable.     VASCULAR STRUCTURES: Expected flow voids. No aneurysm, stenosis  or dissection seen.    DIFFUSION WEIGHTED IMAGES: No restricted diffusion.      Impression:      Stable chronic ischemia of the left MCA distribution with  encephalomalacic change.   No acute ischemia.  No acute intracranial hemorrhage.  No significant change compared to recent MRI study.    Electronically signed by:  Aniket Solis MD  5/3/2022 5:20 PM CDT  Workstation: [id number]    CT Angiogram Head w AI Analysis of LVO [[id number]] Collected: 05/03/22 1445     Updated: 05/03/22 1708    Narrative:      EXAM:  CT HEAD ANGIOGRAPHY WITHOUT THEN WITH IV CONTRAST, CT NECK  ANGIOGRAPHY WITH IV CONTRAST    ORDERING PROVIDER:  PEARL YANG    CLINICAL HISTORY:   Stroke    COMPARISON:      TECHNIQUE:   Nonenhanced CT of the head was performed and reformatted in the  sagittal and coronal planes, followed by high-resolution CT head  after administration of 90 mL of Isovue-370 contrast. 3-D MIP  images were created.     This examination was performed according to our departmental dose  optimization program which includes automated exposure control,  adjustment of the MA and kV according to patient size, and/or use  of iterative reconstruction technique.    FINDINGS:     Normal bilateral petrous carotid arteries.   No significant atherosclerotic change of right cavernous carotid  artery with a normal supraclinoid bifurcation.   No significant atherosclerotic change of left cavernous carotid  artery with a normal supraclinoid bifurcation.    Normal right A1 segments of the anterior cerebral artery.   Normal left A1 segments of the anterior cerebral artery.   Normal intact anterior communicating artery (ACOM). Normal  bilateral A2 segments of the anterior cerebral  arteries.    Normal right M1 and M2 segments of the right middle cerebral  artery, with a normal M1 bifurcation. Normal left M1 and overall  less robust flow of left M2 segments of the left middle cerebral  artery, and this may be due to chronic ischemia also offset from  prior CT scan from 3/10/2022.    Normal right posterior communicating artery (PCOM). Normal left  posterior communicating artery (PCOM).    Normal bilateral vertebral arteries. Normal basilar artery with a  normal basilar bifurcation. The visualized bilateral superior  cerebellar (SCA) arteries are normal.    Normal bilateral P1, P2 and visualized P3 segments of the  posterior cerebral arteries.    There is no demonstrated aneurysm of the [place].     EXAM:  CT HEAD ANGIOGRAPHY WITHOUT THEN WITH IV CONTRAST, CT NECK  ANGIOGRAPHY WITH IV CONTRAST    ORDERING PROVIDER:  PEARL YANG    CLINICAL HISTORY:   Stroke    COMPARISON:   None.    TECHNIQUE:    CT of the neck was obtained from the skullbase to the aortic arch  with sagittal and coronal reformats  after administration of 90  ml of Isovue 370 contrast. 3-D MIP images were created.      This examination was performed according to our departmental dose  optimization program which includes automated exposure control,  adjustment of the MA and kV according to patient size, and/or use  of iterative reconstruction technique.    Degree of stenoses in the cervical carotid systems determined  using NASCET criteria.    FINDINGS:    AORTIC ARCH:  Unremarkable visualized aortic arch. Bovine origins of the  brachiocephalic, and left common carotid together, and  unremarkable origin of left subclavian arteries.    RIGHT CAROTID ARTERIES:  Normal right common carotid artery (CCA). Normal right common  carotid bulb.  Normal origin of the right internal carotid (ICA) artery without  a  hemodynamically significant stenosis. Normal visualized cervical  portion  of the right internal carotid  artery.  Normal origin of the right external carotid artery (ECA).    LEFT CAROTID ARTERIES:  Normal left common carotid artery (CCA). Normal left common  carotid bulb.  Normal origin of the left internal carotid (ICA) artery without a  hemodynamically significant stenosis. Normal visualized cervical  portion of the left internal carotid artery.  Normal origin of the left external carotid artery (ECA).    VERTEBRAL ARTERIES:  Normal bilateral vertebral arteries.]      Impression:        CT of the brain:  Less robust flow of left M2 segments of the left MCA due to  chronic ischemia.  Normal flow in the left MONY, and left M1 segment of the left MCA.  Unremarkable right anterior circulation, and the bilateral  vertebral basilar system.   No evidence of aneurysm within the [place].    CTA of the neck:  No evidence of hemodynamically significant stenosis of the  visualized ICAs and bilateral vertebral arteries.  No evidence of dissection within the bilateral carotid or  vertebral arteries.          Electronically signed by:  Aniket Solis MD  5/3/2022 5:06 PM CDT  Workstation: [id number]    CT Angiogram Carotids [[id number]] Collected: 05/03/22 1445     Updated: 05/03/22 1708    Narrative:      EXAM:  CT HEAD ANGIOGRAPHY WITHOUT THEN WITH IV CONTRAST, CT NECK  ANGIOGRAPHY WITH IV CONTRAST    ORDERING PROVIDER:  PEARL YANG    CLINICAL HISTORY:   Stroke    COMPARISON:      TECHNIQUE:   Nonenhanced CT of the head was performed and reformatted in the  sagittal and coronal planes, followed by high-resolution CT head  after administration of 90 mL of Isovue-370 contrast. 3-D MIP  images were created.     This examination was performed according to our departmental dose  optimization program which includes automated exposure control,  adjustment of the MA and kV according to patient size, and/or use  of iterative reconstruction technique.    FINDINGS:     Normal bilateral petrous carotid arteries.   No significant  atherosclerotic change of right cavernous carotid  artery with a normal supraclinoid bifurcation.   No significant atherosclerotic change of left cavernous carotid  artery with a normal supraclinoid bifurcation.    Normal right A1 segments of the anterior cerebral artery.   Normal left A1 segments of the anterior cerebral artery.   Normal intact anterior communicating artery (ACOM). Normal  bilateral A2 segments of the anterior cerebral arteries.    Normal right M1 and M2 segments of the right middle cerebral  artery, with a normal M1 bifurcation. Normal left M1 and overall  less robust flow of left M2 segments of the left middle cerebral  artery, and this may be due to chronic ischemia also offset from  prior CT scan from 3/10/2022.    Normal right posterior communicating artery (PCOM). Normal left  posterior communicating artery (PCOM).    Normal bilateral vertebral arteries. Normal basilar artery with a  normal basilar bifurcation. The visualized bilateral superior  cerebellar (SCA) arteries are normal.    Normal bilateral P1, P2 and visualized P3 segments of the  posterior cerebral arteries.    There is no demonstrated aneurysm of the [place].     EXAM:  CT HEAD ANGIOGRAPHY WITHOUT THEN WITH IV CONTRAST, CT NECK  ANGIOGRAPHY WITH IV CONTRAST    ORDERING PROVIDER:  PEARL YANG    CLINICAL HISTORY:   Stroke    COMPARISON:   None.    TECHNIQUE:    CT of the neck was obtained from the skullbase to the aortic arch  with sagittal and coronal reformats  after administration of 90  ml of Isovue 370 contrast. 3-D MIP images were created.      This examination was performed according to our departmental dose  optimization program which includes automated exposure control,  adjustment of the MA and kV according to patient size, and/or use  of iterative reconstruction technique.    Degree of stenoses in the cervical carotid systems determined  using NASCET criteria.    FINDINGS:    AORTIC ARCH:  Unremarkable  visualized aortic arch. Bovine origins of the  brachiocephalic, and left common carotid together, and  unremarkable origin of left subclavian arteries.    RIGHT CAROTID ARTERIES:  Normal right common carotid artery (CCA). Normal right common  carotid bulb.  Normal origin of the right internal carotid (ICA) artery without  a  hemodynamically significant stenosis. Normal visualized cervical  portion  of the right internal carotid artery.  Normal origin of the right external carotid artery (ECA).    LEFT CAROTID ARTERIES:  Normal left common carotid artery (CCA). Normal left common  carotid bulb.  Normal origin of the left internal carotid (ICA) artery without a  hemodynamically significant stenosis. Normal visualized cervical  portion of the left internal carotid artery.  Normal origin of the left external carotid artery (ECA).    VERTEBRAL ARTERIES:  Normal bilateral vertebral arteries.]      Impression:        CT of the brain:  Less robust flow of left M2 segments of the left MCA due to  chronic ischemia.  Normal flow in the left MONY, and left M1 segment of the left MCA.  Unremarkable right anterior circulation, and the bilateral  vertebral basilar system.   No evidence of aneurysm within the [place].    CTA of the neck:  No evidence of hemodynamically significant stenosis of the  visualized ICAs and bilateral vertebral arteries.  No evidence of dissection within the bilateral carotid or  vertebral arteries.          Electronically signed by:  Aniket Solis MD  5/3/2022 5:06 PM CDT  Workstation: [id number]    XR Chest 1 View [[id number]] Collected: 05/03/22 1511     Updated: 05/03/22 1534    Narrative:      2:46 PM    INDICATION: Stroke protocol. Acute neurologic symptoms    FINDINGS:  Limited near expiratory film.  No significant areas of consolidation, acute congestive changes,  pneumothorax or pleural fluid.  Heart and mediastinal contours within normal limits.      Impression:      Limited near expiratory  film.  No definite acute cardiopulmonary process.    Electronically signed by:  Ernesto Jacobs MD  5/3/2022 3:32 PM  CDT Workstation: [id number]    CT Head Without Contrast Stroke Protocol [[id number]] Collected: 05/03/22 1445     Updated: 05/03/22 1521    Narrative:      CT head without IV contrast stroke protocol May 3, 2022    INDICATION: Acute cerebrovascular disease/acute neurologic  symptoms    TECHNIQUE: Spiral images obtained from foramen magnum through  vertex without IV contrast. Sagittal, axial and coronal  reformatted images generated retrospectively.  Encephalomalacia consistent with old infarct left perisylvian  region.  Mild atrophy for a patient of this age.  No definite acute parenchymal pathology.  Right sphenoid and left ethmoid sinus inflammation.  Mastoids grossly clear.  No focal or acute bony abnormality.      Impression:      Old left middle cerebral artery distribution infarct with  associated encephalomalacia stable compared with March 10 CT  scan.  Atrophy for a patient of this age.  No acute intracranial pathology.  Sinus inflammation as above.    Electronically signed by:  Ernesto Jacobs MD  5/3/2022 3:19 PM  CDT Workstation: [id number]          DISCHARGE DISPOSITION   Condition of patient at time of discharge is stable  Patient will be discharged home with home health for physical therapy    DISCHARGE MEDICATIONS:     Discharge Medications      Changes to Medications      Instructions Start Date   levETIRAcetam 750 MG tablet  Commonly known as: KEPPRA  What changed:   · medication strength  · how much to take   750 mg, Oral, Every 12 Hours Scheduled         Continue These Medications      Instructions Start Date   acetaminophen 500 MG tablet  Commonly known as: TYLENOL   500 mg, Oral, Every 8 Hours PRN      aspirin 325 MG tablet   325 mg, Oral, Daily      atorvastatin 80 MG tablet  Commonly known as: LIPITOR   80 mg, Oral, Nightly      busPIRone 15 MG tablet  Commonly known as:  BUSPAR   No dose, route, or frequency recorded.      escitalopram 10 MG tablet  Commonly known as: LEXAPRO   10 mg, Oral, Daily      furosemide 20 MG tablet  Commonly known as: LASIX   20 mg, Oral, Daily      hydrOXYzine pamoate 50 MG capsule  Commonly known as: VISTARIL   No dose, route, or frequency recorded.      losartan 50 MG tablet  Commonly known as: COZAAR   50 mg, Oral, Daily      meclizine 12.5 MG tablet  Commonly known as: ANTIVERT   No dose, route, or frequency recorded.      melatonin 5 MG tablet tablet   10 mg, Oral, Every Night at Bedtime      ondansetron ODT 4 MG disintegrating tablet  Commonly known as: ZOFRAN-ODT   4 mg, Translingual, Every 6 Hours PRN      orphenadrine 100 MG 12 hr tablet  Commonly known as: NORFLEX   100 mg, Oral, 2 Times Daily PRN      pantoprazole 40 MG EC tablet  Commonly known as: Protonix   40 mg, Oral, Daily      potassium chloride 10 MEQ CR tablet  Commonly known as: K-DUR,KLOR-CON   No dose, route, or frequency recorded.      solifenacin 10 MG tablet  Commonly known as: VESICARE   No dose, route, or frequency recorded.      traZODone 50 MG tablet  Commonly known as: DESYREL   50 mg, Oral, Nightly             Diet Instructions     Diet: Cardiac      Discharge Diet: Cardiac        Your Scheduled Appointments    Jun 17, 2022 11:30 AM  Follow Up with ALPHONSE Pittman  [facility] NEUROLOGY (--) [address] [place] [address]   [place] [address]   Arrive 15 minutes prior to appointment.       Jul 27, 2022  2:15 PM  Office Visit with Jonel Cr MD  [facility] PRIMARY CARE - [address] [place] [facility] [id number] [phone]   Arrive 15 minutes prior to appointment.  If you are in need of a language or hearing  please call the Department.       Dec 16, 2022 11:30 AM  Follow Up with ALPHONSE Pittman  [facility] NEUROLOGY (--) [address] [place] [address]  [place] [address]   Arrive 15 minutes prior to appointment.            Activity Instructions     Activity as Tolerated          Additional Instructions for the Follow-ups that You Need to Schedule     Discharge Follow-up with PCP   As directed       Currently Documented PCP:    Jonel Cr MD    PCP Phone Number:    [phone]     Follow Up Details: Post admission f/up in 1 week         Discharge Follow-up with Specialty: F/up with your neurologist; 1 Week   As directed      Specialty: F/up with your neurologist    Follow Up: 1 Week    Follow Up Details: Post admission            Follow-up Information     Jonel Cr MD .    Specialties: Family Medicine, Emergency Medicine  Why: Post admission f/up in 1 week  Contact information:  [address]   [place]  [facility] [address] [phone]                         TEST  RESULTS PENDING AT DISCHARGE       Eulogio Dunn MD  05/05/22  08:34 CDT     Dragon disclaimer:  Part of this note may be an electronic transcription/translation of spoken language to printed text using the Dragon Dictation System.                        Please note that this discharge summary required more than 30 minutes to complete.    Please send a copy of this dictation to the following providers:  Jonel Cr MD

## 2022-05-05 NOTE — OUTREACH NOTE
Prep Survey    Flowsheet Row Responses   [facility] facility patient discharged from? [place]   Is LACE score < 7 ? No   Emergency Room discharge w/ pulse ox? No   Eligibility [facility]   Date of Admission 05/03/22   Date of Discharge 05/05/22   Discharge Disposition Home-Health Care Sv   Discharge diagnosis Dysarthria   Does the patient have one of the following disease processes/diagnoses(primary or secondary)? Other   Does the patient have Home health ordered? Yes   What is the Home health agency?  [facility]   Is there a DME ordered? No   Prep survey completed? Yes          LESLYE EL - Registered Nurse

## 2022-05-05 NOTE — PLAN OF CARE
Goal Outcome Evaluation:  Plan of Care Reviewed With: patient           Outcome Evaluation: Initial PT evaluation complete.  Patient is alert and cooperative.  She demonstrates (I) with bed mobility, requires SPV with transfers and gait, ambulating 150'x1 with FWW, slow sammie, good use of walker. Patient's RLE is much weaker than L, patient reports CVA last December.  She would benefit from HHPT upon return to [place], to increase RLE strength, improve activity tolerance and lower fall risk; RN notified.  Patient has own FWW, she is to discharge home today.

## 2022-05-06 ENCOUNTER — TRANSITIONAL CARE MANAGEMENT TELEPHONE ENCOUNTER (OUTPATIENT)
Dept: CALL CENTER | Facility: HOSPITAL | Age: 47
End: 2022-05-06

## 2022-05-06 ENCOUNTER — HOME CARE VISIT (OUTPATIENT)
Dept: HOME HEALTH SERVICES | Facility: CLINIC | Age: 47
End: 2022-05-06

## 2022-05-06 VITALS
SYSTOLIC BLOOD PRESSURE: 132 MMHG | RESPIRATION RATE: 16 BRPM | DIASTOLIC BLOOD PRESSURE: 70 MMHG | HEART RATE: 76 BPM | OXYGEN SATURATION: 97 %

## 2022-05-06 PROCEDURE — G0151 HHCP-SERV OF PT,EA 15 MIN: HCPCS

## 2022-05-06 NOTE — OUTREACH NOTE
"Call Center TCM Note    Flowsheet Row Responses   [facility] facility patient discharged from? [place]   Does the patient have one of the following disease processes/diagnoses(primary or secondary)? Other   TCM attempt successful? No  [no names listed on verbal release---says \"[place]\"]   Unsuccessful attempts Attempt 1  [Home # not in working order, cell number not accepting calls]   Comments regarding PCP Hospital PCP FOLLOW UP APPOINTMENT IS 5/11/22@300pm          Carmen Aragon RN    5/6/2022, 14:53 EDT      "

## 2022-05-06 NOTE — OUTREACH NOTE
"Call Center TCM Note    Flowsheet Row Responses   [facility] patient discharged from? [place]   Does the patient have one of the following disease processes/diagnoses(primary or secondary)? Other   TCM attempt successful? No   Unsuccessful attempts Attempt 2  [\"not accepting calls\"]          Carmen Aragon RN    5/6/2022, 15:42 EDT      "

## 2022-05-06 NOTE — HOME HEALTH
"PATIENT WAS AT THE HOSPITAL FROM 5/3/2022 - 5/52022 SECONDARY TO CVA LIKE SYMPTSOMS. HER FINAL DIAGNOSIS WERE ACUTE CONFUSION STATE, SEIZURE DISORDER, EXPRESSIVE APHASIA, BIPOLAR DISORDER. PATIENT WAS HAVING SOME CONFUSION AND FEELING LIKE SHE WAS HAVING CVA TYPE SYMPTOMS ONCE SHE WAS TREATED SHE STARTED DOING BETTER AND MOST SYMPTOMS RESOLVED. SHE IS FEELING BETTER BUT FEELS WEAK AND IS HAVING DIFFICULTY WITH HER RIGHT LEG AND HER WALKING    PMHX: BIPOLAR, CHOLELITHIASIS, HEMATOCHEZIA, HLD, MIGRAINE, NEUROPATHY, OA, URINARY INCNTINENCE, SEIZURE, HISTORY OF [place], CVA, DYSPHAGIA, , DYSARTHRIA    PATIENT GOAL\" GET HER LEG BETTER\"    PRIOR LEVEL OF FUNCTION: PATIENT WAS DOING MUCH BETTER WITH AMBULATION AND WAS MOD I, SHE WAS DOING HER OWN BATHING AND DRESSING, SUPERVISION FROM PERSONAL CARE FACILITY AT TIMES AND THEY MANAGED HER MEDICAITON"

## 2022-05-09 ENCOUNTER — TRANSITIONAL CARE MANAGEMENT TELEPHONE ENCOUNTER (OUTPATIENT)
Dept: CALL CENTER | Facility: HOSPITAL | Age: 47
End: 2022-05-09

## 2022-05-09 NOTE — OUTREACH NOTE
Call Center TCM Note    Flowsheet Row Responses   [facility] patient discharged from? [place]   Does the patient have one of the following disease processes/diagnoses(primary or secondary)? Other   TCM attempt successful? No  [No names listed on verbal release--says [place]]   Unsuccessful attempts Attempt 3          Carmen Aragon RN    5/9/2022, 11:05 EDT

## 2022-05-10 ENCOUNTER — HOME CARE VISIT (OUTPATIENT)
Dept: HOME HEALTH SERVICES | Facility: CLINIC | Age: 47
End: 2022-05-10

## 2022-05-10 PROCEDURE — G0157 HHC PT ASSISTANT EA 15: HCPCS

## 2022-05-12 ENCOUNTER — HOME CARE VISIT (OUTPATIENT)
Dept: HOME HEALTH SERVICES | Facility: CLINIC | Age: 47
End: 2022-05-12

## 2022-05-12 VITALS
SYSTOLIC BLOOD PRESSURE: 126 MMHG | DIASTOLIC BLOOD PRESSURE: 70 MMHG | HEART RATE: 74 BPM | OXYGEN SATURATION: 98 % | RESPIRATION RATE: 16 BRPM

## 2022-05-12 VITALS
SYSTOLIC BLOOD PRESSURE: 136 MMHG | HEART RATE: 80 BPM | DIASTOLIC BLOOD PRESSURE: 70 MMHG | OXYGEN SATURATION: 98 % | RESPIRATION RATE: 16 BRPM

## 2022-05-12 PROCEDURE — G0157 HHC PT ASSISTANT EA 15: HCPCS

## 2022-05-16 ENCOUNTER — HOME CARE VISIT (OUTPATIENT)
Dept: HOME HEALTH SERVICES | Facility: CLINIC | Age: 47
End: 2022-05-16

## 2022-05-16 PROCEDURE — G0157 HHC PT ASSISTANT EA 15: HCPCS

## 2022-05-17 ENCOUNTER — READMISSION MANAGEMENT (OUTPATIENT)
Dept: CALL CENTER | Facility: HOSPITAL | Age: 47
End: 2022-05-17

## 2022-05-17 VITALS
TEMPERATURE: 97.9 F | RESPIRATION RATE: 18 BRPM | DIASTOLIC BLOOD PRESSURE: 70 MMHG | SYSTOLIC BLOOD PRESSURE: 138 MMHG | OXYGEN SATURATION: 97 % | HEART RATE: 86 BPM

## 2022-05-17 NOTE — OUTREACH NOTE
Medical Week 2 Survey    Flowsheet Row Responses   [facility] patient discharged from? [place]   Does the patient have one of the following disease processes/diagnoses(primary or secondary)? Other   Week 2 attempt successful? No   Unsuccessful attempts Attempt 1          JULIÁN PUTNAM - Registered Nurse

## 2022-05-19 ENCOUNTER — HOME CARE VISIT (OUTPATIENT)
Dept: HOME HEALTH SERVICES | Facility: CLINIC | Age: 47
End: 2022-05-19

## 2022-05-19 VITALS
SYSTOLIC BLOOD PRESSURE: 132 MMHG | HEART RATE: 68 BPM | RESPIRATION RATE: 18 BRPM | DIASTOLIC BLOOD PRESSURE: 68 MMHG | OXYGEN SATURATION: 99 %

## 2022-05-19 PROCEDURE — G0157 HHC PT ASSISTANT EA 15: HCPCS

## 2022-05-19 NOTE — HOME HEALTH
Pt reports doing good today feeling good. Reports has stopped smoking and is proud of herself. L foot and distal LE have increase swelling this date with 1+ edema noted. Pt has HEP and expresses compliance.

## 2022-05-20 ENCOUNTER — APPOINTMENT (OUTPATIENT)
Dept: CT IMAGING | Facility: HOSPITAL | Age: 47
End: 2022-05-20

## 2022-05-20 ENCOUNTER — HOSPITAL ENCOUNTER (EMERGENCY)
Facility: HOSPITAL | Age: 47
Discharge: NURSING, REHAB OR LONG-TERM CARE | End: 2022-05-20
Attending: FAMILY MEDICINE | Admitting: FAMILY MEDICINE

## 2022-05-20 VITALS
HEIGHT: 60 IN | RESPIRATION RATE: 18 BRPM | DIASTOLIC BLOOD PRESSURE: 90 MMHG | BODY MASS INDEX: 42.41 KG/M2 | WEIGHT: 216 LBS | TEMPERATURE: 97.9 F | HEART RATE: 61 BPM | SYSTOLIC BLOOD PRESSURE: 141 MMHG | OXYGEN SATURATION: 98 %

## 2022-05-20 DIAGNOSIS — B37.31 VAGINAL CANDIDIASIS: ICD-10-CM

## 2022-05-20 DIAGNOSIS — N20.0 BILATERAL NEPHROLITHIASIS: ICD-10-CM

## 2022-05-20 DIAGNOSIS — N30.01 ACUTE CYSTITIS WITH HEMATURIA: Primary | ICD-10-CM

## 2022-05-20 LAB
ALBUMIN SERPL-MCNC: 4.3 G/DL (ref 3.5–5.2)
ALBUMIN/GLOB SERPL: 1.7 G/DL
ALP SERPL-CCNC: 95 U/L (ref 39–117)
ALT SERPL W P-5'-P-CCNC: 42 U/L (ref 1–33)
ANION GAP SERPL CALCULATED.3IONS-SCNC: 11 MMOL/L (ref 5–15)
AST SERPL-CCNC: 33 U/L (ref 1–32)
BACTERIA UR QL AUTO: ABNORMAL /HPF
BASOPHILS # BLD AUTO: 0.05 10*3/MM3 (ref 0–0.2)
BASOPHILS NFR BLD AUTO: 0.7 % (ref 0–1.5)
BILIRUB SERPL-MCNC: 0.2 MG/DL (ref 0–1.2)
BILIRUB UR QL STRIP: NEGATIVE
BUN SERPL-MCNC: 18 MG/DL (ref 6–20)
BUN/CREAT SERPL: 16.1 (ref 7–25)
CALCIUM SPEC-SCNC: 9.5 MG/DL (ref 8.6–10.5)
CHLORIDE SERPL-SCNC: 103 MMOL/L (ref 98–107)
CLARITY UR: ABNORMAL
CO2 SERPL-SCNC: 26 MMOL/L (ref 22–29)
COLOR UR: YELLOW
CREAT SERPL-MCNC: 1.12 MG/DL (ref 0.57–1)
DEPRECATED RDW RBC AUTO: 41.7 FL (ref 37–54)
EGFRCR SERPLBLD CKD-EPI 2021: 61.5 ML/MIN/1.73
EOSINOPHIL # BLD AUTO: 0.42 10*3/MM3 (ref 0–0.4)
EOSINOPHIL NFR BLD AUTO: 5.7 % (ref 0.3–6.2)
ERYTHROCYTE [DISTWIDTH] IN BLOOD BY AUTOMATED COUNT: 12.8 % (ref 12.3–15.4)
GLOBULIN UR ELPH-MCNC: 2.5 GM/DL
GLUCOSE SERPL-MCNC: 93 MG/DL (ref 65–99)
GLUCOSE UR STRIP-MCNC: NEGATIVE MG/DL
HCG SERPL QL: NEGATIVE
HCT VFR BLD AUTO: 38.9 % (ref 34–46.6)
HGB BLD-MCNC: 13.2 G/DL (ref 12–15.9)
HGB UR QL STRIP.AUTO: ABNORMAL
HYALINE CASTS UR QL AUTO: ABNORMAL /LPF
IMM GRANULOCYTES # BLD AUTO: 0.03 10*3/MM3 (ref 0–0.05)
IMM GRANULOCYTES NFR BLD AUTO: 0.4 % (ref 0–0.5)
KETONES UR QL STRIP: NEGATIVE
LEUKOCYTE ESTERASE UR QL STRIP.AUTO: ABNORMAL
LYMPHOCYTES # BLD AUTO: 2.42 10*3/MM3 (ref 0.7–3.1)
LYMPHOCYTES NFR BLD AUTO: 32.9 % (ref 19.6–45.3)
MCH RBC QN AUTO: 30.5 PG (ref 26.6–33)
MCHC RBC AUTO-ENTMCNC: 33.9 G/DL (ref 31.5–35.7)
MCV RBC AUTO: 89.8 FL (ref 79–97)
MONOCYTES # BLD AUTO: 0.54 10*3/MM3 (ref 0.1–0.9)
MONOCYTES NFR BLD AUTO: 7.3 % (ref 5–12)
NEUTROPHILS NFR BLD AUTO: 3.89 10*3/MM3 (ref 1.7–7)
NEUTROPHILS NFR BLD AUTO: 53 % (ref 42.7–76)
NITRITE UR QL STRIP: POSITIVE
NRBC BLD AUTO-RTO: 0 /100 WBC (ref 0–0.2)
PH UR STRIP.AUTO: 6 [PH] (ref 5–9)
PLATELET # BLD AUTO: 264 10*3/MM3 (ref 140–450)
PMV BLD AUTO: 11.2 FL (ref 6–12)
POTASSIUM SERPL-SCNC: 4.3 MMOL/L (ref 3.5–5.2)
PROT SERPL-MCNC: 6.8 G/DL (ref 6–8.5)
PROT UR QL STRIP: NEGATIVE
RBC # BLD AUTO: 4.33 10*6/MM3 (ref 3.77–5.28)
RBC # UR STRIP: ABNORMAL /HPF
REF LAB TEST METHOD: ABNORMAL
SODIUM SERPL-SCNC: 140 MMOL/L (ref 136–145)
SP GR UR STRIP: 1.02 (ref 1–1.03)
SQUAMOUS #/AREA URNS HPF: ABNORMAL /HPF
UROBILINOGEN UR QL STRIP: ABNORMAL
WBC # UR STRIP: ABNORMAL /HPF
WBC NRBC COR # BLD: 7.35 10*3/MM3 (ref 3.4–10.8)
YEAST URNS QL MICRO: ABNORMAL /HPF

## 2022-05-20 PROCEDURE — 99284 EMERGENCY DEPT VISIT MOD MDM: CPT

## 2022-05-20 PROCEDURE — 36415 COLL VENOUS BLD VENIPUNCTURE: CPT

## 2022-05-20 PROCEDURE — 87077 CULTURE AEROBIC IDENTIFY: CPT | Performed by: FAMILY MEDICINE

## 2022-05-20 PROCEDURE — 74176 CT ABD & PELVIS W/O CONTRAST: CPT

## 2022-05-20 PROCEDURE — 87186 SC STD MICRODIL/AGAR DIL: CPT | Performed by: FAMILY MEDICINE

## 2022-05-20 PROCEDURE — 85025 COMPLETE CBC W/AUTO DIFF WBC: CPT | Performed by: FAMILY MEDICINE

## 2022-05-20 PROCEDURE — 84703 CHORIONIC GONADOTROPIN ASSAY: CPT | Performed by: FAMILY MEDICINE

## 2022-05-20 PROCEDURE — 81001 URINALYSIS AUTO W/SCOPE: CPT | Performed by: FAMILY MEDICINE

## 2022-05-20 PROCEDURE — 80053 COMPREHEN METABOLIC PANEL: CPT | Performed by: FAMILY MEDICINE

## 2022-05-20 PROCEDURE — 87086 URINE CULTURE/COLONY COUNT: CPT | Performed by: FAMILY MEDICINE

## 2022-05-20 RX ORDER — NITROFURANTOIN 25; 75 MG/1; MG/1
100 CAPSULE ORAL 2 TIMES DAILY
Qty: 10 CAPSULE | Refills: 0 | Status: SHIPPED | OUTPATIENT
Start: 2022-05-20 | End: 2022-05-25

## 2022-05-20 RX ORDER — FLUCONAZOLE 2 MG/ML
400 INJECTION, SOLUTION INTRAVENOUS ONCE
Status: DISCONTINUED | OUTPATIENT
Start: 2022-05-20 | End: 2022-05-20

## 2022-05-20 RX ORDER — NITROFURANTOIN 25; 75 MG/1; MG/1
100 CAPSULE ORAL ONCE
Status: COMPLETED | OUTPATIENT
Start: 2022-05-20 | End: 2022-05-20

## 2022-05-20 RX ORDER — FLUCONAZOLE 2 MG/ML
800 INJECTION, SOLUTION INTRAVENOUS ONCE
Status: DISCONTINUED | OUTPATIENT
Start: 2022-05-20 | End: 2022-05-20 | Stop reason: DRUGHIGH

## 2022-05-20 RX ORDER — FLUCONAZOLE 200 MG/1
200 TABLET ORAL DAILY
Qty: 14 TABLET | Refills: 0 | Status: SHIPPED | OUTPATIENT
Start: 2022-05-20 | End: 2022-06-03

## 2022-05-20 RX ORDER — FLUCONAZOLE 150 MG/1
150 TABLET ORAL ONCE
Status: COMPLETED | OUTPATIENT
Start: 2022-05-20 | End: 2022-05-20

## 2022-05-20 RX ADMIN — FLUCONAZOLE 150 MG: 150 TABLET ORAL at 19:53

## 2022-05-20 RX ADMIN — NITROFURANTOIN MONOHYDRATE/MACROCRYSTALLINE 100 MG: 25; 75 CAPSULE ORAL at 19:56

## 2022-05-20 NOTE — ED PROVIDER NOTES
"Paulo Shannon is a pleasant 45 yo female with history of left MCA stroke in December 2021 and history of bilateral non-obstructive nephrolithiasis, who came from [place] for episodes where she \"froze\". Patient reports right back pain and lower abdominal pain that she states started \"probably this morning\".  She is unsure if she has pain when she urinates. She does have pain over the medial aspects of both her thighs. She describes the pain at her back as an 8/10 in severity and is unsure if it moves or worsens.   Patient also has a headache at the back of her head that comes around to her eyes that is intermittent, lasts for a couple of days.  Adds to this that she usually wears glasses and that she has been cutting down on her cigarettes from 5 cigarettes a day to 1 a day. Denies any caffeine use. Denies any fevers.   Apologetic about her speech, reports her speech therapist quit.         Review of Systems   Constitutional: Negative for fever.   Eyes: Positive for visual disturbance.   Respiratory: Shortness of breath: Probably.    Gastrointestinal: Positive for abdominal pain and nausea. Negative for vomiting.   Musculoskeletal: Positive for back pain and myalgias.   Skin: Negative for rash and wound.   Neurological: Positive for speech difficulty and headaches.   Psychiatric/Behavioral: Negative for confusion and decreased concentration. The patient is not nervous/anxious.      Past Medical History:   Diagnosis Date   • Abdominal pain     Chronic RLQ, RUQ, R flank comes and goes.    • Abdominal pain, right lower quadrant    • Acute bilateral otitis media    • Allergic rhinitis    • Backache     Upper back.   • Candidiasis of skin    • Cellulitis of neck    • Chest pain    • Contraception    • Cough    • Dysfunction of eustachian tube    • Dyspareunia, female    • Elevated cholesterol    • Excessive or frequent menstruation    • Flank pain    • Generalized aches and pains    • Headache    • Health " maintenance examination    • Hypertension    • Infection of skin and subcutaneous tissue    • Irregular periods    • Kidney stone     Poss   • Menorrhagia    • Nausea vomiting and diarrhea    • Obesity    • Open wound of abdominal wall    • Otalgia    • Pain in left foot    • Pain in unspecified hand    • Removed Impacted cerumen 06/05/2012   • Rhinitis    • Seizure ([facility]) 08/15/2019   • Shoulder pain     right-sided-likely muscle strain      • Shoulder tendinitis    • Spider bite wound    • Staphylococcal infection of skin    • Stroke ([facility])    • Swollen feet    • Tinea cruris    • Tobacco dependence syndrome    • Upper respiratory infection    • Urinary tract infectious disease    • Vertigo    • Visit for gynecologic examination    • Vulvovaginitis        Allergies   Allergen Reactions   • Abilify [Aripiprazole] Nausea Only   • Buspirone Rash   • Penicillins Hives and Nausea And Vomiting     No current facility-administered medications on file prior to encounter.     Current Outpatient Medications on File Prior to Encounter   Medication Sig Dispense Refill   • acetaminophen (TYLENOL) 500 MG tablet Take 500 mg by mouth Every 8 (Eight) Hours As Needed for Mild Pain .     • aspirin 325 MG tablet Take 325 mg by mouth Daily.     • atorvastatin (LIPITOR) 80 MG tablet Take 1 tablet by mouth Every Night.     • busPIRone (BUSPAR) 15 MG tablet Take 15 mg by mouth 3 (Three) Times a Day.     • DICLOFENAC PO Take 75 mg by mouth 2 (two) times a day.     • escitalopram (LEXAPRO) 10 MG tablet Take 10 mg by mouth Daily.     • furosemide (LASIX) 20 MG tablet Take 20 mg by mouth Daily.     • hydrOXYzine pamoate (VISTARIL) 50 MG capsule Take 50 mg by mouth 3 (Three) Times a Day As Needed for Anxiety.     • levETIRAcetam (KEPPRA) 750 MG tablet Take 1 tablet by mouth Every 12 (Twelve) Hours for 30 days. 60 tablet 1   • losartan (COZAAR) 50 MG tablet Take 50 mg by mouth Daily.     • meclizine (ANTIVERT) 12.5 MG tablet Take 12.5 mg by  mouth 3 (Three) Times a Day As Needed for Dizziness.     • melatonin 5 MG tablet tablet Take 10 mg by mouth every night at bedtime.     • ondansetron ODT (ZOFRAN-ODT) 4 MG disintegrating tablet Place 1 tablet on the tongue Every 6 (Six) Hours As Needed for Nausea or Vomiting. 10 tablet 0   • orphenadrine (NORFLEX) 100 MG 12 hr tablet Take 1 tablet by mouth 2 (Two) Times a Day As Needed for Muscle Spasms or Mild Pain . 10 tablet 0   • pantoprazole (Protonix) 40 MG EC tablet Take 1 tablet by mouth Daily. 30 tablet 0   • potassium chloride (K-DUR,KLOR-CON) 10 MEQ CR tablet Take 10 mEq by mouth Daily.     • solifenacin (VESICARE) 10 MG tablet Take 10 mg by mouth Daily.     • traZODone (DESYREL) 50 MG tablet Take 50 mg by mouth Every Night.       Past Surgical History:   Procedure Laterality Date   •  SECTION     • DILATATION AND CURETTAGE      Secondary to incomplete spontaneous    • INCISION AND DRAINAGE ABSCESS  2012   • INJECTION OF MEDICATION  2015    Phenergan (1)     • INJECTION OF MEDICATION  10/10/2013    Toradol (2)      • INJECTION OF MEDICATION  2014    Zofran (1)          Family History   Problem Relation Age of Onset   • Breast cancer Mother    • Thyroid disease Mother    • Hypertension Mother    • Heart disease Mother    • Arthritis Mother    • Liver disease Father    • Endometrial cancer Sister    • Anxiety disorder Brother    • Mental illness Daughter    • Depression Daughter    • Diabetes Maternal Aunt    • Diabetes Maternal Uncle    • Stroke Maternal Grandmother    • Breast cancer Maternal Grandmother    • Cancer Maternal Grandfather    • Stroke Paternal Grandmother    • Depression Other    • Cancer Other         Colorectal Cancer   • Endometrial cancer Other    • Lung cancer Other        Social History     Socioeconomic History   • Marital status: Single   Tobacco Use   • Smoking status: Current Every Day Smoker     Packs/day: 0.50     Types: Cigarettes   •  Smokeless tobacco: Never Used   Vaping Use   • Vaping Use: Some days   • Substances: Nicotine   • Devices: Refillable tank   • Passive vaping exposure: Yes   Substance and Sexual Activity   • Alcohol use: No   • Drug use: No   • Sexual activity: Not Currently     Objective    Vitals:    05/20/22 1716 05/20/22 1746 05/20/22 1816 05/20/22 1937   BP: 110/69 149/96 139/84 141/90   BP Location:    Left arm   Patient Position:    Lying   Pulse: 62   61   Resp:    18   Temp:       TempSrc:       SpO2: 97%   98%   Weight:       Height:         Physical Exam  Vitals and nursing note reviewed.   Constitutional:       Comments: During interview, patient would grab at right lower side of back, arching her back, her eyes wide open, and gritting her jaw then relax after a few minutes.    Eyes:      Extraocular Movements: Extraocular movements intact.      Pupils: Pupils are equal, round, and reactive to light.   Cardiovascular:      Rate and Rhythm: Normal rate and regular rhythm.      Heart sounds: Normal heart sounds. No murmur heard.    No friction rub. No gallop.   Pulmonary:      Effort: Pulmonary effort is normal.      Breath sounds: Normal breath sounds. No wheezing, rhonchi or rales.   Abdominal:      General: Bowel sounds are normal.      Palpations: Abdomen is soft.      Tenderness: There is abdominal tenderness in the right lower quadrant, suprapubic area and left lower quadrant. There is guarding. There is no right CVA tenderness, left CVA tenderness or rebound. Negative signs include Gifford's sign and Rovsing's sign.   Musculoskeletal:      Cervical back: Neck supple.      Lumbar back: Tenderness present. No signs of trauma or lacerations. Normal range of motion. No scoliosis.      Right upper leg: Tenderness present. No swelling, edema or bony tenderness.      Left upper leg: Tenderness present. No swelling, edema or bony tenderness.      Right knee: No erythema or ecchymosis. Normal range of motion.      Left knee:  No erythema or ecchymosis. Normal range of motion.        Legs:    Skin:     General: Skin is warm and dry.   Neurological:      Mental Status: She is alert and oriented to person, place, and time.      GCS: GCS eye subscore is 4. GCS verbal subscore is 5. GCS motor subscore is 6.      Cranial Nerves: Dysarthria and facial asymmetry present.   Psychiatric:         Mood and Affect: Mood normal.         Behavior: Behavior normal.         Cognition and Memory: Cognition is not impaired. Memory is not impaired.       Procedures  CT Abdomen Pelvis Without Contrast    Result Date: 5/20/2022  EXAM: CT ABDOMEN PELVIS WITHOUT IV CONTRAST ORDERING PROVIDER: AB JIMÉNEZ CLINICAL HISTORY: Abdominal pain COMPARISON: 4/13/2022 TECHNIQUE: CT abdomen and pelvis performed without IV or oral contrast, reformatted in the sagittal and coronal planes. This examination was performed according to our departmental dose optimization program which includes automated exposure control, adjustment of the MA and kV according to patient size, and/or use of iterative reconstruction technique. FINDINGS: BASILAR CHEST: No consolidation, nodule or effusion. LIVER: No mass, enlargement or abnormal density. BILIARY TRACT: Unremarkable gallbladder. SPLEEN: No mass or enlargement. PANCREAS: No mass or inflammatory process. Normal pancreatic duct ADRENAL GLANDS: Unremarkable. No mass. URINARY SYSTEM: Kidneys are normal in size. No hydronephrosis, or mass. Normal ureters.  Bladder is normal without mass or stone. Bilateral nephrolithiasis with largest focus on the left side measuring about 6.4 mm. GI TRACT: No mass, dilation, or wall thickening.  No diverticula.  No hernia.  Appendix is normal.  REPRODUCTIVE SYSTEM: Unremarkable PERITONEAL SPACE:No free air, free fluid, mass or adenopathy. RETROPERITONEAL SPACE:  No adenopathy, mass, aneurysm or significant vascular abnormality. BONES AND EXTRA-ABDOMINAL SOFT TISSUES: No aggressive osseous lesion..   No inguinal adenopathy or hernia.     Bilateral nephrolithiasis with largest focus on the left side measuring about 6.4 mm. Appendix is normal. No evidence of obstructive uropathy on either side. No evidence of bowel obstruction or perienteric inflammation. Electronically signed by:  Aniket Solis MD  5/20/2022 6:08 PM CDT Workstation: [id number]     ED Course    Patient is from [place] and was sent to ED for freezing episodes. UA was done positive for yeast and leukocyte esterase and bacteria. CT abdomen without contrast showed bilateral non-obstructive nephrolithiasis. CMP remarkable for slight elevation in LFTs. CBC was unremarkable. Patient received one dose of macrobid and fluconazole in the ED.     For PCP: Discharged on Macrobid 100 mg BID to take for 5 days and Fluconazole 200 mg daily to be taken for 14 days.  Instructed to follow up with PCP in 3 days if worsens. Patient verbalized understanding and agreement with this plan. Patient was stable at time of discharge.         MDM    Final diagnoses:   Vaginal candidiasis   Acute cystitis with hematuria   Bilateral nephrolithiasis     ED Disposition  ED Disposition     ED Disposition   Discharge    Condition   Good    Comment   --           Jonel Cr MD  [address] [place] [address]  [phone]    In 3 days  If symptoms worsen       Medication List      New Prescriptions    fluconazole 200 MG tablet  Commonly known as: Diflucan  Take 1 tablet by mouth Daily for 14 days.     nitrofurantoin (macrocrystal-monohydrate) 100 MG capsule  Commonly known as: MACROBID  Take 1 capsule by mouth 2 (Two) Times a Day for 5 days.           Where to Get Your Medications      These medications were sent to [facility] - [place], [place] - [address] - [phone]  - [phone] FX  [address], [place] [address]    Phone: [phone]   · fluconazole 200 MG tablet  · nitrofurantoin (macrocrystal-monohydrate) 100  MG capsule           This document has been electronically signed by Jenny Kaplan MD on May 20, 2022 23:25 CDT     Jenny Kaplan MD  Resident  05/20/22 2897

## 2022-05-23 LAB — BACTERIA SPEC AEROBE CULT: ABNORMAL

## 2022-05-24 LAB
QT INTERVAL: 462 MS
QTC INTERVAL: 480 MS

## 2022-05-25 ENCOUNTER — HOME CARE VISIT (OUTPATIENT)
Dept: HOME HEALTH SERVICES | Facility: CLINIC | Age: 47
End: 2022-05-25

## 2022-05-25 ENCOUNTER — HOME CARE VISIT (OUTPATIENT)
Dept: HOME HEALTH SERVICES | Facility: HOME HEALTHCARE | Age: 47
End: 2022-05-25

## 2022-05-25 VITALS
DIASTOLIC BLOOD PRESSURE: 86 MMHG | SYSTOLIC BLOOD PRESSURE: 130 MMHG | HEART RATE: 76 BPM | OXYGEN SATURATION: 98 % | TEMPERATURE: 98 F | RESPIRATION RATE: 16 BRPM

## 2022-05-25 PROCEDURE — G0151 HHCP-SERV OF PT,EA 15 MIN: HCPCS

## 2022-05-25 NOTE — HOME HEALTH
PATIENT RECENTLY WENT TO THE ER SECONDARY TO A UTI. SHE STATES SHE HAD A SEIZURE THE OTHER DAY BUT UNSURE OF THE VALIDITY DUE TO HISTORY OF BEING A POOR HISTORIAN. SHE DID GO TO THE ER SECONDARY TO INCREASED CONFUSION AND PAIN. SHE WAS FOUND TO HAVE UTI AND YEAST INFECTION AND SHE WAS TREATED AND SENT HOME WITH ANTIBIOTICS. SHE FEELS LIKE HER LEG ISN'T DOING AS WELL AND HER WALKING ISN'T DOING AS WELL SINCE SHE WENT TO THE ER. PATIENT STATES SHE HAD A NEAR FALL THE OTHER DAY WHEN SHE WAS COMING IN FROM WALKING HER DAUGTHER TO THE CAR

## 2022-05-31 ENCOUNTER — HOME CARE VISIT (OUTPATIENT)
Dept: HOME HEALTH SERVICES | Facility: CLINIC | Age: 47
End: 2022-05-31

## 2022-05-31 PROCEDURE — G0157 HHC PT ASSISTANT EA 15: HCPCS

## 2022-06-01 VITALS
TEMPERATURE: 98 F | HEART RATE: 70 BPM | SYSTOLIC BLOOD PRESSURE: 134 MMHG | RESPIRATION RATE: 18 BRPM | DIASTOLIC BLOOD PRESSURE: 80 MMHG | OXYGEN SATURATION: 99 %

## 2022-06-06 ENCOUNTER — HOME CARE VISIT (OUTPATIENT)
Dept: HOME HEALTH SERVICES | Facility: CLINIC | Age: 47
End: 2022-06-06

## 2022-06-06 VITALS
SYSTOLIC BLOOD PRESSURE: 140 MMHG | OXYGEN SATURATION: 97 % | HEART RATE: 85 BPM | TEMPERATURE: 98 F | DIASTOLIC BLOOD PRESSURE: 78 MMHG | RESPIRATION RATE: 18 BRPM

## 2022-06-06 PROCEDURE — G0157 HHC PT ASSISTANT EA 15: HCPCS

## 2022-06-07 ENCOUNTER — APPOINTMENT (OUTPATIENT)
Dept: GENERAL RADIOLOGY | Facility: HOSPITAL | Age: 47
End: 2022-06-07

## 2022-06-07 ENCOUNTER — HOSPITAL ENCOUNTER (EMERGENCY)
Facility: HOSPITAL | Age: 47
Discharge: HOME | End: 2022-06-07
Attending: STUDENT IN AN ORGANIZED HEALTH CARE EDUCATION/TRAINING PROGRAM | Admitting: STUDENT IN AN ORGANIZED HEALTH CARE EDUCATION/TRAINING PROGRAM

## 2022-06-07 VITALS
OXYGEN SATURATION: 99 % | SYSTOLIC BLOOD PRESSURE: 100 MMHG | HEART RATE: 58 BPM | WEIGHT: 201.2 LBS | RESPIRATION RATE: 18 BRPM | DIASTOLIC BLOOD PRESSURE: 61 MMHG | BODY MASS INDEX: 39.5 KG/M2 | HEIGHT: 60 IN

## 2022-06-07 DIAGNOSIS — S93.402A MILD ANKLE SPRAIN, LEFT, INITIAL ENCOUNTER: Primary | ICD-10-CM

## 2022-06-07 DIAGNOSIS — G40.909 SEIZURE DISORDER: ICD-10-CM

## 2022-06-07 LAB
ALBUMIN SERPL-MCNC: 4.1 G/DL (ref 3.5–5.2)
ALBUMIN/GLOB SERPL: 1.7 G/DL
ALP SERPL-CCNC: 87 U/L (ref 39–117)
ALT SERPL W P-5'-P-CCNC: 23 U/L (ref 1–33)
ANION GAP SERPL CALCULATED.3IONS-SCNC: 12 MMOL/L (ref 5–15)
AST SERPL-CCNC: 23 U/L (ref 1–32)
BACTERIA UR QL AUTO: ABNORMAL /HPF
BASOPHILS # BLD AUTO: 0.05 10*3/MM3 (ref 0–0.2)
BASOPHILS NFR BLD AUTO: 0.5 % (ref 0–1.5)
BILIRUB SERPL-MCNC: 0.2 MG/DL (ref 0–1.2)
BILIRUB UR QL STRIP: NEGATIVE
BUN SERPL-MCNC: 17 MG/DL (ref 6–20)
BUN/CREAT SERPL: 16.5 (ref 7–25)
CALCIUM SPEC-SCNC: 10 MG/DL (ref 8.6–10.5)
CHLORIDE SERPL-SCNC: 104 MMOL/L (ref 98–107)
CLARITY UR: CLEAR
CO2 SERPL-SCNC: 23 MMOL/L (ref 22–29)
COLOR UR: YELLOW
CREAT SERPL-MCNC: 1.03 MG/DL (ref 0.57–1)
DEPRECATED RDW RBC AUTO: 41.1 FL (ref 37–54)
EGFRCR SERPLBLD CKD-EPI 2021: 68.1 ML/MIN/1.73
EOSINOPHIL # BLD AUTO: 0.37 10*3/MM3 (ref 0–0.4)
EOSINOPHIL NFR BLD AUTO: 3.6 % (ref 0.3–6.2)
ERYTHROCYTE [DISTWIDTH] IN BLOOD BY AUTOMATED COUNT: 12.6 % (ref 12.3–15.4)
FLUAV SUBTYP SPEC NAA+PROBE: NOT DETECTED
FLUBV RNA ISLT QL NAA+PROBE: NOT DETECTED
GLOBULIN UR ELPH-MCNC: 2.4 GM/DL
GLUCOSE SERPL-MCNC: 105 MG/DL (ref 65–99)
GLUCOSE UR STRIP-MCNC: NEGATIVE MG/DL
HCT VFR BLD AUTO: 35.5 % (ref 34–46.6)
HGB BLD-MCNC: 12.2 G/DL (ref 12–15.9)
HGB UR QL STRIP.AUTO: NEGATIVE
HOLD SPECIMEN: NORMAL
HYALINE CASTS UR QL AUTO: ABNORMAL /LPF
IMM GRANULOCYTES # BLD AUTO: 0.03 10*3/MM3 (ref 0–0.05)
IMM GRANULOCYTES NFR BLD AUTO: 0.3 % (ref 0–0.5)
KETONES UR QL STRIP: NEGATIVE
LEUKOCYTE ESTERASE UR QL STRIP.AUTO: ABNORMAL
LYMPHOCYTES # BLD AUTO: 2.28 10*3/MM3 (ref 0.7–3.1)
LYMPHOCYTES NFR BLD AUTO: 22.4 % (ref 19.6–45.3)
MAGNESIUM SERPL-MCNC: 1.9 MG/DL (ref 1.6–2.6)
MCH RBC QN AUTO: 30.8 PG (ref 26.6–33)
MCHC RBC AUTO-ENTMCNC: 34.4 G/DL (ref 31.5–35.7)
MCV RBC AUTO: 89.6 FL (ref 79–97)
MONOCYTES # BLD AUTO: 0.46 10*3/MM3 (ref 0.1–0.9)
MONOCYTES NFR BLD AUTO: 4.5 % (ref 5–12)
NEUTROPHILS NFR BLD AUTO: 68.7 % (ref 42.7–76)
NEUTROPHILS NFR BLD AUTO: 7.01 10*3/MM3 (ref 1.7–7)
NITRITE UR QL STRIP: NEGATIVE
NRBC BLD AUTO-RTO: 0 /100 WBC (ref 0–0.2)
PH UR STRIP.AUTO: <=5 [PH] (ref 5–9)
PLATELET # BLD AUTO: 243 10*3/MM3 (ref 140–450)
PMV BLD AUTO: 10.9 FL (ref 6–12)
POTASSIUM SERPL-SCNC: 4.4 MMOL/L (ref 3.5–5.2)
PROT SERPL-MCNC: 6.5 G/DL (ref 6–8.5)
PROT UR QL STRIP: NEGATIVE
RBC # BLD AUTO: 3.96 10*6/MM3 (ref 3.77–5.28)
RBC # UR STRIP: ABNORMAL /HPF
REF LAB TEST METHOD: ABNORMAL
SARS-COV-2 RNA PNL SPEC NAA+PROBE: NOT DETECTED
SODIUM SERPL-SCNC: 139 MMOL/L (ref 136–145)
SP GR UR STRIP: 1.01 (ref 1–1.03)
SQUAMOUS #/AREA URNS HPF: ABNORMAL /HPF
UROBILINOGEN UR QL STRIP: ABNORMAL
WBC # UR STRIP: ABNORMAL /HPF
WBC NRBC COR # BLD: 10.2 10*3/MM3 (ref 3.4–10.8)
WHOLE BLOOD HOLD COAG: NORMAL

## 2022-06-07 PROCEDURE — 99284 EMERGENCY DEPT VISIT MOD MDM: CPT

## 2022-06-07 PROCEDURE — 87636 SARSCOV2 & INF A&B AMP PRB: CPT

## 2022-06-07 PROCEDURE — 93005 ELECTROCARDIOGRAM TRACING: CPT | Performed by: STUDENT IN AN ORGANIZED HEALTH CARE EDUCATION/TRAINING PROGRAM

## 2022-06-07 PROCEDURE — 85025 COMPLETE CBC W/AUTO DIFF WBC: CPT

## 2022-06-07 PROCEDURE — 96374 THER/PROPH/DIAG INJ IV PUSH: CPT

## 2022-06-07 PROCEDURE — 73610 X-RAY EXAM OF ANKLE: CPT

## 2022-06-07 PROCEDURE — 83735 ASSAY OF MAGNESIUM: CPT

## 2022-06-07 PROCEDURE — 25010000002 LEVETIRACETAM IN NACL 0.82% 500 MG/100ML SOLUTION

## 2022-06-07 PROCEDURE — 93010 ELECTROCARDIOGRAM REPORT: CPT | Performed by: INTERNAL MEDICINE

## 2022-06-07 PROCEDURE — 71045 X-RAY EXAM CHEST 1 VIEW: CPT

## 2022-06-07 PROCEDURE — 73552 X-RAY EXAM OF FEMUR 2/>: CPT

## 2022-06-07 PROCEDURE — 81001 URINALYSIS AUTO W/SCOPE: CPT

## 2022-06-07 PROCEDURE — 73564 X-RAY EXAM KNEE 4 OR MORE: CPT

## 2022-06-07 PROCEDURE — 80053 COMPREHEN METABOLIC PANEL: CPT

## 2022-06-07 PROCEDURE — 73590 X-RAY EXAM OF LOWER LEG: CPT

## 2022-06-07 RX ORDER — SODIUM CHLORIDE 0.9 % (FLUSH) 0.9 %
10 SYRINGE (ML) INJECTION AS NEEDED
Status: DISCONTINUED | OUTPATIENT
Start: 2022-06-07 | End: 2022-06-07 | Stop reason: HOSPADM

## 2022-06-07 RX ORDER — LEVETIRACETAM 5 MG/ML
500 INJECTION INTRAVASCULAR EVERY 12 HOURS SCHEDULED
Status: DISCONTINUED | OUTPATIENT
Start: 2022-06-07 | End: 2022-06-07 | Stop reason: HOSPADM

## 2022-06-07 RX ADMIN — LEVETIRACETAM 500 MG: 5 INJECTION INTRAVENOUS at 13:03

## 2022-06-07 NOTE — ED PROVIDER NOTES
"Subjective   46 year old female patient presents to ER from [place] after being found with AMS. Reportedly she was \"slumped against the wall and sitting on the toilet.\" patient with limited communication due to aphasia secondary to old stroke. She can nod yes and no. She reports that she has had a HA for 2 days and may have had a seizure. She denies vomiting, fever, or recent illness. She is c/o pain to left lower extremity from thigh to ankle. Unknown if LOC. Unknown events leading up to when patient was found.           Review of Systems   Unable to perform ROS: Patient nonverbal   Constitutional: Negative for fever.   Neurological: Positive for headaches.       Past Medical History:   Diagnosis Date   • Abdominal pain     Chronic RLQ, RUQ, R flank comes and goes.    • Abdominal pain, right lower quadrant    • Acute bilateral otitis media    • Allergic rhinitis    • Backache     Upper back.   • Candidiasis of skin    • Cellulitis of neck    • Chest pain    • Contraception    • Cough    • Dysfunction of eustachian tube    • Dyspareunia, female    • Elevated cholesterol    • Excessive or frequent menstruation    • Flank pain    • Generalized aches and pains    • Headache    • Health maintenance examination    • Hypertension    • Infection of skin and subcutaneous tissue    • Irregular periods    • Kidney stone     Poss   • Menorrhagia    • Nausea vomiting and diarrhea    • Obesity    • Open wound of abdominal wall    • Otalgia    • Pain in left foot    • Pain in unspecified hand    • Removed Impacted cerumen 06/05/2012   • Rhinitis    • Seizure ([facility]) 08/15/2019   • Shoulder pain     right-sided-likely muscle strain      • Shoulder tendinitis    • Spider bite wound    • Staphylococcal infection of skin    • Stroke ([facility])    • Swollen feet    • Tinea cruris    • Tobacco dependence syndrome    • Upper respiratory infection    • Urinary tract infectious disease    • Vertigo    • Visit for gynecologic examination    • " "Vulvovaginitis        Allergies   Allergen Reactions   • Abilify [Aripiprazole] Nausea Only   • Buspirone Rash   • Penicillins Hives and Nausea And Vomiting       Past Surgical History:   Procedure Laterality Date   •  SECTION     • DILATATION AND CURETTAGE      Secondary to incomplete spontaneous    • INCISION AND DRAINAGE ABSCESS  2012   • INJECTION OF MEDICATION  2015    Phenergan (1)     • INJECTION OF MEDICATION  10/10/2013    Toradol (2)      • INJECTION OF MEDICATION  2014    Zofran (1)          Family History   Problem Relation Age of Onset   • Breast cancer Mother    • Thyroid disease Mother    • Hypertension Mother    • Heart disease Mother    • Arthritis Mother    • Liver disease Father    • Endometrial cancer Sister    • Anxiety disorder Brother    • Mental illness Daughter    • Depression Daughter    • Diabetes Maternal Aunt    • Diabetes Maternal Uncle    • Stroke Maternal Grandmother    • Breast cancer Maternal Grandmother    • Cancer Maternal Grandfather    • Stroke Paternal Grandmother    • Depression Other    • Cancer Other         Colorectal Cancer   • Endometrial cancer Other    • Lung cancer Other        Social History     Socioeconomic History   • Marital status: Single   Tobacco Use   • Smoking status: Current Every Day Smoker     Packs/day: 0.50     Types: Cigarettes   • Smokeless tobacco: Never Used   Vaping Use   • Vaping Use: Some days   • Substances: Nicotine   • Devices: Refillable tank   • Passive vaping exposure: Yes   Substance and Sexual Activity   • Alcohol use: No   • Drug use: No   • Sexual activity: Not Currently           Objective    /71   Pulse 56   Resp 16   Ht 152.4 cm (60\")   Wt 91.3 kg (201 lb 3.2 oz)   SpO2 99%   BMI 39.29 kg/m²       Physical Exam  Vitals and nursing note reviewed.   Constitutional:       General: She is not in acute distress.     Appearance: Normal appearance. She is not ill-appearing, toxic-appearing or " diaphoretic.   HENT:      Head: Normocephalic.      Nose: Nose normal.      Mouth/Throat:      Mouth: Mucous membranes are moist.   Eyes:      Pupils: Pupils are equal, round, and reactive to light.   Cardiovascular:      Rate and Rhythm: Normal rate and regular rhythm.      Pulses: Normal pulses.      Heart sounds: Normal heart sounds.   Pulmonary:      Effort: Pulmonary effort is normal.      Breath sounds: Normal breath sounds.   Abdominal:      General: Bowel sounds are normal.      Palpations: Abdomen is soft.   Musculoskeletal:         General: Normal range of motion.      Cervical back: Normal range of motion.      Left lower leg: Tenderness present.      Comments: Little to no movement appreciated from right side which is baseline for patient. Pt c/o pain to palpation of entire left leg when she indicates is just since event today.   Skin:     General: Skin is warm and dry.      Capillary Refill: Capillary refill takes less than 2 seconds.   Neurological:      Mental Status: She is alert.      Comments: Pt nonverbal, which is baseline s/p CVA. Nods to yes and no questions appropriately. She is alert, good attention, cooperative. Follows commands.    Psychiatric:         Mood and Affect: Mood normal.         Behavior: Behavior normal.         Thought Content: Thought content normal.         Judgment: Judgment normal.         Procedures           ED Course      Results for orders placed or performed during the hospital encounter of 06/07/22   COVID-19 and FLU A/B PCR - Swab, Nasopharynx    Specimen: Nasopharynx; Swab   Result Value Ref Range    COVID19 Not Detected Not Detected - Ref. Range    Influenza A PCR Not Detected Not Detected    Influenza B PCR Not Detected Not Detected   Comprehensive Metabolic Panel    Specimen: Blood   Result Value Ref Range    Glucose 105 (H) 65 - 99 mg/dL    BUN 17 6 - 20 mg/dL    Creatinine 1.03 (H) 0.57 - 1.00 mg/dL    Sodium 139 136 - 145 mmol/L    Potassium 4.4 3.5 - 5.2  mmol/L    Chloride 104 98 - 107 mmol/L    CO2 23.0 22.0 - 29.0 mmol/L    Calcium 10.0 8.6 - 10.5 mg/dL    Total Protein 6.5 6.0 - 8.5 g/dL    Albumin 4.10 3.50 - 5.20 g/dL    ALT (SGPT) 23 1 - 33 U/L    AST (SGOT) 23 1 - 32 U/L    Alkaline Phosphatase 87 39 - 117 U/L    Total Bilirubin 0.2 0.0 - 1.2 mg/dL    Globulin 2.4 gm/dL    A/G Ratio 1.7 g/dL    BUN/Creatinine Ratio 16.5 7.0 - 25.0    Anion Gap 12.0 5.0 - 15.0 mmol/L    eGFR 68.1 >60.0 mL/min/1.73   Urinalysis With Microscopic If Indicated (No Culture) - Urine, Clean Catch    Specimen: Urine, Clean Catch   Result Value Ref Range    Color, UA Yellow Yellow, Straw, Dark Yellow, Kavita    Appearance, UA Clear Clear    pH, UA <=5.0 5.0 - 9.0    Specific Gravity, UA 1.012 1.003 - 1.030    Glucose, UA Negative Negative    Ketones, UA Negative Negative    Bilirubin, UA Negative Negative    Blood, UA Negative Negative    Protein, UA Negative Negative    Leuk Esterase, UA Moderate (2+) (A) Negative    Nitrite, UA Negative Negative    Urobilinogen, UA 0.2 E.U./dL 0.2 - 1.0 E.U./dL   Magnesium    Specimen: Blood   Result Value Ref Range    Magnesium 1.9 1.6 - 2.6 mg/dL   CBC Auto Differential    Specimen: Blood   Result Value Ref Range    WBC 10.20 3.40 - 10.80 10*3/mm3    RBC 3.96 3.77 - 5.28 10*6/mm3    Hemoglobin 12.2 12.0 - 15.9 g/dL    Hematocrit 35.5 34.0 - 46.6 %    MCV 89.6 79.0 - 97.0 fL    MCH 30.8 26.6 - 33.0 pg    MCHC 34.4 31.5 - 35.7 g/dL    RDW 12.6 12.3 - 15.4 %    RDW-SD 41.1 37.0 - 54.0 fl    MPV 10.9 6.0 - 12.0 fL    Platelets 243 140 - 450 10*3/mm3    Neutrophil % 68.7 42.7 - 76.0 %    Lymphocyte % 22.4 19.6 - 45.3 %    Monocyte % 4.5 (L) 5.0 - 12.0 %    Eosinophil % 3.6 0.3 - 6.2 %    Basophil % 0.5 0.0 - 1.5 %    Immature Grans % 0.3 0.0 - 0.5 %    Neutrophils, Absolute 7.01 (H) 1.70 - 7.00 10*3/mm3    Lymphocytes, Absolute 2.28 0.70 - 3.10 10*3/mm3    Monocytes, Absolute 0.46 0.10 - 0.90 10*3/mm3    Eosinophils, Absolute 0.37 0.00 - 0.40 10*3/mm3     Basophils, Absolute 0.05 0.00 - 0.20 10*3/mm3    Immature Grans, Absolute 0.03 0.00 - 0.05 10*3/mm3    nRBC 0.0 0.0 - 0.2 /100 WBC   Urinalysis, Microscopic Only - Urine, Clean Catch    Specimen: Urine, Clean Catch   Result Value Ref Range    RBC, UA 3-5 (A) None Seen /HPF    WBC, UA 21-30 (A) None Seen, 0-2, 3-5 /HPF    Bacteria, UA 1+ (A) None Seen /HPF    Squamous Epithelial Cells, UA 3-5 (A) None Seen, 0-2 /HPF    Hyaline Casts, UA 0-2 None Seen /LPF    Methodology Automated Microscopy    Gold Top - SST   Result Value Ref Range    Extra Tube Hold for add-ons.    Light Blue Top   Result Value Ref Range    Extra Tube Hold for add-ons.      XR Femur 2 View Left    Result Date: 6/7/2022  Procedure: Left femur four views Reason for exam: Pain FINDINGS: Bony structures of the left femur normal. There is no evidence of fracture or dislocation. Soft tissue structures are normal.     Negative left femur. Electronically signed by:  Dallas Chapin MD  6/7/2022 1:25 PM CDT Workstation: [id number]    XR Knee 4+ View Left    Result Date: 6/7/2022  PROCEDURE:  Left  Knee  4  views REASON FOR EXAM: pain, found AMS FINDINGS: Comparison exam dated March 27, 2022. Bony structures of the left knee are normal. There is no evidence of fracture or dislocation. Soft tissue structures are normal.     1. Negative left knee.. Electronically signed by:  Dallas Chapin MD  6/7/2022 1:27 PM CDT Workstation: [id number]    XR Tibia Fibula 2 View Left    Result Date: 6/7/2022  PROCEDURE: Left tibia and fibula 2  views REASON FOR EXAM: pain, found AMS FINDINGS: Distal medial malleolar tip triangular-shaped well ossified lesion with well-corticated borders consistent with old chip avulsion fracture. Otherwise bony structures of the left tibia and fibula are normal. There is no evidence of acute fracture or dislocation. Mild diffuse left ankle soft tissue swelling.     1.  Distal medial malleolar tip triangular-shaped well ossified lesion with  well-corticated borders consistent with old chip avulsion fracture. 2.  Mild diffuse left ankle soft tissue swelling suspicious for edema and/or cellulitis. 3.  Remainder of left tibia and fibula are unremarkable. Electronically signed by:  Dallas Chapin MD  6/7/2022 1:30 PM CDT Workstation: [id number]    XR Ankle 3+ View Left    Result Date: 6/7/2022  PROCEDURE:  Left  ankle 3 views REASON FOR EXAM: pain, found AMS FINDINGS: Medial malleolus distal tip small triangular well ossified lesion with well-corticated borders is seen. This is most consistent with old chip avulsion fracture. Inferior calcaneal spur. Otherwise bony structures of the left ankle are unremarkable. There is no evidence of acute fracture or dislocation. Mild diffuse soft tissue swelling worse medially.     1.  Medial malleolus distal tip small triangular well ossified lesion with well-corticated borders is seen. This is most consistent with old chip avulsion fracture. 2.  Inferior calcaneal spur. 3.  Mild diffuse left ankle soft tissue swelling worse medially. 4.  Remainder of left ankle exam is unremarkable. Electronically signed by:  Dallas Chapin MD  6/7/2022 1:23 PM CDT Workstation: [id number]    XR Chest 1 View    Result Date: 6/7/2022  PROCEDURE: Single chest view portable REASON FOR EXAM:AMS FINDINGS: Comparison exam dated May 3, 2022. Cardiac and pulmonary vasculature are normal. Lungs are clear. Pleural spaces are normal. No acute osseous abnormality.     Negative single view chest Electronically signed by:  Dallas Chapin MD  6/7/2022 1:20 PM CDT Workstation: [id number]                                               [facility]    Final diagnoses:   Mild ankle sprain, left, initial encounter   Seizure disorder (HCC)       ED Disposition  ED Disposition     ED Disposition   Discharge    Condition   Stable    Comment   --             Jonel Cr MD  [facility] [address] [facility] [address] [phone]      ER follow up, As  needed         Medication List      No changes were made to your prescriptions during this visit.          Ana Rosa Michele, APRN  06/07/22 7958

## 2022-06-07 NOTE — ED TRIAGE NOTES
"Pt presents to ED via EMS from [place]. Initial call was for \"foaming at the mouth\", EMS reports upon their arrival, pt was sitting on toilet and drooling. Pt has hx of seizures and stroke effecting left side - has left side facial droop at baseline. Pt C/O left upper and lower extremity pain with chest pain   "

## 2022-06-09 ENCOUNTER — HOME CARE VISIT (OUTPATIENT)
Dept: HOME HEALTH SERVICES | Facility: CLINIC | Age: 47
End: 2022-06-09

## 2022-06-09 LAB
QT INTERVAL: 434 MS
QTC INTERVAL: 444 MS

## 2022-06-09 PROCEDURE — G0151 HHCP-SERV OF PT,EA 15 MIN: HCPCS

## 2022-06-11 NOTE — HOME HEALTH
Subjective: Patient states that she id fair today. Reporting that she is still struggling with her memory and very forgetful. States that she gets frustrated with no being able to find her appropriate words. Still reporting leg pain in L LE but this has not changed much over course of care. States that she is walking frequently and moving better than she was prior to PT working with her. Patient will be referred to SLP for evaluation to continue focus on memory and cognition following PT DC this session.

## 2022-06-13 ENCOUNTER — HOME CARE VISIT (OUTPATIENT)
Dept: HOME HEALTH SERVICES | Facility: CLINIC | Age: 47
End: 2022-06-13

## 2022-06-13 ENCOUNTER — HOSPITAL ENCOUNTER (EMERGENCY)
Facility: HOSPITAL | Age: 47
Discharge: HOME | End: 2022-06-13
Attending: STUDENT IN AN ORGANIZED HEALTH CARE EDUCATION/TRAINING PROGRAM | Admitting: STUDENT IN AN ORGANIZED HEALTH CARE EDUCATION/TRAINING PROGRAM

## 2022-06-13 VITALS
SYSTOLIC BLOOD PRESSURE: 120 MMHG | WEIGHT: 220.6 LBS | DIASTOLIC BLOOD PRESSURE: 69 MMHG | BODY MASS INDEX: 43.31 KG/M2 | OXYGEN SATURATION: 100 % | HEART RATE: 59 BPM | TEMPERATURE: 98.2 F | RESPIRATION RATE: 16 BRPM | HEIGHT: 60 IN

## 2022-06-13 DIAGNOSIS — N39.0 URINARY TRACT INFECTION WITH HEMATURIA, SITE UNSPECIFIED: Primary | ICD-10-CM

## 2022-06-13 DIAGNOSIS — R31.9 URINARY TRACT INFECTION WITH HEMATURIA, SITE UNSPECIFIED: Primary | ICD-10-CM

## 2022-06-13 LAB
ALBUMIN SERPL-MCNC: 4 G/DL (ref 3.5–5.2)
ALBUMIN/GLOB SERPL: 1.5 G/DL
ALP SERPL-CCNC: 89 U/L (ref 39–117)
ALT SERPL W P-5'-P-CCNC: 23 U/L (ref 1–33)
ANION GAP SERPL CALCULATED.3IONS-SCNC: 10 MMOL/L (ref 5–15)
AST SERPL-CCNC: 19 U/L (ref 1–32)
BACTERIA UR QL AUTO: ABNORMAL /HPF
BASOPHILS # BLD AUTO: 0.05 10*3/MM3 (ref 0–0.2)
BASOPHILS NFR BLD AUTO: 0.7 % (ref 0–1.5)
BILIRUB SERPL-MCNC: 0.2 MG/DL (ref 0–1.2)
BILIRUB UR QL STRIP: NEGATIVE
BUN SERPL-MCNC: 15 MG/DL (ref 6–20)
BUN/CREAT SERPL: 14.6 (ref 7–25)
CALCIUM SPEC-SCNC: 9.2 MG/DL (ref 8.6–10.5)
CHLORIDE SERPL-SCNC: 105 MMOL/L (ref 98–107)
CK SERPL-CCNC: 92 U/L (ref 20–180)
CLARITY UR: CLEAR
CO2 SERPL-SCNC: 24 MMOL/L (ref 22–29)
COLOR UR: YELLOW
CREAT SERPL-MCNC: 1.03 MG/DL (ref 0.57–1)
DEPRECATED RDW RBC AUTO: 41.7 FL (ref 37–54)
EGFRCR SERPLBLD CKD-EPI 2021: 68.1 ML/MIN/1.73
EOSINOPHIL # BLD AUTO: 0.46 10*3/MM3 (ref 0–0.4)
EOSINOPHIL NFR BLD AUTO: 6.2 % (ref 0.3–6.2)
ERYTHROCYTE [DISTWIDTH] IN BLOOD BY AUTOMATED COUNT: 13.1 % (ref 12.3–15.4)
GLOBULIN UR ELPH-MCNC: 2.6 GM/DL
GLUCOSE SERPL-MCNC: 96 MG/DL (ref 65–99)
GLUCOSE UR STRIP-MCNC: NEGATIVE MG/DL
HCT VFR BLD AUTO: 36.5 % (ref 34–46.6)
HGB BLD-MCNC: 12.5 G/DL (ref 12–15.9)
HGB UR QL STRIP.AUTO: ABNORMAL
HYALINE CASTS UR QL AUTO: ABNORMAL /LPF
IMM GRANULOCYTES # BLD AUTO: 0.02 10*3/MM3 (ref 0–0.05)
IMM GRANULOCYTES NFR BLD AUTO: 0.3 % (ref 0–0.5)
KETONES UR QL STRIP: NEGATIVE
LEUKOCYTE ESTERASE UR QL STRIP.AUTO: ABNORMAL
LYMPHOCYTES # BLD AUTO: 2.66 10*3/MM3 (ref 0.7–3.1)
LYMPHOCYTES NFR BLD AUTO: 36 % (ref 19.6–45.3)
MAGNESIUM SERPL-MCNC: 2 MG/DL (ref 1.6–2.6)
MCH RBC QN AUTO: 30.3 PG (ref 26.6–33)
MCHC RBC AUTO-ENTMCNC: 34.2 G/DL (ref 31.5–35.7)
MCV RBC AUTO: 88.4 FL (ref 79–97)
MONOCYTES # BLD AUTO: 0.48 10*3/MM3 (ref 0.1–0.9)
MONOCYTES NFR BLD AUTO: 6.5 % (ref 5–12)
NEUTROPHILS NFR BLD AUTO: 3.71 10*3/MM3 (ref 1.7–7)
NEUTROPHILS NFR BLD AUTO: 50.3 % (ref 42.7–76)
NITRITE UR QL STRIP: POSITIVE
NRBC BLD AUTO-RTO: 0 /100 WBC (ref 0–0.2)
NT-PROBNP SERPL-MCNC: 77.8 PG/ML (ref 0–450)
PH UR STRIP.AUTO: 5.5 [PH] (ref 5–9)
PLATELET # BLD AUTO: 234 10*3/MM3 (ref 140–450)
PMV BLD AUTO: 11.6 FL (ref 6–12)
POTASSIUM SERPL-SCNC: 4.2 MMOL/L (ref 3.5–5.2)
PROT SERPL-MCNC: 6.6 G/DL (ref 6–8.5)
PROT UR QL STRIP: NEGATIVE
RBC # BLD AUTO: 4.13 10*6/MM3 (ref 3.77–5.28)
RBC # UR STRIP: ABNORMAL /HPF
REF LAB TEST METHOD: ABNORMAL
SODIUM SERPL-SCNC: 139 MMOL/L (ref 136–145)
SP GR UR STRIP: 1.02 (ref 1–1.03)
SQUAMOUS #/AREA URNS HPF: ABNORMAL /HPF
TROPONIN T SERPL-MCNC: <0.01 NG/ML (ref 0–0.03)
UROBILINOGEN UR QL STRIP: ABNORMAL
WBC # UR STRIP: ABNORMAL /HPF
WBC NRBC COR # BLD: 7.38 10*3/MM3 (ref 3.4–10.8)

## 2022-06-13 PROCEDURE — 93005 ELECTROCARDIOGRAM TRACING: CPT | Performed by: STUDENT IN AN ORGANIZED HEALTH CARE EDUCATION/TRAINING PROGRAM

## 2022-06-13 PROCEDURE — G0153 HHCP-SVS OF S/L PATH,EA 15MN: HCPCS

## 2022-06-13 PROCEDURE — 99283 EMERGENCY DEPT VISIT LOW MDM: CPT

## 2022-06-13 PROCEDURE — 84484 ASSAY OF TROPONIN QUANT: CPT | Performed by: STUDENT IN AN ORGANIZED HEALTH CARE EDUCATION/TRAINING PROGRAM

## 2022-06-13 PROCEDURE — 87086 URINE CULTURE/COLONY COUNT: CPT | Performed by: STUDENT IN AN ORGANIZED HEALTH CARE EDUCATION/TRAINING PROGRAM

## 2022-06-13 PROCEDURE — 81001 URINALYSIS AUTO W/SCOPE: CPT | Performed by: STUDENT IN AN ORGANIZED HEALTH CARE EDUCATION/TRAINING PROGRAM

## 2022-06-13 PROCEDURE — 83735 ASSAY OF MAGNESIUM: CPT | Performed by: STUDENT IN AN ORGANIZED HEALTH CARE EDUCATION/TRAINING PROGRAM

## 2022-06-13 PROCEDURE — 82550 ASSAY OF CK (CPK): CPT | Performed by: STUDENT IN AN ORGANIZED HEALTH CARE EDUCATION/TRAINING PROGRAM

## 2022-06-13 PROCEDURE — 93010 ELECTROCARDIOGRAM REPORT: CPT | Performed by: INTERNAL MEDICINE

## 2022-06-13 PROCEDURE — 87186 SC STD MICRODIL/AGAR DIL: CPT | Performed by: STUDENT IN AN ORGANIZED HEALTH CARE EDUCATION/TRAINING PROGRAM

## 2022-06-13 PROCEDURE — 87077 CULTURE AEROBIC IDENTIFY: CPT | Performed by: STUDENT IN AN ORGANIZED HEALTH CARE EDUCATION/TRAINING PROGRAM

## 2022-06-13 PROCEDURE — 85025 COMPLETE CBC W/AUTO DIFF WBC: CPT | Performed by: STUDENT IN AN ORGANIZED HEALTH CARE EDUCATION/TRAINING PROGRAM

## 2022-06-13 PROCEDURE — 80053 COMPREHEN METABOLIC PANEL: CPT | Performed by: STUDENT IN AN ORGANIZED HEALTH CARE EDUCATION/TRAINING PROGRAM

## 2022-06-13 PROCEDURE — 83880 ASSAY OF NATRIURETIC PEPTIDE: CPT | Performed by: STUDENT IN AN ORGANIZED HEALTH CARE EDUCATION/TRAINING PROGRAM

## 2022-06-13 RX ORDER — CEPHALEXIN 500 MG/1
500 CAPSULE ORAL 2 TIMES DAILY
Qty: 10 CAPSULE | Refills: 0 | Status: SHIPPED | OUTPATIENT
Start: 2022-06-13 | End: 2022-06-18

## 2022-06-13 RX ADMIN — SODIUM CHLORIDE, POTASSIUM CHLORIDE, SODIUM LACTATE AND CALCIUM CHLORIDE 1000 ML: 600; 310; 30; 20 INJECTION, SOLUTION INTRAVENOUS at 18:07

## 2022-06-13 NOTE — ED NOTES
Pt discharged to waiting room.  This rn called [place], spoke with jhon -- informed on need of RX .  Jhon states they will also  pt

## 2022-06-13 NOTE — ED PROVIDER NOTES
Subjective   46-year-old female comes to the ER chief complaint of feeling lightheaded.  Started around 1 PM this afternoon.  No syncope or near syncope.  Patient reports she was outside most of the day.  She reports not drinking a lot of fluids.  She also endorses feeling fatigued.  She denies pain anywhere or other symptoms.      History provided by:  Patient   used: No        Review of Systems   Constitutional: Negative for chills and fever.   HENT: Negative for drooling.    Eyes: Negative for redness.   Respiratory: Negative for cough, chest tightness, shortness of breath and wheezing.    Cardiovascular: Negative for chest pain and palpitations.   Gastrointestinal: Negative for abdominal pain, nausea and vomiting.   Genitourinary: Negative for flank pain.   Skin: Negative for color change.   Neurological: Positive for light-headedness. Negative for dizziness, seizures, syncope, weakness, numbness and headaches.   Psychiatric/Behavioral: Negative for confusion.       Past Medical History:   Diagnosis Date   • Abdominal pain     Chronic RLQ, RUQ, R flank comes and goes.    • Abdominal pain, right lower quadrant    • Acute bilateral otitis media    • Allergic rhinitis    • Backache     Upper back.   • Candidiasis of skin    • Cellulitis of neck    • Chest pain    • Contraception    • Cough    • Dysfunction of eustachian tube    • Dyspareunia, female    • Elevated cholesterol    • Excessive or frequent menstruation    • Flank pain    • Generalized aches and pains    • Headache    • Health maintenance examination    • Hypertension    • Infection of skin and subcutaneous tissue    • Irregular periods    • Kidney stone     Poss   • Menorrhagia    • Nausea vomiting and diarrhea    • Obesity    • Open wound of abdominal wall    • Otalgia    • Pain in left foot    • Pain in unspecified hand    • Removed Impacted cerumen 06/05/2012   • Rhinitis    • Seizure ([facility]) 08/15/2019   • Shoulder pain      right-sided-likely muscle strain      • Shoulder tendinitis    • Spider bite wound    • Staphylococcal infection of skin    • Stroke (HCC)    • Swollen feet    • Tinea cruris    • Tobacco dependence syndrome    • Upper respiratory infection    • Urinary tract infectious disease    • Vertigo    • Visit for gynecologic examination    • Vulvovaginitis        Allergies   Allergen Reactions   • Abilify [Aripiprazole] Nausea Only   • Buspirone Rash   • Penicillins Hives and Nausea And Vomiting       Past Surgical History:   Procedure Laterality Date   •  SECTION     • DILATATION AND CURETTAGE      Secondary to incomplete spontaneous    • INCISION AND DRAINAGE ABSCESS  2012   • INJECTION OF MEDICATION  2015    Phenergan (1)     • INJECTION OF MEDICATION  10/10/2013    Toradol (2)      • INJECTION OF MEDICATION  2014    Zofran (1)          Family History   Problem Relation Age of Onset   • Breast cancer Mother    • Thyroid disease Mother    • Hypertension Mother    • Heart disease Mother    • Arthritis Mother    • Liver disease Father    • Endometrial cancer Sister    • Anxiety disorder Brother    • Mental illness Daughter    • Depression Daughter    • Diabetes Maternal Aunt    • Diabetes Maternal Uncle    • Stroke Maternal Grandmother    • Breast cancer Maternal Grandmother    • Cancer Maternal Grandfather    • Stroke Paternal Grandmother    • Depression Other    • Cancer Other         Colorectal Cancer   • Endometrial cancer Other    • Lung cancer Other        Social History     Socioeconomic History   • Marital status: Single   Tobacco Use   • Smoking status: Current Every Day Smoker     Packs/day: 0.50     Types: Cigarettes   • Smokeless tobacco: Never Used   Vaping Use   • Vaping Use: Some days   • Substances: Nicotine   • Devices: Refillable tank   • Passive vaping exposure: Yes   Substance and Sexual Activity   • Alcohol use: No   • Drug use: No   • Sexual activity: Not Currently  "          Objective    Vitals:    06/13/22 1534 06/13/22 1815 06/13/22 1816   BP: 113/76 102/55 120/69   BP Location:  Left arm Left arm   Patient Position:  Lying Sitting   Pulse: 72 58 59   Resp: 18 16 16   Temp: 98.2 °F (36.8 °C)     TempSrc: Oral     SpO2: 99% 99% 100%   Weight: 100 kg (220 lb 9.6 oz)     Height: 152.4 cm (60\")         Physical Exam  Vitals and nursing note reviewed.   Constitutional:       General: She is not in acute distress.     Appearance: She is well-developed. She is obese. She is not ill-appearing, toxic-appearing or diaphoretic.   HENT:      Head: Normocephalic.      Right Ear: External ear normal.      Left Ear: External ear normal.      Mouth/Throat:      Mouth: Mucous membranes are moist.   Eyes:      General: No scleral icterus.     Conjunctiva/sclera: Conjunctivae normal.   Pulmonary:      Effort: Pulmonary effort is normal. No accessory muscle usage or respiratory distress.      Breath sounds: No wheezing.   Chest:      Chest wall: No tenderness.   Abdominal:      General: Bowel sounds are normal.      Palpations: Abdomen is soft.      Tenderness: There is no abdominal tenderness (deep palpation).   Musculoskeletal:         General: Normal range of motion.   Skin:     General: Skin is warm and dry.      Capillary Refill: Capillary refill takes less than 2 seconds.   Neurological:      Mental Status: She is alert and oriented to person, place, and time.      Sensory: No sensory deficit.      Motor: No weakness.   Psychiatric:         Behavior: Behavior normal.         ECG 12 Lead      Date/Time: 6/13/2022 5:16 PM  Performed by: Juvenal Chand MD  Authorized by: Juvenal Chand MD   Interpreted by physician  Rhythm: sinus bradycardia  Rate: bradycardic  BPM: 53  QRS axis: normal  ST segment elevation noted on lead: none.  Clinical impression: abnormal ECG                 ED Course      Results for orders placed or performed during the hospital encounter of 06/13/22 "   Comprehensive Metabolic Panel    Specimen: Blood   Result Value Ref Range    Glucose 96 65 - 99 mg/dL    BUN 15 6 - 20 mg/dL    Creatinine 1.03 (H) 0.57 - 1.00 mg/dL    Sodium 139 136 - 145 mmol/L    Potassium 4.2 3.5 - 5.2 mmol/L    Chloride 105 98 - 107 mmol/L    CO2 24.0 22.0 - 29.0 mmol/L    Calcium 9.2 8.6 - 10.5 mg/dL    Total Protein 6.6 6.0 - 8.5 g/dL    Albumin 4.00 3.50 - 5.20 g/dL    ALT (SGPT) 23 1 - 33 U/L    AST (SGOT) 19 1 - 32 U/L    Alkaline Phosphatase 89 39 - 117 U/L    Total Bilirubin 0.2 0.0 - 1.2 mg/dL    Globulin 2.6 gm/dL    A/G Ratio 1.5 g/dL    BUN/Creatinine Ratio 14.6 7.0 - 25.0    Anion Gap 10.0 5.0 - 15.0 mmol/L    eGFR 68.1 >60.0 mL/min/1.73   Urinalysis With Microscopic If Indicated (No Culture) - Urine, Clean Catch    Specimen: Urine, Clean Catch   Result Value Ref Range    Color, UA Yellow Yellow, Straw, Dark Yellow, Kavita    Appearance, UA Clear Clear    pH, UA 5.5 5.0 - 9.0    Specific Gravity, UA 1.017 1.003 - 1.030    Glucose, UA Negative Negative    Ketones, UA Negative Negative    Bilirubin, UA Negative Negative    Blood, UA Trace (A) Negative    Protein, UA Negative Negative    Leuk Esterase, UA Moderate (2+) (A) Negative    Nitrite, UA Positive (A) Negative    Urobilinogen, UA 0.2 E.U./dL 0.2 - 1.0 E.U./dL   BNP    Specimen: Blood   Result Value Ref Range    proBNP 77.8 0.0 - 450.0 pg/mL   Troponin    Specimen: Blood   Result Value Ref Range    Troponin T <0.010 0.000 - 0.030 ng/mL   CK    Specimen: Blood   Result Value Ref Range    Creatine Kinase 92 20 - 180 U/L   Magnesium    Specimen: Blood   Result Value Ref Range    Magnesium 2.0 1.6 - 2.6 mg/dL   CBC Auto Differential    Specimen: Blood   Result Value Ref Range    WBC 7.38 3.40 - 10.80 10*3/mm3    RBC 4.13 3.77 - 5.28 10*6/mm3    Hemoglobin 12.5 12.0 - 15.9 g/dL    Hematocrit 36.5 34.0 - 46.6 %    MCV 88.4 79.0 - 97.0 fL    MCH 30.3 26.6 - 33.0 pg    MCHC 34.2 31.5 - 35.7 g/dL    RDW 13.1 12.3 - 15.4 %    RDW-SD  41.7 37.0 - 54.0 fl    MPV 11.6 6.0 - 12.0 fL    Platelets 234 140 - 450 10*3/mm3    Neutrophil % 50.3 42.7 - 76.0 %    Lymphocyte % 36.0 19.6 - 45.3 %    Monocyte % 6.5 5.0 - 12.0 %    Eosinophil % 6.2 0.3 - 6.2 %    Basophil % 0.7 0.0 - 1.5 %    Immature Grans % 0.3 0.0 - 0.5 %    Neutrophils, Absolute 3.71 1.70 - 7.00 10*3/mm3    Lymphocytes, Absolute 2.66 0.70 - 3.10 10*3/mm3    Monocytes, Absolute 0.48 0.10 - 0.90 10*3/mm3    Eosinophils, Absolute 0.46 (H) 0.00 - 0.40 10*3/mm3    Basophils, Absolute 0.05 0.00 - 0.20 10*3/mm3    Immature Grans, Absolute 0.02 0.00 - 0.05 10*3/mm3    nRBC 0.0 0.0 - 0.2 /100 WBC   Urinalysis, Microscopic Only - Urine, Clean Catch    Specimen: Urine, Clean Catch   Result Value Ref Range    RBC, UA 6-12 (A) None Seen /HPF    WBC, UA Too Numerous to Count (A) None Seen, 0-2, 3-5 /HPF    Bacteria, UA 3+ (A) None Seen /HPF    Squamous Epithelial Cells, UA 0-2 None Seen, 0-2 /HPF    Hyaline Casts, UA 0-2 None Seen /LPF    Methodology Automated Microscopy            MDM  Number of Diagnoses or Management Options  Urinary tract infection with hematuria, site unspecified: new and requires workup  Diagnosis management comments: Vital signs are stable, afebrile.  Neurologically intact.  UA is leukocyte and nitrite positive.  Culture pending.  Will call in Keflex to the patient's pharmacy.  Orthostatics are negative.  Recommend follow-up with her PCP.  Return precautions given.  Patient states understanding and is agreeable to the plan.      Final diagnoses:   Urinary tract infection with hematuria, site unspecified       ED Disposition  ED Disposition     ED Disposition   Discharge    Condition   Stable    Comment   --             Jonel Cr MD  [facility] [address]   [address] [facility] [address] [phone]    Schedule an appointment as soon as possible for a visit in 2 days  ER follow up         Medication List      New Prescriptions    cephalexin 500 MG capsule  Commonly known as:  KEFLEX  Take 1 capsule by mouth 2 (Two) Times a Day for 5 days.           Where to Get Your Medications      These medications were sent to [facility] - [place], [place] - [address] - [phone]  - [phone]   [address] [facility] [place] [place] [address]    Phone: [phone]   · cephalexin 500 MG capsule            Juvenal Chand MD  06/13/22 [id number]

## 2022-06-14 LAB
QT INTERVAL: 458 MS
QTC INTERVAL: 429 MS

## 2022-06-15 VITALS
RESPIRATION RATE: 17 BRPM | HEART RATE: 66 BPM | OXYGEN SATURATION: 98 % | SYSTOLIC BLOOD PRESSURE: 120 MMHG | DIASTOLIC BLOOD PRESSURE: 68 MMHG | TEMPERATURE: 98 F

## 2022-06-15 VITALS
HEART RATE: 72 BPM | RESPIRATION RATE: 20 BRPM | DIASTOLIC BLOOD PRESSURE: 72 MMHG | OXYGEN SATURATION: 97 % | SYSTOLIC BLOOD PRESSURE: 132 MMHG

## 2022-06-15 LAB — BACTERIA SPEC AEROBE CULT: ABNORMAL

## 2022-06-15 NOTE — HOME HEALTH
Reason for referral:  Pt with decrease in ability to communicate effectively after recent hospitalization    Past medical history:  Pt with hx of CVA, seizures    PLOF:  Pt lived independently prior to CVA.  Pt [facility] in a [facility].  Pt is independent in [facility].    Pt Goal:  Pt wants to improve her communication and memory     Medical Necessity:  Pt with cognitive communication deficits impacting her communication both receptive/expressive, problem solving, and memory.  Pt required skilled ST to implement strategies and compensations to improve Pt's independence and safety.

## 2022-06-17 ENCOUNTER — OFFICE VISIT (OUTPATIENT)
Dept: NEUROLOGY | Facility: TELEHEALTH | Age: 47
End: 2022-06-17

## 2022-06-17 VITALS
HEIGHT: 60 IN | WEIGHT: 218.4 LBS | SYSTOLIC BLOOD PRESSURE: 128 MMHG | HEART RATE: 74 BPM | DIASTOLIC BLOOD PRESSURE: 76 MMHG | BODY MASS INDEX: 42.88 KG/M2 | RESPIRATION RATE: 18 BRPM

## 2022-06-17 DIAGNOSIS — Z86.73 HISTORY OF ISCHEMIC LEFT MCA STROKE: Primary | ICD-10-CM

## 2022-06-17 DIAGNOSIS — I63.512 ACUTE ISCHEMIC LEFT MCA STROKE: ICD-10-CM

## 2022-06-17 PROCEDURE — 99215 OFFICE O/P EST HI 40 MIN: CPT | Performed by: NURSE PRACTITIONER

## 2022-06-17 RX ORDER — FLUTICASONE PROPIONATE 50 MCG
SPRAY, SUSPENSION (ML) NASAL
COMMUNITY
Start: 2022-06-03 | End: 2022-06-17 | Stop reason: SDUPTHER

## 2022-06-17 RX ORDER — GUAIFENESIN AND DEXTROMETHORPHAN HYDROBROMIDE 100; 10 MG/5ML; MG/5ML
SOLUTION ORAL
COMMUNITY
Start: 2022-06-03 | End: 2022-06-17 | Stop reason: SDUPTHER

## 2022-06-17 NOTE — PROGRESS NOTES
Stroke Progress Note       Chief Complaint: Stroke follow-up    Subjective     HPI:  Pt is a 46-yr-old right-handed white female with known diagnosis of hyperlipidemia, Bipolar, seizures, smoker, and mental disability who lives independently with daughter per mother who was hospitalized on 12/15 after a large left MCA stroke. She currently resides at [facility]. Today she states feeling well. She is accompanied by her , Buzz. She still has some expressive aphasia. Strengths are equal, denies numbness, and visual field is intact. She is walking with a walker today and stated she has been d/c'd by therapy.     Review of Systems   HENT: Negative.    Eyes: Negative.    Respiratory: Negative.    Cardiovascular: Negative.    Gastrointestinal: Negative.    Genitourinary: Negative.    Musculoskeletal: Negative.    Skin: Negative.    Neurological: Negative.    Psychiatric/Behavioral: Negative.         Objective      BP: ()/()   Arterial Line BP: ()/()     Neurological Exam  Mental Status  Alert. Oriented to person, place and time. Speech is normal. Expressive aphasia present.    Cranial Nerves  CN II: Visual acuity is normal. Visual fields full to confrontation.  CN III, IV, VI: Extraocular movements intact bilaterally. Normal lids and orbits bilaterally. Pupils equal round and reactive to light bilaterally.  CN V: Facial sensation is normal.  CN VII: Full and symmetric facial movement.  CN IX, X: Palate elevates symmetrically. Normal gag reflex.  CN XI: Shoulder shrug strength is normal.  CN XII: Tongue midline without atrophy or fasciculations.    Motor  Normal muscle bulk throughout. No fasciculations present. Strength is 5/5 throughout all four extremities.    Sensory  Sensation is intact to light touch, pinprick, vibration and proprioception in all four extremities.    Reflexes                                            Right                      Left  Brachioradialis                    1+                          1+  Biceps                                 1+                         1+  Patellar                                1+                         1+    Coordination    Finger-to-nose, rapid alternating movements and heel-to-shin normal bilaterally without dysmetria.    Gait    Walks with walker.      Physical Exam  Vitals reviewed.   Eyes:      General: Lids are normal.      Extraocular Movements: Extraocular movements intact.      Pupils: Pupils are equal, round, and reactive to light.   Pulmonary:      Effort: Pulmonary effort is normal.   Musculoskeletal:         General: Normal range of motion.   Skin:     General: Skin is warm and dry.   Neurological:      General: No focal deficit present.      Mental Status: She is alert.      Coordination: Coordination is intact.      Deep Tendon Reflexes: Strength normal.      Reflex Scores:       Bicep reflexes are 1+ on the right side and 1+ on the left side.       Brachioradialis reflexes are 1+ on the right side and 1+ on the left side.       Patellar reflexes are 1+ on the right side and 1+ on the left side.  Psychiatric:         Mood and Affect: Mood normal.         Speech: Speech normal.         Results Review:    I reviewed the patient's new clinical results.    Lab Results (last 24 hours)     ** No results found for the last 24 hours. **        No radiology results for the last day  Results for orders placed during the hospital encounter of 12/15/21    Adult Transthoracic Echo Complete W/ Cont if Necessary Per Protocol    Interpretation Summary  · Left ventricular ejection fraction appears to be 56 - 60%.  · Left ventricular diastolic function was normal.  · Left ventricular wall thickness is consistent with borderline concentric hypertrophy.  · Saline test results are negative.  · Estimated right ventricular systolic pressure from tricuspid regurgitation is normal (<35 mmHg).        Assessment/Plan     Assessment/Plan: Pt is a 46-yr-old  right-handed white female with known diagnosis of hyperlipidemia, Bipolar, seizures, smoker, and mental disability who lives independently with daughter per mother who was hospitalized on 12/15 after a large left MCA stroke. She currently resides at [facility]. Today she states feeling well. She is accompanied by her , Buzz. She still has some expressive aphasia. Strengths are equal, denies numbness, and visual field is intact. She is walking with a walker today and stated she has been d/c'd by therapy.   1. History of left MCA stroke- Instructed on stroke risk factors, prevention, and s/s to call 911. Pt verbalized understanding and states she has quit smoking. Instructed to continue aspirin, and statin for secondary stroke prevention. Pt states she has been having seizures, referral was sent to Dr. Mckeon in [place] for management.  stated she would send current list of medications and records that are needed to Dr. Mckeon's office.           Patient Active Problem List   Diagnosis   • Nicotine abuse   • Other chest pain   • Acute maxillary sinusitis   • Bipolar affective disorder ([facility])   • Decreased pulses in feet   • Flank pain   • Hematochezia   • High cholesterol   • Migraine without aura and with status migrainosus, not intractable   • Migraine without status migrainosus, not intractable   • Neck pain   • Nephrolithiasis   • Neuropathy   • Chronic pain   • Primary osteoarthritis involving multiple joints   • Sciatic leg pain   • Screening for diabetes mellitus (DM)   • Seasonal allergies   • Stress   • Tobacco use disorder   • Urinary incontinence without sensory awareness   • Vitamin D deficiency   • Carpal tunnel syndrome of right wrist   • Seizure ([facility])   • Intractable vomiting   • Acute UTI (urinary tract infection)   • Cholelithiasis   • GI bleed   • Kidney stone   • Nephrolithiasis   • ELBERT (acute kidney injury) ([facility])   • Infection due to ESBL-producing  Escherichia coli   • CVA (cerebral vascular accident) (H[facility])   • Dysphagia due to recent stroke   • Slurred speech   • Cerebrovascular accident (CVA) (HCC)   • Aphasia due to acute cerebrovascular accident (CVA) (HCC)   • Methamphetamine abuse (HCC)   • Seizure disorder (HCC)   • UTI (urinary tract infection) due to urinary indwelling catheter (HCC)   • Urinary tract infection due to extended-spectrum beta lactamase (ESBL) producing Escherichia coli   • Urinary tract infection due to extended-spectrum beta lactamase (ESBL) producing Escherichia coli   • Metabolic acidosis, NAG, bicarbonate losses   • Midline shift of brain due to stroke   • Nonintractable headache   • Weakness   • Dysarthria           ALPHONSE Martinez  06/17/22  10:36 CDT        I spent 40 minutes caring for Myles Shannon  on this date of service. This time includes time spent by me in the following activities: preparing for the visit, reviewing tests, obtaining and/or reviewing a separately obtained history, performing a medically appropriate examination and/or evaluation, counseling and educating the patient/family/caregiver, ordering medications, tests, or procedures, referring and communicating with other health care professionals, documenting information in the medical record, independently interpreting results and communicating that information with the patient/family/caregiver and care coordination

## 2022-06-20 ENCOUNTER — HOME CARE VISIT (OUTPATIENT)
Dept: HOME HEALTH SERVICES | Facility: CLINIC | Age: 47
End: 2022-06-20

## 2022-06-20 PROCEDURE — G0153 HHCP-SVS OF S/L PATH,EA 15MN: HCPCS

## 2022-06-21 VITALS
HEART RATE: 67 BPM | DIASTOLIC BLOOD PRESSURE: 68 MMHG | RESPIRATION RATE: 18 BRPM | OXYGEN SATURATION: 98 % | SYSTOLIC BLOOD PRESSURE: 142 MMHG | TEMPERATURE: 98 F

## 2022-06-22 ENCOUNTER — HOME CARE VISIT (OUTPATIENT)
Dept: HOME HEALTH SERVICES | Facility: CLINIC | Age: 47
End: 2022-06-22

## 2022-06-22 NOTE — HOME HEALTH
Subjective:  Pt was agreeable to ST.      Medical necessity:  Pt with cognitive communication deficits impacting safety, communication, and memory requiring skilled ST to educat and implemnet strategies to improve communication and safety.

## 2022-06-27 ENCOUNTER — HOME CARE VISIT (OUTPATIENT)
Dept: HOME HEALTH SERVICES | Facility: CLINIC | Age: 47
End: 2022-06-27

## 2022-06-27 PROCEDURE — G0153 HHCP-SVS OF S/L PATH,EA 15MN: HCPCS

## 2022-06-29 ENCOUNTER — HOME CARE VISIT (OUTPATIENT)
Dept: HOME HEALTH SERVICES | Facility: CLINIC | Age: 47
End: 2022-06-29

## 2022-06-29 VITALS
TEMPERATURE: 98 F | SYSTOLIC BLOOD PRESSURE: 112 MMHG | OXYGEN SATURATION: 97 % | RESPIRATION RATE: 17 BRPM | HEART RATE: 68 BPM | DIASTOLIC BLOOD PRESSURE: 84 MMHG

## 2022-06-29 PROCEDURE — G0153 HHCP-SVS OF S/L PATH,EA 15MN: HCPCS

## 2022-07-05 VITALS
SYSTOLIC BLOOD PRESSURE: 144 MMHG | OXYGEN SATURATION: 98 % | TEMPERATURE: 98 F | RESPIRATION RATE: 17 BRPM | DIASTOLIC BLOOD PRESSURE: 86 MMHG | HEART RATE: 78 BPM

## 2022-07-05 NOTE — HOME HEALTH
subjective:  Pt was with a staff member calming down.  She reportedly just had a panic attack due to something that happened with another resident.     Medical necessity:  Pt with cognitive communication deficits that impact her independence and ability to communicate.  Pt required skilled ST to implement strategies and compensations to improve safety.

## 2022-07-24 ENCOUNTER — APPOINTMENT (OUTPATIENT)
Dept: GENERAL RADIOLOGY | Facility: HOSPITAL | Age: 47
End: 2022-07-24

## 2022-07-24 ENCOUNTER — HOSPITAL ENCOUNTER (EMERGENCY)
Facility: HOSPITAL | Age: 47
Discharge: NURSING, REHAB OR LONG-TERM CARE | End: 2022-07-24
Attending: STUDENT IN AN ORGANIZED HEALTH CARE EDUCATION/TRAINING PROGRAM | Admitting: EMERGENCY MEDICINE

## 2022-07-24 VITALS
RESPIRATION RATE: 14 BRPM | BODY MASS INDEX: 39.27 KG/M2 | DIASTOLIC BLOOD PRESSURE: 72 MMHG | TEMPERATURE: 97.7 F | SYSTOLIC BLOOD PRESSURE: 121 MMHG | WEIGHT: 200 LBS | HEIGHT: 60 IN | OXYGEN SATURATION: 98 % | HEART RATE: 53 BPM

## 2022-07-24 DIAGNOSIS — N30.01 ACUTE CYSTITIS WITH HEMATURIA: Primary | ICD-10-CM

## 2022-07-24 LAB
ALBUMIN SERPL-MCNC: 4.3 G/DL (ref 3.5–5.2)
ALBUMIN/GLOB SERPL: 1.9 G/DL
ALP SERPL-CCNC: 94 U/L (ref 39–117)
ALT SERPL W P-5'-P-CCNC: 28 U/L (ref 1–33)
ANION GAP SERPL CALCULATED.3IONS-SCNC: 8 MMOL/L (ref 5–15)
AST SERPL-CCNC: 25 U/L (ref 1–32)
BACTERIA UR QL AUTO: ABNORMAL /HPF
BASOPHILS # BLD AUTO: 0.05 10*3/MM3 (ref 0–0.2)
BASOPHILS NFR BLD AUTO: 0.6 % (ref 0–1.5)
BILIRUB SERPL-MCNC: 0.2 MG/DL (ref 0–1.2)
BILIRUB UR QL STRIP: NEGATIVE
BUN SERPL-MCNC: 14 MG/DL (ref 6–20)
BUN/CREAT SERPL: 13.3 (ref 7–25)
CALCIUM SPEC-SCNC: 9.3 MG/DL (ref 8.6–10.5)
CHLORIDE SERPL-SCNC: 104 MMOL/L (ref 98–107)
CLARITY UR: ABNORMAL
CO2 SERPL-SCNC: 27 MMOL/L (ref 22–29)
COLOR UR: YELLOW
CREAT SERPL-MCNC: 1.05 MG/DL (ref 0.57–1)
D-LACTATE SERPL-SCNC: 1.5 MMOL/L (ref 0.5–2)
DEPRECATED RDW RBC AUTO: 44.4 FL (ref 37–54)
EGFRCR SERPLBLD CKD-EPI 2021: 66.5 ML/MIN/1.73
EOSINOPHIL # BLD AUTO: 0.25 10*3/MM3 (ref 0–0.4)
EOSINOPHIL NFR BLD AUTO: 2.9 % (ref 0.3–6.2)
ERYTHROCYTE [DISTWIDTH] IN BLOOD BY AUTOMATED COUNT: 13.5 % (ref 12.3–15.4)
GLOBULIN UR ELPH-MCNC: 2.3 GM/DL
GLUCOSE SERPL-MCNC: 97 MG/DL (ref 65–99)
GLUCOSE UR STRIP-MCNC: NEGATIVE MG/DL
HCT VFR BLD AUTO: 38.3 % (ref 34–46.6)
HGB BLD-MCNC: 13 G/DL (ref 12–15.9)
HGB UR QL STRIP.AUTO: ABNORMAL
HOLD SPECIMEN: NORMAL
HYALINE CASTS UR QL AUTO: ABNORMAL /LPF
IMM GRANULOCYTES # BLD AUTO: 0.03 10*3/MM3 (ref 0–0.05)
IMM GRANULOCYTES NFR BLD AUTO: 0.4 % (ref 0–0.5)
KETONES UR QL STRIP: NEGATIVE
LEUKOCYTE ESTERASE UR QL STRIP.AUTO: ABNORMAL
LYMPHOCYTES # BLD AUTO: 2.9 10*3/MM3 (ref 0.7–3.1)
LYMPHOCYTES NFR BLD AUTO: 34.1 % (ref 19.6–45.3)
MCH RBC QN AUTO: 30.8 PG (ref 26.6–33)
MCHC RBC AUTO-ENTMCNC: 33.9 G/DL (ref 31.5–35.7)
MCV RBC AUTO: 90.8 FL (ref 79–97)
MONOCYTES # BLD AUTO: 0.46 10*3/MM3 (ref 0.1–0.9)
MONOCYTES NFR BLD AUTO: 5.4 % (ref 5–12)
NEUTROPHILS NFR BLD AUTO: 4.81 10*3/MM3 (ref 1.7–7)
NEUTROPHILS NFR BLD AUTO: 56.6 % (ref 42.7–76)
NITRITE UR QL STRIP: POSITIVE
NRBC BLD AUTO-RTO: 0 /100 WBC (ref 0–0.2)
PH UR STRIP.AUTO: 6.5 [PH] (ref 5–9)
PLATELET # BLD AUTO: 253 10*3/MM3 (ref 140–450)
PMV BLD AUTO: 11.2 FL (ref 6–12)
POTASSIUM SERPL-SCNC: 4 MMOL/L (ref 3.5–5.2)
PROT SERPL-MCNC: 6.6 G/DL (ref 6–8.5)
PROT UR QL STRIP: NEGATIVE
RBC # BLD AUTO: 4.22 10*6/MM3 (ref 3.77–5.28)
RBC # UR STRIP: ABNORMAL /HPF
REF LAB TEST METHOD: ABNORMAL
SODIUM SERPL-SCNC: 139 MMOL/L (ref 136–145)
SP GR UR STRIP: 1.01 (ref 1–1.03)
SQUAMOUS #/AREA URNS HPF: ABNORMAL /HPF
UROBILINOGEN UR QL STRIP: ABNORMAL
WBC # UR STRIP: ABNORMAL /HPF
WBC NRBC COR # BLD: 8.5 10*3/MM3 (ref 3.4–10.8)
WHOLE BLOOD HOLD COAG: NORMAL

## 2022-07-24 PROCEDURE — 83605 ASSAY OF LACTIC ACID: CPT | Performed by: NURSE PRACTITIONER

## 2022-07-24 PROCEDURE — 99283 EMERGENCY DEPT VISIT LOW MDM: CPT

## 2022-07-24 PROCEDURE — 80053 COMPREHEN METABOLIC PANEL: CPT | Performed by: NURSE PRACTITIONER

## 2022-07-24 PROCEDURE — 25010000002 LEVOFLOXACIN PER 250 MG: Performed by: NURSE PRACTITIONER

## 2022-07-24 PROCEDURE — 25010000002 MORPHINE PER 10 MG: Performed by: NURSE PRACTITIONER

## 2022-07-24 PROCEDURE — 36415 COLL VENOUS BLD VENIPUNCTURE: CPT | Performed by: NURSE PRACTITIONER

## 2022-07-24 PROCEDURE — 96375 TX/PRO/DX INJ NEW DRUG ADDON: CPT

## 2022-07-24 PROCEDURE — 96365 THER/PROPH/DIAG IV INF INIT: CPT

## 2022-07-24 PROCEDURE — 85025 COMPLETE CBC W/AUTO DIFF WBC: CPT | Performed by: NURSE PRACTITIONER

## 2022-07-24 PROCEDURE — 87040 BLOOD CULTURE FOR BACTERIA: CPT | Performed by: NURSE PRACTITIONER

## 2022-07-24 PROCEDURE — 74018 RADEX ABDOMEN 1 VIEW: CPT

## 2022-07-24 PROCEDURE — 87077 CULTURE AEROBIC IDENTIFY: CPT | Performed by: NURSE PRACTITIONER

## 2022-07-24 PROCEDURE — 87086 URINE CULTURE/COLONY COUNT: CPT | Performed by: NURSE PRACTITIONER

## 2022-07-24 PROCEDURE — 87186 SC STD MICRODIL/AGAR DIL: CPT | Performed by: NURSE PRACTITIONER

## 2022-07-24 PROCEDURE — 25010000002 ONDANSETRON PER 1 MG: Performed by: NURSE PRACTITIONER

## 2022-07-24 PROCEDURE — 81001 URINALYSIS AUTO W/SCOPE: CPT | Performed by: NURSE PRACTITIONER

## 2022-07-24 RX ORDER — KETOROLAC TROMETHAMINE 15 MG/ML
15 INJECTION, SOLUTION INTRAMUSCULAR; INTRAVENOUS ONCE AS NEEDED
Status: DISCONTINUED | OUTPATIENT
Start: 2022-07-24 | End: 2022-07-24 | Stop reason: HOSPADM

## 2022-07-24 RX ORDER — LEVOFLOXACIN 5 MG/ML
500 INJECTION, SOLUTION INTRAVENOUS ONCE
Status: COMPLETED | OUTPATIENT
Start: 2022-07-24 | End: 2022-07-24

## 2022-07-24 RX ORDER — ONDANSETRON 2 MG/ML
4 INJECTION INTRAMUSCULAR; INTRAVENOUS ONCE
Status: COMPLETED | OUTPATIENT
Start: 2022-07-24 | End: 2022-07-24

## 2022-07-24 RX ORDER — CIPROFLOXACIN 500 MG/1
500 TABLET, FILM COATED ORAL 2 TIMES DAILY
Qty: 10 TABLET | Refills: 0 | Status: SHIPPED | OUTPATIENT
Start: 2022-07-24 | End: 2022-07-29

## 2022-07-24 RX ORDER — SODIUM CHLORIDE 0.9 % (FLUSH) 0.9 %
10 SYRINGE (ML) INJECTION AS NEEDED
Status: DISCONTINUED | OUTPATIENT
Start: 2022-07-24 | End: 2022-07-24 | Stop reason: HOSPADM

## 2022-07-24 RX ADMIN — MORPHINE SULFATE 4 MG: 4 INJECTION INTRAVENOUS at 17:04

## 2022-07-24 RX ADMIN — ONDANSETRON 4 MG: 2 INJECTION INTRAMUSCULAR; INTRAVENOUS at 17:03

## 2022-07-24 RX ADMIN — LEVOFLOXACIN 500 MG: 5 INJECTION, SOLUTION INTRAVENOUS at 19:19

## 2022-07-24 RX ADMIN — SODIUM CHLORIDE 1000 ML: 9 INJECTION, SOLUTION INTRAVENOUS at 16:27

## 2022-07-24 NOTE — ED NOTES
This RN attempted Cultures X 2 unsuccessfully. Jaelyn Vaughn X 2 unsuccessfully. Spoke to Brian in lab at this time to collect.

## 2022-07-25 NOTE — ED PROVIDER NOTES
Subjective   46-year-old female in the emergency department complaining of abdominal pain.  Patient recently seen in the emergency department for same complaint.  She reports some lower abdominal cramping that started 48 hours ago and got worse today.  Patient currently resides at [facility]      History provided by:  Patient   used: No        Review of Systems   Constitutional: Negative for diaphoresis.   HENT: Negative for congestion.    Respiratory: Negative for shortness of breath.    Cardiovascular: Negative for chest pain and palpitations.   Gastrointestinal: Positive for abdominal pain and nausea. Negative for diarrhea and vomiting.   Genitourinary: Negative for flank pain.   Musculoskeletal: Negative for gait problem.   Skin: Negative for wound.   Allergic/Immunologic: Negative for immunocompromised state.   Neurological: Negative for weakness.   Hematological: Negative for adenopathy.   Psychiatric/Behavioral: Negative for confusion.   All other systems reviewed and are negative.      Past Medical History:   Diagnosis Date   • Abdominal pain     Chronic RLQ, RUQ, R flank comes and goes.    • Abdominal pain, right lower quadrant    • Acute bilateral otitis media    • Allergic rhinitis    • Backache     Upper back.   • Candidiasis of skin    • Cellulitis of neck    • Chest pain    • Contraception    • Cough    • Dysfunction of eustachian tube    • Dyspareunia, female    • Elevated cholesterol    • Excessive or frequent menstruation    • Flank pain    • Generalized aches and pains    • Headache    • Health maintenance examination    • Hypertension    • Infection of skin and subcutaneous tissue    • Irregular periods    • Kidney stone     Poss   • Menorrhagia    • Nausea vomiting and diarrhea    • Obesity    • Open wound of abdominal wall    • Otalgia    • Pain in left foot    • Pain in unspecified hand    • Removed Impacted cerumen 06/05/2012   • Rhinitis    • Seizure (HCC)  08/15/2019   • Shoulder pain     right-sided-likely muscle strain      • Shoulder tendinitis    • Spider bite wound    • Staphylococcal infection of skin    • Stroke (HCC)    • Swollen feet    • Tinea cruris    • Tobacco dependence syndrome    • Upper respiratory infection    • Urinary tract infectious disease    • Vertigo    • Visit for gynecologic examination    • Vulvovaginitis        Allergies   Allergen Reactions   • Abilify [Aripiprazole] Nausea Only   • Buspirone Rash   • Penicillins Hives and Nausea And Vomiting       Past Surgical History:   Procedure Laterality Date   •  SECTION     • DILATATION AND CURETTAGE      Secondary to incomplete spontaneous    • INCISION AND DRAINAGE ABSCESS  2012   • INJECTION OF MEDICATION  2015    Phenergan (1)     • INJECTION OF MEDICATION  10/10/2013    Toradol (2)      • INJECTION OF MEDICATION  2014    Zofran (1)          Family History   Problem Relation Age of Onset   • Breast cancer Mother    • Thyroid disease Mother    • Hypertension Mother    • Heart disease Mother    • Arthritis Mother    • Liver disease Father    • Endometrial cancer Sister    • Anxiety disorder Brother    • Mental illness Daughter    • Depression Daughter    • Diabetes Maternal Aunt    • Diabetes Maternal Uncle    • Stroke Maternal Grandmother    • Breast cancer Maternal Grandmother    • Cancer Maternal Grandfather    • Stroke Paternal Grandmother    • Depression Other    • Cancer Other         Colorectal Cancer   • Endometrial cancer Other    • Lung cancer Other        Social History     Socioeconomic History   • Marital status: Single   Tobacco Use   • Smoking status: Former Smoker     Packs/day: 0.50     Types: Cigarettes   • Smokeless tobacco: Never Used   Vaping Use   • Vaping Use: Some days   • Substances: Nicotine   • Devices: Refillable tank   • Passive vaping exposure: Yes   Substance and Sexual Activity   • Alcohol use: No   • Drug use: No   • Sexual  activity: Not Currently           Objective   Physical Exam  Vitals and nursing note reviewed.   Constitutional:       Appearance: She is well-developed.   HENT:      Head: Normocephalic.      Nose: Nose normal.   Eyes:      Conjunctiva/sclera: Conjunctivae normal.      Pupils: Pupils are equal, round, and reactive to light.   Cardiovascular:      Rate and Rhythm: Normal rate and regular rhythm.      Heart sounds: Normal heart sounds.   Pulmonary:      Effort: Pulmonary effort is normal.      Breath sounds: Normal breath sounds.   Abdominal:      Palpations: Abdomen is soft.      Tenderness: There is abdominal tenderness in the suprapubic area.   Musculoskeletal:         General: Normal range of motion.      Cervical back: Normal range of motion.   Skin:     General: Skin is warm and dry.   Neurological:      Mental Status: She is alert and oriented to person, place, and time.      GCS: GCS eye subscore is 4. GCS verbal subscore is 5. GCS motor subscore is 6.         Procedures           ED Course      XR Abdomen KUB   Final Result   Conclusion:   Small to moderate amount feces in the colon.   5 mm left renal calculus.      [id number]      Electronically signed by:  Christopher Self MD  7/24/2022 5:14 PM CDT   Workstation: [id number]        Results for orders placed or performed during the hospital encounter of 07/24/22   Urine Culture - Urine, Urine, Clean Catch    Specimen: Urine, Clean Catch   Result Value Ref Range    Urine Culture >100,000 CFU/mL Gram Negative Bacilli (A)    Comprehensive Metabolic Panel    Specimen: Blood   Result Value Ref Range    Glucose 97 65 - 99 mg/dL    BUN 14 6 - 20 mg/dL    Creatinine 1.05 (H) 0.57 - 1.00 mg/dL    Sodium 139 136 - 145 mmol/L    Potassium 4.0 3.5 - 5.2 mmol/L    Chloride 104 98 - 107 mmol/L    CO2 27.0 22.0 - 29.0 mmol/L    Calcium 9.3 8.6 - 10.5 mg/dL    Total Protein 6.6 6.0 - 8.5 g/dL    Albumin 4.30 3.50 - 5.20 g/dL    ALT (SGPT) 28 1 - 33 U/L    AST (SGOT) 25 1 - 32 U/L     Alkaline Phosphatase 94 39 - 117 U/L    Total Bilirubin 0.2 0.0 - 1.2 mg/dL    Globulin 2.3 gm/dL    A/G Ratio 1.9 g/dL    BUN/Creatinine Ratio 13.3 7.0 - 25.0    Anion Gap 8.0 5.0 - 15.0 mmol/L    eGFR 66.5 >60.0 mL/min/1.73   CBC Auto Differential    Specimen: Blood   Result Value Ref Range    WBC 8.50 3.40 - 10.80 10*3/mm3    RBC 4.22 3.77 - 5.28 10*6/mm3    Hemoglobin 13.0 12.0 - 15.9 g/dL    Hematocrit 38.3 34.0 - 46.6 %    MCV 90.8 79.0 - 97.0 fL    MCH 30.8 26.6 - 33.0 pg    MCHC 33.9 31.5 - 35.7 g/dL    RDW 13.5 12.3 - 15.4 %    RDW-SD 44.4 37.0 - 54.0 fl    MPV 11.2 6.0 - 12.0 fL    Platelets 253 140 - 450 10*3/mm3    Neutrophil % 56.6 42.7 - 76.0 %    Lymphocyte % 34.1 19.6 - 45.3 %    Monocyte % 5.4 5.0 - 12.0 %    Eosinophil % 2.9 0.3 - 6.2 %    Basophil % 0.6 0.0 - 1.5 %    Immature Grans % 0.4 0.0 - 0.5 %    Neutrophils, Absolute 4.81 1.70 - 7.00 10*3/mm3    Lymphocytes, Absolute 2.90 0.70 - 3.10 10*3/mm3    Monocytes, Absolute 0.46 0.10 - 0.90 10*3/mm3    Eosinophils, Absolute 0.25 0.00 - 0.40 10*3/mm3    Basophils, Absolute 0.05 0.00 - 0.20 10*3/mm3    Immature Grans, Absolute 0.03 0.00 - 0.05 10*3/mm3    nRBC 0.0 0.0 - 0.2 /100 WBC   Urinalysis With Microscopic If Indicated (No Culture) - Urine, Clean Catch    Specimen: Urine, Clean Catch   Result Value Ref Range    Color, UA Yellow Yellow, Straw, Dark Yellow, Kavita    Appearance, UA Cloudy (A) Clear    pH, UA 6.5 5.0 - 9.0    Specific Gravity, UA 1.013 1.003 - 1.030    Glucose, UA Negative Negative    Ketones, UA Negative Negative    Bilirubin, UA Negative Negative    Blood, UA Trace (A) Negative    Protein, UA Negative Negative    Leuk Esterase, UA Large (3+) (A) Negative    Nitrite, UA Positive (A) Negative    Urobilinogen, UA 0.2 E.U./dL 0.2 - 1.0 E.U./dL   Urinalysis, Microscopic Only - Urine, Clean Catch    Specimen: Urine, Clean Catch   Result Value Ref Range    RBC, UA 6-12 (A) None Seen /HPF    WBC, UA Too Numerous to Count (A) None Seen,  0-2, 3-5 /HPF    Bacteria, UA 3+ (A) None Seen /HPF    Squamous Epithelial Cells, UA 3-5 (A) None Seen, 0-2 /HPF    Hyaline Casts, UA None Seen None Seen /LPF    Methodology Automated Microscopy    Lactic Acid, Plasma    Specimen: Blood   Result Value Ref Range    Lactate 1.5 0.5 - 2.0 mmol/L   Gold Top - SST   Result Value Ref Range    Extra Tube Hold for add-ons.    Light Blue Top   Result Value Ref Range    Extra Tube Hold for add-ons.                                           MDM    Final diagnoses:   Acute cystitis with hematuria       ED Disposition  ED Disposition     ED Disposition   Discharge    Condition   Stable    Comment   --             Jonel Cr MD  [address]   [place] [place] [id number] [phone]    Call in 1 week           Medication List      New Prescriptions    ciprofloxacin 500 MG tablet  Commonly known as: CIPRO  Take 1 tablet by mouth 2 (Two) Times a Day for 5 days.           Where to Get Your Medications      These medications were sent to [facility] - [place], [place] - [address] - [phone]  - [phone]   [address], [facility] [address]    Phone: [phone]   · ciprofloxacin 500 MG tablet          Martinez Barrett, APRN  07/25/22 1246

## 2022-07-26 LAB — BACTERIA SPEC AEROBE CULT: ABNORMAL

## 2022-07-29 LAB
BACTERIA SPEC AEROBE CULT: NORMAL
BACTERIA SPEC AEROBE CULT: NORMAL

## 2022-08-15 ENCOUNTER — HOSPITAL ENCOUNTER (EMERGENCY)
Facility: HOSPITAL | Age: 47
Discharge: NURSING, REHAB OR LONG-TERM CARE | End: 2022-08-15
Attending: FAMILY MEDICINE | Admitting: FAMILY MEDICINE

## 2022-08-15 ENCOUNTER — APPOINTMENT (OUTPATIENT)
Dept: CT IMAGING | Facility: HOSPITAL | Age: 47
End: 2022-08-15

## 2022-08-15 VITALS
DIASTOLIC BLOOD PRESSURE: 83 MMHG | SYSTOLIC BLOOD PRESSURE: 114 MMHG | TEMPERATURE: 98 F | WEIGHT: 200.9 LBS | HEIGHT: 60 IN | OXYGEN SATURATION: 100 % | HEART RATE: 87 BPM | RESPIRATION RATE: 18 BRPM | BODY MASS INDEX: 39.44 KG/M2

## 2022-08-15 DIAGNOSIS — N30.00 ACUTE CYSTITIS WITHOUT HEMATURIA: ICD-10-CM

## 2022-08-15 DIAGNOSIS — R10.11 RIGHT UPPER QUADRANT ABDOMINAL PAIN: Primary | ICD-10-CM

## 2022-08-15 LAB
ALBUMIN SERPL-MCNC: 4.4 G/DL (ref 3.5–5.2)
ALBUMIN/GLOB SERPL: 1.7 G/DL
ALP SERPL-CCNC: 98 U/L (ref 39–117)
ALT SERPL W P-5'-P-CCNC: 28 U/L (ref 1–33)
ANION GAP SERPL CALCULATED.3IONS-SCNC: 9 MMOL/L (ref 5–15)
AST SERPL-CCNC: 21 U/L (ref 1–32)
BACTERIA UR QL AUTO: ABNORMAL /HPF
BASOPHILS # BLD AUTO: 0.04 10*3/MM3 (ref 0–0.2)
BASOPHILS NFR BLD AUTO: 0.4 % (ref 0–1.5)
BILIRUB SERPL-MCNC: 0.3 MG/DL (ref 0–1.2)
BILIRUB UR QL STRIP: NEGATIVE
BUN SERPL-MCNC: 14 MG/DL (ref 6–20)
BUN/CREAT SERPL: 13.7 (ref 7–25)
CALCIUM SPEC-SCNC: 9.5 MG/DL (ref 8.6–10.5)
CHLORIDE SERPL-SCNC: 103 MMOL/L (ref 98–107)
CLARITY UR: CLEAR
CO2 SERPL-SCNC: 30 MMOL/L (ref 22–29)
COLOR UR: YELLOW
CREAT SERPL-MCNC: 1.02 MG/DL (ref 0.57–1)
DEPRECATED RDW RBC AUTO: 43.1 FL (ref 37–54)
EGFRCR SERPLBLD CKD-EPI 2021: 68.9 ML/MIN/1.73
EOSINOPHIL # BLD AUTO: 0.28 10*3/MM3 (ref 0–0.4)
EOSINOPHIL NFR BLD AUTO: 3.1 % (ref 0.3–6.2)
ERYTHROCYTE [DISTWIDTH] IN BLOOD BY AUTOMATED COUNT: 13.2 % (ref 12.3–15.4)
GLOBULIN UR ELPH-MCNC: 2.6 GM/DL
GLUCOSE SERPL-MCNC: 87 MG/DL (ref 65–99)
GLUCOSE UR STRIP-MCNC: NEGATIVE MG/DL
HCG SERPL QL: NEGATIVE
HCT VFR BLD AUTO: 40.3 % (ref 34–46.6)
HGB BLD-MCNC: 13.5 G/DL (ref 12–15.9)
HGB UR QL STRIP.AUTO: NEGATIVE
HYALINE CASTS UR QL AUTO: ABNORMAL /LPF
IMM GRANULOCYTES # BLD AUTO: 0.03 10*3/MM3 (ref 0–0.05)
IMM GRANULOCYTES NFR BLD AUTO: 0.3 % (ref 0–0.5)
KETONES UR QL STRIP: NEGATIVE
LEUKOCYTE ESTERASE UR QL STRIP.AUTO: ABNORMAL
LIPASE SERPL-CCNC: 30 U/L (ref 13–60)
LYMPHOCYTES # BLD AUTO: 2.28 10*3/MM3 (ref 0.7–3.1)
LYMPHOCYTES NFR BLD AUTO: 25.2 % (ref 19.6–45.3)
MCH RBC QN AUTO: 30.3 PG (ref 26.6–33)
MCHC RBC AUTO-ENTMCNC: 33.5 G/DL (ref 31.5–35.7)
MCV RBC AUTO: 90.6 FL (ref 79–97)
MONOCYTES # BLD AUTO: 0.58 10*3/MM3 (ref 0.1–0.9)
MONOCYTES NFR BLD AUTO: 6.4 % (ref 5–12)
NEUTROPHILS NFR BLD AUTO: 5.83 10*3/MM3 (ref 1.7–7)
NEUTROPHILS NFR BLD AUTO: 64.6 % (ref 42.7–76)
NITRITE UR QL STRIP: NEGATIVE
NRBC BLD AUTO-RTO: 0 /100 WBC (ref 0–0.2)
PH UR STRIP.AUTO: 7.5 [PH] (ref 5–9)
PLATELET # BLD AUTO: 245 10*3/MM3 (ref 140–450)
PMV BLD AUTO: 11.7 FL (ref 6–12)
POTASSIUM SERPL-SCNC: 4.2 MMOL/L (ref 3.5–5.2)
PROT SERPL-MCNC: 7 G/DL (ref 6–8.5)
PROT UR QL STRIP: NEGATIVE
RBC # BLD AUTO: 4.45 10*6/MM3 (ref 3.77–5.28)
RBC # UR STRIP: ABNORMAL /HPF
REF LAB TEST METHOD: ABNORMAL
SODIUM SERPL-SCNC: 142 MMOL/L (ref 136–145)
SP GR UR STRIP: 1.01 (ref 1–1.03)
SQUAMOUS #/AREA URNS HPF: ABNORMAL /HPF
UROBILINOGEN UR QL STRIP: ABNORMAL
WBC # UR STRIP: ABNORMAL /HPF
WBC NRBC COR # BLD: 9.04 10*3/MM3 (ref 3.4–10.8)
WHOLE BLOOD HOLD COAG: NORMAL

## 2022-08-15 PROCEDURE — 96374 THER/PROPH/DIAG INJ IV PUSH: CPT

## 2022-08-15 PROCEDURE — 25010000002 MORPHINE PER 10 MG: Performed by: FAMILY MEDICINE

## 2022-08-15 PROCEDURE — 81001 URINALYSIS AUTO W/SCOPE: CPT | Performed by: STUDENT IN AN ORGANIZED HEALTH CARE EDUCATION/TRAINING PROGRAM

## 2022-08-15 PROCEDURE — 25010000002 ONDANSETRON PER 1 MG: Performed by: FAMILY MEDICINE

## 2022-08-15 PROCEDURE — 80053 COMPREHEN METABOLIC PANEL: CPT | Performed by: FAMILY MEDICINE

## 2022-08-15 PROCEDURE — 85025 COMPLETE CBC W/AUTO DIFF WBC: CPT | Performed by: FAMILY MEDICINE

## 2022-08-15 PROCEDURE — 99284 EMERGENCY DEPT VISIT MOD MDM: CPT

## 2022-08-15 PROCEDURE — 74176 CT ABD & PELVIS W/O CONTRAST: CPT

## 2022-08-15 PROCEDURE — 83690 ASSAY OF LIPASE: CPT | Performed by: FAMILY MEDICINE

## 2022-08-15 PROCEDURE — 96375 TX/PRO/DX INJ NEW DRUG ADDON: CPT

## 2022-08-15 PROCEDURE — 84703 CHORIONIC GONADOTROPIN ASSAY: CPT | Performed by: FAMILY MEDICINE

## 2022-08-15 RX ORDER — NITROFURANTOIN 25; 75 MG/1; MG/1
100 CAPSULE ORAL ONCE
Status: COMPLETED | OUTPATIENT
Start: 2022-08-15 | End: 2022-08-15

## 2022-08-15 RX ORDER — ONDANSETRON 2 MG/ML
4 INJECTION INTRAMUSCULAR; INTRAVENOUS ONCE
Status: COMPLETED | OUTPATIENT
Start: 2022-08-15 | End: 2022-08-15

## 2022-08-15 RX ORDER — NITROFURANTOIN 25; 75 MG/1; MG/1
100 CAPSULE ORAL 2 TIMES DAILY
Qty: 14 CAPSULE | Refills: 0 | Status: SHIPPED | OUTPATIENT
Start: 2022-08-15 | End: 2022-08-22

## 2022-08-15 RX ORDER — ONDANSETRON 4 MG/1
4 TABLET, ORALLY DISINTEGRATING ORAL EVERY 6 HOURS PRN
Qty: 10 TABLET | Refills: 0 | Status: SHIPPED | OUTPATIENT
Start: 2022-08-15 | End: 2022-10-16

## 2022-08-15 RX ADMIN — NITROFURANTOIN MONOHYDRATE/MACROCRYSTALLINE 100 MG: 25; 75 CAPSULE ORAL at 20:16

## 2022-08-15 RX ADMIN — ONDANSETRON 4 MG: 2 INJECTION INTRAMUSCULAR; INTRAVENOUS at 17:41

## 2022-08-15 RX ADMIN — MORPHINE SULFATE 4 MG: 4 INJECTION INTRAVENOUS at 17:44

## 2022-08-15 RX ADMIN — SODIUM CHLORIDE 1000 ML: 9 INJECTION, SOLUTION INTRAVENOUS at 17:36

## 2022-08-15 NOTE — ED PROVIDER NOTES
Subjective     Abdominal Pain  Pain location:  L flank and RLQ  Pain quality: aching    Duration:  5 hours  Timing:  Constant  Progression:  Waxing and waning  Chronicity:  New  Relieved by:  Nothing  Worsened by:  Movement  Associated symptoms: no chest pain, no chills, no cough, no diarrhea, no dysuria, no fatigue, no fever, no nausea, no shortness of breath, no sore throat, no vaginal bleeding, no vaginal discharge and no vomiting        Review of Systems   Constitutional: Positive for activity change and appetite change. Negative for chills, diaphoresis, fatigue and fever.   HENT: Negative for congestion, ear discharge, ear pain, nosebleeds, rhinorrhea, sinus pressure, sore throat and trouble swallowing.    Eyes: Negative for discharge and redness.   Respiratory: Negative for apnea, cough, chest tightness, shortness of breath and wheezing.    Cardiovascular: Negative for chest pain.   Gastrointestinal: Positive for abdominal pain. Negative for diarrhea, nausea and vomiting.   Endocrine: Negative for polyuria.   Genitourinary: Negative for difficulty urinating, dysuria, frequency, urgency, vaginal bleeding and vaginal discharge.   Musculoskeletal: Negative for myalgias and neck pain.   Skin: Negative for color change and rash.   Allergic/Immunologic: Negative for immunocompromised state.   Neurological: Negative for dizziness, seizures, syncope, weakness, light-headedness and headaches.   Hematological: Negative for adenopathy. Does not bruise/bleed easily.   Psychiatric/Behavioral: Negative for behavioral problems and confusion.   All other systems reviewed and are negative.      Past Medical History:   Diagnosis Date   • Abdominal pain     Chronic RLQ, RUQ, R flank comes and goes.    • Abdominal pain, right lower quadrant    • Acute bilateral otitis media    • Allergic rhinitis    • Backache     Upper back.   • Candidiasis of skin    • Cellulitis of neck    • Chest pain    • Contraception    • Cough    •  Dysfunction of eustachian tube    • Dyspareunia, female    • Elevated cholesterol    • Excessive or frequent menstruation    • Flank pain    • Generalized aches and pains    • Headache    • Health maintenance examination    • Hypertension    • Infection of skin and subcutaneous tissue    • Irregular periods    • Kidney stone     Poss   • Menorrhagia    • Nausea vomiting and diarrhea    • Obesity    • Open wound of abdominal wall    • Otalgia    • Pain in left foot    • Pain in unspecified hand    • Removed Impacted cerumen 2012   • Rhinitis    • Seizure ([facility]) 08/15/2019   • Shoulder pain     right-sided-likely muscle strain      • Shoulder tendinitis    • Spider bite wound    • Staphylococcal infection of skin    • Stroke ([facility])    • Swollen feet    • Tinea cruris    • Tobacco dependence syndrome    • Upper respiratory infection    • Urinary tract infectious disease    • Vertigo    • Visit for gynecologic examination    • Vulvovaginitis        Allergies   Allergen Reactions   • Abilify [Aripiprazole] Nausea Only   • Buspirone Rash   • Penicillins Hives and Nausea And Vomiting       Past Surgical History:   Procedure Laterality Date   •  SECTION     • DILATATION AND CURETTAGE      Secondary to incomplete spontaneous    • INCISION AND DRAINAGE ABSCESS  2012   • INJECTION OF MEDICATION  2015    Phenergan (1)     • INJECTION OF MEDICATION  10/10/2013    Toradol (2)      • INJECTION OF MEDICATION  2014    Zofran (1)          Family History   Problem Relation Age of Onset   • Breast cancer Mother    • Thyroid disease Mother    • Hypertension Mother    • Heart disease Mother    • Arthritis Mother    • Liver disease Father    • Endometrial cancer Sister    • Anxiety disorder Brother    • Mental illness Daughter    • Depression Daughter    • Diabetes Maternal Aunt    • Diabetes Maternal Uncle    • Stroke Maternal Grandmother    • Breast cancer Maternal Grandmother    • Cancer  Maternal Grandfather    • Stroke Paternal Grandmother    • Depression Other    • Cancer Other         Colorectal Cancer   • Endometrial cancer Other    • Lung cancer Other        Social History     Socioeconomic History   • Marital status: Single   Tobacco Use   • Smoking status: Current Some Day Smoker     Packs/day: 0.50     Types: Cigarettes   • Smokeless tobacco: Never Used   • Tobacco comment: 19*800*quit*NOW   Vaping Use   • Vaping Use: Some days   • Substances: Nicotine   • Devices: Refillable tank   • Passive vaping exposure: Yes   Substance and Sexual Activity   • Alcohol use: No   • Drug use: No   • Sexual activity: Not Currently           Objective   Physical Exam  Vitals and nursing note reviewed.   Constitutional:       Appearance: She is well-developed.   HENT:      Head: Normocephalic and atraumatic.      Nose: Nose normal.   Eyes:      General: No scleral icterus.        Right eye: No discharge.         Left eye: No discharge.      Conjunctiva/sclera: Conjunctivae normal.      Pupils: Pupils are equal, round, and reactive to light.   Neck:      Trachea: No tracheal deviation.   Cardiovascular:      Rate and Rhythm: Normal rate and regular rhythm.      Heart sounds: Normal heart sounds. No murmur heard.  Pulmonary:      Effort: Pulmonary effort is normal. No respiratory distress.      Breath sounds: Normal breath sounds. No stridor. No wheezing or rales.   Abdominal:      General: Bowel sounds are normal. There is no distension.      Palpations: Abdomen is soft. There is no mass.      Tenderness: There is abdominal tenderness in the right lower quadrant. There is right CVA tenderness. There is no guarding or rebound.   Musculoskeletal:      Cervical back: Normal range of motion and neck supple.   Skin:     General: Skin is warm and dry.      Findings: No erythema or rash.   Neurological:      Mental Status: She is alert and oriented to person, place, and time.      Coordination: Coordination normal.    Psychiatric:         Behavior: Behavior normal.         Thought Content: Thought content normal.         Procedures           ED Course              Labs Reviewed   URINALYSIS W/ MICROSCOPIC IF INDICATED (NO CULTURE) - Abnormal; Notable for the following components:       Result Value    Leuk Esterase, UA Moderate (2+) (*)     All other components within normal limits   URINALYSIS, MICROSCOPIC ONLY - Abnormal; Notable for the following components:    RBC, UA 0-2 (*)     WBC, UA 31-50 (*)     Bacteria, UA Trace (*)     All other components within normal limits   COMPREHENSIVE METABOLIC PANEL - Abnormal; Notable for the following components:    Creatinine 1.02 (*)     CO2 30.0 (*)     All other components within normal limits    Narrative:     GFR Normal >60  Chronic Kidney Disease <60  Kidney Failure <15     LIPASE - Normal   HCG, SERUM, QUALITATIVE - Normal   CBC WITH AUTO DIFFERENTIAL - Normal   CBC AND DIFFERENTIAL    Narrative:     The following orders were created for panel order CBC & Differential.  Procedure                               Abnormality         Status                     ---------                               -----------         ------                     CBC Auto Differential[[id number]]        Normal              Final result                 Please view results for these tests on the individual orders.   EXTRA TUBES    Narrative:     The following orders were created for panel order Extra Tubes.  Procedure                               Abnormality         Status                     ---------                               -----------         ------                     Light Blue Top[[id number]]                                   Final result                 Please view results for these tests on the individual orders.   LIGHT BLUE TOP       CT Abdomen Pelvis Without Contrast   Final Result   1.  No acute findings in the abdomen and pelvis.    2.  Bilateral nephrolithiasis without hydronephrosis.       Electronically signed by:  Kendra Lyles  8/15/2022 7:38 PM CDT   Workstation: [id number]                                            [facility]    Final diagnoses:   Right upper quadrant abdominal pain   Acute cystitis without hematuria       ED Disposition  ED Disposition     ED Disposition   Discharge    Condition   Stable    Comment   --             Jonel Cr MD  [facility] [address] [facility] [address] [phone]    In 2 days  Urine culture resuts         Medication List      New Prescriptions    nitrofurantoin (macrocrystal-monohydrate) 100 MG capsule  Commonly known as: MACROBID  Take 1 capsule by mouth 2 (Two) Times a Day for 7 days.        Changed    * ondansetron ODT 4 MG disintegrating tablet  Commonly known as: ZOFRAN-ODT  Place 1 tablet on the tongue Every 6 (Six) Hours As Needed for Nausea or Vomiting.  What changed: Another medication with the same name was added. Make sure you understand how and when to take each.     * ondansetron ODT 4 MG disintegrating tablet  Commonly known as: ZOFRAN-ODT  Place 1 tablet on the tongue Every 6 (Six) Hours As Needed for Nausea or Vomiting.  What changed: You were already taking a medication with the same name, and this prescription was added. Make sure you understand how and when to take each.         * This list has 2 medication(s) that are the same as other medications prescribed for you. Read the directions carefully, and ask your doctor or other care provider to review them with you.               Where to Get Your Medications      These medications were sent to [place], [place] - [address] [facility] [phone] [facility] [phone] [facility] [facility] [address]    Phone: [phone]   · nitrofurantoin (macrocrystal-monohydrate) 100 MG capsule  · ondansetron ODT 4 MG disintegrating tablet          Asher Grande MD  08/15/22 1649       Asher Grande MD  08/15/22 2010

## 2022-08-15 NOTE — ED NOTES
Pt is presented to ED with c/o right side pain that started hurting approximately 1130 or  1200 today.

## 2022-08-18 ENCOUNTER — TRANSCRIBE ORDERS (OUTPATIENT)
Dept: SLEEP MEDICINE | Facility: HOSPITAL | Age: 47
End: 2022-08-18

## 2022-08-18 ENCOUNTER — TRANSCRIBE ORDERS (OUTPATIENT)
Dept: PULMONOLOGY | Facility: HOSPITAL | Age: 47
End: 2022-08-18

## 2022-08-18 DIAGNOSIS — G40.209 COMPLEX PARTIAL SEIZURES WITH CONSCIOUSNESS IMPAIRED: Primary | ICD-10-CM

## 2022-08-21 ENCOUNTER — HOSPITAL ENCOUNTER (EMERGENCY)
Facility: HOSPITAL | Age: 47
Discharge: HOME | End: 2022-08-21
Attending: EMERGENCY MEDICINE | Admitting: EMERGENCY MEDICINE

## 2022-08-21 VITALS
WEIGHT: 200 LBS | BODY MASS INDEX: 39.27 KG/M2 | OXYGEN SATURATION: 98 % | DIASTOLIC BLOOD PRESSURE: 63 MMHG | RESPIRATION RATE: 20 BRPM | HEIGHT: 60 IN | SYSTOLIC BLOOD PRESSURE: 112 MMHG | TEMPERATURE: 97.9 F | HEART RATE: 54 BPM

## 2022-08-21 DIAGNOSIS — G40.909 NONINTRACTABLE EPILEPSY WITHOUT STATUS EPILEPTICUS, UNSPECIFIED EPILEPSY TYPE: Primary | ICD-10-CM

## 2022-08-21 DIAGNOSIS — R51.9 RECURRENT HEADACHE: ICD-10-CM

## 2022-08-21 LAB
ALBUMIN SERPL-MCNC: 4.4 G/DL (ref 3.5–5.2)
ALBUMIN/GLOB SERPL: 1.9 G/DL
ALP SERPL-CCNC: 92 U/L (ref 39–117)
ALT SERPL W P-5'-P-CCNC: 24 U/L (ref 1–33)
ANION GAP SERPL CALCULATED.3IONS-SCNC: 10 MMOL/L (ref 5–15)
AST SERPL-CCNC: 23 U/L (ref 1–32)
BASOPHILS # BLD AUTO: 0.05 10*3/MM3 (ref 0–0.2)
BASOPHILS NFR BLD AUTO: 0.7 % (ref 0–1.5)
BILIRUB SERPL-MCNC: 0.2 MG/DL (ref 0–1.2)
BUN SERPL-MCNC: 20 MG/DL (ref 6–20)
BUN/CREAT SERPL: 19.4 (ref 7–25)
CALCIUM SPEC-SCNC: 9 MG/DL (ref 8.6–10.5)
CHLORIDE SERPL-SCNC: 105 MMOL/L (ref 98–107)
CO2 SERPL-SCNC: 25 MMOL/L (ref 22–29)
CREAT SERPL-MCNC: 1.03 MG/DL (ref 0.57–1)
D-LACTATE SERPL-SCNC: 1.3 MMOL/L (ref 0.5–2)
DEPRECATED RDW RBC AUTO: 43.3 FL (ref 37–54)
EGFRCR SERPLBLD CKD-EPI 2021: 67.6 ML/MIN/1.73
EOSINOPHIL # BLD AUTO: 0.34 10*3/MM3 (ref 0–0.4)
EOSINOPHIL NFR BLD AUTO: 4.9 % (ref 0.3–6.2)
ERYTHROCYTE [DISTWIDTH] IN BLOOD BY AUTOMATED COUNT: 13.3 % (ref 12.3–15.4)
ETHANOL BLD-MCNC: <10 MG/DL (ref 0–10)
ETHANOL UR QL: <0.01 %
GLOBULIN UR ELPH-MCNC: 2.3 GM/DL
GLUCOSE SERPL-MCNC: 123 MG/DL (ref 65–99)
HCT VFR BLD AUTO: 36.2 % (ref 34–46.6)
HGB BLD-MCNC: 12.2 G/DL (ref 12–15.9)
HOLD SPECIMEN: NORMAL
HOLD SPECIMEN: NORMAL
IMM GRANULOCYTES # BLD AUTO: 0.01 10*3/MM3 (ref 0–0.05)
IMM GRANULOCYTES NFR BLD AUTO: 0.1 % (ref 0–0.5)
LYMPHOCYTES # BLD AUTO: 2.62 10*3/MM3 (ref 0.7–3.1)
LYMPHOCYTES NFR BLD AUTO: 38.1 % (ref 19.6–45.3)
MCH RBC QN AUTO: 30 PG (ref 26.6–33)
MCHC RBC AUTO-ENTMCNC: 33.7 G/DL (ref 31.5–35.7)
MCV RBC AUTO: 89.2 FL (ref 79–97)
MONOCYTES # BLD AUTO: 0.63 10*3/MM3 (ref 0.1–0.9)
MONOCYTES NFR BLD AUTO: 9.2 % (ref 5–12)
NEUTROPHILS NFR BLD AUTO: 3.22 10*3/MM3 (ref 1.7–7)
NEUTROPHILS NFR BLD AUTO: 47 % (ref 42.7–76)
NRBC BLD AUTO-RTO: 0 /100 WBC (ref 0–0.2)
PLATELET # BLD AUTO: 231 10*3/MM3 (ref 140–450)
PMV BLD AUTO: 11.6 FL (ref 6–12)
POTASSIUM SERPL-SCNC: 4.1 MMOL/L (ref 3.5–5.2)
PROT SERPL-MCNC: 6.7 G/DL (ref 6–8.5)
RBC # BLD AUTO: 4.06 10*6/MM3 (ref 3.77–5.28)
SODIUM SERPL-SCNC: 140 MMOL/L (ref 136–145)
WBC NRBC COR # BLD: 6.87 10*3/MM3 (ref 3.4–10.8)
WHOLE BLOOD HOLD COAG: NORMAL
WHOLE BLOOD HOLD SPECIMEN: NORMAL

## 2022-08-21 PROCEDURE — 25010000002 PROCHLORPERAZINE 10 MG/2ML SOLUTION: Performed by: EMERGENCY MEDICINE

## 2022-08-21 PROCEDURE — 93005 ELECTROCARDIOGRAM TRACING: CPT | Performed by: EMERGENCY MEDICINE

## 2022-08-21 PROCEDURE — 25010000002 KETOROLAC TROMETHAMINE PER 15 MG: Performed by: EMERGENCY MEDICINE

## 2022-08-21 PROCEDURE — 93005 ELECTROCARDIOGRAM TRACING: CPT

## 2022-08-21 PROCEDURE — 85025 COMPLETE CBC W/AUTO DIFF WBC: CPT

## 2022-08-21 PROCEDURE — 93010 ELECTROCARDIOGRAM REPORT: CPT | Performed by: INTERNAL MEDICINE

## 2022-08-21 PROCEDURE — 25010000002 DIPHENHYDRAMINE PER 50 MG: Performed by: EMERGENCY MEDICINE

## 2022-08-21 PROCEDURE — 96375 TX/PRO/DX INJ NEW DRUG ADDON: CPT

## 2022-08-21 PROCEDURE — 82077 ASSAY SPEC XCP UR&BREATH IA: CPT | Performed by: EMERGENCY MEDICINE

## 2022-08-21 PROCEDURE — 83605 ASSAY OF LACTIC ACID: CPT

## 2022-08-21 PROCEDURE — 99283 EMERGENCY DEPT VISIT LOW MDM: CPT

## 2022-08-21 PROCEDURE — 80053 COMPREHEN METABOLIC PANEL: CPT | Performed by: EMERGENCY MEDICINE

## 2022-08-21 PROCEDURE — 36415 COLL VENOUS BLD VENIPUNCTURE: CPT

## 2022-08-21 PROCEDURE — 96374 THER/PROPH/DIAG INJ IV PUSH: CPT

## 2022-08-21 RX ORDER — KETOROLAC TROMETHAMINE 15 MG/ML
15 INJECTION, SOLUTION INTRAMUSCULAR; INTRAVENOUS ONCE
Status: COMPLETED | OUTPATIENT
Start: 2022-08-21 | End: 2022-08-21

## 2022-08-21 RX ORDER — DIPHENHYDRAMINE HYDROCHLORIDE 50 MG/ML
12.5 INJECTION INTRAMUSCULAR; INTRAVENOUS ONCE
Status: COMPLETED | OUTPATIENT
Start: 2022-08-21 | End: 2022-08-21

## 2022-08-21 RX ORDER — PROCHLORPERAZINE EDISYLATE 5 MG/ML
2.5 INJECTION INTRAMUSCULAR; INTRAVENOUS ONCE
Status: COMPLETED | OUTPATIENT
Start: 2022-08-21 | End: 2022-08-21

## 2022-08-21 RX ORDER — SODIUM CHLORIDE 0.9 % (FLUSH) 0.9 %
10 SYRINGE (ML) INJECTION AS NEEDED
Status: DISCONTINUED | OUTPATIENT
Start: 2022-08-21 | End: 2022-08-21 | Stop reason: HOSPADM

## 2022-08-21 RX ADMIN — PROCHLORPERAZINE EDISYLATE 2.5 MG: 5 INJECTION INTRAMUSCULAR; INTRAVENOUS at 21:02

## 2022-08-21 RX ADMIN — DIPHENHYDRAMINE HYDROCHLORIDE 12.5 MG: 50 INJECTION, SOLUTION INTRAMUSCULAR; INTRAVENOUS at 21:02

## 2022-08-21 RX ADMIN — KETOROLAC TROMETHAMINE 15 MG: 15 INJECTION, SOLUTION INTRAMUSCULAR; INTRAVENOUS at 21:01

## 2022-08-22 LAB
QT INTERVAL: 466 MS
QTC INTERVAL: 461 MS

## 2022-08-22 NOTE — ED PROVIDER NOTES
Subjective   The patient has history of chronic recurrent headaches as well as epilepsy.  The patient has had headache all day gradual onset.  The patient had 1 self-limited seizure earlier today.  She claims compliance with home medications.  Denies head injury.  Recent records including MRI reviewed.      Headache  Pain location:  Generalized  Quality:  Dull  Radiates to:  Does not radiate  Severity currently:  6/10  Severity at highest:  6/10  Onset quality:  Gradual  Duration:  1 day  Timing:  Constant  Progression:  Unchanged  Chronicity:  Recurrent  Similar to prior headaches: yes    Context: bright light and loud noise    Relieved by:  Nothing  Worsened by:  Nothing  Ineffective treatments:  None tried  Associated symptoms: seizures    Associated symptoms: no abdominal pain, no dizziness, no ear pain, no eye pain, no facial pain, no focal weakness, no loss of balance, no neck pain, no neck stiffness, no numbness, no syncope, no visual change and no weakness        Review of Systems   HENT: Negative for ear pain.    Eyes: Negative for pain.   Cardiovascular: Negative for syncope.   Gastrointestinal: Negative for abdominal pain.   Musculoskeletal: Negative for neck pain and neck stiffness.   Neurological: Positive for seizures and headaches. Negative for dizziness, focal weakness, weakness, numbness and loss of balance.   All other systems reviewed and are negative.      Past Medical History:   Diagnosis Date   • Abdominal pain     Chronic RLQ, RUQ, R flank comes and goes.    • Abdominal pain, right lower quadrant    • Acute bilateral otitis media    • Allergic rhinitis    • Backache     Upper back.   • Candidiasis of skin    • Cellulitis of neck    • Chest pain    • Contraception    • Cough    • Dysfunction of eustachian tube    • Dyspareunia, female    • Elevated cholesterol    • Excessive or frequent menstruation    • Flank pain    • Generalized aches and pains    • Headache    • Health maintenance  examination    • Hypertension    • Infection of skin and subcutaneous tissue    • Irregular periods    • Kidney stone     Poss   • Menorrhagia    • Nausea vomiting and diarrhea    • Obesity    • Open wound of abdominal wall    • Otalgia    • Pain in left foot    • Pain in unspecified hand    • Removed Impacted cerumen 2012   • Rhinitis    • Seizure ([facility]) 08/15/2019   • Shoulder pain     right-sided-likely muscle strain      • Shoulder tendinitis    • Spider bite wound    • Staphylococcal infection of skin    • Stroke ([facility])    • Swollen feet    • Tinea cruris    • Tobacco dependence syndrome    • Upper respiratory infection    • Urinary tract infectious disease    • Vertigo    • Visit for gynecologic examination    • Vulvovaginitis        Allergies   Allergen Reactions   • Abilify [Aripiprazole] Nausea Only   • Buspirone Rash   • Penicillins Hives and Nausea And Vomiting       Past Surgical History:   Procedure Laterality Date   •  SECTION     • DILATATION AND CURETTAGE      Secondary to incomplete spontaneous    • INCISION AND DRAINAGE ABSCESS  2012   • INJECTION OF MEDICATION  2015    Phenergan (1)     • INJECTION OF MEDICATION  10/10/2013    Toradol (2)      • INJECTION OF MEDICATION  2014    Zofran (1)          Family History   Problem Relation Age of Onset   • Breast cancer Mother    • Thyroid disease Mother    • Hypertension Mother    • Heart disease Mother    • Arthritis Mother    • Liver disease Father    • Endometrial cancer Sister    • Anxiety disorder Brother    • Mental illness Daughter    • Depression Daughter    • Diabetes Maternal Aunt    • Diabetes Maternal Uncle    • Stroke Maternal Grandmother    • Breast cancer Maternal Grandmother    • Cancer Maternal Grandfather    • Stroke Paternal Grandmother    • Depression Other    • Cancer Other         Colorectal Cancer   • Endometrial cancer Other    • Lung cancer Other        Social History     Socioeconomic  History   • Marital status: Single   Tobacco Use   • Smoking status: Current Some Day Smoker     Packs/day: 0.50     Types: Cigarettes   • Smokeless tobacco: Never Used   • Tobacco comment: 19*800*quit*NOW   Vaping Use   • Vaping Use: Some days   • Substances: Nicotine   • Devices: Refillable tank   • Passive vaping exposure: Yes   Substance and Sexual Activity   • Alcohol use: No   • Drug use: No   • Sexual activity: Not Currently           Objective   Physical Exam  Vitals and nursing note reviewed.   Constitutional:       Appearance: She is not ill-appearing.   HENT:      Head: Normocephalic and atraumatic.      Nose: Nose normal.   Eyes:      General: No scleral icterus.  Cardiovascular:      Rate and Rhythm: Regular rhythm. Bradycardia present.   Pulmonary:      Effort: Pulmonary effort is normal.      Breath sounds: Normal breath sounds.   Musculoskeletal:         General: No signs of injury.   Skin:     General: Skin is warm and dry.   Neurological:      Mental Status: She is alert and oriented to person, place, and time.      Comments: Dysarthria which is chronic.   Psychiatric:         Behavior: Behavior normal.         Procedures           ED Course                                           MDM  Number of Diagnoses or Management Options     Amount and/or Complexity of Data Reviewed  Clinical lab tests: reviewed  Tests in the medicine section of CPT®: reviewed  Decide to obtain previous medical records or to obtain history from someone other than the patient: yes  Review and summarize past medical records: yes      EKG interpretation: Interpreted by myself.  Sinus bradycardia.  Rate 59.  Normal P wave and VA interval.  Normal QRS and axis.  Normal QTc interval.  ST segments are normal.  Final diagnoses:   Nonintractable epilepsy without status epilepticus, unspecified epilepsy type (HCC)   Recurrent headache       ED Disposition  ED Disposition     ED Disposition   Discharge    Condition   Stable    Comment    --             Jonel Cr MD  [address] [place] [address]   [address] [facility] [address]  [phone]    Call in 1 day  Schedule an appointment for reevaluation after an emergency department visit.         Medication List      No changes were made to your prescriptions during this visit.          Oh Linares,   08/22/22 0544

## 2022-08-25 ENCOUNTER — HOSPITAL ENCOUNTER (OUTPATIENT)
Dept: PULMONOLOGY | Facility: HOSPITAL | Age: 47
Discharge: HOME | End: 2022-08-25
Admitting: PSYCHIATRY & NEUROLOGY

## 2022-08-25 DIAGNOSIS — G40.209 COMPLEX PARTIAL SEIZURES WITH CONSCIOUSNESS IMPAIRED: ICD-10-CM

## 2022-08-25 PROCEDURE — 95816 EEG AWAKE AND DROWSY: CPT

## 2022-08-29 ENCOUNTER — APPOINTMENT (OUTPATIENT)
Dept: CT IMAGING | Facility: HOSPITAL | Age: 47
End: 2022-08-29

## 2022-08-29 ENCOUNTER — HOSPITAL ENCOUNTER (EMERGENCY)
Facility: HOSPITAL | Age: 47
Discharge: HOME | End: 2022-08-29
Attending: EMERGENCY MEDICINE | Admitting: EMERGENCY MEDICINE

## 2022-08-29 VITALS
WEIGHT: 206.5 LBS | BODY MASS INDEX: 40.54 KG/M2 | RESPIRATION RATE: 16 BRPM | HEIGHT: 60 IN | SYSTOLIC BLOOD PRESSURE: 98 MMHG | DIASTOLIC BLOOD PRESSURE: 62 MMHG | OXYGEN SATURATION: 98 % | TEMPERATURE: 97.6 F | HEART RATE: 60 BPM

## 2022-08-29 DIAGNOSIS — N39.0 URINARY TRACT INFECTION WITH HEMATURIA, SITE UNSPECIFIED: Primary | ICD-10-CM

## 2022-08-29 DIAGNOSIS — R31.9 URINARY TRACT INFECTION WITH HEMATURIA, SITE UNSPECIFIED: Primary | ICD-10-CM

## 2022-08-29 DIAGNOSIS — N20.0 BILATERAL NEPHROLITHIASIS: ICD-10-CM

## 2022-08-29 LAB
ALBUMIN SERPL-MCNC: 4 G/DL (ref 3.5–5.2)
ALBUMIN/GLOB SERPL: 1.6 G/DL
ALP SERPL-CCNC: 93 U/L (ref 39–117)
ALT SERPL W P-5'-P-CCNC: 21 U/L (ref 1–33)
ANION GAP SERPL CALCULATED.3IONS-SCNC: 10 MMOL/L (ref 5–15)
AST SERPL-CCNC: 18 U/L (ref 1–32)
B-HCG UR QL: NEGATIVE
BACTERIA UR QL AUTO: ABNORMAL /HPF
BASOPHILS # BLD AUTO: 0.05 10*3/MM3 (ref 0–0.2)
BASOPHILS NFR BLD AUTO: 0.7 % (ref 0–1.5)
BILIRUB SERPL-MCNC: <0.2 MG/DL (ref 0–1.2)
BILIRUB UR QL STRIP: NEGATIVE
BUN SERPL-MCNC: 13 MG/DL (ref 6–20)
BUN/CREAT SERPL: 12.5 (ref 7–25)
CALCIUM SPEC-SCNC: 9.3 MG/DL (ref 8.6–10.5)
CHLORIDE SERPL-SCNC: 106 MMOL/L (ref 98–107)
CLARITY UR: ABNORMAL
CO2 SERPL-SCNC: 24 MMOL/L (ref 22–29)
COLOR UR: YELLOW
CREAT SERPL-MCNC: 1.04 MG/DL (ref 0.57–1)
DEPRECATED RDW RBC AUTO: 43.9 FL (ref 37–54)
EGFRCR SERPLBLD CKD-EPI 2021: 66.8 ML/MIN/1.73
EOSINOPHIL # BLD AUTO: 0.29 10*3/MM3 (ref 0–0.4)
EOSINOPHIL NFR BLD AUTO: 3.9 % (ref 0.3–6.2)
ERYTHROCYTE [DISTWIDTH] IN BLOOD BY AUTOMATED COUNT: 13.5 % (ref 12.3–15.4)
GLOBULIN UR ELPH-MCNC: 2.5 GM/DL
GLUCOSE SERPL-MCNC: 90 MG/DL (ref 65–99)
GLUCOSE UR STRIP-MCNC: NEGATIVE MG/DL
HCT VFR BLD AUTO: 36.5 % (ref 34–46.6)
HGB BLD-MCNC: 12.4 G/DL (ref 12–15.9)
HGB UR QL STRIP.AUTO: ABNORMAL
HYALINE CASTS UR QL AUTO: ABNORMAL /LPF
IMM GRANULOCYTES # BLD AUTO: 0.02 10*3/MM3 (ref 0–0.05)
IMM GRANULOCYTES NFR BLD AUTO: 0.3 % (ref 0–0.5)
KETONES UR QL STRIP: NEGATIVE
LEUKOCYTE ESTERASE UR QL STRIP.AUTO: ABNORMAL
LIPASE SERPL-CCNC: 48 U/L (ref 13–60)
LYMPHOCYTES # BLD AUTO: 2.82 10*3/MM3 (ref 0.7–3.1)
LYMPHOCYTES NFR BLD AUTO: 38 % (ref 19.6–45.3)
MCH RBC QN AUTO: 30.2 PG (ref 26.6–33)
MCHC RBC AUTO-ENTMCNC: 34 G/DL (ref 31.5–35.7)
MCV RBC AUTO: 89 FL (ref 79–97)
MONOCYTES # BLD AUTO: 0.48 10*3/MM3 (ref 0.1–0.9)
MONOCYTES NFR BLD AUTO: 6.5 % (ref 5–12)
NEUTROPHILS NFR BLD AUTO: 3.77 10*3/MM3 (ref 1.7–7)
NEUTROPHILS NFR BLD AUTO: 50.6 % (ref 42.7–76)
NITRITE UR QL STRIP: POSITIVE
NRBC BLD AUTO-RTO: 0 /100 WBC (ref 0–0.2)
PH UR STRIP.AUTO: 6.5 [PH] (ref 5–9)
PLATELET # BLD AUTO: 205 10*3/MM3 (ref 140–450)
PMV BLD AUTO: 11.8 FL (ref 6–12)
POTASSIUM SERPL-SCNC: 4.2 MMOL/L (ref 3.5–5.2)
PROT SERPL-MCNC: 6.5 G/DL (ref 6–8.5)
PROT UR QL STRIP: NEGATIVE
RBC # BLD AUTO: 4.1 10*6/MM3 (ref 3.77–5.28)
RBC # UR STRIP: ABNORMAL /HPF
REF LAB TEST METHOD: ABNORMAL
SODIUM SERPL-SCNC: 140 MMOL/L (ref 136–145)
SP GR UR STRIP: 1.02 (ref 1–1.03)
SQUAMOUS #/AREA URNS HPF: ABNORMAL /HPF
UROBILINOGEN UR QL STRIP: ABNORMAL
WBC # UR STRIP: ABNORMAL /HPF
WBC NRBC COR # BLD: 7.43 10*3/MM3 (ref 3.4–10.8)

## 2022-08-29 PROCEDURE — 96375 TX/PRO/DX INJ NEW DRUG ADDON: CPT

## 2022-08-29 PROCEDURE — 81001 URINALYSIS AUTO W/SCOPE: CPT

## 2022-08-29 PROCEDURE — 36415 COLL VENOUS BLD VENIPUNCTURE: CPT

## 2022-08-29 PROCEDURE — 74176 CT ABD & PELVIS W/O CONTRAST: CPT

## 2022-08-29 PROCEDURE — 83690 ASSAY OF LIPASE: CPT

## 2022-08-29 PROCEDURE — 87186 SC STD MICRODIL/AGAR DIL: CPT

## 2022-08-29 PROCEDURE — 87086 URINE CULTURE/COLONY COUNT: CPT

## 2022-08-29 PROCEDURE — 85025 COMPLETE CBC W/AUTO DIFF WBC: CPT

## 2022-08-29 PROCEDURE — 25010000002 ONDANSETRON PER 1 MG

## 2022-08-29 PROCEDURE — 80053 COMPREHEN METABOLIC PANEL: CPT

## 2022-08-29 PROCEDURE — 25010000002 KETOROLAC TROMETHAMINE PER 15 MG

## 2022-08-29 PROCEDURE — 96374 THER/PROPH/DIAG INJ IV PUSH: CPT

## 2022-08-29 PROCEDURE — 87077 CULTURE AEROBIC IDENTIFY: CPT

## 2022-08-29 PROCEDURE — 81025 URINE PREGNANCY TEST: CPT

## 2022-08-29 PROCEDURE — 99283 EMERGENCY DEPT VISIT LOW MDM: CPT

## 2022-08-29 RX ORDER — CEPHALEXIN 500 MG/1
500 CAPSULE ORAL 2 TIMES DAILY
Qty: 14 CAPSULE | Refills: 0 | Status: SHIPPED | OUTPATIENT
Start: 2022-08-29 | End: 2022-08-29 | Stop reason: SDUPTHER

## 2022-08-29 RX ORDER — KETOROLAC TROMETHAMINE 30 MG/ML
30 INJECTION, SOLUTION INTRAMUSCULAR; INTRAVENOUS ONCE
Status: COMPLETED | OUTPATIENT
Start: 2022-08-29 | End: 2022-08-29

## 2022-08-29 RX ORDER — ONDANSETRON 2 MG/ML
4 INJECTION INTRAMUSCULAR; INTRAVENOUS ONCE
Status: COMPLETED | OUTPATIENT
Start: 2022-08-29 | End: 2022-08-29

## 2022-08-29 RX ORDER — CEPHALEXIN 500 MG/1
500 CAPSULE ORAL 2 TIMES DAILY
Qty: 14 CAPSULE | Refills: 0 | Status: SHIPPED | OUTPATIENT
Start: 2022-08-29 | End: 2022-09-05

## 2022-08-29 RX ORDER — SODIUM CHLORIDE 0.9 % (FLUSH) 0.9 %
10 SYRINGE (ML) INJECTION AS NEEDED
Status: DISCONTINUED | OUTPATIENT
Start: 2022-08-29 | End: 2022-08-29 | Stop reason: HOSPADM

## 2022-08-29 RX ORDER — CEPHALEXIN 500 MG/1
500 CAPSULE ORAL ONCE
Status: COMPLETED | OUTPATIENT
Start: 2022-08-29 | End: 2022-08-29

## 2022-08-29 RX ADMIN — KETOROLAC TROMETHAMINE 30 MG: 30 INJECTION, SOLUTION INTRAMUSCULAR; INTRAVENOUS at 19:32

## 2022-08-29 RX ADMIN — ONDANSETRON 4 MG: 2 INJECTION INTRAMUSCULAR; INTRAVENOUS at 19:32

## 2022-08-29 RX ADMIN — CEPHALEXIN 500 MG: 500 CAPSULE ORAL at 20:25

## 2022-08-29 RX ADMIN — SODIUM CHLORIDE 1000 ML: 9 INJECTION, SOLUTION INTRAVENOUS at 19:32

## 2022-09-01 LAB — BACTERIA SPEC AEROBE CULT: ABNORMAL

## 2022-09-02 ENCOUNTER — TELEPHONE (OUTPATIENT)
Dept: EMERGENCY DEPT | Facility: HOSPITAL | Age: 47
End: 2022-09-02

## 2022-09-02 RX ORDER — GRANULES FOR ORAL 3 G/1
3 POWDER ORAL ONCE
Qty: 3 G | Refills: 0 | Status: SHIPPED | OUTPATIENT
Start: 2022-09-02 | End: 2022-09-02

## 2022-09-21 ENCOUNTER — APPOINTMENT (OUTPATIENT)
Dept: CT IMAGING | Facility: HOSPITAL | Age: 47
End: 2022-09-21

## 2022-09-21 ENCOUNTER — HOSPITAL ENCOUNTER (EMERGENCY)
Facility: HOSPITAL | Age: 47
Discharge: HOME | End: 2022-09-21
Attending: STUDENT IN AN ORGANIZED HEALTH CARE EDUCATION/TRAINING PROGRAM | Admitting: EMERGENCY MEDICINE

## 2022-09-21 VITALS
RESPIRATION RATE: 18 BRPM | SYSTOLIC BLOOD PRESSURE: 121 MMHG | WEIGHT: 215 LBS | HEART RATE: 60 BPM | BODY MASS INDEX: 42.21 KG/M2 | HEIGHT: 60 IN | OXYGEN SATURATION: 98 % | DIASTOLIC BLOOD PRESSURE: 73 MMHG | TEMPERATURE: 98.3 F

## 2022-09-21 DIAGNOSIS — R10.31 RIGHT LOWER QUADRANT ABDOMINAL PAIN: ICD-10-CM

## 2022-09-21 DIAGNOSIS — N20.0 BILATERAL NEPHROLITHIASIS: Primary | ICD-10-CM

## 2022-09-21 LAB
ALBUMIN SERPL-MCNC: 4.1 G/DL (ref 3.5–5.2)
ALBUMIN/GLOB SERPL: 1.7 G/DL
ALP SERPL-CCNC: 95 U/L (ref 39–117)
ALT SERPL W P-5'-P-CCNC: 33 U/L (ref 1–33)
ANION GAP SERPL CALCULATED.3IONS-SCNC: 8 MMOL/L (ref 5–15)
AST SERPL-CCNC: 27 U/L (ref 1–32)
B-HCG UR QL: NEGATIVE
BACTERIA UR QL AUTO: ABNORMAL /HPF
BASOPHILS # BLD AUTO: 0.05 10*3/MM3 (ref 0–0.2)
BASOPHILS NFR BLD AUTO: 0.8 % (ref 0–1.5)
BILIRUB SERPL-MCNC: <0.2 MG/DL (ref 0–1.2)
BILIRUB UR QL STRIP: NEGATIVE
BUN SERPL-MCNC: 13 MG/DL (ref 6–20)
BUN/CREAT SERPL: 12.4 (ref 7–25)
CALCIUM SPEC-SCNC: 9 MG/DL (ref 8.6–10.5)
CHLORIDE SERPL-SCNC: 105 MMOL/L (ref 98–107)
CLARITY UR: CLEAR
CO2 SERPL-SCNC: 27 MMOL/L (ref 22–29)
COLOR UR: YELLOW
CREAT SERPL-MCNC: 1.05 MG/DL (ref 0.57–1)
DEPRECATED RDW RBC AUTO: 45.6 FL (ref 37–54)
EGFRCR SERPLBLD CKD-EPI 2021: 66.1 ML/MIN/1.73
EOSINOPHIL # BLD AUTO: 0.29 10*3/MM3 (ref 0–0.4)
EOSINOPHIL NFR BLD AUTO: 4.8 % (ref 0.3–6.2)
ERYTHROCYTE [DISTWIDTH] IN BLOOD BY AUTOMATED COUNT: 13.5 % (ref 12.3–15.4)
GLOBULIN UR ELPH-MCNC: 2.4 GM/DL
GLUCOSE SERPL-MCNC: 94 MG/DL (ref 65–99)
GLUCOSE UR STRIP-MCNC: NEGATIVE MG/DL
HCT VFR BLD AUTO: 36.8 % (ref 34–46.6)
HGB BLD-MCNC: 12.2 G/DL (ref 12–15.9)
HGB UR QL STRIP.AUTO: NEGATIVE
HOLD SPECIMEN: NORMAL
HOLD SPECIMEN: NORMAL
HYALINE CASTS UR QL AUTO: ABNORMAL /LPF
IMM GRANULOCYTES # BLD AUTO: 0.01 10*3/MM3 (ref 0–0.05)
IMM GRANULOCYTES NFR BLD AUTO: 0.2 % (ref 0–0.5)
KETONES UR QL STRIP: NEGATIVE
LEUKOCYTE ESTERASE UR QL STRIP.AUTO: ABNORMAL
LYMPHOCYTES # BLD AUTO: 2.09 10*3/MM3 (ref 0.7–3.1)
LYMPHOCYTES NFR BLD AUTO: 34.3 % (ref 19.6–45.3)
MCH RBC QN AUTO: 30.5 PG (ref 26.6–33)
MCHC RBC AUTO-ENTMCNC: 33.2 G/DL (ref 31.5–35.7)
MCV RBC AUTO: 92 FL (ref 79–97)
MONOCYTES # BLD AUTO: 0.38 10*3/MM3 (ref 0.1–0.9)
MONOCYTES NFR BLD AUTO: 6.2 % (ref 5–12)
NEUTROPHILS NFR BLD AUTO: 3.28 10*3/MM3 (ref 1.7–7)
NEUTROPHILS NFR BLD AUTO: 53.7 % (ref 42.7–76)
NITRITE UR QL STRIP: NEGATIVE
NRBC BLD AUTO-RTO: 0 /100 WBC (ref 0–0.2)
PH UR STRIP.AUTO: 6.5 [PH] (ref 5–9)
PLATELET # BLD AUTO: 227 10*3/MM3 (ref 140–450)
PMV BLD AUTO: 12 FL (ref 6–12)
POTASSIUM SERPL-SCNC: 4.3 MMOL/L (ref 3.5–5.2)
PROT SERPL-MCNC: 6.5 G/DL (ref 6–8.5)
PROT UR QL STRIP: NEGATIVE
RBC # BLD AUTO: 4 10*6/MM3 (ref 3.77–5.28)
RBC # UR STRIP: ABNORMAL /HPF
REF LAB TEST METHOD: ABNORMAL
SODIUM SERPL-SCNC: 140 MMOL/L (ref 136–145)
SP GR UR STRIP: 1.01 (ref 1–1.03)
SQUAMOUS #/AREA URNS HPF: ABNORMAL /HPF
UROBILINOGEN UR QL STRIP: ABNORMAL
WBC # UR STRIP: ABNORMAL /HPF
WBC NRBC COR # BLD: 6.1 10*3/MM3 (ref 3.4–10.8)

## 2022-09-21 PROCEDURE — 81025 URINE PREGNANCY TEST: CPT

## 2022-09-21 PROCEDURE — 25010000002 ONDANSETRON PER 1 MG

## 2022-09-21 PROCEDURE — 96375 TX/PRO/DX INJ NEW DRUG ADDON: CPT

## 2022-09-21 PROCEDURE — 0 IOPAMIDOL PER 1 ML: Performed by: EMERGENCY MEDICINE

## 2022-09-21 PROCEDURE — 25010000002 MORPHINE PER 10 MG: Performed by: EMERGENCY MEDICINE

## 2022-09-21 PROCEDURE — 87086 URINE CULTURE/COLONY COUNT: CPT

## 2022-09-21 PROCEDURE — 74177 CT ABD & PELVIS W/CONTRAST: CPT

## 2022-09-21 PROCEDURE — 81001 URINALYSIS AUTO W/SCOPE: CPT

## 2022-09-21 PROCEDURE — 25010000002 KETOROLAC TROMETHAMINE PER 15 MG

## 2022-09-21 PROCEDURE — 99284 EMERGENCY DEPT VISIT MOD MDM: CPT

## 2022-09-21 PROCEDURE — 85025 COMPLETE CBC W/AUTO DIFF WBC: CPT

## 2022-09-21 PROCEDURE — 80053 COMPREHEN METABOLIC PANEL: CPT

## 2022-09-21 PROCEDURE — 96374 THER/PROPH/DIAG INJ IV PUSH: CPT

## 2022-09-21 RX ORDER — ONDANSETRON 2 MG/ML
4 INJECTION INTRAMUSCULAR; INTRAVENOUS ONCE
Status: COMPLETED | OUTPATIENT
Start: 2022-09-21 | End: 2022-09-21

## 2022-09-21 RX ORDER — SODIUM CHLORIDE 0.9 % (FLUSH) 0.9 %
10 SYRINGE (ML) INJECTION AS NEEDED
Status: DISCONTINUED | OUTPATIENT
Start: 2022-09-21 | End: 2022-09-21 | Stop reason: HOSPADM

## 2022-09-21 RX ORDER — MORPHINE SULFATE 2 MG/ML
2 INJECTION, SOLUTION INTRAMUSCULAR; INTRAVENOUS ONCE
Status: DISCONTINUED | OUTPATIENT
Start: 2022-09-21 | End: 2022-09-21

## 2022-09-21 RX ORDER — KETOROLAC TROMETHAMINE 30 MG/ML
30 INJECTION, SOLUTION INTRAMUSCULAR; INTRAVENOUS EVERY 6 HOURS PRN
Status: DISCONTINUED | OUTPATIENT
Start: 2022-09-21 | End: 2022-09-21 | Stop reason: HOSPADM

## 2022-09-21 RX ORDER — MORPHINE SULFATE 2 MG/ML
2 INJECTION, SOLUTION INTRAMUSCULAR; INTRAVENOUS ONCE
Status: COMPLETED | OUTPATIENT
Start: 2022-09-21 | End: 2022-09-21

## 2022-09-21 RX ADMIN — KETOROLAC TROMETHAMINE 30 MG: 30 INJECTION, SOLUTION INTRAMUSCULAR; INTRAVENOUS at 16:11

## 2022-09-21 RX ADMIN — ONDANSETRON 4 MG: 2 INJECTION INTRAMUSCULAR; INTRAVENOUS at 16:11

## 2022-09-21 RX ADMIN — MORPHINE SULFATE 2 MG: 2 INJECTION, SOLUTION INTRAMUSCULAR; INTRAVENOUS at 18:00

## 2022-09-21 RX ADMIN — SODIUM CHLORIDE 1000 ML: 9 INJECTION, SOLUTION INTRAVENOUS at 16:11

## 2022-09-21 RX ADMIN — IOPAMIDOL 90 ML: 755 INJECTION, SOLUTION INTRAVENOUS at 17:47

## 2022-09-21 NOTE — DISCHARGE INSTRUCTIONS
Home to rest. Take ibuprofen and tylenol for pain. Continue Keflex as prescribed. Follow up with your primary care provider if no improvement of symptoms in 2-3 days. Follow up with urology for chronic kidney stones. Return for worsening symptoms.

## 2022-09-21 NOTE — ED NOTES
Pt presents via EMS with c/o nausea, vomiting and right sided abd pain that started today at approx 1300. States abd pain radiates down her leg

## 2022-09-21 NOTE — ED PROVIDER NOTES
Subjective   History of Present Illness  47 year old female patient presents to ER with complaint of right lower abdominal pain starting today and vomiting that started yesterday. She reports that pain radiates into right leg. She is currently taking keflex for a UTI and was last seen in this ER on 8/29 for UTI symptoms. She has a history significant for recurrent UTI and kidney stones. She reports that she has had intermittent burning with urination for past week and has been taking Keflex for 2 days. She smokes a half pack of cigarettes a day and denies ETOH or illicit drug use. She is rating her abdominal pain 10/10 and throbbing.         Review of Systems   Constitutional: Negative.    HENT: Negative.    Eyes: Negative.    Respiratory: Negative.    Cardiovascular: Negative.    Gastrointestinal: Positive for abdominal pain, nausea and vomiting.   Endocrine: Negative.    Genitourinary: Positive for dysuria.   Musculoskeletal: Negative.    Skin: Negative.    Allergic/Immunologic: Negative.    Neurological: Negative.    Hematological: Negative.    Psychiatric/Behavioral: Negative.        Past Medical History:   Diagnosis Date   • Abdominal pain     Chronic RLQ, RUQ, R flank comes and goes.    • Abdominal pain, right lower quadrant    • Acute bilateral otitis media    • Allergic rhinitis    • Backache     Upper back.   • Candidiasis of skin    • Cellulitis of neck    • Chest pain    • Contraception    • Cough    • Dysfunction of eustachian tube    • Dyspareunia, female    • Elevated cholesterol    • Excessive or frequent menstruation    • Flank pain    • Generalized aches and pains    • Headache    • Health maintenance examination    • Hypertension    • Infection of skin and subcutaneous tissue    • Irregular periods    • Kidney stone     Poss   • Menorrhagia    • Nausea vomiting and diarrhea    • Obesity    • Open wound of abdominal wall    • Otalgia    • Pain in left foot    • Pain in unspecified hand    • Removed  Impacted cerumen 2012   • Rhinitis    • Seizure ([facility]) 08/15/2019   • Shoulder pain     right-sided-likely muscle strain      • Shoulder tendinitis    • Spider bite wound    • Staphylococcal infection of skin    • Stroke ([facility])    • Swollen feet    • Tinea cruris    • Tobacco dependence syndrome    • Upper respiratory infection    • Urinary tract infectious disease    • Vertigo    • Visit for gynecologic examination    • Vulvovaginitis        Allergies   Allergen Reactions   • Abilify [Aripiprazole] Nausea Only   • Buspirone Rash   • Penicillins Hives and Nausea And Vomiting       Past Surgical History:   Procedure Laterality Date   •  SECTION     • DILATATION AND CURETTAGE      Secondary to incomplete spontaneous    • INCISION AND DRAINAGE ABSCESS  2012   • INJECTION OF MEDICATION  2015    Phenergan (1)     • INJECTION OF MEDICATION  10/10/2013    Toradol (2)      • INJECTION OF MEDICATION  2014    Zofran (1)          Family History   Problem Relation Age of Onset   • Breast cancer Mother    • Thyroid disease Mother    • Hypertension Mother    • Heart disease Mother    • Arthritis Mother    • Liver disease Father    • Endometrial cancer Sister    • Anxiety disorder Brother    • Mental illness Daughter    • Depression Daughter    • Diabetes Maternal Aunt    • Diabetes Maternal Uncle    • Stroke Maternal Grandmother    • Breast cancer Maternal Grandmother    • Cancer Maternal Grandfather    • Stroke Paternal Grandmother    • Depression Other    • Cancer Other         Colorectal Cancer   • Endometrial cancer Other    • Lung cancer Other        Social History     Socioeconomic History   • Marital status: Single   Tobacco Use   • Smoking status: Current Some Day Smoker     Packs/day: 0.50     Types: Cigarettes   • Smokeless tobacco: Never Used   • Tobacco comment: *800*quit*NOW   Vaping Use   • Vaping Use: Some days   • Substances: Nicotine   • Devices: RefSilentsoftble tank   •  "Passive vaping exposure: Yes   Substance and Sexual Activity   • Alcohol use: No   • Drug use: No   • Sexual activity: Not Currently           Objective    /69   Pulse 60   Temp 98.3 °F (36.8 °C) (Oral)   Resp 18   Ht 152.4 cm (60\")   Wt 97.5 kg (215 lb)   LMP 07/18/2022 (Approximate)   SpO2 98%   BMI 41.99 kg/m²     Physical Exam  Vitals and nursing note reviewed.   Constitutional:       General: She is not in acute distress.     Appearance: Normal appearance. She is obese. She is ill-appearing. She is not toxic-appearing or diaphoretic.   HENT:      Head: Normocephalic.      Nose: Nose normal.      Mouth/Throat:      Mouth: Mucous membranes are moist.   Eyes:      Pupils: Pupils are equal, round, and reactive to light.   Cardiovascular:      Rate and Rhythm: Normal rate and regular rhythm.      Pulses: Normal pulses.      Heart sounds: Normal heart sounds.   Pulmonary:      Effort: Pulmonary effort is normal. No respiratory distress.      Breath sounds: Normal breath sounds. No wheezing.   Abdominal:      General: Bowel sounds are normal. There is no distension.      Palpations: Abdomen is soft.      Tenderness: There is abdominal tenderness in the right lower quadrant and suprapubic area.   Musculoskeletal:         General: Normal range of motion.      Cervical back: Normal range of motion.   Skin:     General: Skin is warm and dry.      Capillary Refill: Capillary refill takes less than 2 seconds.   Neurological:      Mental Status: She is alert and oriented to person, place, and time.   Psychiatric:         Mood and Affect: Mood normal.         Behavior: Behavior normal.         Thought Content: Thought content normal.         Judgment: Judgment normal.         Procedures           ED Course  ED Course as of 09/21/22 1833   Wed Sep 21, 2022   1829 Spoke with patient about her results and discharge plan. She verbalizes understanding and is agreeable to plan. [HS]      ED Course User Index  [HS] " Ana Rosa Michele APRN      Results for orders placed or performed during the hospital encounter of 09/21/22   Comprehensive Metabolic Panel    Specimen: Blood   Result Value Ref Range    Glucose 94 65 - 99 mg/dL    BUN 13 6 - 20 mg/dL    Creatinine 1.05 (H) 0.57 - 1.00 mg/dL    Sodium 140 136 - 145 mmol/L    Potassium 4.3 3.5 - 5.2 mmol/L    Chloride 105 98 - 107 mmol/L    CO2 27.0 22.0 - 29.0 mmol/L    Calcium 9.0 8.6 - 10.5 mg/dL    Total Protein 6.5 6.0 - 8.5 g/dL    Albumin 4.10 3.50 - 5.20 g/dL    ALT (SGPT) 33 1 - 33 U/L    AST (SGOT) 27 1 - 32 U/L    Alkaline Phosphatase 95 39 - 117 U/L    Total Bilirubin <0.2 0.0 - 1.2 mg/dL    Globulin 2.4 gm/dL    A/G Ratio 1.7 g/dL    BUN/Creatinine Ratio 12.4 7.0 - 25.0    Anion Gap 8.0 5.0 - 15.0 mmol/L    eGFR 66.1 >60.0 mL/min/1.73   Urinalysis With Culture If Indicated - Urine, Clean Catch    Specimen: Urine, Clean Catch   Result Value Ref Range    Color, UA Yellow Yellow, Straw, Dark Yellow, Kavita    Appearance, UA Clear Clear    pH, UA 6.5 5.0 - 9.0    Specific Gravity, UA 1.013 1.003 - 1.030    Glucose, UA Negative Negative    Ketones, UA Negative Negative    Bilirubin, UA Negative Negative    Blood, UA Negative Negative    Protein, UA Negative Negative    Leuk Esterase, UA Moderate (2+) (A) Negative    Nitrite, UA Negative Negative    Urobilinogen, UA 0.2 E.U./dL 0.2 - 1.0 E.U./dL   Pregnancy, Urine - Urine, Clean Catch    Specimen: Urine, Clean Catch   Result Value Ref Range    HCG, Urine QL Negative Negative   CBC Auto Differential    Specimen: Blood   Result Value Ref Range    WBC 6.10 3.40 - 10.80 10*3/mm3    RBC 4.00 3.77 - 5.28 10*6/mm3    Hemoglobin 12.2 12.0 - 15.9 g/dL    Hematocrit 36.8 34.0 - 46.6 %    MCV 92.0 79.0 - 97.0 fL    MCH 30.5 26.6 - 33.0 pg    MCHC 33.2 31.5 - 35.7 g/dL    RDW 13.5 12.3 - 15.4 %    RDW-SD 45.6 37.0 - 54.0 fl    MPV 12.0 6.0 - 12.0 fL    Platelets 227 140 - 450 10*3/mm3    Neutrophil % 53.7 42.7 - 76.0 %    Lymphocyte %  34.3 19.6 - 45.3 %    Monocyte % 6.2 5.0 - 12.0 %    Eosinophil % 4.8 0.3 - 6.2 %    Basophil % 0.8 0.0 - 1.5 %    Immature Grans % 0.2 0.0 - 0.5 %    Neutrophils, Absolute 3.28 1.70 - 7.00 10*3/mm3    Lymphocytes, Absolute 2.09 0.70 - 3.10 10*3/mm3    Monocytes, Absolute 0.38 0.10 - 0.90 10*3/mm3    Eosinophils, Absolute 0.29 0.00 - 0.40 10*3/mm3    Basophils, Absolute 0.05 0.00 - 0.20 10*3/mm3    Immature Grans, Absolute 0.01 0.00 - 0.05 10*3/mm3    nRBC 0.0 0.0 - 0.2 /100 WBC   Urinalysis, Microscopic Only - Urine, Clean Catch    Specimen: Urine, Clean Catch   Result Value Ref Range    RBC, UA 0-2 (A) None Seen /HPF    WBC, UA 31-50 (A) None Seen, 0-2, 3-5 /HPF    Bacteria, UA None Seen None Seen /HPF    Squamous Epithelial Cells, UA 3-5 (A) None Seen, 0-2 /HPF    Hyaline Casts, UA None Seen None Seen /LPF    Methodology Automated Microscopy    Gold Top - SST   Result Value Ref Range    Extra Tube Hold for add-ons.           CT Abdomen Pelvis With Contrast    Result Date: 9/21/2022  EXAM: CT ABDOMEN PELVIS WITH IV CONTRAST ORDERING PROVIDER: GRACE PEOPLES CLINICAL HISTORY: Right lower quadrant pain COMPARISON: 8/29/2022 TECHNIQUE: CT abdomen and pelvis performed with 90ml of Isovue 370 as IV contrast and reformatted in the sagittal and coronal planes. This examination was performed according to our departmental dose optimization program which includes automated exposure control, adjustment of the MA and kV according to patient size, and/or use of iterative reconstruction technique. FINDINGS: BASILAR CHEST: Mild dependent atelectasis in the lung bases. No focal consolidation, nodule or effusion. LIVER: No mass, enlargement or abnormal density. Diffuse fatty change of liver. BILIARY TRACT: Unremarkable gallbladder. SPLEEN: No mass or enlargement. PANCREAS: No mass or inflammatory process. Normal pancreatic duct. ADRENAL GLANDS: Unremarkable. No mass. URINARY SYSTEM: Kidneys are normal in size. Bilateral  nephrolithiasis measuring up to largest focus of 6.6 mm in size on the left. No hydronephrosis, or mass. Normal ureters.  Bladder is normal without mass or stone.  GI TRACT: No mass, dilation, or wall thickening.  No diverticula.  No hernia.  Appendix is normal.  REPRODUCTIVE SYSTEM: Unremarkable uterus PERITONEAL SPACE:No free air, free fluid, mass or adenopathy. RETROPERITONEAL SPACE:  No adenopathy, mass, aneurysm or significant vascular abnormality. BONES AND EXTRA-ABDOMINAL SOFT TISSUES: Unremarkable.  No inguinal adenopathy or hernia.     Appendix is normal. Mild dependent atelectasis in the lung bases. Bilateral nonobstructing nephrolithiasis. No evidence of obstructive uropathy on either side. No evidence of bowel obstruction or perienteric inflammation. Electronically signed by:  Aniket Solsi MD  9/21/2022 6:13 PM CDT Workstation: [id number]                                    [facility]    Final diagnoses:   Bilateral nephrolithiasis   Right lower quadrant abdominal pain       ED Disposition  ED Disposition     ED Disposition   Discharge    Condition   Stable    Comment   --             Sonya Crooks, APRN  [address] [facility] [address] [phone]    Schedule an appointment as soon as possible for a visit in 2 days  If symptoms worsen    Chris Deshpande MD  [address] [facility] [address]  [facility] [address] [phone]    Schedule an appointment as soon as possible for a visit   ER follow up for kidney stones, If symptoms worsen         Medication List      No changes were made to your prescriptions during this visit.          Ana Rosa Michele, APRN  09/21/22 [id number]

## 2022-09-22 LAB — BACTERIA SPEC AEROBE CULT: NORMAL

## 2022-09-22 NOTE — ED NOTES
Pt discharged to waiting room to wait on transportation from [place]. Discharge papers given to security.

## 2022-10-02 ENCOUNTER — HOSPITAL ENCOUNTER (EMERGENCY)
Facility: HOSPITAL | Age: 47
Discharge: HOME | End: 2022-10-02
Attending: STUDENT IN AN ORGANIZED HEALTH CARE EDUCATION/TRAINING PROGRAM | Admitting: STUDENT IN AN ORGANIZED HEALTH CARE EDUCATION/TRAINING PROGRAM

## 2022-10-02 ENCOUNTER — APPOINTMENT (OUTPATIENT)
Dept: CT IMAGING | Facility: HOSPITAL | Age: 47
End: 2022-10-02

## 2022-10-02 VITALS
SYSTOLIC BLOOD PRESSURE: 135 MMHG | TEMPERATURE: 97.8 F | OXYGEN SATURATION: 99 % | HEART RATE: 54 BPM | DIASTOLIC BLOOD PRESSURE: 67 MMHG | RESPIRATION RATE: 18 BRPM

## 2022-10-02 DIAGNOSIS — R51.9 ACUTE NONINTRACTABLE HEADACHE, UNSPECIFIED HEADACHE TYPE: ICD-10-CM
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
## 2022-10-02 PROCEDURE — 70450 CT HEAD/BRAIN W/O DYE: CPT

## 2022-10-02 PROCEDURE — 72125 CT NECK SPINE W/O DYE: CPT

## 2022-10-02 PROCEDURE — 99283 EMERGENCY DEPT VISIT LOW MDM: CPT

## 2022-10-02 RX ORDER — ACETAMINOPHEN 500 MG
1000 TABLET ORAL ONCE
Status: COMPLETED | OUTPATIENT
Start: 2022-10-02 | End: 2022-10-02

## 2022-10-02 RX ORDER — CLONIDINE HYDROCHLORIDE 0.1 MG/1
0.1 TABLET ORAL
Status: ON HOLD | COMMUNITY
End: 2023-03-09

## 2022-10-02 RX ADMIN — ACETAMINOPHEN 1000 MG: 500 TABLET ORAL at 21:52

## 2022-10-03 NOTE — DISCHARGE INSTRUCTIONS
Stay hydrated.  Take Tylenol as needed every 4-6 hours for headache.  Call your PCP tomorrow for follow-up.  Return to ED with any worsening or concerning symptoms.

## 2022-10-03 NOTE — ED NOTES
"Called [facility] at [phone] informing that pt is being d/c'd and needs a ride home; staff states \"OK I will come get her\"; awaiting arrival of staff member to come transport pt back to facility.   "

## 2022-10-03 NOTE — ED PROVIDER NOTES
Subjective   History of Present Illness  Patient presents with left-sided head pain after falling while walking earlier this evening.  Patient denies seizure, or loss of consciousness.        Review of Systems   Constitutional: Negative for activity change and appetite change.   HENT: Negative for congestion and ear pain.    Eyes: Negative for pain and discharge.   Respiratory: Negative for chest tightness and shortness of breath.    Cardiovascular: Negative for chest pain and palpitations.   Gastrointestinal: Negative for abdominal distention and abdominal pain.   Endocrine: Negative for cold intolerance and heat intolerance.   Genitourinary: Negative for difficulty urinating and dysuria.   Musculoskeletal: Positive for arthralgias (left side of head pain). Negative for back pain and neck pain.   Skin: Negative for color change and rash.   Allergic/Immunologic: Negative for environmental allergies and food allergies.   Neurological: Negative for dizziness and headaches.   Hematological: Negative for adenopathy. Does not bruise/bleed easily.   Psychiatric/Behavioral: Negative for agitation and confusion.       Past Medical History:   Diagnosis Date   • Abdominal pain     Chronic RLQ, RUQ, R flank comes and goes.    • Abdominal pain, right lower quadrant    • Acute bilateral otitis media    • Allergic rhinitis    • Backache     Upper back.   • Candidiasis of skin    • Cellulitis of neck    • Chest pain    • Contraception    • Cough    • Dysfunction of eustachian tube    • Dyspareunia, female    • Elevated cholesterol    • Excessive or frequent menstruation    • Flank pain    • Generalized aches and pains    • Headache    • Health maintenance examination    • Hypertension    • Infection of skin and subcutaneous tissue    • Irregular periods    • Kidney stone     Poss   • Menorrhagia    • Nausea vomiting and diarrhea    • Obesity    • Open wound of abdominal wall    • Otalgia    • Pain in left foot    • Pain in  unspecified hand    • Removed Impacted cerumen 2012   • Rhinitis    • Seizure ([facility]) 08/15/2019   • Shoulder pain     right-sided-likely muscle strain      • Shoulder tendinitis    • Spider bite wound    • Staphylococcal infection of skin    • Stroke ([facility])    • Swollen feet    • Tinea cruris    • Tobacco dependence syndrome    • Upper respiratory infection    • Urinary tract infectious disease    • Vertigo    • Visit for gynecologic examination    • Vulvovaginitis        Allergies   Allergen Reactions   • Abilify [Aripiprazole] Nausea Only   • Buspirone Rash   • Penicillins Hives and Nausea And Vomiting       Past Surgical History:   Procedure Laterality Date   •  SECTION     • DILATATION AND CURETTAGE      Secondary to incomplete spontaneous    • INCISION AND DRAINAGE ABSCESS  2012   • INJECTION OF MEDICATION  2015    Phenergan (1)     • INJECTION OF MEDICATION  10/10/2013    Toradol (2)      • INJECTION OF MEDICATION  2014    Zofran (1)          Family History   Problem Relation Age of Onset   • Breast cancer Mother    • Thyroid disease Mother    • Hypertension Mother    • Heart disease Mother    • Arthritis Mother    • Liver disease Father    • Endometrial cancer Sister    • Anxiety disorder Brother    • Mental illness Daughter    • Depression Daughter    • Diabetes Maternal Aunt    • Diabetes Maternal Uncle    • Stroke Maternal Grandmother    • Breast cancer Maternal Grandmother    • Cancer Maternal Grandfather    • Stroke Paternal Grandmother    • Depression Other    • Cancer Other         Colorectal Cancer   • Endometrial cancer Other    • Lung cancer Other        Social History     Socioeconomic History   • Marital status: Single   Tobacco Use   • Smoking status: Current Some Day Smoker     Packs/day: 0.50     Types: Cigarettes   • Smokeless tobacco: Never Used   • Tobacco comment: 19*800*quit*NOW   Vaping Use   • Vaping Use: Some days   • Substances: Nicotine   •  Devices: Refillable tank   • Passive vaping exposure: Yes   Substance and Sexual Activity   • Alcohol use: No   • Drug use: No   • Sexual activity: Not Currently           Objective   Physical Exam  Vitals and nursing note reviewed.   Constitutional:       Appearance: She is well-developed.   HENT:      Head: Normocephalic and atraumatic.   Eyes:      Pupils: Pupils are equal, round, and reactive to light.   Neck:      Thyroid: No thyromegaly.      Vascular: No JVD.      Trachea: No tracheal deviation.   Cardiovascular:      Rate and Rhythm: Normal rate.      Pulses:           Radial pulses are 2+ on the right side and 2+ on the left side.        Dorsalis pedis pulses are 2+ on the right side and 2+ on the left side.      Heart sounds: Normal heart sounds, S1 normal and S2 normal.   Pulmonary:      Effort: Pulmonary effort is normal.      Breath sounds: Normal breath sounds.   Abdominal:      General: Bowel sounds are normal.   Musculoskeletal:         General: Normal range of motion.   Skin:     General: Skin is warm and dry.      Capillary Refill: Capillary refill takes 2 to 3 seconds.   Neurological:      General: No focal deficit present.      Mental Status: She is alert and oriented to person, place, and time. Mental status is at baseline.      GCS: GCS eye subscore is 4. GCS verbal subscore is 5. GCS motor subscore is 6.   Psychiatric:         Speech: Speech normal.         Behavior: Behavior normal.         Thought Content: Thought content normal.         Procedures           ED Course           Vitals:    10/02/22 2115 10/02/22 2130 10/02/22 2145 10/02/22 2200   BP: 142/80 128/78 144/71 135/67   BP Location:       Patient Position:       Pulse: 56 52 60 54   Resp:       Temp:       SpO2: 100% 100% 100% 99%     Lab Results (last 24 hours)     ** No results found for the last 24 hours. **        CT Head Without Contrast    Result Date: 10/2/2022  No acute intracranial findings. 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  Electronically signed by:  Mino Sarabia MD  10/2/2022 11:20 PM CDT Workstation: [id number]    CT Cervical Spine Without Contrast    Result Date: 10/2/2022  No acute intracranial findings. Unchanged old left-sided MCA territory infarction No acute cervical spine fracture or subluxation.  Electronically signed by:  Mino Sarabia MD  10/2/2022 11:20 PM CDT Workstation: [id number]                                    MDM  Number of Diagnoses or Management Options  Acute nonintractable headache, unspecified headache type  Fall, initial encounter  Diagnosis management comments: Patient with improved headache after treatment with Tylenol.  CT head and neck unremarkable for acute findings.  Patient to follow-up with PCP tomorrow.       Amount and/or Complexity of Data Reviewed  Tests in the radiology section of CPT®: ordered and reviewed    Patient Progress  Patient progress: stable      Final diagnoses:   Fall, initial encounter   Acute nonintractable headache, unspecified headache type       ED Disposition  ED Disposition     ED Disposition   Discharge    Condition   Stable    Comment   --             Sonya Crooks, APRN  [address] [place], [place] [id number]  [phone]    Call in 1 day  for follow up         Medication List      No changes were made to your prescriptions during this visit.       This document has been electronically signed by Conrad Sánchez MD on October 2, 2022 23:32 CDT    Conrad Sánchez MD   Part of this note may be an electronic transcription/translation of spoken language to printed text using the Dragon Dictation System.        Conrad Sánchez MD  10/02/22 [id number]

## 2022-10-16 ENCOUNTER — APPOINTMENT (OUTPATIENT)
Dept: CT IMAGING | Facility: HOSPITAL | Age: 47
End: 2022-10-16

## 2022-10-16 ENCOUNTER — APPOINTMENT (OUTPATIENT)
Dept: GENERAL RADIOLOGY | Facility: HOSPITAL | Age: 47
End: 2022-10-16

## 2022-10-16 ENCOUNTER — APPOINTMENT (OUTPATIENT)
Dept: MRI IMAGING | Facility: HOSPITAL | Age: 47
End: 2022-10-16

## 2022-10-16 ENCOUNTER — HOSPITAL ENCOUNTER (OUTPATIENT)
Facility: HOSPITAL | Age: 47
Setting detail: OBSERVATION
Discharge: HOME | End: 2022-10-18
Attending: FAMILY MEDICINE | Admitting: HOSPITALIST

## 2022-10-16 DIAGNOSIS — Z78.9 IMPAIRED MOBILITY AND ADLS: ICD-10-CM

## 2022-10-16 DIAGNOSIS — I63.312 CEREBROVASCULAR ACCIDENT (CVA) DUE TO THROMBOSIS OF LEFT MIDDLE CEREBRAL ARTERY: ICD-10-CM

## 2022-10-16 DIAGNOSIS — Z74.09 IMPAIRED FUNCTIONAL MOBILITY, BALANCE, GAIT, AND ENDURANCE: ICD-10-CM

## 2022-10-16 DIAGNOSIS — Z74.09 IMPAIRED MOBILITY AND ADLS: ICD-10-CM

## 2022-10-16 DIAGNOSIS — N39.0 URINARY TRACT INFECTION IN FEMALE: Primary | ICD-10-CM

## 2022-10-16 LAB
ABO GROUP BLD: NORMAL
ALBUMIN SERPL-MCNC: 4.2 G/DL (ref 3.5–5.2)
ALBUMIN/GLOB SERPL: 1.9 G/DL
ALP SERPL-CCNC: 98 U/L (ref 39–117)
ALT SERPL W P-5'-P-CCNC: 39 U/L (ref 1–33)
AMPHET+METHAMPHET UR QL: NEGATIVE
AMPHETAMINES UR QL: NEGATIVE
ANION GAP SERPL CALCULATED.3IONS-SCNC: 8 MMOL/L (ref 5–15)
APTT PPP: 25.9 SECONDS (ref 20–40.3)
AST SERPL-CCNC: 29 U/L (ref 1–32)
BACTERIA UR QL AUTO: ABNORMAL /HPF
BARBITURATES UR QL SCN: POSITIVE
BASOPHILS # BLD AUTO: 0.03 10*3/MM3 (ref 0–0.2)
BASOPHILS NFR BLD AUTO: 0.5 % (ref 0–1.5)
BENZODIAZ UR QL SCN: NEGATIVE
BILIRUB SERPL-MCNC: 0.2 MG/DL (ref 0–1.2)
BILIRUB UR QL STRIP: NEGATIVE
BLD GP AB SCN SERPL QL: NEGATIVE
BUN SERPL-MCNC: 15 MG/DL (ref 6–20)
BUN/CREAT SERPL: 15 (ref 7–25)
BUPRENORPHINE SERPL-MCNC: NEGATIVE NG/ML
CALCIUM SPEC-SCNC: 9.2 MG/DL (ref 8.6–10.5)
CANNABINOIDS SERPL QL: NEGATIVE
CHLORIDE SERPL-SCNC: 106 MMOL/L (ref 98–107)
CLARITY UR: CLEAR
CO2 SERPL-SCNC: 27 MMOL/L (ref 22–29)
COCAINE UR QL: NEGATIVE
COLOR UR: YELLOW
CREAT SERPL-MCNC: 1 MG/DL (ref 0.57–1)
DEPRECATED RDW RBC AUTO: 46.2 FL (ref 37–54)
EGFRCR SERPLBLD CKD-EPI 2021: 70.1 ML/MIN/1.73
EOSINOPHIL # BLD AUTO: 0.31 10*3/MM3 (ref 0–0.4)
EOSINOPHIL NFR BLD AUTO: 5.5 % (ref 0.3–6.2)
ERYTHROCYTE [DISTWIDTH] IN BLOOD BY AUTOMATED COUNT: 13.5 % (ref 12.3–15.4)
GLOBULIN UR ELPH-MCNC: 2.2 GM/DL
GLUCOSE BLDC GLUCOMTR-MCNC: 113 MG/DL (ref 70–130)
GLUCOSE SERPL-MCNC: 105 MG/DL (ref 65–99)
GLUCOSE UR STRIP-MCNC: NEGATIVE MG/DL
HCG SERPL QL: NEGATIVE
HCT VFR BLD AUTO: 40.3 % (ref 34–46.6)
HGB BLD-MCNC: 13.1 G/DL (ref 12–15.9)
HGB UR QL STRIP.AUTO: ABNORMAL
HOLD SPECIMEN: NORMAL
HYALINE CASTS UR QL AUTO: ABNORMAL /LPF
IMM GRANULOCYTES # BLD AUTO: 0.01 10*3/MM3 (ref 0–0.05)
IMM GRANULOCYTES NFR BLD AUTO: 0.2 % (ref 0–0.5)
INR PPP: 0.99 (ref 0.8–1.2)
KETONES UR QL STRIP: NEGATIVE
LEUKOCYTE ESTERASE UR QL STRIP.AUTO: ABNORMAL
LYMPHOCYTES # BLD AUTO: 1.49 10*3/MM3 (ref 0.7–3.1)
LYMPHOCYTES NFR BLD AUTO: 26.4 % (ref 19.6–45.3)
Lab: NORMAL
MCH RBC QN AUTO: 29.8 PG (ref 26.6–33)
MCHC RBC AUTO-ENTMCNC: 32.5 G/DL (ref 31.5–35.7)
MCV RBC AUTO: 91.8 FL (ref 79–97)
METHADONE UR QL SCN: NEGATIVE
MONOCYTES # BLD AUTO: 0.41 10*3/MM3 (ref 0.1–0.9)
MONOCYTES NFR BLD AUTO: 7.3 % (ref 5–12)
NEUTROPHILS NFR BLD AUTO: 3.39 10*3/MM3 (ref 1.7–7)
NEUTROPHILS NFR BLD AUTO: 60.1 % (ref 42.7–76)
NITRITE UR QL STRIP: POSITIVE
NRBC BLD AUTO-RTO: 0 /100 WBC (ref 0–0.2)
OPIATES UR QL: NEGATIVE
OXYCODONE UR QL SCN: NEGATIVE
PCP UR QL SCN: NEGATIVE
PH UR STRIP.AUTO: 7.5 [PH] (ref 5–9)
PLATELET # BLD AUTO: 221 10*3/MM3 (ref 140–450)
PMV BLD AUTO: 11.3 FL (ref 6–12)
POTASSIUM SERPL-SCNC: 4.4 MMOL/L (ref 3.5–5.2)
PROPOXYPH UR QL: NEGATIVE
PROT SERPL-MCNC: 6.4 G/DL (ref 6–8.5)
PROT UR QL STRIP: NEGATIVE
PROTHROMBIN TIME: 13 SECONDS (ref 11.1–15.3)
RBC # BLD AUTO: 4.39 10*6/MM3 (ref 3.77–5.28)
RBC # UR STRIP: ABNORMAL /HPF
REF LAB TEST METHOD: ABNORMAL
RH BLD: POSITIVE
SODIUM SERPL-SCNC: 141 MMOL/L (ref 136–145)
SP GR UR STRIP: 1.05 (ref 1–1.03)
SQUAMOUS #/AREA URNS HPF: ABNORMAL /HPF
T&S EXPIRATION DATE: NORMAL
TRICYCLICS UR QL SCN: NEGATIVE
TROPONIN T SERPL-MCNC: <0.01 NG/ML (ref 0–0.03)
UROBILINOGEN UR QL STRIP: ABNORMAL
WBC # UR STRIP: ABNORMAL /HPF
WBC NRBC COR # BLD: 5.64 10*3/MM3 (ref 3.4–10.8)
WHOLE BLOOD HOLD COAG: NORMAL
WHOLE BLOOD HOLD SPECIMEN: NORMAL

## 2022-10-16 PROCEDURE — 86901 BLOOD TYPING SEROLOGIC RH(D): CPT | Performed by: FAMILY MEDICINE

## 2022-10-16 PROCEDURE — 70496 CT ANGIOGRAPHY HEAD: CPT

## 2022-10-16 PROCEDURE — 36415 COLL VENOUS BLD VENIPUNCTURE: CPT

## 2022-10-16 PROCEDURE — 85730 THROMBOPLASTIN TIME PARTIAL: CPT | Performed by: FAMILY MEDICINE

## 2022-10-16 PROCEDURE — 93005 ELECTROCARDIOGRAM TRACING: CPT | Performed by: FAMILY MEDICINE

## 2022-10-16 PROCEDURE — G0378 HOSPITAL OBSERVATION PER HR: HCPCS

## 2022-10-16 PROCEDURE — 86900 BLOOD TYPING SEROLOGIC ABO: CPT | Performed by: FAMILY MEDICINE

## 2022-10-16 PROCEDURE — 0 IOPAMIDOL PER 1 ML: Performed by: FAMILY MEDICINE

## 2022-10-16 PROCEDURE — 84703 CHORIONIC GONADOTROPIN ASSAY: CPT | Performed by: NURSE PRACTITIONER

## 2022-10-16 PROCEDURE — 93010 ELECTROCARDIOGRAM REPORT: CPT | Performed by: INTERNAL MEDICINE

## 2022-10-16 PROCEDURE — 85025 COMPLETE CBC W/AUTO DIFF WBC: CPT | Performed by: FAMILY MEDICINE

## 2022-10-16 PROCEDURE — 25010000002 CEFEPIME PER 500 MG: Performed by: PHYSICIAN ASSISTANT

## 2022-10-16 PROCEDURE — 85610 PROTHROMBIN TIME: CPT | Performed by: FAMILY MEDICINE

## 2022-10-16 PROCEDURE — 80306 DRUG TEST PRSMV INSTRMNT: CPT | Performed by: NURSE PRACTITIONER

## 2022-10-16 PROCEDURE — 82962 GLUCOSE BLOOD TEST: CPT

## 2022-10-16 PROCEDURE — 87181 SC STD AGAR DILUTION PER AGT: CPT | Performed by: NURSE PRACTITIONER

## 2022-10-16 PROCEDURE — 87086 URINE CULTURE/COLONY COUNT: CPT | Performed by: NURSE PRACTITIONER

## 2022-10-16 PROCEDURE — 96365 THER/PROPH/DIAG IV INF INIT: CPT

## 2022-10-16 PROCEDURE — 86850 RBC ANTIBODY SCREEN: CPT | Performed by: FAMILY MEDICINE

## 2022-10-16 PROCEDURE — 81001 URINALYSIS AUTO W/SCOPE: CPT | Performed by: NURSE PRACTITIONER

## 2022-10-16 PROCEDURE — 70551 MRI BRAIN STEM W/O DYE: CPT

## 2022-10-16 PROCEDURE — 99285 EMERGENCY DEPT VISIT HI MDM: CPT

## 2022-10-16 PROCEDURE — 84484 ASSAY OF TROPONIN QUANT: CPT | Performed by: FAMILY MEDICINE

## 2022-10-16 PROCEDURE — 70498 CT ANGIOGRAPHY NECK: CPT

## 2022-10-16 PROCEDURE — 80053 COMPREHEN METABOLIC PANEL: CPT | Performed by: FAMILY MEDICINE

## 2022-10-16 PROCEDURE — 87186 SC STD MICRODIL/AGAR DIL: CPT | Performed by: NURSE PRACTITIONER

## 2022-10-16 PROCEDURE — 70450 CT HEAD/BRAIN W/O DYE: CPT

## 2022-10-16 PROCEDURE — 71045 X-RAY EXAM CHEST 1 VIEW: CPT

## 2022-10-16 PROCEDURE — 72125 CT NECK SPINE W/O DYE: CPT

## 2022-10-16 RX ORDER — ONDANSETRON 4 MG/1
4 TABLET, FILM COATED ORAL EVERY 6 HOURS PRN
Status: DISCONTINUED | OUTPATIENT
Start: 2022-10-16 | End: 2022-10-18 | Stop reason: HOSPADM

## 2022-10-16 RX ORDER — BISACODYL 5 MG/1
5 TABLET, DELAYED RELEASE ORAL DAILY PRN
Status: DISCONTINUED | OUTPATIENT
Start: 2022-10-16 | End: 2022-10-18 | Stop reason: HOSPADM

## 2022-10-16 RX ORDER — SODIUM CHLORIDE 0.9 % (FLUSH) 0.9 %
10 SYRINGE (ML) INJECTION EVERY 12 HOURS SCHEDULED
Status: DISCONTINUED | OUTPATIENT
Start: 2022-10-16 | End: 2022-10-18 | Stop reason: HOSPADM

## 2022-10-16 RX ORDER — ACETAMINOPHEN 500 MG
1000 TABLET ORAL EVERY 8 HOURS PRN
Status: DISCONTINUED | OUTPATIENT
Start: 2022-10-16 | End: 2022-10-18 | Stop reason: HOSPADM

## 2022-10-16 RX ORDER — TRAZODONE HYDROCHLORIDE 50 MG/1
50 TABLET ORAL NIGHTLY
Status: DISCONTINUED | OUTPATIENT
Start: 2022-10-16 | End: 2022-10-18 | Stop reason: HOSPADM

## 2022-10-16 RX ORDER — BISACODYL 10 MG
10 SUPPOSITORY, RECTAL RECTAL DAILY PRN
Status: DISCONTINUED | OUTPATIENT
Start: 2022-10-16 | End: 2022-10-18 | Stop reason: HOSPADM

## 2022-10-16 RX ORDER — ESCITALOPRAM OXALATE 10 MG/1
20 TABLET ORAL DAILY
Status: DISCONTINUED | OUTPATIENT
Start: 2022-10-16 | End: 2022-10-18 | Stop reason: HOSPADM

## 2022-10-16 RX ORDER — LEVETIRACETAM 500 MG/1
1000 TABLET ORAL 2 TIMES DAILY
Status: DISCONTINUED | OUTPATIENT
Start: 2022-10-16 | End: 2022-10-18 | Stop reason: HOSPADM

## 2022-10-16 RX ORDER — ASPIRIN 325 MG
325 TABLET ORAL DAILY
Status: DISCONTINUED | OUTPATIENT
Start: 2022-10-16 | End: 2022-10-18 | Stop reason: HOSPADM

## 2022-10-16 RX ORDER — POLYETHYLENE GLYCOL 3350 17 G/17G
17 POWDER, FOR SOLUTION ORAL DAILY PRN
Status: DISCONTINUED | OUTPATIENT
Start: 2022-10-16 | End: 2022-10-18 | Stop reason: HOSPADM

## 2022-10-16 RX ORDER — ACETAMINOPHEN 325 MG/1
650 TABLET ORAL ONCE
Status: COMPLETED | OUTPATIENT
Start: 2022-10-16 | End: 2022-10-16

## 2022-10-16 RX ORDER — BUSPIRONE HYDROCHLORIDE 15 MG/1
15 TABLET ORAL 3 TIMES DAILY
Status: DISCONTINUED | OUTPATIENT
Start: 2022-10-16 | End: 2022-10-18 | Stop reason: HOSPADM

## 2022-10-16 RX ORDER — SODIUM CHLORIDE 0.9 % (FLUSH) 0.9 %
10 SYRINGE (ML) INJECTION AS NEEDED
Status: DISCONTINUED | OUTPATIENT
Start: 2022-10-16 | End: 2022-10-18 | Stop reason: HOSPADM

## 2022-10-16 RX ORDER — CLONIDINE HYDROCHLORIDE 0.1 MG/1
0.1 TABLET ORAL
Status: DISCONTINUED | OUTPATIENT
Start: 2022-10-16 | End: 2022-10-18 | Stop reason: HOSPADM

## 2022-10-16 RX ORDER — LOSARTAN POTASSIUM 50 MG/1
50 TABLET ORAL DAILY
Status: DISCONTINUED | OUTPATIENT
Start: 2022-10-16 | End: 2022-10-18 | Stop reason: HOSPADM

## 2022-10-16 RX ORDER — PANTOPRAZOLE SODIUM 40 MG/1
40 TABLET, DELAYED RELEASE ORAL DAILY
Status: DISCONTINUED | OUTPATIENT
Start: 2022-10-16 | End: 2022-10-18 | Stop reason: HOSPADM

## 2022-10-16 RX ORDER — LANOLIN ALCOHOL/MO/W.PET/CERES
6 CREAM (GRAM) TOPICAL NIGHTLY
Status: DISCONTINUED | OUTPATIENT
Start: 2022-10-16 | End: 2022-10-18 | Stop reason: HOSPADM

## 2022-10-16 RX ORDER — ATORVASTATIN CALCIUM 40 MG/1
80 TABLET, FILM COATED ORAL NIGHTLY
Status: DISCONTINUED | OUTPATIENT
Start: 2022-10-16 | End: 2022-10-18 | Stop reason: HOSPADM

## 2022-10-16 RX ORDER — FUROSEMIDE 20 MG/1
20 TABLET ORAL DAILY
Status: DISCONTINUED | OUTPATIENT
Start: 2022-10-16 | End: 2022-10-18 | Stop reason: HOSPADM

## 2022-10-16 RX ORDER — MEDROXYPROGESTERONE ACETATE 150 MG/ML
150 INJECTION, SUSPENSION INTRAMUSCULAR
Status: ON HOLD | COMMUNITY
End: 2023-03-05

## 2022-10-16 RX ORDER — HYDROXYZINE PAMOATE 25 MG/1
50 CAPSULE ORAL 2 TIMES DAILY
Status: DISCONTINUED | OUTPATIENT
Start: 2022-10-16 | End: 2022-10-18 | Stop reason: HOSPADM

## 2022-10-16 RX ORDER — AMOXICILLIN 250 MG
2 CAPSULE ORAL 2 TIMES DAILY
Status: DISCONTINUED | OUTPATIENT
Start: 2022-10-16 | End: 2022-10-18 | Stop reason: HOSPADM

## 2022-10-16 RX ORDER — ONDANSETRON 2 MG/ML
4 INJECTION INTRAMUSCULAR; INTRAVENOUS EVERY 6 HOURS PRN
Status: DISCONTINUED | OUTPATIENT
Start: 2022-10-16 | End: 2022-10-18 | Stop reason: HOSPADM

## 2022-10-16 RX ORDER — OXYBUTYNIN CHLORIDE 5 MG/1
10 TABLET, EXTENDED RELEASE ORAL DAILY
Status: DISCONTINUED | OUTPATIENT
Start: 2022-10-16 | End: 2022-10-18 | Stop reason: HOSPADM

## 2022-10-16 RX ORDER — BUTALBITAL, ACETAMINOPHEN AND CAFFEINE 50; 325; 40 MG/1; MG/1; MG/1
1 TABLET ORAL DAILY PRN
COMMUNITY
End: 2023-03-09 | Stop reason: HOSPADM

## 2022-10-16 RX ORDER — LEVETIRACETAM 1000 MG/1
1000 TABLET ORAL 2 TIMES DAILY
COMMUNITY

## 2022-10-16 RX ADMIN — BUSPIRONE HYDROCHLORIDE 15 MG: 15 TABLET ORAL at 18:42

## 2022-10-16 RX ADMIN — BUSPIRONE HYDROCHLORIDE 15 MG: 15 TABLET ORAL at 20:19

## 2022-10-16 RX ADMIN — TRAZODONE HYDROCHLORIDE 50 MG: 50 TABLET ORAL at 20:16

## 2022-10-16 RX ADMIN — LOSARTAN POTASSIUM 50 MG: 50 TABLET, FILM COATED ORAL at 18:42

## 2022-10-16 RX ADMIN — IOPAMIDOL 90 ML: 755 INJECTION, SOLUTION INTRAVENOUS at 09:49

## 2022-10-16 RX ADMIN — HYDROXYZINE PAMOATE 50 MG: 25 CAPSULE ORAL at 20:16

## 2022-10-16 RX ADMIN — Medication 10 ML: at 20:16

## 2022-10-16 RX ADMIN — SODIUM CHLORIDE 1000 ML: 9 INJECTION, SOLUTION INTRAVENOUS at 12:46

## 2022-10-16 RX ADMIN — OXYBUTYNIN CHLORIDE 10 MG: 5 TABLET, EXTENDED RELEASE ORAL at 18:42

## 2022-10-16 RX ADMIN — MELATONIN 6 MG: 3 TAB ORAL at 20:16

## 2022-10-16 RX ADMIN — LEVETIRACETAM 1000 MG: 500 TABLET, FILM COATED ORAL at 20:16

## 2022-10-16 RX ADMIN — ASPIRIN 325 MG: 325 TABLET ORAL at 18:42

## 2022-10-16 RX ADMIN — ACETAMINOPHEN 650 MG: 325 TABLET, FILM COATED ORAL at 14:50

## 2022-10-16 RX ADMIN — ATORVASTATIN CALCIUM 80 MG: 40 TABLET, FILM COATED ORAL at 20:16

## 2022-10-16 RX ADMIN — CEFEPIME HYDROCHLORIDE 2 G: 2 INJECTION, POWDER, FOR SOLUTION INTRAVENOUS at 18:49

## 2022-10-16 RX ADMIN — ESCITALOPRAM OXALATE 20 MG: 10 TABLET ORAL at 18:42

## 2022-10-16 RX ADMIN — PANTOPRAZOLE SODIUM 40 MG: 40 TABLET, DELAYED RELEASE ORAL at 18:42

## 2022-10-16 RX ADMIN — DOCUSATE SODIUM 50 MG AND SENNOSIDES 8.6 MG 2 TABLET: 8.6; 5 TABLET, FILM COATED ORAL at 20:16

## 2022-10-16 NOTE — H&P
"    [facility] Medicine  HISTORY AND PHYSICAL      Date of Admission: 10/16/2022  Primary Care Physician: Sonya Crooks APRN    Subjective     Chief Complaint: Possible stroke, Fall    History of Present Illness  Patient is a 47 year old female (history of HLD, CVA, HTN, Seizure) who presented to the ED today for evaluation of possible stroke. Patient is alone in examination room when I go to see her. She is awake and alert. She tells me she tripped over a cord in her room last night and fell. She denies hitting her head. Upon waking this morning she \"didn't feel right\" and called her mother, worried she may have had a stroke. She reports her speech was slurred this morning. She is otherwise a poor historian. Of note, patient lives at [facility].    ED evaluation included teleneurology evaluation. She did undergo CXR, CT head, CT neck, CTA head and neck, and also MRI Brain. None of these imaging modalities today revealed any acute injuries or evidence of acute ischemia/infarct. Lab evaluation today reveals normal CBC and CMP; however, UTI is noted with positive nitrites, moderate leukocytes, and 4+ bacteria. She has history of frequent falls and it was felt best for admission for IV antibiotics and PT/OT.          Review of Systems   Constitutional: Positive for fatigue. Negative for appetite change, chills, diaphoresis and fever.   HENT: Negative for congestion, ear pain, postnasal drip, rhinorrhea, sinus pressure, sinus pain, sneezing, sore throat and trouble swallowing.    Eyes: Negative for photophobia, pain and discharge.   Respiratory: Negative for cough, chest tightness, shortness of breath and wheezing.    Cardiovascular: Negative for chest pain, palpitations and leg swelling.   Gastrointestinal: Negative for abdominal pain, blood in stool, constipation, diarrhea, nausea and vomiting.   Endocrine: Negative for polydipsia, " polyphagia and polyuria.   Genitourinary: Positive for dysuria. Negative for difficulty urinating, flank pain, frequency, hematuria and urgency.   Musculoskeletal: Negative for arthralgias, back pain, myalgias and neck pain.   Skin: Negative for color change, rash and wound.   Neurological: Positive for headaches (resolved). Negative for dizziness, seizures, syncope and weakness.   Hematological: Does not bruise/bleed easily.   Psychiatric/Behavioral: Negative for behavioral problems, confusion, hallucinations, sleep disturbance and suicidal ideas.        Otherwise complete ROS reviewed and negative except as mentioned in the HPI.    Past Medical History:   Past Medical History:   Diagnosis Date   • Abdominal pain     Chronic RLQ, RUQ, R flank comes and goes.    • Abdominal pain, right lower quadrant    • Acute bilateral otitis media    • Allergic rhinitis    • Backache     Upper back.   • Candidiasis of skin    • Cellulitis of neck    • Chest pain    • Contraception    • Cough    • Dysfunction of eustachian tube    • Dyspareunia, female    • Elevated cholesterol    • Excessive or frequent menstruation    • Flank pain    • Generalized aches and pains    • Headache    • Health maintenance examination    • Hypertension    • Infection of skin and subcutaneous tissue    • Irregular periods    • Kidney stone     Poss   • Menorrhagia    • Nausea vomiting and diarrhea    • Obesity    • Open wound of abdominal wall    • Otalgia    • Pain in left foot    • Pain in unspecified hand    • Removed Impacted cerumen 06/05/2012   • Rhinitis    • Seizure ([facility]) 08/15/2019   • Shoulder pain     right-sided-likely muscle strain      • Shoulder tendinitis    • Spider bite wound    • Staphylococcal infection of skin    • Stroke ([facility])    • Swollen feet    • Tinea cruris    • Tobacco dependence syndrome    • Upper respiratory infection    • Urinary tract infectious disease    • Vertigo    • Visit for gynecologic examination    •  Vulvovaginitis      Past Surgical History:  Past Surgical History:   Procedure Laterality Date   •  SECTION     • DILATATION AND CURETTAGE      Secondary to incomplete spontaneous    • INCISION AND DRAINAGE ABSCESS  2012   • INJECTION OF MEDICATION  2015    Phenergan (1)     • INJECTION OF MEDICATION  10/10/2013    Toradol (2)      • INJECTION OF MEDICATION  2014    Zofran (1)        Social History:  reports that she has been smoking cigarettes. She has been smoking an average of .5 packs per day. She has never used smokeless tobacco. She reports that she does not drink alcohol and does not use drugs.    Family History: family history includes Anxiety disorder in her brother; Arthritis in her mother; Breast cancer in her maternal grandmother and mother; Cancer in her maternal grandfather and another family member; Depression in her daughter and another family member; Diabetes in her maternal aunt and maternal uncle; Endometrial cancer in her sister and another family member; Heart disease in her mother; Hypertension in her mother; Liver disease in her father; Lung cancer in an other family member; Mental illness in her daughter; Stroke in her maternal grandmother and paternal grandmother; Thyroid disease in her mother.       Allergies:  Allergies   Allergen Reactions   • Abilify [Aripiprazole] Nausea Only   • Buspirone Rash   • Penicillins Hives and Nausea And Vomiting       Medications:  Prior to Admission medications    Medication Sig Start Date End Date Taking? Authorizing Provider   acetaminophen (TYLENOL) 500 MG tablet Take 2 tablets by mouth Every 8 (Eight) Hours As Needed for Mild Pain.   Yes Radha Zaragoza MD   aspirin 325 MG tablet Take 325 mg by mouth Daily.   Yes Radha Zaragoza MD   atorvastatin (LIPITOR) 80 MG tablet Take 1 tablet by mouth Every Night. 21  Yes Juan Conroy MD   busPIRone (BUSPAR) 15 MG tablet Take 15 mg by mouth 3 (Three) Times  a Day. 3/9/22  Yes Sonya Crooks APRN   cloNIDine (CATAPRES) 0.1 MG tablet Take 0.1 mg by mouth Every 30 (Thirty) Minutes As Needed for High Blood Pressure. Give one tap po every 30 mins when BP is over 160   Yes ProviderRadha MD   DICLOFENAC PO Take 75 mg by mouth 2 (two) times a day. 1/1/22  Yes Radha Zaragoza MD   escitalopram (LEXAPRO) 20 MG tablet Take 1 tablet by mouth Daily.   Yes Provider, MD Radha   furosemide (LASIX) 20 MG tablet Take 20 mg by mouth Daily.   Yes Provider, MD Radha   hydrOXYzine pamoate (VISTARIL) 50 MG capsule Take 1 capsule by mouth 2 (Two) Times a Day. 4/5/22  Yes Sonya Crooks APRN   levETIRAcetam (KEPPRA) 1000 MG tablet Take 1 tablet by mouth 2 (Two) Times a Day.   Yes ProviderRadha MD   losartan (COZAAR) 50 MG tablet Take 50 mg by mouth Daily.   Yes Provider, MD Radha   meclizine (ANTIVERT) 12.5 MG tablet Take 12.5 mg by mouth 3 (Three) Times a Day As Needed for Dizziness. 4/19/22  Yes Sonya Crooks APRN   melatonin 5 MG tablet tablet Take 10 mg by mouth every night at bedtime.   Yes ProviderRadha MD   orphenadrine (NORFLEX) 100 MG 12 hr tablet Take 1 tablet by mouth 2 (Two) Times a Day As Needed for Muscle Spasms or Mild Pain . 4/13/22  Yes Nanette Mai APRN   pantoprazole (Protonix) 40 MG EC tablet Take 1 tablet by mouth Daily. 12/22/21  Yes Buzz Vasquez APRN   potassium chloride (K-DUR,KLOR-CON) 10 MEQ CR tablet Take 10 mEq by mouth Daily. 3/1/22  Yes Sonya Crooks APRN   solifenacin (VESICARE) 10 MG tablet Take 10 mg by mouth Daily. 3/2/22  Yes Sonya Crooks APRN   traZODone (DESYREL) 50 MG tablet Take 50 mg by mouth Every Night.   Yes ProviderRadha MD   levETIRAcetam (KEPPRA) 750 MG tablet Take 1 tablet by mouth Every 12 (Twelve) Hours for 30 days. 5/5/22 10/2/22  Eulogio Dunn MD   ondansetron ODT (ZOFRAN-ODT) 4 MG disintegrating tablet Place 1  "tablet on the tongue Every 6 (Six) Hours As Needed for Nausea or Vomiting. 4/2/22 10/16/22  Asher Grande MD   ondansetron ODT (ZOFRAN-ODT) 4 MG disintegrating tablet Place 1 tablet on the tongue Every 6 (Six) Hours As Needed for Nausea or Vomiting. 8/15/22 10/16/22  Asher Grande MD     I have utilized all available immediate resources to obtain, update, and review the patient's current medications.    Objective     Vital Signs: /86 (BP Location: Left arm, Patient Position: Lying)   Pulse 58   Temp 98.2 °F (36.8 °C) (Oral)   Resp 20   Ht 152.4 cm (60\")   Wt 112 kg (246 lb)   LMP  (LMP Unknown)   SpO2 99%   BMI 48.04 kg/m²   Physical Exam  Vitals and nursing note reviewed.   Constitutional:       Appearance: She is obese.   HENT:      Head: Normocephalic and atraumatic.      Right Ear: External ear normal.      Left Ear: External ear normal.      Nose: Nose normal. No congestion or rhinorrhea.      Mouth/Throat:      Mouth: Mucous membranes are moist.      Pharynx: Oropharynx is clear.   Eyes:      General: No scleral icterus.     Extraocular Movements: Extraocular movements intact.      Conjunctiva/sclera: Conjunctivae normal.      Pupils: Pupils are equal, round, and reactive to light.   Neck:      Vascular: No carotid bruit.   Cardiovascular:      Rate and Rhythm: Normal rate and regular rhythm.      Pulses: Normal pulses.      Heart sounds: Normal heart sounds. No murmur heard.  Pulmonary:      Effort: Pulmonary effort is normal.      Breath sounds: Normal breath sounds. No wheezing, rhonchi or rales.   Abdominal:      General: Abdomen is flat. Bowel sounds are normal.      Palpations: Abdomen is soft.      Tenderness: There is abdominal tenderness in the suprapubic area. There is no guarding.   Musculoskeletal:         General: No swelling or deformity. Normal range of motion.      Cervical back: Normal range of motion and neck supple. No tenderness.      Right lower leg: No edema. "      Left lower leg: No edema.   Skin:     General: Skin is warm and dry.      Capillary Refill: Capillary refill takes less than 2 seconds.      Findings: No rash.   Neurological:      General: No focal deficit present.      Mental Status: She is alert and oriented to person, place, and time.      GCS: GCS eye subscore is 4. GCS verbal subscore is 5. GCS motor subscore is 6.      Comments: Right side weakness noted, chronic   Psychiatric:         Mood and Affect: Mood normal.         Behavior: Behavior normal.         Thought Content: Thought content normal.         Judgment: Judgment normal.              Results Reviewed:  Lab Results (last 24 hours)     Procedure Component Value Units Date/Time    Extra Tubes [[id number]] Collected: 10/16/22 0855    Specimen: Blood, Venous Line Updated: 10/16/22 1301    Narrative:      The following orders were created for panel order Extra Tubes.  Procedure                               Abnormality         Status                     ---------                               -----------         ------                     Verdin Top[[id number]]                                         Final result                 Please view results for these tests on the individual orders.    Gray Top [[id number]] Collected: 10/16/22 0855    Specimen: Blood Updated: 10/16/22 1301     Extra Tube Hold for add-ons.     Comment: Auto resulted.       Urine Culture - Urine, Urine, Clean Catch [[id number]] Collected: 10/16/22 1111    Specimen: Urine, Clean Catch Updated: 10/16/22 1217    Urine Drug Screen - Urine, Clean Catch [[id number]]  (Abnormal) Collected: 10/16/22 1111    Specimen: Urine, Clean Catch Updated: 10/16/22 1129     THC, Screen, Urine Negative     Phencyclidine (PCP), Urine Negative     Cocaine Screen, Urine Negative     Methamphetamine, Ur Negative     Opiate Screen Negative     Amphetamine Screen, Urine Negative     Benzodiazepine Screen, Urine Negative     Tricyclic Antidepressants Screen  Negative     Methadone Screen, Urine Negative     Barbiturates Screen, Urine Positive     Oxycodone Screen, Urine Negative     Propoxyphene Screen Negative     Buprenorphine, Screen, Urine Negative    Narrative:      Cutoff For Drugs Screened:    Amphetamines               500 ng/ml  Barbiturates               200 ng/ml  Benzodiazepines            150 ng/ml  Cocaine                    150 ng/ml  Methadone                  200 ng/ml  Opiates                    100 ng/ml  Phencyclidine               25 ng/ml  THC                            50 ng/ml  Methamphetamine            500 ng/ml  Tricyclic Antidepressants  300 ng/ml  Oxycodone                  100 ng/ml  Propoxyphene               300 ng/ml  Buprenorphine               10 ng/ml    The normal value for all drugs tested is negative. This report includes unconfirmed screening results, with the cutoff values listed, to be used for medical treatment purposes only.  Unconfirmed results must not be used for non-medical purposes such as employment or legal testing.  Clinical consideration should be applied to any drug of abuse test, particularly when unconfirmed results are used.      Urinalysis With Microscopic If Indicated (No Culture) - Urine, Clean Catch [[id number]]  (Abnormal) Collected: 10/16/22 1111    Specimen: Urine, Clean Catch Updated: 10/16/22 1126     Color, UA Yellow     Appearance, UA Clear     pH, UA 7.5     Specific [place], UA 1.050     Comment: Result obtained by Refractometer        Glucose, UA Negative     Ketones, UA Negative     Bilirubin, UA Negative     Blood, UA Trace     Protein, UA Negative     Leuk Esterase, UA Moderate (2+)     Nitrite, UA Positive     Urobilinogen, UA 0.2 E.U./dL    Urinalysis, Microscopic Only - Urine, Clean Catch [[id number]]  (Abnormal) Collected: 10/16/22 1111    Specimen: Urine, Clean Catch Updated: 10/16/22 1122     RBC, UA 0-2 /HPF      WBC, UA 31-50 /HPF      Bacteria, UA 4+ /HPF      Squamous Epithelial Cells, UA  3-5 /HPF      Hyaline Casts, UA None Seen /LPF      Methodology Automated Microscopy    [place] Draw [[id number]] Collected: 10/16/22 0855    Specimen: Blood Updated: 10/16/22 1001    Narrative:      The following orders were created for panel order [place] Draw.  Procedure                               Abnormality         Status                     ---------                               -----------         ------                     Green Top (Gel)[[id number]]                                  Final result               Lavender Top[[id number]]                                     Final result               Gold Top - SST[[id number]]                                   Final result               Light Blue Top[[id number]]                                   Final result                 Please view results for these tests on the individual orders.    Green Top (Gel) [[id number]] Collected: 10/16/22 0855    Specimen: Blood Updated: 10/16/22 1001     Extra Tube Hold for add-ons.     Comment: Auto resulted.       Lavender Top [[id number]] Collected: 10/16/22 0855    Specimen: Blood Updated: 10/16/22 1001     Extra Tube hold for add-on     Comment: Auto resulted       Gold Top - SST [[id number]] Collected: 10/16/22 0855    Specimen: Blood Updated: 10/16/22 1001     Extra Tube Hold for add-ons.     Comment: Auto resulted.       Light Blue Top [[id number]] Collected: 10/16/22 0855    Specimen: Blood Updated: 10/16/22 1001     Extra Tube Hold for add-ons.     Comment: Auto resulted       Comprehensive Metabolic Panel [[id number]]  (Abnormal) Collected: 10/16/22 0855    Specimen: Blood Updated: 10/16/22 0949     Glucose 105 mg/dL      BUN 15 mg/dL      Creatinine 1.00 mg/dL      Sodium 141 mmol/L      Potassium 4.4 mmol/L      Chloride 106 mmol/L      CO2 27.0 mmol/L      Calcium 9.2 mg/dL      Total Protein 6.4 g/dL      Albumin 4.20 g/dL      ALT (SGPT) 39 U/L      AST (SGOT) 29 U/L      Alkaline Phosphatase 98 U/L      Total Bilirubin  0.2 mg/dL      Globulin 2.2 gm/dL      A/G Ratio 1.9 g/dL      BUN/Creatinine Ratio 15.0     Anion Gap 8.0 mmol/L      eGFR 70.1 mL/min/1.73      Comment: National Kidney Foundation and American Society of Nephrology (ASN) Task Force recommended calculation based on the Chronic Kidney Disease Epidemiology Collaboration (CKD-EPI) equation refit without adjustment for race.       Narrative:      GFR Normal >60  Chronic Kidney Disease <60  Kidney Failure <15      Troponin [[id number]]  (Normal) Collected: 10/16/22 0855    Specimen: Blood Updated: 10/16/22 0948     Troponin T <0.010 ng/mL     Narrative:      Troponin T Reference Range:  <= 0.03 ng/mL-   Negative for AMI  >0.03 ng/mL-     Abnormal for myocardial necrosis.  Clinicians would have to utilize clinical acumen, EKG, Troponin and serial changes to determine if it is an Acute Myocardial Infarction or myocardial injury due to an underlying chronic condition.       Results may be falsely decreased if patient taking Biotin.      Protime-INR [[id number]]  (Normal) Collected: 10/16/22 0855    Specimen: Blood Updated: 10/16/22 0942     Protime 13.0 Seconds      INR 0.99    Narrative:      Therapeutic range for most indications is 2.0-3.0 INR,  or 2.5-3.5 for mechanical heart valves.    aPTT [[id number]]  (Normal) Collected: 10/16/22 0855    Specimen: Blood Updated: 10/16/22 0942     PTT 25.9 seconds     Narrative:      The recommended Heparin therapeutic range is 68-97 seconds.    hCG, Serum, Qualitative [[id number]]  (Normal) Collected: 10/16/22 0855    Specimen: Blood Updated: 10/16/22 0937     HCG Qualitative Negative    CBC & Differential [[id number]]  (Normal) Collected: 10/16/22 0855    Specimen: Blood Updated: 10/16/22 0926    Narrative:      The following orders were created for panel order CBC & Differential.  Procedure                               Abnormality         Status                     ---------                               -----------         ------                      CBC Auto Differential[[id number]]        Normal              Final result                 Please view results for these tests on the individual orders.    CBC Auto Differential [[id number]]  (Normal) Collected: 10/16/22 0855    Specimen: Blood Updated: 10/16/22 0926     WBC 5.64 10*3/mm3      RBC 4.39 10*6/mm3      Hemoglobin 13.1 g/dL      Hematocrit 40.3 %      MCV 91.8 fL      MCH 29.8 pg      MCHC 32.5 g/dL      RDW 13.5 %      RDW-SD 46.2 fl      MPV 11.3 fL      Platelets 221 10*3/mm3      Neutrophil % 60.1 %      Lymphocyte % 26.4 %      Monocyte % 7.3 %      Eosinophil % 5.5 %      Basophil % 0.5 %      Immature Grans % 0.2 %      Neutrophils, Absolute 3.39 10*3/mm3      Lymphocytes, Absolute 1.49 10*3/mm3      Monocytes, Absolute 0.41 10*3/mm3      Eosinophils, Absolute 0.31 10*3/mm3      Basophils, Absolute 0.03 10*3/mm3      Immature Grans, Absolute 0.01 10*3/mm3      nRBC 0.0 /100 WBC     POC Glucose Once [[id number]]  (Normal) Collected: 10/16/22 0907    Specimen: Blood Updated: 10/16/22 0918     Glucose 113 mg/dL      Comment: RN NotifiedOperator: [id number] MASOUD VALEROARONMeter ID: [id number]           Imaging Results (Last 24 Hours)     Procedure Component Value Units Date/Time    CT Angiogram Head w AI Analysis of LVO [[id number]] Collected: 10/16/22 0948     Updated: 10/16/22 1437    Addenda:         ADDENDUM   ADDENDUM #1       NO CHANGE IN IMPRESSION.  TEMPLATE/GRAMMAR ERRORS CORRECTED:    COMPARISON: CT head 10/16/2022, MRI brain performed subsequently  10/16/2022, 5/3/2022    INDICATION: Acute stroke    TECHNIQUE:   1. CT angiography of the neck was performed after the uneventful  administration of iodinated intravenous contrast. Multiplanar  reformats including MIP and rotating three-dimensional reformats  were created on a separate workstation and permanently archived.       2. CT angiography of the [place] is performed after the  uneventful administration of  intravenous contrast. Multiplanar  reformats including MIP and rotating three-dimensional reformats  are created on a separate workstation and permanently archived.     All CT scans at this location are performed using dose modulation  techniques as appropriate to a performed exam including the  following: automated exposure control; adjustment of the mA  and/or kV according to patient size (this includes techniques or  standardized protocols for targeted exams where dose is matched  to indication / reason for exam; i.e. extremities or head); use  of iterative reconstruction technique.      Findings:    The visualized portions of the thoracic aorta including the  aortic arch appear normal caliber and configuration.  The right common carotid artery is widely patent. There is small  plaque at the proximal right internal carotid artery without  flow-limiting stenosis. External carotid artery is widely patent.   The right vertebral artery is patent.  The left common carotid artery is patent. There is mild narrowing  at the proximal left internal carotid artery, approximately 50%  diameter, with poststenotic dilatation. This at least partly  relates to tortuosity and turning vessel.  The right external  carotid artery is widely patent.  The left vertebral artery is  patent.  The bilateral subclavian arteries are patent.      The bilateral distal internal carotid arteries are patent.    The bilateral anterior and middle cerebral arteries are patent.  Chronic diminished caliber of the left M2 segment, previously  described.    The bilateral distal vertebral arteries are patent. The basilar  artery is patent. The bilateral posterior cerebral arteries are  patent.    There is no aneurysm.    Chronic left MCA encephalomalacia.     IMPRESSION:   Impression:  1.  No flow limiting stenosis of the right internal carotid  artery.  2. There is mild to moderate relative narrowing of the proximal  left internal carotid artery with  approximately 50% diameter  stenosis. There is at least partly reflects a tortuous rotating  vessel and is unchanged from prior.  3.  Patent bilateral vertebral arteries.  4. Intact [place]. As previously described, the left M2  and distal left MCA segments appear decreased caliber relative to  the right, at site of encephalomalacia from remote insult.  5. Chronic left MCA encephalomalacia     The degree of stenosis was calculated in reference to  measurements of the distal internal carotid artery diameter,  utilizing NASCET criteria.    Electronically signed by:  Cameron Mcmahon MD  10/16/2022 2:35 PM CDT  Workstation: [id number]    Signed: 10/16/22 1435 by Cameron Mcmahon MD    Narrative:      COMPARISON: CT head 10/16/2022, MRI brain performed subsequently  10/16/2022, 5/3/2022    INDICATION: Acute stroke    TECHNIQUE:   1. CT angiography of the neck was performed after the uneventful  administration of iodinated intravenous contrast. Multiplanar  reformats including MIP and rotating three-dimensional reformats  were created on a separate workstation and permanently archived.       2. CT angiography of the [place] is performed after the  uneventful administration of intravenous contrast. Multiplanar  reformats including MIP and rotating three-dimensional reformats  are created on a separate workstation and permanently archived.     All CT scans at this location are performed using dose modulation  techniques as appropriate to a performed exam including the  following: automated exposure control; adjustment of the mA  and/or kV according to patient size (this includes techniques or  standardized protocols for targeted exams where dose is matched  to indication / reason for exam; i.e. extremities or head); use  of iterative reconstruction technique.      Findings:    The visualized portions of the thoracic aorta including the  aortic arch appear normal caliber and configuration.  The right common carotid  artery there is widely patent. There is  small plaque at the proximal right internal carotid artery  without flow-limiting stenosis. External carotid artery are  widely patent.  The right vertebral artery is patent.  The left common carotid artery is patent. There is mild narrowing  at the proximal left internal carotid artery, approximately 50%  diameter, with poststenotic dilatation. The right external  carotid artery are widely patent.  The left vertebral artery is  patent.  The bilateral subclavian arteries are patent.      The bilateral distal internal carotid arteries are patent.    The bilateral anterior and middle cerebral arteries are patent.  Chronic diminished caliber of the left M2 segment, previously  described.    The bilateral distal vertebral arteries are patent. The basilar  artery is patent. The bilateral posterior cerebral arteries are  patent.    There is no aneurysm.    Chronic left MCA encephalomalacia.       Impression:      Impression:  1.  No flow limiting stenosis of the right internal carotid  artery.  2. There is mild to moderate relative narrowing of the proximal  left internal carotid artery with approximately 50% diameter  stenosis. There is at least partly reflects a tortuous rotating  vessel and is unchanged from prior.  3.  Patent bilateral vertebral arteries.  4. Intact [place] [facility] [place]. As previously described, the left M2  and distal left MCA segments appear decreased caliber relative to  the right, at site of encephalomalacia from remote insult.  5. Chronic left MCA encephalomalacia     The degree of stenosis was calculated in reference to  measurements of the distal internal carotid artery diameter,  utilizing NASCET criteria.    Electronically signed by:  Cameron Mcmahon MD  10/16/2022 1:49 PM CDT  Workstation: [id number]    CT Angiogram Neck [[id number]] Collected: 10/16/22 0948     Updated: 10/16/22 1438    Addenda:         ADDENDUM   ADDENDUM #1       NO CHANGE IN IMPRESSION.   TEMPLATE/GRAMMAR ERRORS CORRECTED:    COMPARISON: CT head 10/16/2022, MRI brain performed subsequently  10/16/2022, 5/3/2022    INDICATION: Acute stroke    TECHNIQUE:   1. CT angiography of the neck was performed after the uneventful  administration of iodinated intravenous contrast. Multiplanar  reformats including MIP and rotating three-dimensional reformats  were created on a separate workstation and permanently archived.       2. CT angiography of the [place] is performed after the  uneventful administration of intravenous contrast. Multiplanar  reformats including MIP and rotating three-dimensional reformats  are created on a separate workstation and permanently archived.     All CT scans at this location are performed using dose modulation  techniques as appropriate to a performed exam including the  following: automated exposure control; adjustment of the mA  and/or kV according to patient size (this includes techniques or  standardized protocols for targeted exams where dose is matched  to indication / reason for exam; i.e. extremities or head); use  of iterative reconstruction technique.      Findings:    The visualized portions of the thoracic aorta including the  aortic arch appear normal caliber and configuration.  The right common carotid artery is widely patent. There is small  plaque at the proximal right internal carotid artery without  flow-limiting stenosis. External carotid artery is widely patent.   The right vertebral artery is patent.  The left common carotid artery is patent. There is mild narrowing  at the proximal left internal carotid artery, approximately 50%  diameter, with poststenotic dilatation. This at least partly  relates to tortuosity and turning vessel.  The right external  carotid artery is widely patent.  The left vertebral artery is  patent.  The bilateral subclavian arteries are patent.      The bilateral distal internal carotid arteries are patent.    The bilateral  anterior and middle cerebral arteries are patent.  Chronic diminished caliber of the left M2 segment, previously  described.    The bilateral distal vertebral arteries are patent. The basilar  artery is patent. The bilateral posterior cerebral arteries are  patent.    There is no aneurysm.    Chronic left MCA encephalomalacia.     IMPRESSION:   Impression:  1.  No flow limiting stenosis of the right internal carotid  artery.  2. There is mild to moderate relative narrowing of the proximal  left internal carotid artery with approximately 50% diameter  stenosis. There is at least partly reflects a tortuous rotating  vessel and is unchanged from prior.  3.  Patent bilateral vertebral arteries.  4. Intact [place] [facility] [place]. As previously described, the left M2  and distal left MCA segments appear decreased caliber relative to  the right, at site of encephalomalacia from remote insult.  5. Chronic left MCA encephalomalacia     The degree of stenosis was calculated in reference to  measurements of the distal internal carotid artery diameter,  utilizing NASCET criteria.    Electronically signed by:  Cameron Mcmahon MD  10/16/2022 2:35 PM CDT  Workstation: [id number]    Signed: 10/16/22 1435 by Cameron Mcmahon MD    Narrative:      COMPARISON: CT head 10/16/2022, MRI brain performed subsequently  10/16/2022, 5/3/2022    INDICATION: Acute stroke    TECHNIQUE:   1. CT angiography of the neck was performed after the uneventful  administration of iodinated intravenous contrast. Multiplanar  reformats including MIP and rotating three-dimensional reformats  were created on a separate workstation and permanently archived.       2. CT angiography of the [place] is performed after the  uneventful administration of intravenous contrast. Multiplanar  reformats including MIP and rotating three-dimensional reformats  are created on a separate workstation and permanently archived.     All CT scans at this location are performed using  dose modulation  techniques as appropriate to a performed exam including the  following: automated exposure control; adjustment of the mA  and/or kV according to patient size (this includes techniques or  standardized protocols for targeted exams where dose is matched  to indication / reason for exam; i.e. extremities or head); use  of iterative reconstruction technique.      Findings:    The visualized portions of the thoracic aorta including the  aortic arch appear normal caliber and configuration.  The right common carotid artery there is widely patent. There is  small plaque at the proximal right internal carotid artery  without flow-limiting stenosis. External carotid artery are  widely patent.  The right vertebral artery is patent.  The left common carotid artery is patent. There is mild narrowing  at the proximal left internal carotid artery, approximately 50%  diameter, with poststenotic dilatation. The right external  carotid artery are widely patent.  The left vertebral artery is  patent.  The bilateral subclavian arteries are patent.      The bilateral distal internal carotid arteries are patent.    The bilateral anterior and middle cerebral arteries are patent.  Chronic diminished caliber of the left M2 segment, previously  described.    The bilateral distal vertebral arteries are patent. The basilar  artery is patent. The bilateral posterior cerebral arteries are  patent.    There is no aneurysm.    Chronic left MCA encephalomalacia.       Impression:      Impression:  1.  No flow limiting stenosis of the right internal carotid  artery.  2. There is mild to moderate relative narrowing of the proximal  left internal carotid artery with approximately 50% diameter  stenosis. There is at least partly reflects a tortuous rotating  vessel and is unchanged from prior.  3.  Patent bilateral vertebral arteries.  4. Intact [place] [facility] [place]. As previously described, the left M2  and distal left MCA segments appear  decreased caliber relative to  the right, at site of encephalomalacia from remote insult.  5. Chronic left MCA encephalomalacia     The degree of stenosis was calculated in reference to  measurements of the distal internal carotid artery diameter,  utilizing NASCET criteria.    Electronically signed by:  Cameron Mcmahon MD  10/16/2022 1:49 PM CDT  Workstation: [id number]    MRI Brain Without Contrast [[id number]] Collected: 10/16/22 1157     Updated: 10/16/22 1242    Narrative:      PROCEDURE: MR BRAIN WITHOUT IV CONTRAST    Clinical History: stroke    Indications: Same as above    Comparison: MRI of the brain done on 5/20/2022    Technique:     Noncontrast MRI of the brain was done in a multiplanar and  multi-sequence format.    Findings:     There is no evidence of acute focal infarcts, midline shift, mass  effect or intracranial hemorrhage.    Encephalomalacic change from an old infarct in the left middle  cerebral artery distribution is again noted.    There are no extra-axial fluid collections.    The  meninges appear unremarkable, given the limitation of lack  of intravenous contrast    There is good gray/white matter differentiation.    There is normal flow-void in the intracranial vasculature.    The ventricular system is normal.    The craniovertebral junction appears grossly unremarkable.    The mastoid air cells are unremarkable.    The paranasal sinuses are unremarkable.     Limited evaluation of the bilateral orbits, pituitary fossa  region and the posterior fossa region including the  cerebellopontine angles do not show any gross abnormality.      Impression:      Impression:    There are no acute intracranial findings.  Encephalomalacic change from an old infarct in the left middle  cerebral artery distribution is again noted.    Electronically signed by:  Juan Carlos Kim MD  10/16/2022 12:40 PM  CDT Workstation: [id number]    XR Chest 1 View [[id number]] Collected: 10/16/22 0958     Updated: 10/16/22 1046     Narrative:      Chest x-ray single view.       CLINICAL INDICATION: Shortness of breath acute stroke protocol.    COMPARISON: June 7, 2022    FINDINGS: Cardiac silhouette is normal in size. Pulmonary  vascularity is unremarkable.     No focal infiltrate or consolidation.  No pleural effusion.  No  pneumothorax.      Impression:      No evidence of active disease.    Electronically signed by:  John Galeano MD  10/16/2022 10:44 AM  CDT Workstation: [id number]    CT Cervical Spine Without Contrast [[id number]] Collected: 10/16/22 0936     Updated: 10/16/22 0959    Narrative:      CT cervical spine.       History: Neck pain  Prior exam: CT cervical spine October 2, 2022.  This exam was performed according to our departmental  dose-optimization program, which includes automated exposure  control, adjustment of the mA and/or kV according to patient size  and/or use of iterative reconstruction technique.    FINDINGS: CT of the cervical spine without IV contrast is  obtained. Axial images were obtained from the skull base through  T1   . Sagittal and coronal reconstructed images are provided.  The visualized vertebral bodies demonstrate normal height and  alignment. There is no evidence of acute fracture or dislocation.  The prevertebral soft tissue is unremarkable. Degenerative,  spondylotic changes C5-C6 with both anterior and posterior  osteophytes and disc space narrowing.      Impression:      Degenerative, spondylotic changes C5-C6 as described  above. Otherwise unremarkable CT examination of the cervical  spine. No change since prior exam.    Electronically signed by:  John Galeano MD  10/16/2022 9:56 AM CDT  Workstation: [id number]    CT Head Without Contrast Stroke Protocol [[id number]] Collected: 10/16/22 0936     Updated: 10/16/22 0954    Narrative:      Noncontrast CT examination of the brain.    INDICATION: Acute neurological deficit. Stroke protocol    Technique: Axial 5 mm contiguous images with brain  parenchymal  and bone windows    This exam was performed according to our departmental  dose-optimization program, which includes automated exposure  control, adjustment of the mA and/or kV according to patient size  and/or use of iterative reconstruction technique.    Prior relevant examination: CT brain September 21, 2022.  Large old infarct left parietal lobe, left middle cerebral artery  distribution unchanged since prior exam. Brain parenchyma appears  otherwise within normal limits. Ventricles are within normal  limits in size. No evidence of abnormal mass or calcification is  seen. No evidence of acute hemorrhage is noted. Bony structures  appear within normal limits and the mastoid air cells and  visualized paranasal sinuses are normally aerated.        Impression:      Large old infarct left MCA distribution.    CT brain is otherwise unremarkable and unchanged since prior  exam.    Electronically signed by:  John Galeano MD  10/16/2022 9:52 AM CDT  Workstation: [id number]        I have personally reviewed and interpreted the radiology studies and ECG obtained at time of admission.       Assessment / Plan     Assessment:   Active Hospital Problems    Diagnosis    • **Urinary tract infection in female      Plan:    Urinary tract infection in female: IV antibiotics; previous cultures show resistance to rocephin, history of E Coli and ESBL; Cefepime initiated. Culture pending.     Weakness, frequent falls: PT/OT ordered. No evidence of acute stroke today, neurology following. Continue aspirin     HTN: Continue home meds     HLD: Continue statin     Seizures: Continue Keppra     VTE: SCDs for now     Code Status: FULL CODE    I confirmed that the patient's Advance Care Plan is present, code status is documented, or surrogate decision maker is listed in the patient's medical record.       I have utilized all available immediate resources to obtain, update, or review the patient's current medications.     Estimated  length of stay is 1-3 days.     The patient was seen and examined by me on 10/16/22  at 1445.        Electronically signed by Yessy Hargrove PA-C, 10/16/22, 15:22 CDT.

## 2022-10-16 NOTE — CONSULTS
"Teleneurology Consultation Note  Date of Consultation: 10/16/2022  Requesting Attending: Lyle Angulo MD  Location: ED or Floor   Date/Time Telestroke doctor on video: 10/16/2022  09:40 CDT  Chief Complaint/Reason for consultation: fall     History of Present Illness:   Myles Shannon is a 47 y.o. woman with a history of left MCA ischemic stroke in December 2021 with residual right sided weakness and aphasia, bipolar, epilepsy, hyperlipidemia, smoker, cognitive impairment, presenting with headache and fall.       Ms. Shannon presents with her fiance who helps provide some history.    Ms. Shannon and her fiance are unable to provide sufficient detailed history to construct a comprehensive story. They report at some point over the evening Ms. Shannon got up to use the bathroom and fall. While on the floor she may have hit her head but is not sure. She reports not being sure why she fell \"just tripped\" at first reports no new weakness but a few minutes later reports right face and leg feel weaker. She repeats multiple times \"I'm just not right\" and when asked to clarify and given specific questions about weakness she is inconsistent.     On examination she has dysarthria, facial weakness and right leg weakness. She reports maybe her leg is weaker from falling on it.       Reviewed admission from December 2021 - cardiac work up did not include long term cardiac monitoring, there was hypercoag panel sent, etiology of stroke ESUS(however still needs holter monitor)     Review of System: All 12 points of review of system has been obtained and pertinent positive mentioned in HPI.   Medical History: Pertinent past medical history, surgical history, family history and social history has been reviewed.   Allergies:   Allergies   Allergen Reactions   • Abilify [Aripiprazole] Nausea Only   • Buspirone Rash   • Penicillins Hives and Nausea And Vomiting       Physical Exam:   Ms. Shannon is seen sitting up in " "bed    She is oriented to self and location  Her speech is slurred and at times difficult to undestand  She repeats  She names  She states \"I'm just not right\" multiple times to many questions   She has right facial weakness   Moderate to severe dysarthria   She is able to lift both arm up without drift   She has drift in her right lower extremity.   Sensation, coordination and gait are not tested.     CBC and CMB reviewed from 10/16/22   No leukocytosis  No anemia  No metabolic abnormality         MRI from December 2021 - original infarct         Assessment and Plan:  Myles Shannon is a 47 y.o. woman with a history of left MCA ischemic stroke in December 2021 with residual right sided weakness and aphasia, bipolar, epilepsy, hyperlipidemia, smoker, cognitive impairment, presenting with headache and fall.    On exam the patient is limited in her ability to provide a detailed account of the events and symptoms that brought her to the ED today. She does feel that her leg and face are weaker then her baseline and her finance agrees. Her initial labs do not demonstrate concern for acute infection to lead to recrudescence. Her leg may be secondary to the fall but that would not explain her facial weakness. Recommend obtaining MRI to r/o new ischemia.      Recommendations  - [ ] MRI brain   - [ ] consider PT/OT evaluation given frequency of recent falls and ED presentations.   - [ ] complete prior stroke work up with holter monitor and consideration of long term implantable loop record for Embolic Stroke of Undetermined Source   - [ ] continue ASA/statin      Zully Goodman MD  We will continue to follow  If you have any questions about the patient recommendations, please call [phone] at anytime and ask to speak with the neurologist on call.   Press 1 for an emergent consult and 2 for a non-emergent consult.        Teleneurology Patient Verification & Consent  I have proceeded with this evaluation at " the direct request of the referring practitioner as there is felt to be no appropriate specialist available at bedside to perform these evaluations. The [facility] ([facility]) practitioner has reported to me this is the correct patient and has obtained verbal consent from the patient/surrogate to perform his voluntary telemedicine evaluation (including obtaining history, performing examination and reviewing data provided by the patient and/or originating [facility] care provider). I attest that I introduced myself to the patient, provided my credentials, disclosed my location, and determined that, based on review of the patient's chart and discussion with the patient's primary team, telemedicine via a HIPAA-compliant, real-time, face-to-face, two-way, interactive audio and video platform is an appropriate and effective means of providing this service.  The patient/surrogate has the right to refuse this evaluation as they have been explained risks (including potential loss of confidentiality), benefits, alternatives, and the potential need for subsequent face to face care. Patient/surrogate understands that there is a risk of medical inaccuracies given that our recommendations will be made based on reported data (and we must therefore assume this information is accurate). Knowing that there is a risk that this information is not reported accurately, and that the telemedicine video, audio, or data feed may be incomplete, the patient agrees to proceed with evaluation and holds us harmless knowing these risks. In this evaluation, we will be providing recommendations only. The ultimate decision to follow, or not follow, these recommendations will be left to the bedside treating/requesting practitioner. The patient/surrogate has been notified that other healthcare professionals (including technical personnel) may be involved in this audio-video evaluation. All laws concerning confidentiality and patient  access to medical records and copies of medical records apply to telemedicine. The patient/surrogate has received the originating site's Health Notice of Privacy Practices.    Medical Data Reviewed:   1. Data reviewed include clinical labs, radiology, medical test;  2. Obtaining/reviewing old medical records;  3. Obtaining case history from another source;  4. Independent review of CNS images    Zully SALCEDO MD, saw patient on 10/16/2022 for an initial in-patient or emergency room telemedicine face-to-face consult using interactive technology.

## 2022-10-16 NOTE — ED PROVIDER NOTES
"Subjective   History of Present Illness  Patient presents to the ER with c/o \"not feel right\" and states \"I think I had another stroke\". She states she fell last night but cannot give details to how she fell. Patient denies hitting her head. She states she went to bed and woke up \"not feeling right\". She is unable to give any specific detail as to what does not feel right. She states she had a stroke 9 months ago and has chronic right sided weakness. She has been seen in the ER for falls since her stroke. She states she has left sided headache and left sided neck pain. Patient is a poor historian for details and keeps repeating the same information at this time. Patient is alone at this time with no family present.     S/O at bedside now and states patient was ok early this AM and then she got upset at a resident at the [facility] and went to her room. She shut the door and when he went in she was in the floor. She was slow to respond at that time. Patient eventually was able to communicate she had tripped in the bathroom. He states that is when EMS was called. Patient's  is also poor historian and it is difficulty to differentiate what occurred today vs what occurred in the the past to his statements as his story is with inconsistencies.         Review of Systems   Constitutional: Negative for chills and fever.   HENT: Negative for trouble swallowing.    Eyes: Negative for visual disturbance.   Respiratory: Negative for shortness of breath.    Cardiovascular: Negative for chest pain.   Gastrointestinal: Negative for abdominal pain, nausea and vomiting.   Musculoskeletal: Positive for neck pain.   Skin: Negative for wound.   Neurological: Positive for weakness and headaches.       Past Medical History:   Diagnosis Date   • Abdominal pain     Chronic RLQ, RUQ, R flank comes and goes.    • Abdominal pain, right lower quadrant    • Acute bilateral otitis media    • Allergic rhinitis    • Backache     Upper back. "   • Candidiasis of skin    • Cellulitis of neck    • Chest pain    • Contraception    • Cough    • Dysfunction of eustachian tube    • Dyspareunia, female    • Elevated cholesterol    • Excessive or frequent menstruation    • Flank pain    • Generalized aches and pains    • Headache    • Health maintenance examination    • Hypertension    • Infection of skin and subcutaneous tissue    • Irregular periods    • Kidney stone     Poss   • Menorrhagia    • Nausea vomiting and diarrhea    • Obesity    • Open wound of abdominal wall    • Otalgia    • Pain in left foot    • Pain in unspecified hand    • Removed Impacted cerumen 2012   • Rhinitis    • Seizure (Former[facility]) 08/15/2019   • Shoulder pain     right-sided-likely muscle strain      • Shoulder tendinitis    • Spider bite wound    • Staphylococcal infection of skin    • Stroke ([facility])    • Swollen feet    • Tinea cruris    • Tobacco dependence syndrome    • Upper respiratory infection    • Urinary tract infectious disease    • Vertigo    • Visit for gynecologic examination    • Vulvovaginitis        Allergies   Allergen Reactions   • Abilify [Aripiprazole] Nausea Only   • Buspirone Rash   • Penicillins Hives and Nausea And Vomiting       Past Surgical History:   Procedure Laterality Date   •  SECTION     • DILATATION AND CURETTAGE      Secondary to incomplete spontaneous    • INCISION AND DRAINAGE ABSCESS  2012   • INJECTION OF MEDICATION  2015    Phenergan (1)     • INJECTION OF MEDICATION  10/10/2013    Toradol (2)      • INJECTION OF MEDICATION  2014    Zofran (1)          Family History   Problem Relation Age of Onset   • Breast cancer Mother    • Thyroid disease Mother    • Hypertension Mother    • Heart disease Mother    • Arthritis Mother    • Liver disease Father    • Endometrial cancer Sister    • Anxiety disorder Brother    • Mental illness Daughter    • Depression Daughter    • Diabetes Maternal Aunt    • Diabetes  "Maternal Uncle    • Stroke Maternal Grandmother    • Breast cancer Maternal Grandmother    • Cancer Maternal Grandfather    • Stroke Paternal Grandmother    • Depression Other    • Cancer Other         Colorectal Cancer   • Endometrial cancer Other    • Lung cancer Other        Social History     Socioeconomic History   • Marital status: Single   Tobacco Use   • Smoking status: Some Days     Packs/day: 0.50     Types: Cigarettes   • Smokeless tobacco: Never   • Tobacco comments:     19*800*quit*NOW   Vaping Use   • Vaping Use: Some days   • Substances: Nicotine   • Devices: Refillable tank   • Passive vaping exposure: Yes   Substance and Sexual Activity   • Alcohol use: No   • Drug use: No   • Sexual activity: Not Currently           Objective    /70 (BP Location: Left arm, Patient Position: Lying)   Pulse 67   Temp 98.2 °F (36.8 °C) (Oral)   Resp 20   Ht 152.4 cm (60\")   Wt 112 kg (246 lb)   LMP  (LMP Unknown)   SpO2 99%   BMI 48.04 kg/m²     Physical Exam  Vitals and nursing note reviewed.   Constitutional:       Appearance: She is not ill-appearing.   HENT:      Head: Normocephalic and atraumatic.   Eyes:      Extraocular Movements: Extraocular movements intact.      Pupils: Pupils are equal, round, and reactive to light.   Neck:      Comments: Tenderness to left cervical paraspinal muscles  Cardiovascular:      Rate and Rhythm: Normal rate and regular rhythm.      Heart sounds: Normal heart sounds.   Pulmonary:      Effort: Pulmonary effort is normal.      Breath sounds: Normal breath sounds.   Abdominal:      General: Bowel sounds are normal.      Palpations: Abdomen is soft.      Tenderness: There is no abdominal tenderness.   Musculoskeletal:      Cervical back: Normal range of motion and neck supple.   Skin:     General: Skin is warm and dry.      Capillary Refill: Capillary refill takes less than 2 seconds.   Neurological:      Mental Status: She is alert and oriented to person, place, and " time.      GCS: GCS eye subscore is 4. GCS verbal subscore is 5. GCS motor subscore is 6.      Comments: Patient slow to respond, repeat same story, has generalized weakness that is more prominent on right, there is facial drooping on right          ECG 12 Lead      Date/Time: 10/16/2022 10:17 AM  Performed by: Nanette Mai APRN  Authorized by: Lyle Angulo MD   Interpreted by physician  Rhythm: sinus bradycardia  Rate: bradycardic  BPM: 56  ST Segments: ST segments normal  Clinical impression: abnormal ECG        Results for orders placed or performed during the hospital encounter of 10/16/22   Comprehensive Metabolic Panel    Specimen: Blood   Result Value Ref Range    Glucose 105 (H) 65 - 99 mg/dL    BUN 15 6 - 20 mg/dL    Creatinine 1.00 0.57 - 1.00 mg/dL    Sodium 141 136 - 145 mmol/L    Potassium 4.4 3.5 - 5.2 mmol/L    Chloride 106 98 - 107 mmol/L    CO2 27.0 22.0 - 29.0 mmol/L    Calcium 9.2 8.6 - 10.5 mg/dL    Total Protein 6.4 6.0 - 8.5 g/dL    Albumin 4.20 3.50 - 5.20 g/dL    ALT (SGPT) 39 (H) 1 - 33 U/L    AST (SGOT) 29 1 - 32 U/L    Alkaline Phosphatase 98 39 - 117 U/L    Total Bilirubin 0.2 0.0 - 1.2 mg/dL    Globulin 2.2 gm/dL    A/G Ratio 1.9 g/dL    BUN/Creatinine Ratio 15.0 7.0 - 25.0    Anion Gap 8.0 5.0 - 15.0 mmol/L    eGFR 70.1 >60.0 mL/min/1.73   Protime-INR    Specimen: Blood   Result Value Ref Range    Protime 13.0 11.1 - 15.3 Seconds    INR 0.99 0.80 - 1.20   aPTT    Specimen: Blood   Result Value Ref Range    PTT 25.9 20.0 - 40.3 seconds   Troponin    Specimen: Blood   Result Value Ref Range    Troponin T <0.010 0.000 - 0.030 ng/mL   CBC Auto Differential    Specimen: Blood   Result Value Ref Range    WBC 5.64 3.40 - 10.80 10*3/mm3    RBC 4.39 3.77 - 5.28 10*6/mm3    Hemoglobin 13.1 12.0 - 15.9 g/dL    Hematocrit 40.3 34.0 - 46.6 %    MCV 91.8 79.0 - 97.0 fL    MCH 29.8 26.6 - 33.0 pg    MCHC 32.5 31.5 - 35.7 g/dL    RDW 13.5 12.3 - 15.4 %    RDW-SD 46.2 37.0 - 54.0 fl     MPV 11.3 6.0 - 12.0 fL    Platelets 221 140 - 450 10*3/mm3    Neutrophil % 60.1 42.7 - 76.0 %    Lymphocyte % 26.4 19.6 - 45.3 %    Monocyte % 7.3 5.0 - 12.0 %    Eosinophil % 5.5 0.3 - 6.2 %    Basophil % 0.5 0.0 - 1.5 %    Immature Grans % 0.2 0.0 - 0.5 %    Neutrophils, Absolute 3.39 1.70 - 7.00 10*3/mm3    Lymphocytes, Absolute 1.49 0.70 - 3.10 10*3/mm3    Monocytes, Absolute 0.41 0.10 - 0.90 10*3/mm3    Eosinophils, Absolute 0.31 0.00 - 0.40 10*3/mm3    Basophils, Absolute 0.03 0.00 - 0.20 10*3/mm3    Immature Grans, Absolute 0.01 0.00 - 0.05 10*3/mm3    nRBC 0.0 0.0 - 0.2 /100 WBC   hCG, Serum, Qualitative    Specimen: Blood   Result Value Ref Range    HCG Qualitative Negative Negative   Urinalysis With Microscopic If Indicated (No Culture) - Urine, Clean Catch    Specimen: Urine, Clean Catch   Result Value Ref Range    Color, UA Yellow Yellow, Straw, Dark Yellow, [place]    Appearance, UA Clear Clear    pH, UA 7.5 5.0 - 9.0    Specific Gravity, UA 1.050 (H) 1.003 - 1.030    Glucose, UA Negative Negative    Ketones, UA Negative Negative    Bilirubin, UA Negative Negative    Blood, UA Trace (A) Negative    Protein, UA Negative Negative    Leuk Esterase, UA Moderate (2+) (A) Negative    Nitrite, UA Positive (A) Negative    Urobilinogen, UA 0.2 E.U./dL 0.2 - 1.0 E.U./dL   Urine Drug Screen - Urine, Clean Catch    Specimen: Urine, Clean Catch   Result Value Ref Range    THC, Screen, Urine Negative Negative    Phencyclidine (PCP), Urine Negative Negative    Cocaine Screen, Urine Negative Negative    Methamphetamine, Ur Negative Negative    Opiate Screen Negative Negative    Amphetamine Screen, Urine Negative Negative    Benzodiazepine Screen, Urine Negative Negative    Tricyclic Antidepressants Screen Negative Negative    Methadone Screen, Urine Negative Negative    Barbiturates Screen, Urine Positive (A) Negative    Oxycodone Screen, Urine Negative Negative    Propoxyphene Screen Negative Negative     Buprenorphine, Screen, Urine Negative Negative   Urinalysis, Microscopic Only - Urine, Clean Catch    Specimen: Urine, Clean Catch   Result Value Ref Range    RBC, UA 0-2 (A) None Seen /HPF    WBC, UA 31-50 (A) None Seen, 0-2, 3-5 /HPF    Bacteria, UA 4+ (A) None Seen /HPF    Squamous Epithelial Cells, UA 3-5 (A) None Seen, 0-2 /HPF    Hyaline Casts, UA None Seen None Seen /LPF    Methodology Automated Microscopy    POC Glucose Once    Specimen: Blood   Result Value Ref Range    Glucose 113 70 - 130 mg/dL   ECG 12 Lead   Result Value Ref Range    QT Interval 442 ms    QTC Interval 426 ms   Type & Screen    Specimen: Blood   Result Value Ref Range    ABO Type O     RH type Positive     Antibody Screen Negative     T&S Expiration Date 10/19/2022 11:59:59 PM    PREVIOUS HISTORY    Specimen: Blood   Result Value Ref Range    Previous History Previous Record on File    Green Top (Gel)   Result Value Ref Range    Extra Tube Hold for add-ons.    Lavender Top   Result Value Ref Range    Extra Tube hold for add-on    Gold Top - SST   Result Value Ref Range    Extra Tube Hold for add-ons.    Light Blue Top   Result Value Ref Range    Extra Tube Hold for add-ons.    Gray Top   Result Value Ref Range    Extra Tube Hold for add-ons.      CT Head Without Contrast    Result Date: 10/2/2022  Narrative: EXAM DESCRIPTION:  CT HEAD WO CONTRAST (accession [id number]), CT CERVICAL SPINE WO CONTRAST (accession [id number]) CLINICAL HISTORY: 47 years  Female;  fall, head pain; TECHNIQUE: Noncontrast CT head. Noncontrast cervical spine CT with sagittal and coronal reconstructions All CT scans at this facility use dose modulation, iterative reconstruction, and/or weight based dosing when appropriate to reduce radiation dose to as low as reasonably achievable. COMPARISON: 5/3/2022 FINDINGS: Head: Gray matter, white matter, ventricles, and cisterns are within normal limits for age. Large unchanged area of encephalomalacia in the left MCA  territory consistent with old MCA territory infarction.  No acute hemorrhage or mass effect. Visualized portions of paranasal sinuses and mastoids are clear. Visualized portions of the calvarium are within normal limits. Cervical spine: Alignment is anatomic. There is no acute fracture of the cervical spine.  No epidural hematoma.     Impression: No acute intracranial findings. Unchanged old left-sided MCA territory infarction No acute cervical spine fracture or subluxation.  Electronically signed by:  Mino Sarabia MD  10/2/2022 11:20 PM CDT Workstation: [id number]    CT Angiogram Neck    Addendum Date: 10/16/2022 Addendum:    ADDENDUM ADDENDUM #1 NO CHANGE IN IMPRESSION.  TEMPLATE/GRAMMAR ERRORS CORRECTED: COMPARISON: CT head 10/16/2022, MRI brain performed subsequently 10/16/2022, 5/3/2022 INDICATION: Acute stroke TECHNIQUE: 1. CT angiography of the neck was performed after the uneventful administration of iodinated intravenous contrast. Multiplanar reformats including MIP and rotating three-dimensional reformats were created on a separate workstation and permanently archived. 2. CT angiography of the [place] is performed after the uneventful administration of intravenous contrast. Multiplanar reformats including MIP and rotating three-dimensional reformats are created on a separate workstation and permanently archived. All CT scans at this location are performed using dose modulation techniques as appropriate to a performed exam including the following: automated exposure control; adjustment of the mA and/or kV according to patient size (this includes techniques or standardized protocols for targeted exams where dose is matched to indication / reason for exam; i.e. extremities or head); use of iterative reconstruction technique. Findings:  The visualized portions of the thoracic aorta including the aortic arch appear normal caliber and configuration. The right common carotid artery is widely patent.  There is small plaque at the proximal right internal carotid artery without flow-limiting stenosis. External carotid artery is widely patent.  The right vertebral artery is patent. The left common carotid artery is patent. There is mild narrowing at the proximal left internal carotid artery, approximately 50% diameter, with poststenotic dilatation. This at least partly relates to tortuosity and turning vessel.  The right external carotid artery is widely patent.  The left vertebral artery is patent. The bilateral subclavian arteries are patent. The bilateral distal internal carotid arteries are patent. The bilateral anterior and middle cerebral arteries are patent. Chronic diminished caliber of the left M2 segment, previously described. The bilateral distal vertebral arteries are patent. The basilar artery is patent. The bilateral posterior cerebral arteries are patent. There is no aneurysm. Chronic left MCA encephalomalacia. IMPRESSION: Impression: 1.  No flow limiting stenosis of the right internal carotid artery. 2. There is mild to moderate relative narrowing of the proximal left internal carotid artery with approximately 50% diameter stenosis. There is at least partly reflects a tortuous rotating vessel and is unchanged from prior. 3.  Patent bilateral vertebral arteries. 4. Intact [place]. As previously described, the left M2 and distal left MCA segments appear decreased caliber relative to the right, at site of encephalomalacia from remote insult. 5. Chronic left MCA encephalomalacia The degree of stenosis was calculated in reference to measurements of the distal internal carotid artery diameter, utilizing NASCET criteria. Electronically signed by:  Cameron Mcmahon MD  10/16/2022 2:35 PM CDT Workstation: [id number]     Result Date: 10/16/2022  Narrative: COMPARISON: CT head 10/16/2022, MRI brain performed subsequently 10/16/2022, 5/3/2022 INDICATION: Acute stroke TECHNIQUE: 1. CT angiography of the neck  was performed after the uneventful administration of iodinated intravenous contrast. Multiplanar reformats including MIP and rotating three-dimensional reformats were created on a separate workstation and permanently archived. 2. CT angiography of the [place] is performed after the uneventful administration of intravenous contrast. Multiplanar reformats including MIP and rotating three-dimensional reformats are created on a separate workstation and permanently archived. All CT scans at this location are performed using dose modulation techniques as appropriate to a performed exam including the following: automated exposure control; adjustment of the mA and/or kV according to patient size (this includes techniques or standardized protocols for targeted exams where dose is matched to indication / reason for exam; i.e. extremities or head); use of iterative reconstruction technique. Findings:  The visualized portions of the thoracic aorta including the aortic arch appear normal caliber and configuration. The right common carotid artery there is widely patent. There is small plaque at the proximal right internal carotid artery without flow-limiting stenosis. External carotid artery are widely patent.  The right vertebral artery is patent. The left common carotid artery is patent. There is mild narrowing at the proximal left internal carotid artery, approximately 50% diameter, with poststenotic dilatation. The right external carotid artery are widely patent.  The left vertebral artery is patent. The bilateral subclavian arteries are patent. The bilateral distal internal carotid arteries are patent. The bilateral anterior and middle cerebral arteries are patent. Chronic diminished caliber of the left M2 segment, previously described. The bilateral distal vertebral arteries are patent. The basilar artery is patent. The bilateral posterior cerebral arteries are patent. There is no aneurysm. Chronic left MCA  encephalomalacia.     Impression: Impression: 1.  No flow limiting stenosis of the right internal carotid artery. 2. There is mild to moderate relative narrowing of the proximal left internal carotid artery with approximately 50% diameter stenosis. There is at least partly reflects a tortuous rotating vessel and is unchanged from prior. 3.  Patent bilateral vertebral arteries. 4. Intact [place]. As previously described, the left M2 and distal left MCA segments appear decreased caliber relative to the right, at site of encephalomalacia from remote insult. 5. Chronic left MCA encephalomalacia The degree of stenosis was calculated in reference to measurements of the distal internal carotid artery diameter, utilizing NASCET criteria. Electronically signed by:  Cameron Mcmahon MD  10/16/2022 1:49 PM CDT Workstation: [id number]    CT Cervical Spine Without Contrast    Result Date: 10/16/2022  Narrative: CT cervical spine. History: Neck pain Prior exam: CT cervical spine October 2, 2022. This exam was performed according to our departmental dose-optimization program, which includes automated exposure control, adjustment of the mA and/or kV according to patient size and/or use of iterative reconstruction technique. FINDINGS: CT of the cervical spine without IV contrast is obtained. Axial images were obtained from the skull base through T1   . Sagittal and coronal reconstructed images are provided. The visualized vertebral bodies demonstrate normal height and alignment. There is no evidence of acute fracture or dislocation. The prevertebral soft tissue is unremarkable. Degenerative, spondylotic changes C5-C6 with both anterior and posterior osteophytes and disc space narrowing.     Impression: Degenerative, spondylotic changes C5-C6 as described above. Otherwise unremarkable CT examination of the cervical spine. No change since prior exam. Electronically signed by:  John Galeano MD  10/16/2022 9:56 AM CDT Workstation:  [id number]    CT Cervical Spine Without Contrast    Result Date: 10/2/2022  Narrative: EXAM DESCRIPTION:  CT HEAD WO CONTRAST (accession [id number]), CT CERVICAL SPINE WO CONTRAST (accession [id number]) CLINICAL HISTORY: 47 years  Female;  fall, head pain; TECHNIQUE: Noncontrast CT head. Noncontrast cervical spine CT with sagittal and coronal reconstructions All CT scans at this facility use dose modulation, iterative reconstruction, and/or weight based dosing when appropriate to reduce radiation dose to as low as reasonably achievable. COMPARISON: 5/3/2022 FINDINGS: Head: Gray matter, white matter, ventricles, and cisterns are within normal limits for age. Large unchanged area of encephalomalacia in the left MCA territory consistent with old MCA territory infarction.  No acute hemorrhage or mass effect. Visualized portions of paranasal sinuses and mastoids are clear. Visualized portions of the calvarium are within normal limits. Cervical spine: Alignment is anatomic. There is no acute fracture of the cervical spine.  No epidural hematoma.     Impression: No acute intracranial findings. Unchanged old left-sided MCA territory infarction No acute cervical spine fracture or subluxation.  Electronically signed by:  Mino Sarabia MD  10/2/2022 11:20 PM CDT Workstation: [id number]    MRI Brain Without Contrast    Result Date: 10/16/2022  Narrative: PROCEDURE: MR BRAIN WITHOUT IV CONTRAST Clinical History: stroke Indications: Same as above Comparison: MRI of the brain done on 5/20/2022 Technique:  Noncontrast MRI of the brain was done in a multiplanar and multi-sequence format. Findings: There is no evidence of acute focal infarcts, midline shift, mass effect or intracranial hemorrhage. Encephalomalacic change from an old infarct in the left middle cerebral artery distribution is again noted. There are no extra-axial fluid collections. The  meninges appear unremarkable, given the limitation of lack of intravenous contrast  There is good gray/white matter differentiation. There is normal flow-void in the intracranial vasculature. The ventricular system is normal. The craniovertebral junction appears grossly unremarkable. The mastoid air cells are unremarkable. The paranasal sinuses are unremarkable. Limited evaluation of the bilateral orbits, pituitary fossa region and the posterior fossa region including the cerebellopontine angles do not show any gross abnormality.     Impression: Impression: There are no acute intracranial findings. Encephalomalacic change from an old infarct in the left middle cerebral artery distribution is again noted. Electronically signed by:  Juan Carlos Kim MD  10/16/2022 12:40 PM CDT Workstation: [id number]    CT Abdomen Pelvis With Contrast    Result Date: 9/21/2022  Narrative: EXAM: CT ABDOMEN PELVIS WITH IV CONTRAST ORDERING PROVIDER: GRAEC PEOPLES CLINICAL HISTORY: Right lower quadrant pain COMPARISON: 8/29/2022 TECHNIQUE: CT abdomen and pelvis performed with 90ml of Isovue 370 as IV contrast and reformatted in the sagittal and coronal planes. This examination was performed according to our departmental dose optimization program which includes automated exposure control, adjustment of the MA and kV according to patient size, and/or use of iterative reconstruction technique. FINDINGS: BASILAR CHEST: Mild dependent atelectasis in the lung bases. No focal consolidation, nodule or effusion. LIVER: No mass, enlargement or abnormal density. Diffuse fatty change of liver. BILIARY TRACT: Unremarkable gallbladder. SPLEEN: No mass or enlargement. PANCREAS: No mass or inflammatory process. Normal pancreatic duct. ADRENAL GLANDS: Unremarkable. No mass. URINARY SYSTEM: Kidneys are normal in size. Bilateral nephrolithiasis measuring up to largest focus of 6.6 mm in size on the left. No hydronephrosis, or mass. Normal ureters.  Bladder is normal without mass or stone.  GI TRACT: No mass, dilation, or wall  thickening.  No diverticula.  No hernia.  Appendix is normal.  REPRODUCTIVE SYSTEM: Unremarkable uterus PERITONEAL SPACE:No free air, free fluid, mass or adenopathy. RETROPERITONEAL SPACE:  No adenopathy, mass, aneurysm or significant vascular abnormality. BONES AND EXTRA-ABDOMINAL SOFT TISSUES: Unremarkable.  No inguinal adenopathy or hernia.     Impression: Appendix is normal. Mild dependent atelectasis in the lung bases. Bilateral nonobstructing nephrolithiasis. No evidence of obstructive uropathy on either side. No evidence of bowel obstruction or perienteric inflammation. Electronically signed by:  Aniket Solis MD  9/21/2022 6:13 PM CDT Workstation: [id number]    XR Chest 1 View    Result Date: 10/16/2022  Narrative: Chest x-ray single view. CLINICAL INDICATION: Shortness of breath acute stroke protocol. COMPARISON: June 7, 2022 FINDINGS: Cardiac silhouette is normal in size. Pulmonary vascularity is unremarkable. No focal infiltrate or consolidation.  No pleural effusion.  No pneumothorax.     Impression: No evidence of active disease. Electronically signed by:  John Galeano MD  10/16/2022 10:44 AM CDT Workstation: [id number]    CT Head Without Contrast Stroke Protocol    Result Date: 10/16/2022  Narrative: Noncontrast CT examination of the brain. INDICATION: Acute neurological deficit. Stroke protocol Technique: Axial 5 mm contiguous images with brain parenchymal and bone windows This exam was performed according to our departmental dose-optimization program, which includes automated exposure control, adjustment of the mA and/or kV according to patient size and/or use of iterative reconstruction technique. Prior relevant examination: CT brain September 21, 2022. 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 Brain parenchyma appears otherwise within normal limits. Ventricles are within normal limits in size. No evidence of abnormal mass or calcification is  seen. No evidence of acute hemorrhage is noted. Bony structures appear within normal limits and the mastoid air cells and visualized paranasal sinuses are normally aerated.     Impression: Large old infarct left MCA distribution. CT brain is otherwise unremarkable and unchanged since prior exam. Electronically signed by:  John Galeano MD  10/16/2022 9:52 AM CDT Workstation: [id number]    XR Abdomen KUB    Result Date: 10/12/2022  Narrative: Kidney stone AP abdomen: The gas pattern is normal . No mass or abnormal air collections . There are at least 3 stones in the left kidney . There are numerous calcifications in the pelvis with most of these unchanged from June 2018 .    Impression:   At least 3 stones in the left kidney . While no specific ureteral stone is seen due to the large number of calcifications in the pelvis and distal ureteral stone could be missed [id number]    CT Angiogram Head w AI Analysis of LVO    Addendum Date: 10/16/2022 Addendum:    ADDENDUM ADDENDUM #1 NO CHANGE IN IMPRESSION.  TEMPLATE/GRAMMAR ERRORS CORRECTED: COMPARISON: CT head 10/16/2022, MRI brain performed subsequently 10/16/2022, 5/3/2022 INDICATION: Acute stroke TECHNIQUE: 1. CT angiography of the neck was performed after the uneventful administration of iodinated intravenous contrast. Multiplanar reformats including MIP and rotating three-dimensional reformats were created on a separate workstation and permanently archived. 2. CT angiography of the [place] is performed after the uneventful administration of intravenous contrast. Multiplanar reformats including MIP and rotating three-dimensional reformats are created on a separate workstation and permanently archived. All CT scans at this location are performed using dose modulation techniques as appropriate to a performed exam including the following: automated exposure control; adjustment of the mA and/or kV according to patient size (this includes techniques or  standardized protocols for targeted exams where dose is matched to indication / reason for exam; i.e. extremities or head); use of iterative reconstruction technique. Findings:  The visualized portions of the thoracic aorta including the aortic arch appear normal caliber and configuration. The right common carotid artery is widely patent. There is small plaque at the proximal right internal carotid artery without flow-limiting stenosis. External carotid artery is widely patent.  The right vertebral artery is patent. The left common carotid artery is patent. There is mild narrowing at the proximal left internal carotid artery, approximately 50% diameter, with poststenotic dilatation. This at least partly relates to tortuosity and turning vessel.  The right external carotid artery is widely patent.  The left vertebral artery is patent. The bilateral subclavian arteries are patent. The bilateral distal internal carotid arteries are patent. The bilateral anterior and middle cerebral arteries are patent. Chronic diminished caliber of the left M2 segment, previously described. The bilateral distal vertebral arteries are patent. The basilar artery is patent. The bilateral posterior cerebral arteries are patent. There is no aneurysm. Chronic left MCA encephalomalacia. IMPRESSION: Impression: 1.  No flow limiting stenosis of the right internal carotid artery. 2. There is mild to moderate relative narrowing of the proximal left internal carotid artery with approximately 50% diameter stenosis. There is at least partly reflects a tortuous rotating vessel and is unchanged from prior. 3.  Patent bilateral vertebral arteries. 4. Intact [place]. As previously described, the left M2 and distal left MCA segments appear decreased caliber relative to the right, at site of encephalomalacia from remote insult. 5. Chronic left MCA encephalomalacia The degree of stenosis was calculated in reference to measurements of the distal  internal carotid artery diameter, utilizing NASCET criteria. Electronically signed by:  Cameron Mcmahon MD  10/16/2022 2:35 PM CDT Workstation: [id number]     Result Date: 10/16/2022  Narrative: COMPARISON: CT head 10/16/2022, MRI brain performed subsequently 10/16/2022, 5/3/2022 INDICATION: Acute stroke TECHNIQUE: 1. CT angiography of the neck was performed after the uneventful administration of iodinated intravenous contrast. Multiplanar reformats including MIP and rotating three-dimensional reformats were created on a separate workstation and permanently archived. 2. CT angiography of the [place] is performed after the uneventful administration of intravenous contrast. Multiplanar reformats including MIP and rotating three-dimensional reformats are created on a separate workstation and permanently archived. All CT scans at this location are performed using dose modulation techniques as appropriate to a performed exam including the following: automated exposure control; adjustment of the mA and/or kV according to patient size (this includes techniques or standardized protocols for targeted exams where dose is matched to indication / reason for exam; i.e. extremities or head); use of iterative reconstruction technique. Findings:  The visualized portions of the thoracic aorta including the aortic arch appear normal caliber and configuration. The right common carotid artery there is widely patent. There is small plaque at the proximal right internal carotid artery without flow-limiting stenosis. External carotid artery are widely patent.  The right vertebral artery is patent. The left common carotid artery is patent. There is mild narrowing at the proximal left internal carotid artery, approximately 50% diameter, with poststenotic dilatation. The right external carotid artery are widely patent.  The left vertebral artery is patent. The bilateral subclavian arteries are patent. The bilateral distal internal  carotid arteries are patent. The bilateral anterior and middle cerebral arteries are patent. Chronic diminished caliber of the left M2 segment, previously described. The bilateral distal vertebral arteries are patent. The basilar artery is patent. The bilateral posterior cerebral arteries are patent. There is no aneurysm. Chronic left MCA encephalomalacia.     Impression: Impression: 1.  No flow limiting stenosis of the right internal carotid artery. 2. There is mild to moderate relative narrowing of the proximal left internal carotid artery with approximately 50% diameter stenosis. There is at least partly reflects a tortuous rotating vessel and is unchanged from prior. 3.  Patent bilateral vertebral arteries. 4. Intact [place]. As previously described, the left M2 and distal left MCA segments appear decreased caliber relative to the right, at site of encephalomalacia from remote insult. 5. Chronic left MCA encephalomalacia The degree of stenosis was calculated in reference to measurements of the distal internal carotid artery diameter, utilizing NASCET criteria. Electronically signed by:  Cameron Mcmahon MD  10/16/2022 1:49 PM CDT Workstation: [id number]             ED Course  ED Course as of 10/16/22 1449   Sun Oct 16, 2022   0949 Spoke with Dr. Goodman. Will order CTA head and neck. Advised do not need perfusion test at this time.  [SH]   1042 Telemed Neuro exam in progress at this time.  [SH]   1242 CT aware CTAs still pending final results. States they have called 3 times already for reading.  [SH]   1349 CT called again about pending CTA results. Exams are being dictated at this time.  [SH]   1350 CTA results posted.  [SH]   1358 Spoke with Dr. Goodman. Advised [SH]   1400 Spoke with Dr. Goodman who states from neuro patient is cleared since images are negative for acute findings. Given patient has UTI with AMS and frequent falls agrees patient should be admitted at this time for PO/OT.  [SH]   1445  Spoke with Dr. Allison who agrees to admission at this time.  [SH]      ED Course User Index  [SH] Nanette Mai, APRN                                           [facility]    Final diagnoses:   Urinary tract infection in female       ED Disposition  ED Disposition     ED Disposition   Decision to Admit    Condition   --    Comment   Level of Care: Telemetry [5]   Diagnosis: Urinary tract infection in female [[id number]]   Admitting Physician: MICHELET ALLISON [[id number]]   Attending Physician: MICHELET ALLISON [[id number]]               No follow-up provider specified.       Medication List      No changes were made to your prescriptions during this visit.          Nanette Mai, ALPHONSE  10/16/22 [id number]

## 2022-10-16 NOTE — ED NOTES
Nursing report ED to floor  Myles Shannon  47 y.o.  female    HPI:   Chief Complaint   Patient presents with    Fall    Altered Mental Status       Admitting doctor:   Santy Allison MD    Consulting provider(s):  Consults       Date and Time Order Name Status Description    10/16/2022  9:19 AM Inpatient Neurology Consult Stroke      10/16/2022  9:19 AM Inpatient Neurology Consult Stroke               Admitting diagnosis:   The encounter diagnosis was Urinary tract infection in female.    Code status:   Current Code Status       Date Active Code Status Order ID Comments User Context       Prior            Allergies:   Abilify [aripiprazole], Buspirone, and Penicillins    Intake and Output    Intake/Output Summary (Last 24 hours) at 10/16/2022 1452  Last data filed at 10/16/2022 1422  Gross per 24 hour   Intake 1000 ml   Output --   Net 1000 ml       Weight:       10/16/22  0905   Weight: 112 kg (246 lb)       Most recent vitals:   Vitals:    10/16/22 1132 10/16/22 1222 10/16/22 1409 10/16/22 1452   BP: 125/76 127/68 139/70 129/86   BP Location: Left arm  Left arm Left arm   Patient Position: Lying  Lying Lying   Pulse: 57 58 67 58   Resp: 18  20 20   Temp:       TempSrc:       SpO2: 96% 97% 99% 99%   Weight:       Height:         Oxygen Therapy: RA    Active LDAs/IV Access:   Lines, Drains & Airways       Active LDAs       Name Placement date Placement time Site Days    Peripheral IV 10/16/22 0931 Right Antecubital 10/16/22  0931  Antecubital  less than 1                    Labs (abnormal labs have a star):   Labs Reviewed   COMPREHENSIVE METABOLIC PANEL - Abnormal; Notable for the following components:       Result Value    Glucose 105 (*)     ALT (SGPT) 39 (*)     All other components within normal limits    Narrative:     GFR Normal >60  Chronic Kidney Disease <60  Kidney Failure <15     URINALYSIS W/ MICROSCOPIC IF INDICATED (NO CULTURE) - Abnormal; Notable for the following components:    Specific  [place], UA 1.050 (*)     Blood, UA Trace (*)     Leuk Esterase, UA Moderate (2+) (*)     Nitrite, UA Positive (*)     All other components within normal limits   URINE DRUG SCREEN - Abnormal; Notable for the following components:    Barbiturates Screen, Urine Positive (*)     All other components within normal limits    Narrative:     Cutoff For Drugs Screened:    Amphetamines               500 ng/ml  Barbiturates               200 ng/ml  Benzodiazepines            150 ng/ml  Cocaine                    150 ng/ml  Methadone                  200 ng/ml  Opiates                    100 ng/ml  Phencyclidine               25 ng/ml  THC                            50 ng/ml  Methamphetamine            500 ng/ml  Tricyclic Antidepressants  300 ng/ml  Oxycodone                  100 ng/ml  Propoxyphene               300 ng/ml  Buprenorphine               10 ng/ml    The normal value for all drugs tested is negative. This report includes unconfirmed screening results, with the cutoff values listed, to be used for medical treatment purposes only.  Unconfirmed results must not be used for non-medical purposes such as employment or legal testing.  Clinical consideration should be applied to any drug of abuse test, particularly when unconfirmed results are used.     URINALYSIS, MICROSCOPIC ONLY - Abnormal; Notable for the following components:    RBC, UA 0-2 (*)     WBC, UA 31-50 (*)     Bacteria, UA 4+ (*)     Squamous Epithelial Cells, UA 3-5 (*)     All other components within normal limits   PROTIME-INR - Normal    Narrative:     Therapeutic range for most indications is 2.0-3.0 INR,  or 2.5-3.5 for mechanical heart valves.   APTT - Normal    Narrative:     The recommended Heparin therapeutic range is 68-97 seconds.   TROPONIN (IN-HOUSE) - Normal    Narrative:     Troponin T Reference Range:  <= 0.03 ng/mL-   Negative for AMI  >0.03 ng/mL-     Abnormal for myocardial necrosis.  Clinicians would have to utilize clinical acumen,  EKG, Troponin and serial changes to determine if it is an Acute Myocardial Infarction or myocardial injury due to an underlying chronic condition.       Results may be falsely decreased if patient taking Biotin.     CBC WITH AUTO DIFFERENTIAL - Normal   HCG, SERUM, QUALITATIVE - Normal   POCT GLUCOSE FINGERSTICK - Normal   URINE CULTURE   RAINBOW DRAW    Narrative:     The following orders were created for panel order [place] Draw.  Procedure                               Abnormality         Status                     ---------                               -----------         ------                     Green Top (Gel)[[id number]]                                  Final result               Lavender Top[[id number]]                                     Final result               Gold Top - SST[[id number]]                                   Final result               Light Blue Top[[id number]]                                   Final result                 Please view results for these tests on the individual orders.   POCT GLUCOSE FINGERSTICK   TYPE AND SCREEN   PREVIOUS HISTORY   CBC AND DIFFERENTIAL    Narrative:     The following orders were created for panel order CBC & Differential.  Procedure                               Abnormality         Status                     ---------                               -----------         ------                     CBC Auto Differential[[id number]]        Normal              Final result                 Please view results for these tests on the individual orders.   GREEN TOP   LAVENDER TOP   GOLD TOP - SST   LIGHT BLUE TOP   EXTRA TUBES    Narrative:     The following orders were created for panel order Extra Tubes.  Procedure                               Abnormality         Status                     ---------                               -----------         ------                     Verdin Top[[id number]]                                         Final result                  Please view results for these tests on the individual orders.   GRAY TOP       Meds given in ED:   Medications   sodium chloride 0.9 % flush 10 mL (has no administration in time range)   iopamidol (ISOVUE-370) 76 % injection 100 mL (90 mL Intravenous Given 10/16/22 0981)   sodium chloride 0.9 % bolus 1,000 mL (0 mL Intravenous Stopped 10/16/22 1422)   acetaminophen (TYLENOL) tablet 650 mg (650 mg Oral Given 10/16/22 1450)           NIH Stroke Scale:  Interval: baseline    Isolation/Infection(s):  No active isolations   ESBL E coli     COVID Testing  Collected No  Resulted N/a    Nursing report ED to floor:  Mental status: Alert and oriented x4  Ambulatory status: Assistance x2  Precautions: Falls    ED nurse phone extentsion- [phone]

## 2022-10-17 LAB
ANION GAP SERPL CALCULATED.3IONS-SCNC: 10 MMOL/L (ref 5–15)
BASOPHILS # BLD AUTO: 0.05 10*3/MM3 (ref 0–0.2)
BASOPHILS NFR BLD AUTO: 0.9 % (ref 0–1.5)
BUN SERPL-MCNC: 13 MG/DL (ref 6–20)
BUN/CREAT SERPL: 14.1 (ref 7–25)
CALCIUM SPEC-SCNC: 9 MG/DL (ref 8.6–10.5)
CHLORIDE SERPL-SCNC: 109 MMOL/L (ref 98–107)
CO2 SERPL-SCNC: 22 MMOL/L (ref 22–29)
CREAT SERPL-MCNC: 0.92 MG/DL (ref 0.57–1)
DEPRECATED RDW RBC AUTO: 43.8 FL (ref 37–54)
EGFRCR SERPLBLD CKD-EPI 2021: 77.4 ML/MIN/1.73
EOSINOPHIL # BLD AUTO: 0.3 10*3/MM3 (ref 0–0.4)
EOSINOPHIL NFR BLD AUTO: 5.2 % (ref 0.3–6.2)
ERYTHROCYTE [DISTWIDTH] IN BLOOD BY AUTOMATED COUNT: 13.3 % (ref 12.3–15.4)
GLUCOSE SERPL-MCNC: 91 MG/DL (ref 65–99)
HCT VFR BLD AUTO: 36.5 % (ref 34–46.6)
HGB BLD-MCNC: 12 G/DL (ref 12–15.9)
HOLD SPECIMEN: NORMAL
IMM GRANULOCYTES # BLD AUTO: 0.02 10*3/MM3 (ref 0–0.05)
IMM GRANULOCYTES NFR BLD AUTO: 0.3 % (ref 0–0.5)
LYMPHOCYTES # BLD AUTO: 1.92 10*3/MM3 (ref 0.7–3.1)
LYMPHOCYTES NFR BLD AUTO: 33.1 % (ref 19.6–45.3)
MCH RBC QN AUTO: 29.5 PG (ref 26.6–33)
MCHC RBC AUTO-ENTMCNC: 32.9 G/DL (ref 31.5–35.7)
MCV RBC AUTO: 89.7 FL (ref 79–97)
MONOCYTES # BLD AUTO: 0.47 10*3/MM3 (ref 0.1–0.9)
MONOCYTES NFR BLD AUTO: 8.1 % (ref 5–12)
NEUTROPHILS NFR BLD AUTO: 3.04 10*3/MM3 (ref 1.7–7)
NEUTROPHILS NFR BLD AUTO: 52.4 % (ref 42.7–76)
NRBC BLD AUTO-RTO: 0 /100 WBC (ref 0–0.2)
PLATELET # BLD AUTO: 211 10*3/MM3 (ref 140–450)
PMV BLD AUTO: 11.4 FL (ref 6–12)
POTASSIUM SERPL-SCNC: 4.2 MMOL/L (ref 3.5–5.2)
RBC # BLD AUTO: 4.07 10*6/MM3 (ref 3.77–5.28)
SODIUM SERPL-SCNC: 141 MMOL/L (ref 136–145)
WBC NRBC COR # BLD: 5.8 10*3/MM3 (ref 3.4–10.8)

## 2022-10-17 PROCEDURE — 80048 BASIC METABOLIC PNL TOTAL CA: CPT | Performed by: PHYSICIAN ASSISTANT

## 2022-10-17 PROCEDURE — 97165 OT EVAL LOW COMPLEX 30 MIN: CPT

## 2022-10-17 PROCEDURE — 25010000002 CEFEPIME PER 500 MG: Performed by: PHYSICIAN ASSISTANT

## 2022-10-17 PROCEDURE — 96366 THER/PROPH/DIAG IV INF ADDON: CPT

## 2022-10-17 PROCEDURE — 99213 OFFICE O/P EST LOW 20 MIN: CPT | Performed by: NURSE PRACTITIONER

## 2022-10-17 PROCEDURE — G0378 HOSPITAL OBSERVATION PER HR: HCPCS

## 2022-10-17 PROCEDURE — 97162 PT EVAL MOD COMPLEX 30 MIN: CPT

## 2022-10-17 PROCEDURE — 85025 COMPLETE CBC W/AUTO DIFF WBC: CPT | Performed by: PHYSICIAN ASSISTANT

## 2022-10-17 RX ADMIN — HYDROXYZINE PAMOATE 50 MG: 25 CAPSULE ORAL at 09:08

## 2022-10-17 RX ADMIN — PANTOPRAZOLE SODIUM 40 MG: 40 TABLET, DELAYED RELEASE ORAL at 09:09

## 2022-10-17 RX ADMIN — DOCUSATE SODIUM 50 MG AND SENNOSIDES 8.6 MG 2 TABLET: 8.6; 5 TABLET, FILM COATED ORAL at 21:10

## 2022-10-17 RX ADMIN — ATORVASTATIN CALCIUM 80 MG: 40 TABLET, FILM COATED ORAL at 21:10

## 2022-10-17 RX ADMIN — ASPIRIN 325 MG: 325 TABLET ORAL at 09:08

## 2022-10-17 RX ADMIN — BUSPIRONE HYDROCHLORIDE 15 MG: 15 TABLET ORAL at 21:10

## 2022-10-17 RX ADMIN — FUROSEMIDE 20 MG: 20 TABLET ORAL at 09:09

## 2022-10-17 RX ADMIN — HYDROXYZINE PAMOATE 50 MG: 25 CAPSULE ORAL at 21:10

## 2022-10-17 RX ADMIN — TRAZODONE HYDROCHLORIDE 50 MG: 50 TABLET ORAL at 21:10

## 2022-10-17 RX ADMIN — MELATONIN 6 MG: 3 TAB ORAL at 21:10

## 2022-10-17 RX ADMIN — CEFEPIME HYDROCHLORIDE 2 G: 2 INJECTION, POWDER, FOR SOLUTION INTRAVENOUS at 00:51

## 2022-10-17 RX ADMIN — LOSARTAN POTASSIUM 50 MG: 50 TABLET, FILM COATED ORAL at 09:09

## 2022-10-17 RX ADMIN — Medication 10 ML: at 09:09

## 2022-10-17 RX ADMIN — ESCITALOPRAM OXALATE 20 MG: 10 TABLET ORAL at 09:09

## 2022-10-17 RX ADMIN — CEFEPIME HYDROCHLORIDE 2 G: 2 INJECTION, POWDER, FOR SOLUTION INTRAVENOUS at 09:14

## 2022-10-17 RX ADMIN — LEVETIRACETAM 1000 MG: 500 TABLET, FILM COATED ORAL at 09:09

## 2022-10-17 RX ADMIN — DOCUSATE SODIUM 50 MG AND SENNOSIDES 8.6 MG 2 TABLET: 8.6; 5 TABLET, FILM COATED ORAL at 09:08

## 2022-10-17 RX ADMIN — OXYBUTYNIN CHLORIDE 10 MG: 5 TABLET, EXTENDED RELEASE ORAL at 09:09

## 2022-10-17 RX ADMIN — BUSPIRONE HYDROCHLORIDE 15 MG: 15 TABLET ORAL at 09:08

## 2022-10-17 RX ADMIN — LEVETIRACETAM 1000 MG: 500 TABLET, FILM COATED ORAL at 21:10

## 2022-10-17 RX ADMIN — CEFEPIME HYDROCHLORIDE 2 G: 2 INJECTION, POWDER, FOR SOLUTION INTRAVENOUS at 17:37

## 2022-10-17 RX ADMIN — BUSPIRONE HYDROCHLORIDE 15 MG: 15 TABLET ORAL at 17:00

## 2022-10-17 NOTE — PLAN OF CARE
Goal Outcome Evaluation:  Plan of Care Reviewed With: patient           Outcome Evaluation: Initial PT evaluation complete, co-evaluation with OT.  Patient is alert, cooperative.  She demonstrates (I) with bed mobility, requires CGA with transfers and gait, ambulating 25'x1 with FWW, cues for proper use of walker.  [place] assessed: 21/28 or moderate fall risk; bed alarm activated, recommend continued use of FWW as patient has limited stepping response to perturbation. RLE only slightly weaker than L, from previous CVA. Patient is appriopriate for return to [place], already has a FWW.  Goals established, continue skilled I/P PT.

## 2022-10-17 NOTE — THERAPY EVALUATION
Patient Name: Myles Shannon  : 1975    MRN: [id number]                              Today's Date: 10/17/2022       Admit Date: 10/16/2022    Visit Dx:     ICD-10-CM ICD-9-CM   1. Urinary tract infection in female  N39.0 599.0   2. Cerebrovascular accident (CVA) due to thrombosis of left middle cerebral artery ([facility])  I63.312 434.01   3. Impaired mobility and ADLs  Z74.09 V49.89    Z78.9      Patient Active Problem List   Diagnosis   • Nicotine abuse   • Other chest pain   • Acute maxillary sinusitis   • Bipolar affective disorder ([facility])   • Decreased pulses in feet   • Flank pain   • Hematochezia   • High cholesterol   • Migraine without aura and with status migrainosus, not intractable   • Migraine without status migrainosus, not intractable   • Neck pain   • Nephrolithiasis   • Neuropathy   • Chronic pain   • Primary osteoarthritis involving multiple joints   • Sciatic leg pain   • Screening for diabetes mellitus (DM)   • Seasonal allergies   • Stress   • Tobacco use disorder   • Urinary incontinence without sensory awareness   • Vitamin D deficiency   • Carpal tunnel syndrome of right wrist   • Seizure ([facility])   • Intractable vomiting   • Acute UTI (urinary tract infection)   • Cholelithiasis   • GI bleed   • Kidney stone   • Nephrolithiasis   • ELBERT (acute kidney injury) ([facility])   • Infection due to ESBL-producing Escherichia coli   • CVA (cerebral vascular accident) ([facility])   • Dysphagia due to recent stroke   • Slurred speech   • Cerebrovascular accident (CVA) ([facility])   • Aphasia due to acute cerebrovascular accident (CVA) ([facility])   • Methamphetamine abuse ([facility])   • Seizure disorder ([facility])   • UTI (urinary tract infection) due to urinary indwelling catheter ([facility])   • Urinary tract infection due to extended-spectrum beta lactamase (ESBL) producing Escherichia coli   • Urinary tract infection due to extended-spectrum beta lactamase (ESBL) producing Escherichia coli   • Metabolic acidosis, NAG, bicarbonate  losses   • Midline shift of brain due to stroke   • Nonintractable headache   • Weakness   • Dysarthria   • Urinary tract infection in female     Past Medical History:   Diagnosis Date   • Abdominal pain     Chronic RLQ, RUQ, R flank comes and goes.    • Abdominal pain, right lower quadrant    • Acute bilateral otitis media    • Allergic rhinitis    • Backache     Upper back.   • Candidiasis of skin    • Cellulitis of neck    • Chest pain    • Contraception    • Cough    • Dysfunction of eustachian tube    • Dyspareunia, female    • Elevated cholesterol    • Excessive or frequent menstruation    • Flank pain    • Generalized aches and pains    • Headache    • Health maintenance examination    • Hypertension    • Infection of skin and subcutaneous tissue    • Irregular periods    • Kidney stone     Poss   • Menorrhagia    • Nausea vomiting and diarrhea    • Obesity    • Open wound of abdominal wall    • Otalgia    • Pain in left foot    • Pain in unspecified hand    • Removed Impacted cerumen 2012   • Rhinitis    • Seizure ([facility]) 08/15/2019   • Shoulder pain     right-sided-likely muscle strain      • Shoulder tendinitis    • Spider bite wound    • Staphylococcal infection of skin    • Stroke ([facility])    • Swollen feet    • Tinea cruris    • Tobacco dependence syndrome    • Upper respiratory infection    • Urinary tract infectious disease    • Vertigo    • Visit for gynecologic examination    • Vulvovaginitis      Past Surgical History:   Procedure Laterality Date   •  SECTION     • DILATATION AND CURETTAGE      Secondary to incomplete spontaneous    • INCISION AND DRAINAGE ABSCESS  2012   • INJECTION OF MEDICATION  2015    Phenergan (1)     • INJECTION OF MEDICATION  10/10/2013    Toradol (2)      • INJECTION OF MEDICATION  2014    Zofran (1)         General Information     Row Name 10/17/22 0810          OT Time and Intention    Document Type evaluation  -SJ     Mode of  Treatment occupational therapy;physical therapy  -     Row Name 10/17/22 0810          General Information    Patient Profile Reviewed yes  -SJ     Prior Level of Function independent:;gait;transfer;ADL's;feeding;grooming;dressing;min assist:;bathing  Assist with bathing; [place] provides meals and manages medications  -     Existing Precautions/Restrictions fall;seizures  -     Barriers to Rehab cognitive status;previous functional deficit  -     Row Name 10/17/22 0810          Living Environment    People in Home facility resident  [place]  -     Row Name 10/17/22 0810          Stairs Within Home, Primary    Stairs, Within Home, Primary Walks with RW; assist with bathing using a shower chair; walkin shower. Reports she fell without using RW at admission (fall at [facility]).  -SJ     Number of Stairs, Within Home, Primary ten  Reports having stairs at various enterances at [facility]; max stairs ~10  -SJ     Stair Railings, Within Home, Primary railings on both sides of stairs  -     Row Name 10/17/22 0810          Cognition    Orientation Status (Cognition) oriented to;person;place;situation;oriented x 3  -     Row Name 10/17/22 0810          Safety Issues, Functional Mobility    Safety Issues Affecting Function (Mobility) safety precaution awareness  -     Impairments Affecting Function (Mobility) pain;endurance/activity tolerance;cognition;balance  -SJ     Cognitive Impairments, Mobility Safety/Performance insight into deficits/self-awareness;problem-solving/reasoning;safety precaution awareness  -SJ     Comment, Safety Issues/Impairments (Mobility) Reports year is 2002, even with cues cannot correct;  -           User Key  (r) = Recorded By, (t) = Taken By, (c) = Cosigned By    Initials Name Provider Type     Aditi Henriquez OT Occupational Therapist                 Mobility/ADL's     Row Name 10/17/22 0810          Bed Mobility    Bed Mobility supine-sit;sit-supine  -SJ     Supine-Sit  [place] (Bed Mobility) modified independence  -     Sit-Supine [place] (Bed Mobility) modified independence  -     Assistive Device (Bed Mobility) head of bed elevated;bed rails  -     Row Name 10/17/22 0810          Transfers    Transfers sit-stand transfer;toilet transfer  -     Row Name 10/17/22 0810          Sit-Stand Transfer    Sit-Stand [place] (Transfers) contact guard  -     Assistive Device (Sit-Stand Transfers) walker, front-wheeled  -     Row Name 10/17/22 0810          Toilet Transfer    Type (Toilet Transfer) sit-stand;stand-sit  -     [place] Level (Toilet Transfer) contact guard  -     Assistive Device (Toilet Transfer) walker, front-wheeled  -     Row Name 10/17/22 0810          Activities of Daily Living    BADL Assessment/Intervention toileting;lower body dressing;grooming  -[facility] Name 10/17/22 0810          Toileting Assessment/Training    [place] Level (Toileting) minimum assist (75% patient effort)  -SJ     Row Name 10/17/22 0810          Lower Body Dressing Assessment/Training    [place] Level (Lower Body Dressing) doff;don;socks;supervision  -     Position (Lower Body Dressing) sitting up in bed  -     Row Name 10/17/22 0810          Grooming Assessment/Training    [place] Level (Grooming) contact guard assist  -     Position (Grooming) sink side  -           User Key  (r) = Recorded By, (t) = Taken By, (c) = Cosigned By    Initials Name Provider Type    Aditi Ramírez OT Occupational Therapist               Obj/Interventions     Row Name 10/17/22 0810          Sensory Assessment (Somatosensory)    Sensory Assessment (Somatosensory) UE sensation intact  -     Sensory Assessment Some paresthesia in R hand; sensation intact to light touch  -     Row Name 10/17/22 0810          Range of Motion Comprehensive    General Range of Motion bilateral upper extremity ROM WFL  -     Row Name 10/17/22 0810          Strength  Comprehensive (MMT)    General Manual Muscle Testing (MMT) Assessment other (see comments)  -SJ     Comment, General Manual Muscle Testing (MMT) Assessment R UE 4-/5 grossly, L UE 4/5 grossly  -SJ           User Key  (r) = Recorded By, (t) = Taken By, (c) = Cosigned By    Initials Name Provider Type     Aditi Henriquez, OT Occupational Therapist               Goals/Plan     Row Name 10/17/22 0810          Transfer Goal 1 (OT)    Activity/Assistive Device (Transfer Goal 1, OT) shower chair  -SJ     Eddy Level/Cues Needed (Transfer Goal 1, OT) supervision required  -SJ     Time Frame (Transfer Goal 1, OT) long term goal (LTG);by discharge  -SJ     Progress/Outcome (Transfer Goal 1, OT) goal not met  -     Row Name 10/17/22 0810          Bathing Goal 1 (OT)    Activity/Device (Bathing Goal 1, OT) bathing skills, all  -SJ     Eddy Level/Cues Needed (Bathing Goal 1, OT) supervision required  -SJ     Time Frame (Bathing Goal 1, OT) long term goal (LTG);by discharge  -SJ     Progress/Outcomes (Bathing Goal 1, OT) goal not met  -     Row Name 10/17/22 0810          Dressing Goal 1 (OT)    Activity/Device (Dressing Goal 1, OT) lower body dressing  -SJ     Eddy/Cues Needed (Dressing Goal 1, OT) independent  -SJ     Time Frame (Dressing Goal 1, OT) long term goal (LTG);by discharge  -SJ     Progress/Outcome (Dressing Goal 1, OT) goal not met  -     Row Name 10/17/22 0810          Toileting Goal 1 (OT)    Activity/Device (Toileting Goal 1, OT) toileting skills, all  -SJ     Eddy Level/Cues Needed (Toileting Goal 1, OT) independent  -SJ     Time Frame (Toileting Goal 1, OT) by discharge;long term goal (LTG)  -SJ     Progress/Outcome (Toileting Goal 1, OT) goal not met  -     Row Name 10/17/22 0810          Therapy Assessment/Plan (OT)    Planned Therapy Interventions (OT) activity tolerance training;adaptive equipment training;BADL retraining;edema control/reduction;cognitive/visual  perception retraining;functional balance retraining;IADL retraining;manual therapy/joint mobilization;occupation/activity based interventions;neuromuscular control/coordination retraining;passive ROM/stretching;patient/caregiver education/training;ROM/therapeutic exercise;transfer/mobility retraining;strengthening exercise  -           User Key  (r) = Recorded By, (t) = Taken By, (c) = Cosigned By    Initials Name Provider Type     Aditi Henriquez, OT Occupational Therapist               Clinical Impression     Row Name 10/17/22 0810          Pain Assessment    Pretreatment Pain Rating 7/10  -SJ     Posttreatment Pain Rating 7/10  -SJ     Pain Location lower  -     Pain Location - back;head  -     Pain Intervention(s) Repositioned;Ambulation/increased activity  -     Row Name 10/17/22 0810          Plan of Care Review    Plan of Care Reviewed With patient  -     Outcome Evaluation OT eval complete, co-eval with PT, supine in bed upon arrival. Patient alert x3 (not to year), pleasant and agreeable for evaluation. Patient with slight expressive aphasia, word-finding defecits, and possibly with some receptive aphasia. R UE with slight weakness compared to L;  hx of CVA in past as per chart. Patient reports not walking without RW at times, but reports hx of falls. Supine<>sit with modified independence. Sit to stand and toilet t/f with CGA and RW. Toileting wtih min A. Grooming standing at sink with CGA. LE dressing with supervision. Patient with decreased balance, hx of falls, decrased safety with transfers, and decreased independence in ADLs. Cont inpatient OT. Recommend cont therapy at d/c.  -     Row Name 10/17/22 0810          Therapy Assessment/Plan (OT)    Patient/Family Therapy Goal Statement (OT) stop falling  -     Rehab Potential (OT) good, to achieve stated therapy goals  -     Criteria for Skilled Therapeutic Interventions Met (OT) yes;skilled treatment is necessary  -     Therapy  Frequency (OT) other (see comments)  3-7 d/wk  -SJ     Predicted Duration of Therapy Intervention (OT) until d/c or all goals met  -     Row Name 10/17/22 0810          Therapy Plan Review/Discharge Plan (OT)    Anticipated Discharge Disposition (OT) other (see comments)  [place]  -     Row Name 10/17/22 0810          Vital Signs    Pre Systolic BP Rehab 93  -SJ     Pre Treatment Diastolic BP 62  -SJ     Pretreatment Heart Rate (beats/min) 67  -SJ     Pre SpO2 (%) 97  -SJ     O2 Delivery Pre Treatment room air  -SJ     Pre Patient Position Sitting  -     Row Name 10/17/22 0810          Positioning and Restraints    Pre-Treatment Position in bed  -SJ     Post Treatment Position bed  -SJ     In Bed supine;call light within reach;encouraged to call for assist;exit alarm on;side rails up x2  -SJ           User Key  (r) = Recorded By, (t) = Taken By, (c) = Cosigned By    Initials Name Provider Type     Aditi Henriquez, OT Occupational Therapist               Outcome Measures     Row Name 10/17/22 0810          How much help from another is currently needed...    Putting on and taking off regular lower body clothing? 3  -SJ     Bathing (including washing, rinsing, and drying) 3  -SJ     Toileting (which includes using toilet bed pan or urinal) 3  -SJ     Putting on and taking off regular upper body clothing 4  -SJ     Taking care of personal grooming (such as brushing teeth) 3  -SJ     Eating meals 4  -SJ     AM-PAC 6 Clicks Score (OT) 20  -SJ     Row Name 10/17/22 0809          How much help from another person do you currently need...    Turning from your back to your side while in flat bed without using bedrails? 4  -CZ     Moving from lying on back to sitting on the side of a flat bed without bedrails? 4  -CZ     Moving to and from a bed to a chair (including a wheelchair)? 3  -CZ     Standing up from a chair using your arms (e.g., wheelchair, bedside chair)? 3  -CZ     Climbing 3-5 steps with a railing? 3   -CZ     To walk in hospital room? 3  -CZ     AM-PAC 6 Clicks Score (PT) 20  -CZ     Highest level of mobility 6 --> Walked 10 steps or more  -     Row Name 10/17/22 0810 10/17/22 0809       Functional Assessment    Outcome Measure Options AM-PAC 6 Clicks Daily Activity (OT)  - AM-PAC 6 Clicks Basic Mobility (PT);[place]  -CZ          User Key  (r) = Recorded By, (t) = Taken By, (c) = Cosigned By    Initials Name Provider Type    CZ Mitchel Vasquez, PT Physical Therapist     Aditi Henriquez, OT Occupational Therapist                Occupational Therapy Education     Title: PT OT SLP Therapies (In Progress)     Topic: Occupational Therapy (In Progress)     Point: ADL training (Done)     Description:   Instruct learner(s) on proper safety adaptation and remediation techniques during self care or transfers.   Instruct in proper use of assistive devices.              Learning Progress Summary           Patient Acceptance, E,TB, VU,NR by  at 10/17/2022 0854    Comment: POC, role of OT, transfer training                   Point: Home exercise program (Not Started)     Description:   Instruct learner(s) on appropriate technique for monitoring, assisting and/or progressing therapeutic exercises/activities.              Learner Progress:  Not documented in this visit.          Point: Precautions (Done)     Description:   Instruct learner(s) on prescribed precautions during self-care and functional transfers.              Learning Progress Summary           Patient Acceptance, E,TB, VU,NR by  at 10/17/2022 0854    Comment: POC, role of OT, transfer training                   Point: Body mechanics (Done)     Description:   Instruct learner(s) on proper positioning and spine alignment during self-care, functional mobility activities and/or exercises.              Learning Progress Summary           Patient Acceptance, E,TB, VU,NR by  at 10/17/2022 0854    Comment: POC, role of OT, transfer training                                User Key     Initials Effective Dates Name Provider Type Discipline     06/14/21 -  Aditi Henriquez, OT Occupational Therapist OT              OT Recommendation and Plan  Planned Therapy Interventions (OT): activity tolerance training, adaptive equipment training, BADL retraining, edema control/reduction, cognitive/visual perception retraining, functional balance retraining, IADL retraining, manual therapy/joint mobilization, occupation/activity based interventions, neuromuscular control/coordination retraining, passive ROM/stretching, patient/caregiver education/training, ROM/therapeutic exercise, transfer/mobility retraining, strengthening exercise  Therapy Frequency (OT): other (see comments) (3-7 d/wk)  Plan of Care Review  Plan of Care Reviewed With: patient  Outcome Evaluation: OT eval complete, co-eval with PT, supine in bed upon arrival. Patient alert x3 (not to year), pleasant and agreeable for evaluation. Patient with slight expressive aphasia, word-finding defecits, and possibly with some receptive aphasia. R UE with slight weakness compared to L;  hx of CVA in past as per chart. Patient reports not walking without RW at times, but reports hx of falls. Supine<>sit with modified independence. Sit to stand and toilet t/f with CGA and RW. Toileting wtih min A. Grooming standing at sink with CGA. LE dressing with supervision. Patient with decreased balance, hx of falls, decrased safety with transfers, and decreased independence in ADLs. Cont inpatient OT. Recommend cont therapy at d/c.     Time Calculation:    Time Calculation- OT     Row Name 10/17/22 0856             Time Calculation- OT    OT Start Time 0810  -      OT Stop Time 0856  -      OT Time Calculation (min) 46 min  -SJ      OT Received On 10/17/22  -      OT Goal Re-Cert Due Date 10/30/22  -SJ         Untimed Charges    OT Eval/Re-eval Minutes 46  -SJ         Total Minutes    Untimed Charges Total Minutes 46  -SJ        Total Minutes 46  -SJ            User Key  (r) = Recorded By, (t) = Taken By, (c) = Cosigned By    Initials Name Provider Type     Aditi Henriquez OT Occupational Therapist              Therapy Charges for Today     Code Description Service Date Service Provider Modifiers Qty    [id number]  OT EVAL LOW COMPLEXITY 3 10/17/2022 Aditi Henriquez OT GO 1               Aditi Henriquez OT  10/17/2022

## 2022-10-17 NOTE — PLAN OF CARE
Goal Outcome Evaluation:  Plan of Care Reviewed With: patient        Progress: no change  Outcome Evaluation: abx continued this shift, frequent voiding with pain, vss

## 2022-10-17 NOTE — PROGRESS NOTES
Stroke Progress Note       Chief Complaint: No complaints    Subjective     HPI: Pt is a 47 y.o. right-handed white female with known diagnosis of left MCA ischemic stroke in December 2021 with residual right sided weakness and aphasia, bipolar, epilepsy, hyperlipidemia, smoker, cognitive impairment, presenting with headache and fall. Today she states feeling some better. Her significant other is at bedside. Right strength is 4/5, she has no drift today, denies numbness, and visual field is intact.       Review of Systems   HENT: Negative.    Eyes: Negative.    Respiratory: Negative.    Cardiovascular: Negative.    Gastrointestinal: Negative.    Genitourinary: Negative.    Musculoskeletal: Negative.    Skin: Negative.    Neurological: Positive for weakness.   Psychiatric/Behavioral: Negative.         Objective      Temp:  [97 °F (36.1 °C)-98.1 °F (36.7 °C)] 98.1 °F (36.7 °C)  Heart Rate:  [56-67] 67  Resp:  [18-20] 18  BP: ()/(56-86) 93/62    Neurological Exam  Mental Status  Awake, alert and oriented to person, place and time.Alert. Speech is normal. Language is fluent with no aphasia.    Cranial Nerves  CN II: Visual acuity is normal. Visual fields full to confrontation.  CN III, IV, VI: Extraocular movements intact bilaterally. Normal lids and orbits bilaterally. Pupils equal round and reactive to light bilaterally.  CN V: Facial sensation is normal.  CN VII: Full and symmetric facial movement.  CN IX, X: Palate elevates symmetrically. Normal gag reflex.  CN XI: Shoulder shrug strength is normal.  CN XII: Tongue midline without atrophy or fasciculations.    Motor  Normal muscle bulk throughout. No fasciculations present. Strength is 5/5 in all four extremities except as noted.  Right strength 4/5.    Sensory  Sensation is intact to light touch, pinprick, vibration and proprioception in all four extremities.    Reflexes  Not assessed.    Coordination    Finger-to-nose, rapid alternating movements and  heel-to-shin normal bilaterally without dysmetria.    Gait    Not assessed.      Physical Exam  Vitals and nursing note reviewed.   Constitutional:       Appearance: Normal appearance.   HENT:      Head: Normocephalic and atraumatic.   Eyes:      General: Lids are normal.      Extraocular Movements: Extraocular movements intact.      Pupils: Pupils are equal, round, and reactive to light.   Cardiovascular:      Rate and Rhythm: Normal rate.   Pulmonary:      Effort: Pulmonary effort is normal.   Musculoskeletal:         General: Normal range of motion.   Skin:     General: Skin is warm and dry.   Neurological:      Mental Status: She is alert. Mental status is at baseline.      Coordination: Coordination is intact.   Psychiatric:         Mood and Affect: Mood normal.         Speech: Speech normal.         Results Review:    I reviewed the patient's new clinical results.    Lab Results (last 24 hours)     Procedure Component Value Units Date/Time    Extra Tubes [[id number]] Collected: 10/17/22 0940    Specimen: Blood, Venous Line Updated: 10/17/22 1045    Narrative:      The following orders were created for panel order Extra Tubes.  Procedure                               Abnormality         Status                     ---------                               -----------         ------                     Green Top (Gel)[[id number]]                                  Final result                 Please view results for these tests on the individual orders.    Green Top (Gel) [[id number]] Collected: 10/17/22 0940    Specimen: Blood Updated: 10/17/22 1045     Extra Tube Hold for add-ons.     Comment: Auto resulted.       Urine Culture - Urine, Urine, Clean Catch [[id number]]  (Abnormal) Collected: 10/16/22 1111    Specimen: Urine, Clean Catch Updated: 10/17/22 0957     Urine Culture >100,000 CFU/mL Escherichia coli    Narrative:      Colonization of the urinary tract without infection is common. Treatment is discouraged  unless the patient is symptomatic, pregnant, or undergoing an invasive urologic procedure.    CBC & Differential [[id number]]  (Normal) Collected: 10/17/22 0905    Specimen: Blood Updated: 10/17/22 0932    Narrative:      The following orders were created for panel order CBC & Differential.  Procedure                               Abnormality         Status                     ---------                               -----------         ------                     CBC Auto Differential[[id number]]        Normal              Final result                 Please view results for these tests on the individual orders.    CBC Auto Differential [[id number]]  (Normal) Collected: 10/17/22 0905    Specimen: Blood Updated: 10/17/22 0932     WBC 5.80 10*3/mm3      RBC 4.07 10*6/mm3      Hemoglobin 12.0 g/dL      Hematocrit 36.5 %      MCV 89.7 fL      MCH 29.5 pg      MCHC 32.9 g/dL      RDW 13.3 %      RDW-SD 43.8 fl      MPV 11.4 fL      Platelets 211 10*3/mm3      Neutrophil % 52.4 %      Lymphocyte % 33.1 %      Monocyte % 8.1 %      Eosinophil % 5.2 %      Basophil % 0.9 %      Immature Grans % 0.3 %      Neutrophils, Absolute 3.04 10*3/mm3      Lymphocytes, Absolute 1.92 10*3/mm3      Monocytes, Absolute 0.47 10*3/mm3      Eosinophils, Absolute 0.30 10*3/mm3      Basophils, Absolute 0.05 10*3/mm3      Immature Grans, Absolute 0.02 10*3/mm3      nRBC 0.0 /100 WBC     Basic Metabolic Panel [[id number]]  (Abnormal) Collected: 10/17/22 0523    Specimen: Blood Updated: 10/17/22 0555     Glucose 91 mg/dL      BUN 13 mg/dL      Creatinine 0.92 mg/dL      Sodium 141 mmol/L      Potassium 4.2 mmol/L      Chloride 109 mmol/L      CO2 22.0 mmol/L      Calcium 9.0 mg/dL      BUN/Creatinine Ratio 14.1     Anion Gap 10.0 mmol/L      eGFR 77.4 mL/min/1.73      Comment: National Kidney Foundation and American Society of Nephrology (ASN) Task Force recommended calculation based on the Chronic Kidney Disease Epidemiology Collaboration (CKD-EPI)  equation refit without adjustment for race.       Narrative:      GFR Normal >60  Chronic Kidney Disease <60  Kidney Failure <15      Extra Tubes [[id number]] Collected: 10/16/22 0855    Specimen: Blood, Venous Line Updated: 10/16/22 1301    Narrative:      The following orders were created for panel order Extra Tubes.  Procedure                               Abnormality         Status                     ---------                               -----------         ------                     Verdin Top[[id number]]                                         Final result                 Please view results for these tests on the individual orders.    Gray Top [[id number]] Collected: 10/16/22 0855    Specimen: Blood Updated: 10/16/22 1301     Extra Tube Hold for add-ons.     Comment: Auto resulted.       Urine Drug Screen - Urine, Clean Catch [[id number]]  (Abnormal) Collected: 10/16/22 1111    Specimen: Urine, Clean Catch Updated: 10/16/22 1129     THC, Screen, Urine Negative     Phencyclidine (PCP), Urine Negative     Cocaine Screen, Urine Negative     Methamphetamine, Ur Negative     Opiate Screen Negative     Amphetamine Screen, Urine Negative     Benzodiazepine Screen, Urine Negative     Tricyclic Antidepressants Screen Negative     Methadone Screen, Urine Negative     Barbiturates Screen, Urine Positive     Oxycodone Screen, Urine Negative     Propoxyphene Screen Negative     Buprenorphine, Screen, Urine Negative    Narrative:      Cutoff For Drugs Screened:    Amphetamines               500 ng/ml  Barbiturates               200 ng/ml  Benzodiazepines            150 ng/ml  Cocaine                    150 ng/ml  Methadone                  200 ng/ml  Opiates                    100 ng/ml  Phencyclidine               25 ng/ml  THC                            50 ng/ml  Methamphetamine            500 ng/ml  Tricyclic Antidepressants  300 ng/ml  Oxycodone                  100 ng/ml  Propoxyphene               300  ng/ml  Buprenorphine               10 ng/ml    The normal value for all drugs tested is negative. This report includes unconfirmed screening results, with the cutoff values listed, to be used for medical treatment purposes only.  Unconfirmed results must not be used for non-medical purposes such as employment or legal testing.  Clinical consideration should be applied to any drug of abuse test, particularly when unconfirmed results are used.      Urinalysis With Microscopic If Indicated (No Culture) - Urine, Clean Catch [[id number]]  (Abnormal) Collected: 10/16/22 1111    Specimen: Urine, Clean Catch Updated: 10/16/22 1126     Color, UA Yellow     Appearance, UA Clear     pH, UA 7.5     Specific [place], UA 1.050     Comment: Result obtained by Refractometer        Glucose, UA Negative     Ketones, UA Negative     Bilirubin, UA Negative     Blood, UA Trace     Protein, UA Negative     Leuk Esterase, UA Moderate (2+)     Nitrite, UA Positive     Urobilinogen, UA 0.2 E.U./dL    Urinalysis, Microscopic Only - Urine, Clean Catch [[id number]]  (Abnormal) Collected: 10/16/22 1111    Specimen: Urine, Clean Catch Updated: 10/16/22 1122     RBC, UA 0-2 /HPF      WBC, UA 31-50 /HPF      Bacteria, UA 4+ /HPF      Squamous Epithelial Cells, UA 3-5 /HPF      Hyaline Casts, UA None Seen /LPF      Methodology Automated Microscopy        CT Angiogram Neck    Addendum Date: 10/16/2022     ADDENDUM ADDENDUM #1 NO CHANGE IN IMPRESSION.  TEMPLATE/GRAMMAR ERRORS CORRECTED: COMPARISON: CT head 10/16/2022, MRI brain performed subsequently 10/16/2022, 5/3/2022 INDICATION: Acute stroke TECHNIQUE: 1. CT angiography of the neck was performed after the uneventful administration of iodinated intravenous contrast. Multiplanar reformats including MIP and rotating three-dimensional reformats were created on a separate workstation and permanently archived. 2. CT angiography of the [place] is performed after the uneventful administration of  intravenous contrast. Multiplanar reformats including MIP and rotating three-dimensional reformats are created on a separate workstation and permanently archived. All CT scans at this location are performed using dose modulation techniques as appropriate to a performed exam including the following: automated exposure control; adjustment of the mA and/or kV according to patient size (this includes techniques or standardized protocols for targeted exams where dose is matched to indication / reason for exam; i.e. extremities or head); use of iterative reconstruction technique. Findings:  The visualized portions of the thoracic aorta including the aortic arch appear normal caliber and configuration. The right common carotid artery is widely patent. There is small plaque at the proximal right internal carotid artery without flow-limiting stenosis. External carotid artery is widely patent.  The right vertebral artery is patent. The left common carotid artery is patent. There is mild narrowing at the proximal left internal carotid artery, approximately 50% diameter, with poststenotic dilatation. This at least partly relates to tortuosity and turning vessel.  The right external carotid artery is widely patent.  The left vertebral artery is patent. The bilateral subclavian arteries are patent. The bilateral distal internal carotid arteries are patent. The bilateral anterior and middle cerebral arteries are patent. Chronic diminished caliber of the left M2 segment, previously described. The bilateral distal vertebral arteries are patent. The basilar artery is patent. The bilateral posterior cerebral arteries are patent. There is no aneurysm. Chronic left MCA encephalomalacia. IMPRESSION: Impression: 1.  No flow limiting stenosis of the right internal carotid artery. 2. There is mild to moderate relative narrowing of the proximal left internal carotid artery with approximately 50% diameter stenosis. There is at least partly  reflects a tortuous rotating vessel and is unchanged from prior. 3.  Patent bilateral vertebral arteries. 4. Intact [place]. As previously described, the left M2 and distal left MCA segments appear decreased caliber relative to the right, at site of encephalomalacia from remote insult. 5. Chronic left MCA encephalomalacia The degree of stenosis was calculated in reference to measurements of the distal internal carotid artery diameter, utilizing NASCET criteria. Electronically signed by:  Cameron Mcmahon MD  10/16/2022 2:35 PM CDT Workstation: [id number]     Result Date: 10/16/2022  Impression: 1.  No flow limiting stenosis of the right internal carotid artery. 2. There is mild to moderate relative narrowing of the proximal left internal carotid artery with approximately 50% diameter stenosis. There is at least partly reflects a tortuous rotating vessel and is unchanged from prior. 3.  Patent bilateral vertebral arteries. 4. Intact [place]. As previously described, the left M2 and distal left MCA segments appear decreased caliber relative to the right, at site of encephalomalacia from remote insult. 5. Chronic left MCA encephalomalacia The degree of stenosis was calculated in reference to measurements of the distal internal carotid artery diameter, utilizing NASCET criteria. Electronically signed by:  Cameron Mcmahon MD  10/16/2022 1:49 PM CDT Workstation: [id number]    CT Cervical Spine Without Contrast    Result Date: 10/16/2022  Degenerative, spondylotic changes C5-C6 as described above. Otherwise unremarkable CT examination of the cervical spine. No change since prior exam. Electronically signed by:  Jhon Galeano MD  10/16/2022 9:56 AM CDT Workstation: [id number]    MRI Brain Without Contrast    Result Date: 10/16/2022  Impression: There are no acute intracranial findings. Encephalomalacic change from an old infarct in the left middle cerebral artery distribution is again noted. Electronically signed by:   Juan Carlos Kim MD  10/16/2022 12:40 PM CDT Workstation: [id number]    XR Chest 1 View    Result Date: 10/16/2022  No evidence of active disease. Electronically signed by:  John Galeano MD  10/16/2022 10:44 AM CDT Workstation: [id number]    CT Head Without Contrast Stroke Protocol    Result Date: 10/16/2022  Large old infarct left MCA distribution. CT brain is otherwise unremarkable and unchanged since prior exam. Electronically signed by:  John Galeano MD  10/16/2022 9:52 AM CDT Workstation: [id number]    CT Angiogram Head w AI Analysis of LVO    Addendum Date: 10/16/2022     ADDENDUM ADDENDUM #1 NO CHANGE IN IMPRESSION.  TEMPLATE/GRAMMAR ERRORS CORRECTED: COMPARISON: CT head 10/16/2022, MRI brain performed subsequently 10/16/2022, 5/3/2022 INDICATION: Acute stroke TECHNIQUE: 1. CT angiography of the neck was performed after the uneventful administration of iodinated intravenous contrast. Multiplanar reformats including MIP and rotating three-dimensional reformats were created on a separate workstation and permanently archived. 2. CT angiography of the [place] is performed after the uneventful administration of intravenous contrast. Multiplanar reformats including MIP and rotating three-dimensional reformats are created on a separate workstation and permanently archived. All CT scans at this location are performed using dose modulation techniques as appropriate to a performed exam including the following: automated exposure control; adjustment of the mA and/or kV according to patient size (this includes techniques or standardized protocols for targeted exams where dose is matched to indication / reason for exam; i.e. extremities or head); use of iterative reconstruction technique. Findings:  The visualized portions of the thoracic aorta including the aortic arch appear normal caliber and configuration. The right common carotid artery is widely patent. There is small plaque at the proximal right internal  carotid artery without flow-limiting stenosis. External carotid artery is widely patent.  The right vertebral artery is patent. The left common carotid artery is patent. There is mild narrowing at the proximal left internal carotid artery, approximately 50% diameter, with poststenotic dilatation. This at least partly relates to tortuosity and turning vessel.  The right external carotid artery is widely patent.  The left vertebral artery is patent. The bilateral subclavian arteries are patent. The bilateral distal internal carotid arteries are patent. The bilateral anterior and middle cerebral arteries are patent. Chronic diminished caliber of the left M2 segment, previously described. The bilateral distal vertebral arteries are patent. The basilar artery is patent. The bilateral posterior cerebral arteries are patent. There is no aneurysm. Chronic left MCA encephalomalacia. IMPRESSION: Impression: 1.  No flow limiting stenosis of the right internal carotid artery. 2. There is mild to moderate relative narrowing of the proximal left internal carotid artery with approximately 50% diameter stenosis. There is at least partly reflects a tortuous rotating vessel and is unchanged from prior. 3.  Patent bilateral vertebral arteries. 4. Intact [place]. As previously described, the left M2 and distal left MCA segments appear decreased caliber relative to the right, at site of encephalomalacia from remote insult. 5. Chronic left MCA encephalomalacia The degree of stenosis was calculated in reference to measurements of the distal internal carotid artery diameter, utilizing NASCET criteria. Electronically signed by:  Cameron Mcmahon MD  10/16/2022 2:35 PM CDT Workstation: [id number]     Result Date: 10/16/2022  Impression: 1.  No flow limiting stenosis of the right internal carotid artery. 2. There is mild to moderate relative narrowing of the proximal left internal carotid artery with approximately 50% diameter stenosis.  There is at least partly reflects a tortuous rotating vessel and is unchanged from prior. 3.  Patent bilateral vertebral arteries. 4. Intact [place]. As previously described, the left M2 and distal left MCA segments appear decreased caliber relative to the right, at site of encephalomalacia from remote insult. 5. Chronic left MCA encephalomalacia The degree of stenosis was calculated in reference to measurements of the distal internal carotid artery diameter, utilizing NASCET criteria. Electronically signed by:  Cameron Mcmahon MD  10/16/2022 1:49 PM CDT Workstation: [id number]    Results for orders placed during the hospital encounter of 12/15/21    Adult Transthoracic Echo Complete W/ Cont if Necessary Per Protocol    Interpretation Summary  · Left ventricular ejection fraction appears to be 56 - 60%.  · Left ventricular diastolic function was normal.  · Left ventricular wall thickness is consistent with borderline concentric hypertrophy.  · Saline test results are negative.  · Estimated right ventricular systolic pressure from tricuspid regurgitation is normal (<35 mmHg).        Assessment/Plan     Assessment/Plan: Pt is a 47 y.o. right-handed white female with known diagnosis of left MCA ischemic stroke in December 2021 with residual right sided weakness and aphasia, bipolar, epilepsy, hyperlipidemia, smoker, cognitive impairment, presenting with headache and fall. Today she states feeling some better. Her significant other is at bedside. Right strength is 4/5, she has no drift today, denies numbness, and visual field is intact.   1 MRI neg for stroke, she is being treated for a UTI by the hospitalist team. Continue aspirin and Lipitor for secondary stroke prevention. Instructed on stroke risk factors, prevention, and s/s to call 911. Pt verbalized understanding. Cardiac monitor ordered for after discharge.   2. Activity- Okay to work with PT/OT.  3. Diet- Heart-healthy diet.   Case was discussed with pt,  Dr. Goodman, Hospitalist team, and nursing. Neurology will sign off.          Patient Active Problem List   Diagnosis   • Nicotine abuse   • Other chest pain   • Acute maxillary sinusitis   • Bipolar affective disorder ([facility])   • Decreased pulses in feet   • Flank pain   • Hematochezia   • High cholesterol   • Migraine without aura and with status migrainosus, not intractable   • Migraine without status migrainosus, not intractable   • Neck pain   • Nephrolithiasis   • Neuropathy   • Chronic pain   • Primary osteoarthritis involving multiple joints   • Sciatic leg pain   • Screening for diabetes mellitus (DM)   • Seasonal allergies   • Stress   • Tobacco use disorder   • Urinary incontinence without sensory awareness   • Vitamin D deficiency   • Carpal tunnel syndrome of right wrist   • Seizure ([facility])   • Intractable vomiting   • Acute UTI (urinary tract infection)   • Cholelithiasis   • GI bleed   • Kidney stone   • Nephrolithiasis   • ELBERT (acute kidney injury) ([facility])   • Infection due to ESBL-producing Escherichia coli   • CVA (cerebral vascular accident) ([facility])   • Dysphagia due to recent stroke   • Slurred speech   • Cerebrovascular accident (CVA) ([facility])   • Aphasia due to acute cerebrovascular accident (CVA) ([facility])   • Methamphetamine abuse ([facility])   • Seizure disorder ([facility])   • UTI (urinary tract infection) due to urinary indwelling catheter ([facility])   • Urinary tract infection due to extended-spectrum beta lactamase (ESBL) producing Escherichia coli   • Urinary tract infection due to extended-spectrum beta lactamase (ESBL) producing Escherichia coli   • Metabolic acidosis, NAG, bicarbonate losses   • Midline shift of brain due to stroke   • Nonintractable headache   • Weakness   • Dysarthria   • Urinary tract infection in female           Michael HaganALPHONSE  10/17/22  11:04 CDT        I spent 40 minutes caring for Myles Shannon  on this date of service. This time includes time spent by me in the following  activities: preparing for the visit, reviewing tests, obtaining and/or reviewing a separately obtained history, performing a medically appropriate examination and/or evaluation, counseling and educating the patient/family/caregiver, ordering medications, tests, or procedures, referring and communicating with other health care professionals, documenting information in the medical record, independently interpreting results and communicating that information with the patient/family/caregiver and care coordination

## 2022-10-17 NOTE — PLAN OF CARE
Goal Outcome Evaluation:  Plan of Care Reviewed With: patient           Outcome Evaluation: OT eval complete, co-eval with PT, supine in bed upon arrival. Patient alert x3 (not to year), pleasant and agreeable for evaluation. Patient with slight expressive aphasia, word-finding defecits, and possibly with some receptive aphasia. R UE with slight weakness compared to L;  hx of CVA in past as per chart. Patient reports not walking without RW at times, but reports hx of falls. Supine<>sit with modified independence. Sit to stand and toilet t/f with CGA and RW. Toileting wtih min A. Grooming standing at sink with CGA. LE dressing with supervision. Patient with decreased balance, hx of falls, decrased safety with transfers, and decreased independence in ADLs. Cont inpatient OT. Recommend cont therapy at d/c.

## 2022-10-17 NOTE — THERAPY EVALUATION
Patient Name: Myles Shannon  : 1975    MRN: [id number]                              Today's Date: 10/17/2022       Admit Date: 10/16/2022    Visit Dx:     ICD-10-CM ICD-9-CM   1. Urinary tract infection in female  N39.0 599.0   2. Cerebrovascular accident (CVA) due to thrombosis of left middle cerebral artery ([facility])  I63.312 434.01   3. Impaired mobility and ADLs  Z74.09 V49.89    Z78.9    4. Impaired functional mobility, balance, gait, and endurance  Z74.09 V49.89     Patient Active Problem List   Diagnosis   • Nicotine abuse   • Other chest pain   • Acute maxillary sinusitis   • Bipolar affective disorder ([facility])   • Decreased pulses in feet   • Flank pain   • Hematochezia   • High cholesterol   • Migraine without aura and with status migrainosus, not intractable   • Migraine without status migrainosus, not intractable   • Neck pain   • Nephrolithiasis   • Neuropathy   • Chronic pain   • Primary osteoarthritis involving multiple joints   • Sciatic leg pain   • Screening for diabetes mellitus (DM)   • Seasonal allergies   • Stress   • Tobacco use disorder   • Urinary incontinence without sensory awareness   • Vitamin D deficiency   • Carpal tunnel syndrome of right wrist   • Seizure ([facility])   • Intractable vomiting   • Acute UTI (urinary tract infection)   • Cholelithiasis   • GI bleed   • Kidney stone   • Nephrolithiasis   • ELBERT (acute kidney injury) ([facility])   • Infection due to ESBL-producing Escherichia coli   • CVA (cerebral vascular accident) ([facility])   • Dysphagia due to recent stroke   • Slurred speech   • Cerebrovascular accident (CVA) ([facility])   • Aphasia due to acute cerebrovascular accident (CVA) ([facility])   • Methamphetamine abuse ([facility])   • Seizure disorder ([facility])   • UTI (urinary tract infection) due to urinary indwelling catheter ([facility])   • Urinary tract infection due to extended-spectrum beta lactamase (ESBL) producing Escherichia coli   • Urinary tract infection due to extended-spectrum beta lactamase  (ESBL) producing Escherichia coli   • Metabolic acidosis, NAG, bicarbonate losses   • Midline shift of brain due to stroke   • Nonintractable headache   • Weakness   • Dysarthria   • Urinary tract infection in female     Past Medical History:   Diagnosis Date   • Abdominal pain     Chronic RLQ, RUQ, R flank comes and goes.    • Abdominal pain, right lower quadrant    • Acute bilateral otitis media    • Allergic rhinitis    • Backache     Upper back.   • Candidiasis of skin    • Cellulitis of neck    • Chest pain    • Contraception    • Cough    • Dysfunction of eustachian tube    • Dyspareunia, female    • Elevated cholesterol    • Excessive or frequent menstruation    • Flank pain    • Generalized aches and pains    • Headache    • Health maintenance examination    • Hypertension    • Infection of skin and subcutaneous tissue    • Irregular periods    • Kidney stone     Poss   • Menorrhagia    • Nausea vomiting and diarrhea    • Obesity    • Open wound of abdominal wall    • Otalgia    • Pain in left foot    • Pain in unspecified hand    • Removed Impacted cerumen 2012   • Rhinitis    • Seizure ([facility]) 08/15/2019   • Shoulder pain     right-sided-likely muscle strain      • Shoulder tendinitis    • Spider bite wound    • Staphylococcal infection of skin    • Stroke ([facility])    • Swollen feet    • Tinea cruris    • Tobacco dependence syndrome    • Upper respiratory infection    • Urinary tract infectious disease    • Vertigo    • Visit for gynecologic examination    • Vulvovaginitis      Past Surgical History:   Procedure Laterality Date   •  SECTION     • DILATATION AND CURETTAGE      Secondary to incomplete spontaneous    • INCISION AND DRAINAGE ABSCESS  2012   • INJECTION OF MEDICATION  2015    Phenergan (1)     • INJECTION OF MEDICATION  10/10/2013    Toradol (2)      • INJECTION OF MEDICATION  2014    Zofran (1)         General Information     Row Name 10/17/22 0809           Physical Therapy Time and Intention    Document Type evaluation  -CZ     Mode of Treatment occupational therapy;physical therapy  -CZ     Row Name 10/17/22 0809          General Information    Patient Profile Reviewed yes  -CZ     Prior Level of Function independent:;all household mobility  -CZ     Existing Precautions/Restrictions fall  -CZ     Barriers to Rehab cognitive status  -CZ     Row Name 10/17/22 0809          Living Environment    People in Home facility resident  -CZ     Row Name 10/17/22 0809          Stairs Within Home, Primary    Stairs, Within Home, Primary Ambulates with FWW, wasn't using walker during recent fall.  -CZ     Number of Stairs, Within Home, Primary ten  -CZ     Stair Railings, Within Home, Primary railings on both sides of stairs  -CZ     Row Name 10/17/22 0809          Cognition    Orientation Status (Cognition) oriented to;person;place;situation;oriented x 3  -CZ     Row Name 10/17/22 0809          Safety Issues, Functional Mobility    Impairments Affecting Function (Mobility) pain;endurance/activity tolerance;cognition;strength  -CZ     Comment, Safety Issues/Impairments (Mobility) Reports year is 2002, even with cues cannot correct  -CZ           User Key  (r) = Recorded By, (t) = Taken By, (c) = Cosigned By    Initials Name Provider Type    CZ Mitchel Vasquez, PT Physical Therapist               Mobility     Row Name 10/17/22 0809          Bed Mobility    Bed Mobility supine-sit  -CZ     Supine-Sit [place] (Bed Mobility) modified independence  -CZ     Sit-Supine [place] (Bed Mobility) modified independence  -CZ     Assistive Device (Bed Mobility) head of bed elevated;bed rails  -CZ     Row Name 10/17/22 0809          Sit-Stand Transfer    Sit-Stand [place] (Transfers) contact guard  -CZ     Assistive Device (Sit-Stand Transfers) walker, front-wheeled  -CZ     Row Name 10/17/22 0809          Gait/Stairs (Locomotion)    [place] Level (Gait) contact guard  -CZ      Assistive Device (Gait) walker, front-wheeled  -CZ     Distance in Feet (Gait) 5'x1, 25'x 1.  -CZ     Comment, (Gait/Stairs) Cues for proper use of walker.  Good heel strike on R, no heel strike on L initially, then able to self correct.  -CZ           User Key  (r) = Recorded By, (t) = Taken By, (c) = Cosigned By    Initials Name Provider Type    CZ Mitchel Vasquez, PT Physical Therapist               Obj/Interventions     Row Name 10/17/22 0832          Range of Motion Comprehensive    General Range of Motion bilateral lower extremity ROM WFL  -CZ     Row Name 10/17/22 0832          Strength Comprehensive (MMT)    General Manual Muscle Testing (MMT) Assessment other (see comments)  -CZ     Comment, General Manual Muscle Testing (MMT) Assessment RLE: 3+/5, LLE: 4-/5 grossly,.  -CZ     Row Name 10/17/22 0832          Sensory Assessment (Somatosensory)    Sensory Assessment (Somatosensory) LE sensation intact  -CZ           User Key  (r) = Recorded By, (t) = Taken By, (c) = Cosigned By    Initials Name Provider Type    CZ Mitchel Vasquez, PT Physical Therapist               Goals/Plan     Row Name 10/17/22 0809          Bed Mobility Goal 1 (PT)    Activity/Assistive Device (Bed Mobility Goal 1, PT) sit to supine/supine to sit  -CZ     [place] Level/Cues Needed (Bed Mobility Goal 1, PT) independent  -CZ     Time Frame (Bed Mobility Goal 1, PT) by discharge  -CZ     Strategies/Barriers (Bed Mobility Goal 1, PT) HOB flat, no bed rails.  -CZ     Progress/Outcomes (Bed Mobility Goal 1, PT) goal not met  -CZ     Row Name 10/17/22 0809          Transfer Goal 1 (PT)    Activity/Assistive Device (Transfer Goal 1, PT) sit-to-stand/stand-to-sit;bed-to-chair/chair-to-bed;walker, rolling  -CZ     [place] Level/Cues Needed (Transfer Goal 1, PT) modified independence  -CZ     Time Frame (Transfer Goal 1, PT) by discharge  -CZ     Strategies/Barriers (Transfers Goal 1, PT) Recent fall.  -CZ     Progress/Outcome  (Transfer Goal 1, PT) goal not met  -CZ     Row Name 10/17/22 0809          Gait Training Goal 1 (PT)    Activity/Assistive Device (Gait Training Goal 1, PT) walker, rolling  -CZ     [place] Level (Gait Training Goal 1, PT) modified independence  -CZ     Distance (Gait Training Goal 1, PT) 30'x3.  -CZ     Time Frame (Gait Training Goal 1, PT) by discharge  -CZ     Strategies/Barriers (Gait Training Goal 1, PT) Recent fall.  -CZ     Progress/Outcome (Gait Training Goal 1, PT) goal not met  -CZ     Row Name 10/17/22 08          Problem Specific Goal 1 (PT)    Problem Specific Goal 1 (PT) Score 25/28 on Tinetti fall risk assessment.  -CZ     Time Frame (Problem Specific Goal 1, PT) by discharge  -CZ     Progress/Outcome (Problem Specific Goal 1, PT) goal not met  -CZ     Row Name 10/17/22 0809          Therapy Assessment/Plan (PT)    Planned Therapy Interventions (PT) bed mobility training;gait training;patient/family education;strengthening;stretching;transfer training;balance training  -CZ           User Key  (r) = Recorded By, (t) = Taken By, (c) = Cosigned By    Initials Name Provider Type    CZ Mitchel Vasquez, PT Physical Therapist               Clinical Impression     Row Name 10/17/22 0809          Pain    Pretreatment Pain Rating 7/10  -CZ     Posttreatment Pain Rating 7/10  -CZ     Pain Location - back;head  -CZ     Pain Intervention(s) Repositioned;Distraction;Ambulation/increased activity  -CZ     Row Name 10/17/22 0809          Plan of Care Review    Plan of Care Reviewed With patient  -CZ     Outcome Evaluation Initial PT evaluation complete, co-evaluation with OT.  Patient is alert, cooperative.  She demonstrates (I) with bed mobility, requires CGA with transfers and gait, ambulating 25'x1 with FWW, cues for proper use of walker.  [place] assessed: 21/28 or moderate fall risk; bed alarm activated, recommend continued use of FWW as patient has limited stepping response to perturbation. RLE only  slightly weaker than L, from previous CVA. Patient is appriopriate for return to [place], already has a FWW.  Goals established, continue skilled I/P PT.  -CZ     Row Name 10/17/22 0809          Therapy Assessment/Plan (PT)    Rehab Potential (PT) good, to achieve stated therapy goals  -CZ     Criteria for Skilled Interventions Met (PT) yes;skilled treatment is necessary  -CZ     Therapy Frequency (PT) 5 times/wk  -CZ     Row Name 10/17/22 0809          Vital Signs    Pre Systolic BP Rehab 93  -CZ     Pre Treatment Diastolic BP 62  -CZ     Pretreatment Heart Rate (beats/min) 67  -CZ     Pre SpO2 (%) 97  -CZ     O2 Delivery Pre Treatment room air  -CZ     Pre Patient Position Sitting  -CZ     Row Name 10/17/22 0809          Positioning and Restraints    Pre-Treatment Position in bed  -CZ     Post Treatment Position bed  -CZ     In Bed supine;call light within reach;encouraged to call for assist;exit alarm on  -CZ           User Key  (r) = Recorded By, (t) = Taken By, (c) = Cosigned By    Initials Name Provider Type    CZ Mitchel Vasquez, PT Physical Therapist               Outcome Measures     Row Name 10/17/22 0809          How much help from another person do you currently need...    Turning from your back to your side while in flat bed without using bedrails? 4  -CZ     Moving from lying on back to sitting on the side of a flat bed without bedrails? 4  -CZ     Moving to and from a bed to a chair (including a wheelchair)? 3  -CZ     Standing up from a chair using your arms (e.g., wheelchair, bedside chair)? 3  -CZ     Climbing 3-5 steps with a railing? 3  -CZ     To walk in hospital room? 3  -CZ     AM-PAC 6 Clicks Score (PT) 20  -CZ     Highest level of mobility 6 --> Walked 10 steps or more  -CZ     Row Name 10/17/22 0809          T[place]etti Assessment    [place] Assessment yes  -CZ     Sitting Balance 1  -CZ     Arises 2  -CZ     Attempts to Rise 2  -CZ     Immediate Standing Balance (first 5 sec) 1  -CZ      "Standing Balance 1  -CZ     Sternal Nudge (feet close together) 0  -CZ     Eyes Closed (feet close together) 1  -CZ     Turning 360 Degrees- Steps 1  -CZ     Turning 360 Degrees- Steadiness 1  -CZ     Sitting Down 2  -CZ     [place] Balance Score 12  -CZ     Gait Initiation (immediate after told \"go\") 1  -CZ     Step Length- Right Swing 1  -CZ     Step Length- Left Swing 1  -CZ     Foot Clearance- Right Foot 1  -CZ     Foot Clearance- Left Foot 1  -CZ     Step Symmetry 1  -CZ     Step Continuity 1  -CZ     Path (excursion) 1  -CZ     Trunk 0  -CZ     Base of Support 1  -CZ     Gait Score 9  -CZ     T[place]etti Total Score 21  -CZ     [place] Assistive Device rolling walker  -CZ     Row Name 10/17/22 0810 10/17/22 0809       Functional Assessment    Outcome Measure Options AM-PAC 6 Clicks Daily Activity (OT)  - AM-PAC 6 Clicks Basic Mobility (PT);[place]  -CZ          User Key  (r) = Recorded By, (t) = Taken By, (c) = Cosigned By    Initials Name Provider Type    CZ Mitchel Vasquez, PT Physical Therapist    SJ Aditi Henriquez, OT Occupational Therapist                             Physical Therapy Education     Title: PT OT SLP Therapies (In Progress)     Topic: Physical Therapy (In Progress)     Point: Mobility training (Not Started)     Learner Progress:  Not documented in this visit.          Point: Home exercise program (Not Started)     Learner Progress:  Not documented in this visit.          Point: Body mechanics (Not Started)     Learner Progress:  Not documented in this visit.          Point: Precautions (Done)     Learning Progress Summary           Patient Acceptance, E, VU by  at 10/17/2022 0851    Comment: [place] assessed: 21/28 or moderate fall risk, continue use of FWW, bed alarm activated.                               User Key     Initials Effective Dates Name Provider Type Discipline     09/18/22 -  Mitchel Vasquez, PT Physical Therapist PT              PT Recommendation and Plan  Planned " Therapy Interventions (PT): bed mobility training, gait training, patient/family education, strengthening, stretching, transfer training, balance training  Plan of Care Reviewed With: patient  Outcome Evaluation: Initial PT evaluation complete, co-evaluation with OT.  Patient is alert, cooperative.  She demonstrates (I) with bed mobility, requires CGA with transfers and gait, ambulating 25'x1 with FWW, cues for proper use of walker.  [place] assessed: 21/28 or moderate fall risk; bed alarm activated, recommend continued use of FWW as patient has limited stepping response to perturbation. RLE only slightly weaker than L, from previous CVA. Patient is appriopriate for return to [place], already has a FWW.  Goals established, continue skilled I/P PT.     Time Calculation:    PT Charges     Row Name 10/17/22 1213             Time Calculation    Start Time 0809  -CZ      Stop Time 0855  -CZ      Time Calculation (min) 46 min  -CZ      PT Received On 10/17/22  -CZ      PT Goal Re-Cert Due Date 10/30/22  -CZ         Untimed Charges    PT Eval/Re-eval Minutes 46  -CZ         Total Minutes    Untimed Charges Total Minutes 46  -CZ       Total Minutes 46  -CZ            User Key  (r) = Recorded By, (t) = Taken By, (c) = Cosigned By    Initials Name Provider Type    CZ Mitchel Vasquez, PT Physical Therapist              Therapy Charges for Today     Code Description Service Date Service Provider Modifiers Qty    [id number] HC PT EVAL MOD COMPLEXITY 3 10/17/2022 Mitchel Vasquez, PT GP 1          PT G-Codes  Outcome Measure Options: AM-PAC 6 Clicks Daily Activity (OT)  AM-PAC 6 Clicks Score (PT): 20  AM-PAC 6 Clicks Score (OT): 20  [place] Total Score: 21    Mitchel Vasquez PT  10/17/2022

## 2022-10-17 NOTE — CONSULTS
Nutrition Services    Patient Name:  Myles Shannon  YOB: 1975  MRN: [id number]  Admit Date:  10/16/2022    RD visit d/t report of chewing difficulty. Pt is edentulous. RD offered soft foods and chopped/ground meats and pt says she will take chopped meats. Denies any other difficulties w/eating. PO intake 75% at breakfast this am. No report or record of weight loss. RD adjusted diet order.     Electronically signed by:  Dahlia Mitchell RD  10/17/22 11:33 CDT

## 2022-10-17 NOTE — PROGRESS NOTES
[facility] Medicine   INPATIENT PROGRESS NOTE      Patient Name: Myles Shannon  Date of Admission: 10/16/2022  Today's Date: 10/17/22  Length of Stay: 0  Primary Care Physician: Sonya Crooks APRN    Subjective   Chief Complaint and HPI:  Patient presented initially to ED with chief complaint of fall, questionable CVAs.  Hx of CVA, seizures, HTN, HLD.  She is a resident at [facility].  There were many inconsistencies in her story about the nature and timing of her possible fall but the patient felt that her residual weakness from previous stroke was worse and that her speech was felt slurred.  During initial ED work-up she did have chest x-ray, CT head, CT neck, CTA head and neck, brain MRI which were all unremarkable for acute abnormalities.  She was found to have urinary tract infection, admitted for UTI, PT OT, follow-up.  History of E. coli and ESBL, cefepime started for UTI.     Today she is doing well.  Both she and her significant other state that they feel that she is back to her baseline.  She states that she is able to get up and move around that she is normally.  She complains of mild dysuria, no fevers, chills, vomiting through the night    Past Medical History:   Medical History        Past Medical History:   Diagnosis Date   • Abdominal pain       Chronic RLQ, RUQ, R flank comes and goes.    • Abdominal pain, right lower quadrant     • Acute bilateral otitis media     • Allergic rhinitis     • Backache       Upper back.   • Candidiasis of skin     • Cellulitis of neck     • Chest pain     • Contraception     • Cough     • Dysfunction of eustachian tube     • Dyspareunia, female     • Elevated cholesterol     • Excessive or frequent menstruation     • Flank pain     • Generalized aches and pains     • Headache     • Health maintenance examination     • Hypertension     • Infection of skin and subcutaneous tissue     • Irregular periods      • Kidney stone       Poss   • Menorrhagia     • Nausea vomiting and diarrhea     • Obesity     • Open wound of abdominal wall     • Otalgia     • Pain in left foot     • Pain in unspecified hand     • Removed Impacted cerumen 2012   • Rhinitis     • Seizure ([facility]) 08/15/2019   • Shoulder pain       right-sided-likely muscle strain      • Shoulder tendinitis     • Spider bite wound     • Staphylococcal infection of skin     • Stroke ([facility])     • Swollen feet     • Tinea cruris     • Tobacco dependence syndrome     • Upper respiratory infection     • Urinary tract infectious disease     • Vertigo     • Visit for gynecologic examination     • Vulvovaginitis           Past Surgical History:  Surgical History         Past Surgical History:   Procedure Laterality Date   •  SECTION       • DILATATION AND CURETTAGE        Secondary to incomplete spontaneous    • INCISION AND DRAINAGE ABSCESS   2012   • INJECTION OF MEDICATION   2015     Phenergan (1)     • INJECTION OF MEDICATION   10/10/2013     Toradol (2)      • INJECTION OF MEDICATION   2014     Zofran (1)            Social History:  reports that she has been smoking cigarettes. She has been smoking an average of .5 packs per day. She has never used smokeless tobacco. She reports that she does not drink alcohol and does not use drugs.     Family History: family history includes Anxiety disorder in her brother; Arthritis in her mother; Breast cancer in her maternal grandmother and mother; Cancer in her maternal grandfather and another family member; Depression in her daughter and another family member; Diabetes in her maternal aunt and maternal uncle; Endometrial cancer in her sister and another family member; Heart disease in her mother; Hypertension in her mother; Liver disease in her father; Lung cancer in an other family member; Mental illness in her daughter; Stroke in her maternal grandmother and paternal grandmother;  Thyroid disease in her mother.          Review of Systems   Review of Systems as of 10/17/22   Constitutional: Positive for weakness  HENT: Negative.    Eyes: Negative.    Respiratory: Negative.    Cardiovascular: Negative.    Gastrointestinal: Negative.    Endocrine: Negative.    Genitourinary: Positive for dysuria  Musculoskeletal: Negative.    Skin: Negative.    Neurological: Positive for weakness, lateral  Psychiatric/Behavioral: Negative.      All pertinent negatives and positives are as above. All other systems have been reviewed and are negative unless otherwise stated.     Objective    Temp:  [97 °F (36.1 °C)-98.2 °F (36.8 °C)] 98.1 °F (36.7 °C)  Heart Rate:  [56-69] 67  Resp:  [18-20] 18  BP: ()/(56-86) 93/62  Physical Exam  Vitals and nursing note reviewed.   Constitutional:       General: No acute distress.     Appearance: Normal appearance.   HENT:      Head: Normocephalic and atraumatic.      Mouth: Mucous membranes are moist.   Eyes:      Conjunctiva/sclera: Conjunctivae normal.      Pupils: Pupils are equal, round, and reactive to light.   Cardiovascular:      Rate and Rhythm: Normal rate and regular rhythm.   Pulmonary:      Effort: Pulmonary effort is normal.      Breath sounds: Normal breath sounds.   Abdominal:      General: Abdomen is flat.      Palpations: Abdomen is soft.      Tenderness: Mild suprapubic tenderness without rebound or guarding  Musculoskeletal:         General: No signs of injury. Normal range of motion.   Skin:     General: Skin is warm and dry.   Neurological: Residual left-sided weakness, left-sided facial droop mild     General: No focal deficit present.      Mental Status: Alert and oriented  Psychiatric:         Mood and Affect: Mood normal.         Behavior: Behavior normal.         Results Review:  I have reviewed the labs, radiology results, and diagnostic studies.    Laboratory Data:   Results from last 7 days   Lab Units 10/16/22  0855   WBC 10*3/mm3 5.64    HEMOGLOBIN g/dL 13.1   HEMATOCRIT % 40.3   PLATELETS 10*3/mm3 221        Results from last 7 days   Lab Units 10/17/22  0523 10/16/22  0855   SODIUM mmol/L 141 141   POTASSIUM mmol/L 4.2 4.4   CHLORIDE mmol/L 109* 106   CO2 mmol/L 22.0 27.0   BUN mg/dL 13 15   CREATININE mg/dL 0.92 1.00   CALCIUM mg/dL 9.0 9.2   BILIRUBIN mg/dL  --  0.2   ALK PHOS U/L  --  98   ALT (SGPT) U/L  --  39*   AST (SGOT) U/L  --  29   GLUCOSE mg/dL 91 105*       Culture Data:   No results found for: BLOODCX  No results found for: URINECX  No results found for: RESPCX  No results found for: WOUNDCX  No results found for: STOOLCX  No components found for: BODYFLD    Radiology Data:   Imaging Results (Last 24 Hours)     Procedure Component Value Units Date/Time    CT Angiogram Head w AI Analysis of LVO [[id number]] Collected: 10/16/22 0948     Updated: 10/16/22 1437    Addenda:         ADDENDUM   ADDENDUM #1       NO CHANGE IN IMPRESSION.  TEMPLATE/GRAMMAR ERRORS CORRECTED:    COMPARISON: CT head 10/16/2022, MRI brain performed subsequently  10/16/2022, 5/3/2022    INDICATION: Acute stroke    TECHNIQUE:   1. CT angiography of the neck was performed after the uneventful  administration of iodinated intravenous contrast. Multiplanar  reformats including MIP and rotating three-dimensional reformats  were created on a separate workstation and permanently archived.       2. CT angiography of the [place] is performed after the  uneventful administration of intravenous contrast. Multiplanar  reformats including MIP and rotating three-dimensional reformats  are created on a separate workstation and permanently archived.     All CT scans at this location are performed using dose modulation  techniques as appropriate to a performed exam including the  following: automated exposure control; adjustment of the mA  and/or kV according to patient size (this includes techniques or  standardized protocols for targeted exams where dose is matched  to  indication / reason for exam; i.e. extremities or head); use  of iterative reconstruction technique.      Findings:    The visualized portions of the thoracic aorta including the  aortic arch appear normal caliber and configuration.  The right common carotid artery is widely patent. There is small  plaque at the proximal right internal carotid artery without  flow-limiting stenosis. External carotid artery is widely patent.   The right vertebral artery is patent.  The left common carotid artery is patent. There is mild narrowing  at the proximal left internal carotid artery, approximately 50%  diameter, with poststenotic dilatation. This at least partly  relates to tortuosity and turning vessel.  The right external  carotid artery is widely patent.  The left vertebral artery is  patent.  The bilateral subclavian arteries are patent.      The bilateral distal internal carotid arteries are patent.    The bilateral anterior and middle cerebral arteries are patent.  Chronic diminished caliber of the left M2 segment, previously  described.    The bilateral distal vertebral arteries are patent. The basilar  artery is patent. The bilateral posterior cerebral arteries are  patent.    There is no aneurysm.    Chronic left MCA encephalomalacia.     IMPRESSION:   Impression:  1.  No flow limiting stenosis of the right internal carotid  artery.  2. There is mild to moderate relative narrowing of the proximal  left internal carotid artery with approximately 50% diameter  stenosis. There is at least partly reflects a tortuous rotating  vessel and is unchanged from prior.  3.  Patent bilateral vertebral arteries.  4. Intact [place]. As previously described, the left M2  and distal left MCA segments appear decreased caliber relative to  the right, at site of encephalomalacia from remote insult.  5. Chronic left MCA encephalomalacia     The degree of stenosis was calculated in reference to  measurements of the distal  internal carotid artery diameter,  utilizing NASCET criteria.    Electronically signed by:  Cameron Mcmaohn MD  10/16/2022 2:35 PM CDT  Workstation: [id number]    Signed: 10/16/22 1435 by Cameron Mcmahon MD    Narrative:      COMPARISON: CT head 10/16/2022, MRI brain performed subsequently  10/16/2022, 5/3/2022    INDICATION: Acute stroke    TECHNIQUE:   1. CT angiography of the neck was performed after the uneventful  administration of iodinated intravenous contrast. Multiplanar  reformats including MIP and rotating three-dimensional reformats  were created on a separate workstation and permanently archived.       2. CT angiography of the [place] is performed after the  uneventful administration of intravenous contrast. Multiplanar  reformats including MIP and rotating three-dimensional reformats  are created on a separate workstation and permanently archived.     All CT scans at this location are performed using dose modulation  techniques as appropriate to a performed exam including the  following: automated exposure control; adjustment of the mA  and/or kV according to patient size (this includes techniques or  standardized protocols for targeted exams where dose is matched  to indication / reason for exam; i.e. extremities or head); use  of iterative reconstruction technique.      Findings:    The visualized portions of the thoracic aorta including the  aortic arch appear normal caliber and configuration.  The right common carotid artery there is widely patent. There is  small plaque at the proximal right internal carotid artery  without flow-limiting stenosis. External carotid artery are  widely patent.  The right vertebral artery is patent.  The left common carotid artery is patent. There is mild narrowing  at the proximal left internal carotid artery, approximately 50%  diameter, with poststenotic dilatation. The right external  carotid artery are widely patent.  The left vertebral artery is  patent.  The  bilateral subclavian arteries are patent.      The bilateral distal internal carotid arteries are patent.    The bilateral anterior and middle cerebral arteries are patent.  Chronic diminished caliber of the left M2 segment, previously  described.    The bilateral distal vertebral arteries are patent. The basilar  artery is patent. The bilateral posterior cerebral arteries are  patent.    There is no aneurysm.    Chronic left MCA encephalomalacia.       Impression:      Impression:  1.  No flow limiting stenosis of the right internal carotid  artery.  2. There is mild to moderate relative narrowing of the proximal  left internal carotid artery with approximately 50% diameter  stenosis. There is at least partly reflects a tortuous rotating  vessel and is unchanged from prior.  3.  Patent bilateral vertebral arteries.  4. Intact [place]. As previously described, the left M2  and distal left MCA segments appear decreased caliber relative to  the right, at site of encephalomalacia from remote insult.  5. Chronic left MCA encephalomalacia     The degree of stenosis was calculated in reference to  measurements of the distal internal carotid artery diameter,  utilizing NASCET criteria.    Electronically signed by:  Cameron Mcmahon MD  10/16/2022 1:49 PM CDT  Workstation: [id number]    CT Angiogram Neck [[id number]] Collected: 10/16/22 0948     Updated: 10/16/22 1437    Addenda:         ADDENDUM   ADDENDUM #1       NO CHANGE IN IMPRESSION.  TEMPLATE/GRAMMAR ERRORS CORRECTED:    COMPARISON: CT head 10/16/2022, MRI brain performed subsequently  10/16/2022, 5/3/2022    INDICATION: Acute stroke    TECHNIQUE:   1. CT angiography of the neck was performed after the uneventful  administration of iodinated intravenous contrast. Multiplanar  reformats including MIP and rotating three-dimensional reformats  were created on a separate workstation and permanently archived.       2. CT angiography of the [place] is performed  after the  uneventful administration of intravenous contrast. Multiplanar  reformats including MIP and rotating three-dimensional reformats  are created on a separate workstation and permanently archived.     All CT scans at this location are performed using dose modulation  techniques as appropriate to a performed exam including the  following: automated exposure control; adjustment of the mA  and/or kV according to patient size (this includes techniques or  standardized protocols for targeted exams where dose is matched  to indication / reason for exam; i.e. extremities or head); use  of iterative reconstruction technique.      Findings:    The visualized portions of the thoracic aorta including the  aortic arch appear normal caliber and configuration.  The right common carotid artery is widely patent. There is small  plaque at the proximal right internal carotid artery without  flow-limiting stenosis. External carotid artery is widely patent.   The right vertebral artery is patent.  The left common carotid artery is patent. There is mild narrowing  at the proximal left internal carotid artery, approximately 50%  diameter, with poststenotic dilatation. This at least partly  relates to tortuosity and turning vessel.  The right external  carotid artery is widely patent.  The left vertebral artery is  patent.  The bilateral subclavian arteries are patent.      The bilateral distal internal carotid arteries are patent.    The bilateral anterior and middle cerebral arteries are patent.  Chronic diminished caliber of the left M2 segment, previously  described.    The bilateral distal vertebral arteries are patent. The basilar  artery is patent. The bilateral posterior cerebral arteries are  patent.    There is no aneurysm.    Chronic left MCA encephalomalacia.     IMPRESSION:   Impression:  1.  No flow limiting stenosis of the right internal carotid  artery.  2. There is mild to moderate relative narrowing of the  proximal  left internal carotid artery with approximately 50% diameter  stenosis. There is at least partly reflects a tortuous rotating  vessel and is unchanged from prior.  3.  Patent bilateral vertebral arteries.  4. Intact [place]. As previously described, the left M2  and distal left MCA segments appear decreased caliber relative to  the right, at site of encephalomalacia from remote insult.  5. Chronic left MCA encephalomalacia     The degree of stenosis was calculated in reference to  measurements of the distal internal carotid artery diameter,  utilizing NASCET criteria.    Electronically signed by:  Cameron Mcmahon MD  10/16/2022 2:35 PM CDT  Workstation: [id number]    Signed: 10/16/22 1435 by Cameron Mcmahon MD    Narrative:      COMPARISON: CT head 10/16/2022, MRI brain performed subsequently  10/16/2022, 5/3/2022    INDICATION: Acute stroke    TECHNIQUE:   1. CT angiography of the neck was performed after the uneventful  administration of iodinated intravenous contrast. Multiplanar  reformats including MIP and rotating three-dimensional reformats  were created on a separate workstation and permanently archived.       2. CT angiography of the [place] is performed after the  uneventful administration of intravenous contrast. Multiplanar  reformats including MIP and rotating three-dimensional reformats  are created on a separate workstation and permanently archived.     All CT scans at this location are performed using dose modulation  techniques as appropriate to a performed exam including the  following: automated exposure control; adjustment of the mA  and/or kV according to patient size (this includes techniques or  standardized protocols for targeted exams where dose is matched  to indication / reason for exam; i.e. extremities or head); use  of iterative reconstruction technique.      Findings:    The visualized portions of the thoracic aorta including the  aortic arch appear normal caliber  and configuration.  The right common carotid artery there is widely patent. There is  small plaque at the proximal right internal carotid artery  without flow-limiting stenosis. External carotid artery are  widely patent.  The right vertebral artery is patent.  The left common carotid artery is patent. There is mild narrowing  at the proximal left internal carotid artery, approximately 50%  diameter, with poststenotic dilatation. The right external  carotid artery are widely patent.  The left vertebral artery is  patent.  The bilateral subclavian arteries are patent.      The bilateral distal internal carotid arteries are patent.    The bilateral anterior and middle cerebral arteries are patent.  Chronic diminished caliber of the left M2 segment, previously  described.    The bilateral distal vertebral arteries are patent. The basilar  artery is patent. The bilateral posterior cerebral arteries are  patent.    There is no aneurysm.    Chronic left MCA encephalomalacia.       Impression:      Impression:  1.  No flow limiting stenosis of the right internal carotid  artery.  2. There is mild to moderate relative narrowing of the proximal  left internal carotid artery with approximately 50% diameter  stenosis. There is at least partly reflects a tortuous rotating  vessel and is unchanged from prior.  3.  Patent bilateral vertebral arteries.  4. Intact [place]. As previously described, the left M2  and distal left MCA segments appear decreased caliber relative to  the right, at site of encephalomalacia from remote insult.  5. Chronic left MCA encephalomalacia     The degree of stenosis was calculated in reference to  measurements of the distal internal carotid artery diameter,  utilizing NASCET criteria.    Electronically signed by:  Cameron Mcmahon MD  10/16/2022 1:49 PM CDT  Workstation: [id number]    MRI Brain Without Contrast [[id number]] Collected: 10/16/22 1157     Updated: 10/16/22 1242    Narrative:       PROCEDURE: MR BRAIN WITHOUT IV CONTRAST    Clinical History: stroke    Indications: Same as above    Comparison: MRI of the brain done on 5/20/2022    Technique:     Noncontrast MRI of the brain was done in a multiplanar and  multi-sequence format.    Findings:     There is no evidence of acute focal infarcts, midline shift, mass  effect or intracranial hemorrhage.    Encephalomalacic change from an old infarct in the left middle  cerebral artery distribution is again noted.    There are no extra-axial fluid collections.    The  meninges appear unremarkable, given the limitation of lack  of intravenous contrast    There is good gray/white matter differentiation.    There is normal flow-void in the intracranial vasculature.    The ventricular system is normal.    The craniovertebral junction appears grossly unremarkable.    The mastoid air cells are unremarkable.    The paranasal sinuses are unremarkable.     Limited evaluation of the bilateral orbits, pituitary fossa  region and the posterior fossa region including the  cerebellopontine angles do not show any gross abnormality.      Impression:      Impression:    There are no acute intracranial findings.  Encephalomalacic change from an old infarct in the left middle  cerebral artery distribution is again noted.    Electronically signed by:  Juan Carlos Kim MD  10/16/2022 12:40 PM  CDT Workstation: [id number]    XR Chest 1 View [[id number]] Collected: 10/16/22 0958     Updated: 10/16/22 1046    Narrative:      Chest x-ray single view.       CLINICAL INDICATION: Shortness of breath acute stroke protocol.    COMPARISON: June 7, 2022    FINDINGS: Cardiac silhouette is normal in size. Pulmonary  vascularity is unremarkable.     No focal infiltrate or consolidation.  No pleural effusion.  No  pneumothorax.      Impression:      No evidence of active disease.    Electronically signed by:  John Galeano MD  10/16/2022 10:44 AM  CDT Workstation: [id number]    CT Cervical  Spine Without Contrast [[id number]] Collected: 10/16/22 0936     Updated: 10/16/22 0959    Narrative:      CT cervical spine.       History: Neck pain  Prior exam: CT cervical spine October 2, 2022.  This exam was performed according to our departmental  dose-optimization program, which includes automated exposure  control, adjustment of the mA and/or kV according to patient size  and/or use of iterative reconstruction technique.    FINDINGS: CT of the cervical spine without IV contrast is  obtained. Axial images were obtained from the skull base through  T1   . Sagittal and coronal reconstructed images are provided.  The visualized vertebral bodies demonstrate normal height and  alignment. There is no evidence of acute fracture or dislocation.  The prevertebral soft tissue is unremarkable. Degenerative,  spondylotic changes C5-C6 with both anterior and posterior  osteophytes and disc space narrowing.      Impression:      Degenerative, spondylotic changes C5-C6 as described  above. Otherwise unremarkable CT examination of the cervical  spine. No change since prior exam.    Electronically signed by:  John Galeano MD  10/16/2022 9:56 AM CDT  Workstation: [id number]    CT Head Without Contrast Stroke Protocol [[id number]] Collected: 10/16/22 0936     Updated: 10/16/22 0954    Narrative:      Noncontrast CT examination of the brain.    INDICATION: Acute neurological deficit. Stroke protocol    Technique: Axial 5 mm contiguous images with brain parenchymal  and bone windows    This exam was performed according to our departmental  dose-optimization program, which includes automated exposure  control, adjustment of the mA and/or kV according to patient size  and/or use of iterative reconstruction technique.    Prior relevant examination: CT brain September 21, 2022.  Large old infarct left parietal lobe, left middle cerebral artery  distribution unchanged since prior exam. Brain parenchyma appears  otherwise within normal  limits. Ventricles are within normal  limits in size. No evidence of abnormal mass or calcification is  seen. No evidence of acute hemorrhage is noted. Bony structures  appear within normal limits and the mastoid air cells and  visualized paranasal sinuses are normally aerated.        Impression:      Large old infarct left MCA distribution.    CT brain is otherwise unremarkable and unchanged since prior  exam.    Electronically signed by:  John Galeano MD  10/16/2022 9:52 AM CDT  Workstation: [id number]          I have reviewed the patient's current medications.     Assessment/Plan     [facility] Problems    Diagnosis    • **Urinary tract infection in female      Plan:    Urinary tract infection in female: IV antibiotics; previous cultures show resistance to rocephin, history of E Coli and ESBL; Cefepime initiated.  Culture positive for E. coli, continue cefepime pending sensitivity      Weakness, frequent falls: PT/OT ordered. No evidence of acute stroke today, neurology following. Continue aspirin  -Consider loop recorder, Holter on discharge per neurology recommendation with cardiology follow      HTN: Continue home meds      HLD: Continue statin      Seizures: Continue Keppra       Current IV fluids: None, received 1 L bolus in the ED  VTE prophylaxis: SCDs  GI prophylaxis: Protonix  Bowel care: Protocol    Discharge Planning: In process, possibly tomorrow    Electronically signed by Sheela Pack PA-C, 10/17/22, 08:42 CDT.

## 2022-10-18 VITALS
WEIGHT: 246 LBS | DIASTOLIC BLOOD PRESSURE: 48 MMHG | TEMPERATURE: 98.6 F | HEIGHT: 60 IN | SYSTOLIC BLOOD PRESSURE: 100 MMHG | BODY MASS INDEX: 48.29 KG/M2 | HEART RATE: 59 BPM | RESPIRATION RATE: 16 BRPM | OXYGEN SATURATION: 96 %

## 2022-10-18 PROBLEM — R00.1 BRADYCARDIA: Status: ACTIVE | Noted: 2022-10-18

## 2022-10-18 PROBLEM — G93.40 ENCEPHALOPATHY: Status: ACTIVE | Noted: 2022-10-18

## 2022-10-18 LAB
ANION GAP SERPL CALCULATED.3IONS-SCNC: 12 MMOL/L (ref 5–15)
BASOPHILS # BLD AUTO: 0.04 10*3/MM3 (ref 0–0.2)
BASOPHILS NFR BLD AUTO: 0.6 % (ref 0–1.5)
BUN SERPL-MCNC: 15 MG/DL (ref 6–20)
BUN/CREAT SERPL: 15.3 (ref 7–25)
CALCIUM SPEC-SCNC: 9.4 MG/DL (ref 8.6–10.5)
CHLORIDE SERPL-SCNC: 106 MMOL/L (ref 98–107)
CO2 SERPL-SCNC: 21 MMOL/L (ref 22–29)
CREAT SERPL-MCNC: 0.98 MG/DL (ref 0.57–1)
DEPRECATED RDW RBC AUTO: 45.5 FL (ref 37–54)
EGFRCR SERPLBLD CKD-EPI 2021: 71.8 ML/MIN/1.73
EOSINOPHIL # BLD AUTO: 0.29 10*3/MM3 (ref 0–0.4)
EOSINOPHIL NFR BLD AUTO: 4.3 % (ref 0.3–6.2)
ERYTHROCYTE [DISTWIDTH] IN BLOOD BY AUTOMATED COUNT: 13.4 % (ref 12.3–15.4)
GLUCOSE SERPL-MCNC: 92 MG/DL (ref 65–99)
HCT VFR BLD AUTO: 38.6 % (ref 34–46.6)
HGB BLD-MCNC: 12.7 G/DL (ref 12–15.9)
IMM GRANULOCYTES # BLD AUTO: 0.02 10*3/MM3 (ref 0–0.05)
IMM GRANULOCYTES NFR BLD AUTO: 0.3 % (ref 0–0.5)
LYMPHOCYTES # BLD AUTO: 2.08 10*3/MM3 (ref 0.7–3.1)
LYMPHOCYTES NFR BLD AUTO: 30.6 % (ref 19.6–45.3)
MCH RBC QN AUTO: 30.5 PG (ref 26.6–33)
MCHC RBC AUTO-ENTMCNC: 32.9 G/DL (ref 31.5–35.7)
MCV RBC AUTO: 92.6 FL (ref 79–97)
MONOCYTES # BLD AUTO: 0.5 10*3/MM3 (ref 0.1–0.9)
MONOCYTES NFR BLD AUTO: 7.4 % (ref 5–12)
NEUTROPHILS NFR BLD AUTO: 3.86 10*3/MM3 (ref 1.7–7)
NEUTROPHILS NFR BLD AUTO: 56.8 % (ref 42.7–76)
NRBC BLD AUTO-RTO: 0 /100 WBC (ref 0–0.2)
PLATELET # BLD AUTO: 184 10*3/MM3 (ref 140–450)
PMV BLD AUTO: 11.8 FL (ref 6–12)
POTASSIUM SERPL-SCNC: 4.1 MMOL/L (ref 3.5–5.2)
RBC # BLD AUTO: 4.17 10*6/MM3 (ref 3.77–5.28)
SODIUM SERPL-SCNC: 139 MMOL/L (ref 136–145)
WBC NRBC COR # BLD: 6.79 10*3/MM3 (ref 3.4–10.8)

## 2022-10-18 PROCEDURE — 25010000002 CEFEPIME PER 500 MG: Performed by: PHYSICIAN ASSISTANT

## 2022-10-18 PROCEDURE — 80048 BASIC METABOLIC PNL TOTAL CA: CPT | Performed by: PHYSICIAN ASSISTANT

## 2022-10-18 PROCEDURE — 97110 THERAPEUTIC EXERCISES: CPT

## 2022-10-18 PROCEDURE — G0378 HOSPITAL OBSERVATION PER HR: HCPCS

## 2022-10-18 PROCEDURE — 85025 COMPLETE CBC W/AUTO DIFF WBC: CPT | Performed by: PHYSICIAN ASSISTANT

## 2022-10-18 PROCEDURE — 97535 SELF CARE MNGMENT TRAINING: CPT

## 2022-10-18 PROCEDURE — 96366 THER/PROPH/DIAG IV INF ADDON: CPT

## 2022-10-18 RX ORDER — AMOXICILLIN AND CLAVULANATE POTASSIUM 875; 125 MG/1; MG/1
1 TABLET, FILM COATED ORAL 2 TIMES DAILY
Qty: 6 TABLET | Refills: 0 | Status: SHIPPED | OUTPATIENT
Start: 2022-10-18 | End: 2022-10-21

## 2022-10-18 RX ADMIN — LEVETIRACETAM 1000 MG: 500 TABLET, FILM COATED ORAL at 09:11

## 2022-10-18 RX ADMIN — OXYBUTYNIN CHLORIDE 10 MG: 5 TABLET, EXTENDED RELEASE ORAL at 09:11

## 2022-10-18 RX ADMIN — HYDROXYZINE PAMOATE 50 MG: 25 CAPSULE ORAL at 09:11

## 2022-10-18 RX ADMIN — ASPIRIN 325 MG: 325 TABLET ORAL at 09:11

## 2022-10-18 RX ADMIN — PANTOPRAZOLE SODIUM 40 MG: 40 TABLET, DELAYED RELEASE ORAL at 09:11

## 2022-10-18 RX ADMIN — CEFEPIME HYDROCHLORIDE 2 G: 2 INJECTION, POWDER, FOR SOLUTION INTRAVENOUS at 09:40

## 2022-10-18 RX ADMIN — DOCUSATE SODIUM 50 MG AND SENNOSIDES 8.6 MG 2 TABLET: 8.6; 5 TABLET, FILM COATED ORAL at 09:11

## 2022-10-18 RX ADMIN — CEFEPIME HYDROCHLORIDE 2 G: 2 INJECTION, POWDER, FOR SOLUTION INTRAVENOUS at 01:12

## 2022-10-18 RX ADMIN — BUSPIRONE HYDROCHLORIDE 15 MG: 15 TABLET ORAL at 09:11

## 2022-10-18 RX ADMIN — FUROSEMIDE 20 MG: 20 TABLET ORAL at 09:11

## 2022-10-18 RX ADMIN — LOSARTAN POTASSIUM 50 MG: 50 TABLET, FILM COATED ORAL at 09:11

## 2022-10-18 RX ADMIN — ESCITALOPRAM OXALATE 20 MG: 10 TABLET ORAL at 09:11

## 2022-10-18 RX ADMIN — Medication 10 ML: at 09:12

## 2022-10-18 NOTE — PLAN OF CARE
Goal Outcome Evaluation:           Progress: improving  Outcome Evaluation: Patient has rested much of night quietly in bed with eyes closed. Does urinate frequently. Requires standby assist and walker for ambulation. Cooperative with care. Medication compliant. Continues with IV antibiotics. Fall safety precautions remain in place including bed alarm and non-skid footwear when OOB.

## 2022-10-18 NOTE — DISCHARGE SUMMARY
[facility] Medicine Services  DISCHARGE SUMMARY       Date of Admission: 10/16/2022  Date of Discharge:  10/18/2022  Primary Care Physician: Sonya Crooks APRN    Presenting Problem/History of Present Illness:  Urinary tract infection in female [N39.0]       Final Discharge Diagnoses:  Active Hospital Problems    Diagnosis    • **Urinary tract infection in female    • Encephalopathy    • Bradycardia        Consults:   Consults     Date and Time Order Name Status Description    10/16/2022  9:19 AM Inpatient Neurology Consult Stroke Completed     10/16/2022  9:19 AM Inpatient Neurology Consult Stroke Completed           Procedures Performed:                 Pertinent Test Results:   Lab Results (most recent)     Procedure Component Value Units Date/Time    CBC & Differential [[id number]]  (Normal) Collected: 10/18/22 0637    Specimen: Blood Updated: 10/18/22 0744    Narrative:      The following orders were created for panel order CBC & Differential.  Procedure                               Abnormality         Status                     ---------                               -----------         ------                     CBC Auto Differential[[id number]]        Normal              Final result                 Please view results for these tests on the individual orders.    CBC Auto Differential [[id number]]  (Normal) Collected: 10/18/22 0637    Specimen: Blood Updated: 10/18/22 0744     WBC 6.79 10*3/mm3      RBC 4.17 10*6/mm3      Hemoglobin 12.7 g/dL      Hematocrit 38.6 %      MCV 92.6 fL      MCH 30.5 pg      MCHC 32.9 g/dL      RDW 13.4 %      RDW-SD 45.5 fl      MPV 11.8 fL      Platelets 184 10*3/mm3      Neutrophil % 56.8 %      Lymphocyte % 30.6 %      Monocyte % 7.4 %      Eosinophil % 4.3 %      Basophil % 0.6 %      Immature Grans % 0.3 %      Neutrophils, Absolute 3.86 10*3/mm3      Lymphocytes, Absolute 2.08 10*3/mm3      Monocytes, Absolute 0.50  10*3/mm3      Eosinophils, Absolute 0.29 10*3/mm3      Basophils, Absolute 0.04 10*3/mm3      Immature Grans, Absolute 0.02 10*3/mm3      nRBC 0.0 /100 WBC     Basic Metabolic Panel [[id number]]  (Abnormal) Collected: 10/18/22 0637    Specimen: Blood Updated: 10/18/22 0727     Glucose 92 mg/dL      BUN 15 mg/dL      Creatinine 0.98 mg/dL      Sodium 139 mmol/L      Potassium 4.1 mmol/L      Comment: Slight hemolysis detected by analyzer. Results may be affected.        Chloride 106 mmol/L      CO2 21.0 mmol/L      Calcium 9.4 mg/dL      BUN/Creatinine Ratio 15.3     Anion Gap 12.0 mmol/L      eGFR 71.8 mL/min/1.73      Comment: National Kidney Foundation and American Society of Nephrology (ASN) Task Force recommended calculation based on the Chronic Kidney Disease Epidemiology Collaboration (CKD-EPI) equation refit without adjustment for race.       Narrative:      GFR Normal >60  Chronic Kidney Disease <60  Kidney Failure <15      Extra Tubes [[id number]] Collected: 10/17/22 0940    Specimen: Blood, Venous Line Updated: 10/17/22 1045    Narrative:      The following orders were created for panel order Extra Tubes.  Procedure                               Abnormality         Status                     ---------                               -----------         ------                     Green Top (Gel)[[id number]]                                  Final result                 Please view results for these tests on the individual orders.    Green Top (Gel) [[id number]] Collected: 10/17/22 0940    Specimen: Blood Updated: 10/17/22 1045     Extra Tube Hold for add-ons.     Comment: Auto resulted.       Urine Culture - Urine, Urine, Clean Catch [[id number]]  (Abnormal) Collected: 10/16/22 1111    Specimen: Urine, Clean Catch Updated: 10/17/22 0957     Urine Culture >100,000 CFU/mL Escherichia coli    Narrative:      Colonization of the urinary tract without infection is common. Treatment is discouraged unless the patient is  symptomatic, pregnant, or undergoing an invasive urologic procedure.    CBC & Differential [[id number]]  (Normal) Collected: 10/17/22 0905    Specimen: Blood Updated: 10/17/22 0932    Narrative:      The following orders were created for panel order CBC & Differential.  Procedure                               Abnormality         Status                     ---------                               -----------         ------                     CBC Auto Differential[[id number]]        Normal              Final result                 Please view results for these tests on the individual orders.    CBC Auto Differential [[id number]]  (Normal) Collected: 10/17/22 0905    Specimen: Blood Updated: 10/17/22 0932     WBC 5.80 10*3/mm3      RBC 4.07 10*6/mm3      Hemoglobin 12.0 g/dL      Hematocrit 36.5 %      MCV 89.7 fL      MCH 29.5 pg      MCHC 32.9 g/dL      RDW 13.3 %      RDW-SD 43.8 fl      MPV 11.4 fL      Platelets 211 10*3/mm3      Neutrophil % 52.4 %      Lymphocyte % 33.1 %      Monocyte % 8.1 %      Eosinophil % 5.2 %      Basophil % 0.9 %      Immature Grans % 0.3 %      Neutrophils, Absolute 3.04 10*3/mm3      Lymphocytes, Absolute 1.92 10*3/mm3      Monocytes, Absolute 0.47 10*3/mm3      Eosinophils, Absolute 0.30 10*3/mm3      Basophils, Absolute 0.05 10*3/mm3      Immature Grans, Absolute 0.02 10*3/mm3      nRBC 0.0 /100 WBC     Basic Metabolic Panel [[id number]]  (Abnormal) Collected: 10/17/22 0523    Specimen: Blood Updated: 10/17/22 0555     Glucose 91 mg/dL      BUN 13 mg/dL      Creatinine 0.92 mg/dL      Sodium 141 mmol/L      Potassium 4.2 mmol/L      Chloride 109 mmol/L      CO2 22.0 mmol/L      Calcium 9.0 mg/dL      BUN/Creatinine Ratio 14.1     Anion Gap 10.0 mmol/L      eGFR 77.4 mL/min/1.73      Comment: National Kidney Foundation and American Society of Nephrology (ASN) Task Force recommended calculation based on the Chronic Kidney Disease Epidemiology Collaboration (CKD-EPI) equation refit without  adjustment for race.       Narrative:      GFR Normal >60  Chronic Kidney Disease <60  Kidney Failure <15      Extra Tubes [[id number]] Collected: 10/16/22 0855    Specimen: Blood, Venous Line Updated: 10/16/22 1301    Narrative:      The following orders were created for panel order Extra Tubes.  Procedure                               Abnormality         Status                     ---------                               -----------         ------                     Verdin Top[[id number]]                                         Final result                 Please view results for these tests on the individual orders.    Gray Top [[id number]] Collected: 10/16/22 0855    Specimen: Blood Updated: 10/16/22 1301     Extra Tube Hold for add-ons.     Comment: Auto resulted.       Urine Drug Screen - Urine, Clean Catch [[id number]]  (Abnormal) Collected: 10/16/22 1111    Specimen: Urine, Clean Catch Updated: 10/16/22 1129     THC, Screen, Urine Negative     Phencyclidine (PCP), Urine Negative     Cocaine Screen, Urine Negative     Methamphetamine, Ur Negative     Opiate Screen Negative     Amphetamine Screen, Urine Negative     Benzodiazepine Screen, Urine Negative     Tricyclic Antidepressants Screen Negative     Methadone Screen, Urine Negative     Barbiturates Screen, Urine Positive     Oxycodone Screen, Urine Negative     Propoxyphene Screen Negative     Buprenorphine, Screen, Urine Negative    Narrative:      Cutoff For Drugs Screened:    Amphetamines               500 ng/ml  Barbiturates               200 ng/ml  Benzodiazepines            150 ng/ml  Cocaine                    150 ng/ml  Methadone                  200 ng/ml  Opiates                    100 ng/ml  Phencyclidine               25 ng/ml  THC                            50 ng/ml  Methamphetamine            500 ng/ml  Tricyclic Antidepressants  300 ng/ml  Oxycodone                  100 ng/ml  Propoxyphene               300 ng/ml  Buprenorphine               10  ng/ml    The normal value for all drugs tested is negative. This report includes unconfirmed screening results, with the cutoff values listed, to be used for medical treatment purposes only.  Unconfirmed results must not be used for non-medical purposes such as employment or legal testing.  Clinical consideration should be applied to any drug of abuse test, particularly when unconfirmed results are used.      Urinalysis With Microscopic If Indicated (No Culture) - Urine, Clean Catch [[id number]]  (Abnormal) Collected: 10/16/22 1111    Specimen: Urine, Clean Catch Updated: 10/16/22 1126     Color, UA Yellow     Appearance, UA Clear     pH, UA 7.5     Specific [place], UA 1.050     Comment: Result obtained by Refractometer        Glucose, UA Negative     Ketones, UA Negative     Bilirubin, UA Negative     Blood, UA Trace     Protein, UA Negative     Leuk Esterase, UA Moderate (2+)     Nitrite, UA Positive     Urobilinogen, UA 0.2 E.U./dL    Urinalysis, Microscopic Only - Urine, Clean Catch [[id number]]  (Abnormal) Collected: 10/16/22 1111    Specimen: Urine, Clean Catch Updated: 10/16/22 1122     RBC, UA 0-2 /HPF      WBC, UA 31-50 /HPF      Bacteria, UA 4+ /HPF      Squamous Epithelial Cells, UA 3-5 /HPF      Hyaline Casts, UA None Seen /LPF      Methodology Automated Microscopy    Rochelle Draw [[id number]] Collected: 10/16/22 0855    Specimen: Blood Updated: 10/16/22 1001    Narrative:      The following orders were created for panel order Rochelle Draw.  Procedure                               Abnormality         Status                     ---------                               -----------         ------                     Green Top (Gel)[[id number]]                                  Final result               Lavender Top[[id number]]                                     Final result               Gold Top - SST[[id number]]                                   Final result               Light Blue Top[[id number]]                                    Final result                 Please view results for these tests on the individual orders.    Green Top (Gel) [[id number]] Collected: 10/16/22 0855    Specimen: Blood Updated: 10/16/22 1001     Extra Tube Hold for add-ons.     Comment: Auto resulted.       Lavender Top [[id number]] Collected: 10/16/22 0855    Specimen: Blood Updated: 10/16/22 1001     Extra Tube hold for add-on     Comment: Auto resulted       Gold Top - SST [[id number]] Collected: 10/16/22 0855    Specimen: Blood Updated: 10/16/22 1001     Extra Tube Hold for add-ons.     Comment: Auto resulted.       Light Blue Top [[id number]] Collected: 10/16/22 0855    Specimen: Blood Updated: 10/16/22 1001     Extra Tube Hold for add-ons.     Comment: Auto resulted       Comprehensive Metabolic Panel [[id number]]  (Abnormal) Collected: 10/16/22 0855    Specimen: Blood Updated: 10/16/22 0949     Glucose 105 mg/dL      BUN 15 mg/dL      Creatinine 1.00 mg/dL      Sodium 141 mmol/L      Potassium 4.4 mmol/L      Chloride 106 mmol/L      CO2 27.0 mmol/L      Calcium 9.2 mg/dL      Total Protein 6.4 g/dL      Albumin 4.20 g/dL      ALT (SGPT) 39 U/L      AST (SGOT) 29 U/L      Alkaline Phosphatase 98 U/L      Total Bilirubin 0.2 mg/dL      Globulin 2.2 gm/dL      A/G Ratio 1.9 g/dL      BUN/Creatinine Ratio 15.0     Anion Gap 8.0 mmol/L      eGFR 70.1 mL/min/1.73      Comment: National Kidney Foundation and American Society of Nephrology (ASN) Task Force recommended calculation based on the Chronic Kidney Disease Epidemiology Collaboration (CKD-EPI) equation refit without adjustment for race.       Narrative:      GFR Normal >60  Chronic Kidney Disease <60  Kidney Failure <15      Troponin [[id number]]  (Normal) Collected: 10/16/22 0855    Specimen: Blood Updated: 10/16/22 0948     Troponin T <0.010 ng/mL     Narrative:      Troponin T Reference Range:  <= 0.03 ng/mL-   Negative for AMI  >0.03 ng/mL-     Abnormal for myocardial necrosis.  Clinicians  would have to utilize clinical acumen, EKG, Troponin and serial changes to determine if it is an Acute Myocardial Infarction or myocardial injury due to an underlying chronic condition.       Results may be falsely decreased if patient taking Biotin.      Protime-INR [[id number]]  (Normal) Collected: 10/16/22 0855    Specimen: Blood Updated: 10/16/22 0942     Protime 13.0 Seconds      INR 0.99    Narrative:      Therapeutic range for most indications is 2.0-3.0 INR,  or 2.5-3.5 for mechanical heart valves.    aPTT [[id number]]  (Normal) Collected: 10/16/22 0855    Specimen: Blood Updated: 10/16/22 0942     PTT 25.9 seconds     Narrative:      The recommended Heparin therapeutic range is 68-97 seconds.    hCG, Serum, Qualitative [[id number]]  (Normal) Collected: 10/16/22 0855    Specimen: Blood Updated: 10/16/22 0937     HCG Qualitative Negative    POC Glucose Once [[id number]]  (Normal) Collected: 10/16/22 0907    Specimen: Blood Updated: 10/16/22 0918     Glucose 113 mg/dL      Comment: RN NotifiedOperator: [id number] MASOUD DEAARONMeter ID: [id number]           Imaging Results (Most Recent)     Procedure Component Value Units Date/Time    CT Angiogram Head w AI Analysis of LVO [[id number]] Collected: 10/16/22 0948     Updated: 10/16/22 1437    Addenda:         ADDENDUM   ADDENDUM #1       NO CHANGE IN IMPRESSION.  TEMPLATE/GRAMMAR ERRORS CORRECTED:    COMPARISON: CT head 10/16/2022, MRI brain performed subsequently  10/16/2022, 5/3/2022    INDICATION: Acute stroke    TECHNIQUE:   1. CT angiography of the neck was performed after the uneventful  administration of iodinated intravenous contrast. Multiplanar  reformats including MIP and rotating three-dimensional reformats  were created on a separate workstation and permanently archived.       2. CT angiography of the [place] is performed after the  uneventful administration of intravenous contrast. Multiplanar  reformats including MIP and rotating  three-dimensional reformats  are created on a separate workstation and permanently archived.     All CT scans at this location are performed using dose modulation  techniques as appropriate to a performed exam including the  following: automated exposure control; adjustment of the mA  and/or kV according to patient size (this includes techniques or  standardized protocols for targeted exams where dose is matched  to indication / reason for exam; i.e. extremities or head); use  of iterative reconstruction technique.      Findings:    The visualized portions of the thoracic aorta including the  aortic arch appear normal caliber and configuration.  The right common carotid artery is widely patent. There is small  plaque at the proximal right internal carotid artery without  flow-limiting stenosis. External carotid artery is widely patent.   The right vertebral artery is patent.  The left common carotid artery is patent. There is mild narrowing  at the proximal left internal carotid artery, approximately 50%  diameter, with poststenotic dilatation. This at least partly  relates to tortuosity and turning vessel.  The right external  carotid artery is widely patent.  The left vertebral artery is  patent.  The bilateral subclavian arteries are patent.      The bilateral distal internal carotid arteries are patent.    The bilateral anterior and middle cerebral arteries are patent.  Chronic diminished caliber of the left M2 segment, previously  described.    The bilateral distal vertebral arteries are patent. The basilar  artery is patent. The bilateral posterior cerebral arteries are  patent.    There is no aneurysm.    Chronic left MCA encephalomalacia.     IMPRESSION:   Impression:  1.  No flow limiting stenosis of the right internal carotid  artery.  2. There is mild to moderate relative narrowing of the proximal  left internal carotid artery with approximately 50% diameter  stenosis. There is at least partly reflects a  tortuous rotating  vessel and is unchanged from prior.  3.  Patent bilateral vertebral arteries.  4. Intact [place]. As previously described, the left M2  and distal left MCA segments appear decreased caliber relative to  the right, at site of encephalomalacia from remote insult.  5. Chronic left MCA encephalomalacia     The degree of stenosis was calculated in reference to  measurements of the distal internal carotid artery diameter,  utilizing NASCET criteria.    Electronically signed by:  Cameron Mcmahon MD  10/16/2022 2:35 PM CDT  Workstation: [id number]    Signed: 10/16/22 1435 by Cameron Mcmahon MD    Narrative:      COMPARISON: CT head 10/16/2022, MRI brain performed subsequently  10/16/2022, 5/3/2022    INDICATION: Acute stroke    TECHNIQUE:   1. CT angiography of the neck was performed after the uneventful  administration of iodinated intravenous contrast. Multiplanar  reformats including MIP and rotating three-dimensional reformats  were created on a separate workstation and permanently archived.       2. CT angiography of the [place] is performed after the  uneventful administration of intravenous contrast. Multiplanar  reformats including MIP and rotating three-dimensional reformats  are created on a separate workstation and permanently archived.     All CT scans at this location are performed using dose modulation  techniques as appropriate to a performed exam including the  following: automated exposure control; adjustment of the mA  and/or kV according to patient size (this includes techniques or  standardized protocols for targeted exams where dose is matched  to indication / reason for exam; i.e. extremities or head); use  of iterative reconstruction technique.      Findings:    The visualized portions of the thoracic aorta including the  aortic arch appear normal caliber and configuration.  The right common carotid artery there is widely patent. There is  small plaque at the proximal  right internal carotid artery  without flow-limiting stenosis. External carotid artery are  widely patent.  The right vertebral artery is patent.  The left common carotid artery is patent. There is mild narrowing  at the proximal left internal carotid artery, approximately 50%  diameter, with poststenotic dilatation. The right external  carotid artery are widely patent.  The left vertebral artery is  patent.  The bilateral subclavian arteries are patent.      The bilateral distal internal carotid arteries are patent.    The bilateral anterior and middle cerebral arteries are patent.  Chronic diminished caliber of the left M2 segment, previously  described.    The bilateral distal vertebral arteries are patent. The basilar  artery is patent. The bilateral posterior cerebral arteries are  patent.    There is no aneurysm.    Chronic left MCA encephalomalacia.       Impression:      Impression:  1.  No flow limiting stenosis of the right internal carotid  artery.  2. There is mild to moderate relative narrowing of the proximal  left internal carotid artery with approximately 50% diameter  stenosis. There is at least partly reflects a tortuous rotating  vessel and is unchanged from prior.  3.  Patent bilateral vertebral arteries.  4. Intact [place]. As previously described, the left M2  and distal left MCA segments appear decreased caliber relative to  the right, at site of encephalomalacia from remote insult.  5. Chronic left MCA encephalomalacia     The degree of stenosis was calculated in reference to  measurements of the distal internal carotid artery diameter,  utilizing NASCET criteria.    Electronically signed by:  Cameron Mcmahon MD  10/16/2022 1:49 PM CDT  Workstation: [id number]    CT Angiogram Neck [[id number]] Collected: 10/16/22 0948     Updated: 10/16/22 1437    Addenda:         ADDENDUM   ADDENDUM #1       NO CHANGE IN IMPRESSION.  TEMPLATE/GRAMMAR ERRORS CORRECTED:    COMPARISON: CT head 10/16/2022,  MRI brain performed subsequently  10/16/2022, 5/3/2022    INDICATION: Acute stroke    TECHNIQUE:   1. CT angiography of the neck was performed after the uneventful  administration of iodinated intravenous contrast. Multiplanar  reformats including MIP and rotating three-dimensional reformats  were created on a separate workstation and permanently archived.       2. CT angiography of the [place] is performed after the  uneventful administration of intravenous contrast. Multiplanar  reformats including MIP and rotating three-dimensional reformats  are created on a separate workstation and permanently archived.     All CT scans at this location are performed using dose modulation  techniques as appropriate to a performed exam including the  following: automated exposure control; adjustment of the mA  and/or kV according to patient size (this includes techniques or  standardized protocols for targeted exams where dose is matched  to indication / reason for exam; i.e. extremities or head); use  of iterative reconstruction technique.      Findings:    The visualized portions of the thoracic aorta including the  aortic arch appear normal caliber and configuration.  The right common carotid artery is widely patent. There is small  plaque at the proximal right internal carotid artery without  flow-limiting stenosis. External carotid artery is widely patent.   The right vertebral artery is patent.  The left common carotid artery is patent. There is mild narrowing  at the proximal left internal carotid artery, approximately 50%  diameter, with poststenotic dilatation. This at least partly  relates to tortuosity and turning vessel.  The right external  carotid artery is widely patent.  The left vertebral artery is  patent.  The bilateral subclavian arteries are patent.      The bilateral distal internal carotid arteries are patent.    The bilateral anterior and middle cerebral arteries are patent.  Chronic diminished  caliber of the left M2 segment, previously  described.    The bilateral distal vertebral arteries are patent. The basilar  artery is patent. The bilateral posterior cerebral arteries are  patent.    There is no aneurysm.    Chronic left MCA encephalomalacia.     IMPRESSION:   Impression:  1.  No flow limiting stenosis of the right internal carotid  artery.  2. There is mild to moderate relative narrowing of the proximal  left internal carotid artery with approximately 50% diameter  stenosis. There is at least partly reflects a tortuous rotating  vessel and is unchanged from prior.  3.  Patent bilateral vertebral arteries.  4. Intact [place]. As previously described, the left M2  and distal left MCA segments appear decreased caliber relative to  the right, at site of encephalomalacia from remote insult.  5. Chronic left MCA encephalomalacia     The degree of stenosis was calculated in reference to  measurements of the distal internal carotid artery diameter,  utilizing NASCET criteria.    Electronically signed by:  Camreon Mcmahon MD  10/16/2022 2:35 PM CDT  Workstation: [id number]    Signed: 10/16/22 1435 by Cameron Mcmahon MD    Narrative:      COMPARISON: CT head 10/16/2022, MRI brain performed subsequently  10/16/2022, 5/3/2022    INDICATION: Acute stroke    TECHNIQUE:   1. CT angiography of the neck was performed after the uneventful  administration of iodinated intravenous contrast. Multiplanar  reformats including MIP and rotating three-dimensional reformats  were created on a separate workstation and permanently archived.       2. CT angiography of the [place] is performed after the  uneventful administration of intravenous contrast. Multiplanar  reformats including MIP and rotating three-dimensional reformats  are created on a separate workstation and permanently archived.     All CT scans at this location are performed using dose modulation  techniques as appropriate to a performed exam including  the  following: automated exposure control; adjustment of the mA  and/or kV according to patient size (this includes techniques or  standardized protocols for targeted exams where dose is matched  to indication / reason for exam; i.e. extremities or head); use  of iterative reconstruction technique.      Findings:    The visualized portions of the thoracic aorta including the  aortic arch appear normal caliber and configuration.  The right common carotid artery there is widely patent. There is  small plaque at the proximal right internal carotid artery  without flow-limiting stenosis. External carotid artery are  widely patent.  The right vertebral artery is patent.  The left common carotid artery is patent. There is mild narrowing  at the proximal left internal carotid artery, approximately 50%  diameter, with poststenotic dilatation. The right external  carotid artery are widely patent.  The left vertebral artery is  patent.  The bilateral subclavian arteries are patent.      The bilateral distal internal carotid arteries are patent.    The bilateral anterior and middle cerebral arteries are patent.  Chronic diminished caliber of the left M2 segment, previously  described.    The bilateral distal vertebral arteries are patent. The basilar  artery is patent. The bilateral posterior cerebral arteries are  patent.    There is no aneurysm.    Chronic left MCA encephalomalacia.       Impression:      Impression:  1.  No flow limiting stenosis of the right internal carotid  artery.  2. There is mild to moderate relative narrowing of the proximal  left internal carotid artery with approximately 50% diameter  stenosis. There is at least partly reflects a tortuous rotating  vessel and is unchanged from prior.  3.  Patent bilateral vertebral arteries.  4. Intact [place]. As previously described, the left M2  and distal left MCA segments appear decreased caliber relative to  the right, at site of encephalomalacia  from remote insult.  5. Chronic left MCA encephalomalacia     The degree of stenosis was calculated in reference to  measurements of the distal internal carotid artery diameter,  utilizing NASCET criteria.    Electronically signed by:  Cameron Mcmahon MD  10/16/2022 1:49 PM CDT  Workstation: [id number]    MRI Brain Without Contrast [[id number]] Collected: 10/16/22 1157     Updated: 10/16/22 1242    Narrative:      PROCEDURE: MR BRAIN WITHOUT IV CONTRAST    Clinical History: stroke    Indications: Same as above    Comparison: MRI of the brain done on 5/20/2022    Technique:     Noncontrast MRI of the brain was done in a multiplanar and  multi-sequence format.    Findings:     There is no evidence of acute focal infarcts, midline shift, mass  effect or intracranial hemorrhage.    Encephalomalacic change from an old infarct in the left middle  cerebral artery distribution is again noted.    There are no extra-axial fluid collections.    The  meninges appear unremarkable, given the limitation of lack  of intravenous contrast    There is good gray/white matter differentiation.    There is normal flow-void in the intracranial vasculature.    The ventricular system is normal.    The craniovertebral junction appears grossly unremarkable.    The mastoid air cells are unremarkable.    The paranasal sinuses are unremarkable.     Limited evaluation of the bilateral orbits, pituitary fossa  region and the posterior fossa region including the  cerebellopontine angles do not show any gross abnormality.      Impression:      Impression:    There are no acute intracranial findings.  Encephalomalacic change from an old infarct in the left middle  cerebral artery distribution is again noted.    Electronically signed by:  Juan Carlos Kim MD  10/16/2022 12:40 PM  CDT Workstation: [id number]    XR Chest 1 View [[id number]] Collected: 10/16/22 0958     Updated: 10/16/22 1046    Narrative:      Chest x-ray single view.       CLINICAL  INDICATION: Shortness of breath acute stroke protocol.    COMPARISON: June 7, 2022    FINDINGS: Cardiac silhouette is normal in size. Pulmonary  vascularity is unremarkable.     No focal infiltrate or consolidation.  No pleural effusion.  No  pneumothorax.      Impression:      No evidence of active disease.    Electronically signed by:  John Galeano MD  10/16/2022 10:44 AM  CDT Workstation: [id number]    CT Cervical Spine Without Contrast [[id number]] Collected: 10/16/22 0936     Updated: 10/16/22 0959    Narrative:      CT cervical spine.       History: Neck pain  Prior exam: CT cervical spine October 2, 2022.  This exam was performed according to our departmental  dose-optimization program, which includes automated exposure  control, adjustment of the mA and/or kV according to patient size  and/or use of iterative reconstruction technique.    FINDINGS: CT of the cervical spine without IV contrast is  obtained. Axial images were obtained from the skull base through  T1   . Sagittal and coronal reconstructed images are provided.  The visualized vertebral bodies demonstrate normal height and  alignment. There is no evidence of acute fracture or dislocation.  The prevertebral soft tissue is unremarkable. Degenerative,  spondylotic changes C5-C6 with both anterior and posterior  osteophytes and disc space narrowing.      Impression:      Degenerative, spondylotic changes C5-C6 as described  above. Otherwise unremarkable CT examination of the cervical  spine. No change since prior exam.    Electronically signed by:  John Galeano MD  10/16/2022 9:56 AM CDT  Workstation: [id number]    CT Head Without Contrast Stroke Protocol [[id number]] Collected: 10/16/22 0936     Updated: 10/16/22 0954    Narrative:      Noncontrast CT examination of the brain.    INDICATION: Acute neurological deficit. Stroke protocol    Technique: Axial 5 mm contiguous images with brain parenchymal  and bone windows    This exam was performed according to  "our departmental  dose-optimization program, which includes automated exposure  control, adjustment of the mA and/or kV according to patient size  and/or use of iterative reconstruction technique.    Prior relevant examination: CT brain September 21, 2022.  Large old infarct left parietal lobe, left middle cerebral artery  distribution unchanged since prior exam. Brain parenchyma appears  otherwise within normal limits. Ventricles are within normal  limits in size. No evidence of abnormal mass or calcification is  seen. No evidence of acute hemorrhage is noted. Bony structures  appear within normal limits and the mastoid air cells and  visualized paranasal sinuses are normally aerated.        Impression:      Large old infarct left MCA distribution.    CT brain is otherwise unremarkable and unchanged since prior  exam.    Electronically signed by:  John Galeano MD  10/16/2022 9:52 AM CDT  Workstation: [id number]          Chief Complaint on Day of Discharge: none    Hospital Course:  The patient is a 47 y.o. female who presented to [facility] with chief complaint of encephalopathy, possible stroke.  Neurology did a full work-up and does not feel that patient's symptoms represented a stroke.  Felt to be encephalopathy related to UTI.  Encephalopathy improved overnight after IV antibiotics.    Patient has history of E. coli and ESBL.  Urine cultures here today show ESBL, sensitivities show multiple antibiotics of resistance.  The only option really left his amoxicillin.  Discussed at length with patient as she has a history of \"hives \"to penicillin.  She is willing to try amoxicillin and understands should she experience any sort of throat closing, swelling she should return to the emergency department immediately.  Discharge only neurology has set her up for a Holter to monitor the bradycardia as a possible source of the encephalopathy/presyncope that she experienced prior to arrival and recommends close " "follow-up with cardiology and neurology.    Condition on Discharge: Stable, improved    Physical Exam on Discharge:  /48 (BP Location: Left arm, Patient Position: Lying)   Pulse 59   Temp 98.6 °F (37 °C) (Temporal)   Resp 16   Ht 152.4 cm (60\")   Wt 112 kg (246 lb)   LMP  (LMP Unknown)   SpO2 96%   BMI 48.04 kg/m²   Vitals and nursing note reviewed.   Constitutional:       General: No acute distress.     Appearance: Normal appearance.   HENT:      Head: Normocephalic and atraumatic.      Right Ear: External ear normal.      Left Ear: External ear normal.      Nose: Nose normal.      Mouth/Throat:      Mouth: Mucous membranes are moist.   Eyes:      Conjunctiva/sclera: Conjunctivae normal.      Pupils: Pupils are equal, round, and reactive to light.   Cardiovascular:      Rate and Rhythm: Normal rate and regular rhythm.   Pulmonary:      Effort: Pulmonary effort is normal.      Breath sounds: Normal breath sounds.   Abdominal:      General: Abdomen is flat.      Palpations: Abdomen is soft.      Tenderness: There is no abdominal tenderness.   Musculoskeletal:         General: No signs of injury. Normal range of motion.      Cervical back: No tenderness.   Skin:     General: Skin is warm and dry.   Neurological:      General: Residual weakness noted     Mental Status: Alert and oriented to person, place, and time.   Psychiatric:         Mood and Affect: Mood normal.         Behavior: Behavior normal.         Discharge Medications:     Discharge Medications      New Medications      Instructions Start Date   amoxicillin-clavulanate 875-125 MG per tablet  Commonly known as: Augmentin   1 tablet, Oral, 2 Times Daily         Continue These Medications      Instructions Start Date   acetaminophen 500 MG tablet  Commonly known as: TYLENOL   1,000 mg, Oral, Every 8 Hours PRN      aspirin 325 MG tablet   325 mg, Oral, Daily      atorvastatin 80 MG tablet  Commonly known as: LIPITOR   80 mg, Oral, Nightly    "   busPIRone 15 MG tablet  Commonly known as: BUSPAR   15 mg, Oral, 3 Times Daily      butalbital-acetaminophen-caffeine -40 MG per tablet  Commonly known as: FIORICET, ESGIC   1 tablet, Oral, Daily PRN      cloNIDine 0.1 MG tablet  Commonly known as: CATAPRES   0.1 mg, Oral, Every 30 Minutes PRN, Give one tap po every 30 mins when BP is over 160      DICLOFENAC PO   75 mg, Oral, 2 times daily      escitalopram 20 MG tablet  Commonly known as: LEXAPRO   30 mg, Oral, Daily, 30 mg      furosemide 20 MG tablet  Commonly known as: LASIX   20 mg, Oral, Daily      hydrOXYzine pamoate 50 MG capsule  Commonly known as: VISTARIL   50 mg, Oral, 2 Times Daily      levETIRAcetam 1000 MG tablet  Commonly known as: KEPPRA   1,000 mg, Oral, 2 Times Daily      levETIRAcetam 750 MG tablet  Commonly known as: KEPPRA   750 mg, Oral, Every 12 Hours Scheduled      losartan 50 MG tablet  Commonly known as: COZAAR   50 mg, Oral, Daily      meclizine 12.5 MG tablet  Commonly known as: ANTIVERT   12.5 mg, Oral, 3 Times Daily PRN      medroxyPROGESTERone 150 MG/ML injection  Commonly known as: DEPO-PROVERA   150 mg, Intramuscular, Every 3 Months      melatonin 5 MG tablet tablet   10 mg, Oral, Every Night at Bedtime      orphenadrine 100 MG 12 hr tablet  Commonly known as: NORFLEX   100 mg, Oral, 2 Times Daily PRN      pantoprazole 40 MG EC tablet  Commonly known as: Protonix   40 mg, Oral, Daily      potassium chloride 10 MEQ CR tablet  Commonly known as: K-DUR,KLOR-CON   10 mEq, Oral, Daily      solifenacin 10 MG tablet  Commonly known as: VESICARE   10 mg, Oral, Daily      traZODone 50 MG tablet  Commonly known as: DESYREL   50 mg, Oral, Nightly             Discharge Diet:   Diet Instructions     Diet: Regular      Discharge Diet: Regular           Activity at Discharge:   Activity Instructions     Activity as Tolerated             Discharge Care Plan/Instructions:      Urinary tract infection in female: IV antibiotics; previous  cultures show resistance to rocephin, history of E Coli and ESBL; Cefepime initiated.  Culture positive for E. coli, continue cefepime pending sensitivity  -Sensitivities show multiple antibiotics with resistance.  Main option left is amoxicillin.  Patient has history of hives to amoxicillin but wants to go ahead and try this for treatment of recurrent UTI.  Strong return precautions given      Weakness, frequent falls: PT/OT ordered. No evidence of acute stroke today, neurology following. Continue aspirin  -Loop recorder, Holter on discharge per neurology order with cardiology follow      HTN: Continue home meds      HLD: Continue statin      Seizures: Continue Keppra        Current IV fluids: None, received 1 L bolus in the ED  VTE prophylaxis: SCDs  GI prophylaxis: Protonix  Bowel care: Protocol    Follow-up Appointments:   Future Appointments   Date Time Provider Department Center   12/16/2022 11:30 AM Michael Hagan, [facility] None       Test Results Pending at Discharge:   Pending Labs     Order Current Status    Urine Culture - Urine, Urine, Clean Catch Preliminary result          Time: 33 minutes

## 2022-10-18 NOTE — THERAPY TREATMENT NOTE
Patient Name: Myles Shannon  : 1975    MRN: [id number]                              Today's Date: 10/18/2022       Admit Date: 10/16/2022    Visit Dx:     ICD-10-CM ICD-9-CM   1. Urinary tract infection in female  N39.0 599.0   2. Cerebrovascular accident (CVA) due to thrombosis of left middle cerebral artery ([facility])  I63.312 434.01   3. Impaired mobility and ADLs  Z74.09 V49.89    Z78.9    4. Impaired functional mobility, balance, gait, and endurance  Z74.09 V49.89     Patient Active Problem List   Diagnosis   • Nicotine abuse   • Other chest pain   • Acute maxillary sinusitis   • Bipolar affective disorder ([facility])   • Decreased pulses in feet   • Flank pain   • Hematochezia   • High cholesterol   • Migraine without aura and with status migrainosus, not intractable   • Migraine without status migrainosus, not intractable   • Neck pain   • Nephrolithiasis   • Neuropathy   • Chronic pain   • Primary osteoarthritis involving multiple joints   • Sciatic leg pain   • Screening for diabetes mellitus (DM)   • Seasonal allergies   • Stress   • Tobacco use disorder   • Urinary incontinence without sensory awareness   • Vitamin D deficiency   • Carpal tunnel syndrome of right wrist   • Seizure ([facility])   • Intractable vomiting   • Acute UTI (urinary tract infection)   • Cholelithiasis   • GI bleed   • Kidney stone   • Nephrolithiasis   • ELBERT (acute kidney injury) ([facility])   • Infection due to ESBL-producing Escherichia coli   • CVA (cerebral vascular accident) ([facility])   • Dysphagia due to recent stroke   • Slurred speech   • Cerebrovascular accident (CVA) ([facility])   • Aphasia due to acute cerebrovascular accident (CVA) ([facility])   • Methamphetamine abuse ([facility])   • Seizure disorder ([facility])   • UTI (urinary tract infection) due to urinary indwelling catheter ([facility])   • Urinary tract infection due to extended-spectrum beta lactamase (ESBL) producing Escherichia coli   • Urinary tract infection due to extended-spectrum beta lactamase  (ESBL) producing Escherichia coli   • Metabolic acidosis, NAG, bicarbonate losses   • Midline shift of brain due to stroke   • Nonintractable headache   • Weakness   • Dysarthria   • Urinary tract infection in female   • Encephalopathy   • Bradycardia     Past Medical History:   Diagnosis Date   • Abdominal pain     Chronic RLQ, RUQ, R flank comes and goes.    • Abdominal pain, right lower quadrant    • Acute bilateral otitis media    • Allergic rhinitis    • Backache     Upper back.   • Candidiasis of skin    • Cellulitis of neck    • Chest pain    • Contraception    • Cough    • Dysfunction of eustachian tube    • Dyspareunia, female    • Elevated cholesterol    • Excessive or frequent menstruation    • Flank pain    • Generalized aches and pains    • Headache    • Health maintenance examination    • Hypertension    • Infection of skin and subcutaneous tissue    • Irregular periods    • Kidney stone     Poss   • Menorrhagia    • Nausea vomiting and diarrhea    • Obesity    • Open wound of abdominal wall    • Otalgia    • Pain in left foot    • Pain in unspecified hand    • Removed Impacted cerumen 2012   • Rhinitis    • Seizure ([facility]) 08/15/2019   • Shoulder pain     right-sided-likely muscle strain      • Shoulder tendinitis    • Spider bite wound    • Staphylococcal infection of skin    • Stroke ([facility])    • Swollen feet    • Tinea cruris    • Tobacco dependence syndrome    • Upper respiratory infection    • Urinary tract infectious disease    • Vertigo    • Visit for gynecologic examination    • Vulvovaginitis      Past Surgical History:   Procedure Laterality Date   •  SECTION     • DILATATION AND CURETTAGE      Secondary to incomplete spontaneous    • INCISION AND DRAINAGE ABSCESS  2012   • INJECTION OF MEDICATION  2015    Phenergan (1)     • INJECTION OF MEDICATION  10/10/2013    Toradol (2)      • INJECTION OF MEDICATION  2014    Zofran (1)         General  Information     Row Name 10/18/22 0943          OT Time and Intention    Document Type therapy note (daily note)  -CS     Mode of Treatment occupational therapy  -     Row Name 10/18/22 0943          General Information    Patient Profile Reviewed yes  -CS     Existing Precautions/Restrictions fall;seizures  -CS     Row Name 10/18/22 0943          Cognition    Orientation Status (Cognition) oriented to;person;place;situation;oriented x 3  -CS     Row Name 10/18/22 0943          Safety Issues, Functional Mobility    Impairments Affecting Function (Mobility) pain;endurance/activity tolerance;cognition;balance  -CS           User Key  (r) = Recorded By, (t) = Taken By, (c) = Cosigned By    Initials Name Provider Type    CS Amanda Acevedo COTA Occupational Therapist Assistant                 Mobility/ADL's     Row Name 10/18/22 0943          Bed Mobility    Supine-Sit Olin (Bed Mobility) modified independence  -CS     Sit-Supine [place]lin (Bed Mobility) modified independence  -CS     Assistive Device (Bed Mobility) head of bed elevated;bed rails  -CS     Row Name 10/18/22 0943          Transfers    Transfers sit-stand transfer;stand-sit transfer  -CS     Row Name 10/18/22 0943          Sit-Stand Transfer    Sit-Stand Olin (Transfers) contact guard  -CS     Assistive Device (Sit-Stand Transfers) walker, front-wheeled  -CS     Row Name 10/18/22 0943          Stand-Sit Transfer    Stand-Sit Olin (Transfers) contact guard  -CS     Assistive Device (Stand-Sit Transfers) walker, front-wheeled  -CS     Row Name 10/18/22 0943          Activities of Daily Living    BADL Assessment/Intervention upper body dressing;lower body dressing;grooming  -CS     Row Name 10/18/22 0943          Lower Body Dressing Assessment/Training    Olin Level (Lower Body Dressing) lower body dressing skills;don;pants/bottoms;shoes/slippers;socks;standby assist  -CS     Position (Lower Body Dressing) supported  standing  -     Row Name 10/18/22 0943          Grooming Assessment/Training    [place]ott Level (Grooming) grooming skills;set up  -CS     Position (Grooming) edge of bed sitting  -     Row Name 10/18/22 0943          Upper Body Dressing Assessment/Training    [place]ott Level (Upper Body Dressing) upper body dressing skills;don;pull-over garment;independent  -CS     Position (Upper Body Dressing) edge of bed sitting  -CS           User Key  (r) = Recorded By, (t) = Taken By, (c) = Cosigned By    Initials Name Provider Type     Amanda Acevedo COTA Occupational Therapist Assistant               Obj/Interventions     Row Name 10/18/22 0943          Shoulder (Therapeutic Exercise)    Shoulder (Therapeutic Exercise) AROM (active range of motion)  -     Shoulder AROM (Therapeutic Exercise) bilateral;flexion;extension;external rotation;internal rotation  -     Row Name 10/18/22 0943          Elbow/Forearm (Therapeutic Exercise)    Elbow/Forearm (Therapeutic Exercise) AROM (active range of motion)  -     Elbow/Forearm AROM (Therapeutic Exercise) bilateral;flexion;extension;supination;pronation  -     Row Name 10/18/22 0943          Wrist (Therapeutic Exercise)    Wrist (Therapeutic Exercise) AROM (active range of motion)  -     Wrist AROM (Therapeutic Exercise) bilateral;flexion;extension  -     Row Name 10/18/22 0943          Motor Skills    Therapeutic Exercise shoulder;elbow/forearm;wrist  -           User Key  (r) = Recorded By, (t) = Taken By, (c) = Cosigned By    Initials Name Provider Type    Amanda Loco COTA Occupational Therapist Assistant               Goals/Plan     Row Name 10/18/22 0943          Transfer Goal 1 (OT)    Activity/Assistive Device (Transfer Goal 1, OT) shower chair  -     Mott Level/Cues Needed (Transfer Goal 1, OT) supervision required  -CS     Time Frame (Transfer Goal 1, OT) long term goal (LTG);by discharge  -CS     Progress/Outcome (Transfer  Goal 1, OT) goal not met  -CS     Row Name 10/18/22 0943          Bathing Goal 1 (OT)    Activity/Device (Bathing Goal 1, OT) bathing skills, all  -CS     [place] Level/Cues Needed (Bathing Goal 1, OT) supervision required  -CS     Time Frame (Bathing Goal 1, OT) long term goal (LTG);by discharge  -CS     Progress/Outcomes (Bathing Goal 1, OT) goal not met  -CS     Row Name 10/18/22 0943          Dressing Goal 1 (OT)    Activity/Device (Dressing Goal 1, OT) lower body dressing  -CS     [place]/Cues Needed (Dressing Goal 1, OT) independent  -CS     Time Frame (Dressing Goal 1, OT) long term goal (LTG);by discharge  -CS     Progress/Outcome (Dressing Goal 1, OT) goal not met;good progress toward goal  -CS     Row Name 10/18/22 0943          Toileting Goal 1 (OT)    Activity/Device (Toileting Goal 1, OT) toileting skills, all  -CS     [place] Level/Cues Needed (Toileting Goal 1, OT) independent  -CS     Time Frame (Toileting Goal 1, OT) by discharge;long term goal (LTG)  -CS     Progress/Outcome (Toileting Goal 1, OT) goal not met  -CS           User Key  (r) = Recorded By, (t) = Taken By, (c) = Cosigned By    Initials Name Provider Type    CS Amanda Acevedo COTA Occupational Therapist Assistant               Clinical Impression     Row Name 10/18/22 0943          Pain Assessment    Pretreatment Pain Rating 0/10 - no pain  -CS     Posttreatment Pain Rating 0/10 - no pain  -CS     Row Name 10/18/22 0943          Plan of Care Review    Plan of Care Reviewed With patient  -CS     Progress improving  -CS     Row Name 10/18/22 0943          Therapy Assessment/Plan (OT)    Rehab Potential (OT) good, to achieve stated therapy goals  -CS     Row Name 10/18/22 0943          Therapy Plan Review/Discharge Plan (OT)    Anticipated Discharge Disposition (OT) other (see comments)  -CS     Row Name 10/18/22 0943          Vital Signs    Pre Patient Position Supine  -CS     Intra Patient Position Sitting  -CS      Post Patient Position Supine  -CS     Row Name 10/18/22 0943          Positioning and Restraints    Pre-Treatment Position in bed  -CS     Post Treatment Position bed  -CS     In Bed fowlers;call light within reach;encouraged to call for assist;with family/caregiver  -CS           User Key  (r) = Recorded By, (t) = Taken By, (c) = Cosigned By    Initials Name Provider Type    Amanda Loco COTA Occupational Therapist Assistant               Outcome Measures     Row Name 10/18/22 1320          How much help from another is currently needed...    Putting on and taking off regular lower body clothing? 3  -CS     Bathing (including washing, rinsing, and drying) 3  -CS     Toileting (which includes using toilet bed pan or urinal) 3  -CS     Putting on and taking off regular upper body clothing 4  -CS     Taking care of personal grooming (such as brushing teeth) 3  -CS     Eating meals 4  -CS     AM-PAC 6 Clicks Score (OT) 20  -CS     Row Name 10/18/22 0911          How much help from another person do you currently need...    Turning from your back to your side while in flat bed without using bedrails? 4  -CB     Moving from lying on back to sitting on the side of a flat bed without bedrails? 4  -CB     Moving to and from a bed to a chair (including a wheelchair)? 4  -CB     Standing up from a chair using your arms (e.g., wheelchair, bedside chair)? 4  -CB     Climbing 3-5 steps with a railing? 3  -CB     To walk in hospital room? 3  -CB     AM-PAC 6 Clicks Score (PT) 22  -CB     Highest level of mobility 7 --> Walked 25 feet or more  -CB           User Key  (r) = Recorded By, (t) = Taken By, (c) = Cosigned By    Initials Name Provider Type    Lucien Farr RN Registered Nurse    Amanda Loco COTA Occupational Therapist Assistant                Occupational Therapy Education     Title: PT OT SLP Therapies (Resolved)     Topic: Occupational Therapy (Resolved)     Point: ADL training (Resolved)      Description:   Instruct learner(s) on proper safety adaptation and remediation techniques during self care or transfers.   Instruct in proper use of assistive devices.              Learning Progress Summary           Patient Acceptance, E,TB, VU,NR by  at 10/17/2022 0854    Comment: POC, role of OT, transfer training                   Point: Home exercise program (Resolved)     Description:   Instruct learner(s) on appropriate technique for monitoring, assisting and/or progressing therapeutic exercises/activities.              Learner Progress:  Not documented in this visit.          Point: Precautions (Resolved)     Description:   Instruct learner(s) on prescribed precautions during self-care and functional transfers.              Learning Progress Summary           Patient Acceptance, E,TB, VU,NR by  at 10/17/2022 0854    Comment: POC, role of OT, transfer training                   Point: Body mechanics (Resolved)     Description:   Instruct learner(s) on proper positioning and spine alignment during self-care, functional mobility activities and/or exercises.              Learning Progress Summary           Patient Acceptance, E,TB, VU,NR by  at 10/17/2022 0854    Comment: POC, role of OT, transfer training                               User Key     Initials Effective Dates Name Provider Type Discipline     06/14/21 -  Aditi Henriquez, OT Occupational Therapist OT              OT Recommendation and Plan     Plan of Care Review  Plan of Care Reviewed With: patient  Progress: improving     Time Calculation:    Time Calculation- OT     Row Name 10/18/22 1321             Time Calculation- OT    OT Start Time 0943  -CS      OT Stop Time 1040  -CS      OT Time Calculation (min) 57 min  -CS      Total Timed Code Minutes- OT 57 minute(s)  -CS      OT Received On 10/18/22  -CS         Timed Charges    41672 - OT Therapeutic Exercise Minutes 27  -CS      55618 - OT Self Care/Mgmt Minutes 30  -CS         Total  Minutes    Timed Charges Total Minutes 57  -CS       Total Minutes 57  -CS            User Key  (r) = Recorded By, (t) = Taken By, (c) = Cosigned By    Initials Name Provider Type    CS Amanda Acevedo COTA Occupational Therapist Assistant              Therapy Charges for Today     Code Description Service Date Service Provider Modifiers Qty    56993565751 HC OT SELF CARE/MGMT/TRAIN EA 15 MIN 10/18/2022 Amanda Acevedo COTA GO 2    54669134887 HC OT THER PROC EA 15 MIN 10/18/2022 Amanda Acevedo COTA GO 2 Carrie L Stewart, COTA  10/18/2022

## 2022-10-19 ENCOUNTER — READMISSION MANAGEMENT (OUTPATIENT)
Dept: CALL CENTER | Facility: HOSPITAL | Age: 47
End: 2022-10-19

## 2022-10-19 LAB — BACTERIA SPEC AEROBE CULT: ABNORMAL

## 2022-10-19 NOTE — OUTREACH NOTE
Prep Survey    Flowsheet Row Responses   [facility] facility patient discharged from? [facility]   Is LACE score < 7 ? No   Emergency Room discharge w/ pulse ox? No   Eligibility Readm Mgmt   Discharge diagnosis Urinary tract infection in female   Does the patient have one of the following disease processes/diagnoses(primary or secondary)? Other   Does the patient have Home health ordered? No   Is there a DME ordered? No   Prep survey completed? Yes          SINTIA PUTNAM - Registered Nurse

## 2022-10-26 LAB
QT INTERVAL: 442 MS
QTC INTERVAL: 426 MS

## 2022-10-28 ENCOUNTER — READMISSION MANAGEMENT (OUTPATIENT)
Dept: CALL CENTER | Facility: HOSPITAL | Age: 47
End: 2022-10-28

## 2022-10-28 ENCOUNTER — APPOINTMENT (OUTPATIENT)
Dept: GENERAL RADIOLOGY | Facility: HOSPITAL | Age: 47
End: 2022-10-28

## 2022-10-28 ENCOUNTER — HOSPITAL ENCOUNTER (EMERGENCY)
Facility: HOSPITAL | Age: 47
Discharge: HOME | End: 2022-10-28
Attending: EMERGENCY MEDICINE | Admitting: EMERGENCY MEDICINE

## 2022-10-28 VITALS
OXYGEN SATURATION: 97 % | SYSTOLIC BLOOD PRESSURE: 126 MMHG | BODY MASS INDEX: 42.21 KG/M2 | TEMPERATURE: 98.4 F | HEART RATE: 61 BPM | WEIGHT: 215 LBS | RESPIRATION RATE: 18 BRPM | HEIGHT: 60 IN | DIASTOLIC BLOOD PRESSURE: 81 MMHG

## 2022-10-28 DIAGNOSIS — N20.0 NEPHROLITHIASIS: Primary | ICD-10-CM

## 2022-10-28 DIAGNOSIS — N39.0 RECURRENT UTI: ICD-10-CM

## 2022-10-28 LAB
ALBUMIN SERPL-MCNC: 4 G/DL (ref 3.5–5.2)
ALBUMIN/GLOB SERPL: 1.8 G/DL
ALP SERPL-CCNC: 103 U/L (ref 39–117)
ALT SERPL W P-5'-P-CCNC: 34 U/L (ref 1–33)
ANION GAP SERPL CALCULATED.3IONS-SCNC: 10 MMOL/L (ref 5–15)
AST SERPL-CCNC: 26 U/L (ref 1–32)
B-HCG UR QL: NEGATIVE
BACTERIA UR QL AUTO: ABNORMAL /HPF
BASOPHILS # BLD AUTO: 0.04 10*3/MM3 (ref 0–0.2)
BASOPHILS NFR BLD AUTO: 0.7 % (ref 0–1.5)
BILIRUB SERPL-MCNC: 0.2 MG/DL (ref 0–1.2)
BILIRUB UR QL STRIP: NEGATIVE
BUN SERPL-MCNC: 11 MG/DL (ref 6–20)
BUN/CREAT SERPL: 12 (ref 7–25)
CALCIUM SPEC-SCNC: 9.2 MG/DL (ref 8.6–10.5)
CHLORIDE SERPL-SCNC: 107 MMOL/L (ref 98–107)
CLARITY UR: ABNORMAL
CO2 SERPL-SCNC: 24 MMOL/L (ref 22–29)
COLOR UR: YELLOW
CREAT SERPL-MCNC: 0.92 MG/DL (ref 0.57–1)
DEPRECATED RDW RBC AUTO: 44.7 FL (ref 37–54)
EGFRCR SERPLBLD CKD-EPI 2021: 77.4 ML/MIN/1.73
EOSINOPHIL # BLD AUTO: 0.19 10*3/MM3 (ref 0–0.4)
EOSINOPHIL NFR BLD AUTO: 3.3 % (ref 0.3–6.2)
ERYTHROCYTE [DISTWIDTH] IN BLOOD BY AUTOMATED COUNT: 13.2 % (ref 12.3–15.4)
GLOBULIN UR ELPH-MCNC: 2.2 GM/DL
GLUCOSE SERPL-MCNC: 97 MG/DL (ref 65–99)
GLUCOSE UR STRIP-MCNC: NEGATIVE MG/DL
HCT VFR BLD AUTO: 39.5 % (ref 34–46.6)
HGB BLD-MCNC: 12.9 G/DL (ref 12–15.9)
HGB UR QL STRIP.AUTO: ABNORMAL
HYALINE CASTS UR QL AUTO: ABNORMAL /LPF
IMM GRANULOCYTES # BLD AUTO: 0.01 10*3/MM3 (ref 0–0.05)
IMM GRANULOCYTES NFR BLD AUTO: 0.2 % (ref 0–0.5)
KETONES UR QL STRIP: NEGATIVE
LEUKOCYTE ESTERASE UR QL STRIP.AUTO: ABNORMAL
LYMPHOCYTES # BLD AUTO: 1.47 10*3/MM3 (ref 0.7–3.1)
LYMPHOCYTES NFR BLD AUTO: 25.9 % (ref 19.6–45.3)
MCH RBC QN AUTO: 29.8 PG (ref 26.6–33)
MCHC RBC AUTO-ENTMCNC: 32.7 G/DL (ref 31.5–35.7)
MCV RBC AUTO: 91.2 FL (ref 79–97)
MONOCYTES # BLD AUTO: 0.36 10*3/MM3 (ref 0.1–0.9)
MONOCYTES NFR BLD AUTO: 6.3 % (ref 5–12)
NEUTROPHILS NFR BLD AUTO: 3.61 10*3/MM3 (ref 1.7–7)
NEUTROPHILS NFR BLD AUTO: 63.6 % (ref 42.7–76)
NITRITE UR QL STRIP: NEGATIVE
NRBC BLD AUTO-RTO: 0 /100 WBC (ref 0–0.2)
PH UR STRIP.AUTO: 5.5 [PH] (ref 5–9)
PLATELET # BLD AUTO: 232 10*3/MM3 (ref 140–450)
PMV BLD AUTO: 11.6 FL (ref 6–12)
POTASSIUM SERPL-SCNC: 4.5 MMOL/L (ref 3.5–5.2)
PROT SERPL-MCNC: 6.2 G/DL (ref 6–8.5)
PROT UR QL STRIP: NEGATIVE
RBC # BLD AUTO: 4.33 10*6/MM3 (ref 3.77–5.28)
RBC # UR STRIP: ABNORMAL /HPF
REF LAB TEST METHOD: ABNORMAL
SODIUM SERPL-SCNC: 141 MMOL/L (ref 136–145)
SP GR UR STRIP: 1.01 (ref 1–1.03)
SQUAMOUS #/AREA URNS HPF: ABNORMAL /HPF
UROBILINOGEN UR QL STRIP: ABNORMAL
WBC # UR STRIP: ABNORMAL /HPF
WBC NRBC COR # BLD: 5.68 10*3/MM3 (ref 3.4–10.8)
WHOLE BLOOD HOLD COAG: NORMAL

## 2022-10-28 PROCEDURE — 96374 THER/PROPH/DIAG INJ IV PUSH: CPT

## 2022-10-28 PROCEDURE — 81025 URINE PREGNANCY TEST: CPT

## 2022-10-28 PROCEDURE — 74018 RADEX ABDOMEN 1 VIEW: CPT

## 2022-10-28 PROCEDURE — 25010000002 KETOROLAC TROMETHAMINE PER 15 MG

## 2022-10-28 PROCEDURE — 25010000002 MORPHINE PER 10 MG: Performed by: EMERGENCY MEDICINE

## 2022-10-28 PROCEDURE — 87077 CULTURE AEROBIC IDENTIFY: CPT

## 2022-10-28 PROCEDURE — 80053 COMPREHEN METABOLIC PANEL: CPT | Performed by: EMERGENCY MEDICINE

## 2022-10-28 PROCEDURE — 87186 SC STD MICRODIL/AGAR DIL: CPT

## 2022-10-28 PROCEDURE — 25010000002 ONDANSETRON PER 1 MG

## 2022-10-28 PROCEDURE — 96375 TX/PRO/DX INJ NEW DRUG ADDON: CPT

## 2022-10-28 PROCEDURE — 85025 COMPLETE CBC W/AUTO DIFF WBC: CPT | Performed by: EMERGENCY MEDICINE

## 2022-10-28 PROCEDURE — 36415 COLL VENOUS BLD VENIPUNCTURE: CPT

## 2022-10-28 PROCEDURE — 81001 URINALYSIS AUTO W/SCOPE: CPT

## 2022-10-28 PROCEDURE — 87086 URINE CULTURE/COLONY COUNT: CPT

## 2022-10-28 PROCEDURE — 99284 EMERGENCY DEPT VISIT MOD MDM: CPT

## 2022-10-28 RX ORDER — ONDANSETRON 2 MG/ML
4 INJECTION INTRAMUSCULAR; INTRAVENOUS ONCE
Status: COMPLETED | OUTPATIENT
Start: 2022-10-28 | End: 2022-10-28

## 2022-10-28 RX ORDER — MORPHINE SULFATE 2 MG/ML
2 INJECTION, SOLUTION INTRAMUSCULAR; INTRAVENOUS ONCE
Status: COMPLETED | OUTPATIENT
Start: 2022-10-28 | End: 2022-10-28

## 2022-10-28 RX ORDER — TAMSULOSIN HYDROCHLORIDE 0.4 MG/1
1 CAPSULE ORAL DAILY
Qty: 7 CAPSULE | Refills: 0 | Status: SHIPPED | OUTPATIENT
Start: 2022-10-28 | End: 2022-11-01

## 2022-10-28 RX ORDER — KETOROLAC TROMETHAMINE 30 MG/ML
30 INJECTION, SOLUTION INTRAMUSCULAR; INTRAVENOUS ONCE
Status: COMPLETED | OUTPATIENT
Start: 2022-10-28 | End: 2022-10-28

## 2022-10-28 RX ORDER — KETOROLAC TROMETHAMINE 10 MG/1
10 TABLET, FILM COATED ORAL EVERY 6 HOURS PRN
Qty: 20 TABLET | Refills: 0 | Status: SHIPPED | OUTPATIENT
Start: 2022-10-28 | End: 2022-11-02

## 2022-10-28 RX ORDER — GRANULES FOR ORAL 3 G/1
3 POWDER ORAL ONCE
Status: COMPLETED | OUTPATIENT
Start: 2022-10-28 | End: 2022-10-28

## 2022-10-28 RX ADMIN — ONDANSETRON 4 MG: 2 INJECTION INTRAMUSCULAR; INTRAVENOUS at 10:06

## 2022-10-28 RX ADMIN — MORPHINE SULFATE 2 MG: 2 INJECTION, SOLUTION INTRAMUSCULAR; INTRAVENOUS at 11:16

## 2022-10-28 RX ADMIN — KETOROLAC TROMETHAMINE 30 MG: 30 INJECTION, SOLUTION INTRAMUSCULAR; INTRAVENOUS at 10:06

## 2022-10-28 RX ADMIN — SODIUM CHLORIDE 1000 ML: 9 INJECTION, SOLUTION INTRAVENOUS at 10:05

## 2022-10-28 RX ADMIN — FOSFOMYCIN TROMETHAMINE 3 G: 3 POWDER ORAL at 11:32

## 2022-10-28 NOTE — OUTREACH NOTE
"Medical Week 2 Survey    Flowsheet Row Responses   [facility] patient discharged from? [facility]   Does the patient have one of the following disease processes/diagnoses(primary or secondary)? Other   Week 2 attempt successful? Yes   Call start time 1507   Call end time 1511   Person spoke with today (if not patient) and relationship Chris-s/o.   Meds reviewed with patient/caregiver? Yes   Is the patient having any side effects they believe may be caused by any medication additions or changes? No   Does the patient have all medications ordered at discharge? Yes   Is the patient taking all medications as directed (includes completed medication regime)? Yes   Medication comments Reviewed medications per AVS from ER visit today.   Week 2 Call Completed? Yes   Revoked No further contact(revokes)-requires comment   Graduated/Revoked comments Reached s/o who stated that patient is \"doing okay\", and he and patient walked home from ER today. States that he \"would not take my dog over there\", then hung up.           SCAR ATKINS - Registered Nurse  "

## 2022-10-30 LAB — BACTERIA SPEC AEROBE CULT: ABNORMAL

## 2022-11-01 ENCOUNTER — HOSPITAL ENCOUNTER (EMERGENCY)
Facility: HOSPITAL | Age: 47
Discharge: HOME | End: 2022-11-01
Attending: FAMILY MEDICINE | Admitting: FAMILY MEDICINE

## 2022-11-01 VITALS
BODY MASS INDEX: 42.21 KG/M2 | HEIGHT: 60 IN | DIASTOLIC BLOOD PRESSURE: 72 MMHG | SYSTOLIC BLOOD PRESSURE: 115 MMHG | HEART RATE: 56 BPM | RESPIRATION RATE: 18 BRPM | TEMPERATURE: 97.9 F | WEIGHT: 215 LBS | OXYGEN SATURATION: 98 %

## 2022-11-01 DIAGNOSIS — N39.0 URINARY TRACT INFECTION WITH HEMATURIA, SITE UNSPECIFIED: Primary | ICD-10-CM

## 2022-11-01 DIAGNOSIS — R31.9 URINARY TRACT INFECTION WITH HEMATURIA, SITE UNSPECIFIED: Primary | ICD-10-CM

## 2022-11-01 PROCEDURE — 93005 ELECTROCARDIOGRAM TRACING: CPT | Performed by: FAMILY MEDICINE

## 2022-11-01 PROCEDURE — 93010 ELECTROCARDIOGRAM REPORT: CPT | Performed by: INTERNAL MEDICINE

## 2022-11-01 PROCEDURE — 99284 EMERGENCY DEPT VISIT MOD MDM: CPT

## 2022-11-01 PROCEDURE — 93005 ELECTROCARDIOGRAM TRACING: CPT

## 2022-11-01 RX ORDER — NITROFURANTOIN 25; 75 MG/1; MG/1
100 CAPSULE ORAL 2 TIMES DAILY
Qty: 14 CAPSULE | Refills: 0 | Status: SHIPPED | OUTPATIENT
Start: 2022-11-01 | End: 2022-11-01 | Stop reason: SDUPTHER

## 2022-11-01 RX ORDER — NITROFURANTOIN 25; 75 MG/1; MG/1
100 CAPSULE ORAL ONCE
Status: COMPLETED | OUTPATIENT
Start: 2022-11-01 | End: 2022-11-01

## 2022-11-01 RX ORDER — NITROFURANTOIN 25; 75 MG/1; MG/1
100 CAPSULE ORAL 2 TIMES DAILY
Qty: 14 CAPSULE | Refills: 0 | Status: SHIPPED | OUTPATIENT
Start: 2022-11-01 | End: 2022-11-08

## 2022-11-01 RX ADMIN — NITROFURANTOIN MONOHYDRATE/MACROCRYSTALLINE 100 MG: 25; 75 CAPSULE ORAL at 21:59

## 2022-11-02 LAB
QT INTERVAL: 460 MS
QTC INTERVAL: 455 MS

## 2022-11-02 NOTE — ED PROVIDER NOTES
Subjective   History of Present Illness  Patient fell at home about 1-1/2 hours ago.  She thinks she may have had a seizure but is not sure.  She does not remember any seizure type activity but does have a history of seizures.  She states that she is compliant with her home medications.  She was seen in the emergency department 3 days ago and had lab work performed that was reviewed in the emergency department.  Her most recent urinalysis with culture and sensitivity was reviewed as well and was sensitive for Macrobid.  Patient denies currently taking any antibiotics.  In the emergency department she is awake alert and oriented, answering questions, with no signs of being postictal, in no acute distress.      Seizures  Seizure activity on arrival: no    Seizure type:  Unable to specify  Initial focality:  None  Postictal symptoms: no confusion, no memory loss and no somnolence    Return to baseline: yes    Severity:  Mild  History of seizures: yes    Fall  Associated symptoms: seizures    Associated symptoms: no abdominal pain, no chest pain, no headaches, no nausea, no neck pain and no vomiting        Review of Systems   Constitutional: Negative for appetite change, chills, diaphoresis, fatigue and fever.   HENT: Negative for congestion, ear discharge, ear pain, nosebleeds, rhinorrhea, sinus pressure, sore throat and trouble swallowing.    Eyes: Negative for discharge and redness.   Respiratory: Negative for apnea, cough, chest tightness, shortness of breath and wheezing.    Cardiovascular: Negative for chest pain.   Gastrointestinal: Negative for abdominal pain, diarrhea, nausea and vomiting.   Endocrine: Negative for polyuria.   Genitourinary: Negative for dysuria, frequency and urgency.   Musculoskeletal: Negative for myalgias and neck pain.   Skin: Negative for color change and rash.   Allergic/Immunologic: Negative for immunocompromised state.   Neurological: Positive for seizures. Negative for dizziness,  syncope, weakness, light-headedness and headaches.   Hematological: Negative for adenopathy. Does not bruise/bleed easily.   Psychiatric/Behavioral: Negative for behavioral problems and confusion.   All other systems reviewed and are negative.      Past Medical History:   Diagnosis Date   • Abdominal pain     Chronic RLQ, RUQ, R flank comes and goes.    • Abdominal pain, right lower quadrant    • Acute bilateral otitis media    • Allergic rhinitis    • Backache     Upper back.   • Candidiasis of skin    • Cellulitis of neck    • Chest pain    • Contraception    • Cough    • Dysfunction of eustachian tube    • Dyspareunia, female    • Elevated cholesterol    • Excessive or frequent menstruation    • Flank pain    • Generalized aches and pains    • Headache    • Health maintenance examination    • Hypertension    • Infection of skin and subcutaneous tissue    • Irregular periods    • Kidney stone     Poss   • Menorrhagia    • Nausea vomiting and diarrhea    • Obesity    • Open wound of abdominal wall    • Otalgia    • Pain in left foot    • Pain in unspecified hand    • Removed Impacted cerumen 2012   • Rhinitis    • Seizure ([facility]) 08/15/2019   • Shoulder pain     right-sided-likely muscle strain      • Shoulder tendinitis    • Spider bite wound    • Staphylococcal infection of skin    • Stroke ([facility])    • Swollen feet    • Tinea cruris    • Tobacco dependence syndrome    • Upper respiratory infection    • Urinary tract infectious disease    • Vertigo    • Visit for gynecologic examination    • Vulvovaginitis        Allergies   Allergen Reactions   • Abilify [Aripiprazole] Nausea Only   • Buspirone Rash   • Penicillins Hives and Nausea And Vomiting       Past Surgical History:   Procedure Laterality Date   •  SECTION     • DILATATION AND CURETTAGE      Secondary to incomplete spontaneous    • INCISION AND DRAINAGE ABSCESS  2012   • INJECTION OF MEDICATION  2015    Phenergan (1)      • INJECTION OF MEDICATION  10/10/2013    Toradol (2)      • INJECTION OF MEDICATION  01/16/2014    Zofran (1)          Family History   Problem Relation Age of Onset   • Breast cancer Mother    • Thyroid disease Mother    • Hypertension Mother    • Heart disease Mother    • Arthritis Mother    • Liver disease Father    • Endometrial cancer Sister    • Anxiety disorder Brother    • Mental illness Daughter    • Depression Daughter    • Diabetes Maternal Aunt    • Diabetes Maternal Uncle    • Stroke Maternal Grandmother    • Breast cancer Maternal Grandmother    • Cancer Maternal Grandfather    • Stroke Paternal Grandmother    • Depression Other    • Cancer Other         Colorectal Cancer   • Endometrial cancer Other    • Lung cancer Other        Social History     Socioeconomic History   • Marital status: Single   Tobacco Use   • Smoking status: Some Days     Packs/day: 0.50     Types: Cigarettes   • Smokeless tobacco: Never   • Tobacco comments:     19*800*quit*NOW   Vaping Use   • Vaping Use: Some days   • Substances: Nicotine   • Devices: Refillable tank   • Passive vaping exposure: Yes   Substance and Sexual Activity   • Alcohol use: No   • Drug use: No   • Sexual activity: Not Currently           Objective   Physical Exam  Vitals and nursing note reviewed.   Constitutional:       Appearance: She is well-developed.   HENT:      Head: Normocephalic and atraumatic.      Nose: Nose normal.   Eyes:      General: No scleral icterus.        Right eye: No discharge.         Left eye: No discharge.      Conjunctiva/sclera: Conjunctivae normal.      Pupils: Pupils are equal, round, and reactive to light.   Neck:      Trachea: No tracheal deviation.   Cardiovascular:      Rate and Rhythm: Normal rate and regular rhythm.      Heart sounds: Normal heart sounds. No murmur heard.  Pulmonary:      Effort: Pulmonary effort is normal. No respiratory distress.      Breath sounds: Normal breath sounds. No stridor. No wheezing or  rales.   Abdominal:      General: Bowel sounds are normal. There is no distension.      Palpations: Abdomen is soft. There is no mass.      Tenderness: There is no abdominal tenderness. There is no guarding or rebound.   Musculoskeletal:      Cervical back: Normal range of motion and neck supple.   Skin:     General: Skin is warm and dry.      Findings: No erythema or rash.   Neurological:      Mental Status: She is alert and oriented to person, place, and time.      Coordination: Coordination normal.   Psychiatric:         Behavior: Behavior normal.         Thought Content: Thought content normal.         Procedures           ED Course                                           MDM    Final diagnoses:   Urinary tract infection with hematuria, site unspecified       ED Disposition  ED Disposition     ED Disposition   Discharge    Condition   Stable    Comment   --             SCOTT'Jillian Hoffmanoramimi Nicole, APRN  [address] [place] [id number] [phone]    In 2 days  If no improvement         Medication List      New Prescriptions    nitrofurantoin (macrocrystal-monohydrate) 100 MG capsule  Commonly known as: MACROBID  Take 1 capsule by mouth 2 (Two) Times a Day for 7 days.           Where to Get Your Medications      These medications were sent to [facility] - [place], [place] - [address] [place] [address] - [phone]  - [phone] [address] [address]    Phone: [phone]   · nitrofurantoin (macrocrystal-monohydrate) 100 MG capsule          Asher Grande MD  11/01/22 2203       Asher Grande MD  11/01/22 2205       Asher Grande MD  11/01/22 2578

## 2022-11-14 ENCOUNTER — HOSPITAL ENCOUNTER (EMERGENCY)
Facility: HOSPITAL | Age: 47
Discharge: HOME | End: 2022-11-14
Attending: FAMILY MEDICINE | Admitting: STUDENT IN AN ORGANIZED HEALTH CARE EDUCATION/TRAINING PROGRAM

## 2022-11-14 VITALS
HEART RATE: 61 BPM | BODY MASS INDEX: 41.43 KG/M2 | TEMPERATURE: 98.5 F | RESPIRATION RATE: 20 BRPM | HEIGHT: 60 IN | SYSTOLIC BLOOD PRESSURE: 108 MMHG | DIASTOLIC BLOOD PRESSURE: 61 MMHG | OXYGEN SATURATION: 98 % | WEIGHT: 211 LBS

## 2022-11-14 DIAGNOSIS — N39.0 RECURRENT UTI (URINARY TRACT INFECTION): Primary | ICD-10-CM

## 2022-11-14 LAB
ALBUMIN SERPL-MCNC: 4 G/DL (ref 3.5–5.2)
ALBUMIN/GLOB SERPL: 1.7 G/DL
ALP SERPL-CCNC: 110 U/L (ref 39–117)
ALT SERPL W P-5'-P-CCNC: 32 U/L (ref 1–33)
ANION GAP SERPL CALCULATED.3IONS-SCNC: 10 MMOL/L (ref 5–15)
AST SERPL-CCNC: 22 U/L (ref 1–32)
BACTERIA UR QL AUTO: ABNORMAL /HPF
BASOPHILS # BLD AUTO: 0.04 10*3/MM3 (ref 0–0.2)
BASOPHILS NFR BLD AUTO: 0.7 % (ref 0–1.5)
BILIRUB SERPL-MCNC: <0.2 MG/DL (ref 0–1.2)
BILIRUB UR QL STRIP: NEGATIVE
BUN SERPL-MCNC: 20 MG/DL (ref 6–20)
BUN/CREAT SERPL: 16.7 (ref 7–25)
CALCIUM SPEC-SCNC: 9.2 MG/DL (ref 8.6–10.5)
CHLORIDE SERPL-SCNC: 105 MMOL/L (ref 98–107)
CLARITY UR: ABNORMAL
CO2 SERPL-SCNC: 25 MMOL/L (ref 22–29)
COLOR UR: YELLOW
CREAT SERPL-MCNC: 1.2 MG/DL (ref 0.57–1)
DEPRECATED RDW RBC AUTO: 45 FL (ref 37–54)
EGFRCR SERPLBLD CKD-EPI 2021: 56.3 ML/MIN/1.73
EOSINOPHIL # BLD AUTO: 0.29 10*3/MM3 (ref 0–0.4)
EOSINOPHIL NFR BLD AUTO: 4.8 % (ref 0.3–6.2)
ERYTHROCYTE [DISTWIDTH] IN BLOOD BY AUTOMATED COUNT: 13.2 % (ref 12.3–15.4)
GLOBULIN UR ELPH-MCNC: 2.3 GM/DL
GLUCOSE SERPL-MCNC: 105 MG/DL (ref 65–99)
GLUCOSE UR STRIP-MCNC: NEGATIVE MG/DL
HCT VFR BLD AUTO: 36.7 % (ref 34–46.6)
HGB BLD-MCNC: 11.7 G/DL (ref 12–15.9)
HGB UR QL STRIP.AUTO: ABNORMAL
HOLD SPECIMEN: NORMAL
HYALINE CASTS UR QL AUTO: ABNORMAL /LPF
IMM GRANULOCYTES # BLD AUTO: 0.01 10*3/MM3 (ref 0–0.05)
IMM GRANULOCYTES NFR BLD AUTO: 0.2 % (ref 0–0.5)
KETONES UR QL STRIP: ABNORMAL
LEUKOCYTE ESTERASE UR QL STRIP.AUTO: ABNORMAL
LIPASE SERPL-CCNC: 51 U/L (ref 13–60)
LYMPHOCYTES # BLD AUTO: 2.37 10*3/MM3 (ref 0.7–3.1)
LYMPHOCYTES NFR BLD AUTO: 39.2 % (ref 19.6–45.3)
MCH RBC QN AUTO: 29.4 PG (ref 26.6–33)
MCHC RBC AUTO-ENTMCNC: 31.9 G/DL (ref 31.5–35.7)
MCV RBC AUTO: 92.2 FL (ref 79–97)
MONOCYTES # BLD AUTO: 0.36 10*3/MM3 (ref 0.1–0.9)
MONOCYTES NFR BLD AUTO: 6 % (ref 5–12)
NEUTROPHILS NFR BLD AUTO: 2.97 10*3/MM3 (ref 1.7–7)
NEUTROPHILS NFR BLD AUTO: 49.1 % (ref 42.7–76)
NITRITE UR QL STRIP: NEGATIVE
NRBC BLD AUTO-RTO: 0 /100 WBC (ref 0–0.2)
PH UR STRIP.AUTO: 5.5 [PH] (ref 5–9)
PLATELET # BLD AUTO: 178 10*3/MM3 (ref 140–450)
PMV BLD AUTO: 11.8 FL (ref 6–12)
POTASSIUM SERPL-SCNC: 4.4 MMOL/L (ref 3.5–5.2)
PROT SERPL-MCNC: 6.3 G/DL (ref 6–8.5)
PROT UR QL STRIP: ABNORMAL
RBC # BLD AUTO: 3.98 10*6/MM3 (ref 3.77–5.28)
RBC # UR STRIP: ABNORMAL /HPF
REF LAB TEST METHOD: ABNORMAL
SODIUM SERPL-SCNC: 140 MMOL/L (ref 136–145)
SP GR UR STRIP: 1.03 (ref 1–1.03)
SQUAMOUS #/AREA URNS HPF: ABNORMAL /HPF
UROBILINOGEN UR QL STRIP: ABNORMAL
WBC # UR STRIP: ABNORMAL /HPF
WBC NRBC COR # BLD: 6.04 10*3/MM3 (ref 3.4–10.8)
WHOLE BLOOD HOLD COAG: NORMAL
WHOLE BLOOD HOLD SPECIMEN: NORMAL

## 2022-11-14 PROCEDURE — 87186 SC STD MICRODIL/AGAR DIL: CPT

## 2022-11-14 PROCEDURE — 87086 URINE CULTURE/COLONY COUNT: CPT

## 2022-11-14 PROCEDURE — 83690 ASSAY OF LIPASE: CPT | Performed by: FAMILY MEDICINE

## 2022-11-14 PROCEDURE — 99284 EMERGENCY DEPT VISIT MOD MDM: CPT

## 2022-11-14 PROCEDURE — 85025 COMPLETE CBC W/AUTO DIFF WBC: CPT

## 2022-11-14 PROCEDURE — 81001 URINALYSIS AUTO W/SCOPE: CPT | Performed by: FAMILY MEDICINE

## 2022-11-14 PROCEDURE — 87077 CULTURE AEROBIC IDENTIFY: CPT

## 2022-11-14 PROCEDURE — 80053 COMPREHEN METABOLIC PANEL: CPT | Performed by: FAMILY MEDICINE

## 2022-11-14 RX ORDER — CEPHALEXIN 500 MG/1
500 CAPSULE ORAL 4 TIMES DAILY
Qty: 28 CAPSULE | Refills: 0 | Status: SHIPPED | OUTPATIENT
Start: 2022-11-14 | End: 2022-11-21

## 2022-11-14 RX ORDER — CEPHALEXIN 500 MG/1
500 CAPSULE ORAL ONCE
Status: COMPLETED | OUTPATIENT
Start: 2022-11-14 | End: 2022-11-14

## 2022-11-14 RX ORDER — SODIUM CHLORIDE 0.9 % (FLUSH) 0.9 %
10 SYRINGE (ML) INJECTION AS NEEDED
Status: DISCONTINUED | OUTPATIENT
Start: 2022-11-14 | End: 2022-11-14 | Stop reason: HOSPADM

## 2022-11-14 RX ADMIN — CEPHALEXIN 500 MG: 500 CAPSULE ORAL at 19:49

## 2022-11-14 RX ADMIN — SODIUM CHLORIDE 1000 ML: 9 INJECTION, SOLUTION INTRAVENOUS at 18:33

## 2022-11-15 NOTE — DISCHARGE INSTRUCTIONS
Home to rest. Follow up with your primary care provider for reevaluation of recurrent urinary tract infections. Take antibiotics as prescribed.

## 2022-11-15 NOTE — ED PROVIDER NOTES
Subjective   History of Present Illness  47 year old female patient presents to ER with complaint of inability to urinate today. She has an extensive history or recurrent UTIs and also CVA. She denies fever, nausea, or vomiting. She denies tobacco, ETOH, or illcit drug use. She denies pain outside of her chronic pain.         Review of Systems   Constitutional: Negative.    HENT: Negative.    Eyes: Negative.    Respiratory: Negative.    Cardiovascular: Negative.    Gastrointestinal: Negative.    Endocrine: Negative.    Genitourinary: Positive for decreased urine volume and difficulty urinating.   Musculoskeletal: Negative.    Skin: Negative.    Allergic/Immunologic: Negative.    Neurological: Negative.    Hematological: Negative.    Psychiatric/Behavioral: Negative.        Past Medical History:   Diagnosis Date   • Abdominal pain     Chronic RLQ, RUQ, R flank comes and goes.    • Abdominal pain, right lower quadrant    • Acute bilateral otitis media    • Allergic rhinitis    • Backache     Upper back.   • Candidiasis of skin    • Cellulitis of neck    • Chest pain    • Contraception    • Cough    • Dysfunction of eustachian tube    • Dyspareunia, female    • Elevated cholesterol    • Excessive or frequent menstruation    • Flank pain    • Generalized aches and pains    • Headache    • Health maintenance examination    • Hypertension    • Infection of skin and subcutaneous tissue    • Irregular periods    • Kidney stone     Poss   • Menorrhagia    • Nausea vomiting and diarrhea    • Obesity    • Open wound of abdominal wall    • Otalgia    • Pain in left foot    • Pain in unspecified hand    • Removed Impacted cerumen 06/05/2012   • Rhinitis    • Seizure ([facility]) 08/15/2019   • Shoulder pain     right-sided-likely muscle strain      • Shoulder tendinitis    • Spider bite wound    • Staphylococcal infection of skin    • Stroke ([facility])    • Swollen feet    • Tinea cruris    • Tobacco dependence syndrome    • Upper  "respiratory infection    • Urinary tract infectious disease    • Vertigo    • Visit for gynecologic examination    • Vulvovaginitis        Allergies   Allergen Reactions   • Abilify [Aripiprazole] Nausea Only   • Buspirone Rash   • Penicillins Hives and Nausea And Vomiting       Past Surgical History:   Procedure Laterality Date   •  SECTION     • DILATATION AND CURETTAGE      Secondary to incomplete spontaneous    • INCISION AND DRAINAGE ABSCESS  2012   • INJECTION OF MEDICATION  2015    Phenergan (1)     • INJECTION OF MEDICATION  10/10/2013    Toradol (2)      • INJECTION OF MEDICATION  2014    Zofran (1)          Family History   Problem Relation Age of Onset   • Breast cancer Mother    • Thyroid disease Mother    • Hypertension Mother    • Heart disease Mother    • Arthritis Mother    • Liver disease Father    • Endometrial cancer Sister    • Anxiety disorder Brother    • Mental illness Daughter    • Depression Daughter    • Diabetes Maternal Aunt    • Diabetes Maternal Uncle    • Stroke Maternal Grandmother    • Breast cancer Maternal Grandmother    • Cancer Maternal Grandfather    • Stroke Paternal Grandmother    • Depression Other    • Cancer Other         Colorectal Cancer   • Endometrial cancer Other    • Lung cancer Other        Social History     Socioeconomic History   • Marital status: Single   Tobacco Use   • Smoking status: Every Day     Packs/day: 0.50     Types: Cigarettes   • Smokeless tobacco: Never   • Tobacco comments:     19*800*quit*NOW   Vaping Use   • Vaping Use: Some days   • Substances: Nicotine   • Devices: Refillable tank   • Passive vaping exposure: Yes   Substance and Sexual Activity   • Alcohol use: No   • Drug use: No   • Sexual activity: Not Currently           Objective    /75 (BP Location: Right arm, Patient Position: Lying)   Pulse 62   Temp 98.5 °F (36.9 °C) (Oral)   Resp 20   Ht 152.4 cm (60\")   Wt 95.7 kg (211 lb)   LMP  (LMP " Unknown)   SpO2 99%   BMI 41.21 kg/m²     Physical Exam  Vitals and nursing note reviewed.   Constitutional:       General: She is not in acute distress.     Appearance: Normal appearance. She is not ill-appearing, toxic-appearing or diaphoretic.   HENT:      Head: Normocephalic.      Right Ear: External ear normal.      Left Ear: External ear normal.      Nose: Nose normal.      Mouth/Throat:      Mouth: Mucous membranes are moist.   Eyes:      Pupils: Pupils are equal, round, and reactive to light.   Cardiovascular:      Rate and Rhythm: Normal rate and regular rhythm.      Pulses: Normal pulses.      Heart sounds: Normal heart sounds.   Pulmonary:      Effort: Pulmonary effort is normal.      Breath sounds: Normal breath sounds.   Abdominal:      General: Bowel sounds are normal.      Palpations: Abdomen is soft.      Tenderness: There is generalized abdominal tenderness.   Musculoskeletal:         General: Normal range of motion.      Cervical back: Normal range of motion and neck supple.   Skin:     General: Skin is warm and dry.      Capillary Refill: Capillary refill takes less than 2 seconds.   Neurological:      Mental Status: She is alert and oriented to person, place, and time.   Psychiatric:         Mood and Affect: Mood normal.         Behavior: Behavior normal.         Thought Content: Thought content normal.         Judgment: Judgment normal.         Procedures           ED Course      Results for orders placed or performed during the hospital encounter of 11/14/22   Comprehensive Metabolic Panel    Specimen: Blood   Result Value Ref Range    Glucose 105 (H) 65 - 99 mg/dL    BUN 20 6 - 20 mg/dL    Creatinine 1.20 (H) 0.57 - 1.00 mg/dL    Sodium 140 136 - 145 mmol/L    Potassium 4.4 3.5 - 5.2 mmol/L    Chloride 105 98 - 107 mmol/L    CO2 25.0 22.0 - 29.0 mmol/L    Calcium 9.2 8.6 - 10.5 mg/dL    Total Protein 6.3 6.0 - 8.5 g/dL    Albumin 4.00 3.50 - 5.20 g/dL    ALT (SGPT) 32 1 - 33 U/L    AST  (SGOT) 22 1 - 32 U/L    Alkaline Phosphatase 110 39 - 117 U/L    Total Bilirubin <0.2 0.0 - 1.2 mg/dL    Globulin 2.3 gm/dL    A/G Ratio 1.7 g/dL    BUN/Creatinine Ratio 16.7 7.0 - 25.0    Anion Gap 10.0 5.0 - 15.0 mmol/L    eGFR 56.3 (L) >60.0 mL/min/1.73   Lipase    Specimen: Blood   Result Value Ref Range    Lipase 51 13 - 60 U/L   Urinalysis With Microscopic If Indicated (No Culture) - Urine, Clean Catch    Specimen: Urine, Clean Catch   Result Value Ref Range    Color, UA Yellow Yellow, Straw, Dark Yellow, [place]av[place]    Appearance, UA Cloudy (A) Clear    pH, UA 5.5 5.0 - 9.0    Specific [place], UA 1.026 1.003 - 1.030    Glucose, UA Negative Negative    Ketones, UA Trace (A) Negative    Bilirubin, UA Negative Negative    Blood, UA Trace (A) Negative    Protein, UA Trace (A) Negative    Leuk Esterase, UA Moderate (2+) (A) Negative    Nitrite, UA Negative Negative    Urobilinogen, UA 1.0 E.U./dL 0.2 - 1.0 E.U./dL   CBC Auto Differential    Specimen: Blood   Result Value Ref Range    WBC 6.04 3.40 - 10.80 10*3/mm3    RBC 3.98 3.77 - 5.28 10*6/mm3    Hemoglobin 11.7 (L) 12.0 - 15.9 g/dL    Hematocrit 36.7 34.0 - 46.6 %    MCV 92.2 79.0 - 97.0 fL    MCH 29.4 26.6 - 33.0 pg    MCHC 31.9 31.5 - 35.7 g/dL    RDW 13.2 12.3 - 15.4 %    RDW-SD 45.0 37.0 - 54.0 fl    MPV 11.8 6.0 - 12.0 fL    Platelets 178 140 - 450 10*3/mm3    Neutrophil % 49.1 42.7 - 76.0 %    Lymphocyte % 39.2 19.6 - 45.3 %    Monocyte % 6.0 5.0 - 12.0 %    Eosinophil % 4.8 0.3 - 6.2 %    Basophil % 0.7 0.0 - 1.5 %    Immature Grans % 0.2 0.0 - 0.5 %    Neutrophils, Absolute 2.97 1.70 - 7.00 10*3/mm3    Lymphocytes, Absolute 2.37 0.70 - 3.10 10*3/mm3    Monocytes, Absolute 0.36 0.10 - 0.90 10*3/mm3    Eosinophils, Absolute 0.29 0.00 - 0.40 10*3/mm3    Basophils, Absolute 0.04 0.00 - 0.20 10*3/mm3    Immature Grans, Absolute 0.01 0.00 - 0.05 10*3/mm3    nRBC 0.0 0.0 - 0.2 /100 WBC   Urinalysis, Microscopic Only - Urine, Clean Catch    Specimen: Urine, Clean  Catch   Result Value Ref Range    RBC, UA 6-12 (A) None Seen /HPF    WBC, UA Too Numerous to Count (A) None Seen, 0-2, 3-5 /HPF    Bacteria, UA 2+ (A) None Seen /HPF    Squamous Epithelial Cells, UA 21-30 (A) None Seen, 0-2 /HPF    Hyaline Casts, UA None Seen None Seen /LPF    Methodology Automated Microscopy    Green Top (Gel)   Result Value Ref Range    Extra Tube Hold for add-ons.    Lavender Top   Result Value Ref Range    Extra Tube hold for add-on    Gold Top - SST   Result Value Ref Range    Extra Tube Hold for add-ons.    Light Blue Top   Result Value Ref Range    Extra Tube Hold for add-ons.                                           MDM    Final diagnoses:   Recurrent UTI (urinary tract infection)       ED Disposition  ED Disposition     ED Disposition   Discharge    Condition   Stable    Comment   --             Sonya Crooks, APRN  [address] [place], [place] [id number] [phone]      ER follow up         Medication List      New Prescriptions    cephalexin 500 MG capsule  Commonly known as: KEFLEX  Take 1 capsule by mouth 4 (Four) Times a Day for 7 days.           Where to Get Your Medications      These medications were sent to [facility] - [place], [place] - [address] [facility] - [phone]  - [phone]   [address] [facility] [address]    Phone: [phone]   · cephalexin 500 MG capsule          Ana Rosa Michele, APRN  11/14/22 1948

## 2022-11-16 LAB — BACTERIA SPEC AEROBE CULT: ABNORMAL

## 2022-11-24 ENCOUNTER — APPOINTMENT (OUTPATIENT)
Dept: GENERAL RADIOLOGY | Facility: HOSPITAL | Age: 47
End: 2022-11-24

## 2022-11-24 ENCOUNTER — HOSPITAL ENCOUNTER (EMERGENCY)
Facility: HOSPITAL | Age: 47
Discharge: HOME | End: 2022-11-24
Attending: STUDENT IN AN ORGANIZED HEALTH CARE EDUCATION/TRAINING PROGRAM | Admitting: STUDENT IN AN ORGANIZED HEALTH CARE EDUCATION/TRAINING PROGRAM

## 2022-11-24 VITALS
WEIGHT: 211 LBS | RESPIRATION RATE: 20 BRPM | BODY MASS INDEX: 41.43 KG/M2 | DIASTOLIC BLOOD PRESSURE: 68 MMHG | OXYGEN SATURATION: 99 % | TEMPERATURE: 97.6 F | HEIGHT: 60 IN | SYSTOLIC BLOOD PRESSURE: 114 MMHG | HEART RATE: 52 BPM

## 2022-11-24 DIAGNOSIS — R07.89 CHEST WALL PAIN: Primary | ICD-10-CM

## 2022-11-24 LAB
ALBUMIN SERPL-MCNC: 4.1 G/DL (ref 3.5–5.2)
ALBUMIN/GLOB SERPL: 1.6 G/DL
ALP SERPL-CCNC: 116 U/L (ref 39–117)
ALT SERPL W P-5'-P-CCNC: 25 U/L (ref 1–33)
ANION GAP SERPL CALCULATED.3IONS-SCNC: 11 MMOL/L (ref 5–15)
AST SERPL-CCNC: 20 U/L (ref 1–32)
BASOPHILS # BLD AUTO: 0.06 10*3/MM3 (ref 0–0.2)
BASOPHILS NFR BLD AUTO: 0.9 % (ref 0–1.5)
BILIRUB SERPL-MCNC: <0.2 MG/DL (ref 0–1.2)
BUN SERPL-MCNC: 15 MG/DL (ref 6–20)
BUN/CREAT SERPL: 12.4 (ref 7–25)
CALCIUM SPEC-SCNC: 9.1 MG/DL (ref 8.6–10.5)
CHLORIDE SERPL-SCNC: 105 MMOL/L (ref 98–107)
CO2 SERPL-SCNC: 26 MMOL/L (ref 22–29)
CREAT SERPL-MCNC: 1.21 MG/DL (ref 0.57–1)
DEPRECATED RDW RBC AUTO: 46.5 FL (ref 37–54)
EGFRCR SERPLBLD CKD-EPI 2021: 55.7 ML/MIN/1.73
EOSINOPHIL # BLD AUTO: 0.33 10*3/MM3 (ref 0–0.4)
EOSINOPHIL NFR BLD AUTO: 4.8 % (ref 0.3–6.2)
ERYTHROCYTE [DISTWIDTH] IN BLOOD BY AUTOMATED COUNT: 13.6 % (ref 12.3–15.4)
FLUAV SUBTYP SPEC NAA+PROBE: NOT DETECTED
FLUBV RNA ISLT QL NAA+PROBE: NOT DETECTED
GLOBULIN UR ELPH-MCNC: 2.5 GM/DL
GLUCOSE SERPL-MCNC: 92 MG/DL (ref 65–99)
HCT VFR BLD AUTO: 36.5 % (ref 34–46.6)
HGB BLD-MCNC: 11.7 G/DL (ref 12–15.9)
HOLD SPECIMEN: NORMAL
HOLD SPECIMEN: NORMAL
IMM GRANULOCYTES # BLD AUTO: 0.01 10*3/MM3 (ref 0–0.05)
IMM GRANULOCYTES NFR BLD AUTO: 0.1 % (ref 0–0.5)
LYMPHOCYTES # BLD AUTO: 3.08 10*3/MM3 (ref 0.7–3.1)
LYMPHOCYTES NFR BLD AUTO: 44.9 % (ref 19.6–45.3)
MCH RBC QN AUTO: 29.9 PG (ref 26.6–33)
MCHC RBC AUTO-ENTMCNC: 32.1 G/DL (ref 31.5–35.7)
MCV RBC AUTO: 93.4 FL (ref 79–97)
MONOCYTES # BLD AUTO: 0.55 10*3/MM3 (ref 0.1–0.9)
MONOCYTES NFR BLD AUTO: 8 % (ref 5–12)
NEUTROPHILS NFR BLD AUTO: 2.83 10*3/MM3 (ref 1.7–7)
NEUTROPHILS NFR BLD AUTO: 41.3 % (ref 42.7–76)
NRBC BLD AUTO-RTO: 0 /100 WBC (ref 0–0.2)
NT-PROBNP SERPL-MCNC: 32.8 PG/ML (ref 0–450)
PLATELET # BLD AUTO: 257 10*3/MM3 (ref 140–450)
PMV BLD AUTO: 11.3 FL (ref 6–12)
POTASSIUM SERPL-SCNC: 3.9 MMOL/L (ref 3.5–5.2)
PROT SERPL-MCNC: 6.6 G/DL (ref 6–8.5)
RBC # BLD AUTO: 3.91 10*6/MM3 (ref 3.77–5.28)
SARS-COV-2 RNA PNL SPEC NAA+PROBE: NOT DETECTED
SODIUM SERPL-SCNC: 142 MMOL/L (ref 136–145)
TROPONIN T SERPL-MCNC: <0.01 NG/ML (ref 0–0.03)
TROPONIN T SERPL-MCNC: <0.01 NG/ML (ref 0–0.03)
WBC NRBC COR # BLD: 6.86 10*3/MM3 (ref 3.4–10.8)
WHOLE BLOOD HOLD COAG: NORMAL
WHOLE BLOOD HOLD SPECIMEN: NORMAL

## 2022-11-24 PROCEDURE — 71045 X-RAY EXAM CHEST 1 VIEW: CPT

## 2022-11-24 PROCEDURE — 93005 ELECTROCARDIOGRAM TRACING: CPT

## 2022-11-24 PROCEDURE — 99285 EMERGENCY DEPT VISIT HI MDM: CPT

## 2022-11-24 PROCEDURE — 93010 ELECTROCARDIOGRAM REPORT: CPT | Performed by: INTERNAL MEDICINE

## 2022-11-24 PROCEDURE — 84484 ASSAY OF TROPONIN QUANT: CPT | Performed by: STUDENT IN AN ORGANIZED HEALTH CARE EDUCATION/TRAINING PROGRAM

## 2022-11-24 PROCEDURE — 93005 ELECTROCARDIOGRAM TRACING: CPT | Performed by: STUDENT IN AN ORGANIZED HEALTH CARE EDUCATION/TRAINING PROGRAM

## 2022-11-24 PROCEDURE — 96374 THER/PROPH/DIAG INJ IV PUSH: CPT

## 2022-11-24 PROCEDURE — 87636 SARSCOV2 & INF A&B AMP PRB: CPT | Performed by: STUDENT IN AN ORGANIZED HEALTH CARE EDUCATION/TRAINING PROGRAM

## 2022-11-24 PROCEDURE — 25010000002 KETOROLAC TROMETHAMINE PER 15 MG: Performed by: STUDENT IN AN ORGANIZED HEALTH CARE EDUCATION/TRAINING PROGRAM

## 2022-11-24 PROCEDURE — 36415 COLL VENOUS BLD VENIPUNCTURE: CPT

## 2022-11-24 PROCEDURE — 83880 ASSAY OF NATRIURETIC PEPTIDE: CPT | Performed by: STUDENT IN AN ORGANIZED HEALTH CARE EDUCATION/TRAINING PROGRAM

## 2022-11-24 PROCEDURE — 85025 COMPLETE CBC W/AUTO DIFF WBC: CPT | Performed by: STUDENT IN AN ORGANIZED HEALTH CARE EDUCATION/TRAINING PROGRAM

## 2022-11-24 PROCEDURE — 80053 COMPREHEN METABOLIC PANEL: CPT | Performed by: STUDENT IN AN ORGANIZED HEALTH CARE EDUCATION/TRAINING PROGRAM

## 2022-11-24 RX ORDER — KETOROLAC TROMETHAMINE 15 MG/ML
15 INJECTION, SOLUTION INTRAMUSCULAR; INTRAVENOUS ONCE
Status: COMPLETED | OUTPATIENT
Start: 2022-11-24 | End: 2022-11-24

## 2022-11-24 RX ORDER — NABUMETONE 750 MG/1
750 TABLET, FILM COATED ORAL 2 TIMES DAILY PRN
Qty: 6 TABLET | Refills: 0 | Status: SHIPPED | OUTPATIENT
Start: 2022-11-24 | End: 2023-03-09 | Stop reason: HOSPADM

## 2022-11-24 RX ORDER — SODIUM CHLORIDE 0.9 % (FLUSH) 0.9 %
10 SYRINGE (ML) INJECTION AS NEEDED
Status: DISCONTINUED | OUTPATIENT
Start: 2022-11-24 | End: 2022-11-25 | Stop reason: HOSPADM

## 2022-11-24 RX ORDER — ASPIRIN 325 MG
325 TABLET ORAL ONCE
Status: COMPLETED | OUTPATIENT
Start: 2022-11-24 | End: 2022-11-24

## 2022-11-24 RX ADMIN — ASPIRIN 325 MG: 325 TABLET ORAL at 20:30

## 2022-11-24 RX ADMIN — KETOROLAC TROMETHAMINE 15 MG: 15 INJECTION, SOLUTION INTRAMUSCULAR; INTRAVENOUS at 22:10

## 2022-11-25 NOTE — ED PROVIDER NOTES
Subjective   History of Present Illness  47-year-old female comes to the ER with chief complaint of left-sided sharp chest pain this been going on for the last several hours.  Nothing seems to make it better or worse.  She rates it as 7/10.  It does radiate into her left neck.  She denies injury or trauma.  She has not taken anything for the pain.    History provided by:  Patient   used: No        Review of Systems   Constitutional: Negative for chills and fever.   HENT: Negative for drooling.    Eyes: Negative for redness.   Respiratory: Negative for cough, chest tightness, shortness of breath and wheezing.    Cardiovascular: Positive for chest pain. Negative for palpitations.   Gastrointestinal: Negative for abdominal pain, nausea and vomiting.   Genitourinary: Negative for flank pain.   Musculoskeletal: Positive for myalgias.   Skin: Negative for color change.   Neurological: Negative for seizures.   Psychiatric/Behavioral: Negative for confusion.       Past Medical History:   Diagnosis Date   • Abdominal pain     Chronic RLQ, RUQ, R flank comes and goes.    • Abdominal pain, right lower quadrant    • Acute bilateral otitis media    • Allergic rhinitis    • Backache     Upper back.   • Candidiasis of skin    • Cellulitis of neck    • Chest pain    • Contraception    • Cough    • Dysfunction of eustachian tube    • Dyspareunia, female    • Elevated cholesterol    • Excessive or frequent menstruation    • Flank pain    • Generalized aches and pains    • Headache    • Health maintenance examination    • Hypertension    • Infection of skin and subcutaneous tissue    • Irregular periods    • Kidney stone     Poss   • Menorrhagia    • Nausea vomiting and diarrhea    • Obesity    • Open wound of abdominal wall    • Otalgia    • Pain in left foot    • Pain in unspecified hand    • Removed Impacted cerumen 06/05/2012   • Rhinitis    • Seizure ([facility]) 08/15/2019   • Shoulder pain     right-sided-likely  muscle strain      • Shoulder tendinitis    • Spider bite wound    • Staphylococcal infection of skin    • Stroke ([facility])    • Swollen feet    • Tinea cruris    • Tobacco dependence syndrome    • Upper respiratory infection    • Urinary tract infectious disease    • Vertigo    • Visit for gynecologic examination    • Vulvovaginitis        Allergies   Allergen Reactions   • Abilify [Aripiprazole] Nausea Only   • Buspirone Rash   • Penicillins Hives and Nausea And Vomiting       Past Surgical History:   Procedure Laterality Date   •  SECTION     • DILATATION AND CURETTAGE      Secondary to incomplete spontaneous    • INCISION AND DRAINAGE ABSCESS  2012   • INJECTION OF MEDICATION  2015    Phenergan (1)     • INJECTION OF MEDICATION  10/10/2013    Toradol (2)      • INJECTION OF MEDICATION  2014    Zofran (1)          Family History   Problem Relation Age of Onset   • Breast cancer Mother    • Thyroid disease Mother    • Hypertension Mother    • Heart disease Mother    • Arthritis Mother    • Liver disease Father    • Endometrial cancer Sister    • Anxiety disorder Brother    • Mental illness Daughter    • Depression Daughter    • Diabetes Maternal Aunt    • Diabetes Maternal Uncle    • Stroke Maternal Grandmother    • Breast cancer Maternal Grandmother    • Cancer Maternal Grandfather    • Stroke Paternal Grandmother    • Depression Other    • Cancer Other         Colorectal Cancer   • Endometrial cancer Other    • Lung cancer Other        Social History     Socioeconomic History   • Marital status: Single   Tobacco Use   • Smoking status: Every Day     Packs/day: 0.50     Types: Cigarettes   • Smokeless tobacco: Never   • Tobacco comments:     *800*quit*NOW   Vaping Use   • Vaping Use: Some days   • Substances: Nicotine   • Devices: Refillable tank   • Passive vaping exposure: Yes   Substance and Sexual Activity   • Alcohol use: No   • Drug use: No   • Sexual activity: Not  Currently           Objective    Vitals:    11/24/22 2001 11/24/22 2054 11/24/22 2200 11/24/22 2213   BP: 110/63 114/68 114/68 114/68   BP Location: Left arm Left arm Left arm Left arm   Patient Position: Lying Lying Lying Lying   Pulse: 54 55  51   Resp: 18 20 20 20   Temp:       TempSrc:       SpO2: 96% 96%  99%   Weight:       Height:           Physical Exam  Vitals and nursing note reviewed.   Constitutional:       General: She is not in acute distress.     Appearance: She is well-developed. She is obese. She is not ill-appearing, toxic-appearing or diaphoretic.   HENT:      Head: Normocephalic.      Right Ear: External ear normal.      Left Ear: External ear normal.   Eyes:      Conjunctiva/sclera: Conjunctivae normal.   Pulmonary:      Effort: Pulmonary effort is normal. No accessory muscle usage or respiratory distress.      Breath sounds: No wheezing.   Chest:      Chest wall: Tenderness present.   Abdominal:      Palpations: Abdomen is soft.      Tenderness: There is no abdominal tenderness (deep palpation).   Skin:     General: Skin is warm and dry.      Capillary Refill: Capillary refill takes less than 2 seconds.   Neurological:      Mental Status: She is alert and oriented to person, place, and time.   Psychiatric:         Behavior: Behavior normal.         ECG 12 Lead      Date/Time: 11/24/2022 10:59 PM  Performed by: Juvenal Chand MD  Authorized by: Juvenal Chand MD   Interpreted by physician  Rhythm: sinus rhythm  Rate: normal  BPM: 61  QRS axis: normal  ST segment elevation noted on lead: none.  Clinical impression: abnormal ECG                 ED Course      Results for orders placed or performed during the hospital encounter of 11/24/22   COVID-19 and FLU A/B PCR - Swab, Nasopharynx    Specimen: Nasopharynx; Swab   Result Value Ref Range    COVID19 Not Detected Not Detected - Ref. Range    Influenza A PCR Not Detected Not Detected    Influenza B PCR Not Detected Not Detected   Troponin     Specimen: Blood   Result Value Ref Range    Troponin T <0.010 0.000 - 0.030 ng/mL   Troponin    Specimen: Blood   Result Value Ref Range    Troponin T <0.010 0.000 - 0.030 ng/mL   Comprehensive Metabolic Panel    Specimen: Blood   Result Value Ref Range    Glucose 92 65 - 99 mg/dL    BUN 15 6 - 20 mg/dL    Creatinine 1.21 (H) 0.57 - 1.00 mg/dL    Sodium 142 136 - 145 mmol/L    Potassium 3.9 3.5 - 5.2 mmol/L    Chloride 105 98 - 107 mmol/L    CO2 26.0 22.0 - 29.0 mmol/L    Calcium 9.1 8.6 - 10.5 mg/dL    Total Protein 6.6 6.0 - 8.5 g/dL    Albumin 4.10 3.50 - 5.20 g/dL    ALT (SGPT) 25 1 - 33 U/L    AST (SGOT) 20 1 - 32 U/L    Alkaline Phosphatase 116 39 - 117 U/L    Total Bilirubin <0.2 0.0 - 1.2 mg/dL    Globulin 2.5 gm/dL    A/G Ratio 1.6 g/dL    BUN/Creatinine Ratio 12.4 7.0 - 25.0    Anion Gap 11.0 5.0 - 15.0 mmol/L    eGFR 55.7 (L) >60.0 mL/min/1.73   BNP    Specimen: Blood   Result Value Ref Range    proBNP 32.8 0.0 - 450.0 pg/mL   CBC Auto Differential    Specimen: Blood   Result Value Ref Range    WBC 6.86 3.40 - 10.80 10*3/mm3    RBC 3.91 3.77 - 5.28 10*6/mm3    Hemoglobin 11.7 (L) 12.0 - 15.9 g/dL    Hematocrit 36.5 34.0 - 46.6 %    MCV 93.4 79.0 - 97.0 fL    MCH 29.9 26.6 - 33.0 pg    MCHC 32.1 31.5 - 35.7 g/dL    RDW 13.6 12.3 - 15.4 %    RDW-SD 46.5 37.0 - 54.0 fl    MPV 11.3 6.0 - 12.0 fL    Platelets 257 140 - 450 10*3/mm3    Neutrophil % 41.3 (L) 42.7 - 76.0 %    Lymphocyte % 44.9 19.6 - 45.3 %    Monocyte % 8.0 5.0 - 12.0 %    Eosinophil % 4.8 0.3 - 6.2 %    Basophil % 0.9 0.0 - 1.5 %    Immature Grans % 0.1 0.0 - 0.5 %    Neutrophils, Absolute 2.83 1.70 - 7.00 10*3/mm3    Lymphocytes, Absolute 3.08 0.70 - 3.10 10*3/mm3    Monocytes, Absolute 0.55 0.10 - 0.90 10*3/mm3    Eosinophils, Absolute 0.33 0.00 - 0.40 10*3/mm3    Basophils, Absolute 0.06 0.00 - 0.20 10*3/mm3    Immature Grans, Absolute 0.01 0.00 - 0.05 10*3/mm3    nRBC 0.0 0.0 - 0.2 /100 WBC   ECG 12 Lead Chest Pain   Result Value Ref Range     QT Interval 450 ms    QTC Interval 453 ms   Green Top (Gel)   Result Value Ref Range    Extra Tube Hold for add-ons.    Lavender Top   Result Value Ref Range    Extra Tube hold for add-on    Gold Top - SST   Result Value Ref Range    Extra Tube Hold for add-ons.    Light Blue Top   Result Value Ref Range    Extra Tube Hold for add-ons.      XR Chest 1 View   Final Result     Lung volumes are diminished.  Followup PA and lateral views   would be helpful if symptoms persist.      Electronically signed by:  Tremaine Nelson MD  11/24/2022 9:37 PM   CST Workstation: [id number]          Wells' Criteria for Pulmonary Embolism - MDCalc  0.0 points -> Low risk group: 1.3% chance of PE in an ED population. Another study assigned scores ? 4 as “PE Unlikely” and had a 3% incidence of PE.       MDM  Number of Diagnoses or Management Options  Chest wall pain: new and requires workup  Diagnosis management comments: Vital signs are stable, afebrile.  Labs unremarkable.  Troponin negative x1.  EKG is normal sinus no acute ischemic changes.  Chest x-ray shows no acute cardiopulmonary process.  Patient is tender on left side of her chest with palpation.  She received Toradol.  We will call in an anti-inflammatory to her pharmacy.  Recommend follow-up with her PCP.  Return precautions given.  Patient states understanding and is agreeable to the plan.      Final diagnoses:   Chest wall pain       ED Disposition  ED Disposition     ED Disposition   Discharge    Condition   Stable    Comment   --             Sonya Crooks, APRN  [address] [facility] [address]  [phone]    Schedule an appointment as soon as possible for a visit in 2 days  ER follow up         Medication List      New Prescriptions    nabumetone 750 MG tablet  Commonly known as: RELAFEN  Take 1 tablet by mouth 2 (Two) Times a Day As Needed for Mild Pain.           Where to Get Your Medications      These medications were sent to [facility] -  [place], [place] - [address] - [phone] PH - [phone] FX  [address], [facility] [address]    Phone: [phone]   · nabumetone 750 MG tablet          Juvenal Chand MD  11/24/22 4441

## 2022-11-28 LAB
QT INTERVAL: 450 MS
QTC INTERVAL: 453 MS

## 2022-12-10 ENCOUNTER — APPOINTMENT (OUTPATIENT)
Dept: CT IMAGING | Facility: HOSPITAL | Age: 47
End: 2022-12-10

## 2022-12-10 ENCOUNTER — HOSPITAL ENCOUNTER (OUTPATIENT)
Facility: HOSPITAL | Age: 47
Setting detail: OBSERVATION
Discharge: NURSING, REHAB OR LONG-TERM CARE | End: 2022-12-11
Attending: EMERGENCY MEDICINE | Admitting: FAMILY MEDICINE

## 2022-12-10 ENCOUNTER — APPOINTMENT (OUTPATIENT)
Dept: GENERAL RADIOLOGY | Facility: HOSPITAL | Age: 47
End: 2022-12-10

## 2022-12-10 DIAGNOSIS — R29.90 STROKE-LIKE SYMPTOMS: Primary | ICD-10-CM

## 2022-12-10 DIAGNOSIS — I69.322 DYSARTHRIA DUE TO OLD STROKE: ICD-10-CM

## 2022-12-10 DIAGNOSIS — R29.810 FACIAL DROOP: ICD-10-CM

## 2022-12-10 DIAGNOSIS — R29.898 RIGHT ARM WEAKNESS: ICD-10-CM

## 2022-12-10 LAB
ABO GROUP BLD: NORMAL
ALBUMIN SERPL-MCNC: 4.3 G/DL (ref 3.5–5.2)
ALBUMIN/GLOB SERPL: 1.8 G/DL
ALP SERPL-CCNC: 118 U/L (ref 39–117)
ALT SERPL W P-5'-P-CCNC: 31 U/L (ref 1–33)
ANION GAP SERPL CALCULATED.3IONS-SCNC: 12 MMOL/L (ref 5–15)
APTT PPP: 27 SECONDS (ref 20–40.3)
AST SERPL-CCNC: 25 U/L (ref 1–32)
BASOPHILS # BLD AUTO: 0.04 10*3/MM3 (ref 0–0.2)
BASOPHILS NFR BLD AUTO: 0.6 % (ref 0–1.5)
BILIRUB SERPL-MCNC: 0.2 MG/DL (ref 0–1.2)
BLD GP AB SCN SERPL QL: NEGATIVE
BUN SERPL-MCNC: 15 MG/DL (ref 6–20)
BUN/CREAT SERPL: 13 (ref 7–25)
CALCIUM SPEC-SCNC: 9.4 MG/DL (ref 8.6–10.5)
CHLORIDE SERPL-SCNC: 106 MMOL/L (ref 98–107)
CO2 SERPL-SCNC: 23 MMOL/L (ref 22–29)
CRE SCREEN PCR: NOT DETECTED
CREAT SERPL-MCNC: 1.15 MG/DL (ref 0.57–1)
DEPRECATED RDW RBC AUTO: 42.7 FL (ref 37–54)
EGFRCR SERPLBLD CKD-EPI 2021: 59.3 ML/MIN/1.73
EOSINOPHIL # BLD AUTO: 0.26 10*3/MM3 (ref 0–0.4)
EOSINOPHIL NFR BLD AUTO: 3.9 % (ref 0.3–6.2)
ERYTHROCYTE [DISTWIDTH] IN BLOOD BY AUTOMATED COUNT: 13.1 % (ref 12.3–15.4)
GLOBULIN UR ELPH-MCNC: 2.4 GM/DL
GLUCOSE SERPL-MCNC: 91 MG/DL (ref 65–99)
HCT VFR BLD AUTO: 38.2 % (ref 34–46.6)
HGB BLD-MCNC: 12.6 G/DL (ref 12–15.9)
HOLD SPECIMEN: NORMAL
IMM GRANULOCYTES # BLD AUTO: 0.01 10*3/MM3 (ref 0–0.05)
IMM GRANULOCYTES NFR BLD AUTO: 0.2 % (ref 0–0.5)
IMP STRAIN: NOT DETECTED
INR PPP: 1.06 (ref 0.8–1.2)
KPC STRAIN: NOT DETECTED
LYMPHOCYTES # BLD AUTO: 2.29 10*3/MM3 (ref 0.7–3.1)
LYMPHOCYTES NFR BLD AUTO: 34.5 % (ref 19.6–45.3)
Lab: NORMAL
MAGNESIUM SERPL-MCNC: 1.9 MG/DL (ref 1.6–2.6)
MCH RBC QN AUTO: 30 PG (ref 26.6–33)
MCHC RBC AUTO-ENTMCNC: 33 G/DL (ref 31.5–35.7)
MCV RBC AUTO: 91 FL (ref 79–97)
MONOCYTES # BLD AUTO: 0.48 10*3/MM3 (ref 0.1–0.9)
MONOCYTES NFR BLD AUTO: 7.2 % (ref 5–12)
NDM STRAIN: NOT DETECTED
NEUTROPHILS NFR BLD AUTO: 3.56 10*3/MM3 (ref 1.7–7)
NEUTROPHILS NFR BLD AUTO: 53.6 % (ref 42.7–76)
NRBC BLD AUTO-RTO: 0 /100 WBC (ref 0–0.2)
OXA 48 STRAIN: NOT DETECTED
PLATELET # BLD AUTO: 228 10*3/MM3 (ref 140–450)
PMV BLD AUTO: 11.2 FL (ref 6–12)
POTASSIUM SERPL-SCNC: 4.3 MMOL/L (ref 3.5–5.2)
PROT SERPL-MCNC: 6.7 G/DL (ref 6–8.5)
PROTHROMBIN TIME: 13.7 SECONDS (ref 11.1–15.3)
RBC # BLD AUTO: 4.2 10*6/MM3 (ref 3.77–5.28)
RH BLD: POSITIVE
SODIUM SERPL-SCNC: 141 MMOL/L (ref 136–145)
T&S EXPIRATION DATE: NORMAL
TROPONIN T SERPL-MCNC: <0.01 NG/ML (ref 0–0.03)
VIM STRAIN: NOT DETECTED
WBC NRBC COR # BLD: 6.64 10*3/MM3 (ref 3.4–10.8)
WHOLE BLOOD HOLD COAG: NORMAL
WHOLE BLOOD HOLD SPECIMEN: NORMAL
WHOLE BLOOD HOLD SPECIMEN: NORMAL

## 2022-12-10 PROCEDURE — 70496 CT ANGIOGRAPHY HEAD: CPT

## 2022-12-10 PROCEDURE — 85730 THROMBOPLASTIN TIME PARTIAL: CPT | Performed by: EMERGENCY MEDICINE

## 2022-12-10 PROCEDURE — G0378 HOSPITAL OBSERVATION PER HR: HCPCS

## 2022-12-10 PROCEDURE — 70498 CT ANGIOGRAPHY NECK: CPT

## 2022-12-10 PROCEDURE — 86900 BLOOD TYPING SEROLOGIC ABO: CPT | Performed by: EMERGENCY MEDICINE

## 2022-12-10 PROCEDURE — 85025 COMPLETE CBC W/AUTO DIFF WBC: CPT | Performed by: EMERGENCY MEDICINE

## 2022-12-10 PROCEDURE — 83735 ASSAY OF MAGNESIUM: CPT | Performed by: FAMILY MEDICINE

## 2022-12-10 PROCEDURE — 84484 ASSAY OF TROPONIN QUANT: CPT | Performed by: EMERGENCY MEDICINE

## 2022-12-10 PROCEDURE — 85610 PROTHROMBIN TIME: CPT | Performed by: EMERGENCY MEDICINE

## 2022-12-10 PROCEDURE — 80053 COMPREHEN METABOLIC PANEL: CPT | Performed by: EMERGENCY MEDICINE

## 2022-12-10 PROCEDURE — 87081 CULTURE SCREEN ONLY: CPT | Performed by: INTERNAL MEDICINE

## 2022-12-10 PROCEDURE — 99285 EMERGENCY DEPT VISIT HI MDM: CPT

## 2022-12-10 PROCEDURE — 86850 RBC ANTIBODY SCREEN: CPT | Performed by: EMERGENCY MEDICINE

## 2022-12-10 PROCEDURE — 93010 ELECTROCARDIOGRAM REPORT: CPT | Performed by: INTERNAL MEDICINE

## 2022-12-10 PROCEDURE — 93005 ELECTROCARDIOGRAM TRACING: CPT | Performed by: EMERGENCY MEDICINE

## 2022-12-10 PROCEDURE — 0 IOPAMIDOL PER 1 ML: Performed by: EMERGENCY MEDICINE

## 2022-12-10 PROCEDURE — 86901 BLOOD TYPING SEROLOGIC RH(D): CPT | Performed by: EMERGENCY MEDICINE

## 2022-12-10 PROCEDURE — 87102 FUNGUS ISOLATION CULTURE: CPT | Performed by: INTERNAL MEDICINE

## 2022-12-10 PROCEDURE — 36415 COLL VENOUS BLD VENIPUNCTURE: CPT

## 2022-12-10 PROCEDURE — 70450 CT HEAD/BRAIN W/O DYE: CPT

## 2022-12-10 PROCEDURE — 0042T HC CT CEREBRAL PERFUSION W/WO CONTRAST: CPT

## 2022-12-10 PROCEDURE — 71045 X-RAY EXAM CHEST 1 VIEW: CPT

## 2022-12-10 RX ORDER — KETOROLAC TROMETHAMINE 10 MG/1
10 TABLET, FILM COATED ORAL EVERY 6 HOURS PRN
Status: ON HOLD | COMMUNITY
End: 2023-03-09

## 2022-12-10 RX ORDER — SODIUM CHLORIDE 0.9 % (FLUSH) 0.9 %
10 SYRINGE (ML) INJECTION AS NEEDED
Status: DISCONTINUED | OUTPATIENT
Start: 2022-12-10 | End: 2022-12-11 | Stop reason: HOSPADM

## 2022-12-10 RX ORDER — PANTOPRAZOLE SODIUM 40 MG/1
40 TABLET, DELAYED RELEASE ORAL DAILY
Status: DISCONTINUED | OUTPATIENT
Start: 2022-12-11 | End: 2022-12-11 | Stop reason: HOSPADM

## 2022-12-10 RX ORDER — BUSPIRONE HYDROCHLORIDE 15 MG/1
15 TABLET ORAL 3 TIMES DAILY
Status: DISCONTINUED | OUTPATIENT
Start: 2022-12-10 | End: 2022-12-11

## 2022-12-10 RX ORDER — SODIUM CHLORIDE 0.9 % (FLUSH) 0.9 %
10 SYRINGE (ML) INJECTION EVERY 12 HOURS SCHEDULED
Status: DISCONTINUED | OUTPATIENT
Start: 2022-12-10 | End: 2022-12-11 | Stop reason: HOSPADM

## 2022-12-10 RX ORDER — ATORVASTATIN CALCIUM 40 MG/1
80 TABLET, FILM COATED ORAL NIGHTLY
Status: DISCONTINUED | OUTPATIENT
Start: 2022-12-10 | End: 2022-12-11 | Stop reason: HOSPADM

## 2022-12-10 RX ORDER — SODIUM CHLORIDE 9 MG/ML
40 INJECTION, SOLUTION INTRAVENOUS AS NEEDED
Status: DISCONTINUED | OUTPATIENT
Start: 2022-12-10 | End: 2022-12-11 | Stop reason: HOSPADM

## 2022-12-10 RX ORDER — LOSARTAN POTASSIUM 50 MG/1
50 TABLET ORAL DAILY
Status: DISCONTINUED | OUTPATIENT
Start: 2022-12-11 | End: 2022-12-11 | Stop reason: HOSPADM

## 2022-12-10 RX ORDER — ACETAMINOPHEN 325 MG/1
650 TABLET ORAL EVERY 6 HOURS PRN
Status: DISCONTINUED | OUTPATIENT
Start: 2022-12-10 | End: 2022-12-11 | Stop reason: HOSPADM

## 2022-12-10 RX ORDER — ESCITALOPRAM OXALATE 10 MG/1
30 TABLET ORAL DAILY
Status: DISCONTINUED | OUTPATIENT
Start: 2022-12-11 | End: 2022-12-11 | Stop reason: HOSPADM

## 2022-12-10 RX ORDER — ASPIRIN 325 MG
325 TABLET ORAL DAILY
Status: DISCONTINUED | OUTPATIENT
Start: 2022-12-11 | End: 2022-12-11 | Stop reason: HOSPADM

## 2022-12-10 RX ORDER — TRAZODONE HYDROCHLORIDE 50 MG/1
50 TABLET ORAL NIGHTLY
Status: DISCONTINUED | OUTPATIENT
Start: 2022-12-10 | End: 2022-12-11 | Stop reason: HOSPADM

## 2022-12-10 RX ORDER — FUROSEMIDE 20 MG/1
20 TABLET ORAL DAILY
Status: DISCONTINUED | OUTPATIENT
Start: 2022-12-11 | End: 2022-12-11 | Stop reason: HOSPADM

## 2022-12-10 RX ADMIN — IOPAMIDOL 90 ML: 755 INJECTION, SOLUTION INTRAVENOUS at 17:40

## 2022-12-10 RX ADMIN — IOPAMIDOL 50 ML: 755 INJECTION, SOLUTION INTRAVENOUS at 17:45

## 2022-12-10 RX ADMIN — ACETAMINOPHEN 650 MG: 325 TABLET, FILM COATED ORAL at 21:05

## 2022-12-10 RX ADMIN — Medication 10 ML: at 23:42

## 2022-12-10 NOTE — ED PROVIDER NOTES
Subjective   History of Present Illness  Patient presents emergency department with purported asymmetrical  and a facial droop for about 2 hours on the left.  The patient seems to have on initial exam a spasm in the left face resulting in a downward grimace.  This does not appear to be drooping.  Patient also with a normal exam on the extremity on the right where this was purportedly weak.  Patient has 5/5 strength on arrival.  Patient is talking with a slurred speech.  Patient on multiple psychiatric medications and dystonia also in the differential.  Patient denies any fevers or chills, no nausea vomiting.  No bowel or bladder changes.        Review of Systems   Constitutional: Negative for activity change, appetite change, chills and fever.   HENT: Negative.  Negative for congestion.    Eyes: Negative.  Negative for photophobia and visual disturbance.   Respiratory: Negative.  Negative for cough, chest tightness and shortness of breath.    Cardiovascular: Negative.  Negative for chest pain and palpitations.   Gastrointestinal: Negative.  Negative for abdominal pain, constipation, diarrhea, nausea and vomiting.   Endocrine: Negative.    Genitourinary: Negative.  Negative for decreased urine volume, dysuria, flank pain and hematuria.   Musculoskeletal: Negative.  Negative for arthralgias, back pain, myalgias, neck pain and neck stiffness.   Skin: Negative.  Negative for pallor.   Neurological: Positive for speech difficulty and weakness. Negative for dizziness, syncope, light-headedness, numbness and headaches.   Psychiatric/Behavioral: Negative.  Negative for confusion and suicidal ideas. The patient is not nervous/anxious.    All other systems reviewed and are negative.      Past Medical History:   Diagnosis Date   • Abdominal pain     Chronic RLQ, RUQ, R flank comes and goes.    • Abdominal pain, right lower quadrant    • Acute bilateral otitis media    • Allergic rhinitis    • Backache     Upper back.   •  Candidiasis of skin    • Cellulitis of neck    • Chest pain    • Contraception    • Cough    • Dysfunction of eustachian tube    • Dyspareunia, female    • Elevated cholesterol    • Excessive or frequent menstruation    • Flank pain    • Generalized aches and pains    • Headache    • Health maintenance examination    • Hypertension    • Infection of skin and subcutaneous tissue    • Irregular periods    • Kidney stone     Poss   • Menorrhagia    • Nausea vomiting and diarrhea    • Obesity    • Open wound of abdominal wall    • Otalgia    • Pain in left foot    • Pain in unspecified hand    • Removed Impacted cerumen 2012   • Rhinitis    • Seizure ([facility]) 08/15/2019   • Shoulder pain     right-sided-likely muscle strain      • Shoulder tendinitis    • Spider bite wound    • Staphylococcal infection of skin    • Stroke ([facility])    • Swollen feet    • Tinea cruris    • Tobacco dependence syndrome    • Upper respiratory infection    • Urinary tract infectious disease    • Vertigo    • Visit for gynecologic examination    • Vulvovaginitis        Allergies   Allergen Reactions   • Abilify [Aripiprazole] Nausea Only   • Buspirone Rash   • Penicillins Hives and Nausea And Vomiting       Past Surgical History:   Procedure Laterality Date   •  SECTION     • DILATATION AND CURETTAGE      Secondary to incomplete spontaneous    • INCISION AND DRAINAGE ABSCESS  2012   • INJECTION OF MEDICATION  2015    Phenergan (1)     • INJECTION OF MEDICATION  10/10/2013    Toradol (2)      • INJECTION OF MEDICATION  2014    Zofran (1)          Family History   Problem Relation Age of Onset   • Breast cancer Mother    • Thyroid disease Mother    • Hypertension Mother    • Heart disease Mother    • Arthritis Mother    • Liver disease Father    • Endometrial cancer Sister    • Anxiety disorder Brother    • Mental illness Daughter    • Depression Daughter    • Diabetes Maternal Aunt    • Diabetes Maternal  Uncle    • Stroke Maternal Grandmother    • Breast cancer Maternal Grandmother    • Cancer Maternal Grandfather    • Stroke Paternal Grandmother    • Depression Other    • Cancer Other         Colorectal Cancer   • Endometrial cancer Other    • Lung cancer Other        Social History     Socioeconomic History   • Marital status: Single   Tobacco Use   • Smoking status: Every Day     Packs/day: 0.50     Types: Cigarettes   • Smokeless tobacco: Never   • Tobacco comments:     19*800*quit*NOW   Vaping Use   • Vaping Use: Some days   • Substances: Nicotine   • Devices: Refillable tank   • Passive vaping exposure: Yes   Substance and Sexual Activity   • Alcohol use: No   • Drug use: No   • Sexual activity: Not Currently           Objective   Physical Exam  Vitals and nursing note reviewed.   Constitutional:       General: She is not in acute distress.     Appearance: Normal appearance. She is well-developed. She is not ill-appearing or diaphoretic.   HENT:      Head: Normocephalic and atraumatic.      Nose: Nose normal. No congestion or rhinorrhea.      Mouth/Throat:      Mouth: Mucous membranes are moist.   Eyes:      General: No scleral icterus.     Extraocular Movements: Extraocular movements intact.      Conjunctiva/sclera: Conjunctivae normal.   Neck:      Vascular: No JVD.   Cardiovascular:      Rate and Rhythm: Normal rate and regular rhythm.      Heart sounds: Normal heart sounds. No murmur heard.    No friction rub. No gallop.   Pulmonary:      Effort: Pulmonary effort is normal. No respiratory distress.      Breath sounds: No wheezing or rales.   Chest:      Chest wall: No tenderness.   Abdominal:      General: There is no distension.      Palpations: Abdomen is soft. There is no mass.      Tenderness: There is no abdominal tenderness. There is no guarding or rebound.   Musculoskeletal:         General: No tenderness or deformity. Normal range of motion.      Cervical back: Normal range of motion and neck  supple.   Lymphadenopathy:      Cervical: No cervical adenopathy.   Skin:     General: Skin is warm and dry.      Capillary Refill: Capillary refill takes less than 2 seconds.      Coloration: Skin is not pale.      Findings: No erythema or rash.   Neurological:      General: No focal deficit present.      Mental Status: She is alert and oriented to person, place, and time.      Cranial Nerves: No cranial nerve deficit.      Motor: No abnormal muscle tone.      Comments: There is no appreciable focal neurodeficit.  Patient does have difficulty with speech though it is unclear how much of this is baseline with the patient.  Patient does appear to have a waxing and waning left-sided facial spasm.  There does not appear to be droop per se on that side.    Psychiatric:         Behavior: Behavior normal.         Thought Content: Thought content normal.         Judgment: Judgment normal.         Procedures           ED Course  ED Course as of 12/10/22 1856   Sat Dec 10, 2022   1855 Patient without CT findings or new perfusion deficits.  Again the facial droop appears voluntary or possibly dystonia.  There is no upper extremity weakness noted by this examiner.  Patient evaluated with Dr. Weaver from the neurology team who recommends MRI in the a.m. with observation.  Patient not a tPA candidate at this time with the questionable facial droop only symptoms.   [PC]      ED Course User Index  [PC] Vinay Hanna MD                                          Labs Reviewed   COMPREHENSIVE METABOLIC PANEL - Abnormal; Notable for the following components:       Result Value    Creatinine 1.15 (*)     Alkaline Phosphatase 118 (*)     eGFR 59.3 (*)     All other components within normal limits    Narrative:     GFR Normal >60  Chronic Kidney Disease <60  Kidney Failure <15     PROTIME-INR - Normal    Narrative:     Therapeutic range for most indications is 2.0-3.0 INR,  or 2.5-3.5 for mechanical heart valves.   APTT - Normal     Narrative:     The recommended Heparin therapeutic range is 68-97 seconds.   TROPONIN (IN-HOUSE) - Normal    Narrative:     Troponin T Reference Range:  <= 0.03 ng/mL-   Negative for AMI  >0.03 ng/mL-     Abnormal for myocardial necrosis.  Clinicians would have to utilize clinical acumen, EKG, Troponin and serial changes to determine if it is an Acute Myocardial Infarction or myocardial injury due to an underlying chronic condition.       Results may be falsely decreased if patient taking Biotin.     CBC WITH AUTO DIFFERENTIAL - Normal   RAINBOW DRAW    Narrative:     The following orders were created for panel order [place] Draw.  Procedure                               Abnormality         Status                     ---------                               -----------         ------                     Green Top (Gel)[[id number]]                                  Final result               Lavender Top[[id number]]                                     Final result               Gold Top - SST[[id number]]                                                              Light Blue Top[[id number]]                                   In process                   Please view results for these tests on the individual orders.   POCT GLUCOSE FINGERSTICK   PREVIOUS HISTORY   TYPE AND SCREEN   CBC AND DIFFERENTIAL    Narrative:     The following orders were created for panel order CBC & Differential.  Procedure                               Abnormality         Status                     ---------                               -----------         ------                     CBC Auto Differential[[id number]]        Normal              Final result                 Please view results for these tests on the individual orders.   GREEN TOP   LAVENDER TOP   GOLD TOP - SST   LIGHT BLUE TOP   EXTRA TUBES    Narrative:     The following orders were created for panel order Extra Tubes.  Procedure                               Abnormality          Status                     ---------                               -----------         ------                     Lavender Top[[id number]]                                     In process                 Gold Top - SST[[id number]]                                   In process                 Green Top (Gel)[[id number]]                                  In process                   Please view results for these tests on the individual orders.   LAVENDER TOP   GOLD TOP - SST   GREEN TOP       XR Chest 1 View   Final Result   No acute chest disease.      Electronically signed by:  Sid Mccormack DO  12/10/2022 6:40 PM CST   Workstation: [id number]      CT CEREBRAL PERFUSION WITH & WITHOUT CONTRAST   Final Result      1. Stable narrowing of the left ICA at its origin resulting in   approximately 50% narrowing.   2. Stable narrowing of the distal left MCA M2 branches. This is   unchanged relative to 3/4/2022.   3. No significant acute perfusion defects are seen.                  Electronically signed by:  Jaziel Benjamin MD  12/10/2022 6:39 PM CST   Workstation: [id number],      CT Angiogram Head w AI Analysis of LVO   Final Result      1. Stable narrowing of the left ICA at its origin resulting in   approximately 50% narrowing.   2. Stable narrowing of the distal left MCA M2 branches. This is   unchanged relative to 3/4/2022.   3. No significant acute perfusion defects are seen.                  Electronically signed by:  Jaziel Benjamin MD  12/10/2022 6:39 PM CST   Workstation: [id number],      CT Angiogram Neck   Final Result      1. Stable narrowing of the left ICA at its origin resulting in   approximately 50% narrowing.   2. Stable narrowing of the distal left MCA M2 branches. This is   unchanged relative to 3/4/2022.   3. No significant acute perfusion defects are seen.                  Electronically signed by:  Jaziel Benjamin MD  12/10/2022 6:39 PM CST   Workstation: [id number],      CT Head Without Contrast Stroke Protocol    Final Result      There is no acute intracranial abnormality.      Large old infarct in the left MCA distribution is again noted.       If clinical concern exists regarding an acute ischemic/vascular   etiology being responsible for patient's symptomatology, an MRI   of the brain is more sensitive than the current study, in ruling   out such a possibility.         Electronically signed by:  Juan Carlos Kim MD  12/10/2022 5:47 PM   [facility] Workstation: [id number]            MDM    Final diagnoses:   Stroke-like symptoms   Right arm weakness   Facial droop   Dysarthria due to old stroke       ED Disposition  ED Disposition     ED Disposition   Decision to Admit    Condition   --    Comment   Level of Care: Stepdown [25]   Diagnosis: Stroke-like symptoms [132827]   Admitting Physician: MICHAEL POZO [[id number]]   Attending Physician: MICHAEL POZO [[id number]]               No follow-up provider specified.       Medication List      No changes were made to your prescriptions during this visit.          Vinay Hanna MD  12/10/22 2597

## 2022-12-11 ENCOUNTER — APPOINTMENT (OUTPATIENT)
Dept: CARDIOLOGY | Facility: HOSPITAL | Age: 47
End: 2022-12-11

## 2022-12-11 ENCOUNTER — APPOINTMENT (OUTPATIENT)
Dept: MRI IMAGING | Facility: HOSPITAL | Age: 47
End: 2022-12-11

## 2022-12-11 VITALS
HEIGHT: 60 IN | OXYGEN SATURATION: 97 % | BODY MASS INDEX: 37.66 KG/M2 | DIASTOLIC BLOOD PRESSURE: 55 MMHG | TEMPERATURE: 97.9 F | WEIGHT: 191.8 LBS | RESPIRATION RATE: 18 BRPM | HEART RATE: 61 BPM | SYSTOLIC BLOOD PRESSURE: 98 MMHG

## 2022-12-11 LAB
ANION GAP SERPL CALCULATED.3IONS-SCNC: 9 MMOL/L (ref 5–15)
BASOPHILS # BLD AUTO: 0.05 10*3/MM3 (ref 0–0.2)
BASOPHILS NFR BLD AUTO: 1 % (ref 0–1.5)
BH CV ECHO MEAS - ACS: 1.72 CM
BH CV ECHO MEAS - AO MAX PG: 6.5 MMHG
BH CV ECHO MEAS - AO MEAN PG: 2.08 MMHG
BH CV ECHO MEAS - AO ROOT DIAM: 2.5 CM
BH CV ECHO MEAS - AO V2 MAX: 127 CM/SEC
BH CV ECHO MEAS - AO V2 VTI: 19.9 CM
BH CV ECHO MEAS - AVA(I,D): 3.2 CM2
BH CV ECHO MEAS - EDV(CUBED): 76.5 ML
BH CV ECHO MEAS - EDV(MOD-SP2): 66.3 ML
BH CV ECHO MEAS - EDV(MOD-SP4): 70.1 ML
BH CV ECHO MEAS - EF(MOD-BP): 67.1 %
BH CV ECHO MEAS - EF(MOD-SP2): 72.4 %
BH CV ECHO MEAS - EF(MOD-SP4): 55.8 %
BH CV ECHO MEAS - ESV(CUBED): 12.8 ML
BH CV ECHO MEAS - ESV(MOD-SP2): 18.3 ML
BH CV ECHO MEAS - ESV(MOD-SP4): 31 ML
BH CV ECHO MEAS - FS: 44.9 %
BH CV ECHO MEAS - IVS/LVPW: 1.02 CM
BH CV ECHO MEAS - IVSD: 0.92 CM
BH CV ECHO MEAS - LA DIMENSION: 3.3 CM
BH CV ECHO MEAS - LAT PEAK E' VEL: 11.1 CM/SEC
BH CV ECHO MEAS - LV DIASTOLIC VOL/BSA (35-75): 38.3 CM2
BH CV ECHO MEAS - LV MASS(C)D: 123.2 GRAMS
BH CV ECHO MEAS - LV MAX PG: 4.9 MMHG
BH CV ECHO MEAS - LV MEAN PG: 2.7 MMHG
BH CV ECHO MEAS - LV SYSTOLIC VOL/BSA (12-30): 16.9 CM2
BH CV ECHO MEAS - LV V1 MAX: 110.3 CM/SEC
BH CV ECHO MEAS - LV V1 VTI: 25.8 CM
BH CV ECHO MEAS - LVIDD: 4.2 CM
BH CV ECHO MEAS - LVIDS: 2.34 CM
BH CV ECHO MEAS - LVOT AREA: 2.44 CM2
BH CV ECHO MEAS - LVOT DIAM: 1.76 CM
BH CV ECHO MEAS - LVPWD: 0.9 CM
BH CV ECHO MEAS - MED PEAK E' VEL: 9 CM/SEC
BH CV ECHO MEAS - MV A MAX VEL: 47.4 CM/SEC
BH CV ECHO MEAS - MV DEC SLOPE: 683 CM/SEC2
BH CV ECHO MEAS - MV E MAX VEL: 110 CM/SEC
BH CV ECHO MEAS - MV E/A: 2.32
BH CV ECHO MEAS - MV MAX PG: 4.7 MMHG
BH CV ECHO MEAS - MV MEAN PG: 1.13 MMHG
BH CV ECHO MEAS - MV P1/2T: 47.1 MSEC
BH CV ECHO MEAS - MV V2 VTI: 35.6 CM
BH CV ECHO MEAS - MVA(P1/2T): 4.7 CM2
BH CV ECHO MEAS - MVA(VTI): 1.77 CM2
BH CV ECHO MEAS - PA V2 MAX: 85.3 CM/SEC
BH CV ECHO MEAS - RAP SYSTOLE: 3 MMHG
BH CV ECHO MEAS - RVDD: 2.38 CM
BH CV ECHO MEAS - RVSP: 25.3 MMHG
BH CV ECHO MEAS - SI(MOD-SP2): 26.2 ML/M2
BH CV ECHO MEAS - SI(MOD-SP4): 21.4 ML/M2
BH CV ECHO MEAS - SV(LVOT): 63 ML
BH CV ECHO MEAS - SV(MOD-SP2): 48 ML
BH CV ECHO MEAS - SV(MOD-SP4): 39.1 ML
BH CV ECHO MEAS - TAPSE (>1.6): 2.48 CM
BH CV ECHO MEAS - TR MAX PG: 22.3 MMHG
BH CV ECHO MEAS - TR MAX VEL: 236.2 CM/SEC
BH CV ECHO MEASUREMENTS AVERAGE E/E' RATIO: 10.95
BH CV ECHO SHUNT ASSESSMENT PERFORMED (HIDDEN SCRIPTING): 1
BUN SERPL-MCNC: 14 MG/DL (ref 6–20)
BUN/CREAT SERPL: 13.9 (ref 7–25)
CALCIUM SPEC-SCNC: 9.2 MG/DL (ref 8.6–10.5)
CHLORIDE SERPL-SCNC: 107 MMOL/L (ref 98–107)
CO2 SERPL-SCNC: 24 MMOL/L (ref 22–29)
CREAT SERPL-MCNC: 1.01 MG/DL (ref 0.57–1)
DEPRECATED RDW RBC AUTO: 42.6 FL (ref 37–54)
EGFRCR SERPLBLD CKD-EPI 2021: 69.2 ML/MIN/1.73
EOSINOPHIL # BLD AUTO: 0.25 10*3/MM3 (ref 0–0.4)
EOSINOPHIL NFR BLD AUTO: 5 % (ref 0.3–6.2)
ERYTHROCYTE [DISTWIDTH] IN BLOOD BY AUTOMATED COUNT: 12.9 % (ref 12.3–15.4)
GLUCOSE SERPL-MCNC: 85 MG/DL (ref 65–99)
HCT VFR BLD AUTO: 38.4 % (ref 34–46.6)
HGB BLD-MCNC: 12.3 G/DL (ref 12–15.9)
IMM GRANULOCYTES # BLD AUTO: 0.02 10*3/MM3 (ref 0–0.05)
IMM GRANULOCYTES NFR BLD AUTO: 0.4 % (ref 0–0.5)
LV EF 2D ECHO EST: 58 %
LYMPHOCYTES # BLD AUTO: 2.12 10*3/MM3 (ref 0.7–3.1)
LYMPHOCYTES NFR BLD AUTO: 42.7 % (ref 19.6–45.3)
MAGNESIUM SERPL-MCNC: 1.9 MG/DL (ref 1.6–2.6)
MAXIMAL PREDICTED HEART RATE: 173 BPM
MCH RBC QN AUTO: 29.4 PG (ref 26.6–33)
MCHC RBC AUTO-ENTMCNC: 32 G/DL (ref 31.5–35.7)
MCV RBC AUTO: 91.9 FL (ref 79–97)
MONOCYTES # BLD AUTO: 0.38 10*3/MM3 (ref 0.1–0.9)
MONOCYTES NFR BLD AUTO: 7.7 % (ref 5–12)
NEUTROPHILS NFR BLD AUTO: 2.14 10*3/MM3 (ref 1.7–7)
NEUTROPHILS NFR BLD AUTO: 43.2 % (ref 42.7–76)
NRBC BLD AUTO-RTO: 0 /100 WBC (ref 0–0.2)
PLATELET # BLD AUTO: 223 10*3/MM3 (ref 140–450)
PMV BLD AUTO: 11.9 FL (ref 6–12)
POTASSIUM SERPL-SCNC: 4.1 MMOL/L (ref 3.5–5.2)
RBC # BLD AUTO: 4.18 10*6/MM3 (ref 3.77–5.28)
SODIUM SERPL-SCNC: 140 MMOL/L (ref 136–145)
STRESS TARGET HR: 147 BPM
WBC NRBC COR # BLD: 4.96 10*3/MM3 (ref 3.4–10.8)

## 2022-12-11 PROCEDURE — G0378 HOSPITAL OBSERVATION PER HR: HCPCS

## 2022-12-11 PROCEDURE — 85025 COMPLETE CBC W/AUTO DIFF WBC: CPT | Performed by: FAMILY MEDICINE

## 2022-12-11 PROCEDURE — 83735 ASSAY OF MAGNESIUM: CPT | Performed by: FAMILY MEDICINE

## 2022-12-11 PROCEDURE — 80048 BASIC METABOLIC PNL TOTAL CA: CPT | Performed by: FAMILY MEDICINE

## 2022-12-11 PROCEDURE — 70551 MRI BRAIN STEM W/O DYE: CPT

## 2022-12-11 PROCEDURE — 93306 TTE W/DOPPLER COMPLETE: CPT

## 2022-12-11 PROCEDURE — 93306 TTE W/DOPPLER COMPLETE: CPT | Performed by: INTERNAL MEDICINE

## 2022-12-11 RX ADMIN — LOSARTAN POTASSIUM 50 MG: 50 TABLET, FILM COATED ORAL at 08:58

## 2022-12-11 RX ADMIN — PANTOPRAZOLE SODIUM 40 MG: 40 TABLET, DELAYED RELEASE ORAL at 08:58

## 2022-12-11 RX ADMIN — Medication 10 ML: at 08:58

## 2022-12-11 RX ADMIN — FUROSEMIDE 20 MG: 20 TABLET ORAL at 08:58

## 2022-12-11 RX ADMIN — ESCITALOPRAM OXALATE 30 MG: 10 TABLET ORAL at 08:58

## 2022-12-11 RX ADMIN — ASPIRIN 325 MG: 325 TABLET ORAL at 08:58

## 2022-12-11 NOTE — PLAN OF CARE
Problem: Fall Injury Risk  Goal: Absence of Fall and Fall-Related Injury  Outcome: Ongoing, Progressing  Intervention: Promote Injury-Free Environment  Recent Flowsheet Documentation  Taken 12/11/2022 0600 by Abhay Box RN  Safety Promotion/Fall Prevention:   safety round/check completed   fall prevention program maintained  Taken 12/11/2022 0500 by Abhay Box RN  Safety Promotion/Fall Prevention: safety round/check completed  Taken 12/11/2022 0400 by Abhay Box RN  Safety Promotion/Fall Prevention:   safety round/check completed   fall prevention program maintained  Taken 12/11/2022 0300 by Abhay Box RN  Safety Promotion/Fall Prevention: safety round/check completed  Taken 12/11/2022 0200 by Abhay Box RN  Safety Promotion/Fall Prevention:   safety round/check completed   fall prevention program maintained  Taken 12/11/2022 0100 by Abhay Box RN  Safety Promotion/Fall Prevention: safety round/check completed  Taken 12/11/2022 0000 by Abhay Box RN  Safety Promotion/Fall Prevention:   safety round/check completed   fall prevention program maintained  Taken 12/10/2022 2300 by Abhay Box RN  Safety Promotion/Fall Prevention: safety round/check completed  Taken 12/10/2022 2200 by Abhay Box RN  Safety Promotion/Fall Prevention:   safety round/check completed   fall prevention program maintained  Taken 12/10/2022 2100 by Abhay Box RN  Safety Promotion/Fall Prevention: safety round/check completed  Taken 12/10/2022 2000 by Abhay Box RN  Safety Promotion/Fall Prevention:   safety round/check completed   fall prevention program maintained     Problem: Adjustment to Illness (Stroke, Ischemic/Transient Ischemic Attack)  Goal: Optimal Coping  Outcome: Ongoing, Progressing     Problem: Bowel Elimination Impaired (Stroke, Ischemic/Transient Ischemic Attack)  Goal: Effective Bowel Elimination  Outcome: Ongoing, Progressing     Problem: Cerebral Tissue Perfusion (Stroke, Ischemic/Transient Ischemic  Attack)  Goal: Optimal Cerebral Tissue Perfusion  Outcome: Ongoing, Progressing     Problem: Cognitive Impairment (Stroke, Ischemic/Transient Ischemic Attack)  Goal: Optimal Cognitive Function  Outcome: Ongoing, Progressing     Problem: Communication Impairment (Stroke, Ischemic/Transient Ischemic Attack)  Goal: Improved Communication Skills  Outcome: Ongoing, Progressing     Problem: Functional Ability Impaired (Stroke, Ischemic/Transient Ischemic Attack)  Goal: Optimal Functional Ability  Outcome: Ongoing, Progressing     Problem: Respiratory Compromise (Stroke, Ischemic/Transient Ischemic Attack)  Goal: Effective Oxygenation and Ventilation  Outcome: Ongoing, Progressing  Intervention: Optimize Oxygenation and Ventilation  Recent Flowsheet Documentation  Taken 12/11/2022 0600 by Abhay Box RN  Head of Bed (HOB) Positioning: HOB at 20-30 degrees  Taken 12/11/2022 0400 by Abhay Box RN  Head of Bed (HOB) Positioning: HOB at 20-30 degrees  Taken 12/11/2022 0200 by Abhay Box RN  Head of Bed (HOB) Positioning: HOB at 20-30 degrees  Taken 12/11/2022 0000 by Abhay Box RN  Head of Bed (HOB) Positioning: HOB at 20-30 degrees  Taken 12/10/2022 2200 by Abhay Box RN  Head of Bed (HOB) Positioning: HOB at 20-30 degrees  Taken 12/10/2022 2000 by Abhay Box RN  Head of Bed (HOB) Positioning: HOB at 20-30 degrees     Problem: Sensorimotor Impairment (Stroke, Ischemic/Transient Ischemic Attack)  Goal: Improved Sensorimotor Function  Outcome: Ongoing, Progressing  Intervention: Optimize Range of Motion, Motor Control and Function  Recent Flowsheet Documentation  Taken 12/11/2022 0600 by Abhay Box RN  Positioning/Transfer Devices:   pillows   in use  Taken 12/11/2022 0400 by Abhay Box RN  Positioning/Transfer Devices:   pillows   in use  Taken 12/11/2022 0200 by Abhay Box RN  Positioning/Transfer Devices:   pillows   in use  Taken 12/11/2022 0000 by Abhay Box RN  Positioning/Transfer Devices:   pillows    in use  Taken 12/10/2022 2200 by Abhay Box, RN  Positioning/Transfer Devices:   pillows   in use  Taken 12/10/2022 2000 by Abhay Box RN  Positioning/Transfer Devices:   pillows   in use     Problem: Swallowing Impairment (Stroke, Ischemic/Transient Ischemic Attack)  Goal: Optimal Eating and Swallowing without Aspiration  Outcome: Ongoing, Progressing     Problem: Urinary Elimination Impaired (Stroke, Ischemic/Transient Ischemic Attack)  Goal: Effective Urinary Elimination  Outcome: Ongoing, Progressing   Goal Outcome Evaluation:

## 2022-12-11 NOTE — ED NOTES
"Nursing report ED to floor  Myles Shannon  47 y.o.  female    HPI:   Chief Complaint   Patient presents with    Facial Droop       Admitting doctor:   Ned Shultz MD    Consulting provider(s):  Consults       Date and Time Order Name Status Description    12/10/2022  5:21 PM Inpatient Neurology Consult Stroke      12/10/2022  5:21 PM Inpatient Neurology Consult Stroke               Admitting diagnosis:   The primary encounter diagnosis was Stroke-like symptoms. Diagnoses of Right arm weakness, Facial droop, and Dysarthria due to old stroke were also pertinent to this visit.    Code status:   Current Code Status       Date Active Code Status Order ID Comments User Context       12/10/2022 1903 CPR (Attempt to Resuscitate) [id number]  Joel Bhakta MD ED        Question Answer    Code Status (Patient has no pulse and is not breathing) CPR (Attempt to Resuscitate)    Medical Interventions (Patient has pulse or is breathing) Full Support                    Allergies:   Abilify [aripiprazole], Buspirone, and Penicillins    Intake and Output  No intake or output data in the 24 hours ending 12/10/22 1929    Weight:       12/10/22  1749   Weight: 95.3 kg (210 lb 3.2 oz)       Most recent vitals:   Vitals:    12/10/22 1716 12/10/22 1749 12/10/22 1922   BP: 137/86 131/87 134/75   BP Location:  Left arm    Patient Position:  Lying    Pulse:  62 (!) 46   Resp:  18 16   Temp:  97.8 °F (36.6 °C)    SpO2:  100% 100%   Weight:  95.3 kg (210 lb 3.2 oz)    Height:  152.4 cm (60\")      Oxygen Therapy: RA    Active LDAs/IV Access:   Lines, Drains & Airways       Active LDAs       Name Placement date Placement time Site Days    Peripheral IV 12/10/22 1722 Right Antecubital 12/10/22  1722  Antecubital  less than 1                    Labs (abnormal labs have a star):   Labs Reviewed   COMPREHENSIVE METABOLIC PANEL - Abnormal; Notable for the following components:       Result Value    Creatinine 1.15 (*)     Alkaline " Phosphatase 118 (*)     eGFR 59.3 (*)     All other components within normal limits    Narrative:     GFR Normal >60  Chronic Kidney Disease <60  Kidney Failure <15     PROTIME-INR - Normal    Narrative:     Therapeutic range for most indications is 2.0-3.0 INR,  or 2.5-3.5 for mechanical heart valves.   APTT - Normal    Narrative:     The recommended Heparin therapeutic range is 68-97 seconds.   TROPONIN (IN-HOUSE) - Normal    Narrative:     Troponin T Reference Range:  <= 0.03 ng/mL-   Negative for AMI  >0.03 ng/mL-     Abnormal for myocardial necrosis.  Clinicians would have to utilize clinical acumen, EKG, Troponin and serial changes to determine if it is an Acute Myocardial Infarction or myocardial injury due to an underlying chronic condition.       Results may be falsely decreased if patient taking Biotin.     CBC WITH AUTO DIFFERENTIAL - Normal   RAINBOW DRAW    Narrative:     The following orders were created for panel order [place] Draw.  Procedure                               Abnormality         Status                     ---------                               -----------         ------                     Green Top (Gel)[[id number]]                                  Final result               Lavender Top[[id number]]                                     Final result               Gold Top - SST[[id number]]                                                              Light Blue Top[[id number]]                                   Final result                 Please view results for these tests on the individual orders.   POCT GLUCOSE FINGERSTICK   TYPE AND SCREEN   PREVIOUS HISTORY   CBC AND DIFFERENTIAL    Narrative:     The following orders were created for panel order CBC & Differential.  Procedure                               Abnormality         Status                     ---------                               -----------         ------                     CBC Auto Differential[[id number]]        Normal               Final result                 Please view results for these tests on the individual orders.   GREEN TOP   LAVENDER TOP   LIGHT BLUE TOP   EXTRA TUBES    Narrative:     The following orders were created for panel order Extra Tubes.  Procedure                               Abnormality         Status                     ---------                               -----------         ------                     Lavender Top[[id number]]                                     Final result               Gold Top - SST[[id number]]                                   Final result               Green Top (Gel)[[id number]]                                  Final result                 Please view results for these tests on the individual orders.   LAVENDER TOP   GOLD TOP - SST   GREEN TOP   GOLD TOP - SST       Meds given in ED:   Medications   sodium chloride 0.9 % flush 10 mL (has no administration in time range)   iopamidol (ISOVUE-370) 76 % injection 90 mL (90 mL Intravenous Given by Other 12/10/22 1746)   iopamidol (ISOVUE-370) 76 % injection 50 mL (50 mL Intravenous Given by Other 12/10/22 9497)           NIH Stroke Scale:  Interval: baseline    Isolation/Infection(s):  No active isolations   ESBL E coli     COVID Testing  Collected na  Resulted na    Nursing report ED to floor:  Mental status: a+o  Ambulatory status: standby assist  Precautions: na    ED nurse phone extentow[facility]- [phone]

## 2022-12-11 NOTE — DISCHARGE SUMMARY
"    [facility] Medicine Services  DISCHARGE SUMMARY       Date of Admission: 12/10/2022  Date of Discharge:  12/11/2022  Primary Care Physician: Sonya Crooks APRN    Presenting Problem/History of Present Illness:  Facial droop [R29.810]  Right arm weakness [R29.898]  Stroke-like symptoms [R29.90]  Dysarthria due to old stroke [I69.322]       Final Discharge Diagnoses:  Active Hospital Problems    Diagnosis    • **Stroke-like symptoms        Consults:   Consults     Date and Time Order Name Status Description    12/10/2022  5:21 PM Inpatient Neurology Consult Stroke      12/10/2022  5:21 PM Inpatient Neurology Consult Stroke                Chief Complaint on Day of Discharge: none    Hospital Course:  The patient is a 47 y.o. female who presented to [facility] with  past medical history of hypertension, hyperlipidemia, CVA presenting to the ER from her nursing home after she was found to have a facial droop.  She was also having some decreased  strength as well.  Nursing home reports the symptoms started about 2 hours prior to arrival.  She does have a history of CVA as well.  Patient was admitted and was seen by neurology who recommended doing MRI of the brain to rule out stroke.  MRI of the brain revealed old stroke but no acute findings.  Her symptoms had resolved, patient was then discharged back to [place].    Condition on Discharge: Stable    Physical Exam on Discharge:  /63 (BP Location: Left arm, Patient Position: Lying)   Pulse 50   Temp 98 °F (36.7 °C) (Oral)   Resp 16   Ht 152.4 cm (60\")   Wt 87 kg (191 lb 12.8 oz)   LMP  (LMP Unknown)   SpO2 97%   BMI 37.46 kg/m²   Physical Exam  Vitals and nursing note reviewed.   Constitutional:       General: She is not in acute distress.     Appearance: She is well-developed. She is not diaphoretic.   HENT:      Head: Normocephalic and atraumatic.   Cardiovascular:      Rate and " Rhythm: Normal rate.   Pulmonary:      Effort: Pulmonary effort is normal. No respiratory distress.      Breath sounds: No wheezing.   Abdominal:      General: There is no distension.      Palpations: Abdomen is soft.   Musculoskeletal:         General: Normal range of motion.   Skin:     General: Skin is warm and dry.   Neurological:      General: No focal deficit present.      Mental Status: She is alert. Mental status is at baseline.      Cranial Nerves: No cranial nerve deficit.   Psychiatric:         Behavior: Behavior normal.         Thought Content: Thought content normal.         Judgment: Judgment normal.           Discharge Disposition:  Skilled Nursing Facility (DC - External)    Discharge Medications:     Discharge Medications      Continue These Medications      Instructions Start Date   acetaminophen 500 MG tablet  Commonly known as: TYLENOL   1,000 mg, Oral, Every 8 Hours PRN      aspirin 325 MG tablet   325 mg, Oral, Daily      atorvastatin 80 MG tablet  Commonly known as: LIPITOR   80 mg, Oral, Nightly      butalbital-acetaminophen-caffeine -40 MG per tablet  Commonly known as: FIORICET, ESGIC   1 tablet, Oral, Daily PRN      cloNIDine 0.1 MG tablet  Commonly known as: CATAPRES   0.1 mg, Oral, Every 30 Minutes PRN, Give one tap po every 30 mins when BP is over 160      escitalopram 20 MG tablet  Commonly known as: LEXAPRO   30 mg, Oral, Daily, 30 mg      furosemide 20 MG tablet  Commonly known as: LASIX   20 mg, Oral, Daily      hydrOXYzine pamoate 50 MG capsule  Commonly known as: VISTARIL   50 mg, Oral, 2 Times Daily      ketorolac 10 MG tablet  Commonly known as: TORADOL   10 mg, Oral, Every 6 Hours PRN      levETIRAcetam 1000 MG tablet  Commonly known as: KEPPRA   1,000 mg, Oral, 2 Times Daily      losartan 50 MG tablet  Commonly known as: COZAAR   50 mg, Oral, Daily      meclizine 12.5 MG tablet  Commonly known as: ANTIVERT   12.5 mg, Oral, 3 Times Daily PRN      medroxyPROGESTERone 150  ambulatory MG/ML injection  Commonly known as: DEPO-PROVERA   150 mg, Intramuscular, Every 3 Months      melatonin 5 MG tablet tablet   10 mg, Oral, Every Night at Bedtime      nabumetone 750 MG tablet  Commonly known as: RELAFEN   750 mg, Oral, 2 Times Daily PRN      orphenadrine 100 MG 12 hr tablet  Commonly known as: NORFLEX   100 mg, Oral, 2 Times Daily PRN      pantoprazole 40 MG EC tablet  Commonly known as: Protonix   40 mg, Oral, Daily      potassium chloride 10 MEQ CR tablet  Commonly known as: K-DUR,KLOR-CON   10 mEq, Oral, Daily      traZODone 50 MG tablet  Commonly known as: DESYREL   50 mg, Oral, Nightly             Discharge Diet:   Diet Instructions     Diet: Cardiac      Discharge Diet: Cardiac      Heart healthy           Activity at Discharge:   Activity Instructions     Activity as Tolerated            Discharge Care Plan/Instructions: Follow-up with PCP and neurology, continue with current medications    Follow-up Appointments:   Future Appointments   Date Time Provider Department Center   1/20/2023 11:00 AM Michael Hagan APRN [facility] None       Test Results Pending at Discharge:   Pending Labs     Order Current Status    [facility] SCREEN - Swab, Axilla Right, Axilla Left and Groin In process    [place] Draw In process          Joel Bhakta MD  12/11/22  15:15 CST

## 2022-12-11 NOTE — ED NOTES
Patient is at an increased risk for falls. Fall light activated, yellow falls bracelet and yellow non-skid socks placed on patient. Call light within reach and patient instructed to call for assistance. Side rails up x2, bed alarm activated, and gait belt readily accessible.

## 2022-12-11 NOTE — H&P
[facility] Medicine Admission      Date of Admission: 12/10/2022      Primary Care Physician: Sonya Crooks APRN      Chief Complaint: Facial droop    HPI:    This is a 47-year-old female with past medical history of hypertension, hyperlipidemia, CVA presenting to the ER from her nursing home after she was found to have a facial droop.  She was also having some decreased  strength as well.  Nursing home reports the symptoms started about 2 hours prior to arrival.  She does have a history of CVA as well.  As of right now she does have a little facial droop and does report some right sided weakness as well.  Denies any chest pain or any shortness of air.    Past Medical History:  has a past medical history of Abdominal pain, Abdominal pain, right lower quadrant, Acute bilateral otitis media, Allergic rhinitis, Backache, Candidiasis of skin, Cellulitis of neck, Chest pain, Contraception, Cough, Dysfunction of eustachian tube, Dyspareunia, female, Elevated cholesterol, Excessive or frequent menstruation, Flank pain, Generalized aches and pains, Headache, Health maintenance examination, Hypertension, Infection of skin and subcutaneous tissue, Irregular periods, Kidney stone, Menorrhagia, Nausea vomiting and diarrhea, Obesity, Open wound of abdominal wall, Otalgia, Pain in left foot, Pain in unspecified hand, Removed Impacted cerumen (2012), Rhinitis, Seizure ([facility]) (08/15/2019), Shoulder pain, Shoulder tendinitis, Spider bite wound, Staphylococcal infection of skin, Stroke ([facility]), Swollen feet, Tinea cruris, Tobacco dependence syndrome, Upper respiratory infection, Urinary tract infectious disease, Vertigo, Visit for gynecologic examination, and Vulvovaginitis.    Past Surgical History:  has a past surgical history that includes  section; Dilation and curettage of uterus (); Incision and Drainage Abscess (2012); Injection of Medication  (05/08/2015); Injection of Medication (10/10/2013); and Injection of Medication (01/16/2014).    Family History: family history includes Anxiety disorder in her brother; Arthritis in her mother; Breast cancer in her maternal grandmother and mother; Cancer in her maternal grandfather and another family member; Depression in her daughter and another family member; Diabetes in her maternal aunt and maternal uncle; Endometrial cancer in her sister and another family member; Heart disease in her mother; Hypertension in her mother; Liver disease in her father; Lung cancer in an other family member; Mental illness in her daughter; Stroke in her maternal grandmother and paternal grandmother; Thyroid disease in her mother.    Social History:  reports that she has been smoking cigarettes. She has been smoking an average of .5 packs per day. She has never used smokeless tobacco. She reports that she does not drink alcohol and does not use drugs.    Allergies:   Allergies   Allergen Reactions   • Abilify [Aripiprazole] Nausea Only   • Buspirone Rash   • Penicillins Hives and Nausea And Vomiting       Medications: Scheduled Meds:   Continuous Infusions:   PRN Meds:.•  sodium chloride  No current facility-administered medications on file prior to encounter.     Current Outpatient Medications on File Prior to Encounter   Medication Sig Dispense Refill   • acetaminophen (TYLENOL) 500 MG tablet Take 2 tablets by mouth Every 8 (Eight) Hours As Needed for Mild Pain.     • aspirin 325 MG tablet Take 325 mg by mouth Daily.     • atorvastatin (LIPITOR) 80 MG tablet Take 1 tablet by mouth Every Night.     • busPIRone (BUSPAR) 15 MG tablet Take 15 mg by mouth 3 (Three) Times a Day.     • butalbital-acetaminophen-caffeine (FIORICET, ESGIC) -40 MG per tablet Take 1 tablet by mouth Daily As Needed for Headache.     • cloNIDine (CATAPRES) 0.1 MG tablet Take 0.1 mg by mouth Every 30 (Thirty) Minutes As Needed for High Blood Pressure. Give  one tap po every 30 mins when BP is over 160     • DICLOFENAC PO Take 75 mg by mouth 2 (two) times a day.     • escitalopram (LEXAPRO) 20 MG tablet Take 1.5 tablets by mouth Daily. 30 mg     • furosemide (LASIX) 20 MG tablet Take 20 mg by mouth Daily.     • hydrOXYzine pamoate (VISTARIL) 50 MG capsule Take 1 capsule by mouth 2 (Two) Times a Day.     • levETIRAcetam (KEPPRA) 1000 MG tablet Take 1 tablet by mouth 2 (Two) Times a Day.     • losartan (COZAAR) 50 MG tablet Take 50 mg by mouth Daily.     • meclizine (ANTIVERT) 12.5 MG tablet Take 12.5 mg by mouth 3 (Three) Times a Day As Needed for Dizziness.     • medroxyPROGESTERone (DEPO-PROVERA) 150 MG/ML injection Inject 1 mL into the appropriate muscle as directed by prescriber Every 3 (Three) Months.     • melatonin 5 MG tablet tablet Take 10 mg by mouth every night at bedtime.     • nabumetone (RELAFEN) 750 MG tablet Take 1 tablet by mouth 2 (Two) Times a Day As Needed for Mild Pain. 6 tablet 0   • orphenadrine (NORFLEX) 100 MG 12 hr tablet Take 1 tablet by mouth 2 (Two) Times a Day As Needed for Muscle Spasms or Mild Pain . 10 tablet 0   • pantoprazole (Protonix) 40 MG EC tablet Take 1 tablet by mouth Daily. 30 tablet 0   • potassium chloride (K-DUR,KLOR-CON) 10 MEQ CR tablet Take 10 mEq by mouth Daily.     • solifenacin (VESICARE) 10 MG tablet Take 10 mg by mouth Daily.     • traZODone (DESYREL) 50 MG tablet Take 50 mg by mouth Every Night.         Review of Systems:  Review of Systems   Constitutional: Negative for fever.   HENT: Negative for congestion.    Eyes: Negative for visual disturbance.   Respiratory: Negative for shortness of breath.    Cardiovascular: Negative for chest pain.   Gastrointestinal: Negative for abdominal distention.   Endocrine: Negative for polyuria.   Genitourinary: Negative for difficulty urinating.   Musculoskeletal: Negative for arthralgias.   Skin: Negative for color change.   Allergic/Immunologic: Negative for environmental  allergies.   Neurological: Negative for dizziness.   Hematological: Negative for adenopathy.   Psychiatric/Behavioral: Negative for agitation.      Otherwise complete ROS is negative except as mentioned above.    Physical Exam:   Temp:  [97.8 °F (36.6 °C)] 97.8 °F (36.6 °C)  Heart Rate:  [62] 62  Resp:  [18] 18  BP: (131-137)/(86-87) 131/87  Physical Exam  Vitals and nursing note reviewed.   Constitutional:       General: She is not in acute distress.     Appearance: She is well-developed. She is not diaphoretic.   HENT:      Head: Normocephalic and atraumatic.   Cardiovascular:      Rate and Rhythm: Normal rate.   Pulmonary:      Effort: Pulmonary effort is normal. No respiratory distress.      Breath sounds: No wheezing.   Abdominal:      General: There is no distension.      Palpations: Abdomen is soft.   Musculoskeletal:         General: Normal range of motion.   Skin:     General: Skin is warm and dry.   Neurological:      Mental Status: She is alert.      Cranial Nerves: No cranial nerve deficit.      Comments: Mild facial droop, right-sided weakness present   Psychiatric:         Behavior: Behavior normal.         Thought Content: Thought content normal.         Judgment: Judgment normal.           Results Reviewed:  I have personally reviewed current lab, radiology, and data and agree with results.  Lab Results (last 24 hours)     Procedure Component Value Units Date/Time    Protime-INR [[id number]]  (Normal) Collected: 12/10/22 1812    Specimen: Blood Updated: 12/10/22 1832     Protime 13.7 Seconds      INR 1.06    Narrative:      Therapeutic range for most indications is 2.0-3.0 INR,  or 2.5-3.5 for mechanical heart valves.    aPTT [[id number]]  (Normal) Collected: 12/10/22 1812    Specimen: Blood Updated: 12/10/22 1832     PTT 27.0 seconds     Narrative:      The recommended Heparin therapeutic range is 68-97 seconds.    [place] Draw [[id number]] Collected: 12/10/22 1726    Specimen: Blood Updated: 12/10/22  1831    Narrative:      The following orders were created for panel order [place] Draw.  Procedure                               Abnormality         Status                     ---------                               -----------         ------                     Green Top (Gel)[[id number]]                                  Final result               Lavender Top[[id number]]                                     Final result               Gold Top - SST[[id number]]                                                              Light Blue Top[[id number]]                                   In process                   Please view results for these tests on the individual orders.    Green Top (Gel) [[id number]] Collected: 12/10/22 1726    Specimen: Blood Updated: 12/10/22 1831     Extra Tube Hold for add-ons.     Comment: Auto resulted.       Lavender Top [[id number]] Collected: 12/10/22 1726    Specimen: Blood Updated: 12/10/22 1831     Extra Tube hold for add-on     Comment: Auto resulted       Extra Tubes [[id number]] Collected: 12/10/22 1812    Specimen: Blood, Venous Line Updated: 12/10/22 1813    Narrative:      The following orders were created for panel order Extra Tubes.  Procedure                               Abnormality         Status                     ---------                               -----------         ------                     Lavender Top[[id number]]                                     In process                 Gold Top - SST[[id number]]                                   In process                 Green Top (Gel)[[id number]]                                  In process                   Please view results for these tests on the individual orders.    Lavender Top [[id number]] Collected: 12/10/22 1812    Specimen: Blood Updated: 12/10/22 1813    Gold Top - SST [[id number]] Collected: 12/10/22 1812    Specimen: Blood Updated: 12/10/22 1813    Green Top (Gel) [[id number]] Collected: 12/10/22 1812    Specimen: Blood  Updated: 12/10/22 1813    Light Blue Top [[id number]] Collected: 12/10/22 1812    Specimen: Blood Updated: 12/10/22 1812    Comprehensive Metabolic Panel [[id number]]  (Abnormal) Collected: 12/10/22 1726    Specimen: Blood Updated: 12/10/22 1748     Glucose 91 mg/dL      BUN 15 mg/dL      Creatinine 1.15 mg/dL      Sodium 141 mmol/L      Potassium 4.3 mmol/L      Chloride 106 mmol/L      CO2 23.0 mmol/L      Calcium 9.4 mg/dL      Total Protein 6.7 g/dL      Albumin 4.30 g/dL      ALT (SGPT) 31 U/L      AST (SGOT) 25 U/L      Alkaline Phosphatase 118 U/L      Total Bilirubin 0.2 mg/dL      Globulin 2.4 gm/dL      A/G Ratio 1.8 g/dL      BUN/Creatinine Ratio 13.0     Anion Gap 12.0 mmol/L      eGFR 59.3 mL/min/1.73      Comment: National Kidney Foundation and American Society of Nephrology (ASN) Task Force recommended calculation based on the Chronic Kidney Disease Epidemiology Collaboration (CKD-EPI) equation refit without adjustment for race.       Narrative:      GFR Normal >60  Chronic Kidney Disease <60  Kidney Failure <15      Troponin [[id number]]  (Normal) Collected: 12/10/22 1726    Specimen: Blood Updated: 12/10/22 1748     Troponin T <0.010 ng/mL     Narrative:      Troponin T Reference Range:  <= 0.03 ng/mL-   Negative for AMI  >0.03 ng/mL-     Abnormal for myocardial necrosis.  Clinicians would have to utilize clinical acumen, EKG, Troponin and serial changes to determine if it is an Acute Myocardial Infarction or myocardial injury due to an underlying chronic condition.       Results may be falsely decreased if patient taking Biotin.      CBC & Differential [[id number]]  (Normal) Collected: 12/10/22 1726    Specimen: Blood Updated: 12/10/22 1729    Narrative:      The following orders were created for panel order CBC & Differential.  Procedure                               Abnormality         Status                     ---------                               -----------         ------                      CBC Auto Differential[[id number]]        Normal              Final result                 Please view results for these tests on the individual orders.    CBC Auto Differential [[id number]]  (Normal) Collected: 12/10/22 1726    Specimen: Blood Updated: 12/10/22 1729     WBC 6.64 10*3/mm3      RBC 4.20 10*6/mm3      Hemoglobin 12.6 g/dL      Hematocrit 38.2 %      MCV 91.0 fL      MCH 30.0 pg      MCHC 33.0 g/dL      RDW 13.1 %      RDW-SD 42.7 fl      MPV 11.2 fL      Platelets 228 10*3/mm3      Neutrophil % 53.6 %      Lymphocyte % 34.5 %      Monocyte % 7.2 %      Eosinophil % 3.9 %      Basophil % 0.6 %      Immature Grans % 0.2 %      Neutrophils, Absolute 3.56 10*3/mm3      Lymphocytes, Absolute 2.29 10*3/mm3      Monocytes, Absolute 0.48 10*3/mm3      Eosinophils, Absolute 0.26 10*3/mm3      Basophils, Absolute 0.04 10*3/mm3      Immature Grans, Absolute 0.01 10*3/mm3      nRBC 0.0 /100 WBC         Imaging Results (Last 24 Hours)     Procedure Component Value Units Date/Time    XR Chest 1 View [[id number]] Collected: 12/10/22 1752     Updated: 12/10/22 1842    Narrative:      CLINICAL HISTORY:  Acute Stroke Protocol (onset < 12 hrs)    EXAMINATION:  XR CHEST 1 VW    COMPARISON:  11/24/2022    FINDINGS:  The heart is normal in size. The pulmonary vascularity is within  normal limits.  There is no definitive evidence of acute  pulmonary infiltrate.  There is no demonstrable pleural effusion  identified.      Impression:      No acute chest disease.    Electronically signed by:  Sid Mccormack DO  12/10/2022 6:40 PM CST  Workstation: [id number]    CT Angiogram Head w AI Analysis of LVO [[id number]] Collected: 12/10/22 1731     Updated: 12/10/22 1841    Narrative:              CT NECK ANGIOGRAPHY WITHOUT THEN WITH IV CONTRAST, CT HEAD  PERFUSION WITHOUT AND WITH IV CONTRAST, CT HEAD ANGIOGRAPHY  WITHOUT THEN WITH IV CONTRAST  Site name::CT ANGIOGRAM NECK (accession [id number]), CT CEREBRAL  PERFUSION W WO CONTRAST  (accession [id number]), CT ANGIOGRAM  HEAD W AI ANALYSIS OF LVO (accession [id number])  CLINICAL STATEMENT:  Stroke, follow up.     TECHNIQUE:   CT perfusion protocol was performed through a limited portion of  the brain using intravenous  contrast.. The data acquired was  analyzed using deconvolution algorithms to find cerebral blood  volume(CBV), mean transit time(MTT), and cerebral blood flow(CBF)  for each voxel. Rapid AI software was used for the analysis.  RADIATION DOSE REDUCTION: This exam was performed according to  our departmental dose-optimization program, which includes  automated exposure control, adjustment of the mA and kV according  to patient size, and the use of iterative reconstruction  technique if available.    COMPARISON: Multiple prior exams, 3/4/2022 serves as a CTA  baseline    FINDINGS:     There is evidence of a remote left MCA distribution infarct with  resultant cystic encephalomalacia. Allowing for this there is no  region of delayed mean transit time or diminished cerebral blood  flow seen.  Technique: Nonionic intravenous contrast material was  administered, and axial images were obtained through the neck and  head per standard CTA protocol. Multiplanar reformatted and  shaded-surface display and three-dimensional volume-rendered  images were generated. Where applicable, evaluation of ICA  stenosis was performed using the site of most significant  stenosis is compared to the diameter of the ICA distal to the  stenosis at a point where the ICA walls become parallel. (NASCET)  RADIATION DOSE REDUCTION: This exam was performed according to  our departmental dose-optimization program, which includes  automated exposure control, adjustment of the mA and kV according  to patient size, and the use of iterative reconstruction  technique if available.    CT angiography data was processed via RAPID LVO AI technology to  assist clinical stroke decision-making regarding the diagnosis of  large  vessel occlusion.    COMPARISON: None.    FINDINGS:     CTA NECK: The imaged aortic arch demonstrates no luminal flap or  ulcerated plaque. The origins of the innominate, bilateral common  carotid, bilateral subclavian, and bilateral vertebral arteries  demonstrate no significant stenosis.      Focal approximately 50% narrowing of the left ICA has its origin  is noted similar to prior studies.    The bilateral external carotid arteries are patent.     The cervical vertebral arteries are patent  and codominant.        CTA HEAD:  This examination is compared to multiple prior studies including  3/4/2022. There is narrowing of the distal left MCA and M2  branches similar to the prior exam. Some of the M2 branch vessels  are occluded. These findings are all in the region expected  related to underlying cystic encephalomalacia resulting from the  acute ischemic event 12/15/2021. No new areas of vessel tapering  or narrowing are seen.  Critical Communication: Findings were discussed by Dr. Benjamin  directly with Dr. JULIUS SCHUMACHER on  12/10/2022 6:39 PM CST.  Read back obtained.      Impression:        1. Stable narrowing of the left ICA at its origin resulting in  approximately 50% narrowing.  2. Stable narrowing of the distal left MCA M2 branches. This is  unchanged relative to 3/4/2022.  3. No significant acute perfusion defects are seen.            Electronically signed by:  Jaziel Benjamin MD  12/10/2022 6:39 PM CST  Workstation: [id number],    CT Angiogram Neck [[id number]] Collected: 12/10/22 1731     Updated: 12/10/22 1841    Narrative:              CT NECK ANGIOGRAPHY WITHOUT THEN WITH IV CONTRAST, CT HEAD  PERFUSION WITHOUT AND WITH IV CONTRAST, CT HEAD ANGIOGRAPHY  WITHOUT THEN WITH IV CONTRAST  Site name::CT ANGIOGRAM NECK (accession [id number]), CT CEREBRAL  PERFUSION W WO CONTRAST (accession [id number]), CT ANGIOGRAM  HEAD W AI ANALYSIS OF LVO (accession [id number])  CLINICAL STATEMENT:  Stroke, follow up.      TECHNIQUE:   CT perfusion protocol was performed through a limited portion of  the brain using intravenous  contrast.. The data acquired was  analyzed using deconvolution algorithms to find cerebral blood  volume(CBV), mean transit time(MTT), and cerebral blood flow(CBF)  for each voxel. Rapid AI software was used for the analysis.  RADIATION DOSE REDUCTION: This exam was performed according to  our departmental dose-optimization program, which includes  automated exposure control, adjustment of the mA and kV according  to patient size, and the use of iterative reconstruction  technique if available.    COMPARISON: Multiple prior exams, 3/4/2022 serves as a CTA  baseline    FINDINGS:     There is evidence of a remote left MCA distribution infarct with  resultant cystic encephalomalacia. Allowing for this there is no  region of delayed mean transit time or diminished cerebral blood  flow seen.  Technique: Nonionic intravenous contrast material was  administered, and axial images were obtained through the neck and  head per standard CTA protocol. Multiplanar reformatted and  shaded-surface display and three-dimensional volume-rendered  images were generated. Where applicable, evaluation of ICA  stenosis was performed using the site of most significant  stenosis is compared to the diameter of the ICA distal to the  stenosis at a point where the ICA walls become parallel. (NASCET)  RADIATION DOSE REDUCTION: This exam was performed according to  our departmental dose-optimization program, which includes  automated exposure control, adjustment of the mA and kV according  to patient size, and the use of iterative reconstruction  technique if available.    CT angiography data was processed via RAPID [email or web address] AI technology to  assist clinical stroke decision-making regarding the diagnosis of  large vessel occlusion.    COMPARISON: None.    FINDINGS:     CTA NECK: The imaged aortic arch demonstrates no luminal flap  or  ulcerated plaque. The origins of the innominate, bilateral common  carotid, bilateral subclavian, and bilateral vertebral arteries  demonstrate no significant stenosis.      Focal approximately 50% narrowing of the left ICA has its origin  is noted similar to prior studies.    The bilateral external carotid arteries are patent.     The cervical vertebral arteries are patent  and codominant.        CTA HEAD:  This examination is compared to multiple prior studies including  3/4/2022. There is narrowing of the distal left MCA and M2  branches similar to the prior exam. Some of the M2 branch vessels  are occluded. These findings are all in the region expected  related to underlying cystic encephalomalacia resulting from the  acute ischemic event 12/15/2021. No new areas of vessel tapering  or narrowing are seen.  Critical Communication: Findings were discussed by Dr. Benjamin  directly with Dr. JULIUS SCHUMACHER on  12/10/2022 6:39 PM CST.  Read back obtained.      Impression:        1. Stable narrowing of the left ICA at its origin resulting in  approximately 50% narrowing.  2. Stable narrowing of the distal left MCA M2 branches. This is  unchanged relative to 3/4/2022.  3. No significant acute perfusion defects are seen.            Electronically signed by:  Jaziel Benjamin MD  12/10/2022 6:39 PM CST  Workstation: [id number],    CT CEREBRAL PERFUSION WITH & WITHOUT CONTRAST [[id number]] Collected: 12/10/22 1732     Updated: 12/10/22 1841    Narrative:              CT NECK ANGIOGRAPHY WITHOUT THEN WITH IV CONTRAST, CT HEAD  PERFUSION WITHOUT AND WITH IV CONTRAST, CT HEAD ANGIOGRAPHY  WITHOUT THEN WITH IV CONTRAST  Site name::CT ANGIOGRAM NECK (accession [id number]), CT CEREBRAL  PERFUSION W WO CONTRAST (accession [id number]), CT ANGIOGRAM  HEAD W AI ANALYSIS OF LVO (accession [id number])  CLINICAL STATEMENT:  Stroke, follow up.     TECHNIQUE:   CT perfusion protocol was performed through a limited portion of  the brain  using intravenous  contrast.. The data acquired was  analyzed using deconvolution algorithms to find cerebral blood  volume(CBV), mean transit time(MTT), and cerebral blood flow(CBF)  for each voxel. Rapid [employer] software was used for the analysis.  RADIATION DOSE REDUCTION: This exam was performed according to  our departmental dose-optimization program, which includes  automated exposure control, adjustment of the mA and kV according  to patient size, and the use of iterative reconstruction  technique if available.    COMPARISON: Multiple prior exams, 3/4/2022 serves as a CTA  baseline    FINDINGS:     There is evidence of a remote left MCA distribution infarct with  resultant cystic encephalomalacia. Allowing for this there is no  region of delayed mean transit time or diminished cerebral blood  flow seen.  Technique: Nonionic intravenous contrast material was  administered, and axial images were obtained through the neck and  head per standard CTA protocol. Multiplanar reformatted and  shaded-surface display and three-dimensional volume-rendered  images were generated. Where applicable, evaluation of ICA  stenosis was performed using the site of most significant  stenosis is compared to the diameter of the ICA distal to the  stenosis at a point where the ICA walls become parallel. (NASCET)  RADIATION DOSE REDUCTION: This exam was performed according to  our departmental dose-optimization program, which includes  automated exposure control, adjustment of the mA and kV according  to patient size, and the use of iterative reconstruction  technique if available.    CT angiography data was processed via RAPID [employer] AI technology to  assist clinical stroke decision-making regarding the diagnosis of  large vessel occlusion.    COMPARISON: None.    FINDINGS:     CTA NECK: The imaged aortic arch demonstrates no luminal flap or  ulcerated plaque. The origins of the innominate, bilateral common  carotid, bilateral subclavian,  and bilateral vertebral arteries  demonstrate no significant stenosis.      Focal approximately 50% narrowing of the left ICA has its origin  is noted similar to prior studies.    The bilateral external carotid arteries are patent.     The cervical vertebral arteries are patent  and codominant.        CTA HEAD:  This examination is compared to multiple prior studies including  3/4/2022. There is narrowing of the distal left MCA and M2  branches similar to the prior exam. Some of the M2 branch vessels  are occluded. These findings are all in the region expected  related to underlying cystic encephalomalacia resulting from the  acute ischemic event 12/15/2021. No new areas of vessel tapering  or narrowing are seen.  Critical Communication: Findings were discussed by Dr. Benjamin  directly with Dr. JULIUS SCHUMACHER on  12/10/2022 6:39 PM CST.  Read back obtained.      Impression:        1. Stable narrowing of the left ICA at its origin resulting in  approximately 50% narrowing.  2. Stable narrowing of the distal left MCA M2 branches. This is  unchanged relative to 3/4/2022.  3. No significant acute perfusion defects are seen.            Electronically signed by:  Jaziel Benjamin MD  12/10/2022 6:39 PM CST  Workstation: [id number],    CT Head Without Contrast Stroke Protocol [[id number]] Collected: 12/10/22 1731     Updated: 12/10/22 1749    Narrative:      PROCEDURE: CT HEAD WITHOUT IV CONTRAST    CLINICAL HISTORY: Neuro deficit, acute, stroke suspected    INDICATION: Same as above    Comparison: 10/16/2022    TECHNIQUE:     CT of the head was done without intravenous contrast in  orthogonal planes.    This exam was performed according to the departmental  dose-optimization program which includes automated exposure  control, adjustment of the mA and/or kV according to patient size  and/or use of iterative reconstruction technique.      FINDINGS:     There is no intracranial hemorrhage, midline shift, mass effect  or acute  focal infarct.    Large old infarct in the left MCA distribution is again noted.     There is no hydrocephalus.    The mastoid air cells are unremarkable.     The paranasal sinuses show changes of chronic sinusitis.    There is no visualization of acute displaced fractures involving  the calvarium or the skull base.       Impression:        There is no acute intracranial abnormality.    Large old infarct in the left MCA distribution is again noted.     If clinical concern exists regarding an acute ischemic/vascular  etiology being responsible for patient's symptomatology, an MRI  of the brain is more sensitive than the current study, in ruling  out such a possibility.      Electronically signed by:  Juan Carlos Kim MD  12/10/2022 5:47 PM  CST Workstation: [id number]            Assessment:    Active Hospital Problems    Diagnosis    • **Stroke-like symptoms          CVA/TIA-neurology has been consulted and they are recommending doing MRI, we will continue to monitor closely    Hypertension-continue with home medication and continue with blood pressure monitoring    Anxiety-continue with buspirone    Chronic pain-resume home pain medication    DVT prophylaxis-SCD    Patient full code    I confirmed that the patient's Advance Care Plan is present, code status is documented, or surrogate decision maker is listed in the patient's medical record.         Joel Bhakta MD  12/10/22  19:03 CST

## 2022-12-11 NOTE — PROGRESS NOTES
[facility] Nurse Practitioner - Brief Progress Note  PERMANENT  12/10/2022 21:46    [facility] - U/SD - 20 - [place] ([facility])    KEAGAN TORRES    Date of Service 12/10/2022 21:46    HPI/Events of Note [facility] Provider Assessment Note    47-year-old female admitted to the ICU for facial droop and right sided weakness     Pmhx: CVA, HTN, HLD, smoker, anxiety, Chronic pain     VSS     CXR No acute chest disease.     Patient without CT findings or new perfusion deficits.  Again the facial droop appears voluntary or possibly dystonia.  There is no upper extremity weakness noted by this examiner.  Patient evaluated with Dr. Weaver from the neurology team who   recommends MRI in the a.m. with observation.  Patient not a tPA candidate at this time with the questionable facial droop only symptoms.      Consider Cards consult HR 46-62, it appears in past EKGs bradycardia noted.  BP stable 134/75    Cr 1.15, continue to monitor I/O, follow labs    Spoke to dulce maria RN, no needs at this time.      _x____   Video Assessment performed  _x____   Most recent labs reviewed  _x____   Vital Signs reviewed  _x____   Best Practices addressed:                 Glycemic control:Please notify bedside physician when present or [facility] if glc > 180 X 2  _x____     Spoke with bedside RN  _n/a____     Orders written    Contact [facility] for any needs if bedside physician is not present.    Kisha Robles APRN, AGACNP-BC    CCT 25 minutes      Interventions Major-Acute renal failure - evaluation and management  Intermediate-Arrhythmia - evaluation and management, Best-practice therapies (e.g. VTE, beta blocker, etc.), Change in mental status - evaluation and management, Communication with other healthcare providers and/or family        Electronically Signed by: Kisha Robles (IQRA) on 12/10/2022 22:04    Annotated By: Stevan Horn)    Date: 12/11/22 00:13  I have  personally reviewed the note above. I agree with the findings, assessment, and plan of care. No changes.

## 2022-12-12 LAB
QT INTERVAL: 476 MS
QTC INTERVAL: 446 MS

## 2022-12-15 LAB — BACTERIA ISLT: NORMAL

## 2022-12-16 ENCOUNTER — OFFICE VISIT (OUTPATIENT)
Dept: NEUROLOGY | Facility: TELEHEALTH | Age: 47
End: 2022-12-16

## 2022-12-16 VITALS — BODY MASS INDEX: 40.44 KG/M2 | WEIGHT: 206 LBS | HEIGHT: 60 IN | HEART RATE: 70 BPM

## 2022-12-16 DIAGNOSIS — Z86.73 HISTORY OF STROKE: Primary | ICD-10-CM

## 2022-12-16 DIAGNOSIS — H91.93 BILATERAL HEARING LOSS, UNSPECIFIED HEARING LOSS TYPE: ICD-10-CM

## 2022-12-16 PROCEDURE — 99215 OFFICE O/P EST HI 40 MIN: CPT | Performed by: NURSE PRACTITIONER

## 2022-12-16 RX ORDER — ESCITALOPRAM OXALATE 10 MG/1
20 TABLET ORAL DAILY
COMMUNITY
Start: 2022-10-01

## 2022-12-16 RX ORDER — DICLOFENAC SODIUM 75 MG/1
75 TABLET, DELAYED RELEASE ORAL 2 TIMES DAILY
Status: ON HOLD | COMMUNITY
Start: 2022-10-01 | End: 2023-03-09

## 2022-12-16 RX ORDER — NITROFURANTOIN MACROCRYSTALS 100 MG/1
100 CAPSULE ORAL
COMMUNITY
Start: 2022-11-15 | End: 2023-01-07

## 2022-12-16 NOTE — PROGRESS NOTES
Stroke Progress Note       Chief Complaint: Stroke follow-up    Subjective     HPI: Pt is a 47-yr-old right-handed white female with known diagnosis of hyperlipidemia, Bipolar, seizures, smoker, and mental disability who lives independently with daughter per mother who was hospitalized on 12/15/21 after a large left MCA stroke. She currently resides at [facility]. Today she states doing well. Strengths are equal 5/5, denies numbness, and visual field is intact. NIHSS 0, MRS 3, PHQ9 0      Review of Systems   HENT: Negative.    Eyes: Negative.    Respiratory: Negative.    Cardiovascular: Negative.    Gastrointestinal: Negative.    Genitourinary: Negative.    Musculoskeletal: Negative.    Skin: Negative.    Neurological: Negative.    Psychiatric/Behavioral: Negative.         Objective      Heart Rate:  [70] 70    Neurological Exam  Mental Status  Alert. Oriented to person, place and time. Speech is normal. Language is fluent with no aphasia.    Cranial Nerves  CN II: Visual acuity is normal. Visual fields full to confrontation.  CN III, IV, VI: Extraocular movements intact bilaterally. Normal lids and orbits bilaterally. Pupils equal round and reactive to light bilaterally.  CN V: Facial sensation is normal.  CN VII: Full and symmetric facial movement.  CN IX, X: Palate elevates symmetrically. Normal gag reflex.  CN XI: Shoulder shrug strength is normal.  CN XII: Tongue midline without atrophy or fasciculations.    Motor  Normal muscle bulk throughout. No fasciculations present. Strength is 5/5 throughout all four extremities.    Sensory  Sensation is intact to light touch, pinprick, vibration and proprioception in all four extremities.    Reflexes                                            Right                      Left  Brachioradialis                    1+                         1+  Biceps                                 1+                         1+  Patellar                                1+                          1+    Coordination    Finger-to-nose, rapid alternating movements and heel-to-shin normal bilaterally without dysmetria.    Gait  Normal casual, toe, heel and tandem gait.      Physical Exam  Constitutional:       Appearance: Normal appearance.   HENT:      Head: Normocephalic and atraumatic.   Eyes:      General: Lids are normal.      Extraocular Movements: Extraocular movements intact.      Pupils: Pupils are equal, round, and reactive to light.   Cardiovascular:      Rate and Rhythm: Normal rate.   Pulmonary:      Effort: Pulmonary effort is normal.   Musculoskeletal:         General: Normal range of motion.      Cervical back: Normal range of motion.   Skin:     General: Skin is warm and dry.   Neurological:      General: No focal deficit present.      Mental Status: She is alert.      Motor: Motor strength is normal.      Coordination: Coordination is intact.      Deep Tendon Reflexes:      Reflex Scores:       Bicep reflexes are 1+ on the right side and 1+ on the left side.       Brachioradialis reflexes are 1+ on the right side and 1+ on the left side.       Patellar reflexes are 1+ on the right side and 1+ on the left side.  Psychiatric:         Mood and Affect: Mood normal.         Speech: Speech normal.         Results Review:    I reviewed the patient's new clinical results.    Lab Results (last 24 hours)     ** No results found for the last 24 hours. **        No radiology results for the last day  Results for orders placed during the hospital encounter of 12/10/22    Adult Transthoracic Echo Complete W/ Cont if Necessary Per Protocol    Interpretation Summary  •  Left ventricular systolic function is normal. Left ventricular ejection fraction appears to be 56 - 60%.  •  Left ventricular diastolic function was normal.  •  Saline test results are negative for right to left atrial level shunt at baseline state. Valsalva maneuver was not performed.  •  Estimated right ventricular  systolic pressure from tricuspid regurgitation is normal (<35 mmHg).  •  The right ventricle appears dilated in limited views.  •  There is no evidence of pericardial effusion.        Assessment/Plan     Assessment/Plan: Pt is a 47-yr-old right-handed white female with known diagnosis of hyperlipidemia, Bipolar, seizures, smoker, and mental disability who lives independently with daughter per mother who was hospitalized on 12/15/21 after a large left MCA stroke. She currently resides at [facility]. Today she states doing well. Strengths are equal 5/5, denies numbness, and visual field is intact.   1. History of stroke- Instructed to continue aspirin and Lipitor. Instructed on the importance of smoking cessation to reduce stroke risk. Personal care escort stated her PT/OT and SLP had been d/c d/t her meeting her goals. Pt states issue with hearing so ENT consult was place. Instructed on stroke risk factors, prevention, and s/s to call 911. Pt verbalized understanding.           Patient Active Problem List   Diagnosis   • Nicotine abuse   • Other chest pain   • Acute maxillary sinusitis   • Bipolar affective disorder (HCC)   • Decreased pulses in feet   • Flank pain   • Hematochezia   • High cholesterol   • Migraine without aura and with status migrainosus, not intractable   • Migraine without status migrainosus, not intractable   • Neck pain   • Nephrolithiasis   • Neuropathy   • Chronic pain   • Primary osteoarthritis involving multiple joints   • Sciatic leg pain   • Screening for diabetes mellitus (DM)   • Seasonal allergies   • Stress   • Tobacco use disorder   • Urinary incontinence without sensory awareness   • Vitamin D deficiency   • Carpal tunnel syndrome of right wrist   • Seizure ([facility])   • Intractable vomiting   • Acute UTI (urinary tract infection)   • Cholelithiasis   • GI bleed   • Kidney stone   • Nephrolithiasis   • ELBERT (acute kidney injury) ([facility])   • Infection due to ESBL-producing  Escherichia coli   • CVA (cerebral vascular accident) (HCC)   • Dysphagia due to recent stroke   • Slurred speech   • Cerebrovascular accident (CVA) (HCC)   • Aphasia due to acute cerebrovascular accident (CVA) (HCC)   • Methamphetamine abuse (HCC)   • Seizure disorder (HCC)   • UTI (urinary tract infection) due to urinary indwelling catheter (HCC)   • Urinary tract infection due to extended-spectrum beta lactamase (ESBL) producing Escherichia coli   • Urinary tract infection due to extended-spectrum beta lactamase (ESBL) producing Escherichia coli   • Metabolic acidosis, NAG, bicarbonate losses   • Midline shift of brain due to stroke   • Nonintractable headache   • Weakness   • Dysarthria   • Urinary tract infection in female   • Encephalopathy   • Bradycardia   • Stroke-like symptoms           ALPHONSE Martinez  12/16/22  11:32 CST        I spent 40 minutes caring for Myles Shannon  on this date of service. This time includes time spent by me in the following activities: preparing for the visit, reviewing tests, obtaining and/or reviewing a separately obtained history, performing a medically appropriate examination and/or evaluation, counseling and educating the patient/family/caregiver, ordering medications, tests, or procedures, referring and communicating with other health care professionals, documenting information in the medical record, independently interpreting results and communicating that information with the patient/family/caregiver and care coordination

## 2022-12-24 ENCOUNTER — HOSPITAL ENCOUNTER (EMERGENCY)
Facility: HOSPITAL | Age: 47
Discharge: HOME | End: 2022-12-24
Attending: FAMILY MEDICINE | Admitting: FAMILY MEDICINE

## 2022-12-24 ENCOUNTER — APPOINTMENT (OUTPATIENT)
Dept: GENERAL RADIOLOGY | Facility: HOSPITAL | Age: 47
End: 2022-12-24

## 2022-12-24 VITALS
TEMPERATURE: 98.5 F | DIASTOLIC BLOOD PRESSURE: 69 MMHG | SYSTOLIC BLOOD PRESSURE: 128 MMHG | RESPIRATION RATE: 18 BRPM | BODY MASS INDEX: 40.44 KG/M2 | WEIGHT: 206 LBS | HEART RATE: 53 BPM | HEIGHT: 60 IN | OXYGEN SATURATION: 99 %

## 2022-12-24 DIAGNOSIS — R07.81 PLEURODYNIA: Primary | ICD-10-CM

## 2022-12-24 LAB
ALBUMIN SERPL-MCNC: 3.9 G/DL (ref 3.5–5.2)
ALBUMIN/GLOB SERPL: 1.4 G/DL
ALP SERPL-CCNC: 107 U/L (ref 39–117)
ALT SERPL W P-5'-P-CCNC: 24 U/L (ref 1–33)
ANION GAP SERPL CALCULATED.3IONS-SCNC: 9 MMOL/L (ref 5–15)
AST SERPL-CCNC: 22 U/L (ref 1–32)
BASOPHILS # BLD AUTO: 0.03 10*3/MM3 (ref 0–0.2)
BASOPHILS NFR BLD AUTO: 0.5 % (ref 0–1.5)
BILIRUB SERPL-MCNC: 0.2 MG/DL (ref 0–1.2)
BUN SERPL-MCNC: 11 MG/DL (ref 6–20)
BUN/CREAT SERPL: 9.7 (ref 7–25)
CALCIUM SPEC-SCNC: 9.2 MG/DL (ref 8.6–10.5)
CHLORIDE SERPL-SCNC: 103 MMOL/L (ref 98–107)
CK SERPL-CCNC: 118 U/L (ref 20–180)
CO2 SERPL-SCNC: 26 MMOL/L (ref 22–29)
CREAT SERPL-MCNC: 1.13 MG/DL (ref 0.57–1)
DEPRECATED RDW RBC AUTO: 43.3 FL (ref 37–54)
EGFRCR SERPLBLD CKD-EPI 2021: 60.5 ML/MIN/1.73
EOSINOPHIL # BLD AUTO: 0.19 10*3/MM3 (ref 0–0.4)
EOSINOPHIL NFR BLD AUTO: 3.3 % (ref 0.3–6.2)
ERYTHROCYTE [DISTWIDTH] IN BLOOD BY AUTOMATED COUNT: 12.7 % (ref 12.3–15.4)
GLOBULIN UR ELPH-MCNC: 2.7 GM/DL
GLUCOSE SERPL-MCNC: 99 MG/DL (ref 65–99)
HCT VFR BLD AUTO: 37.5 % (ref 34–46.6)
HGB BLD-MCNC: 12.1 G/DL (ref 12–15.9)
HOLD SPECIMEN: NORMAL
IMM GRANULOCYTES # BLD AUTO: 0.01 10*3/MM3 (ref 0–0.05)
IMM GRANULOCYTES NFR BLD AUTO: 0.2 % (ref 0–0.5)
LIPASE SERPL-CCNC: 31 U/L (ref 13–60)
LYMPHOCYTES # BLD AUTO: 1.99 10*3/MM3 (ref 0.7–3.1)
LYMPHOCYTES NFR BLD AUTO: 34.1 % (ref 19.6–45.3)
MAGNESIUM SERPL-MCNC: 1.9 MG/DL (ref 1.6–2.6)
MCH RBC QN AUTO: 29.8 PG (ref 26.6–33)
MCHC RBC AUTO-ENTMCNC: 32.3 G/DL (ref 31.5–35.7)
MCV RBC AUTO: 92.4 FL (ref 79–97)
MONOCYTES # BLD AUTO: 0.36 10*3/MM3 (ref 0.1–0.9)
MONOCYTES NFR BLD AUTO: 6.2 % (ref 5–12)
NEUTROPHILS NFR BLD AUTO: 3.26 10*3/MM3 (ref 1.7–7)
NEUTROPHILS NFR BLD AUTO: 55.7 % (ref 42.7–76)
NRBC BLD AUTO-RTO: 0 /100 WBC (ref 0–0.2)
NT-PROBNP SERPL-MCNC: 60.6 PG/ML (ref 0–450)
PLATELET # BLD AUTO: 243 10*3/MM3 (ref 140–450)
PMV BLD AUTO: 11.3 FL (ref 6–12)
POTASSIUM SERPL-SCNC: 4.5 MMOL/L (ref 3.5–5.2)
PROT SERPL-MCNC: 6.6 G/DL (ref 6–8.5)
RBC # BLD AUTO: 4.06 10*6/MM3 (ref 3.77–5.28)
SODIUM SERPL-SCNC: 138 MMOL/L (ref 136–145)
TROPONIN T SERPL-MCNC: <0.01 NG/ML (ref 0–0.03)
TROPONIN T SERPL-MCNC: <0.01 NG/ML (ref 0–0.03)
WBC NRBC COR # BLD: 5.84 10*3/MM3 (ref 3.4–10.8)
WHOLE BLOOD HOLD COAG: NORMAL
WHOLE BLOOD HOLD SPECIMEN: NORMAL

## 2022-12-24 PROCEDURE — 93005 ELECTROCARDIOGRAM TRACING: CPT

## 2022-12-24 PROCEDURE — 82550 ASSAY OF CK (CPK): CPT | Performed by: FAMILY MEDICINE

## 2022-12-24 PROCEDURE — 85025 COMPLETE CBC W/AUTO DIFF WBC: CPT

## 2022-12-24 PROCEDURE — 71045 X-RAY EXAM CHEST 1 VIEW: CPT

## 2022-12-24 PROCEDURE — 93005 ELECTROCARDIOGRAM TRACING: CPT | Performed by: FAMILY MEDICINE

## 2022-12-24 PROCEDURE — 93010 ELECTROCARDIOGRAM REPORT: CPT | Performed by: INTERNAL MEDICINE

## 2022-12-24 PROCEDURE — 83735 ASSAY OF MAGNESIUM: CPT | Performed by: FAMILY MEDICINE

## 2022-12-24 PROCEDURE — 36415 COLL VENOUS BLD VENIPUNCTURE: CPT

## 2022-12-24 PROCEDURE — 99284 EMERGENCY DEPT VISIT MOD MDM: CPT

## 2022-12-24 PROCEDURE — 80053 COMPREHEN METABOLIC PANEL: CPT

## 2022-12-24 PROCEDURE — 83880 ASSAY OF NATRIURETIC PEPTIDE: CPT

## 2022-12-24 PROCEDURE — 83690 ASSAY OF LIPASE: CPT | Performed by: FAMILY MEDICINE

## 2022-12-24 PROCEDURE — 84484 ASSAY OF TROPONIN QUANT: CPT

## 2022-12-24 RX ORDER — SODIUM CHLORIDE 0.9 % (FLUSH) 0.9 %
10 SYRINGE (ML) INJECTION AS NEEDED
Status: DISCONTINUED | OUTPATIENT
Start: 2022-12-24 | End: 2022-12-24 | Stop reason: HOSPADM

## 2022-12-24 RX ORDER — ALUMINA, MAGNESIA, AND SIMETHICONE 2400; 2400; 240 MG/30ML; MG/30ML; MG/30ML
15 SUSPENSION ORAL ONCE
Status: COMPLETED | OUTPATIENT
Start: 2022-12-24 | End: 2022-12-24

## 2022-12-24 RX ORDER — ASPIRIN 81 MG/1
324 TABLET, CHEWABLE ORAL ONCE
Status: COMPLETED | OUTPATIENT
Start: 2022-12-24 | End: 2022-12-24

## 2022-12-24 RX ORDER — LIDOCAINE HYDROCHLORIDE 20 MG/ML
15 SOLUTION OROPHARYNGEAL ONCE
Status: COMPLETED | OUTPATIENT
Start: 2022-12-24 | End: 2022-12-24

## 2022-12-24 RX ADMIN — LIDOCAINE HYDROCHLORIDE 15 ML: 20 SOLUTION ORAL; TOPICAL at 17:23

## 2022-12-24 RX ADMIN — NITROGLYCERIN 1 INCH: 20 OINTMENT TOPICAL at 17:23

## 2022-12-24 RX ADMIN — ASPIRIN 324 MG: 81 TABLET, CHEWABLE ORAL at 17:23

## 2022-12-24 RX ADMIN — ALUMINUM HYDROXIDE, MAGNESIUM HYDROXIDE, AND DIMETHICONE 15 ML: 400; 400; 40 SUSPENSION ORAL at 17:22

## 2022-12-24 NOTE — ED PROVIDER NOTES
Subjective   History of Present Illness  Patient presents emergency department with chest pain that began around 3 PM.  She does not have a history of coronary artery disease.  Patient is a current smoker.      Chest Pain  Pain location:  L chest  Pain radiates to:  Does not radiate  Pain severity:  Mild  Duration:  2 hours  Timing:  Intermittent  Progression:  Waxing and waning  Chronicity:  New  Relieved by:  Nothing  Worsened by:  Nothing  Associated symptoms: nausea    Associated symptoms: no abdominal pain, no cough, no diaphoresis, no dizziness, no dysphagia, no fatigue, no fever, no headache, no shortness of breath, no vomiting and no weakness    Risk factors: high cholesterol, hypertension and smoking    Risk factors: no coronary artery disease, no diabetes mellitus and not male        Review of Systems   Constitutional: Negative for appetite change, chills, diaphoresis, fatigue and fever.   HENT: Negative for congestion, ear discharge, ear pain, nosebleeds, rhinorrhea, sinus pressure, sore throat and trouble swallowing.    Eyes: Negative for discharge and redness.   Respiratory: Negative for apnea, cough, chest tightness, shortness of breath and wheezing.    Cardiovascular: Positive for chest pain.   Gastrointestinal: Positive for nausea. Negative for abdominal pain, diarrhea and vomiting.   Endocrine: Negative for polyuria.   Genitourinary: Negative for dysuria, frequency and urgency.   Musculoskeletal: Negative for myalgias and neck pain.   Skin: Negative for color change and rash.   Allergic/Immunologic: Negative for immunocompromised state.   Neurological: Negative for dizziness, seizures, syncope, weakness, light-headedness and headaches.   Hematological: Negative for adenopathy. Does not bruise/bleed easily.   Psychiatric/Behavioral: Negative for behavioral problems and confusion.   All other systems reviewed and are negative.      Past Medical History:   Diagnosis Date   • Abdominal pain     Chronic  RLQ, RUQ, R flank comes and goes.    • Abdominal pain, right lower quadrant    • Acute bilateral otitis media    • Allergic rhinitis    • Backache     Upper back.   • Candidiasis of skin    • Cellulitis of neck    • Chest pain    • Contraception    • Cough    • Dysfunction of eustachian tube    • Dyspareunia, female    • Elevated cholesterol    • Excessive or frequent menstruation    • Flank pain    • Generalized aches and pains    • Headache    • Health maintenance examination    • Hypertension    • Infection of skin and subcutaneous tissue    • Irregular periods    • Kidney stone     Poss   • Menorrhagia    • Nausea vomiting and diarrhea    • Obesity    • Open wound of abdominal wall    • Otalgia    • Pain in left foot    • Pain in unspecified hand    • Removed Impacted cerumen 2012   • Rhinitis    • Seizure ([facility]) 08/15/2019   • Shoulder pain     right-sided-likely muscle strain      • Shoulder tendinitis    • Spider bite wound    • Staphylococcal infection of skin    • Stroke ([facility])    • Swollen feet    • Tinea cruris    • Tobacco dependence syndrome    • Upper respiratory infection    • Urinary tract infectious disease    • Vertigo    • Visit for gynecologic examination    • Vulvovaginitis        Allergies   Allergen Reactions   • Abilify [Aripiprazole] Nausea Only   • Buspirone Rash   • Penicillins Hives and Nausea And Vomiting       Past Surgical History:   Procedure Laterality Date   •  SECTION     • DILATATION AND CURETTAGE      Secondary to incomplete spontaneous    • INCISION AND DRAINAGE ABSCESS  2012   • INJECTION OF MEDICATION  2015    Phenergan (1)     • INJECTION OF MEDICATION  10/10/2013    Toradol (2)      • INJECTION OF MEDICATION  2014    Zofran (1)          Family History   Problem Relation Age of Onset   • Breast cancer Mother    • Thyroid disease Mother    • Hypertension Mother    • Heart disease Mother    • Arthritis Mother    • Liver disease Father     • Endometrial cancer Sister    • Anxiety disorder Brother    • Mental illness Daughter    • Depression Daughter    • Diabetes Maternal Aunt    • Diabetes Maternal Uncle    • Stroke Maternal Grandmother    • Breast cancer Maternal Grandmother    • Cancer Maternal Grandfather    • Stroke Paternal Grandmother    • Depression Other    • Cancer Other         Colorectal Cancer   • Endometrial cancer Other    • Lung cancer Other        Social History     Socioeconomic History   • Marital status: Single   Tobacco Use   • Smoking status: Every Day     Packs/day: 0.50     Types: Cigarettes   • Smokeless tobacco: Never   • Tobacco comments:     19*800*quit*NOW   Vaping Use   • Vaping Use: Some days   • Substances: Nicotine   • Devices: Refillable tank   • Passive vaping exposure: Yes   Substance and Sexual Activity   • Alcohol use: No   • Drug use: No   • Sexual activity: Not Currently           Objective   Physical Exam  Vitals and nursing note reviewed.   Constitutional:       Appearance: She is well-developed.   HENT:      Head: Normocephalic and atraumatic.      Nose: Nose normal.   Eyes:      General: No scleral icterus.        Right eye: No discharge.         Left eye: No discharge.      Conjunctiva/sclera: Conjunctivae normal.      Pupils: Pupils are equal, round, and reactive to light.   Neck:      Trachea: No tracheal deviation.   Cardiovascular:      Rate and Rhythm: Normal rate and regular rhythm.      Heart sounds: Normal heart sounds. No murmur heard.  Pulmonary:      Effort: Pulmonary effort is normal. No respiratory distress.      Breath sounds: Normal breath sounds. No stridor. No wheezing or rales.   Chest:      Chest wall: Tenderness present.          Comments: Chest wall tenderness to palpation.  Abdominal:      General: Bowel sounds are normal. There is no distension.      Palpations: Abdomen is soft. There is no mass.      Tenderness: There is no abdominal tenderness. There is no guarding or rebound.    Musculoskeletal:      Cervical back: Normal range of motion and neck supple.   Skin:     General: Skin is warm and dry.      Findings: No erythema or rash.   Neurological:      Mental Status: She is alert and oriented to person, place, and time.      Coordination: Coordination normal.   Psychiatric:         Behavior: Behavior normal.         Thought Content: Thought content normal.         ECG 12 Lead      Date/Time: 12/24/2022 6:43 PM  Performed by: Asher Grande MD  Authorized by: Asher Grande MD   Interpreted by physician  Rhythm: sinus bradycardia  BPM: 57  ST Segments: ST segments normal                   ED Course              Labs Reviewed   COMPREHENSIVE METABOLIC PANEL - Abnormal; Notable for the following components:       Result Value    Creatinine 1.13 (*)     All other components within normal limits    Narrative:     GFR Normal >60  Chronic Kidney Disease <60  Kidney Failure <15     TROPONIN (IN-HOUSE) - Normal    Narrative:     Troponin T Reference Range:  <= 0.03 ng/mL-   Negative for AMI  >0.03 ng/mL-     Abnormal for myocardial necrosis.  Clinicians would have to utilize clinical acumen, EKG, Troponin and serial changes to determine if it is an Acute Myocardial Infarction or myocardial injury due to an underlying chronic condition.       Results may be falsely decreased if patient taking Biotin.     TROPONIN (IN-HOUSE) - Normal    Narrative:     Troponin T Reference Range:  <= 0.03 ng/mL-   Negative for AMI  >0.03 ng/mL-     Abnormal for myocardial necrosis.  Clinicians would have to utilize clinical acumen, EKG, Troponin and serial changes to determine if it is an Acute Myocardial Infarction or myocardial injury due to an underlying chronic condition.       Results may be falsely decreased if patient taking Biotin.     BNP (IN-HOUSE) - Normal    Narrative:     Among patients with dyspnea, NT-proBNP is highly sensitive for the detection of acute congestive heart failure. In  addition NT-proBNP of <300 pg/ml effectively rules out acute congestive heart failure with 99% negative predictive value.    Results may be falsely decreased if patient taking Biotin.     CBC WITH AUTO DIFFERENTIAL - Normal   LIPASE - Normal   CK - Normal   MAGNESIUM - Normal   RAINBOW DRAW    Narrative:     The following orders were created for panel order [place] Draw.  Procedure                               Abnormality         Status                     ---------                               -----------         ------                     Green Top (Gel)[[id number]]                                  Final result               Lavender Top[[id number]]                                     Final result               Gold Top - SST[[id number]]                                   Final result               Light Blue Top[[id number]]                                   Final result                 Please view results for these tests on the individual orders.   CBC AND DIFFERENTIAL    Narrative:     The following orders were created for panel order CBC & Differential.  Procedure                               Abnormality         Status                     ---------                               -----------         ------                     CBC Auto Differential[[id number]]        Normal              Final result                 Please view results for these tests on the individual orders.   GREEN TOP   LAVENDER TOP   GOLD TOP - SST   LIGHT BLUE TOP   EXTRA TUBES    Narrative:     The following orders were created for panel order Extra Tubes.  Procedure                               Abnormality         Status                     ---------                               -----------         ------                     Verdin Top[[id number]]                                         In process                   Please view results for these tests on the individual orders.   GRAY TOP       XR Chest 1 View   Final Result   CONCLUSION:   No  Acute Disease      [id number]      Electronically signed by:  Christopher Self MD  12/24/2022 4:15 PM   CST Workstation: [id number]                HEART Score for Major Cardiac Events - MDCalc  Calculated on Dec 24 2022 6:13 PM  3 points -> Low Score (0-3 points) Risk of MACE of 0.9-1.7%.                             MDM    Final diagnoses:   Pleurodynia       ED Disposition  ED Disposition     ED Disposition   Discharge    Condition   Stable    Comment   --             Patricia Jara, APRN  [address] [id number] [phone]    In 2 days      O'Sonya Hoffman, APRN  [address] [facility] [address]  [phone]    Schedule an appointment as soon as possible for a visit            Medication List      No changes were made to your prescriptions during this visit.          Asher Grande MD  12/24/22 1843       Asher Grande MD  12/24/22 184

## 2022-12-31 LAB
QT INTERVAL: 454 MS
QTC INTERVAL: 441 MS

## 2023-01-05 ENCOUNTER — HOSPITAL ENCOUNTER (EMERGENCY)
Facility: HOSPITAL | Age: 48
Discharge: HOME | End: 2023-01-05
Attending: STUDENT IN AN ORGANIZED HEALTH CARE EDUCATION/TRAINING PROGRAM | Admitting: STUDENT IN AN ORGANIZED HEALTH CARE EDUCATION/TRAINING PROGRAM
Payer: COMMERCIAL

## 2023-01-05 VITALS
HEIGHT: 60 IN | WEIGHT: 206 LBS | RESPIRATION RATE: 18 BRPM | SYSTOLIC BLOOD PRESSURE: 122 MMHG | TEMPERATURE: 97.1 F | BODY MASS INDEX: 40.44 KG/M2 | HEART RATE: 50 BPM | OXYGEN SATURATION: 99 % | DIASTOLIC BLOOD PRESSURE: 82 MMHG

## 2023-01-05 DIAGNOSIS — R10.9 ABDOMINAL PAIN, UNSPECIFIED ABDOMINAL LOCATION: Primary | ICD-10-CM

## 2023-01-05 DIAGNOSIS — N39.0 URINARY TRACT INFECTION WITH HEMATURIA, SITE UNSPECIFIED: ICD-10-CM

## 2023-01-05 DIAGNOSIS — R31.9 URINARY TRACT INFECTION WITH HEMATURIA, SITE UNSPECIFIED: ICD-10-CM

## 2023-01-05 LAB
ALBUMIN SERPL-MCNC: 4.1 G/DL (ref 3.5–5.2)
ALBUMIN/GLOB SERPL: 1.8 G/DL
ALP SERPL-CCNC: 124 U/L (ref 39–117)
ALT SERPL W P-5'-P-CCNC: 27 U/L (ref 1–33)
ANION GAP SERPL CALCULATED.3IONS-SCNC: 7 MMOL/L (ref 5–15)
AST SERPL-CCNC: 20 U/L (ref 1–32)
BACTERIA UR QL AUTO: ABNORMAL /HPF
BASOPHILS # BLD AUTO: 0.04 10*3/MM3 (ref 0–0.2)
BASOPHILS NFR BLD AUTO: 0.6 % (ref 0–1.5)
BILIRUB SERPL-MCNC: <0.2 MG/DL (ref 0–1.2)
BILIRUB UR QL STRIP: NEGATIVE
BUN SERPL-MCNC: 11 MG/DL (ref 6–20)
BUN/CREAT SERPL: 10.5 (ref 7–25)
CALCIUM SPEC-SCNC: 8.8 MG/DL (ref 8.6–10.5)
CHLORIDE SERPL-SCNC: 105 MMOL/L (ref 98–107)
CLARITY UR: ABNORMAL
CO2 SERPL-SCNC: 26 MMOL/L (ref 22–29)
COLOR UR: YELLOW
CREAT SERPL-MCNC: 1.05 MG/DL (ref 0.57–1)
DEPRECATED RDW RBC AUTO: 44.2 FL (ref 37–54)
EGFRCR SERPLBLD CKD-EPI 2021: 66.1 ML/MIN/1.73
EOSINOPHIL # BLD AUTO: 0.29 10*3/MM3 (ref 0–0.4)
EOSINOPHIL NFR BLD AUTO: 4.7 % (ref 0.3–6.2)
ERYTHROCYTE [DISTWIDTH] IN BLOOD BY AUTOMATED COUNT: 13 % (ref 12.3–15.4)
GLOBULIN UR ELPH-MCNC: 2.3 GM/DL
GLUCOSE SERPL-MCNC: 99 MG/DL (ref 65–99)
GLUCOSE UR STRIP-MCNC: NEGATIVE MG/DL
HCT VFR BLD AUTO: 38.1 % (ref 34–46.6)
HGB BLD-MCNC: 12.3 G/DL (ref 12–15.9)
HGB UR QL STRIP.AUTO: NEGATIVE
HOLD SPECIMEN: NORMAL
HOLD SPECIMEN: NORMAL
HYALINE CASTS UR QL AUTO: ABNORMAL /LPF
IMM GRANULOCYTES # BLD AUTO: 0 10*3/MM3 (ref 0–0.05)
IMM GRANULOCYTES NFR BLD AUTO: 0 % (ref 0–0.5)
KETONES UR QL STRIP: ABNORMAL
LEUKOCYTE ESTERASE UR QL STRIP.AUTO: ABNORMAL
LIPASE SERPL-CCNC: 35 U/L (ref 13–60)
LYMPHOCYTES # BLD AUTO: 2.27 10*3/MM3 (ref 0.7–3.1)
LYMPHOCYTES NFR BLD AUTO: 36.4 % (ref 19.6–45.3)
MCH RBC QN AUTO: 29.9 PG (ref 26.6–33)
MCHC RBC AUTO-ENTMCNC: 32.3 G/DL (ref 31.5–35.7)
MCV RBC AUTO: 92.7 FL (ref 79–97)
MONOCYTES # BLD AUTO: 0.41 10*3/MM3 (ref 0.1–0.9)
MONOCYTES NFR BLD AUTO: 6.6 % (ref 5–12)
NEUTROPHILS NFR BLD AUTO: 3.22 10*3/MM3 (ref 1.7–7)
NEUTROPHILS NFR BLD AUTO: 51.7 % (ref 42.7–76)
NITRITE UR QL STRIP: POSITIVE
NRBC BLD AUTO-RTO: 0 /100 WBC (ref 0–0.2)
PH UR STRIP.AUTO: 6 [PH] (ref 5–9)
PLATELET # BLD AUTO: 217 10*3/MM3 (ref 140–450)
PMV BLD AUTO: 12.1 FL (ref 6–12)
POTASSIUM SERPL-SCNC: 4 MMOL/L (ref 3.5–5.2)
PROT SERPL-MCNC: 6.4 G/DL (ref 6–8.5)
PROT UR QL STRIP: NEGATIVE
RBC # BLD AUTO: 4.11 10*6/MM3 (ref 3.77–5.28)
RBC # UR STRIP: ABNORMAL /HPF
REF LAB TEST METHOD: ABNORMAL
SODIUM SERPL-SCNC: 138 MMOL/L (ref 136–145)
SP GR UR STRIP: 1.02 (ref 1–1.03)
SQUAMOUS #/AREA URNS HPF: ABNORMAL /HPF
UROBILINOGEN UR QL STRIP: ABNORMAL
WBC # UR STRIP: ABNORMAL /HPF
WBC NRBC COR # BLD: 6.23 10*3/MM3 (ref 3.4–10.8)
WHOLE BLOOD HOLD COAG: NORMAL
WHOLE BLOOD HOLD SPECIMEN: NORMAL

## 2023-01-05 PROCEDURE — 96374 THER/PROPH/DIAG INJ IV PUSH: CPT

## 2023-01-05 PROCEDURE — 25010000002 KETOROLAC TROMETHAMINE PER 15 MG: Performed by: STUDENT IN AN ORGANIZED HEALTH CARE EDUCATION/TRAINING PROGRAM

## 2023-01-05 PROCEDURE — 81001 URINALYSIS AUTO W/SCOPE: CPT | Performed by: STUDENT IN AN ORGANIZED HEALTH CARE EDUCATION/TRAINING PROGRAM

## 2023-01-05 PROCEDURE — 85025 COMPLETE CBC W/AUTO DIFF WBC: CPT

## 2023-01-05 PROCEDURE — 87186 SC STD MICRODIL/AGAR DIL: CPT | Performed by: STUDENT IN AN ORGANIZED HEALTH CARE EDUCATION/TRAINING PROGRAM

## 2023-01-05 PROCEDURE — 80053 COMPREHEN METABOLIC PANEL: CPT | Performed by: STUDENT IN AN ORGANIZED HEALTH CARE EDUCATION/TRAINING PROGRAM

## 2023-01-05 PROCEDURE — 83690 ASSAY OF LIPASE: CPT | Performed by: STUDENT IN AN ORGANIZED HEALTH CARE EDUCATION/TRAINING PROGRAM

## 2023-01-05 PROCEDURE — 87088 URINE BACTERIA CULTURE: CPT | Performed by: STUDENT IN AN ORGANIZED HEALTH CARE EDUCATION/TRAINING PROGRAM

## 2023-01-05 PROCEDURE — 87086 URINE CULTURE/COLONY COUNT: CPT | Performed by: STUDENT IN AN ORGANIZED HEALTH CARE EDUCATION/TRAINING PROGRAM

## 2023-01-05 PROCEDURE — 99283 EMERGENCY DEPT VISIT LOW MDM: CPT

## 2023-01-05 RX ORDER — HYDROCODONE BITARTRATE AND ACETAMINOPHEN 5; 325 MG/1; MG/1
1 TABLET ORAL ONCE
Status: COMPLETED | OUTPATIENT
Start: 2023-01-05 | End: 2023-01-05

## 2023-01-05 RX ORDER — SODIUM CHLORIDE 0.9 % (FLUSH) 0.9 %
10 SYRINGE (ML) INJECTION AS NEEDED
Status: DISCONTINUED | OUTPATIENT
Start: 2023-01-05 | End: 2023-01-05 | Stop reason: HOSPADM

## 2023-01-05 RX ORDER — KETOROLAC TROMETHAMINE 15 MG/ML
15 INJECTION, SOLUTION INTRAMUSCULAR; INTRAVENOUS ONCE
Status: COMPLETED | OUTPATIENT
Start: 2023-01-05 | End: 2023-01-05

## 2023-01-05 RX ORDER — CEPHALEXIN 500 MG/1
500 CAPSULE ORAL 2 TIMES DAILY
Qty: 10 CAPSULE | Refills: 0 | Status: SHIPPED | OUTPATIENT
Start: 2023-01-05 | End: 2023-01-07

## 2023-01-05 RX ADMIN — HYDROCODONE BITARTRATE AND ACETAMINOPHEN 1 TABLET: 5; 325 TABLET ORAL at 16:48

## 2023-01-05 RX ADMIN — KETOROLAC TROMETHAMINE 15 MG: 15 INJECTION, SOLUTION INTRAMUSCULAR; INTRAVENOUS at 16:48

## 2023-01-05 NOTE — ED PROVIDER NOTES
Subjective   History of Present Illness  47-year-old female comes to the ER chief complaint of lower abdominal pain for the last few hours.  Patient denies urinary symptoms.  She is tender over the bladder.  She endorses having headache as well.  No neuro symptoms.  She denies nausea vomiting and other symptoms.    History provided by:  Patient   used: No        Review of Systems   Constitutional: Negative for chills and fever.   HENT: Negative for drooling.    Eyes: Negative for redness.   Respiratory: Negative for shortness of breath.    Cardiovascular: Negative for chest pain and palpitations.   Gastrointestinal: Positive for abdominal pain. Negative for nausea and vomiting.   Genitourinary: Negative for dysuria, flank pain, hematuria, urgency, vaginal bleeding and vaginal discharge.   Skin: Negative for color change.   Neurological: Positive for headaches. Negative for dizziness, seizures, weakness, light-headedness and numbness.   Psychiatric/Behavioral: Negative for confusion.       Past Medical History:   Diagnosis Date   • Abdominal pain     Chronic RLQ, RUQ, R flank comes and goes.    • Abdominal pain, right lower quadrant    • Acute bilateral otitis media    • Allergic rhinitis    • Backache     Upper back.   • Candidiasis of skin    • Cellulitis of neck    • Chest pain    • Contraception    • Cough    • Dysfunction of eustachian tube    • Dyspareunia, female    • Elevated cholesterol    • Excessive or frequent menstruation    • Flank pain    • Generalized aches and pains    • Headache    • Health maintenance examination    • Hypertension    • Infection of skin and subcutaneous tissue    • Irregular periods    • Kidney stone     Poss   • Menorrhagia    • Nausea vomiting and diarrhea    • Obesity    • Open wound of abdominal wall    • Otalgia    • Pain in left foot    • Pain in unspecified hand    • Removed Impacted cerumen 06/05/2012   • Rhinitis    • Seizure ([facility]) 08/15/2019   •  Shoulder pain     right-sided-likely muscle strain      • Shoulder tendinitis    • Spider bite wound    • Staphylococcal infection of skin    • Stroke (HCC)    • Swollen feet    • Tinea cruris    • Tobacco dependence syndrome    • Upper respiratory infection    • Urinary tract infectious disease    • Vertigo    • Visit for gynecologic examination    • Vulvovaginitis        Allergies   Allergen Reactions   • Abilify [Aripiprazole] Nausea Only   • Buspirone Rash   • Penicillins Hives and Nausea And Vomiting       Past Surgical History:   Procedure Laterality Date   •  SECTION     • DILATATION AND CURETTAGE      Secondary to incomplete spontaneous    • INCISION AND DRAINAGE ABSCESS  2012   • INJECTION OF MEDICATION  2015    Phenergan (1)     • INJECTION OF MEDICATION  10/10/2013    Toradol (2)      • INJECTION OF MEDICATION  2014    Zofran (1)          Family History   Problem Relation Age of Onset   • Breast cancer Mother    • Thyroid disease Mother    • Hypertension Mother    • Heart disease Mother    • Arthritis Mother    • Liver disease Father    • Endometrial cancer Sister    • Anxiety disorder Brother    • Mental illness Daughter    • Depression Daughter    • Diabetes Maternal Aunt    • Diabetes Maternal Uncle    • Stroke Maternal Grandmother    • Breast cancer Maternal Grandmother    • Cancer Maternal Grandfather    • Stroke Paternal Grandmother    • Depression Other    • Cancer Other         Colorectal Cancer   • Endometrial cancer Other    • Lung cancer Other        Social History     Socioeconomic History   • Marital status: Single   Tobacco Use   • Smoking status: Every Day     Packs/day: 0.50     Types: Cigarettes   • Smokeless tobacco: Never   • Tobacco comments:     *800*quit*NOW   Vaping Use   • Vaping Use: Some days   • Substances: Nicotine   • Devices: Refillable tank   • Passive vaping exposure: Yes   Substance and Sexual Activity   • Alcohol use: No   • Drug  use: No   • Sexual activity: Not Currently           Objective    Vitals:    01/05/23 1525 01/05/23 1645   BP: 118/67 122/82   Pulse: 52 50   Resp: 18    Temp: 97.1 °F (36.2 °C)    TempSrc: Oral    SpO2: 100% 99%   Weight: 93.4 kg (206 lb)    Height: 152.4 cm (60\")        Physical Exam  Vitals and nursing note reviewed.   Constitutional:       General: She is not in acute distress.     Appearance: She is well-developed. She is obese. She is not ill-appearing, toxic-appearing or diaphoretic.   Eyes:      Conjunctiva/sclera: Conjunctivae normal.   Pulmonary:      Effort: Pulmonary effort is normal. No accessory muscle usage or respiratory distress.   Chest:      Chest wall: No tenderness.   Abdominal:      Palpations: Abdomen is soft.      Tenderness: There is abdominal tenderness (deep palpation). There is no guarding or rebound.   Skin:     General: Skin is warm and dry.      Capillary Refill: Capillary refill takes less than 2 seconds.   Neurological:      Mental Status: She is alert and oriented to person, place, and time.         Procedures           ED Course      Results for orders placed or performed during the hospital encounter of 01/05/23   Comprehensive Metabolic Panel    Specimen: Blood   Result Value Ref Range    Glucose 99 65 - 99 mg/dL    BUN 11 6 - 20 mg/dL    Creatinine 1.05 (H) 0.57 - 1.00 mg/dL    Sodium 138 136 - 145 mmol/L    Potassium 4.0 3.5 - 5.2 mmol/L    Chloride 105 98 - 107 mmol/L    CO2 26.0 22.0 - 29.0 mmol/L    Calcium 8.8 8.6 - 10.5 mg/dL    Total Protein 6.4 6.0 - 8.5 g/dL    Albumin 4.1 3.5 - 5.2 g/dL    ALT (SGPT) 27 1 - 33 U/L    AST (SGOT) 20 1 - 32 U/L    Alkaline Phosphatase 124 (H) 39 - 117 U/L    Total Bilirubin <0.2 0.0 - 1.2 mg/dL    Globulin 2.3 gm/dL    A/G Ratio 1.8 g/dL    BUN/Creatinine Ratio 10.5 7.0 - 25.0    Anion Gap 7.0 5.0 - 15.0 mmol/L    eGFR 66.1 >60.0 mL/min/1.73   Lipase    Specimen: Blood   Result Value Ref Range    Lipase 35 13 - 60 U/L   Urinalysis With  Microscopic If Indicated (No Culture) - Urine, Clean Catch    Specimen: Urine, Clean Catch   Result Value Ref Range    Color, UA Yellow Yellow, Straw, Dark Yellow, Kavita    Appearance, UA Cloudy (A) Clear    pH, UA 6.0 5.0 - 9.0    Specific Gravity, UA 1.022 1.003 - 1.030    Glucose, UA Negative Negative    Ketones, UA Trace (A) Negative    Bilirubin, UA Negative Negative    Blood, UA Negative Negative    Protein, UA Negative Negative    Leuk Esterase, UA Moderate (2+) (A) Negative    Nitrite, UA Positive (A) Negative    Urobilinogen, UA 1.0 E.U./dL 0.2 - 1.0 E.U./dL   CBC Auto Differential    Specimen: Blood   Result Value Ref Range    WBC 6.23 3.40 - 10.80 10*3/mm3    RBC 4.11 3.77 - 5.28 10*6/mm3    Hemoglobin 12.3 12.0 - 15.9 g/dL    Hematocrit 38.1 34.0 - 46.6 %    MCV 92.7 79.0 - 97.0 fL    MCH 29.9 26.6 - 33.0 pg    MCHC 32.3 31.5 - 35.7 g/dL    RDW 13.0 12.3 - 15.4 %    RDW-SD 44.2 37.0 - 54.0 fl    MPV 12.1 (H) 6.0 - 12.0 fL    Platelets 217 140 - 450 10*3/mm3    Neutrophil % 51.7 42.7 - 76.0 %    Lymphocyte % 36.4 19.6 - 45.3 %    Monocyte % 6.6 5.0 - 12.0 %    Eosinophil % 4.7 0.3 - 6.2 %    Basophil % 0.6 0.0 - 1.5 %    Immature Grans % 0.0 0.0 - 0.5 %    Neutrophils, Absolute 3.22 1.70 - 7.00 10*3/mm3    Lymphocytes, Absolute 2.27 0.70 - 3.10 10*3/mm3    Monocytes, Absolute 0.41 0.10 - 0.90 10*3/mm3    Eosinophils, Absolute 0.29 0.00 - 0.40 10*3/mm3    Basophils, Absolute 0.04 0.00 - 0.20 10*3/mm3    Immature Grans, Absolute 0.00 0.00 - 0.05 10*3/mm3    nRBC 0.0 0.0 - 0.2 /100 WBC   Urinalysis, Microscopic Only - Urine, Clean Catch    Specimen: Urine, Clean Catch   Result Value Ref Range    RBC, UA 3-5 (A) None Seen /HPF    WBC, UA Too Numerous to Count (A) None Seen, 0-2, 3-5 /HPF    Bacteria, UA 4+ (A) None Seen /HPF    Squamous Epithelial Cells, UA 6-12 (A) None Seen, 0-2 /HPF    Hyaline Casts, UA None Seen None Seen /LPF    Methodology Automated Microscopy                                            Medical Decision Making  Vital signs are stable, afebrile.  Lab significant for UA leukocyte and nitrite positive.  3 RBCs noted.  White count is normal.  Renal function normal.  Patient received pain medicine in the ER.  Will call in antibiotics.  Urine culture pending.  Recommend PCP follow-up.  Return precautions given.  Patient states understanding.    Abdominal pain, unspecified abdominal location: complicated acute illness or injury  Urinary tract infection with hematuria, site unspecified: complicated acute illness or injury  Amount and/or Complexity of Data Reviewed  Labs: ordered. Decision-making details documented in ED Course.      Risk  Prescription drug management.  Parenteral controlled substances.          Final diagnoses:   Abdominal pain, unspecified abdominal location   Urinary tract infection with hematuria, site unspecified       ED Disposition  ED Disposition     ED Disposition   Discharge    Condition   Stable    Comment   --             SCOTT'Sonya Hoffman, APRN  [address] [place] [id number] [phone]    Schedule an appointment as soon as possible for a visit in 2 days  ER follow up         Medication List      New Prescriptions    cephalexin 500 MG capsule  Commonly known as: KEFLEX  Take 1 capsule by mouth 2 (Two) Times a Day for 5 days.           Where to Get Your Medications      These medications were sent to [facility] [place], [place] - [facility] - [phone]  - [phone] [facility] [address]    Phone: [phone]   · cephalexin 500 MG capsule          Juvenal Chand MD  01/05/23 7177

## 2023-01-07 ENCOUNTER — TELEPHONE (OUTPATIENT)
Dept: EMERGENCY DEPT | Facility: HOSPITAL | Age: 48
End: 2023-01-07
Payer: COMMERCIAL

## 2023-01-07 LAB — BACTERIA SPEC AEROBE CULT: ABNORMAL

## 2023-01-07 RX ORDER — NITROFURANTOIN 25; 75 MG/1; MG/1
100 CAPSULE ORAL 2 TIMES DAILY
Qty: 10 CAPSULE | Refills: 0 | Status: SHIPPED | OUTPATIENT
Start: 2023-01-07 | End: 2023-01-12

## 2023-01-19 ENCOUNTER — APPOINTMENT (OUTPATIENT)
Dept: GENERAL RADIOLOGY | Facility: HOSPITAL | Age: 48
End: 2023-01-19
Payer: COMMERCIAL

## 2023-01-19 ENCOUNTER — HOSPITAL ENCOUNTER (EMERGENCY)
Facility: HOSPITAL | Age: 48
Discharge: HOME | End: 2023-01-19
Attending: STUDENT IN AN ORGANIZED HEALTH CARE EDUCATION/TRAINING PROGRAM | Admitting: STUDENT IN AN ORGANIZED HEALTH CARE EDUCATION/TRAINING PROGRAM
Payer: COMMERCIAL

## 2023-01-19 VITALS
SYSTOLIC BLOOD PRESSURE: 97 MMHG | TEMPERATURE: 98 F | WEIGHT: 206 LBS | HEIGHT: 60 IN | OXYGEN SATURATION: 97 % | DIASTOLIC BLOOD PRESSURE: 62 MMHG | HEART RATE: 58 BPM | BODY MASS INDEX: 40.44 KG/M2 | RESPIRATION RATE: 20 BRPM

## 2023-01-19 DIAGNOSIS — R11.2 NAUSEA AND VOMITING, UNSPECIFIED VOMITING TYPE: Primary | ICD-10-CM

## 2023-01-19 DIAGNOSIS — R19.7 DIARRHEA, UNSPECIFIED TYPE: ICD-10-CM

## 2023-01-19 LAB
ALBUMIN SERPL-MCNC: 4 G/DL (ref 3.5–5.2)
ALBUMIN/GLOB SERPL: 1.5 G/DL
ALP SERPL-CCNC: 106 U/L (ref 39–117)
ALT SERPL W P-5'-P-CCNC: 38 U/L (ref 1–33)
ANION GAP SERPL CALCULATED.3IONS-SCNC: 12 MMOL/L (ref 5–15)
AST SERPL-CCNC: 32 U/L (ref 1–32)
BACTERIA UR QL AUTO: ABNORMAL /HPF
BASOPHILS # BLD AUTO: 0.03 10*3/MM3 (ref 0–0.2)
BASOPHILS NFR BLD AUTO: 0.6 % (ref 0–1.5)
BILIRUB SERPL-MCNC: 0.2 MG/DL (ref 0–1.2)
BILIRUB UR QL STRIP: NEGATIVE
BUN SERPL-MCNC: 17 MG/DL (ref 6–20)
BUN/CREAT SERPL: 14.2 (ref 7–25)
CALCIUM SPEC-SCNC: 8.7 MG/DL (ref 8.6–10.5)
CHLORIDE SERPL-SCNC: 103 MMOL/L (ref 98–107)
CLARITY UR: ABNORMAL
CO2 SERPL-SCNC: 23 MMOL/L (ref 22–29)
COLOR UR: YELLOW
CREAT SERPL-MCNC: 1.2 MG/DL (ref 0.57–1)
DEPRECATED RDW RBC AUTO: 42.5 FL (ref 37–54)
EGFRCR SERPLBLD CKD-EPI 2021: 56.3 ML/MIN/1.73
EOSINOPHIL # BLD AUTO: 0.19 10*3/MM3 (ref 0–0.4)
EOSINOPHIL NFR BLD AUTO: 3.5 % (ref 0.3–6.2)
ERYTHROCYTE [DISTWIDTH] IN BLOOD BY AUTOMATED COUNT: 13.1 % (ref 12.3–15.4)
GLOBULIN UR ELPH-MCNC: 2.6 GM/DL
GLUCOSE SERPL-MCNC: 94 MG/DL (ref 65–99)
GLUCOSE UR STRIP-MCNC: NEGATIVE MG/DL
HCG SERPL QL: NEGATIVE
HCT VFR BLD AUTO: 40.7 % (ref 34–46.6)
HGB BLD-MCNC: 13.6 G/DL (ref 12–15.9)
HGB UR QL STRIP.AUTO: ABNORMAL
HOLD SPECIMEN: NORMAL
HOLD SPECIMEN: NORMAL
HYALINE CASTS UR QL AUTO: ABNORMAL /LPF
IMM GRANULOCYTES # BLD AUTO: 0.02 10*3/MM3 (ref 0–0.05)
IMM GRANULOCYTES NFR BLD AUTO: 0.4 % (ref 0–0.5)
KETONES UR QL STRIP: NEGATIVE
LEUKOCYTE ESTERASE UR QL STRIP.AUTO: ABNORMAL
LIPASE SERPL-CCNC: 29 U/L (ref 13–60)
LYMPHOCYTES # BLD AUTO: 1.26 10*3/MM3 (ref 0.7–3.1)
LYMPHOCYTES NFR BLD AUTO: 23.2 % (ref 19.6–45.3)
MCH RBC QN AUTO: 30.2 PG (ref 26.6–33)
MCHC RBC AUTO-ENTMCNC: 33.4 G/DL (ref 31.5–35.7)
MCV RBC AUTO: 90.2 FL (ref 79–97)
MONOCYTES # BLD AUTO: 0.59 10*3/MM3 (ref 0.1–0.9)
MONOCYTES NFR BLD AUTO: 10.9 % (ref 5–12)
NEUTROPHILS NFR BLD AUTO: 3.34 10*3/MM3 (ref 1.7–7)
NEUTROPHILS NFR BLD AUTO: 61.4 % (ref 42.7–76)
NITRITE UR QL STRIP: NEGATIVE
NRBC BLD AUTO-RTO: 0 /100 WBC (ref 0–0.2)
PH UR STRIP.AUTO: 5.5 [PH] (ref 5–9)
PLATELET # BLD AUTO: 205 10*3/MM3 (ref 140–450)
PMV BLD AUTO: 11.7 FL (ref 6–12)
POTASSIUM SERPL-SCNC: 4 MMOL/L (ref 3.5–5.2)
PROT SERPL-MCNC: 6.6 G/DL (ref 6–8.5)
PROT UR QL STRIP: NEGATIVE
RBC # BLD AUTO: 4.51 10*6/MM3 (ref 3.77–5.28)
RBC # UR STRIP: ABNORMAL /HPF
REF LAB TEST METHOD: ABNORMAL
SODIUM SERPL-SCNC: 138 MMOL/L (ref 136–145)
SP GR UR STRIP: 1.01 (ref 1–1.03)
SQUAMOUS #/AREA URNS HPF: ABNORMAL /HPF
UROBILINOGEN UR QL STRIP: ABNORMAL
WBC # UR STRIP: ABNORMAL /HPF
WBC NRBC COR # BLD: 5.43 10*3/MM3 (ref 3.4–10.8)
WHOLE BLOOD HOLD COAG: NORMAL

## 2023-01-19 PROCEDURE — 85025 COMPLETE CBC W/AUTO DIFF WBC: CPT

## 2023-01-19 PROCEDURE — 84703 CHORIONIC GONADOTROPIN ASSAY: CPT | Performed by: STUDENT IN AN ORGANIZED HEALTH CARE EDUCATION/TRAINING PROGRAM

## 2023-01-19 PROCEDURE — 83690 ASSAY OF LIPASE: CPT

## 2023-01-19 PROCEDURE — 25010000002 METOCLOPRAMIDE PER 10 MG: Performed by: STUDENT IN AN ORGANIZED HEALTH CARE EDUCATION/TRAINING PROGRAM

## 2023-01-19 PROCEDURE — 80053 COMPREHEN METABOLIC PANEL: CPT

## 2023-01-19 PROCEDURE — 96374 THER/PROPH/DIAG INJ IV PUSH: CPT

## 2023-01-19 PROCEDURE — 74019 RADEX ABDOMEN 2 VIEWS: CPT

## 2023-01-19 PROCEDURE — 81001 URINALYSIS AUTO W/SCOPE: CPT | Performed by: STUDENT IN AN ORGANIZED HEALTH CARE EDUCATION/TRAINING PROGRAM

## 2023-01-19 PROCEDURE — 99284 EMERGENCY DEPT VISIT MOD MDM: CPT

## 2023-01-19 RX ORDER — METOCLOPRAMIDE 5 MG/1
5 TABLET ORAL 3 TIMES DAILY PRN
Qty: 9 TABLET | Refills: 0 | Status: SHIPPED | OUTPATIENT
Start: 2023-01-19 | End: 2023-03-09 | Stop reason: HOSPADM

## 2023-01-19 RX ORDER — ONDANSETRON 4 MG/1
4 TABLET, ORALLY DISINTEGRATING ORAL 4 TIMES DAILY PRN
Qty: 12 TABLET | Refills: 0 | Status: SHIPPED | OUTPATIENT
Start: 2023-01-19 | End: 2023-03-09 | Stop reason: HOSPADM

## 2023-01-19 RX ORDER — PROMETHAZINE HYDROCHLORIDE 25 MG/1
25 SUPPOSITORY RECTAL EVERY 6 HOURS PRN
Qty: 12 SUPPOSITORY | Refills: 0 | Status: SHIPPED | OUTPATIENT
Start: 2023-01-19 | End: 2023-03-09 | Stop reason: HOSPADM

## 2023-01-19 RX ORDER — METOCLOPRAMIDE HYDROCHLORIDE 5 MG/ML
10 INJECTION INTRAMUSCULAR; INTRAVENOUS ONCE
Status: COMPLETED | OUTPATIENT
Start: 2023-01-19 | End: 2023-01-19

## 2023-01-19 RX ADMIN — SODIUM CHLORIDE, POTASSIUM CHLORIDE, SODIUM LACTATE AND CALCIUM CHLORIDE 1000 ML: 600; 310; 30; 20 INJECTION, SOLUTION INTRAVENOUS at 13:19

## 2023-01-19 RX ADMIN — METOCLOPRAMIDE 10 MG: 5 INJECTION, SOLUTION INTRAMUSCULAR; INTRAVENOUS at 13:19

## 2023-01-19 NOTE — ED PROVIDER NOTES
Subjective   History of Present Illness  47-year-old female comes to the ER with a 4-day history of abdominal achiness with nausea, vomiting, diarrhea.  She has been unable to tolerate anything by mouth.  She has not had much of an appetite either.  Nothing makes her feel better or worse.  Patient reports that she has had this before in the past.  No fevers or chills.  She denies other symptoms.    History provided by:  Patient   used: No        Review of Systems   Constitutional: Positive for activity change and appetite change. Negative for chills, fatigue and fever.   HENT: Negative for drooling.    Eyes: Negative for redness.   Respiratory: Negative for cough and shortness of breath.    Cardiovascular: Negative for chest pain.   Gastrointestinal: Positive for abdominal pain, diarrhea, nausea and vomiting.   Genitourinary: Negative for flank pain.   Skin: Negative for color change.   Neurological: Negative for seizures.   Psychiatric/Behavioral: Negative for confusion and sleep disturbance.       Past Medical History:   Diagnosis Date   • Abdominal pain     Chronic RLQ, RUQ, R flank comes and goes.    • Abdominal pain, right lower quadrant    • Acute bilateral otitis media    • Allergic rhinitis    • Backache     Upper back.   • Candidiasis of skin    • Cellulitis of neck    • Chest pain    • Contraception    • Cough    • Dysfunction of eustachian tube    • Dyspareunia, female    • Elevated cholesterol    • Excessive or frequent menstruation    • Flank pain    • Generalized aches and pains    • Headache    • Health maintenance examination    • Hypertension    • Infection of skin and subcutaneous tissue    • Irregular periods    • Kidney stone     Poss   • Menorrhagia    • Nausea vomiting and diarrhea    • Obesity    • Open wound of abdominal wall    • Otalgia    • Pain in left foot    • Pain in unspecified hand    • Removed Impacted cerumen 06/05/2012   • Rhinitis    • Seizure ([facility]) 08/15/2019    • Shoulder pain     right-sided-likely muscle strain      • Shoulder tendinitis    • Spider bite wound    • Staphylococcal infection of skin    • Stroke (HCC)    • Swollen feet    • Tinea cruris    • Tobacco dependence syndrome    • Upper respiratory infection    • Urinary tract infectious disease    • Vertigo    • Visit for gynecologic examination    • Vulvovaginitis        Allergies   Allergen Reactions   • Abilify [Aripiprazole] Nausea Only   • Buspirone Rash   • Penicillins Hives and Nausea And Vomiting       Past Surgical History:   Procedure Laterality Date   •  SECTION     • DILATATION AND CURETTAGE      Secondary to incomplete spontaneous    • INCISION AND DRAINAGE ABSCESS  2012   • INJECTION OF MEDICATION  2015    Phenergan (1)     • INJECTION OF MEDICATION  10/10/2013    Toradol (2)      • INJECTION OF MEDICATION  2014    Zofran (1)          Family History   Problem Relation Age of Onset   • Breast cancer Mother    • Thyroid disease Mother    • Hypertension Mother    • Heart disease Mother    • Arthritis Mother    • Liver disease Father    • Endometrial cancer Sister    • Anxiety disorder Brother    • Mental illness Daughter    • Depression Daughter    • Diabetes Maternal Aunt    • Diabetes Maternal Uncle    • Stroke Maternal Grandmother    • Breast cancer Maternal Grandmother    • Cancer Maternal Grandfather    • Stroke Paternal Grandmother    • Depression Other    • Cancer Other         Colorectal Cancer   • Endometrial cancer Other    • Lung cancer Other        Social History     Socioeconomic History   • Marital status: Single   Tobacco Use   • Smoking status: Every Day     Packs/day: 0.50     Types: Cigarettes   • Smokeless tobacco: Never   • Tobacco comments:     *800*quit*NOW   Vaping Use   • Vaping Use: Some days   • Substances: Nicotine   • Devices: Refillable tank   • Passive vaping exposure: Yes   Substance and Sexual Activity   • Alcohol use: No   •  "Drug use: No   • Sexual activity: Not Currently           Objective    Vitals:    01/19/23 1225 01/19/23 1229 01/19/23 1327   BP:  98/55 93/59   Pulse:  69 61   Resp:  20 20   Temp: 98 °F (36.7 °C)     TempSrc: Oral     SpO2:  97% 99%   Weight:  93.4 kg (206 lb)    Height:  152.4 cm (60\")        Physical Exam  Vitals and nursing note reviewed.   Constitutional:       General: She is not in acute distress.     Appearance: She is well-developed. She is obese. She is ill-appearing. She is not toxic-appearing or diaphoretic.   HENT:      Nose: No congestion.   Eyes:      Conjunctiva/sclera: Conjunctivae normal.   Cardiovascular:      Rate and Rhythm: Normal rate.   Pulmonary:      Effort: Pulmonary effort is normal. No accessory muscle usage or respiratory distress.      Breath sounds: Normal breath sounds.   Chest:      Chest wall: No tenderness.   Abdominal:      General: Bowel sounds are normal.      Palpations: Abdomen is soft.      Tenderness: There is abdominal tenderness (deep palpation). There is no guarding or rebound.   Skin:     General: Skin is warm and dry.      Capillary Refill: Capillary refill takes less than 2 seconds.   Neurological:      Mental Status: She is alert and oriented to person, place, and time.         Procedures           ED Course      Results for orders placed or performed during the hospital encounter of 01/19/23   Comprehensive Metabolic Panel    Specimen: Blood   Result Value Ref Range    Glucose 94 65 - 99 mg/dL    BUN 17 6 - 20 mg/dL    Creatinine 1.20 (H) 0.57 - 1.00 mg/dL    Sodium 138 136 - 145 mmol/L    Potassium 4.0 3.5 - 5.2 mmol/L    Chloride 103 98 - 107 mmol/L    CO2 23.0 22.0 - 29.0 mmol/L    Calcium 8.7 8.6 - 10.5 mg/dL    Total Protein 6.6 6.0 - 8.5 g/dL    Albumin 4.0 3.5 - 5.2 g/dL    ALT (SGPT) 38 (H) 1 - 33 U/L    AST (SGOT) 32 1 - 32 U/L    Alkaline Phosphatase 106 39 - 117 U/L    Total Bilirubin 0.2 0.0 - 1.2 mg/dL    Globulin 2.6 gm/dL    A/G Ratio 1.5 g/dL    " BUN/Creatinine Ratio 14.2 7.0 - 25.0    Anion Gap 12.0 5.0 - 15.0 mmol/L    eGFR 56.3 (L) >60.0 mL/min/1.73   Lipase    Specimen: Blood   Result Value Ref Range    Lipase 29 13 - 60 U/L   CBC Auto Differential    Specimen: Blood   Result Value Ref Range    WBC 5.43 3.40 - 10.80 10*3/mm3    RBC 4.51 3.77 - 5.28 10*6/mm3    Hemoglobin 13.6 12.0 - 15.9 g/dL    Hematocrit 40.7 34.0 - 46.6 %    MCV 90.2 79.0 - 97.0 fL    MCH 30.2 26.6 - 33.0 pg    MCHC 33.4 31.5 - 35.7 g/dL    RDW 13.1 12.3 - 15.4 %    RDW-SD 42.5 37.0 - 54.0 fl    MPV 11.7 6.0 - 12.0 fL    Platelets 205 140 - 450 10*3/mm3    Neutrophil % 61.4 42.7 - 76.0 %    Lymphocyte % 23.2 19.6 - 45.3 %    Monocyte % 10.9 5.0 - 12.0 %    Eosinophil % 3.5 0.3 - 6.2 %    Basophil % 0.6 0.0 - 1.5 %    Immature Grans % 0.4 0.0 - 0.5 %    Neutrophils, Absolute 3.34 1.70 - 7.00 10*3/mm3    Lymphocytes, Absolute 1.26 0.70 - 3.10 10*3/mm3    Monocytes, Absolute 0.59 0.10 - 0.90 10*3/mm3    Eosinophils, Absolute 0.19 0.00 - 0.40 10*3/mm3    Basophils, Absolute 0.03 0.00 - 0.20 10*3/mm3    Immature Grans, Absolute 0.02 0.00 - 0.05 10*3/mm3    nRBC 0.0 0.0 - 0.2 /100 WBC   hCG, Serum, Qualitative    Specimen: Blood   Result Value Ref Range    HCG Qualitative Negative Negative   Urinalysis With Microscopic If Indicated (No Culture) - Urine, Clean Catch    Specimen: Urine, Clean Catch   Result Value Ref Range    Color, UA Yellow Yellow, Straw, Dark Yellow, Kavita    Appearance, UA Cloudy (A) Clear    pH, UA 5.5 5.0 - 9.0    Specific [place], UA 1.014 1.003 - 1.030    Glucose, UA Negative Negative    Ketones, UA Negative Negative    Bilirubin, UA Negative Negative    Blood, UA Trace (A) Negative    Protein, UA Negative Negative    Leuk Esterase, UA Small (1+) (A) Negative    Nitrite, UA Negative Negative    Urobilinogen, UA 0.2 E.U./dL 0.2 - 1.0 E.U./dL   Urinalysis, Microscopic Only - Urine, Clean Catch    Specimen: Urine, Clean Catch   Result Value Ref Range    RBC, UA 0-2 (A)  None Seen /HPF    WBC, UA 13-20 (A) None Seen, 0-2, 3-5 /HPF    Bacteria, UA None Seen None Seen /HPF    Squamous Epithelial Cells, UA 3-5 (A) None Seen, 0-2 /HPF    Hyaline Casts, UA None Seen None Seen /LPF    Methodology Automated Microscopy    Gold Top - SST   Result Value Ref Range    Extra Tube Hold for add-ons.    Light Blue Top   Result Value Ref Range    Extra Tube Hold for add-ons.      XR Abdomen Flat & Upright   Final Result   CONCLUSION:   No free air.   Nonspecific air-fluid levels in the small and large bowel.   No mechanical bowel obstruction.   6 mm left renal calculus.      [id number]      Electronically signed by:  Christopher Self MD  1/19/2023 2:14 PM CST   Workstation: [id number]                Medical Decision Making  Vital signs are stable, afebrile.  Labs are unremarkable.  Creatinine is around her baseline.  Abdominal x-ray negative for acute findings.  Reglan and IVF bolus given.  Recommend patient follow-up with her PCP.  Antiemetics called into her pharmacy.  Return precautions given.  Patient states understanding and is agreeable to the plan.    Diarrhea, unspecified type: acute illness or injury  Nausea and vomiting, unspecified vomiting type: acute illness or injury  Amount and/or Complexity of Data Reviewed  Labs: ordered. Decision-making details documented in ED Course.  Radiology: ordered. Decision-making details documented in ED Course.      Risk  Prescription drug management.          Final diagnoses:   Nausea and vomiting, unspecified vomiting type   Diarrhea, unspecified type       ED Disposition  ED Disposition     ED Disposition   Discharge    Condition   Stable    Comment   --             Sonya Crooks, APRN  [address] [facility] [address]  [phone]    Schedule an appointment as soon as possible for a visit in 2 days  ER follow up         Medication List      New Prescriptions    metoclopramide 5 MG tablet  Commonly known as: REGLAN  Take 1 tablet by mouth 3 (Three) Times a  Day As Needed (Nausea).     ondansetron ODT 4 MG disintegrating tablet  Commonly known as: ZOFRAN-ODT  Place 1 tablet on the tongue 4 (Four) Times a Day As Needed for Nausea or Vomiting.     promethazine 25 MG suppository  Commonly known as: PHENERGAN  Insert 1 suppository into the rectum Every 6 (Six) Hours As Needed for Nausea or Vomiting.           Where to Get Your Medications      These medications were sent to [facility] - [place], [place] - [facility] [address] - [phone] [facility] [phone]   [address] [place] [address]    Phone: [phone]   · metoclopramide 5 MG tablet  · ondansetron ODT 4 MG disintegrating tablet  · promethazine 25 MG suppository          Juvenal Chand MD  01/19/23 9873

## 2023-01-26 ENCOUNTER — CLINICAL SUPPORT (OUTPATIENT)
Dept: AUDIOLOGY | Facility: CLINIC | Age: 48
End: 2023-01-26
Payer: COMMERCIAL

## 2023-01-26 ENCOUNTER — OFFICE VISIT (OUTPATIENT)
Dept: OTOLARYNGOLOGY | Facility: CLINIC | Age: 48
End: 2023-01-26
Payer: COMMERCIAL

## 2023-01-26 VITALS — HEART RATE: 66 BPM | BODY MASS INDEX: 39.78 KG/M2 | WEIGHT: 202.6 LBS | HEIGHT: 60 IN | OXYGEN SATURATION: 98 %

## 2023-01-26 DIAGNOSIS — F44.6: Primary | ICD-10-CM

## 2023-01-26 DIAGNOSIS — H93.299 ABNORMAL AUDITORY PERCEPTION, UNSPECIFIED LATERALITY: ICD-10-CM

## 2023-01-26 DIAGNOSIS — M26.629 TMJ PAIN DYSFUNCTION SYNDROME: ICD-10-CM

## 2023-01-26 DIAGNOSIS — H93.13 TINNITUS OF BOTH EARS: ICD-10-CM

## 2023-01-26 DIAGNOSIS — Z86.73 HISTORY OF STROKE: Primary | ICD-10-CM

## 2023-01-26 PROCEDURE — 99203 OFFICE O/P NEW LOW 30 MIN: CPT | Performed by: OTOLARYNGOLOGY

## 2023-01-26 PROCEDURE — 92557 COMPREHENSIVE HEARING TEST: CPT | Performed by: AUDIOLOGIST

## 2023-01-26 PROCEDURE — 92567 TYMPANOMETRY: CPT | Performed by: AUDIOLOGIST

## 2023-01-26 NOTE — PROGRESS NOTES
Chief complaint: Hearing loss    Assessment and Plan:  47F with decreased understanding without hearing loss since left MCL stroke in 2021    -Discussed that hearing aids would not be beneficial as she does not have any evidence of hearing loss just a loss of understanding on both sides.  We discussed using varied hearing environments to continue to bolster the connections in her cortex, but that there is no evidence of ear disease today.  -Regarding her ear pain we discussed that she has significant temporomandibular joint dysfunction bilaterally and that decreasing the amount of chewing and jaw movement she is doing as well as trying Tylenol or ibuprofen over-the-counter can help with this type of pain.    History of present illness:    Ms. Shannon Is a 47-year-old female presenting today for evaluation of hearing loss.  She states that ever since she had a large MCL stroke on the left in 2021 she has developed a difficulty with understanding people, especially noticeable on the phone with her daughter she does not notice whether this is one side or the other she does endorse high-frequency intermittent tinnitus bilaterally is not bothersome.  She denies a history of ear surgery, recurrent ear infections, ear drainage, vertigo, dysphagia, or dyspnea.  She does endorse difficulty with speaking also since her stroke which has been improving over time. No other acute ENT concerns today.    Vital Signs   Vitals:    01/26/23 0934   Pulse: 66   SpO2: 98%     Physical Exam:  General: NAD, awake and alert  Head: normocephalic, atraumatic  Eyes: EOMI, sclerae white, conjunctivae pink  Ears: pinnae intact without masses or lesions   Right: TM intact without injection or effusion   Left: TM intact without injection or effusion, nonobstructive cerumen present  Nose: external straight without deformity   Mucosa pink, not edematous. No polyps seen. No purulence.   Septum: midline   Turbinates: not hypertrophied  OC/OP: mucosa  moist and pink, no masses or lesions, tongue is midline and mobile. Tonsils atrophic/absent. Uvula single and elevates symmetrically.  Edentulous  TMJ with significant tenderness to palpation bilaterally, popping and cracking bilaterally  Neuro: no focal deficits, mild intermittent dysarthria    Results Review:  Audiogram and tympanometry from 1/26/2023 reviewed today and demonstrates type a tympanograms bilaterally.  SRT 10 DB on the right with 72% discrimination.  Tones demonstrate normal hearing.  SRT 5 DB on the left with 76% speech discrimination pure tones demonstrate normal hearing.  Referring nurse practitioner note from 12/16/2022 reviewed today and demonstrates a history of a left MCA stroke in 2021 without residual focal deficits, complaint of hearing loss and ENT referral placed at that time.    Review of Systems:  Positive ROS items: Arthritis, dizziness, headaches, tremors, and easy bleeding or bruising.  Otherwise, a 14 system ROS is negative except as pertinent positives are mentioned above.    Histories:  Allergies   Allergen Reactions   • Abilify [Aripiprazole] Nausea Only   • Buspirone Rash   • Penicillins Hives and Nausea And Vomiting       Prior to Admission medications    Medication Sig Start Date End Date Taking? Authorizing Provider   acetaminophen (TYLENOL) 500 MG tablet Take 2 tablets by mouth Every 8 (Eight) Hours As Needed for Mild Pain.   Yes Radha Zaragoza MD   aspirin 325 MG tablet Take 325 mg by mouth Daily.   Yes Radha Zaragoza MD   atorvastatin (LIPITOR) 80 MG tablet Take 1 tablet by mouth Every Night. 12/18/21  Yes Juan Conroy MD   butalbital-acetaminophen-caffeine (FIORICET, ESGIC) -40 MG per tablet Take 1 tablet by mouth Daily As Needed for Headache.   Yes Radha Zaragoza MD   cloNIDine (CATAPRES) 0.1 MG tablet Take 0.1 mg by mouth Every 30 (Thirty) Minutes As Needed for High Blood Pressure. Give one tap po every 30 mins when BP is over 160   Yes  Radha Zaragoza MD   diclofenac (VOLTAREN) 75 MG EC tablet 75 mg. 10/1/22  Yes Radha Zaragoza MD   escitalopram (LEXAPRO) 10 MG tablet 10 mg. 10/1/22  Yes Radha Zaragoza MD   escitalopram (LEXAPRO) 20 MG tablet Take 1.5 tablets by mouth Daily. 30 mg   Yes Radha Zaragoza MD   furosemide (LASIX) 20 MG tablet Take 20 mg by mouth Daily.   Yes Radha Zaragoza MD   hydrOXYzine pamoate (VISTARIL) 50 MG capsule Take 1 capsule by mouth 2 (Two) Times a Day. 4/5/22  Yes Sonya Crooks APRN   ketorolac (TORADOL) 10 MG tablet Take 10 mg by mouth Every 6 (Six) Hours As Needed for Moderate Pain.   Yes Radha Zaragoza MD   levETIRAcetam (KEPPRA) 1000 MG tablet Take 1 tablet by mouth 2 (Two) Times a Day.   Yes Radha Zaragoza MD   losartan (COZAAR) 50 MG tablet Take 50 mg by mouth Daily.   Yes Radha Zaragoza MD   meclizine (ANTIVERT) 12.5 MG tablet Take 12.5 mg by mouth 3 (Three) Times a Day As Needed for Dizziness. 4/19/22  Yes Sonya Crooks APRN   medroxyPROGESTERone (DEPO-PROVERA) 150 MG/ML injection Inject 1 mL into the appropriate muscle as directed by prescriber Every 3 (Three) Months.   Yes Radha Zaragoza MD   melatonin 5 MG tablet tablet Take 10 mg by mouth every night at bedtime.   Yes Radha Zaragoza MD   metoclopramide (REGLAN) 5 MG tablet Take 1 tablet by mouth 3 (Three) Times a Day As Needed (Nausea). 1/19/23  Yes Juvenal Chand MD   nabumetone (RELAFEN) 750 MG tablet Take 1 tablet by mouth 2 (Two) Times a Day As Needed for Mild Pain. 11/24/22  Yes Juvenal Chand MD   ondansetron ODT (ZOFRAN-ODT) 4 MG disintegrating tablet Place 1 tablet on the tongue 4 (Four) Times a Day As Needed for Nausea or Vomiting. 1/19/23  Yes Juvenal Chand MD   orphenadrine (NORFLEX) 100 MG 12 hr tablet Take 1 tablet by mouth 2 (Two) Times a Day As Needed for Muscle Spasms or Mild Pain . 4/13/22  Yes Nanette Mai APRN   pantoprazole (Protonix)  40 MG EC tablet Take 1 tablet by mouth Daily. 21  Yes Buzz Vasquez APRN   potassium chloride (K-DUR,KLOR-CON) 10 MEQ CR tablet Take 10 mEq by mouth Daily. 3/1/22  Yes Sonya Crooks APRN   promethazine (PHENERGAN) 25 MG suppository Insert 1 suppository into the rectum Every 6 (Six) Hours As Needed for Nausea or Vomiting. 23  Yes Juvenal Chand MD   traZODone (DESYREL) 50 MG tablet Take 50 mg by mouth Every Night.   Yes Provider, MD Radha       Past Medical History:   Diagnosis Date   • Abdominal pain     Chronic RLQ, RUQ, R flank comes and goes.    • Abdominal pain, right lower quadrant    • Acute bilateral otitis media    • Allergic rhinitis    • Backache     Upper back.   • Candidiasis of skin    • Cellulitis of neck    • Chest pain    • Contraception    • Cough    • Dysfunction of eustachian tube    • Dyspareunia, female    • Elevated cholesterol    • Excessive or frequent menstruation    • Flank pain    • Generalized aches and pains    • Headache    • Health maintenance examination    • Hypertension    • Infection of skin and subcutaneous tissue    • Irregular periods    • Kidney stone     Poss   • Menorrhagia    • Nausea vomiting and diarrhea    • Obesity    • Open wound of abdominal wall    • Otalgia    • Pain in left foot    • Pain in unspecified hand    • Removed Impacted cerumen 2012   • Rhinitis    • Seizure ([facility]) 08/15/2019   • Shoulder pain     right-sided-likely muscle strain      • Shoulder tendinitis    • Spider bite wound    • Staphylococcal infection of skin    • Stroke ([facility])    • Swollen feet    • Tinea cruris    • Tobacco dependence syndrome    • Upper respiratory infection    • Urinary tract infectious disease    • Vertigo    • Visit for gynecologic examination    • Vulvovaginitis        Past Surgical History:   Procedure Laterality Date   •  SECTION     • DILATATION AND CURETTAGE      Secondary to incomplete spontaneous    • INCISION AND  DRAINAGE ABSCESS  03/21/2012   • INJECTION OF MEDICATION  05/08/2015    Phenergan (1)     • INJECTION OF MEDICATION  10/10/2013    Toradol (2)      • INJECTION OF MEDICATION  01/16/2014    Zofran (1)          Social History     Socioeconomic History   • Marital status: Single   Tobacco Use   • Smoking status: Every Day     Packs/day: 0.50     Types: Cigarettes   • Smokeless tobacco: Never   • Tobacco comments:     19*800*quit*NOW   Vaping Use   • Vaping Use: Some days   • Substances: Nicotine   • Devices: Refillable tank   • Passive vaping exposure: Yes   Substance and Sexual Activity   • Alcohol use: No   • Drug use: No   • Sexual activity: Not Currently       Family History   Problem Relation Age of Onset   • Breast cancer Mother    • Thyroid disease Mother    • Hypertension Mother    • Heart disease Mother    • Arthritis Mother    • Liver disease Father    • Endometrial cancer Sister    • Anxiety disorder Brother    • Mental illness Daughter    • Depression Daughter    • Diabetes Maternal Aunt    • Diabetes Maternal Uncle    • Stroke Maternal Grandmother    • Breast cancer Maternal Grandmother    • Cancer Maternal Grandfather    • Stroke Paternal Grandmother    • Depression Other    • Cancer Other         Colorectal Cancer   • Endometrial cancer Other    • Lung cancer Other              This document has been electronically signed by Annika Jett MD on January 26, 2023 09:42 CST

## 2023-01-26 NOTE — PROGRESS NOTES
STANDARD AUDIOMETRIC EVALUATION      Name:  Myles Sahnnon  :  1975  Age:  47 y.o.  Date of Evaluation:  2023      HISTORY    Reason for visit:  Myles Shannon is seen today for a hearing test at the request of Dr. Annika Jett and ALPHONSE Martinez.  Patient reports a history of a stroke about 1 year ago.  She states she can't hear, and she has to ask people to repeat things to her.  She states she hears ringing in both ears.       EVALUATION    See Audiogram    RESULTS        Otoscopy and Tympanometry 226 Hz :  Right Ear:  Otoscopy:  Clear ear canal          Tympanometry:  Middle ear function within normal limits    Left Ear:   Otoscopy:  Clear ear canal        Tympanometry:  Middle ear function within normal limits    Test technique:  Standard Audiometry     Pure Tone Audiometry:   Patient responded to pure tones at 5-15 dB for 250-8000 Hz in right ear, and at 5-20 dB for 250-8000 Hz in left ear.       Speech Audiometry:        Right Ear:  Speech Reception Threshold (SRT) was obtained at 10 dBHL                 Speech Discrimination scores were 72% in quiet when words were presented at 50 dBHL       Left Ear:  Speech Reception Threshold (SRT) was obtained at 5 dBHL                 Speech Discrimination scores were 76% in quiet when words were presented at 45 dBHL    Reliability:   Fair; better after reinstruction    IMPRESSIONS:  1.  Tympanometry results are consistent with Middle ear function within normal limits in both ears.  2.  Pure tone results are consistent with hearing sensitivity within normal limits for both ears.       RECOMMENDATIONS:  Patient is seeing the Ear Nose and Throat physician immediately following this examination.  It was a pleasure seeing Myles Shannon in Audiology today.  We would be happy to do further testing or discuss these test as necessary.          This document has been electronically signed by Iris Hill MS CCC-NIKKO on January  26, 2023 14:21 CST       Iris Hill, MS CCC-A  Licensed Audiologist

## 2023-01-30 ENCOUNTER — PREP FOR SURGERY (OUTPATIENT)
Dept: OTHER | Facility: HOSPITAL | Age: 48
End: 2023-01-30
Payer: COMMERCIAL

## 2023-01-30 DIAGNOSIS — N39.0 URINARY TRACT INFECTION, SITE NOT SPECIFIED: Primary | ICD-10-CM

## 2023-01-30 RX ORDER — LEVOFLOXACIN 5 MG/ML
500 INJECTION, SOLUTION INTRAVENOUS ONCE
Status: CANCELLED | OUTPATIENT
Start: 2023-02-21 | End: 2023-01-30

## 2023-02-17 ENCOUNTER — PRE-ADMISSION TESTING (OUTPATIENT)
Dept: PREADMISSION TESTING | Facility: HOSPITAL | Age: 48
End: 2023-02-17
Payer: COMMERCIAL

## 2023-02-17 VITALS
BODY MASS INDEX: 39.27 KG/M2 | WEIGHT: 200 LBS | DIASTOLIC BLOOD PRESSURE: 72 MMHG | HEART RATE: 67 BPM | RESPIRATION RATE: 16 BRPM | OXYGEN SATURATION: 98 % | SYSTOLIC BLOOD PRESSURE: 124 MMHG | HEIGHT: 60 IN

## 2023-02-17 DIAGNOSIS — N39.0 URINARY TRACT INFECTION, SITE NOT SPECIFIED: ICD-10-CM

## 2023-02-17 LAB
ANION GAP SERPL CALCULATED.3IONS-SCNC: 8 MMOL/L (ref 5–15)
BILIRUB UR QL STRIP: NEGATIVE
BUN SERPL-MCNC: 14 MG/DL (ref 6–20)
BUN/CREAT SERPL: 13.3 (ref 7–25)
CALCIUM SPEC-SCNC: 9.6 MG/DL (ref 8.6–10.5)
CHLORIDE SERPL-SCNC: 104 MMOL/L (ref 98–107)
CLARITY UR: CLEAR
CO2 SERPL-SCNC: 26 MMOL/L (ref 22–29)
COLOR UR: YELLOW
CREAT SERPL-MCNC: 1.05 MG/DL (ref 0.57–1)
EGFRCR SERPLBLD CKD-EPI 2021: 66.1 ML/MIN/1.73
GLUCOSE SERPL-MCNC: 96 MG/DL (ref 65–99)
GLUCOSE UR STRIP-MCNC: NEGATIVE MG/DL
HGB UR QL STRIP.AUTO: NEGATIVE
KETONES UR QL STRIP: NEGATIVE
LEUKOCYTE ESTERASE UR QL STRIP.AUTO: ABNORMAL
NITRITE UR QL STRIP: NEGATIVE
PH UR STRIP.AUTO: 6.5 [PH] (ref 5–9)
POTASSIUM SERPL-SCNC: 4.6 MMOL/L (ref 3.5–5.2)
PROT UR QL STRIP: NEGATIVE
SODIUM SERPL-SCNC: 138 MMOL/L (ref 136–145)
SP GR UR STRIP: 1.01 (ref 1–1.03)
UROBILINOGEN UR QL STRIP: ABNORMAL

## 2023-02-17 PROCEDURE — 80048 BASIC METABOLIC PNL TOTAL CA: CPT

## 2023-02-17 PROCEDURE — 81003 URINALYSIS AUTO W/O SCOPE: CPT

## 2023-02-17 PROCEDURE — 36415 COLL VENOUS BLD VENIPUNCTURE: CPT

## 2023-02-17 RX ORDER — SOLIFENACIN SUCCINATE 10 MG/1
10 TABLET, FILM COATED ORAL DAILY
Status: ON HOLD | COMMUNITY
End: 2023-03-09

## 2023-02-17 RX ORDER — SODIUM CHLORIDE 9 MG/ML
1000 INJECTION, SOLUTION INTRAVENOUS CONTINUOUS
Status: CANCELLED | OUTPATIENT
Start: 2023-02-21

## 2023-02-17 RX ORDER — FLUTICASONE PROPIONATE 50 MCG
2 SPRAY, SUSPENSION (ML) NASAL DAILY
COMMUNITY
End: 2023-03-09 | Stop reason: HOSPADM

## 2023-02-17 RX ORDER — BUSPIRONE HYDROCHLORIDE 15 MG/1
15 TABLET ORAL 3 TIMES DAILY
COMMUNITY
End: 2023-03-09 | Stop reason: HOSPADM

## 2023-02-20 ENCOUNTER — ANESTHESIA EVENT (OUTPATIENT)
Dept: PERIOP | Facility: HOSPITAL | Age: 48
End: 2023-02-20
Payer: COMMERCIAL

## 2023-02-20 NOTE — H&P
[facility] History and Physical  KEAGAN TORRES  47-year-old; :1975;;female  [address];[place] [address]  Ins: 1) [employer] [place] [employer]  MRN:[id number]  FARAZ HILL MD  [facility] [place]  Allergies  busPIRone Unknown  penicillin Unknown  Abilify Unknown  Current Medications  nitrofurantoin macrocrystals 100 mg oral capsule 1 capsule oral 2 times per day for 5 days then daily  buPROPion 75 mg oral tablet  Trileptal 600 mg oral tablet  oxybutynin 5 mg oral tablet  Imitrex 50 mg oral tablet  Zanaflex 4 mg oral tablet  Topamax 100 mg oral tablet  Mobic 15 mg oral tablet   diclofenac sodium 75 mg oral delayed release tablet  promethazine 25 mg oral tablet  * Medications Reconciled  Medications Prescribed this Visit  Macrodantin 100 mg oral capsule 1 capsule oral 2 times per day for 5 days then daily  Problem List  Kidney stone 2023  Recurrent urinary tract infection 2023  Medical History  Essential hypertension  Kidney disease  Ischemic stroke  Seizure  HAS NOT HAD COLONSCOPY IN LAST 9 YEARS, INSTRUCTED PATIENT TO FOLLOW UP WITH PRIMARY CARE FOR SCREENING.  Surgical History  NONE NOTED  Obstetric/Gynecologic History  Patient has regular menstrual periods and states there is no chance she may be pregnant.  Psychiatric History  Patient is generally satisfied with life and has no thoughts of suicide. Has depression and anxiety.  Family History  Family History Of Malignant Neoplasm, Unspecified  Heart Disease, Unspecified  Type 2 Diabetes Mellitus Without Complications  Mother Alive  Father Alive  Brother Alive  Brother Alive  Sister Alive  Sister Alive  Social History  Current smoker. Non drinker. Disabled. Single.  Imm/Inj/Office Meds History Comments  PT HAS NOT HAD FLU OR PNEUMONIA VACCINE. COVID 2  Chief Complaint  Kidney Stone  History of Present Illness  KEAGAN TORRES is a 47-year-old female here for evaluation. She has a history  of recurrent UTIs, treated for UTI in Oct, Nov, twice in Jan. Reduced tobacco use since Oct. Resides at [facility] in [place]. Dysuria present. UA shows blood and leukocytes. Says she does better on suppressive therapy. Previously took Macrodantin. KUB shows 2 stones left kidney, nothing active. Renal ultrasound shows no hydronephrois.  Location: urinary tract  Severity: moderate  Onset / Duration: months  Timing: daily  Modifying factors: PRN antibiotic  Associated signs and symptoms: dysuria  Review of Systems  Eyes:Reports: blurry vision ; Denies: pain in the eyes and double vision  ENMT:Reports: ear pain ; Denies: sore throat and sinus problems  Cardiovascular:Denies: chest pain, varicose veins, angina and syncope  Respiratory:Denies: shortness of breath, wheezing and frequent cough  Gastrointestinal:Reports: nausea ; Denies: abdominal pain, vomiting, indigestion and heartburn  Genitourinary:Reports: other symptoms (see HPI)  Musculoskeletal:Reports: joint pain and back pain ; Denies: neck pain  Integumentary:Denies: skin rash, boils and persistent itch  Neurological:Reports: tremors, dizzy spells and numbness / tingling  Psychiatric:Reports: generally satisfied with life, depression and anxiety ; Denies: suicidal ideation  Endocrine:Reports: tired/sluggish ; Denies: Excessive thirst, cold intolerance and heat intolerance  Hematologic/Lymphatic:Denies: swollen glands and blood clotting problem  Allergic/Immunologic:Denies: hayfever  Constitutional:Denies: fever, chills and weight loss  Vital Signs  VITAL SIGNS NOT TAKEN DUE TO COVID 19 RESTRICTIONS  Weight: 202 (lb) Resp Rate: 18 (/min)  Height: 62 (in) BMI: 36.95  Current every day smoker  Exam  General appearance: The patient appears well developed and well nourished, in no apparent acute distress.  Examination of pupils and irises: PERRL  Assessment of hearing: Hearing appears normal.  Examination of neck: Neck appears supple.  Assessment of respiratory  effort: Respiratory effort appears normal. No apparent distress.  Examination of Extremities for edema and/or varicosities: none  Inspection of breasts: Deferred.  Examination of abdomen: Soft benign abdomen on exam.  Obtain stool sample: Stool specimen for occult blood is not indicated.  Examination of gait and station: Normal gait and stature.  Head and neck (Assessment of range of motion): Adequate ROM noted in all extremities.  Head and neck (Assessment of stability): Ambulates without assistance.  Inspection of skin and subcutaneous tissue: Exam of the skin is within normal limits. The color and skin turgor are normal. No lesions, bruises, rashes, or jaundice.  Orientation to time, place and person: Oriented to person, place, and time.  Mood and affect: Mood and affect are normal.  Data Review  Medications and chart reviewed. History and physical form/ROS reviewed and no changes noted. Previous encounter reviewed. Last form done 10/12/22. KUB ordered and reviewed by PROVIDER today. UA ORDERED AND REVIEWED BY PROVIDER. US ordered and reviewed by provider.  Lab Results  01/26/2023  GLUCOSE NEGATIVE, BILIRUBIN NEGATIVE, KETONE NEGATIVE, SPECIFIC GRAVITY 1.010, BLOOD TRACE NON-HEMOLYZED, PH 6.5, PROTEIN NEGATIVE, Urobilinogen 0.1 E.U./dl- 1.0 E.U./dl, NITRITE NEGATIVE, LEUKOCYTES TRACE  Assessment - Recurrent urinary tract infection  DX:  Recurrent urinary tract infection  ICD-10: N39.0  Kidney stone  ICD-10: N20.0  Assessment Notes  Recurrent UTIs, stones on imaging not active  Plan  Plan Notes:  Nitrofurantoin suppression  Cystoscopy  Prescriptions:  Macrodantin 100 mg oral capsule 1 capsule oral 2 times per day for 5 days then daily Quantity: 30; Refills: 2  Plan  Procedures:  [id number]. QW UA, AUTOMATEDW/O MICRO  Labs:  URINE SG; (Routine); [facility]  Discussion:  Discussed my findings and plan of action and reasoning behind decision making. All questions were answered. FINDINGS WERE DISCUSSED WITH PATIENT.  RISKS AND BENEFITS EXPLAINED. PATIENT WISHES TO PROCEED.  Instructions:  Patient is instructed to call with any problems. Patient is instructed to call if the condition worsens. Patient is instructed to call with any changes in condition. Patient is instructed to call if she has any intractable pain, fever, chills, nausea, or vomiting. INCREASE FLUIDS. IF PAIN WORSENS/ UNCONTROLLED OR TEMPERATURE  >101.0 GO IMMEDIATELY TO ER.

## 2023-02-21 ENCOUNTER — HOSPITAL ENCOUNTER (OUTPATIENT)
Facility: HOSPITAL | Age: 48
Setting detail: HOSPITAL OUTPATIENT SURGERY
Discharge: HOME | End: 2023-02-21
Attending: UROLOGY | Admitting: UROLOGY
Payer: COMMERCIAL

## 2023-02-21 ENCOUNTER — ANESTHESIA (OUTPATIENT)
Dept: PERIOP | Facility: HOSPITAL | Age: 48
End: 2023-02-21
Payer: COMMERCIAL

## 2023-02-21 VITALS
DIASTOLIC BLOOD PRESSURE: 53 MMHG | SYSTOLIC BLOOD PRESSURE: 98 MMHG | HEIGHT: 60 IN | HEART RATE: 60 BPM | TEMPERATURE: 97 F | BODY MASS INDEX: 39.04 KG/M2 | WEIGHT: 198.85 LBS | OXYGEN SATURATION: 94 % | RESPIRATION RATE: 18 BRPM

## 2023-02-21 DIAGNOSIS — N39.0 URINARY TRACT INFECTION, SITE NOT SPECIFIED: ICD-10-CM

## 2023-02-21 LAB — B-HCG UR QL: NEGATIVE

## 2023-02-21 PROCEDURE — 25010000002 FENTANYL CITRATE (PF) 50 MCG/ML SOLUTION: Performed by: NURSE ANESTHETIST, CERTIFIED REGISTERED

## 2023-02-21 PROCEDURE — 81025 URINE PREGNANCY TEST: CPT | Performed by: ANESTHESIOLOGY

## 2023-02-21 PROCEDURE — 25010000002 PROPOFOL 10 MG/ML EMULSION: Performed by: NURSE ANESTHETIST, CERTIFIED REGISTERED

## 2023-02-21 PROCEDURE — 25010000002 LEVOFLOXACIN PER 250 MG: Performed by: UROLOGY

## 2023-02-21 PROCEDURE — 25010000002 MIDAZOLAM PER 1 MG: Performed by: NURSE ANESTHETIST, CERTIFIED REGISTERED

## 2023-02-21 RX ORDER — MIDAZOLAM HYDROCHLORIDE 1 MG/ML
INJECTION INTRAMUSCULAR; INTRAVENOUS AS NEEDED
Status: DISCONTINUED | OUTPATIENT
Start: 2023-02-21 | End: 2023-02-21 | Stop reason: SURG

## 2023-02-21 RX ORDER — LEVOFLOXACIN 5 MG/ML
500 INJECTION, SOLUTION INTRAVENOUS ONCE
Status: COMPLETED | OUTPATIENT
Start: 2023-02-21 | End: 2023-02-21

## 2023-02-21 RX ORDER — PROPOFOL 10 MG/ML
VIAL (ML) INTRAVENOUS AS NEEDED
Status: DISCONTINUED | OUTPATIENT
Start: 2023-02-21 | End: 2023-02-21 | Stop reason: SURG

## 2023-02-21 RX ORDER — SODIUM CHLORIDE 9 MG/ML
1000 INJECTION, SOLUTION INTRAVENOUS CONTINUOUS
Status: DISCONTINUED | OUTPATIENT
Start: 2023-02-21 | End: 2023-02-21 | Stop reason: HOSPADM

## 2023-02-21 RX ORDER — FENTANYL CITRATE 50 UG/ML
INJECTION, SOLUTION INTRAMUSCULAR; INTRAVENOUS AS NEEDED
Status: DISCONTINUED | OUTPATIENT
Start: 2023-02-21 | End: 2023-02-21 | Stop reason: SURG

## 2023-02-21 RX ADMIN — LEVOFLOXACIN 500 MG: 5 INJECTION, SOLUTION INTRAVENOUS at 07:16

## 2023-02-21 RX ADMIN — PROPOFOL 10 MG: 10 INJECTION, EMULSION INTRAVENOUS at 07:21

## 2023-02-21 RX ADMIN — PROPOFOL 10 MG: 10 INJECTION, EMULSION INTRAVENOUS at 07:24

## 2023-02-21 RX ADMIN — SODIUM CHLORIDE 1000 ML: 9 INJECTION, SOLUTION INTRAVENOUS at 05:59

## 2023-02-21 RX ADMIN — PROPOFOL 10 MG: 10 INJECTION, EMULSION INTRAVENOUS at 07:20

## 2023-02-21 RX ADMIN — PROPOFOL 20 MG: 10 INJECTION, EMULSION INTRAVENOUS at 07:17

## 2023-02-21 RX ADMIN — FENTANYL CITRATE 25 MCG: 50 INJECTION, SOLUTION INTRAMUSCULAR; INTRAVENOUS at 07:18

## 2023-02-21 RX ADMIN — PROPOFOL 10 MG: 10 INJECTION, EMULSION INTRAVENOUS at 07:27

## 2023-02-21 RX ADMIN — MIDAZOLAM HYDROCHLORIDE 1 MG: 1 INJECTION, SOLUTION INTRAMUSCULAR; INTRAVENOUS at 07:10

## 2023-02-21 NOTE — ANESTHESIA PREPROCEDURE EVALUATION
Anesthesia Evaluation     Patient summary reviewed and Nursing notes reviewed   no history of anesthetic complications:  NPO Solid Status: > 8 hours  NPO Liquid Status: > 8 hours           Airway   Mallampati: I  TM distance: >3 FB  Neck ROM: full  Dental    (+) edentulous    Pulmonary     breath sounds clear to auscultation  (+) a smoker Current Abstained day of surgery,   (-) asthma, recent URI, sleep apnea, rhonchi, decreased breath sounds, wheezes, rales  Cardiovascular   Exercise tolerance: poor (<4 METS)    ECG reviewed  Rhythm: regular  Rate: normal    (+) hypertension 2 medications or greater, valvular problems/murmurs MR, dysrhythmias Bradycardia, hyperlipidemia,   (-) past MI, angina, murmur, cardiac stents, DVT    ROS comment: [id number]  Date/Time: 2022 6:43 PM   Performed by: Asher Grande MD   Authorized by: Asher Grande MD   Interpreted by physician   Rhythm: sinus bradycardia   BPM: 57   ST Segments: ST segments normal     Interpreted by Asher Grande MD on 2022  6:43 PM CST    TTE: 22  Interpretation Summary       •  Left ventricular systolic function is normal. Left ventricular ejection fraction appears to be 56 - 60%.  •  Left ventricular diastolic function was normal.  •  Saline test results are negative for right to left atrial level shunt at baseline state. Valsalva maneuver was not performed.  •  Estimated right ventricular systolic pressure from tricuspid regurgitation is normal (<35 mmHg).  •  The right ventricle appears dilated in limited views.  •  There is no evidence of pericardial effusion.      Neuro/Psych  (+) seizures, CVA residual symptoms, headaches (Migraines), psychiatric history Bipolar,    (-) dizziness/light headedness, weakness  GI/Hepatic/Renal/Endo    (+) obesity, morbid obesity,  renal disease (creatinine 1.05) stones,   (-) GERD, liver disease    Musculoskeletal     (-) neck pain  Abdominal   (+) obese,    Substance History    (-) alcohol use, drug use     OB/GYN    (-)  Pregnant        Other   arthritis,      ROS/Med Hx Other: Hx: seziures: last seizure was 2 months ago. Takes Keppra daily.     CVA:12/19/21  Per pt had to relearn how to walk and talk. Pt she has some aphasia and right sided weakness.     Had AMBER after stroke in 2021.    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                   Anesthesia Plan    ASA 3     MAC     (HCG: negative  )  intravenous induction     Anesthetic plan, risks, benefits, and alternatives have been provided, discussed and informed consent has been obtained with: patient.        CODE STATUS:

## 2023-02-21 NOTE — OP NOTE
CYSTOSCOPY  Procedure Note    Myles Shannon  2/21/2023    Pre-op Diagnosis:   Urinary tract infection, site not specified [N39.0]    Post-op Diagnosis:     Post-Op Diagnosis Codes:     * Urinary tract infection, site not specified [N39.0]    Procedure(s):  CYSTOSCOPY    Surgeon(s):  Chris Deshpande MD    Anesthesia: Monitored Anesthesia Care    Staff:   Circulator: Audrey Mathis RN; Alva Dunn RN  Scrub Person: Madyson Cantrell          Estimated Blood Loss: none    Specimens:                None      Drains:   [REMOVED] External Urinary Catheter (Removed)       Findings: No infection tumor calculi no obstruction of the ureter    Complications: None    Indications: Same    Description of Procedure: Patient brought surgery placed in dorsolithotomy position sterile prep and drape genitalia in routine fashion.  Passed a flexible cystoscope into the urethra which was nonobstructive the bladder itself was negative for infection tumor or calculi in the right and left ureters were normal position once his Compass bladder was drained scope was removed and the patient taken recovery having tolerated the procedure well    Chris Deshpande MD     Date: 2/21/2023  Time: 07:31 CST

## 2023-02-21 NOTE — ANESTHESIA POSTPROCEDURE EVALUATION
Patient: Myles Shannon    Procedure Summary     Date: 02/21/23 Room / Location: [facility] OR 02 / [facility] OR    Anesthesia Start: 0714 Anesthesia Stop:     Procedure: CYSTOSCOPY (Urethra) Diagnosis:       Urinary tract infection, site not specified      (Urinary tract infection, site not specified [N39.0])    Surgeons: Chris Deshpande MD Provider: Vida Dexter CRNA    Anesthesia Type: MAC ASA Status: 3          Anesthesia Type: MAC    Vitals  No vitals data found for the desired time range.          Post Anesthesia Care and Evaluation    Patient location during evaluation: PHASE II  Patient participation: complete - patient participated  Level of consciousness: awake  Pain score: 0  Pain management: adequate    Airway patency: patent  Anesthetic complications: No anesthetic complications  PONV Status: none  Cardiovascular status: acceptable and hemodynamically stable  Respiratory status: acceptable and room air  Hydration status: acceptable    Comments: HR 60  BP 98/53  SPO2 94%  Temp 97

## 2023-03-04 ENCOUNTER — APPOINTMENT (OUTPATIENT)
Dept: CT IMAGING | Facility: HOSPITAL | Age: 48
DRG: 418 | End: 2023-03-04
Payer: COMMERCIAL

## 2023-03-04 ENCOUNTER — APPOINTMENT (OUTPATIENT)
Dept: ULTRASOUND IMAGING | Facility: HOSPITAL | Age: 48
DRG: 418 | End: 2023-03-04
Payer: COMMERCIAL

## 2023-03-04 ENCOUNTER — HOSPITAL ENCOUNTER (INPATIENT)
Facility: HOSPITAL | Age: 48
LOS: 5 days | Discharge: HOME | DRG: 418 | End: 2023-03-09
Attending: FAMILY MEDICINE | Admitting: INTERNAL MEDICINE
Payer: COMMERCIAL

## 2023-03-04 DIAGNOSIS — N39.0 URINARY TRACT INFECTION IN FEMALE: ICD-10-CM

## 2023-03-04 DIAGNOSIS — K81.0 ACUTE CHOLECYSTITIS: Primary | ICD-10-CM

## 2023-03-04 DIAGNOSIS — Z74.09 IMPAIRED FUNCTIONAL MOBILITY, BALANCE, GAIT, AND ENDURANCE: ICD-10-CM

## 2023-03-04 DIAGNOSIS — K80.47 CALCULUS OF BILE DUCT WITH ACUTE ON CHRONIC CHOLECYSTITIS WITH OBSTRUCTION: ICD-10-CM

## 2023-03-04 LAB
ALBUMIN SERPL-MCNC: 4.1 G/DL (ref 3.5–5.2)
ALBUMIN/GLOB SERPL: 1.6 G/DL
ALP SERPL-CCNC: 392 U/L (ref 39–117)
ALT SERPL W P-5'-P-CCNC: 449 U/L (ref 1–33)
AMPHET+METHAMPHET UR QL: NEGATIVE
AMPHETAMINES UR QL: NEGATIVE
AMYLASE SERPL-CCNC: 81 U/L (ref 28–100)
ANION GAP SERPL CALCULATED.3IONS-SCNC: 13 MMOL/L (ref 5–15)
AST SERPL-CCNC: 218 U/L (ref 1–32)
B-HCG UR QL: NEGATIVE
BACTERIA UR QL AUTO: ABNORMAL /HPF
BARBITURATES UR QL SCN: NEGATIVE
BASOPHILS # BLD AUTO: 0.03 10*3/MM3 (ref 0–0.2)
BASOPHILS NFR BLD AUTO: 0.6 % (ref 0–1.5)
BENZODIAZ UR QL SCN: POSITIVE
BILIRUB SERPL-MCNC: 2.1 MG/DL (ref 0–1.2)
BILIRUB UR QL STRIP: ABNORMAL
BUN SERPL-MCNC: 16 MG/DL (ref 6–20)
BUN/CREAT SERPL: 13 (ref 7–25)
BUPRENORPHINE SERPL-MCNC: NEGATIVE NG/ML
CALCIUM SPEC-SCNC: 9.3 MG/DL (ref 8.6–10.5)
CANNABINOIDS SERPL QL: NEGATIVE
CHLORIDE SERPL-SCNC: 103 MMOL/L (ref 98–107)
CLARITY UR: ABNORMAL
CO2 SERPL-SCNC: 23 MMOL/L (ref 22–29)
COCAINE UR QL: NEGATIVE
COLOR UR: ABNORMAL
CREAT SERPL-MCNC: 1.23 MG/DL (ref 0.57–1)
D-LACTATE SERPL-SCNC: 0.8 MMOL/L (ref 0.5–2)
DEPRECATED RDW RBC AUTO: 43 FL (ref 37–54)
EGFRCR SERPLBLD CKD-EPI 2021: 54.7 ML/MIN/1.73
EOSINOPHIL # BLD AUTO: 0.26 10*3/MM3 (ref 0–0.4)
EOSINOPHIL NFR BLD AUTO: 5.4 % (ref 0.3–6.2)
ERYTHROCYTE [DISTWIDTH] IN BLOOD BY AUTOMATED COUNT: 13.2 % (ref 12.3–15.4)
GLOBULIN UR ELPH-MCNC: 2.6 GM/DL
GLUCOSE SERPL-MCNC: 107 MG/DL (ref 65–99)
GLUCOSE UR STRIP-MCNC: NEGATIVE MG/DL
HAV IGM SERPL QL IA: NORMAL
HBV CORE IGM SERPL QL IA: NORMAL
HBV SURFACE AG SERPL QL IA: NORMAL
HCT VFR BLD AUTO: 37.2 % (ref 34–46.6)
HCV AB SER DONR QL: NORMAL
HGB BLD-MCNC: 12.2 G/DL (ref 12–15.9)
HGB UR QL STRIP.AUTO: ABNORMAL
HOLD SPECIMEN: NORMAL
HOLD SPECIMEN: NORMAL
HYALINE CASTS UR QL AUTO: ABNORMAL /LPF
IMM GRANULOCYTES # BLD AUTO: 0.01 10*3/MM3 (ref 0–0.05)
IMM GRANULOCYTES NFR BLD AUTO: 0.2 % (ref 0–0.5)
KETONES UR QL STRIP: ABNORMAL
LEUKOCYTE ESTERASE UR QL STRIP.AUTO: ABNORMAL
LIPASE SERPL-CCNC: 95 U/L (ref 13–60)
LYMPHOCYTES # BLD AUTO: 1.16 10*3/MM3 (ref 0.7–3.1)
LYMPHOCYTES NFR BLD AUTO: 24.3 % (ref 19.6–45.3)
MCH RBC QN AUTO: 29.2 PG (ref 26.6–33)
MCHC RBC AUTO-ENTMCNC: 32.8 G/DL (ref 31.5–35.7)
MCV RBC AUTO: 89 FL (ref 79–97)
METHADONE UR QL SCN: NEGATIVE
MONOCYTES # BLD AUTO: 0.51 10*3/MM3 (ref 0.1–0.9)
MONOCYTES NFR BLD AUTO: 10.7 % (ref 5–12)
NEUTROPHILS NFR BLD AUTO: 2.81 10*3/MM3 (ref 1.7–7)
NEUTROPHILS NFR BLD AUTO: 58.8 % (ref 42.7–76)
NITRITE UR QL STRIP: POSITIVE
NRBC BLD AUTO-RTO: 0 /100 WBC (ref 0–0.2)
OPIATES UR QL: NEGATIVE
OXYCODONE UR QL SCN: NEGATIVE
PCP UR QL SCN: NEGATIVE
PH UR STRIP.AUTO: <=5 [PH] (ref 5–9)
PLATELET # BLD AUTO: 196 10*3/MM3 (ref 140–450)
PMV BLD AUTO: 11.9 FL (ref 6–12)
POTASSIUM SERPL-SCNC: 3.8 MMOL/L (ref 3.5–5.2)
PROPOXYPH UR QL: NEGATIVE
PROT SERPL-MCNC: 6.7 G/DL (ref 6–8.5)
PROT UR QL STRIP: ABNORMAL
RBC # BLD AUTO: 4.18 10*6/MM3 (ref 3.77–5.28)
RBC # UR STRIP: ABNORMAL /HPF
REF LAB TEST METHOD: ABNORMAL
SODIUM SERPL-SCNC: 139 MMOL/L (ref 136–145)
SP GR UR STRIP: 1.03 (ref 1–1.03)
SQUAMOUS #/AREA URNS HPF: ABNORMAL /HPF
TRICYCLICS UR QL SCN: NEGATIVE
UROBILINOGEN UR QL STRIP: ABNORMAL
WBC # UR STRIP: ABNORMAL /HPF
WBC NRBC COR # BLD: 4.78 10*3/MM3 (ref 3.4–10.8)
WHOLE BLOOD HOLD COAG: NORMAL
YEAST URNS QL MICRO: ABNORMAL /HPF

## 2023-03-04 PROCEDURE — 99222 1ST HOSP IP/OBS MODERATE 55: CPT | Performed by: STUDENT IN AN ORGANIZED HEALTH CARE EDUCATION/TRAINING PROGRAM

## 2023-03-04 PROCEDURE — 82150 ASSAY OF AMYLASE: CPT | Performed by: NURSE PRACTITIONER

## 2023-03-04 PROCEDURE — 80306 DRUG TEST PRSMV INSTRMNT: CPT | Performed by: NURSE PRACTITIONER

## 2023-03-04 PROCEDURE — 74176 CT ABD & PELVIS W/O CONTRAST: CPT

## 2023-03-04 PROCEDURE — 80074 ACUTE HEPATITIS PANEL: CPT | Performed by: NURSE PRACTITIONER

## 2023-03-04 PROCEDURE — 85025 COMPLETE CBC W/AUTO DIFF WBC: CPT | Performed by: NURSE PRACTITIONER

## 2023-03-04 PROCEDURE — 87086 URINE CULTURE/COLONY COUNT: CPT | Performed by: INTERNAL MEDICINE

## 2023-03-04 PROCEDURE — 83690 ASSAY OF LIPASE: CPT | Performed by: NURSE PRACTITIONER

## 2023-03-04 PROCEDURE — 83605 ASSAY OF LACTIC ACID: CPT | Performed by: NURSE PRACTITIONER

## 2023-03-04 PROCEDURE — 81025 URINE PREGNANCY TEST: CPT | Performed by: NURSE PRACTITIONER

## 2023-03-04 PROCEDURE — 80053 COMPREHEN METABOLIC PANEL: CPT | Performed by: NURSE PRACTITIONER

## 2023-03-04 PROCEDURE — 25010000002 ONDANSETRON PER 1 MG: Performed by: NURSE PRACTITIONER

## 2023-03-04 PROCEDURE — 36415 COLL VENOUS BLD VENIPUNCTURE: CPT

## 2023-03-04 PROCEDURE — 81001 URINALYSIS AUTO W/SCOPE: CPT | Performed by: NURSE PRACTITIONER

## 2023-03-04 PROCEDURE — 99285 EMERGENCY DEPT VISIT HI MDM: CPT

## 2023-03-04 PROCEDURE — 76705 ECHO EXAM OF ABDOMEN: CPT

## 2023-03-04 RX ORDER — SODIUM CHLORIDE 0.9 % (FLUSH) 0.9 %
10 SYRINGE (ML) INJECTION AS NEEDED
Status: DISCONTINUED | OUTPATIENT
Start: 2023-03-04 | End: 2023-03-09 | Stop reason: HOSPADM

## 2023-03-04 RX ORDER — METRONIDAZOLE 500 MG/100ML
500 INJECTION, SOLUTION INTRAVENOUS ONCE
Status: DISCONTINUED | OUTPATIENT
Start: 2023-03-04 | End: 2023-03-05

## 2023-03-04 RX ORDER — SODIUM CHLORIDE 9 MG/ML
125 INJECTION, SOLUTION INTRAVENOUS CONTINUOUS
Status: DISCONTINUED | OUTPATIENT
Start: 2023-03-04 | End: 2023-03-05

## 2023-03-04 RX ORDER — ONDANSETRON 2 MG/ML
4 INJECTION INTRAMUSCULAR; INTRAVENOUS ONCE
Status: COMPLETED | OUTPATIENT
Start: 2023-03-04 | End: 2023-03-04

## 2023-03-04 RX ADMIN — ONDANSETRON 4 MG: 2 INJECTION INTRAMUSCULAR; INTRAVENOUS at 20:03

## 2023-03-04 RX ADMIN — SODIUM CHLORIDE 1000 ML: 9 INJECTION, SOLUTION INTRAVENOUS at 20:03

## 2023-03-04 RX ADMIN — AZTREONAM 2 G: 2 INJECTION, POWDER, LYOPHILIZED, FOR SOLUTION INTRAMUSCULAR; INTRAVENOUS at 23:36

## 2023-03-05 ENCOUNTER — ANESTHESIA EVENT (OUTPATIENT)
Dept: PERIOP | Facility: HOSPITAL | Age: 48
DRG: 418 | End: 2023-03-05
Payer: COMMERCIAL

## 2023-03-05 ENCOUNTER — ANESTHESIA (OUTPATIENT)
Dept: PERIOP | Facility: HOSPITAL | Age: 48
DRG: 418 | End: 2023-03-05
Payer: COMMERCIAL

## 2023-03-05 ENCOUNTER — APPOINTMENT (OUTPATIENT)
Dept: GENERAL RADIOLOGY | Facility: HOSPITAL | Age: 48
DRG: 418 | End: 2023-03-05
Payer: COMMERCIAL

## 2023-03-05 LAB
ALBUMIN SERPL-MCNC: 3.5 G/DL (ref 3.5–5.2)
ALBUMIN/GLOB SERPL: 1.5 G/DL
ALP SERPL-CCNC: 344 U/L (ref 39–117)
ALT SERPL W P-5'-P-CCNC: 374 U/L (ref 1–33)
AMYLASE SERPL-CCNC: 65 U/L (ref 28–100)
ANION GAP SERPL CALCULATED.3IONS-SCNC: 10 MMOL/L (ref 5–15)
AST SERPL-CCNC: 198 U/L (ref 1–32)
BILIRUB SERPL-MCNC: 1.7 MG/DL (ref 0–1.2)
BUN SERPL-MCNC: 13 MG/DL (ref 6–20)
BUN/CREAT SERPL: 13.5 (ref 7–25)
CALCIUM SPEC-SCNC: 8.7 MG/DL (ref 8.6–10.5)
CHLORIDE SERPL-SCNC: 108 MMOL/L (ref 98–107)
CO2 SERPL-SCNC: 21 MMOL/L (ref 22–29)
CREAT SERPL-MCNC: 0.96 MG/DL (ref 0.57–1)
D-LACTATE SERPL-SCNC: 0.8 MMOL/L (ref 0.5–2)
DEPRECATED RDW RBC AUTO: 44 FL (ref 37–54)
EGFRCR SERPLBLD CKD-EPI 2021: 73.6 ML/MIN/1.73
ERYTHROCYTE [DISTWIDTH] IN BLOOD BY AUTOMATED COUNT: 13.4 % (ref 12.3–15.4)
GLOBULIN UR ELPH-MCNC: 2.3 GM/DL
GLUCOSE BLDC GLUCOMTR-MCNC: 102 MG/DL (ref 70–130)
GLUCOSE BLDC GLUCOMTR-MCNC: 98 MG/DL (ref 70–130)
GLUCOSE SERPL-MCNC: 82 MG/DL (ref 65–99)
HBA1C MFR BLD: 5.3 % (ref 4.8–5.6)
HCT VFR BLD AUTO: 33.8 % (ref 34–46.6)
HGB BLD-MCNC: 11.4 G/DL (ref 12–15.9)
INR PPP: 0.98 (ref 0.8–1.2)
LIPASE SERPL-CCNC: 72 U/L (ref 13–60)
MAGNESIUM SERPL-MCNC: 2.3 MG/DL (ref 1.6–2.6)
MCH RBC QN AUTO: 30.1 PG (ref 26.6–33)
MCHC RBC AUTO-ENTMCNC: 33.7 G/DL (ref 31.5–35.7)
MCV RBC AUTO: 89.2 FL (ref 79–97)
PLATELET # BLD AUTO: 168 10*3/MM3 (ref 140–450)
PMV BLD AUTO: 12.5 FL (ref 6–12)
POTASSIUM SERPL-SCNC: 4.1 MMOL/L (ref 3.5–5.2)
PROCALCITONIN SERPL-MCNC: 0.07 NG/ML (ref 0–0.25)
PROT SERPL-MCNC: 5.8 G/DL (ref 6–8.5)
PROTHROMBIN TIME: 12.9 SECONDS (ref 11.1–15.3)
RBC # BLD AUTO: 3.79 10*6/MM3 (ref 3.77–5.28)
SODIUM SERPL-SCNC: 139 MMOL/L (ref 136–145)
TSH SERPL DL<=0.05 MIU/L-ACNC: 0.33 UIU/ML (ref 0.27–4.2)
WBC NRBC COR # BLD: 4.84 10*3/MM3 (ref 3.4–10.8)

## 2023-03-05 PROCEDURE — BF101ZZ FLUOROSCOPY OF BILE DUCTS USING LOW OSMOLAR CONTRAST: ICD-10-PCS | Performed by: STUDENT IN AN ORGANIZED HEALTH CARE EDUCATION/TRAINING PROGRAM

## 2023-03-05 PROCEDURE — 25010000002 ONDANSETRON PER 1 MG: Performed by: NURSE ANESTHETIST, CERTIFIED REGISTERED

## 2023-03-05 PROCEDURE — 83605 ASSAY OF LACTIC ACID: CPT | Performed by: INTERNAL MEDICINE

## 2023-03-05 PROCEDURE — 74300 X-RAY BILE DUCTS/PANCREAS: CPT | Performed by: STUDENT IN AN ORGANIZED HEALTH CARE EDUCATION/TRAINING PROGRAM

## 2023-03-05 PROCEDURE — 88304 TISSUE EXAM BY PATHOLOGIST: CPT

## 2023-03-05 PROCEDURE — G0378 HOSPITAL OBSERVATION PER HR: HCPCS

## 2023-03-05 PROCEDURE — 25010000002 GLUCAGON (HUMAN RECOMBINANT) 1 MG RECONSTITUTED SOLUTION: Performed by: NURSE ANESTHETIST, CERTIFIED REGISTERED

## 2023-03-05 PROCEDURE — C1887 CATHETER, GUIDING: HCPCS | Performed by: STUDENT IN AN ORGANIZED HEALTH CARE EDUCATION/TRAINING PROGRAM

## 2023-03-05 PROCEDURE — 83735 ASSAY OF MAGNESIUM: CPT | Performed by: INTERNAL MEDICINE

## 2023-03-05 PROCEDURE — 25010000002 FENTANYL CITRATE (PF) 50 MCG/ML SOLUTION: Performed by: NURSE ANESTHETIST, CERTIFIED REGISTERED

## 2023-03-05 PROCEDURE — 0 LEVETIRACETAM IN NACL 0.75% 1000 MG/100ML SOLUTION: Performed by: INTERNAL MEDICINE

## 2023-03-05 PROCEDURE — 25010000002 MIDAZOLAM PER 1 MG: Performed by: NURSE ANESTHETIST, CERTIFIED REGISTERED

## 2023-03-05 PROCEDURE — 47564 LAPARO CHOLECYSTECTOMY/EXPLR: CPT | Performed by: STUDENT IN AN ORGANIZED HEALTH CARE EDUCATION/TRAINING PROGRAM

## 2023-03-05 PROCEDURE — 25010000002 HYDROMORPHONE 1 MG/ML SOLUTION: Performed by: INTERNAL MEDICINE

## 2023-03-05 PROCEDURE — 82962 GLUCOSE BLOOD TEST: CPT

## 2023-03-05 PROCEDURE — 76000 FLUOROSCOPY <1 HR PHYS/QHP: CPT

## 2023-03-05 PROCEDURE — 25010000002 NEOSTIGMINE 10 MG/10ML SOLUTION: Performed by: NURSE ANESTHETIST, CERTIFIED REGISTERED

## 2023-03-05 PROCEDURE — 36415 COLL VENOUS BLD VENIPUNCTURE: CPT | Performed by: INTERNAL MEDICINE

## 2023-03-05 PROCEDURE — 82150 ASSAY OF AMYLASE: CPT | Performed by: INTERNAL MEDICINE

## 2023-03-05 PROCEDURE — 25010000002 ONDANSETRON PER 1 MG: Performed by: STUDENT IN AN ORGANIZED HEALTH CARE EDUCATION/TRAINING PROGRAM

## 2023-03-05 PROCEDURE — 84145 PROCALCITONIN (PCT): CPT | Performed by: INTERNAL MEDICINE

## 2023-03-05 PROCEDURE — 0 LEVETIRACETAM IN NACL 0.75% 1000 MG/100ML SOLUTION: Performed by: STUDENT IN AN ORGANIZED HEALTH CARE EDUCATION/TRAINING PROGRAM

## 2023-03-05 PROCEDURE — C1757 CATH, THROMBECTOMY/EMBOLECT: HCPCS | Performed by: STUDENT IN AN ORGANIZED HEALTH CARE EDUCATION/TRAINING PROGRAM

## 2023-03-05 PROCEDURE — 80050 GENERAL HEALTH PANEL: CPT | Performed by: INTERNAL MEDICINE

## 2023-03-05 PROCEDURE — 85610 PROTHROMBIN TIME: CPT | Performed by: INTERNAL MEDICINE

## 2023-03-05 PROCEDURE — 83690 ASSAY OF LIPASE: CPT | Performed by: STUDENT IN AN ORGANIZED HEALTH CARE EDUCATION/TRAINING PROGRAM

## 2023-03-05 PROCEDURE — 25010000002 DEXAMETHASONE PER 1 MG: Performed by: NURSE ANESTHETIST, CERTIFIED REGISTERED

## 2023-03-05 PROCEDURE — 25510000001 IOPAMIDOL 61 % SOLUTION: Performed by: STUDENT IN AN ORGANIZED HEALTH CARE EDUCATION/TRAINING PROGRAM

## 2023-03-05 PROCEDURE — 25010000002 PROPOFOL 200 MG/20ML EMULSION: Performed by: NURSE ANESTHETIST, CERTIFIED REGISTERED

## 2023-03-05 PROCEDURE — 0FT44ZZ RESECTION OF GALLBLADDER, PERCUTANEOUS ENDOSCOPIC APPROACH: ICD-10-PCS | Performed by: STUDENT IN AN ORGANIZED HEALTH CARE EDUCATION/TRAINING PROGRAM

## 2023-03-05 PROCEDURE — C1758 CATHETER, URETERAL: HCPCS | Performed by: STUDENT IN AN ORGANIZED HEALTH CARE EDUCATION/TRAINING PROGRAM

## 2023-03-05 PROCEDURE — 25010000002 HYDROMORPHONE 1 MG/ML SOLUTION: Performed by: NURSE ANESTHETIST, CERTIFIED REGISTERED

## 2023-03-05 PROCEDURE — 99024 POSTOP FOLLOW-UP VISIT: CPT | Performed by: STUDENT IN AN ORGANIZED HEALTH CARE EDUCATION/TRAINING PROGRAM

## 2023-03-05 PROCEDURE — 83036 HEMOGLOBIN GLYCOSYLATED A1C: CPT | Performed by: INTERNAL MEDICINE

## 2023-03-05 PROCEDURE — 25010000002 HYDROMORPHONE 1 MG/ML SOLUTION: Performed by: STUDENT IN AN ORGANIZED HEALTH CARE EDUCATION/TRAINING PROGRAM

## 2023-03-05 DEVICE — LIGAMAX 5 MM ENDOSCOPIC MULTIPLE CLIP APPLIER
Type: IMPLANTABLE DEVICE | Site: ABDOMEN | Status: FUNCTIONAL
Brand: LIGAMAX

## 2023-03-05 RX ORDER — SODIUM CHLORIDE 0.9 % (FLUSH) 0.9 %
SYRINGE (ML) INJECTION AS NEEDED
Status: DISCONTINUED | OUTPATIENT
Start: 2023-03-05 | End: 2023-03-05 | Stop reason: HOSPADM

## 2023-03-05 RX ORDER — METRONIDAZOLE 500 MG/1
500 TABLET ORAL EVERY 8 HOURS SCHEDULED
Status: DISCONTINUED | OUTPATIENT
Start: 2023-03-05 | End: 2023-03-05

## 2023-03-05 RX ORDER — PANTOPRAZOLE SODIUM 40 MG/10ML
40 INJECTION, POWDER, LYOPHILIZED, FOR SOLUTION INTRAVENOUS EVERY 12 HOURS SCHEDULED
Status: DISCONTINUED | OUTPATIENT
Start: 2023-03-05 | End: 2023-03-09

## 2023-03-05 RX ORDER — ONDANSETRON 2 MG/ML
4 INJECTION INTRAMUSCULAR; INTRAVENOUS EVERY 6 HOURS PRN
Status: DISCONTINUED | OUTPATIENT
Start: 2023-03-05 | End: 2023-03-09 | Stop reason: HOSPADM

## 2023-03-05 RX ORDER — ONDANSETRON 2 MG/ML
4 INJECTION INTRAMUSCULAR; INTRAVENOUS ONCE AS NEEDED
Status: COMPLETED | OUTPATIENT
Start: 2023-03-05 | End: 2023-03-05

## 2023-03-05 RX ORDER — SODIUM CHLORIDE, SODIUM LACTATE, POTASSIUM CHLORIDE, CALCIUM CHLORIDE 600; 310; 30; 20 MG/100ML; MG/100ML; MG/100ML; MG/100ML
100 INJECTION, SOLUTION INTRAVENOUS CONTINUOUS
Status: DISCONTINUED | OUTPATIENT
Start: 2023-03-05 | End: 2023-03-09

## 2023-03-05 RX ORDER — DEXAMETHASONE SODIUM PHOSPHATE 4 MG/ML
INJECTION, SOLUTION INTRA-ARTICULAR; INTRALESIONAL; INTRAMUSCULAR; INTRAVENOUS; SOFT TISSUE AS NEEDED
Status: DISCONTINUED | OUTPATIENT
Start: 2023-03-05 | End: 2023-03-05 | Stop reason: SURG

## 2023-03-05 RX ORDER — MIDAZOLAM HYDROCHLORIDE 1 MG/ML
INJECTION INTRAMUSCULAR; INTRAVENOUS AS NEEDED
Status: DISCONTINUED | OUTPATIENT
Start: 2023-03-05 | End: 2023-03-05 | Stop reason: SURG

## 2023-03-05 RX ORDER — ROCURONIUM BROMIDE 10 MG/ML
INJECTION, SOLUTION INTRAVENOUS AS NEEDED
Status: DISCONTINUED | OUTPATIENT
Start: 2023-03-05 | End: 2023-03-05 | Stop reason: SURG

## 2023-03-05 RX ORDER — PROMETHAZINE HYDROCHLORIDE 25 MG/1
25 TABLET ORAL ONCE AS NEEDED
Status: DISCONTINUED | OUTPATIENT
Start: 2023-03-05 | End: 2023-03-05 | Stop reason: HOSPADM

## 2023-03-05 RX ORDER — LIDOCAINE HYDROCHLORIDE 20 MG/ML
INJECTION, SOLUTION EPIDURAL; INFILTRATION; INTRACAUDAL; PERINEURAL AS NEEDED
Status: DISCONTINUED | OUTPATIENT
Start: 2023-03-05 | End: 2023-03-05 | Stop reason: SURG

## 2023-03-05 RX ORDER — ACETAMINOPHEN 325 MG/1
650 TABLET ORAL ONCE AS NEEDED
Status: DISCONTINUED | OUTPATIENT
Start: 2023-03-05 | End: 2023-03-05 | Stop reason: HOSPADM

## 2023-03-05 RX ORDER — LEVETIRACETAM 10 MG/ML
1000 INJECTION INTRAVASCULAR EVERY 12 HOURS SCHEDULED
Status: DISCONTINUED | OUTPATIENT
Start: 2023-03-05 | End: 2023-03-09

## 2023-03-05 RX ORDER — SODIUM CHLORIDE 0.9 % (FLUSH) 0.9 %
10 SYRINGE (ML) INJECTION AS NEEDED
Status: DISCONTINUED | OUTPATIENT
Start: 2023-03-05 | End: 2023-03-09 | Stop reason: HOSPADM

## 2023-03-05 RX ORDER — NALOXONE HCL 0.4 MG/ML
0.4 VIAL (ML) INJECTION AS NEEDED
Status: DISCONTINUED | OUTPATIENT
Start: 2023-03-05 | End: 2023-03-05 | Stop reason: HOSPADM

## 2023-03-05 RX ORDER — ONDANSETRON 2 MG/ML
INJECTION INTRAMUSCULAR; INTRAVENOUS AS NEEDED
Status: DISCONTINUED | OUTPATIENT
Start: 2023-03-05 | End: 2023-03-05 | Stop reason: SURG

## 2023-03-05 RX ORDER — SODIUM CHLORIDE 0.9 % (FLUSH) 0.9 %
10 SYRINGE (ML) INJECTION EVERY 12 HOURS SCHEDULED
Status: DISCONTINUED | OUTPATIENT
Start: 2023-03-05 | End: 2023-03-09 | Stop reason: HOSPADM

## 2023-03-05 RX ORDER — SODIUM CHLORIDE 9 MG/ML
40 INJECTION, SOLUTION INTRAVENOUS AS NEEDED
Status: DISCONTINUED | OUTPATIENT
Start: 2023-03-05 | End: 2023-03-09 | Stop reason: HOSPADM

## 2023-03-05 RX ORDER — FLUMAZENIL 0.1 MG/ML
0.2 INJECTION INTRAVENOUS AS NEEDED
Status: DISCONTINUED | OUTPATIENT
Start: 2023-03-05 | End: 2023-03-05 | Stop reason: HOSPADM

## 2023-03-05 RX ORDER — NEOSTIGMINE METHYLSULFATE 1 MG/ML
INJECTION, SOLUTION INTRAVENOUS AS NEEDED
Status: DISCONTINUED | OUTPATIENT
Start: 2023-03-05 | End: 2023-03-05 | Stop reason: SURG

## 2023-03-05 RX ORDER — SODIUM CHLORIDE, SODIUM GLUCONATE, SODIUM ACETATE, POTASSIUM CHLORIDE AND MAGNESIUM CHLORIDE 526; 502; 368; 37; 30 MG/100ML; MG/100ML; MG/100ML; MG/100ML; MG/100ML
INJECTION, SOLUTION INTRAVENOUS CONTINUOUS PRN
Status: DISCONTINUED | OUTPATIENT
Start: 2023-03-05 | End: 2023-03-05 | Stop reason: SURG

## 2023-03-05 RX ORDER — PROPOFOL 10 MG/ML
INJECTION, EMULSION INTRAVENOUS AS NEEDED
Status: DISCONTINUED | OUTPATIENT
Start: 2023-03-05 | End: 2023-03-05 | Stop reason: SURG

## 2023-03-05 RX ORDER — DIPHENHYDRAMINE HYDROCHLORIDE 50 MG/ML
12.5 INJECTION INTRAMUSCULAR; INTRAVENOUS
Status: DISCONTINUED | OUTPATIENT
Start: 2023-03-05 | End: 2023-03-05 | Stop reason: HOSPADM

## 2023-03-05 RX ORDER — ACETAMINOPHEN 650 MG/1
650 SUPPOSITORY RECTAL ONCE AS NEEDED
Status: DISCONTINUED | OUTPATIENT
Start: 2023-03-05 | End: 2023-03-05 | Stop reason: HOSPADM

## 2023-03-05 RX ORDER — FENTANYL CITRATE 50 UG/ML
INJECTION, SOLUTION INTRAMUSCULAR; INTRAVENOUS AS NEEDED
Status: DISCONTINUED | OUTPATIENT
Start: 2023-03-05 | End: 2023-03-05 | Stop reason: SURG

## 2023-03-05 RX ORDER — BUPIVACAINE HYDROCHLORIDE AND EPINEPHRINE 5; 5 MG/ML; UG/ML
INJECTION, SOLUTION EPIDURAL; INTRACAUDAL; PERINEURAL AS NEEDED
Status: DISCONTINUED | OUTPATIENT
Start: 2023-03-05 | End: 2023-03-05 | Stop reason: HOSPADM

## 2023-03-05 RX ORDER — EPHEDRINE SULFATE 50 MG/ML
5 INJECTION, SOLUTION INTRAVENOUS ONCE AS NEEDED
Status: DISCONTINUED | OUTPATIENT
Start: 2023-03-05 | End: 2023-03-05 | Stop reason: HOSPADM

## 2023-03-05 RX ORDER — PROMETHAZINE HYDROCHLORIDE 25 MG/1
25 SUPPOSITORY RECTAL ONCE AS NEEDED
Status: DISCONTINUED | OUTPATIENT
Start: 2023-03-05 | End: 2023-03-05 | Stop reason: HOSPADM

## 2023-03-05 RX ORDER — MEPERIDINE HYDROCHLORIDE 50 MG/ML
12.5 INJECTION INTRAMUSCULAR; INTRAVENOUS; SUBCUTANEOUS
Status: DISCONTINUED | OUTPATIENT
Start: 2023-03-05 | End: 2023-03-05 | Stop reason: HOSPADM

## 2023-03-05 RX ADMIN — SODIUM CHLORIDE 125 ML/HR: 9 INJECTION, SOLUTION INTRAVENOUS at 00:10

## 2023-03-05 RX ADMIN — FENTANYL CITRATE 25 MCG: 50 INJECTION, SOLUTION INTRAMUSCULAR; INTRAVENOUS at 13:02

## 2023-03-05 RX ADMIN — ROCURONIUM BROMIDE 20 MG: 100 INJECTION INTRAVENOUS at 14:05

## 2023-03-05 RX ADMIN — PROPOFOL 150 MG: 10 INJECTION, EMULSION INTRAVENOUS at 13:08

## 2023-03-05 RX ADMIN — METRONIDAZOLE 500 MG: 500 INJECTION, SOLUTION INTRAVENOUS at 00:10

## 2023-03-05 RX ADMIN — NEOSTIGMINE METHYLSULFATE 3 MG: 0.5 INJECTION INTRAVENOUS at 14:52

## 2023-03-05 RX ADMIN — ONDANSETRON 4 MG: 2 INJECTION INTRAMUSCULAR; INTRAVENOUS at 13:52

## 2023-03-05 RX ADMIN — Medication 10 ML: at 01:34

## 2023-03-05 RX ADMIN — PANTOPRAZOLE SODIUM 40 MG: 40 INJECTION, POWDER, FOR SOLUTION INTRAVENOUS at 20:32

## 2023-03-05 RX ADMIN — LEVETIRACETAM 1000 MG: 10 INJECTION INTRAVENOUS at 03:51

## 2023-03-05 RX ADMIN — HYDROMORPHONE HYDROCHLORIDE 0.5 MG: 1 INJECTION, SOLUTION INTRAMUSCULAR; INTRAVENOUS; SUBCUTANEOUS at 15:38

## 2023-03-05 RX ADMIN — PANTOPRAZOLE SODIUM 40 MG: 40 INJECTION, POWDER, FOR SOLUTION INTRAVENOUS at 01:30

## 2023-03-05 RX ADMIN — HYDROMORPHONE HYDROCHLORIDE 0.5 MG: 1 INJECTION, SOLUTION INTRAMUSCULAR; INTRAVENOUS; SUBCUTANEOUS at 15:28

## 2023-03-05 RX ADMIN — DEXAMETHASONE SODIUM PHOSPHATE 4 MG: 4 INJECTION, SOLUTION INTRAMUSCULAR; INTRAVENOUS at 13:52

## 2023-03-05 RX ADMIN — ROCURONIUM BROMIDE 20 MG: 100 INJECTION INTRAVENOUS at 13:30

## 2023-03-05 RX ADMIN — SODIUM CHLORIDE, POTASSIUM CHLORIDE, SODIUM LACTATE AND CALCIUM CHLORIDE 100 ML/HR: 600; 310; 30; 20 INJECTION, SOLUTION INTRAVENOUS at 16:58

## 2023-03-05 RX ADMIN — ROCURONIUM BROMIDE 30 MG: 100 INJECTION INTRAVENOUS at 13:08

## 2023-03-05 RX ADMIN — HYDROMORPHONE HYDROCHLORIDE 0.25 MG: 1 INJECTION, SOLUTION INTRAMUSCULAR; INTRAVENOUS; SUBCUTANEOUS at 23:58

## 2023-03-05 RX ADMIN — FENTANYL CITRATE 25 MCG: 50 INJECTION, SOLUTION INTRAMUSCULAR; INTRAVENOUS at 13:30

## 2023-03-05 RX ADMIN — Medication 10 ML: at 20:32

## 2023-03-05 RX ADMIN — PANTOPRAZOLE SODIUM 40 MG: 40 INJECTION, POWDER, FOR SOLUTION INTRAVENOUS at 09:24

## 2023-03-05 RX ADMIN — GLUCAGON HYDROCHLORIDE 1 MG: KIT at 13:52

## 2023-03-05 RX ADMIN — ONDANSETRON 4 MG: 2 INJECTION INTRAMUSCULAR; INTRAVENOUS at 16:58

## 2023-03-05 RX ADMIN — ONDANSETRON 4 MG: 2 INJECTION INTRAMUSCULAR; INTRAVENOUS at 15:32

## 2023-03-05 RX ADMIN — HYDROMORPHONE HYDROCHLORIDE 0.25 MG: 1 INJECTION, SOLUTION INTRAMUSCULAR; INTRAVENOUS; SUBCUTANEOUS at 20:31

## 2023-03-05 RX ADMIN — LEVETIRACETAM 1000 MG: 10 INJECTION INTRAVENOUS at 09:43

## 2023-03-05 RX ADMIN — LEVETIRACETAM 1000 MG: 10 INJECTION INTRAVENOUS at 20:32

## 2023-03-05 RX ADMIN — SODIUM CHLORIDE, POTASSIUM CHLORIDE, SODIUM LACTATE AND CALCIUM CHLORIDE 100 ML/HR: 600; 310; 30; 20 INJECTION, SOLUTION INTRAVENOUS at 01:34

## 2023-03-05 RX ADMIN — Medication 10 ML: at 09:24

## 2023-03-05 RX ADMIN — AZTREONAM 2 G: 2 INJECTION, POWDER, LYOPHILIZED, FOR SOLUTION INTRAMUSCULAR; INTRAVENOUS at 10:24

## 2023-03-05 RX ADMIN — LIDOCAINE HYDROCHLORIDE 100 MG: 20 INJECTION, SOLUTION EPIDURAL; INFILTRATION; INTRACAUDAL; PERINEURAL at 13:08

## 2023-03-05 RX ADMIN — GLYCOPYRROLATE 0.4 MG: 0.2 INJECTION, SOLUTION INTRAMUSCULAR; INTRAVITREAL at 14:52

## 2023-03-05 RX ADMIN — AZTREONAM 2 G: 2 INJECTION, POWDER, LYOPHILIZED, FOR SOLUTION INTRAMUSCULAR; INTRAVENOUS at 16:58

## 2023-03-05 RX ADMIN — MIDAZOLAM 2 MG: 1 INJECTION, SOLUTION INTRAMUSCULAR; INTRAVENOUS at 12:58

## 2023-03-05 RX ADMIN — SODIUM CHLORIDE, SODIUM GLUCONATE, SODIUM ACETATE, POTASSIUM CHLORIDE AND MAGNESIUM CHLORIDE: 526; 502; 368; 37; 30 INJECTION, SOLUTION INTRAVENOUS at 12:58

## 2023-03-05 RX ADMIN — AZTREONAM 2 G: 2 INJECTION, POWDER, LYOPHILIZED, FOR SOLUTION INTRAMUSCULAR; INTRAVENOUS at 23:58

## 2023-03-05 RX ADMIN — SODIUM CHLORIDE, POTASSIUM CHLORIDE, SODIUM LACTATE AND CALCIUM CHLORIDE 1000 ML: 600; 310; 30; 20 INJECTION, SOLUTION INTRAVENOUS at 01:34

## 2023-03-05 NOTE — PROGRESS NOTES
[facility] Medicine Services  INPATIENT PROGRESS NOTE    Length of Stay: 0  Date of Admission: 3/4/2023  Primary Care Physician: Sonya Crooks APRN    Subjective   (S) Admitted for right flank pain and no abdominal tenderness; US positive for Gifford positive and possible choledocholithiasis  Today, complains of RUQ abdominal pain; no nausea, no vomiting    Review of Systems   All other systems reviewed and are negative.       All pertinent negatives and positives are as above. All other systems have been reviewed and are negative unless otherwise stated.     Prior to Admission medications    Medication Sig Start Date End Date Taking? Authorizing Provider   aspirin 325 MG tablet Take 1 tablet by mouth Daily.   Yes Radha Zaragoza MD   atorvastatin (LIPITOR) 80 MG tablet Take 1 tablet by mouth Every Night. 12/18/21  Yes Juan Conroy MD   busPIRone (BUSPAR) 15 MG tablet Take 1 tablet by mouth 3 (Three) Times a Day.   Yes Radha Zaragoza MD   butalbital-acetaminophen-caffeine (FIORICET, ESGIC) -40 MG per tablet Take 1 tablet by mouth Daily As Needed for Headache.   Yes Radha Zaragoza MD   cloNIDine (CATAPRES) 0.1 MG tablet Take 1 tablet by mouth Every 30 (Thirty) Minutes As Needed for High Blood Pressure. Give one tap po every 30 mins when BP is over 160   Yes Radha Zaragoza MD   diclofenac (VOLTAREN) 75 MG EC tablet 1 tablet 2 (Two) Times a Day. 10/1/22  Yes Radha Zaragoza MD   escitalopram (LEXAPRO) 10 MG tablet Take 2 tablets by mouth Daily. 10/1/22  Yes Radha Zaragoza MD   fluticasone (FLONASE) 50 MCG/ACT nasal spray 2 sprays into the nostril(s) as directed by provider Daily.   Yes Radha Zaragoza MD   furosemide (LASIX) 20 MG tablet Take 1 tablet by mouth Daily.   Yes Radha Zaragoza MD   hydrOXYzine pamoate (VISTARIL) 50 MG capsule Take 1 capsule by mouth 2 (Two) Times a Day As Needed. 4/5/22  Yes Daksha  ALPHONSE Rodriguez   ketorolac (TORADOL) 10 MG tablet Take 1 tablet by mouth Every 6 (Six) Hours As Needed for Moderate Pain.   Yes Radha Zaragoza MD   levETIRAcetam (KEPPRA) 1000 MG tablet Take 1 tablet by mouth 2 (Two) Times a Day.   Yes ProviderRadha MD   losartan (COZAAR) 50 MG tablet Take 1 tablet by mouth Daily.   Yes ProviderRadha MD   meclizine (ANTIVERT) 12.5 MG tablet Take 1 tablet by mouth 3 (Three) Times a Day As Needed for Dizziness. 4/19/22  Yes Sonya Crooks APRN   melatonin 5 MG tablet tablet Take 2 tablets by mouth every night at bedtime.   Yes Radha Zaragoza MD   metoclopramide (REGLAN) 5 MG tablet Take 1 tablet by mouth 3 (Three) Times a Day As Needed (Nausea). 1/19/23  Yes Juvenal Chand MD   nabumetone (RELAFEN) 750 MG tablet Take 1 tablet by mouth 2 (Two) Times a Day As Needed for Mild Pain. 11/24/22  Yes Juvenal Chand MD   ondansetron ODT (ZOFRAN-ODT) 4 MG disintegrating tablet Place 1 tablet on the tongue 4 (Four) Times a Day As Needed for Nausea or Vomiting. 1/19/23  Yes Juvenal Chand MD   orphenadrine (NORFLEX) 100 MG 12 hr tablet Take 1 tablet by mouth 2 (Two) Times a Day As Needed for Muscle Spasms or Mild Pain . 4/13/22  Yes Nanette Mai APRN   pantoprazole (Protonix) 40 MG EC tablet Take 1 tablet by mouth Daily. 12/22/21  Yes Buzz Vasquez APRN   potassium chloride (K-DUR,KLOR-CON) 10 MEQ CR tablet Take 1 tablet by mouth Daily. 3/1/22  Yes Sonya Crooks APRN   promethazine (PHENERGAN) 25 MG suppository Insert 1 suppository into the rectum Every 6 (Six) Hours As Needed for Nausea or Vomiting. 1/19/23  Yes Juvenal Chand MD   traZODone (DESYREL) 50 MG tablet Take 1 tablet by mouth Every Night.   Yes ProviderRadha MD   medroxyPROGESTERone (DEPO-PROVERA) 150 MG/ML injection Inject 1 mL into the appropriate muscle as directed by prescriber Every 3 (Three) Months.  3/5/23 Yes Provider, MD Radha    acetaminophen (TYLENOL) 500 MG tablet Take 2 tablets by mouth Every 8 (Eight) Hours As Needed for Mild Pain.    Provider, MD Radha   escitalopram (LEXAPRO) 20 MG tablet Take 1.5 tablets by mouth Daily. 30 mg    Radha Zaragoza MD   solifenacin (VESICARE) 10 MG tablet Take 1 tablet by mouth Daily.    Provider, MD Radha       aztreonam, 2 g, Intravenous, Q8H  levETIRAcetam, 1,000 mg, Intravenous, Q12H  pantoprazole, 40 mg, Intravenous, Q12H  sodium chloride, 10 mL, Intravenous, Q12H      lactated ringers, 100 mL/hr, Last Rate: 100 mL/hr (03/05/23 0134)        Objective    Temp:  [98 °F (36.7 °C)-98.4 °F (36.9 °C)] 98.2 °F (36.8 °C)  Heart Rate:  [51-65] 51  Resp:  [18] 18  BP: ()/(53-73) 109/62    Physical Exam  Constitutional:       Appearance: She is obese.   HENT:      Head: Normocephalic.      Nose: Nose normal.      Mouth/Throat:      Mouth: Mucous membranes are moist.   Eyes:      Extraocular Movements: Extraocular movements intact.   Cardiovascular:      Rate and Rhythm: Regular rhythm.      Heart sounds: Normal heart sounds.   Pulmonary:      Breath sounds: Normal breath sounds.   Abdominal:      Palpations: Abdomen is soft.      Tenderness: There is abdominal tenderness. There is guarding.   Musculoskeletal:         General: Normal range of motion.      Cervical back: Normal range of motion and neck supple.   Skin:     General: Skin is warm.   Neurological:      General: No focal deficit present.      Mental Status: She is alert. Mental status is at baseline.           Results Review:  I have reviewed the labs, radiology results, and diagnostic studies.    Laboratory Data:   Results from last 7 days   Lab Units 03/05/23  0546 03/04/23  1958   SODIUM mmol/L 139 139   POTASSIUM mmol/L 4.1 3.8   CHLORIDE mmol/L 108* 103   CO2 mmol/L 21.0* 23.0   BUN mg/dL 13 16   CREATININE mg/dL 0.96 1.23*   GLUCOSE mg/dL 82 107*   CALCIUM mg/dL 8.7 9.3   BILIRUBIN mg/dL 1.7* 2.1*   ALK PHOS U/L 344*  392*   ALT (SGPT) U/L 374* 449*   AST (SGOT) U/L 198* 218*   ANION GAP mmol/L 10.0 13.0     Estimated Creatinine Clearance: 81 mL/min (by C-G formula based on SCr of 0.96 mg/dL).  Results from last 7 days   Lab Units 03/05/23  0546   MAGNESIUM mg/dL 2.3         Results from last 7 days   Lab Units 03/05/23  0546 03/04/23 1958   WBC 10*3/mm3 4.84 4.78   HEMOGLOBIN g/dL 11.4* 12.2   HEMATOCRIT % 33.8* 37.2   PLATELETS 10*3/mm3 168 196     Results from last 7 days   Lab Units 03/05/23  0546   INR  0.98       Culture Data:   No results found for: BLOODCX  No results found for: URINECX  No results found for: RESPCX  No results found for: WOUNDCX  No results found for: STOOLCX  No components found for: BODYFLD    Radiology Data:   Imaging Results (Last 24 Hours)     Procedure Component Value Units Date/Time    US Liver [[id number]] Collected: 03/04/23 2104     Updated: 03/04/23 2248    Narrative:      US LIVER  RPID: US LIVER    CLINICAL HISTORY:  47 years Female, elevated liver enzymes    COMPARISON:  CT on this date    TECHNIQUE:  Grayscale and color Doppler flow imaging was performed, with  spectral Doppler if needed    FINDINGS:    Multiple small stones are present in the gallbladder lumen. The  gallbladder wall is 4 mm which is slightly thickened. There is no  surrounding pericholecystic fluid. The common bile duct is 8.4 mm  which is slightly dilated.    No masses observed within the liver. The gallbladder is reported  as tender when directly scanned.    The pancreas is obscured. The aorta is obscured..    No masses seen within the hepatic parenchyma. The liver  echogenicity is elevated, this could be seen with steatosis.            Impression:        1. Gallstones with thickening of the gallbladder wall and  tenderness of the gallbladder reported when directly scanned.  Findings are suspicious for cholecystitis.  2. 8.4 mm common bile duct correlate for the potential  choledocholithiasis.    Electronically signed  by:  Jaziel Benjamin MD  3/4/2023 10:46 PM CST  Workstation: [id number]    CT Abdomen Pelvis Without Contrast [[id number]] Collected: 03/04/23 1928     Updated: 03/04/23 2036    Narrative:      EXAM:  CT Abdomen and Pelvis Without Intravenous Contrast    CLINICAL HISTORY:  rt flank pain    TECHNIQUE:  Axial computed tomography images of the abdomen and pelvis  without intravenous contrast.  Sagittal and coronal reformatted  images were created and reviewed.  This CT exam was performed  using one or more of the following dose reduction techniques:   automated exposure control, adjustment of the mA and/or kV  according to patient size, and/or use of iterative reconstruction  technique.    COMPARISON:  CT Abdomen Pelvis dated 9/21/2022    FINDINGS:  Lung bases:  Unremarkable.  No mass.  No consolidation.    ABDOMEN:  Liver:  Unremarkable.  Gallbladder and bile ducts:  Unremarkable.  No calcified stones.   No ductal dilation.  Pancreas:  Unremarkable.  No ductal dilation.  Spleen:  Unremarkable.  No splenomegaly.  Adrenals:  Unremarkable.  No mass.  Kidneys and ureters:  Lobulated renal contour.  Bilateral renal  calculi.  No hydronephrosis.  Stomach and bowel:  Moderate stool.  No bowel obstruction.  No  appreciable mucosal thickening.    PELVIS:  Appendix:  Normal caliber appendix.  No findings to suggest acute  appendicitis.  Bladder:  The urinary bladder is decompressed.  No stones.  Reproductive:  Unremarkable as visualized.    ABDOMEN and PELVIS:  Intraperitoneal space:  Unremarkable.  No free air.  No  significant fluid collection.  Bones/joints:  Multilevel spondylosis.  No acute fracture.  No  dislocation.  Soft tissues:  Tiny fat-containing umbilical hernia.  Vasculature:  Unremarkable.  No abdominal aortic aneurysm.  Lymph nodes:  Unremarkable.  No enlarged lymph nodes.      Impression:      1.  Bilateral nonobstructive renal calculi.  2.  Other findings as above.    Electronically signed by:  Ryder Marino MD   3/4/2023 8:34 PM [facility]  Workstation: [id number]          I have reviewed the patient's current medications.     Assessment/Plan   Acute cholecystitis     Likely with choledocholithiasis due to US report and liver enzymes elevated along with elevated TB and alkaline phosphatase     Discussed with Dr Jimenes after ex lap cholecystomy and she would need ERCP tomorrow morning; no GI coverage today     On aztreonam     Chronic medical management     Hx of CVA, ESBL      Seizure disorder     CKD stage III     Left kidney stones    Medical Decision Making  Number and Complexity of problems: one major  Differential Diagnosis: kidney stone    Conditions and Status:        Condition is unchanged.     MDM Data  External documents reviewed: previous medical records  My EKG interpretation:   My plain film interpretation: left kidney stones      Decision rules/scores evaluated (example [id number], Wells, etc): n/a     Discussed with: patient     Treatment Plan  Will be taken to surgery    Care Planning  Shared decision making: patient  Code status and discussions: full    Disposition  Social Determinants of Health that impact treatment or disposition: none  I expect the patient to be discharged to home in 1 to 2 days.     I confirmed that the patient's Advance Care Plan is present, code status is documented, or surrogate decision maker is listed in the patient's medical record.     Erick Chisholm MD

## 2023-03-05 NOTE — ANESTHESIA PREPROCEDURE EVALUATION
Anesthesia Evaluation     Patient summary reviewed and Nursing notes reviewed   no history of anesthetic complications:  NPO Solid Status: > 8 hours  NPO Liquid Status: > 8 hours           Airway   Mallampati: I  TM distance: >3 FB  Neck ROM: full  Dental    (+) edentulous    Pulmonary     breath sounds clear to auscultation  (+) a smoker Current Abstained day of surgery,   (-) asthma, recent URI, sleep apnea, rhonchi, decreased breath sounds, wheezes, rales  Cardiovascular   Exercise tolerance: poor (<4 METS)    ECG reviewed  Rhythm: regular  Rate: normal    (+) hypertension 2 medications or greater, valvular problems/murmurs MR, dysrhythmias Bradycardia, hyperlipidemia,   (-) past MI, angina, murmur, cardiac stents, DVT    ROS comment: [id number]  Date/Time: 2022 6:43 PM   Performed by: Asher Grande MD   Authorized by: Asher Grande MD   Interpreted by physician   Rhythm: sinus bradycardia   BPM: 57   ST Segments: ST segments normal     Interpreted by Asher Grande MD on 2022  6:43 PM CST    TTE: 22  Interpretation Summary       •  Left ventricular systolic function is normal. Left ventricular ejection fraction appears to be 56 - 60%.  •  Left ventricular diastolic function was normal.  •  Saline test results are negative for right to left atrial level shunt at baseline state. Valsalva maneuver was not performed.  •  Estimated right ventricular systolic pressure from tricuspid regurgitation is normal (<35 mmHg).  •  The right ventricle appears dilated in limited views.  •  There is no evidence of pericardial effusion.      Neuro/Psych  (+) seizures, CVA residual symptoms, headaches (Migraines), psychiatric history Bipolar,    (-) dizziness/light headedness, weakness  GI/Hepatic/Renal/Endo    (+) obesity, morbid obesity,  renal disease (creatinine 1.05) stones,   (-) GERD, liver disease    Musculoskeletal     (-) neck pain  Abdominal   (+) obese,    Substance History    (-) alcohol use, drug use     OB/GYN    (-)  Pregnant        Other   arthritis,      ROS/Med Hx Other: Hx: seziures: last seizure was 2 months ago. Takes Keppra daily.     CVA:12/19/21  Per pt had to relearn how to walk and talk. Pt she has some aphasia and right sided weakness.     Had AMBER after stroke in 2021.    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                       Anesthesia Plan    ASA 3 - emergent     general     (  )  intravenous induction     Anesthetic plan, risks, benefits, and alternatives have been provided, discussed and informed consent has been obtained with: patient.    Plan discussed with CRNA.        CODE STATUS:

## 2023-03-05 NOTE — ANESTHESIA PROCEDURE NOTES
Airway  Urgency: elective    Date/Time: 3/5/2023 1:11 PM  End Time:3/5/2023 1:11 PM  Airway not difficult    General Information and Staff    Patient location during procedure: OR  CRNA/CAA: Demian Groves CRNA    Indications and Patient Condition  Indications for airway management: airway protection    Preoxygenated: yes  Mask difficulty assessment: 1 - vent by mask    Final Airway Details  Final airway type: endotracheal airway      Successful airway: ETT  Cuffed: yes   Successful intubation technique: direct laryngoscopy  Facilitating devices/methods: intubating stylet  Endotracheal tube insertion site: oral  Blade: Farias  Blade size: 3  ETT size (mm): 7.0  Cormack-Lehane Classification: grade I - full view of glottis  Placement verified by: chest auscultation and capnometry   Cuff volume (mL): 9  Measured from: lips  ETT/EBT  to lips (cm): 20  Number of attempts at approach: 1  Assessment: lips, teeth, and gum same as pre-op and atraumatic intubation

## 2023-03-05 NOTE — ANESTHESIA POSTPROCEDURE EVALUATION
Patient: Myles Shannon    Procedure Summary     Date: 03/05/23 Room / Location: [facility] OR  / [facility] OR    Anesthesia Start: 1259 Anesthesia Stop: 1528    Procedure: CHOLECYSTECTOMY LAPAROSCOPIC INTRAOPERATIVE CHOLANGIOGRAM AND LAPAROSCOPIC COMMON BILE DUCT (Abdomen) Diagnosis:       Acute cholecystitis      (Acute cholecystitis [K81.0])    Surgeons: Jonn Jimenes MD Provider: Demian Groves CRNA    Anesthesia Type: general ASA Status: 3 - Emergent          Anesthesia Type: general    Vitals  Vitals Value Taken Time   /74 03/05/23 1513   Temp 98.2 °F (36.8 °C) 03/05/23 1513   Pulse 77 03/05/23 1513   Resp 20 03/05/23 1513   SpO2 100 % 03/05/23 1513           Post Anesthesia Care and Evaluation    Patient location during evaluation: PACU  Patient participation: complete - patient participated  Level of consciousness: sleepy but conscious  Pain score: 0  Pain management: adequate    Airway patency: patent  Anesthetic complications: No anesthetic complications  PONV Status: none  Cardiovascular status: acceptable  Respiratory status: acceptable  Hydration status: acceptable    Comments: ---------------------------               03/05/23                      1513         ---------------------------   BP:          157/74         Pulse:         77           Resp:          20           Temp:   98.2 °F (36.8 °C)   SpO2:         100%         ---------------------------

## 2023-03-05 NOTE — PLAN OF CARE
Goal Outcome Evaluation:  Plan of Care Reviewed With: patient        Progress: no change  Outcome Evaluation: Patient from ED this shift. A&O. VSS. Up ad keya, but wanted purewick placed. Incontinent. IVFs infusing. NPO for possible OR today pending test results. Blood sugar q 6 hours. No c/o pain since arrival.

## 2023-03-05 NOTE — ED NOTES
"Nursing report ED to floor  Myles Shannon  47 y.o.  female    HPI:   Chief Complaint   Patient presents with    Painful urination       Admitting doctor:   Saeid Behroozi, MD    Consulting provider(s):  Consults       Date and Time Order Name Status Description    3/4/2023 11:55 PM Hospitalist (on-call MD unless specified)      3/4/2023 11:27 PM Surgery (on-call MD unless specified)               Admitting diagnosis:   The primary encounter diagnosis was Acute cholecystitis. A diagnosis of Urinary tract infection in female was also pertinent to this visit.    Code status:   Current Code Status       Date Active Code Status Order ID Comments User Context       Prior            Allergies:   Abilify [aripiprazole], Buspirone, and Penicillins    Intake and Output    Intake/Output Summary (Last 24 hours) at 3/4/2023 2061  Last data filed at 3/4/2023 2311  Gross per 24 hour   Intake 1000 ml   Output --   Net 1000 ml       Weight:       03/04/23 2006   Weight: 91.6 kg (202 lb)       Most recent vitals:   Vitals:    03/04/23 2006 03/04/23 2106 03/04/23 2129 03/04/23 2249   BP: 94/53 126/64  142/60   BP Location:    Right arm   Patient Position:    Lying   Pulse: 58   56   Resp: 18  18 18   Temp:       SpO2: 95% 96%  97%   Weight: 91.6 kg (202 lb)      Height: 165.1 cm (65\")        Oxygen Therapy: RA    Active LDAs/IV Access:   Lines, Drains & Airways       Active LDAs       Name Placement date Placement time Site Days    Peripheral IV 03/04/23 1957 Right Antecubital 03/04/23 1957  Antecubital  less than 1                    Labs (abnormal labs have a star):   Labs Reviewed   URINALYSIS W/ MICROSCOPIC IF INDICATED (NO CULTURE) - Abnormal; Notable for the following components:       Result Value    Appearance, UA Cloudy (*)     Ketones, UA Trace (*)     Bilirubin, UA Moderate (2+) (*)     Blood, UA Trace (*)     Protein, UA 30 mg/dL (1+) (*)     Leuk Esterase, UA Moderate (2+) (*)     Nitrite, UA Positive (*)     " All other components within normal limits   URINE DRUG SCREEN - Abnormal; Notable for the following components:    Benzodiazepine Screen, Urine Positive (*)     All other components within normal limits    Narrative:     Cutoff For Drugs Screened:    Amphetamines               500 ng/ml  Barbiturates               200 ng/ml  Benzodiazepines            150 ng/ml  Cocaine                    150 ng/ml  Methadone                  200 ng/ml  Opiates                    100 ng/ml  Phencyclidine               25 ng/ml  THC                            50 ng/ml  Methamphetamine            500 ng/ml  Tricyclic Antidepressants  300 ng/ml  Oxycodone                  100 ng/ml  Propoxyphene               300 ng/ml  Buprenorphine               10 ng/ml    The normal value for all drugs tested is negative. This report includes unconfirmed screening results, with the cutoff values listed, to be used for medical treatment purposes only.  Unconfirmed results must not be used for non-medical purposes such as employment or legal testing.  Clinical consideration should be applied to any drug of abuse test, particularly when unconfirmed results are used.     COMPREHENSIVE METABOLIC PANEL - Abnormal; Notable for the following components:    Glucose 107 (*)     Creatinine 1.23 (*)     ALT (SGPT) 449 (*)     AST (SGOT) 218 (*)     Alkaline Phosphatase 392 (*)     Total Bilirubin 2.1 (*)     eGFR 54.7 (*)     All other components within normal limits    Narrative:     GFR Normal >60  Chronic Kidney Disease <60  Kidney Failure <15     LIPASE - Abnormal; Notable for the following components:    Lipase 95 (*)     All other components within normal limits   URINALYSIS, MICROSCOPIC ONLY - Abnormal; Notable for the following components:    RBC, UA 0-2 (*)     WBC, UA Too Numerous to Count (*)     Bacteria, UA 2+ (*)     Squamous Epithelial Cells, UA 31-50 (*)     All other components within normal limits   PREGNANCY, URINE - Normal   LACTIC ACID,  PLASMA - Normal   CBC WITH AUTO DIFFERENTIAL - Normal   HEPATITIS PANEL, ACUTE - Normal    Narrative:     Results may be falsely decreased if patient taking Biotin.    AMYLASE - Normal   CBC AND DIFFERENTIAL    Narrative:     The following orders were created for panel order CBC & Differential.  Procedure                               Abnormality         Status                     ---------                               -----------         ------                     CBC Auto Differential[[id number]]        Normal              Final result                 Please view results for these tests on the individual orders.   EXTRA TUBES    Narrative:     The following orders were created for panel order Extra Tubes.  Procedure                               Abnormality         Status                     ---------                               -----------         ------                     Gold Top - SST[[id number]]                                   Final result               Green Top (Gel)[[id number]]                                  Final result               Light Blue Top[[id number]]                                   Final result                 Please view results for these tests on the individual orders.   GOLD TOP - SST   GREEN TOP   LIGHT BLUE TOP       Meds given in ED:   Medications   sodium chloride 0.9 % flush 10 mL (has no administration in time range)   aztreonam (AZACTAM) 2 g/100 mL 0.9% NS (mbp) (2 g Intravenous New Bag 3/4/23 2336)   metroNIDAZOLE (FLAGYL) IVPB 500 mg (has no administration in time range)   sodium chloride 0.9 % infusion (has no administration in time range)   sodium chloride 0.9 % bolus 1,000 mL (0 mL Intravenous Stopped 3/4/23 2311)   ondansetron (ZOFRAN) injection 4 mg (4 mg Intravenous Given 3/4/23 2003)     sodium chloride, 125 mL/hr         NIH Stroke Scale:       Isolation/Infection(s):  No active isolations   No active infections     COVID Testing  Collected NA  Resulted NA    Nursing  report ED to floor:  Mental status: A&O  Ambulatory status: ad keya  Precautions: NA    ED nurse phone extentsiwa- [phone]

## 2023-03-05 NOTE — CONSULTS
General Surgery Consult Note      Consulted by: Dr. Grande  Consulted for: cholecystitis      Chief complaint Abdominal pain      HPI: 46 yo woman with abdominal pain for a few days. This has been going on for more than year. She has previously been told that her gallbladder needs to come out. Her pain is in her right upper quadrant and radiates to her back. She denies any nausea/vomiting. Her common duct was mildly dilated. Her bilirubin was 2.1. Her lipase was also mildly elevated.        Review of Systems   Constitutional: Negative for activity change and unexpected weight change.   HENT: Negative for congestion and dental problem.    Eyes: Negative for discharge and itching.   Respiratory: Negative for apnea and chest tightness.    Cardiovascular: Negative for chest pain and palpitations.   Gastrointestinal: Negative for abdominal distention and abdominal pain.   Endocrine: Negative for polydipsia and polyuria.   Musculoskeletal: Negative for arthralgias and back pain.   Skin: Negative for color change and pallor.   Neurological: Negative for dizziness and facial asymmetry.   Psychiatric/Behavioral: Negative for agitation and behavioral problems.        Past Medical History:   Diagnosis Date   • Abdominal pain     Chronic RLQ, RUQ, R flank comes and goes.    • Abdominal pain, right lower quadrant    • Acute bilateral otitis media    • Allergic rhinitis    • Backache     Upper back.   • Candidiasis of skin    • Cellulitis of neck    • Chest pain    • Contraception    • Cough    • Dysfunction of eustachian tube    • Dyspareunia, female    • Elevated cholesterol    • Excessive or frequent menstruation    • Flank pain    • Generalized aches and pains    • Headache    • Health maintenance examination    • Hypertension    • Infection of skin and subcutaneous tissue    • Irregular periods    • Kidney stone     Poss   • Menorrhagia    • Nausea vomiting and diarrhea    • Obesity    • Open wound of abdominal wall    •  Otalgia    • Pain in left foot    • Pain in unspecified hand    • Removed Impacted cerumen 2012   • Rhinitis    • Seizure (HCC) 08/15/2019   • Seizure (HCC)    • Shoulder pain     right-sided-likely muscle strain      • Shoulder tendinitis    • Spider bite wound    • Staphylococcal infection of skin    • Stroke (HCC)    • Swollen feet    • Tinea cruris    • Tobacco dependence syndrome    • Upper respiratory infection    • Urinary tract infectious disease    • Vertigo    • Visit for gynecologic examination    • Vulvovaginitis      Past Surgical History:   Procedure Laterality Date   •  SECTION     • DILATATION AND CURETTAGE      Secondary to incomplete spontaneous    • INCISION AND DRAINAGE ABSCESS  2012   • INJECTION OF MEDICATION  2015    Phenergan (1)     • INJECTION OF MEDICATION  10/10/2013    Toradol (2)      • INJECTION OF MEDICATION  2014    Zofran (1)        Family History   Problem Relation Age of Onset   • Breast cancer Mother    • Thyroid disease Mother    • Hypertension Mother    • Heart disease Mother    • Arthritis Mother    • Liver disease Father    • Endometrial cancer Sister    • Anxiety disorder Brother    • Mental illness Daughter    • Depression Daughter    • Diabetes Maternal Aunt    • Diabetes Maternal Uncle    • Stroke Maternal Grandmother    • Breast cancer Maternal Grandmother    • Cancer Maternal Grandfather    • Stroke Paternal Grandmother    • Depression Other    • Cancer Other         Colorectal Cancer   • Endometrial cancer Other    • Lung cancer Other      Social History     Tobacco Use   • Smoking status: Every Day     Packs/day: 0.50     Types: Cigarettes   • Smokeless tobacco: Never   • Tobacco comments:     *800*quit*NOW   Vaping Use   • Vaping Use: Some days   • Substances: Nicotine   • Devices: Refillable tank   • Passive vaping exposure: Yes   Substance Use Topics   • Alcohol use: No   • Drug use: No     (Not in a hospital  admission)    Allergies:  Abilify [aripiprazole], Buspirone, and Penicillins    Objective      Vital Signs  Temp:  [98.4 °F (36.9 °C)] 98.4 °F (36.9 °C)  Heart Rate:  [56-65] 56  Resp:  [18] 18  BP: ()/(53-73) 142/60    Allergies   Allergen Reactions   • Abilify [Aripiprazole] Nausea Only   • Buspirone Rash   • Penicillins Hives and Nausea And Vomiting       Prior to Admission medications    Medication Sig Start Date End Date Taking? Authorizing Provider   aspirin 325 MG tablet Take 1 tablet by mouth Daily.   Yes ProviderRadha MD   busPIRone (BUSPAR) 15 MG tablet Take 1 tablet by mouth 3 (Three) Times a Day.   Yes ProviderRadha MD   butalbital-acetaminophen-caffeine (FIORICET, ESGIC) -40 MG per tablet Take 1 tablet by mouth Daily As Needed for Headache.   Yes ProviderRadha MD   cloNIDine (CATAPRES) 0.1 MG tablet Take 1 tablet by mouth Every 30 (Thirty) Minutes As Needed for High Blood Pressure. Give one tap po every 30 mins when BP is over 160   Yes ProviderRadha MD   diclofenac (VOLTAREN) 75 MG EC tablet 1 tablet 2 (Two) Times a Day. 10/1/22  Yes Radha Zaragoza MD   escitalopram (LEXAPRO) 10 MG tablet Take 2 tablets by mouth Daily. 10/1/22  Yes ProviderRadha MD   furosemide (LASIX) 20 MG tablet Take 1 tablet by mouth Daily.   Yes Provider, MD Radha   hydrOXYzine pamoate (VISTARIL) 50 MG capsule Take 1 capsule by mouth 2 (Two) Times a Day. 4/5/22  Yes Sonya Crooks APRN   ketorolac (TORADOL) 10 MG tablet Take 1 tablet by mouth Every 6 (Six) Hours As Needed for Moderate Pain.   Yes ProviderRadha MD   levETIRAcetam (KEPPRA) 1000 MG tablet Take 1 tablet by mouth 2 (Two) Times a Day.   Yes ProviderRadha MD   losartan (COZAAR) 50 MG tablet Take 1 tablet by mouth Daily.   Yes ProviderRadha MD   meclizine (ANTIVERT) 12.5 MG tablet Take 1 tablet by mouth 3 (Three) Times a Day As Needed for Dizziness. 4/19/22  Yes Sonya Crooks  ALPHONSE Nicole   medroxyPROGESTERone (DEPO-PROVERA) 150 MG/ML injection Inject 1 mL into the appropriate muscle as directed by prescriber Every 3 (Three) Months.   Yes Radha Zaragoza MD   melatonin 5 MG tablet tablet Take 2 tablets by mouth every night at bedtime.   Yes Radha Zaragoza MD   metoclopramide (REGLAN) 5 MG tablet Take 1 tablet by mouth 3 (Three) Times a Day As Needed (Nausea). 1/19/23  Yes Juvenal Chand MD   nabumetone (RELAFEN) 750 MG tablet Take 1 tablet by mouth 2 (Two) Times a Day As Needed for Mild Pain. 11/24/22  Yes Juvenal Chand MD   ondansetron ODT (ZOFRAN-ODT) 4 MG disintegrating tablet Place 1 tablet on the tongue 4 (Four) Times a Day As Needed for Nausea or Vomiting. 1/19/23  Yes Juvenal Chand MD   orphenadrine (NORFLEX) 100 MG 12 hr tablet Take 1 tablet by mouth 2 (Two) Times a Day As Needed for Muscle Spasms or Mild Pain . 4/13/22  Yes Nanette Mai APRN   promethazine (PHENERGAN) 25 MG suppository Insert 1 suppository into the rectum Every 6 (Six) Hours As Needed for Nausea or Vomiting. 1/19/23  Yes Juvenal Chand MD   traZODone (DESYREL) 50 MG tablet Take 1 tablet by mouth Every Night.   Yes Radha Zaragoza MD   acetaminophen (TYLENOL) 500 MG tablet Take 2 tablets by mouth Every 8 (Eight) Hours As Needed for Mild Pain.    Radha Zaragoza MD   atorvastatin (LIPITOR) 80 MG tablet Take 1 tablet by mouth Every Night. 12/18/21   Juan Conroy MD   escitalopram (LEXAPRO) 20 MG tablet Take 1.5 tablets by mouth Daily. 30 mg    Radha Zaragoza MD   fluticasone (FLONASE) 50 MCG/ACT nasal spray 2 sprays into the nostril(s) as directed by provider Daily.    Radha Zaragoza MD   pantoprazole (Protonix) 40 MG EC tablet Take 1 tablet by mouth Daily. 12/22/21   Buzz Vasquez APRN   potassium chloride (K-DUR,KLOR-CON) 10 MEQ CR tablet Take 10 mEq by mouth Daily. 3/1/22   Daksha, ALPHONSE Rodriguez   solifenacin (VESICARE) 10 MG tablet Take  1 tablet by mouth Daily.    Provider, Historical, MD       Physical Exam:  Constitutional: Alert, nontoxic  Head: Normocephalic, atraumatic  Nose: No congestion, no rhinorrhea  Mouth: Moist, no exudate  Eyes: Conjunctivae within normal limits, no scleral icterus  Neck: Supple, normal range of motion  Cardiovascular: Normal rate, no lower extremity edema  Pulmonary: Normal effort, symmetrical chest rise  Abdomen: mildly tender in her right upper quadrant, non peritonitic    Results Review:  Lab Results (last 48 hours)     Procedure Component Value Units Date/Time    Amylase [[id number]]  (Normal) Collected: 03/04/23 1958    Specimen: Blood Updated: 03/04/23 2148     Amylase 81 U/L     Hepatitis Panel, Acute [[id number]]  (Normal) Collected: 03/04/23 1958    Specimen: Blood Updated: 03/04/23 2139     Hepatitis B Surface Ag Non-Reactive     Hep A IgM Non-Reactive     Hep B C IgM Non-Reactive     Hepatitis C Ab Non-Reactive    Narrative:      Results may be falsely decreased if patient taking Biotin.     Extra Tubes [[id number]] Collected: 03/04/23 1958    Specimen: Blood Updated: 03/04/23 2100    Narrative:      The following orders were created for panel order Extra Tubes.  Procedure                               Abnormality         Status                     ---------                               -----------         ------                     Gold Top - SST[[id number]]                                   Final result               Green Top (Gel)[[id number]]                                  Final result               Light Blue Top[[id number]]                                   Final result                 Please view results for these tests on the individual orders.    Gold Top - SST [[id number]] Collected: 03/04/23 1958    Specimen: Blood Updated: 03/04/23 2100     Extra Tube Hold for add-ons.     Comment: Auto resulted.       Green Top (Gel) [[id number]] Collected: 03/04/23 1958    Specimen: Blood Updated: 03/04/23 2100      Extra Tube Hold for add-ons.     Comment: Auto resulted.       Light Blue Top [[id number]] Collected: 03/04/23 1958    Specimen: Blood Updated: 03/04/23 2100     Extra Tube Hold for add-ons.     Comment: Auto resulted       Comprehensive Metabolic Panel [[id number]]  (Abnormal) Collected: 03/04/23 1958    Specimen: Blood Updated: 03/04/23 2028     Glucose 107 mg/dL      BUN 16 mg/dL      Creatinine 1.23 mg/dL      Sodium 139 mmol/L      Potassium 3.8 mmol/L      Chloride 103 mmol/L      CO2 23.0 mmol/L      Calcium 9.3 mg/dL      Total Protein 6.7 g/dL      Albumin 4.1 g/dL      ALT (SGPT) 449 U/L      AST (SGOT) 218 U/L      Alkaline Phosphatase 392 U/L      Total Bilirubin 2.1 mg/dL      Globulin 2.6 gm/dL      A/G Ratio 1.6 g/dL      BUN/Creatinine Ratio 13.0     Anion Gap 13.0 mmol/L      eGFR 54.7 mL/min/1.73     Narrative:      GFR Normal >60  Chronic Kidney Disease <60  Kidney Failure <15      Lipase [[id number]]  (Abnormal) Collected: 03/04/23 1958    Specimen: Blood Updated: 03/04/23 2028     Lipase 95 U/L     Lactic Acid, Plasma [[id number]]  (Normal) Collected: 03/04/23 1952    Specimen: Blood Updated: 03/04/23 2007     Lactate 0.8 mmol/L     CBC & Differential [[id number]]  (Normal) Collected: 03/04/23 1958    Specimen: Blood from Arm, Right Updated: 03/04/23 2002    Narrative:      The following orders were created for panel order CBC & Differential.  Procedure                               Abnormality         Status                     ---------                               -----------         ------                     CBC Auto Differential[[id number]]        Normal              Final result                 Please view results for these tests on the individual orders.    CBC Auto Differential [[id number]]  (Normal) Collected: 03/04/23 1958    Specimen: Blood from Arm, Right Updated: 03/04/23 2002     WBC 4.78 10*3/mm3      RBC 4.18 10*6/mm3      Hemoglobin 12.2 g/dL      Hematocrit 37.2 %      MCV 89.0 fL       MCH 29.2 pg      MCHC 32.8 g/dL      RDW 13.2 %      RDW-SD 43.0 fl      MPV 11.9 fL      Platelets 196 10*3/mm3      Neutrophil % 58.8 %      Lymphocyte % 24.3 %      Monocyte % 10.7 %      Eosinophil % 5.4 %      Basophil % 0.6 %      Immature Grans % 0.2 %      Neutrophils, Absolute 2.81 10*3/mm3      Lymphocytes, Absolute 1.16 10*3/mm3      Monocytes, Absolute 0.51 10*3/mm3      Eosinophils, Absolute 0.26 10*3/mm3      Basophils, Absolute 0.03 10*3/mm3      Immature Grans, Absolute 0.01 10*3/mm3      nRBC 0.0 /100 WBC     Urine Drug Screen - Urine, Clean Catch [[id number]]  (Abnormal) Collected: 03/04/23 1928    Specimen: Urine, Clean Catch Updated: 03/04/23 1955     THC, Screen, Urine Negative     Phencyclidine (PCP), Urine Negative     Cocaine Screen, Urine Negative     Methamphetamine, Ur Negative     Opiate Screen Negative     Amphetamine Screen, Urine Negative     Benzodiazepine Screen, Urine Positive     Tricyclic Antidepressants Screen Negative     Methadone Screen, Urine Negative     Barbiturates Screen, Urine Negative     Oxycodone Screen, Urine Negative     Propoxyphene Screen Negative     Buprenorphine, Screen, Urine Negative    Narrative:      Cutoff For Drugs Screened:    Amphetamines               500 ng/ml  Barbiturates               200 ng/ml  Benzodiazepines            150 ng/ml  Cocaine                    150 ng/ml  Methadone                  200 ng/ml  Opiates                    100 ng/ml  Phencyclidine               25 ng/ml  THC                            50 ng/ml  Methamphetamine            500 ng/ml  Tricyclic Antidepressants  300 ng/ml  Oxycodone                  100 ng/ml  Propoxyphene               300 ng/ml  Buprenorphine               10 ng/ml    The normal value for all drugs tested is negative. This report includes unconfirmed screening results, with the cutoff values listed, to be used for medical treatment purposes only.  Unconfirmed results must not be used for non-medical  purposes such as employment or legal testing.  Clinical consideration should be applied to any drug of abuse test, particularly when unconfirmed results are used.      Urinalysis, Microscopic Only - Urine, Clean Catch [[id number]]  (Abnormal) Collected: 03/04/23 1928    Specimen: Urine, Clean Catch Updated: 03/04/23 1953     RBC, UA 0-2 /HPF      WBC, UA Too Numerous to Count /HPF      Bacteria, UA 2+ /HPF      Squamous Epithelial Cells, UA 31-50 /HPF      Yeast, UA Small/1+ Budding Yeast /HPF      Hyaline Casts, UA None Seen /LPF      Methodology Manual Light Microscopy    Urinalysis With Microscopic If Indicated (No Culture) - Urine, Clean Catch [[id number]]  (Abnormal) Collected: 03/04/23 1928    Specimen: Urine, Clean Catch Updated: 03/04/23 1941     Color, UA Dark Yellow     Appearance, UA Cloudy     pH, UA <=5.0     Specific Gravity, UA 1.029     Glucose, UA Negative     Ketones, UA Trace     Bilirubin, UA Moderate (2+)     Blood, UA Trace     Protein, UA 30 mg/dL (1+)     Leuk Esterase, UA Moderate (2+)     Nitrite, UA Positive     Urobilinogen, UA 1.0 E.U./dL    Pregnancy, Urine - Urine, Clean Catch [[id number]]  (Normal) Collected: 03/04/23 1928    Specimen: Urine, Clean Catch Updated: 03/04/23 1939     HCG, Urine QL Negative        Imaging Results (Last 48 Hours)     Procedure Component Value Units Date/Time    US Liver [[id number]] Collected: 03/04/23 2104     Updated: 03/04/23 2248    Narrative:      US LIVER  RPID: US LIVER    CLINICAL HISTORY:  47 years Female, elevated liver enzymes    COMPARISON:  CT on this date    TECHNIQUE:  Grayscale and color Doppler flow imaging was performed, with  spectral Doppler if needed    FINDINGS:    Multiple small stones are present in the gallbladder lumen. The  gallbladder wall is 4 mm which is slightly thickened. There is no  surrounding pericholecystic fluid. The common bile duct is 8.4 mm  which is slightly dilated.    No masses observed within the liver. The  gallbladder is reported  as tender when directly scanned.    The pancreas is obscured. The aorta is obscured..    No masses seen within the hepatic parenchyma. The liver  echogenicity is elevated, this could be seen with steatosis.            Impression:        1. Gallstones with thickening of the gallbladder wall and  tenderness of the gallbladder reported when directly scanned.  Findings are suspicious for cholecystitis.  2. 8.4 mm common bile duct correlate for the potential  choledocholithiasis.    Electronically signed by:  Jaziel Benjamin MD  3/4/2023 10:46 PM [facility]  Workstation: [id number]    CT Abdomen Pelvis Without Contrast [[id number]] Collected: 03/04/23 1928     Updated: 03/04/23 2036    Narrative:      EXAM:  CT Abdomen and Pelvis Without Intravenous Contrast    CLINICAL HISTORY:  rt flank pain    TECHNIQUE:  Axial computed tomography images of the abdomen and pelvis  without intravenous contrast.  Sagittal and coronal reformatted  images were created and reviewed.  This CT exam was performed  using one or more of the following dose reduction techniques:   automated exposure control, adjustment of the mA and/or kV  according to patient size, and/or use of iterative reconstruction  technique.    COMPARISON:  CT Abdomen Pelvis dated 9/21/2022    FINDINGS:  Lung bases:  Unremarkable.  No mass.  No consolidation.    ABDOMEN:  Liver:  Unremarkable.  Gallbladder and bile ducts:  Unremarkable.  No calcified stones.   No ductal dilation.  Pancreas:  Unremarkable.  No ductal dilation.  Spleen:  Unremarkable.  No splenomegaly.  Adrenals:  Unremarkable.  No mass.  Kidneys and ureters:  Lobulated renal contour.  Bilateral renal  calculi.  No hydronephrosis.  Stomach and bowel:  Moderate stool.  No bowel obstruction.  No  appreciable mucosal thickening.    PELVIS:  Appendix:  Normal caliber appendix.  No findings to suggest acute  appendicitis.  Bladder:  The urinary bladder is decompressed.  No stones.  Reproductive:   Unremarkable as visualized.    ABDOMEN and PELVIS:  Intraperitoneal space:  Unremarkable.  No free air.  No  significant fluid collection.  Bones/joints:  Multilevel spondylosis.  No acute fracture.  No  dislocation.  Soft tissues:  Tiny fat-containing umbilical hernia.  Vasculature:  Unremarkable.  No abdominal aortic aneurysm.  Lymph nodes:  Unremarkable.  No enlarged lymph nodes.      Impression:      1.  Bilateral nonobstructive renal calculi.  2.  Other findings as above.    Electronically signed by:  Ryder Marino MD  3/4/2023 8:34 PM CST  Workstation: [id number]           I reviewed the patient's new clinical results.  I reviewed the patient's new imaging results and agree with the interpretation.  I reviewed the patient's other test results and agree with the interpretation        Assessment & Plan       48 yo woman with likely choledocholithiasis and gallstone pancreatitis    - Recommend admission with IV antibiotics  - Would prefer MRCP prior to operation, however may be difficult to get this over the weekend. Will plan on cholecystectomy either tomorrow or Monday depending on her lab/imaging findings in the morning      I discussed the patients findings and my recommendations with patient    Jonn Jimenes MD  03/04/23  23:59 CST

## 2023-03-05 NOTE — PROGRESS NOTES
GENERAL SURGERY PROGRESS NOTE     LOS: 0 days         Interval History:     No acute events overnight. Pain controlled. No nausea/vomiting    Medication Review:   aztreonam, 2 g, Intravenous, Q8H  levETIRAcetam, 1,000 mg, Intravenous, Q12H  pantoprazole, 40 mg, Intravenous, Q12H  sodium chloride, 10 mL, Intravenous, Q12H        lactated ringers, 100 mL/hr, Last Rate: 100 mL/hr (03/05/23 0134)    Objective     Vital Signs:  Temp:  [98 °F (36.7 °C)-98.4 °F (36.9 °C)] 98 °F (36.7 °C)  Heart Rate:  [56-65] 57  Resp:  [18] 18  BP: ()/(53-73) 102/61    Intake/Output Summary (Last 24 hours) at 3/5/2023 0804  Last data filed at 3/5/2023 0500  Gross per 24 hour   Intake 1100 ml   Output 100 ml   Net 1000 ml       Physical Exam  Constitutional:       Appearance: Normal appearance.   HENT:      Head: Normocephalic and atraumatic.      Nose: Nose normal. No congestion.      Mouth/Throat:      Mouth: Mucous membranes are moist.      Pharynx: Oropharynx is clear.   Eyes:      General: No scleral icterus.     Pupils: Pupils are equal, round, and reactive to light.   Cardiovascular:      Rate and Rhythm: Normal rate and regular rhythm.   Pulmonary:      Effort: Pulmonary effort is normal. No respiratory distress.   Abdominal:      General: There is no distension.      Tenderness: There is abdominal tenderness.   Skin:     General: Skin is warm and dry.   Neurological:      General: No focal deficit present.      Mental Status: She is alert and oriented to person, place, and time.   Psychiatric:         Mood and Affect: Mood normal.         Behavior: Behavior normal.         Results Review:    Results from last 7 days   Lab Units 03/05/23 0546 03/04/23 1958   SODIUM mmol/L 139 139   POTASSIUM mmol/L 4.1 3.8   CHLORIDE mmol/L 108* 103   CO2 mmol/L 21.0* 23.0   BUN mg/dL 13 16   CREATININE mg/dL 0.96 1.23*   GLUCOSE mg/dL 82 107*   CALCIUM mg/dL 8.7 9.3     Results from last 7 days   Lab Units 03/05/23 0546 03/04/23 1958    WBC 10*3/mm3 4.84 4.78   HEMOGLOBIN g/dL 11.4* 12.2   HEMATOCRIT % 33.8* 37.2   PLATELETS 10*3/mm3 168 196       Assessment:    Acute cholecystitis      46 yo woman with acute cholecystitis and likely choledocholithiasis that has resolved    Plan:    - OR today for laparoscopic cholecystectomy with intraoperative cholangiogram.  I have counseled the patient about the nature of the problem, the natural history of the disease, the risk and benefits of surgery, the expected recovery, and the alternatives to surgery.  After this discussion, they were given an opportunity to ask questions, have an understanding of diagnosis and treatment options, and wish to proceed with surgery.         This document has been electronically signed by Jonn Jimenes MD on March 5, 2023 08:04 CST        Jonn Jimenes MD  03/05/23  08:04 CST

## 2023-03-05 NOTE — NURSING NOTE
OR called up and stated they found a bed bug on patient umbilicus when prepping the patient.  EVS notified.  Instructed to put bug in container.   Room put in isolation and personal items double bagged.  [place] notified of bed bug found on patient.

## 2023-03-05 NOTE — ED PROVIDER NOTES
Subjective   History of Present Illness  Patient presents to the ER with c/o right flank pain that radiates around to her bladder region. Patient with history of recurrent UTI and see's Dr. Deshpande. She is currently on prophylactic antibiotics. Denies fever but states she just doesn't feel good. Report urine is dark in color.         Review of Systems   Constitutional: Positive for fatigue. Negative for chills and fever.   HENT: Negative.    Respiratory: Negative for shortness of breath.    Cardiovascular: Negative for chest pain.   Gastrointestinal: Positive for abdominal pain. Negative for diarrhea, nausea and vomiting.   Genitourinary: Positive for flank pain. Negative for decreased urine volume, difficulty urinating, dysuria and frequency.        Urine dark   Skin: Negative.    Neurological: Negative.    Psychiatric/Behavioral: Negative.        Past Medical History:   Diagnosis Date   • Abdominal pain     Chronic RLQ, RUQ, R flank comes and goes.    • Abdominal pain, right lower quadrant    • Acute bilateral otitis media    • Allergic rhinitis    • Backache     Upper back.   • Candidiasis of skin    • Cellulitis of neck    • Chest pain    • Contraception    • Cough    • Dysfunction of eustachian tube    • Dyspareunia, female    • Elevated cholesterol    • Excessive or frequent menstruation    • Flank pain    • Generalized aches and pains    • Headache    • Health maintenance examination    • Hypertension    • Infection of skin and subcutaneous tissue    • Irregular periods    • Kidney stone     Poss   • Menorrhagia    • Nausea vomiting and diarrhea    • Obesity    • Open wound of abdominal wall    • Otalgia    • Pain in left foot    • Pain in unspecified hand    • Removed Impacted cerumen 06/05/2012   • Rhinitis    • Seizure (HCC) 08/15/2019   • Seizure ([facility])    • Shoulder pain     right-sided-likely muscle strain      • Shoulder tendinitis    • Spider bite wound    • Staphylococcal infection of skin    • Stroke  ([facility])    • Swollen feet    • Tinea cruris    • Tobacco dependence syndrome    • Upper respiratory infection    • Urinary tract infectious disease    • Vertigo    • Visit for gynecologic examination    • Vulvovaginitis        Allergies   Allergen Reactions   • Abilify [Aripiprazole] Nausea Only   • Buspirone Rash   • Penicillins Hives and Nausea And Vomiting       Past Surgical History:   Procedure Laterality Date   •  SECTION     • DILATATION AND CURETTAGE      Secondary to incomplete spontaneous    • INCISION AND DRAINAGE ABSCESS  2012   • INJECTION OF MEDICATION  2015    Phenergan (1)     • INJECTION OF MEDICATION  10/10/2013    Toradol (2)      • INJECTION OF MEDICATION  2014    Zofran (1)          Family History   Problem Relation Age of Onset   • Breast cancer Mother    • Thyroid disease Mother    • Hypertension Mother    • Heart disease Mother    • Arthritis Mother    • Liver disease Father    • Endometrial cancer Sister    • Anxiety disorder Brother    • Mental illness Daughter    • Depression Daughter    • Diabetes Maternal Aunt    • Diabetes Maternal Uncle    • Stroke Maternal Grandmother    • Breast cancer Maternal Grandmother    • Cancer Maternal Grandfather    • Stroke Paternal Grandmother    • Depression Other    • Cancer Other         Colorectal Cancer   • Endometrial cancer Other    • Lung cancer Other        Social History     Socioeconomic History   • Marital status: Single   Tobacco Use   • Smoking status: Every Day     Packs/day: 0.50     Types: Cigarettes   • Smokeless tobacco: Never   • Tobacco comments:     19*800*quit*NOW   Vaping Use   • Vaping Use: Some days   • Substances: Nicotine   • Devices: Refillable tank   • Passive vaping exposure: Yes   Substance and Sexual Activity   • Alcohol use: No   • Drug use: No   • Sexual activity: Not Currently           Objective    /60 (BP Location: Right arm, Patient Position: Lying)   Pulse 56   Temp 98.4 °F  "(36.9 °C)   Resp 18   Ht 165.1 cm (65\")   Wt 91.6 kg (202 lb)   LMP  (LMP Unknown)   SpO2 97%   BMI 33.61 kg/m²     Physical Exam  Vitals and nursing note reviewed.   Constitutional:       Appearance: She is not ill-appearing.   HENT:      Head: Normocephalic and atraumatic.   Cardiovascular:      Rate and Rhythm: Normal rate and regular rhythm.      Pulses: Normal pulses.   Pulmonary:      Effort: Pulmonary effort is normal.      Breath sounds: Normal breath sounds.   Abdominal:      General: Bowel sounds are normal. There is no distension.      Palpations: Abdomen is soft.      Tenderness: There is abdominal tenderness (generalized and to lower abdomen). There is right CVA tenderness.   Musculoskeletal:         General: Normal range of motion.   Skin:     General: Skin is warm and dry.      Capillary Refill: Capillary refill takes less than 2 seconds.   Neurological:      Mental Status: She is alert and oriented to person, place, and time.   Psychiatric:         Mood and Affect: Mood normal.         Behavior: Behavior normal.         Procedures  Results for orders placed or performed during the hospital encounter of 03/04/23   Urinalysis With Microscopic If Indicated (No Culture) - Urine, Clean Catch    Specimen: Urine, Clean Catch   Result Value Ref Range    Color, UA Dark Yellow Yellow, Straw, Dark Yellow, Kavita    Appearance, UA Cloudy (A) Clear    pH, UA <=5.0 5.0 - 9.0    Specific Gravity, UA 1.029 1.003 - 1.030    Glucose, UA Negative Negative    Ketones, UA Trace (A) Negative    Bilirubin, UA Moderate (2+) (A) Negative    Blood, UA Trace (A) Negative    Protein, UA 30 mg/dL (1+) (A) Negative    Leuk Esterase, UA Moderate (2+) (A) Negative    Nitrite, UA Positive (A) Negative    Urobilinogen, UA 1.0 E.U./dL 0.2 - 1.0 E.U./dL   Pregnancy, Urine - Urine, Clean Catch    Specimen: Urine, Clean Catch   Result Value Ref Range    HCG, Urine QL Negative Negative   Urine Drug Screen - Urine, Clean Catch    " Specimen: Urine, Clean Catch   Result Value Ref Range    THC, Screen, Urine Negative Negative    Phencyclidine (PCP), Urine Negative Negative    Cocaine Screen, Urine Negative Negative    Methamphetamine, Ur Negative Negative    Opiate Screen Negative Negative    Amphetamine Screen, Urine Negative Negative    Benzodiazepine Screen, Urine Positive (A) Negative    Tricyclic Antidepressants Screen Negative Negative    Methadone Screen, Urine Negative Negative    Barbiturates Screen, Urine Negative Negative    Oxycodone Screen, Urine Negative Negative    Propoxyphene Screen Negative Negative    Buprenorphine, Screen, Urine Negative Negative   Comprehensive Metabolic Panel    Specimen: Blood   Result Value Ref Range    Glucose 107 (H) 65 - 99 mg/dL    BUN 16 6 - 20 mg/dL    Creatinine 1.23 (H) 0.57 - 1.00 mg/dL    Sodium 139 136 - 145 mmol/L    Potassium 3.8 3.5 - 5.2 mmol/L    Chloride 103 98 - 107 mmol/L    CO2 23.0 22.0 - 29.0 mmol/L    Calcium 9.3 8.6 - 10.5 mg/dL    Total Protein 6.7 6.0 - 8.5 g/dL    Albumin 4.1 3.5 - 5.2 g/dL    ALT (SGPT) 449 (H) 1 - 33 U/L    AST (SGOT) 218 (H) 1 - 32 U/L    Alkaline Phosphatase 392 (H) 39 - 117 U/L    Total Bilirubin 2.1 (H) 0.0 - 1.2 mg/dL    Globulin 2.6 gm/dL    A/G Ratio 1.6 g/dL    BUN/Creatinine Ratio 13.0 7.0 - 25.0    Anion Gap 13.0 5.0 - 15.0 mmol/L    eGFR 54.7 (L) >60.0 mL/min/1.73   Lipase    Specimen: Blood   Result Value Ref Range    Lipase 95 (H) 13 - 60 U/L   Lactic Acid, Plasma    Specimen: Blood   Result Value Ref Range    Lactate 0.8 0.5 - 2.0 mmol/L   CBC Auto Differential    Specimen: Arm, Right; Blood   Result Value Ref Range    WBC 4.78 3.40 - 10.80 10*3/mm3    RBC 4.18 3.77 - 5.28 10*6/mm3    Hemoglobin 12.2 12.0 - 15.9 g/dL    Hematocrit 37.2 34.0 - 46.6 %    MCV 89.0 79.0 - 97.0 fL    MCH 29.2 26.6 - 33.0 pg    MCHC 32.8 31.5 - 35.7 g/dL    RDW 13.2 12.3 - 15.4 %    RDW-SD 43.0 37.0 - 54.0 fl    MPV 11.9 6.0 - 12.0 fL    Platelets 196 140 - 450  10*3/mm3    Neutrophil % 58.8 42.7 - 76.0 %    Lymphocyte % 24.3 19.6 - 45.3 %    Monocyte % 10.7 5.0 - 12.0 %    Eosinophil % 5.4 0.3 - 6.2 %    Basophil % 0.6 0.0 - 1.5 %    Immature Grans % 0.2 0.0 - 0.5 %    Neutrophils, Absolute 2.81 1.70 - 7.00 10*3/mm3    Lymphocytes, Absolute 1.16 0.70 - 3.10 10*3/mm3    Monocytes, Absolute 0.51 0.10 - 0.90 10*3/mm3    Eosinophils, Absolute 0.26 0.00 - 0.40 10*3/mm3    Basophils, Absolute 0.03 0.00 - 0.20 10*3/mm3    Immature Grans, Absolute 0.01 0.00 - 0.05 10*3/mm3    nRBC 0.0 0.0 - 0.2 /100 WBC   Urinalysis, Microscopic Only - Urine, Clean Catch    Specimen: Urine, Clean Catch   Result Value Ref Range    RBC, UA 0-2 (A) None Seen /HPF    WBC, UA Too Numerous to Count (A) None Seen, 0-2, 3-5 /HPF    Bacteria, UA 2+ (A) None Seen /HPF    Squamous Epithelial Cells, UA 31-50 (A) None Seen, 0-2 /HPF    Yeast, UA Small/1+ Budding Yeast None Seen /HPF    Hyaline Casts, UA None Seen None Seen /LPF    Methodology Manual Light Microscopy    Hepatitis Panel, Acute    Specimen: Blood   Result Value Ref Range    Hepatitis B Surface Ag Non-Reactive Non-Reactive    Hep A IgM Non-Reactive Non-Reactive    Hep B C IgM Non-Reactive Non-Reactive    Hepatitis C Ab Non-Reactive Non-Reactive   Amylase    Specimen: Blood   Result Value Ref Range    Amylase 81 28 - 100 U/L   Gold Top - SST   Result Value Ref Range    Extra Tube Hold for add-ons.    Green Top (Gel)   Result Value Ref Range    Extra Tube Hold for add-ons.    Light Blue Top   Result Value Ref Range    Extra Tube Hold for add-ons.               ED Course  ED Course as of 03/04/23 2816   Sat Mar 04, 2023   2352 Findings discussed with the on-call surgeon, Dr. Jimenes, who has presented to the emergency department and is evaluating the patient and will make plans for surgery. [CB]      ED Course User Index  [CB] Asher Grande MD              Labs Reviewed   URINALYSIS W/ MICROSCOPIC IF INDICATED (NO CULTURE) - Abnormal; Notable  for the following components:       Result Value    Appearance, UA Cloudy (*)     Ketones, UA Trace (*)     Bilirubin, UA Moderate (2+) (*)     Blood, UA Trace (*)     Protein, UA 30 mg/dL (1+) (*)     Leuk Esterase, UA Moderate (2+) (*)     Nitrite, UA Positive (*)     All other components within normal limits   URINE DRUG SCREEN - Abnormal; Notable for the following components:    Benzodiazepine Screen, Urine Positive (*)     All other components within normal limits    Narrative:     Cutoff For Drugs Screened:    Amphetamines               500 ng/ml  Barbiturates               200 ng/ml  Benzodiazepines            150 ng/ml  Cocaine                    150 ng/ml  Methadone                  200 ng/ml  Opiates                    100 ng/ml  Phencyclidine               25 ng/ml  THC                            50 ng/ml  Methamphetamine            500 ng/ml  Tricyclic Antidepressants  300 ng/ml  Oxycodone                  100 ng/ml  Propoxyphene               300 ng/ml  Buprenorphine               10 ng/ml    The normal value for all drugs tested is negative. This report includes unconfirmed screening results, with the cutoff values listed, to be used for medical treatment purposes only.  Unconfirmed results must not be used for non-medical purposes such as employment or legal testing.  Clinical consideration should be applied to any drug of abuse test, particularly when unconfirmed results are used.     COMPREHENSIVE METABOLIC PANEL - Abnormal; Notable for the following components:    Glucose 107 (*)     Creatinine 1.23 (*)     ALT (SGPT) 449 (*)     AST (SGOT) 218 (*)     Alkaline Phosphatase 392 (*)     Total Bilirubin 2.1 (*)     eGFR 54.7 (*)     All other components within normal limits    Narrative:     GFR Normal >60  Chronic Kidney Disease <60  Kidney Failure <15     LIPASE - Abnormal; Notable for the following components:    Lipase 95 (*)     All other components within normal limits   URINALYSIS, MICROSCOPIC  ONLY - Abnormal; Notable for the following components:    RBC, UA 0-2 (*)     WBC, UA Too Numerous to Count (*)     Bacteria, UA 2+ (*)     Squamous Epithelial Cells, UA 31-50 (*)     All other components within normal limits   PREGNANCY, URINE - Normal   LACTIC ACID, PLASMA - Normal   CBC WITH AUTO DIFFERENTIAL - Normal   HEPATITIS PANEL, ACUTE - Normal    Narrative:     Results may be falsely decreased if patient taking Biotin.    AMYLASE - Normal   CBC AND DIFFERENTIAL    Narrative:     The following orders were created for panel order CBC & Differential.  Procedure                               Abnormality         Status                     ---------                               -----------         ------                     CBC Auto Differential[[id number]]        Normal              Final result                 Please view results for these tests on the individual orders.   EXTRA TUBES    Narrative:     The following orders were created for panel order Extra Tubes.  Procedure                               Abnormality         Status                     ---------                               -----------         ------                     Gold Top - SST[[id number]]                                   Final result               Green Top (Gel)[[id number]]                                  Final result               Light Blue Top[[id number]]                                   Final result                 Please view results for these tests on the individual orders.   GOLD TOP - SST   GREEN TOP   LIGHT BLUE TOP       US Liver   Final Result      1. Gallstones with thickening of the gallbladder wall and   tenderness of the gallbladder reported when directly scanned.   Findings are suspicious for cholecystitis.   2. 8.4 mm common bile duct correlate for the potential   choledocholithiasis.      Electronically signed by:  Jaziel Benjamin MD  3/4/2023 10:46 PM CST   Workstation: [id number]      CT Abdomen Pelvis Without  Contrast   Final Result   1.  Bilateral nonobstructive renal calculi.   2.  Other findings as above.      Electronically signed by:  Ryder Marino MD  3/4/2023 8:34 PM [facility]   Workstation: [id number]                                            [facility]    Final diagnoses:   Acute cholecystitis   Urinary tract infection in female       ED Disposition  ED Disposition     ED Disposition   Decision to Admit    Condition   --    Comment   Level of Care: Med/Surg [1]   Diagnosis: Acute cholecystitis [575.0.ICD-9-CM]   Admitting Physician: BEHROOZI, SAEID [[id number]]   Attending Physician: BEHROOZI, SAEID [[id number]]               No follow-up provider specified.       Medication List      No changes were made to your prescriptions during this visit.          Asher Grande MD  03/04/23 0140       Asher Grande MD  03/04/23 7308

## 2023-03-05 NOTE — H&P
[facility] Medicine Admission      Date of Admission: 3/4/2023      Primary Care Physician: Sonya Crooks APRN      Chief Complaint: Abdominal pain nausea vomiting    HPI:    Patient is a 47-year-old female known past medical history of cerebrovascular accident, seizure disorder potential chronic kidney disease a stage III hypertension, recurrent urinary tract infection, history of ESBL positive urine culture,  follows with Dr. Deshpande who lives in [place] for 1 year presented to ER with complaint of right flank pain and around her bladder region.  She was diagnosed with acute cholecystitis in ED.  She was seen by surgeon Dr. Jimenes.  She was given Flagyl and Invanz.  Hospitalist service was called for admission of the patient.  I discussed the care personally with Dr. Jimenes and Dr. Grande.    Patient was seen and examined in ED room 11.  Patient is very nonspecific with giving information.  Overall she reports 2 to 3 days history suprapubic abdominal pain, discomfort with urination and some right flank pain.  She had nausea and nonbilious nonbloody vomiting and decreased p.o. intake.  She denies any fall injury trauma fever chills headache sore throat chest pain syncope near syncope palpitation shortness of breath back pain constipation diarrhea, bleeding pelvic problems in particular.    Concurrent Medical History:  has a past medical history of Abdominal pain, Abdominal pain, right lower quadrant, Acute bilateral otitis media, Allergic rhinitis, Backache, Candidiasis of skin, Cellulitis of neck, Chest pain, Contraception, Cough, Dysfunction of eustachian tube, Dyspareunia, female, Elevated cholesterol, Excessive or frequent menstruation, Flank pain, Generalized aches and pains, Headache, Health maintenance examination, Hypertension, Infection of skin and subcutaneous tissue, Irregular periods, Kidney stone, Menorrhagia, Nausea vomiting and diarrhea, Obesity,  Open wound of abdominal wall, Otalgia, Pain in left foot, Pain in unspecified hand, Removed Impacted cerumen (2012), Rhinitis, Seizure (HCC) (08/15/2019), Seizure ([facility]), Shoulder pain, Shoulder tendinitis, Spider bite wound, Staphylococcal infection of skin, Stroke ([facility]), Swollen feet, Tinea cruris, Tobacco dependence syndrome, Upper respiratory infection, Urinary tract infectious disease, Vertigo, Visit for gynecologic examination, and Vulvovaginitis.    Past Surgical History:  has a past surgical history that includes  section; Dilation and curettage of uterus (); Incision and Drainage Abscess (2012); Injection of Medication (2015); Injection of Medication (10/10/2013); and Injection of Medication (2014).    Family History: family history includes Anxiety disorder in her brother; Arthritis in her mother; Breast cancer in her maternal grandmother and mother; Cancer in her maternal grandfather and another family member; Depression in her daughter and another family member; Diabetes in her maternal aunt and maternal uncle; Endometrial cancer in her sister and another family member; Heart disease in her mother; Hypertension in her mother; Liver disease in her father; Lung cancer in an other family member; Mental illness in her daughter; Stroke in her maternal grandmother and paternal grandmother; Thyroid disease in her mother.     Social History:  reports that she has been smoking cigarettes. She has been smoking an average of .5 packs per day. She has never used smokeless tobacco. She reports that she does not drink alcohol and does not use drugs.    Allergies:   Allergies   Allergen Reactions   • Abilify [Aripiprazole] Nausea Only   • Buspirone Rash   • Penicillins Hives and Nausea And Vomiting       Medications:   Prior to Admission medications    Medication Sig Start Date End Date Taking? Authorizing Provider   aspirin 325 MG tablet Take 1 tablet by mouth Daily.   Yes Provider,  MD Radha   busPIRone (BUSPAR) 15 MG tablet Take 1 tablet by mouth 3 (Three) Times a Day.   Yes ProviderRadha MD   butalbital-acetaminophen-caffeine (FIORICET, ESGIC) -40 MG per tablet Take 1 tablet by mouth Daily As Needed for Headache.   Yes Radha Zaragoza MD   cloNIDine (CATAPRES) 0.1 MG tablet Take 1 tablet by mouth Every 30 (Thirty) Minutes As Needed for High Blood Pressure. Give one tap po every 30 mins when BP is over 160   Yes Radha Zaragoza MD   diclofenac (VOLTAREN) 75 MG EC tablet 1 tablet 2 (Two) Times a Day. 10/1/22  Yes Radha Zaragoza MD   escitalopram (LEXAPRO) 10 MG tablet Take 2 tablets by mouth Daily. 10/1/22  Yes Radha Zaragoza MD   furosemide (LASIX) 20 MG tablet Take 1 tablet by mouth Daily.   Yes Radha Zaragoza MD   hydrOXYzine pamoate (VISTARIL) 50 MG capsule Take 1 capsule by mouth 2 (Two) Times a Day. 4/5/22  Yes Sonya Crooks APRN   ketorolac (TORADOL) 10 MG tablet Take 1 tablet by mouth Every 6 (Six) Hours As Needed for Moderate Pain.   Yes Radha Zaragoza MD   levETIRAcetam (KEPPRA) 1000 MG tablet Take 1 tablet by mouth 2 (Two) Times a Day.   Yes ProviderRadha MD   losartan (COZAAR) 50 MG tablet Take 1 tablet by mouth Daily.   Yes Radha Zaragoza MD   meclizine (ANTIVERT) 12.5 MG tablet Take 1 tablet by mouth 3 (Three) Times a Day As Needed for Dizziness. 4/19/22  Yes Sonya Crooks APRN   medroxyPROGESTERone (DEPO-PROVERA) 150 MG/ML injection Inject 1 mL into the appropriate muscle as directed by prescriber Every 3 (Three) Months.   Yes Radha Zaragoza MD   melatonin 5 MG tablet tablet Take 2 tablets by mouth every night at bedtime.   Yes Radha Zaragoza MD   metoclopramide (REGLAN) 5 MG tablet Take 1 tablet by mouth 3 (Three) Times a Day As Needed (Nausea). 1/19/23  Yes Juvenal Chand MD   nabumetone (RELAFEN) 750 MG tablet Take 1 tablet by mouth 2 (Two) Times a Day As Needed for  Mild Pain. 11/24/22  Yes Juvenal Chand MD   ondansetron ODT (ZOFRAN-ODT) 4 MG disintegrating tablet Place 1 tablet on the tongue 4 (Four) Times a Day As Needed for Nausea or Vomiting. 1/19/23  Yes Juvenal Chand MD   orphenadrine (NORFLEX) 100 MG 12 hr tablet Take 1 tablet by mouth 2 (Two) Times a Day As Needed for Muscle Spasms or Mild Pain . 4/13/22  Yes Nanette Mai APRN   promethazine (PHENERGAN) 25 MG suppository Insert 1 suppository into the rectum Every 6 (Six) Hours As Needed for Nausea or Vomiting. 1/19/23  Yes Juvenal Chand MD   traZODone (DESYREL) 50 MG tablet Take 1 tablet by mouth Every Night.   Yes Radha Zaragoza MD   acetaminophen (TYLENOL) 500 MG tablet Take 2 tablets by mouth Every 8 (Eight) Hours As Needed for Mild Pain.    Radha Zaragoza MD   atorvastatin (LIPITOR) 80 MG tablet Take 1 tablet by mouth Every Night. 12/18/21   Juan Conroy MD   escitalopram (LEXAPRO) 20 MG tablet Take 1.5 tablets by mouth Daily. 30 mg    Radha Zaragoza MD   fluticasone (FLONASE) 50 MCG/ACT nasal spray 2 sprays into the nostril(s) as directed by provider Daily.    Radha Zaragoza MD   pantoprazole (Protonix) 40 MG EC tablet Take 1 tablet by mouth Daily. 12/22/21   Buzz Vasquez APRN   potassium chloride (K-DUR,KLOR-CON) 10 MEQ CR tablet Take 10 mEq by mouth Daily. 3/1/22   Sonya Crooks APRN   solifenacin (VESICARE) 10 MG tablet Take 1 tablet by mouth Daily.    ProviderRadha MD       Review of Systems:  Review of Systems   Constitutional: Positive for fatigue. Negative for chills, diaphoresis and fever.   HENT: Negative for congestion, dental problem, ear pain, facial swelling, rhinorrhea and sinus pressure.    Eyes: Negative for photophobia, discharge, redness, itching and visual disturbance.   Respiratory: Negative for apnea, cough, choking, chest tightness, shortness of breath, wheezing and stridor.    Cardiovascular: Negative for chest pain,  palpitations and leg swelling.   Gastrointestinal: Positive for abdominal pain, nausea and vomiting. Negative for abdominal distention, anal bleeding, blood in stool, diarrhea and rectal pain.   Endocrine: Negative for cold intolerance, heat intolerance, polydipsia, polyphagia and polyuria.   Genitourinary: Positive for dysuria. Negative for difficulty urinating, flank pain, frequency, hematuria and urgency.   Musculoskeletal: Negative for arthralgias, back pain, joint swelling and myalgias.   Skin: Negative for pallor, rash and wound.   Allergic/Immunologic: Negative for environmental allergies and immunocompromised state.   Neurological: Negative for dizziness, tremors, seizures, facial asymmetry, speech difficulty, weakness, light-headedness, numbness and headaches.   Hematological: Negative for adenopathy. Does not bruise/bleed easily.   Psychiatric/Behavioral: Negative for agitation, behavioral problems and hallucinations. The patient is not nervous/anxious.       Otherwise complete ROS is negative except as mentioned above.    Physical Exam:   Temp:  [98.4 °F (36.9 °C)] 98.4 °F (36.9 °C)  Heart Rate:  [56-65] 56  Resp:  [18] 18  BP: ()/(53-73) 142/60  Physical Exam  Constitutional:       General: She is not in acute distress.     Appearance: She is obese. She is not ill-appearing, toxic-appearing or diaphoretic.   HENT:      Head: Normocephalic and atraumatic.      Right Ear: External ear normal.      Left Ear: External ear normal.      Nose: Nose normal.      Mouth/Throat:      Mouth: Mucous membranes are dry.      Pharynx: Oropharynx is clear.   Eyes:      Extraocular Movements: Extraocular movements intact.      Conjunctiva/sclera: Conjunctivae normal.      Pupils: Pupils are equal, round, and reactive to light.   Cardiovascular:      Rate and Rhythm: Normal rate and regular rhythm.      Heart sounds: No murmur heard.    No friction rub. No gallop.   Pulmonary:      Effort: No respiratory distress.       Breath sounds: No stridor. No wheezing or rales.   Chest:      Chest wall: No tenderness.   Abdominal:      General: Abdomen is flat. There is no distension.      Palpations: Abdomen is soft.      Tenderness: There is no abdominal tenderness. There is no guarding or rebound.      Comments: Minimal right flank discomfort and  suprapubic discomfort on palpitation   Musculoskeletal:         General: No swelling or tenderness.      Cervical back: No rigidity or tenderness.      Right lower leg: No edema.      Left lower leg: No edema.   Lymphadenopathy:      Cervical: No cervical adenopathy.   Skin:     General: Skin is warm and dry.      Coloration: Skin is not jaundiced.      Findings: No erythema.   Neurological:      Mental Status: She is alert and oriented to person, place, and time. Mental status is at baseline.      Sensory: No sensory deficit.      Motor: No weakness.      Coordination: Coordination normal.   Psychiatric:         Mood and Affect: Mood normal.         Behavior: Behavior normal.         Judgment: Judgment normal.           Results Reviewed:  I have personally reviewed current lab, radiology, and data and agree with results.  Lab Results (last 24 hours)     Procedure Component Value Units Date/Time    Amylase [[id number]]  (Normal) Collected: 03/04/23 1958    Specimen: Blood Updated: 03/04/23 2148     Amylase 81 U/L     Hepatitis Panel, Acute [[id number]]  (Normal) Collected: 03/04/23 1958    Specimen: Blood Updated: 03/04/23 2139     Hepatitis B Surface Ag Non-Reactive     Hep A IgM Non-Reactive     Hep B C IgM Non-Reactive     Hepatitis C Ab Non-Reactive    Narrative:      Results may be falsely decreased if patient taking Biotin.     Extra Tubes [[id number]] Collected: 03/04/23 1958    Specimen: Blood Updated: 03/04/23 2100    Narrative:      The following orders were created for panel order Extra Tubes.  Procedure                               Abnormality         Status                      ---------                               -----------         ------                     Gold Top - SST[[id number]]                                   Final result               Green Top (Gel)[[id number]]                                  Final result               Light Blue Top[[id number]]                                   Final result                 Please view results for these tests on the individual orders.    Gold Top - SST [[id number]] Collected: 03/04/23 1958    Specimen: Blood Updated: 03/04/23 2100     Extra Tube Hold for add-ons.     Comment: Auto resulted.       Green Top (Gel) [[id number]] Collected: 03/04/23 1958    Specimen: Blood Updated: 03/04/23 2100     Extra Tube Hold for add-ons.     Comment: Auto resulted.       Light Blue Top [[id number]] Collected: 03/04/23 1958    Specimen: Blood Updated: 03/04/23 2100     Extra Tube Hold for add-ons.     Comment: Auto resulted       Comprehensive Metabolic Panel [[id number]]  (Abnormal) Collected: 03/04/23 1958    Specimen: Blood Updated: 03/04/23 2028     Glucose 107 mg/dL      BUN 16 mg/dL      Creatinine 1.23 mg/dL      Sodium 139 mmol/L      Potassium 3.8 mmol/L      Chloride 103 mmol/L      CO2 23.0 mmol/L      Calcium 9.3 mg/dL      Total Protein 6.7 g/dL      Albumin 4.1 g/dL      ALT (SGPT) 449 U/L      AST (SGOT) 218 U/L      Alkaline Phosphatase 392 U/L      Total Bilirubin 2.1 mg/dL      Globulin 2.6 gm/dL      A/G Ratio 1.6 g/dL      BUN/Creatinine Ratio 13.0     Anion Gap 13.0 mmol/L      eGFR 54.7 mL/min/1.73     Narrative:      GFR Normal >60  Chronic Kidney Disease <60  Kidney Failure <15      Lipase [[id number]]  (Abnormal) Collected: 03/04/23 1958    Specimen: Blood Updated: 03/04/23 2028     Lipase 95 U/L     Lactic Acid, Plasma [[id number]]  (Normal) Collected: 03/04/23 1952    Specimen: Blood Updated: 03/04/23 2007     Lactate 0.8 mmol/L     CBC & Differential [[id number]]  (Normal) Collected: 03/04/23 1958    Specimen: Blood from Arm, Right  Updated: 03/04/23 2002    Narrative:      The following orders were created for panel order CBC & Differential.  Procedure                               Abnormality         Status                     ---------                               -----------         ------                     CBC Auto Differential[[id number]]        Normal              Final result                 Please view results for these tests on the individual orders.    CBC Auto Differential [[id number]]  (Normal) Collected: 03/04/23 1958    Specimen: Blood from Arm, Right Updated: 03/04/23 2002     WBC 4.78 10*3/mm3      RBC 4.18 10*6/mm3      Hemoglobin 12.2 g/dL      Hematocrit 37.2 %      MCV 89.0 fL      MCH 29.2 pg      MCHC 32.8 g/dL      RDW 13.2 %      RDW-SD 43.0 fl      MPV 11.9 fL      Platelets 196 10*3/mm3      Neutrophil % 58.8 %      Lymphocyte % 24.3 %      Monocyte % 10.7 %      Eosinophil % 5.4 %      Basophil % 0.6 %      Immature Grans % 0.2 %      Neutrophils, Absolute 2.81 10*3/mm3      Lymphocytes, Absolute 1.16 10*3/mm3      Monocytes, Absolute 0.51 10*3/mm3      Eosinophils, Absolute 0.26 10*3/mm3      Basophils, Absolute 0.03 10*3/mm3      Immature Grans, Absolute 0.01 10*3/mm3      nRBC 0.0 /100 WBC     Urine Drug Screen - Urine, Clean Catch [[id number]]  (Abnormal) Collected: 03/04/23 1928    Specimen: Urine, Clean Catch Updated: 03/04/23 1955     THC, Screen, Urine Negative     Phencyclidine (PCP), Urine Negative     Cocaine Screen, Urine Negative     Methamphetamine, Ur Negative     Opiate Screen Negative     Amphetamine Screen, Urine Negative     Benzodiazepine Screen, Urine Positive     Tricyclic Antidepressants Screen Negative     Methadone Screen, Urine Negative     Barbiturates Screen, Urine Negative     Oxycodone Screen, Urine Negative     Propoxyphene Screen Negative     Buprenorphine, Screen, Urine Negative    Narrative:      Cutoff For Drugs Screened:    Amphetamines               500 ng/ml  Barbiturates                200 ng/ml  Benzodiazepines            150 ng/ml  Cocaine                    150 ng/ml  Methadone                  200 ng/ml  Opiates                    100 ng/ml  Phencyclidine               25 ng/ml  THC                            50 ng/ml  Methamphetamine            500 ng/ml  Tricyclic Antidepressants  300 ng/ml  Oxycodone                  100 ng/ml  Propoxyphene               300 ng/ml  Buprenorphine               10 ng/ml    The normal value for all drugs tested is negative. This report includes unconfirmed screening results, with the cutoff values listed, to be used for medical treatment purposes only.  Unconfirmed results must not be used for non-medical purposes such as employment or legal testing.  Clinical consideration should be applied to any drug of abuse test, particularly when unconfirmed results are used.      Urinalysis, Microscopic Only - Urine, Clean Catch [[id number]]  (Abnormal) Collected: 03/04/23 1928    Specimen: Urine, Clean Catch Updated: 03/04/23 1953     RBC, UA 0-2 /HPF      WBC, UA Too Numerous to Count /HPF      Bacteria, UA 2+ /HPF      Squamous Epithelial Cells, UA 31-50 /HPF      Yeast, UA Small/1+ Budding Yeast /HPF      Hyaline Casts, UA None Seen /LPF      Methodology Manual Light Microscopy    Urinalysis With Microscopic If Indicated (No Culture) - Urine, Clean Catch [[id number]]  (Abnormal) Collected: 03/04/23 1928    Specimen: Urine, Clean Catch Updated: 03/04/23 1941     Color, UA Dark Yellow     Appearance, UA Cloudy     pH, UA <=5.0     Specific Gravity, UA 1.029     Glucose, UA Negative     Ketones, UA Trace     Bilirubin, UA Moderate (2+)     Blood, UA Trace     Protein, UA 30 mg/dL (1+)     Leuk Esterase, UA Moderate (2+)     Nitrite, UA Positive     Urobilinogen, UA 1.0 E.U./dL    Pregnancy, Urine - Urine, Clean Catch [[id number]]  (Normal) Collected: 03/04/23 1928    Specimen: Urine, Clean Catch Updated: 03/04/23 1939     HCG, Urine QL Negative        Imaging  Results (Last 24 Hours)     Procedure Component Value Units Date/Time    US Liver [[id number]] Collected: 03/04/23 2104     Updated: 03/04/23 2248    Narrative:      US LIVER  RPID: US LIVER    CLINICAL HISTORY:  47 years Female, elevated liver enzymes    COMPARISON:  CT on this date    TECHNIQUE:  Grayscale and color Doppler flow imaging was performed, with  spectral Doppler if needed    FINDINGS:    Multiple small stones are present in the gallbladder lumen. The  gallbladder wall is 4 mm which is slightly thickened. There is no  surrounding pericholecystic fluid. The common bile duct is 8.4 mm  which is slightly dilated.    No masses observed within the liver. The gallbladder is reported  as tender when directly scanned.    The pancreas is obscured. The aorta is obscured..    No masses seen within the hepatic parenchyma. The liver  echogenicity is elevated, this could be seen with steatosis.            Impression:        1. Gallstones with thickening of the gallbladder wall and  tenderness of the gallbladder reported when directly scanned.  Findings are suspicious for cholecystitis.  2. 8.4 mm common bile duct correlate for the potential  choledocholithiasis.    Electronically signed by:  Jaziel Benjamin MD  3/4/2023 10:46 PM [facility]  Workstation: [id number]    CT Abdomen Pelvis Without Contrast [[id number]] Collected: 03/04/23 1928     Updated: 03/04/23 2036    Narrative:      EXAM:  CT Abdomen and Pelvis Without Intravenous Contrast    CLINICAL HISTORY:  rt flank pain    TECHNIQUE:  Axial computed tomography images of the abdomen and pelvis  without intravenous contrast.  Sagittal and coronal reformatted  images were created and reviewed.  This CT exam was performed  using one or more of the following dose reduction techniques:   automated exposure control, adjustment of the mA and/or kV  according to patient size, and/or use of iterative reconstruction  technique.    COMPARISON:  CT Abdomen Pelvis dated  9/21/2022    FINDINGS:  Lung bases:  Unremarkable.  No mass.  No consolidation.    ABDOMEN:  Liver:  Unremarkable.  Gallbladder and bile ducts:  Unremarkable.  No calcified stones.   No ductal dilation.  Pancreas:  Unremarkable.  No ductal dilation.  Spleen:  Unremarkable.  No splenomegaly.  Adrenals:  Unremarkable.  No mass.  Kidneys and ureters:  Lobulated renal contour.  Bilateral renal  calculi.  No hydronephrosis.  Stomach and bowel:  Moderate stool.  No bowel obstruction.  No  appreciable mucosal thickening.    PELVIS:  Appendix:  Normal caliber appendix.  No findings to suggest acute  appendicitis.  Bladder:  The urinary bladder is decompressed.  No stones.  Reproductive:  Unremarkable as visualized.    ABDOMEN and PELVIS:  Intraperitoneal space:  Unremarkable.  No free air.  No  significant fluid collection.  Bones/joints:  Multilevel spondylosis.  No acute fracture.  No  dislocation.  Soft tissues:  Tiny fat-containing umbilical hernia.  Vasculature:  Unremarkable.  No abdominal aortic aneurysm.  Lymph nodes:  Unremarkable.  No enlarged lymph nodes.      Impression:      1.  Bilateral nonobstructive renal calculi.  2.  Other findings as above.    Electronically signed by:  Ryder Marino MD  3/4/2023 8:34 PM CST  Workstation: [id number]        Echocardiogram in December 2022 showed:  •  Left ventricular systolic function is normal. Left ventricular ejection fraction appears to be 56 - 60%.  •  Left ventricular diastolic function was normal.  •  Saline test results are negative for right to left atrial level shunt at baseline state. Valsalva maneuver was not performed.  •  Estimated right ventricular systolic pressure from tricuspid regurgitation is normal (<35 mmHg).  •  The right ventricle appears dilated in limited views.  •  There is no evidence of pericardial effusion.      Assessment:    There are no active hospital problems to display for this patient.      # Acute cholecystitis  # Potential  "choledocholithiasis  # Potential gallstone pancreatitis considering mild elevated lipase level and elevated transaminase   # Dehydration   # Mild elevated lipase level   # Elevated transaminase and bilirubin   # Potential urinary tract infection history of ESBL positive urine culture  # History of seizure   # History of CVA   # Potential chronic kidney disease a stage III       Treatment plan:  I discussed the care with patient surgeon Dr. Jimenes and ED attending Dr. Grande.  I reviewed independently laboratory work-up and imaging studies  Obtain further laboratory work-up Includin urine culture, hemoglobin A1c level TSH level.  Hepatitis panel was negative  We will follow CBC CMP amylase and lipase level  Depending on results of her follow-up liver function tests and bilirubin level she may require obtaining MRCP  Maintain n.p.o.  Maintain on IV fluid lactated Ringer  Give IV fluid bolus lactated Ringer  Place on antibiotic Invanz for coverage of intra-abdominal infection (cholecystitis), and pending culture results.  Invanz will  cover anaerobic bacteria therefore I doubt any need for Flagyl use at this time.  Patient has reported history of allergy to penicillin.  On questioning of the patient about penicillin allergy she reports penicillin may give her nausea (\"make me sick\").,  Per other record penicillin give her hives nausea and vomiting  She will be on IV fluid, antiemetic PPI and analgesics  She will be on Keppra IV while n.p.o. seizure precaution will be initiated.  Comorbidities, chronic medical problems will be treated appropriately  Reconcile home medication and continue with essential home medication upon initiation of p.o. intake at this time patient is n.p.o. status.  Upon restart of home medication we will continue with her aspirin for her history of CVA and if her liver function test significantly improved her statin.  Hold her Lasix for now.   DVT and GI prophylaxis will be initiated  Please see " orders for comprehensive plan        Medical Decision Making  Number and Complexity of problems: Multiple acute complex medical problems as above  Differential Diagnosis: Entertained considered and reflected in orders    Conditions and Status:        Condition is worsening.     [facility] Data  External documents reviewed: Prior records reviewed.  My EKG interpretation: No EKG on current admission.  Last EKG from 12/24/2022 reviewed  My CT interpretation: Results of CT of the abdomen and pelvis and ultrasound reviewed.  Tests considered but not ordered:      Decision rules/scores evaluated (example [id number], Wells, etc):     Discussed with: ED attending Dr. Grande, surgical service Dr. Jimenes, and patient   I have utilized all available immediate resources to obtain, update, or review the patient's current medications (including all prescriptions, over-the-counter products, herbals, cannabis/cannabidiol products, and vitamin/mineral/dietary (nutritional) supplements).          Care Planning  Shared decision making: Patient ED attending and surgical service  Code status and discussions: Full code    Disposition  Social Determinants of Health that impact treatment or disposition:   I expect the patient to be discharged to home in 3-4 days.          I confirmed that the patient's Advance Care Plan is present, code status is documented, or surrogate decision maker is listed in the patient's medical record.     I have utilized all available immediate resources to obtain, update, or review the patient's current medications.     I discussed the patient's findings and my recommendations with: Patient and she agreed with above plan of care.      Saeid Behroozi, MD   03/04/23   23:51 CST

## 2023-03-05 NOTE — OP NOTE
Operative Note    Myles Shannon  3/5/2023    Pre-op Diagnosis:   Acute cholecystitis [K81.0]    Post-op Diagnosis:     Post-Op Diagnosis Codes:     * Acute cholecystitis [K81.0]    Procedure/CPT® Codes:      Procedure(s):  CHOLECYSTECTOMY LAPAROSCOPIC INTRAOPERATIVE CHOLANGIOGRAM AND LAPAROSCOPIC COMMON BILE DUCT    Surgeon(s):  Jonn Jimenes MD    Anesthesia: General    Staff:   Circulator: Dayana Melendez RN  Scrub Person: Yazmin White  Assistant: Erma Hauser    Estimated Blood Loss: minimal    Specimens:                ID Type Source Tests Collected by Time   A (Not marked as sent) :  Tissue Gallbladder TISSUE EXAM, [facility] ([facility]) Jonn Jimenes MD 3/5/2023 1342         Drains:   External Urinary Catheter (Active)   Site Assessment Clean 03/05/23 0915   Application/Removal skin care provided;external catheter applied 03/05/23 0200   Collection Container Wall suction 03/05/23 0915   Wall suction (mmHG) 125 mmHG 03/05/23 0200   Securement Method Tape 03/05/23 0200   Catheter care complete Yes 03/05/23 0200   Output (mL) 100 mL 03/05/23 0500       [REMOVED] External Urinary Catheter (Removed)       Findings: choledocholithiasis    Complications: none    Indication: 48 yo woman with acute cholecystitis and choledocholithiasis who presents for laparoscopic cholecystectomy with transcystic common duct exploration.    Operative Note:    The patient was brought to the operating room and placed supine on the operating table. After adequate general endotracheal anesthesia, the abdominal area was prepped and draped in a sterile manner. A briefing and timeout were performed and all parties were in agreement.     A transverse incision was made in the midcalvicular line approximately 2cm caudally from the costal margin. A 5 mm XCEL trocar was uneventfully passed into the abdomen under direct vision with no visceral injury. The abdomen was insufflated to a total of 15 mmHg, 4.8 L of CO2. A 5 mm  laparoscope revealed an excellent view of the upper and lower abdominal areas. A longitudinal skin incision was made at the umbilicus and a 5 mm  XCEL trocar was placed under direct vision with no visceral injury. The camera was then moved to the umbilical port site and an excellent view of the upper abdomen was obtained. An incision was made transversely just to the left of the midline subxiphoid. A 11mm trocar was placed through this under direct visualization. A fourth final trocar was inserted in right upper quadrant at the tip of the 12th rib. The bed was then tilted in reverse Trendelenburg and tipped to the left. The patient tolerated the positioning and insufflation without difficulty.    The gallbladder was retracted upwards and outwards by grasping the top and the infundibulum of the gallbladder with atraumatic graspers passed through the lateral ports. The peritoneum around the infundibulum was taken down for a distance of 1/3 of the length of the gallbladder on the anterior and posterior sides. The cystic duct and artery easily came into view as did the 5th segment of the liver behind these structures.     A Greene clamp was placed across the infundibulum and a fluoroscopic cholangiogram was performed by piercing the infundibulum with a needle and injecting contrast. This demonstrated a solitary stone at the ampulla. This was attempted to be removed via a abraham catheter and wire basket, however we were unable to get it out. She did have emptying of contrast into the duodenum. The stone was still present at the end of the case.     The cholangiocath was removed and the cystic duct was doubly clipped and divided. The cystic artery was doubly clipped and divided in all branches. The gallbladder was then dissected out of the liver bed using electrocautery. Once it had been nearly completely excised, pressure in the abdomen was reduced to 8 mmHg with no further bleeding being noted from the liver bed. The  remainder of the gallbladder was dissected free.  It was placed into an Endobag and brought out intact through the epigastric port.     All areas were visualized for bleeding and bile leak. None was seen. The patient was then placed supine.  The epigastric trocar was removed and the fascia was closed with 0 Vicryl using an Endo Close device.  The remainder of the trocars were removed and the abdomen was allowed to flatten. All port sites were closed with 4-0 subcuticular on the skin.    The procedure was terminated. It was well tolerated. Sponge and needle counts were correct, and the patient was transferred to recovery in satisfactory condition.         Assistant: Erma Hauser was responsible for performing the following activities: Retraction, Suction, Irrigation, Suturing, Closing, Placing Dressing and Held/Positioned Camera and their skilled assistance was necessary for the success of this case.    Jonn Jimenes MD     Date: 3/5/2023  Time: 14:54 CST       Date: 3/5/2023  Time: 14:56 CST

## 2023-03-06 ENCOUNTER — ANESTHESIA (OUTPATIENT)
Dept: GASTROENTEROLOGY | Facility: HOSPITAL | Age: 48
DRG: 418 | End: 2023-03-06
Payer: COMMERCIAL

## 2023-03-06 ENCOUNTER — ANESTHESIA EVENT (OUTPATIENT)
Dept: GASTROENTEROLOGY | Facility: HOSPITAL | Age: 48
DRG: 418 | End: 2023-03-06
Payer: COMMERCIAL

## 2023-03-06 LAB
ALBUMIN SERPL-MCNC: 3.8 G/DL (ref 3.5–5.2)
ALBUMIN/GLOB SERPL: 1.5 G/DL
ALP SERPL-CCNC: 363 U/L (ref 39–117)
ALT SERPL W P-5'-P-CCNC: 366 U/L (ref 1–33)
ANION GAP SERPL CALCULATED.3IONS-SCNC: 11 MMOL/L (ref 5–15)
AST SERPL-CCNC: 223 U/L (ref 1–32)
BACTERIA SPEC AEROBE CULT: NORMAL
BILIRUB SERPL-MCNC: 0.9 MG/DL (ref 0–1.2)
BUN SERPL-MCNC: 14 MG/DL (ref 6–20)
BUN/CREAT SERPL: 15.4 (ref 7–25)
CALCIUM SPEC-SCNC: 9.2 MG/DL (ref 8.6–10.5)
CHLORIDE SERPL-SCNC: 105 MMOL/L (ref 98–107)
CO2 SERPL-SCNC: 23 MMOL/L (ref 22–29)
CREAT SERPL-MCNC: 0.91 MG/DL (ref 0.57–1)
EGFRCR SERPLBLD CKD-EPI 2021: 78.5 ML/MIN/1.73
GLOBULIN UR ELPH-MCNC: 2.5 GM/DL
GLUCOSE BLDC GLUCOMTR-MCNC: 91 MG/DL (ref 70–130)
GLUCOSE BLDC GLUCOMTR-MCNC: 93 MG/DL (ref 70–130)
GLUCOSE BLDC GLUCOMTR-MCNC: 94 MG/DL (ref 70–130)
GLUCOSE SERPL-MCNC: 87 MG/DL (ref 65–99)
POTASSIUM SERPL-SCNC: 4.5 MMOL/L (ref 3.5–5.2)
PROT SERPL-MCNC: 6.3 G/DL (ref 6–8.5)
SODIUM SERPL-SCNC: 139 MMOL/L (ref 136–145)
WHOLE BLOOD HOLD SPECIMEN: NORMAL

## 2023-03-06 PROCEDURE — 25010000002 HYDROMORPHONE 1 MG/ML SOLUTION: Performed by: INTERNAL MEDICINE

## 2023-03-06 PROCEDURE — 0 LEVETIRACETAM IN NACL 0.75% 1000 MG/100ML SOLUTION: Performed by: INTERNAL MEDICINE

## 2023-03-06 PROCEDURE — 0 LEVETIRACETAM IN NACL 0.75% 1000 MG/100ML SOLUTION: Performed by: STUDENT IN AN ORGANIZED HEALTH CARE EDUCATION/TRAINING PROGRAM

## 2023-03-06 PROCEDURE — 80053 COMPREHEN METABOLIC PANEL: CPT | Performed by: STUDENT IN AN ORGANIZED HEALTH CARE EDUCATION/TRAINING PROGRAM

## 2023-03-06 PROCEDURE — G0378 HOSPITAL OBSERVATION PER HR: HCPCS

## 2023-03-06 PROCEDURE — 99222 1ST HOSP IP/OBS MODERATE 55: CPT | Performed by: INTERNAL MEDICINE

## 2023-03-06 PROCEDURE — 25010000002 HYDROMORPHONE 1 MG/ML SOLUTION: Performed by: STUDENT IN AN ORGANIZED HEALTH CARE EDUCATION/TRAINING PROGRAM

## 2023-03-06 PROCEDURE — 82962 GLUCOSE BLOOD TEST: CPT

## 2023-03-06 RX ORDER — SODIUM CHLORIDE 9 MG/ML
40 INJECTION, SOLUTION INTRAVENOUS AS NEEDED
Status: CANCELLED | OUTPATIENT
Start: 2023-03-06

## 2023-03-06 RX ORDER — DEXTROSE AND SODIUM CHLORIDE 5; .45 G/100ML; G/100ML
30 INJECTION, SOLUTION INTRAVENOUS CONTINUOUS PRN
Status: CANCELLED | OUTPATIENT
Start: 2023-03-06

## 2023-03-06 RX ADMIN — SODIUM CHLORIDE, POTASSIUM CHLORIDE, SODIUM LACTATE AND CALCIUM CHLORIDE 100 ML/HR: 600; 310; 30; 20 INJECTION, SOLUTION INTRAVENOUS at 04:06

## 2023-03-06 RX ADMIN — HYDROMORPHONE HYDROCHLORIDE 0.25 MG: 1 INJECTION, SOLUTION INTRAMUSCULAR; INTRAVENOUS; SUBCUTANEOUS at 12:55

## 2023-03-06 RX ADMIN — HYDROMORPHONE HYDROCHLORIDE 0.25 MG: 1 INJECTION, SOLUTION INTRAMUSCULAR; INTRAVENOUS; SUBCUTANEOUS at 22:00

## 2023-03-06 RX ADMIN — HYDROMORPHONE HYDROCHLORIDE 0.25 MG: 1 INJECTION, SOLUTION INTRAMUSCULAR; INTRAVENOUS; SUBCUTANEOUS at 17:08

## 2023-03-06 RX ADMIN — LEVETIRACETAM 1000 MG: 10 INJECTION INTRAVENOUS at 21:59

## 2023-03-06 RX ADMIN — AZTREONAM 2 G: 2 INJECTION, POWDER, LYOPHILIZED, FOR SOLUTION INTRAMUSCULAR; INTRAVENOUS at 08:47

## 2023-03-06 RX ADMIN — HYDROMORPHONE HYDROCHLORIDE 0.25 MG: 1 INJECTION, SOLUTION INTRAMUSCULAR; INTRAVENOUS; SUBCUTANEOUS at 04:06

## 2023-03-06 RX ADMIN — PANTOPRAZOLE SODIUM 40 MG: 40 INJECTION, POWDER, FOR SOLUTION INTRAVENOUS at 21:59

## 2023-03-06 RX ADMIN — AZTREONAM 2 G: 2 INJECTION, POWDER, LYOPHILIZED, FOR SOLUTION INTRAMUSCULAR; INTRAVENOUS at 17:08

## 2023-03-06 RX ADMIN — PANTOPRAZOLE SODIUM 40 MG: 40 INJECTION, POWDER, FOR SOLUTION INTRAVENOUS at 08:32

## 2023-03-06 RX ADMIN — LEVETIRACETAM 1000 MG: 10 INJECTION INTRAVENOUS at 08:29

## 2023-03-06 RX ADMIN — Medication 10 ML: at 08:35

## 2023-03-06 NOTE — CONSULTS
SUBJECTIVE:   3/6/2023    Name: Myles Shannon  DOD: 1975    REASON FOR CONSULT: Choledocholithiasis    Chief Complaint:     Chief Complaint   Patient presents with   • Painful urination       Subjective     Patient is 47 y.o. female with past medical history of otitis media, allergic rhinitis, candidiasis, eustachian dysfunction, hypercholesterolemia, menorrhagia, otalgia, chronic renal failure, hypertension, recurrent UTIs, presents with right flank pain with nausea and vomiting.  She was noted to have acute cholecystitis and underwent cholecystectomy yesterday.  Intraoperative cholangiogram was consistent with CBD stone.  Patient has continued symptoms with right flank pain.  Nausea is well controlled.  Alk phos today is 363   and bilirubin is 0.9.     ROS/HISTORY/ CURRENT MEDICATIONS/OBJECTIVE/VS/PE:   Review of Systems:   Review of Systems   Constitutional: Negative for chills, fatigue, fever and unexpected weight change.   HENT: Negative for congestion, ear discharge, hearing loss, nosebleeds and sore throat.    Eyes: Negative for pain, discharge and redness.   Respiratory: Negative for cough, chest tightness, shortness of breath and wheezing.    Cardiovascular: Negative for chest pain and palpitations.   Gastrointestinal: Positive for abdominal pain, nausea and vomiting. Negative for abdominal distention, blood in stool, constipation and diarrhea.   Endocrine: Negative for cold intolerance, polydipsia, polyphagia and polyuria.   Genitourinary: Negative for dysuria, flank pain, frequency, hematuria and urgency.   Musculoskeletal: Negative for arthralgias, back pain, joint swelling and myalgias.   Skin: Negative for color change, pallor and rash.   Neurological: Negative for tremors, seizures, syncope, weakness and headaches.   Hematological: Negative for adenopathy. Does not bruise/bleed easily.   Psychiatric/Behavioral: Negative for behavioral problems, confusion, dysphoric mood,  hallucinations and suicidal ideas. The patient is not nervous/anxious.        History:     Past Medical History:   Diagnosis Date   • Abdominal pain     Chronic RLQ, RUQ, R flank comes and goes.    • Abdominal pain, right lower quadrant    • Acute bilateral otitis media    • Allergic rhinitis    • Backache     Upper back.   • Candidiasis of skin    • Cellulitis of neck    • Chest pain    • Contraception    • Cough    • Dysfunction of eustachian tube    • Dyspareunia, female    • Elevated cholesterol    • Excessive or frequent menstruation    • Flank pain    • Generalized aches and pains    • Headache    • Health maintenance examination    • Hypertension    • Infection of skin and subcutaneous tissue    • Irregular periods    • Kidney stone     Poss   • Menorrhagia    • Nausea vomiting and diarrhea    • Obesity    • Open wound of abdominal wall    • Otalgia    • Pain in left foot    • Pain in unspecified hand    • Removed Impacted cerumen 2012   • Rhinitis    • Seizure ([facility]) 08/15/2019   • Seizure ([facility])    • Shoulder pain     right-sided-likely muscle strain      • Shoulder tendinitis    • Spider bite wound    • Staphylococcal infection of skin    • Stroke ([facility])    • Swollen feet    • Tinea cruris    • Tobacco dependence syndrome    • Upper respiratory infection    • Urinary tract infectious disease    • Vertigo    • Visit for gynecologic examination    • Vulvovaginitis      Past Surgical History:   Procedure Laterality Date   •  SECTION     • DILATATION AND CURETTAGE      Secondary to incomplete spontaneous    • INCISION AND DRAINAGE ABSCESS  2012   • INJECTION OF MEDICATION  2015    Phenergan (1)     • INJECTION OF MEDICATION  10/10/2013    Toradol (2)      • INJECTION OF MEDICATION  2014    Zofran (1)        Family History   Problem Relation Age of Onset   • Breast cancer Mother    • Thyroid disease Mother    • Hypertension Mother    • Heart disease Mother    • Arthritis  Mother    • Liver disease Father    • Endometrial cancer Sister    • Anxiety disorder Brother    • Mental illness Daughter    • Depression Daughter    • Diabetes Maternal Aunt    • Diabetes Maternal Uncle    • Stroke Maternal Grandmother    • Breast cancer Maternal Grandmother    • Cancer Maternal Grandfather    • Stroke Paternal Grandmother    • Depression Other    • Cancer Other         Colorectal Cancer   • Endometrial cancer Other    • Lung cancer Other      Social History     Tobacco Use   • Smoking status: Every Day     Packs/day: 0.50     Types: Cigarettes   • Smokeless tobacco: Never   • Tobacco comments:     19*800*quit*NOW   Vaping Use   • Vaping Use: Some days   • Substances: Nicotine   • Devices: Refillable tank   • Passive vaping exposure: Yes   Substance Use Topics   • Alcohol use: No   • Drug use: No     Medications Prior to Admission   Medication Sig Dispense Refill Last Dose   • aspirin 325 MG tablet Take 1 tablet by mouth Daily.      • atorvastatin (LIPITOR) 80 MG tablet Take 1 tablet by mouth Every Night.      • busPIRone (BUSPAR) 15 MG tablet Take 1 tablet by mouth 3 (Three) Times a Day.      • butalbital-acetaminophen-caffeine (FIORICET, ESGIC) -40 MG per tablet Take 1 tablet by mouth Daily As Needed for Headache.      • cloNIDine (CATAPRES) 0.1 MG tablet Take 1 tablet by mouth Every 30 (Thirty) Minutes As Needed for High Blood Pressure. Give one tap po every 30 mins when BP is over 160      • diclofenac (VOLTAREN) 75 MG EC tablet 1 tablet 2 (Two) Times a Day.      • escitalopram (LEXAPRO) 10 MG tablet Take 2 tablets by mouth Daily.      • fluticasone (FLONASE) 50 MCG/ACT nasal spray 2 sprays into the nostril(s) as directed by provider Daily.      • furosemide (LASIX) 20 MG tablet Take 1 tablet by mouth Daily.      • hydrOXYzine pamoate (VISTARIL) 50 MG capsule Take 1 capsule by mouth 2 (Two) Times a Day As Needed.      • ketorolac (TORADOL) 10 MG tablet Take 1 tablet by mouth Every 6  (Six) Hours As Needed for Moderate Pain.      • levETIRAcetam (KEPPRA) 1000 MG tablet Take 1 tablet by mouth 2 (Two) Times a Day.      • losartan (COZAAR) 50 MG tablet Take 1 tablet by mouth Daily.      • meclizine (ANTIVERT) 12.5 MG tablet Take 1 tablet by mouth 3 (Three) Times a Day As Needed for Dizziness.      • melatonin 5 MG tablet tablet Take 2 tablets by mouth every night at bedtime.      • metoclopramide (REGLAN) 5 MG tablet Take 1 tablet by mouth 3 (Three) Times a Day As Needed (Nausea). 9 tablet 0    • nabumetone (RELAFEN) 750 MG tablet Take 1 tablet by mouth 2 (Two) Times a Day As Needed for Mild Pain. 6 tablet 0    • ondansetron ODT (ZOFRAN-ODT) 4 MG disintegrating tablet Place 1 tablet on the tongue 4 (Four) Times a Day As Needed for Nausea or Vomiting. 12 tablet 0    • orphenadrine (NORFLEX) 100 MG 12 hr tablet Take 1 tablet by mouth 2 (Two) Times a Day As Needed for Muscle Spasms or Mild Pain . 10 tablet 0    • pantoprazole (Protonix) 40 MG EC tablet Take 1 tablet by mouth Daily. 30 tablet 0    • potassium chloride (K-DUR,KLOR-CON) 10 MEQ CR tablet Take 1 tablet by mouth Daily.      • promethazine (PHENERGAN) 25 MG suppository Insert 1 suppository into the rectum Every 6 (Six) Hours As Needed for Nausea or Vomiting. 12 suppository 0    • traZODone (DESYREL) 50 MG tablet Take 1 tablet by mouth Every Night.      • acetaminophen (TYLENOL) 500 MG tablet Take 2 tablets by mouth Every 8 (Eight) Hours As Needed for Mild Pain.      • escitalopram (LEXAPRO) 20 MG tablet Take 1.5 tablets by mouth Daily. 30 mg      • solifenacin (VESICARE) 10 MG tablet Take 1 tablet by mouth Daily.        Allergies:  Abilify [aripiprazole], Buspirone, and Penicillins    I have reviewed the patient's medical history, surgical history and family history in the available medical record system.     Current Medications:     Current Facility-Administered Medications   Medication Dose Route Frequency Provider Last Rate Last Admin   •  aztreonam (AZACTAM) 2 g/100 mL 0.9% NS (mbp)  2 g Intravenous Q8H Jonn Jimenes MD 0 mL/hr at 03/06/23 0541 Currently Infusing at 03/06/23 1122   • HYDROmorphone (DILAUDID) injection 0.25 mg  0.25 mg Intravenous Q4H PRN Jonn Jimenes MD   0.25 mg at 03/06/23 1255   • lactated ringers infusion  100 mL/hr Intravenous Continuous Jonn Jimenes  mL/hr at 03/06/23 1257 100 mL/hr at 03/06/23 1257   • levETIRAcetam in NaCl 0.75% (KEPPRA) IVPB 1,000 mg  1,000 mg Intravenous Q12H Jonn Jimenes MD   Stopped at 03/06/23 1122   • ondansetron (ZOFRAN) injection 4 mg  4 mg Intravenous Q6H PRN Erick Chisholm MD       • pantoprazole (PROTONIX) injection 40 mg  40 mg Intravenous Q12H Jonn Jimenes MD   40 mg at 03/06/23 0832   • sodium chloride 0.9 % flush 10 mL  10 mL Intravenous PRN Jonn Jimenes MD       • sodium chloride 0.9 % flush 10 mL  10 mL Intravenous Q12H Jonn Jimenes MD   10 mL at 03/06/23 0835   • sodium chloride 0.9 % flush 10 mL  10 mL Intravenous PRN Jonn Jimenes MD       • sodium chloride 0.9 % infusion 40 mL  40 mL Intravenous PRN Jonn Jimenes MD           Objective     Physical Exam:   Temp:  [98 °F (36.7 °C)-98.4 °F (36.9 °C)] 98 °F (36.7 °C)  Heart Rate:  [48-77] 55  Resp:  [16-20] 18  BP: (116-163)/(59-90) 121/69    Physical Exam:  General Appearance:    Alert, cooperative, in no acute distress   Head:    Normocephalic, without obvious abnormality, atraumatic   Eyes:            Lids and lashes normal, conjunctivae and sclerae normal, no   icterus, no pallor, corneas clear, PERRLA   Ears:    Ears appear intact with no abnormalities noted   Throat:   No oral lesions, no thrush, oral mucosa moist   Neck:   No adenopathy, supple, trachea midline, no thyromegaly, no     carotid bruit, no JVD   Back:     No kyphosis present, no scoliosis present, no skin lesions,       erythema or scars, no tenderness to percussion or                   palpation,   range of motion normal    Lungs:     Clear to auscultation,respirations regular, even and                   unlabored    Heart:    Regular rhythm and normal rate, normal S1 and S2, no            murmur, no gallop, no rub, no click   Breast Exam:    Deferred   Abdomen:     Normal bowel sounds, no masses, no organomegaly, soft        tender, nondistended, no guarding, no rebound                 tenderness   Genitalia:    Deferred   Extremities:   Moves all extremities well, no edema, no cyanosis, no              redness   Pulses:   Pulses palpable and equal bilaterally   Skin:   No bleeding, bruising or rash   Lymph nodes:   No palpable adenopathy   Neurologic:   Cranial nerves 2 - 12 grossly intact, sensation intact, DTR        present and equal bilaterally      Results Review:     Lab Results   Component Value Date    WBC 4.84 03/05/2023    WBC 4.78 03/04/2023    WBC 5.43 01/19/2023    HGB 11.4 (L) 03/05/2023    HGB 12.2 03/04/2023    HGB 13.6 01/19/2023    HCT 33.8 (L) 03/05/2023    HCT 37.2 03/04/2023    HCT 40.7 01/19/2023     03/05/2023     03/04/2023     01/19/2023     Results from last 7 days   Lab Units 03/06/23  0707 03/05/23  0546 03/04/23 1958   ALK PHOS U/L 363* 344* 392*   ALT (SGPT) U/L 366* 374* 449*   AST (SGOT) U/L 223* 198* 218*     Results from last 7 days   Lab Units 03/06/23  0707 03/05/23  0546 03/04/23 1958   BILIRUBIN mg/dL 0.9 1.7* 2.1*   ALK PHOS U/L 363* 344* 392*     Lipase   Date Value Ref Range Status   03/05/2023 72 (H) 13 - 60 U/L Final   03/04/2023 95 (H) 13 - 60 U/L Final     Lab Results   Component Value Date    INR 0.98 03/05/2023    INR 1.06 12/10/2022    INR 0.99 10/16/2022         Radiology Review:  Imaging Results (Last 72 Hours)     Procedure Component Value Units Date/Time    FL C Arm During Surgery [[id number]] Resulted: 03/06/23 0754     Updated: 03/06/23 0754    US Liver [[id number]] Collected: 03/04/23 2104     Updated: 03/04/23 2248    Narrative:      US LIVER  RPID: US  LIVER    CLINICAL HISTORY:  47 years Female, elevated liver enzymes    COMPARISON:  CT on this date    TECHNIQUE:  Grayscale and color Doppler flow imaging was performed, with  spectral Doppler if needed    FINDINGS:    Multiple small stones are present in the gallbladder lumen. The  gallbladder wall is 4 mm which is slightly thickened. There is no  surrounding pericholecystic fluid. The common bile duct is 8.4 mm  which is slightly dilated.    No masses observed within the liver. The gallbladder is reported  as tender when directly scanned.    The pancreas is obscured. The aorta is obscured..    No masses seen within the hepatic parenchyma. The liver  echogenicity is elevated, this could be seen with steatosis.            Impression:        1. Gallstones with thickening of the gallbladder wall and  tenderness of the gallbladder reported when directly scanned.  Findings are suspicious for cholecystitis.  2. 8.4 mm common bile duct correlate for the potential  choledocholithiasis.    Electronically signed by:  Jaziel Benjamin MD  3/4/2023 10:46 PM CST  Workstation: [id number]    CT Abdomen Pelvis Without Contrast [[id number]] Collected: 03/04/23 1928     Updated: 03/04/23 2036    Narrative:      EXAM:  CT Abdomen and Pelvis Without Intravenous Contrast    CLINICAL HISTORY:  rt flank pain    TECHNIQUE:  Axial computed tomography images of the abdomen and pelvis  without intravenous contrast.  Sagittal and coronal reformatted  images were created and reviewed.  This CT exam was performed  using one or more of the following dose reduction techniques:   automated exposure control, adjustment of the mA and/or kV  according to patient size, and/or use of iterative reconstruction  technique.    COMPARISON:  CT Abdomen Pelvis dated 9/21/2022    FINDINGS:  Lung bases:  Unremarkable.  No mass.  No consolidation.    ABDOMEN:  Liver:  Unremarkable.  Gallbladder and bile ducts:  Unremarkable.  No calcified stones.   No ductal  dilation.  Pancreas:  Unremarkable.  No ductal dilation.  Spleen:  Unremarkable.  No splenomegaly.  Adrenals:  Unremarkable.  No mass.  Kidneys and ureters:  Lobulated renal contour.  Bilateral renal  calculi.  No hydronephrosis.  Stomach and bowel:  Moderate stool.  No bowel obstruction.  No  appreciable mucosal thickening.    PELVIS:  Appendix:  Normal caliber appendix.  No findings to suggest acute  appendicitis.  Bladder:  The urinary bladder is decompressed.  No stones.  Reproductive:  Unremarkable as visualized.    ABDOMEN and PELVIS:  Intraperitoneal space:  Unremarkable.  No free air.  No  significant fluid collection.  Bones/joints:  Multilevel spondylosis.  No acute fracture.  No  dislocation.  Soft tissues:  Tiny fat-containing umbilical hernia.  Vasculature:  Unremarkable.  No abdominal aortic aneurysm.  Lymph nodes:  Unremarkable.  No enlarged lymph nodes.      Impression:      1.  Bilateral nonobstructive renal calculi.  2.  Other findings as above.    Electronically signed by:  Ryder Marino MD  3/4/2023 8:34 PM CST  Workstation: [id number]          I reviewed the patient's new clinical results.    I reviewed the patient's new imaging results and agree with the interpretation.     ASSESSMENT/PLAN:   ASSESSMENT: 1.  Right-sided abdominal pain with nausea and vomiting, likely due to choledocholithiasis with cholecystitis.  Patient underwent cholecystectomy yesterday.  2.  Elevated liver enzymes, likely due to choledocholithiasis.  3.  History of recurrent UTIs  4.  Chronic renal failure  PLAN: 1.  Continue antibiotics, antiemetics, pain management and current supportive care  2.  Follow LFTs closely and avoid hypotension hepatotoxic medications  3.  Proceed with ERCP today for further evaluation management  The risks, benefits, and alternatives of this procedure have been discussed with the patient or the responsible party. The patient understands and agrees to proceed.         Venkat Robb,  MD  03/06/23  13:38 CST

## 2023-03-06 NOTE — PROGRESS NOTES
[facility] Medicine Services  INPATIENT PROGRESS NOTE    Length of Stay: 0  Date of Admission: 3/4/2023  Primary Care Physician: Sonya Crooks APRN    Subjective   (S) Admitted for right flank pain and no abdominal tenderness; US positive for Gifford positive and possible choledocholithiasis  Lap cholecystectomy done yesterday and a solitary stone on the ampulla unable to extract it; discussed today with Dr Robb  Still with abdominal pain in the RUQ    Review of Systems   All other systems reviewed and are negative.       All pertinent negatives and positives are as above. All other systems have been reviewed and are negative unless otherwise stated.     Prior to Admission medications    Medication Sig Start Date End Date Taking? Authorizing Provider   aspirin 325 MG tablet Take 1 tablet by mouth Daily.   Yes Radha Zaragoza MD   atorvastatin (LIPITOR) 80 MG tablet Take 1 tablet by mouth Every Night. 12/18/21  Yes Juan Conroy MD   busPIRone (BUSPAR) 15 MG tablet Take 1 tablet by mouth 3 (Three) Times a Day.   Yes ProviderRadha MD   butalbital-acetaminophen-caffeine (FIORICET, ESGIC) -40 MG per tablet Take 1 tablet by mouth Daily As Needed for Headache.   Yes ProviderRadha MD   cloNIDine (CATAPRES) 0.1 MG tablet Take 1 tablet by mouth Every 30 (Thirty) Minutes As Needed for High Blood Pressure. Give one tap po every 30 mins when BP is over 160   Yes ProviderRadha MD   diclofenac (VOLTAREN) 75 MG EC tablet 1 tablet 2 (Two) Times a Day. 10/1/22  Yes Radha Zaragoza MD   escitalopram (LEXAPRO) 10 MG tablet Take 2 tablets by mouth Daily. 10/1/22  Yes Radha Zaragoza MD   fluticasone (FLONASE) 50 MCG/ACT nasal spray 2 sprays into the nostril(s) as directed by provider Daily.   Yes Radha Zaragoza MD   furosemide (LASIX) 20 MG tablet Take 1 tablet by mouth Daily.   Yes Radha Zaragoza MD   hydrOXYzine pamoate  (VISTARIL) 50 MG capsule Take 1 capsule by mouth 2 (Two) Times a Day As Needed. 4/5/22  Yes Sonya Crooks APRN   ketorolac (TORADOL) 10 MG tablet Take 1 tablet by mouth Every 6 (Six) Hours As Needed for Moderate Pain.   Yes ProviderRadha MD   levETIRAcetam (KEPPRA) 1000 MG tablet Take 1 tablet by mouth 2 (Two) Times a Day.   Yes ProviderRadha MD   losartan (COZAAR) 50 MG tablet Take 1 tablet by mouth Daily.   Yes ProviderRadha MD   meclizine (ANTIVERT) 12.5 MG tablet Take 1 tablet by mouth 3 (Three) Times a Day As Needed for Dizziness. 4/19/22  Yes Sonya Crooks APRN   melatonin 5 MG tablet tablet Take 2 tablets by mouth every night at bedtime.   Yes ProviderRadha MD   metoclopramide (REGLAN) 5 MG tablet Take 1 tablet by mouth 3 (Three) Times a Day As Needed (Nausea). 1/19/23  Yes Juvenal Chand MD   nabumetone (RELAFEN) 750 MG tablet Take 1 tablet by mouth 2 (Two) Times a Day As Needed for Mild Pain. 11/24/22  Yes Juvenal Chand MD   ondansetron ODT (ZOFRAN-ODT) 4 MG disintegrating tablet Place 1 tablet on the tongue 4 (Four) Times a Day As Needed for Nausea or Vomiting. 1/19/23  Yes Juvenal Chand MD   orphenadrine (NORFLEX) 100 MG 12 hr tablet Take 1 tablet by mouth 2 (Two) Times a Day As Needed for Muscle Spasms or Mild Pain . 4/13/22  Yes Nanette Mai APRN   pantoprazole (Protonix) 40 MG EC tablet Take 1 tablet by mouth Daily. 12/22/21  Yes Buzz Vasquez APRN   potassium chloride (K-DUR,KLOR-CON) 10 MEQ CR tablet Take 1 tablet by mouth Daily. 3/1/22  Yes Sonya Crooks APRN   promethazine (PHENERGAN) 25 MG suppository Insert 1 suppository into the rectum Every 6 (Six) Hours As Needed for Nausea or Vomiting. 1/19/23  Yes Juvenal Chand MD   traZODone (DESYREL) 50 MG tablet Take 1 tablet by mouth Every Night.   Yes Provider, MD Radha   medroxyPROGESTERone (DEPO-PROVERA) 150 MG/ML injection Inject 1 mL into the appropriate  muscle as directed by prescriber Every 3 (Three) Months.  3/5/23 Yes Radha Zaragoza MD   acetaminophen (TYLENOL) 500 MG tablet Take 2 tablets by mouth Every 8 (Eight) Hours As Needed for Mild Pain.    Radha Zaragoza MD   escitalopram (LEXAPRO) 20 MG tablet Take 1.5 tablets by mouth Daily. 30 mg    Radha Zaragoza MD   solifenacin (VESICARE) 10 MG tablet Take 1 tablet by mouth Daily.    Radha Zaragoza MD       aztreonam, 2 g, Intravenous, Q8H  levETIRAcetam, 1,000 mg, Intravenous, Q12H  pantoprazole, 40 mg, Intravenous, Q12H  sodium chloride, 10 mL, Intravenous, Q12H      lactated ringers, 100 mL/hr, Last Rate: 100 mL/hr (03/06/23 0818)        Objective    Temp:  [98.2 °F (36.8 °C)-98.4 °F (36.9 °C)] 98.4 °F (36.9 °C)  Heart Rate:  [48-77] 65  Resp:  [16-20] 18  BP: (109-163)/(59-90) 130/63    Physical Exam  Constitutional:       Appearance: She is obese.   HENT:      Head: Normocephalic.      Nose: Nose normal.      Mouth/Throat:      Mouth: Mucous membranes are moist.   Eyes:      Extraocular Movements: Extraocular movements intact.   Cardiovascular:      Rate and Rhythm: Regular rhythm.      Heart sounds: Normal heart sounds.   Pulmonary:      Breath sounds: Normal breath sounds.   Abdominal:      Palpations: Abdomen is soft.      Tenderness: There is abdominal tenderness. There is guarding.   Musculoskeletal:         General: Normal range of motion.      Cervical back: Normal range of motion and neck supple.   Skin:     General: Skin is warm.   Neurological:      General: No focal deficit present.      Mental Status: She is alert. Mental status is at baseline.           Results Review:  I have reviewed the labs, radiology results, and diagnostic studies.    Laboratory Data:   Results from last 7 days   Lab Units 03/06/23  0707 03/05/23  0546 03/04/23  1958   SODIUM mmol/L 139 139 139   POTASSIUM mmol/L 4.5 4.1 3.8   CHLORIDE mmol/L 105 108* 103   CO2 mmol/L 23.0 21.0* 23.0   BUN mg/dL  14 13 16   CREATININE mg/dL 0.91 0.96 1.23*   GLUCOSE mg/dL 87 82 107*   CALCIUM mg/dL 9.2 8.7 9.3   BILIRUBIN mg/dL 0.9 1.7* 2.1*   ALK PHOS U/L 363* 344* 392*   ALT (SGPT) U/L 366* 374* 449*   AST (SGOT) U/L 223* 198* 218*   ANION GAP mmol/L 11.0 10.0 13.0     Estimated Creatinine Clearance: 86.1 mL/min (by C-G formula based on SCr of 0.91 mg/dL).  Results from last 7 days   Lab Units 03/05/23  0546   MAGNESIUM mg/dL 2.3         Results from last 7 days   Lab Units 03/05/23 0546 03/04/23 1958   WBC 10*3/mm3 4.84 4.78   HEMOGLOBIN g/dL 11.4* 12.2   HEMATOCRIT % 33.8* 37.2   PLATELETS 10*3/mm3 168 196     Results from last 7 days   Lab Units 03/05/23  0546   INR  0.98       Culture Data:   No results found for: BLOODCX  No results found for: URINECX  No results found for: RESPCX  No results found for: WOUNDCX  No results found for: STOOLCX  No components found for: BODYFLD    Radiology Data:   Imaging Results (Last 24 Hours)     Procedure Component Value Units Date/Time    FL C Arm During Surgery [[id number]] Resulted: 03/06/23 0754     Updated: 03/06/23 0754          I have reviewed the patient's current medications.     Assessment/Plan   Acute cholecystitis     With acute choledocholithiasis as shown intra op cholangiogram done by Dr Jimenes yesterday; liver enzymes trending up; discussed with Dr Robb for ERCP today     On aztreonam     Chronic medical management     Hx of CVA, ESBL      Seizure disorder     CKD stage III     Left kidney stones    Medical Decision Making  Number and Complexity of problems: two major  Differential Diagnosis: kidney stone    Conditions and Status:        Condition is unchanged.     [facility] Data  External documents reviewed: previous medical records  My EKG interpretation:   My plain film interpretation: left kidney stones      Decision rules/scores evaluated (example [id number], Wells, etc): n/a     Discussed with: patient     Treatment Plan   Will be taken to ERCP    Care  Planning  Shared decision making: patient  Code status and discussions: full    Disposition  Social Determinants of Health that impact treatment or disposition: none  I expect the patient to be discharged to home in 1 to 2 days.     I confirmed that the patient's Advance Care Plan is present, code status is documented, or surrogate decision maker is listed in the patient's medical record.     Erick Chisholm MD

## 2023-03-06 NOTE — H&P (VIEW-ONLY)
SUBJECTIVE:   3/6/2023    Name: Myles Shannon  DOD: 1975    REASON FOR CONSULT: Choledocholithiasis    Chief Complaint:     Chief Complaint   Patient presents with   • Painful urination       Subjective     Patient is 47 y.o. female with past medical history of otitis media, allergic rhinitis, candidiasis, eustachian dysfunction, hypercholesterolemia, menorrhagia, otalgia, chronic renal failure, hypertension, recurrent UTIs, presents with right flank pain with nausea and vomiting.  She was noted to have acute cholecystitis and underwent cholecystectomy yesterday.  Intraoperative cholangiogram was consistent with CBD stone.  Patient has continued symptoms with right flank pain.  Nausea is well controlled.  Alk phos today is 363   and bilirubin is 0.9.     ROS/HISTORY/ CURRENT MEDICATIONS/OBJECTIVE/VS/PE:   Review of Systems:   Review of Systems   Constitutional: Negative for chills, fatigue, fever and unexpected weight change.   HENT: Negative for congestion, ear discharge, hearing loss, nosebleeds and sore throat.    Eyes: Negative for pain, discharge and redness.   Respiratory: Negative for cough, chest tightness, shortness of breath and wheezing.    Cardiovascular: Negative for chest pain and palpitations.   Gastrointestinal: Positive for abdominal pain, nausea and vomiting. Negative for abdominal distention, blood in stool, constipation and diarrhea.   Endocrine: Negative for cold intolerance, polydipsia, polyphagia and polyuria.   Genitourinary: Negative for dysuria, flank pain, frequency, hematuria and urgency.   Musculoskeletal: Negative for arthralgias, back pain, joint swelling and myalgias.   Skin: Negative for color change, pallor and rash.   Neurological: Negative for tremors, seizures, syncope, weakness and headaches.   Hematological: Negative for adenopathy. Does not bruise/bleed easily.   Psychiatric/Behavioral: Negative for behavioral problems, confusion, dysphoric mood,  hallucinations and suicidal ideas. The patient is not nervous/anxious.        History:     Past Medical History:   Diagnosis Date   • Abdominal pain     Chronic RLQ, RUQ, R flank comes and goes.    • Abdominal pain, right lower quadrant    • Acute bilateral otitis media    • Allergic rhinitis    • Backache     Upper back.   • Candidiasis of skin    • Cellulitis of neck    • Chest pain    • Contraception    • Cough    • Dysfunction of eustachian tube    • Dyspareunia, female    • Elevated cholesterol    • Excessive or frequent menstruation    • Flank pain    • Generalized aches and pains    • Headache    • Health maintenance examination    • Hypertension    • Infection of skin and subcutaneous tissue    • Irregular periods    • Kidney stone     Poss   • Menorrhagia    • Nausea vomiting and diarrhea    • Obesity    • Open wound of abdominal wall    • Otalgia    • Pain in left foot    • Pain in unspecified hand    • Removed Impacted cerumen 2012   • Rhinitis    • Seizure ([facility]) 08/15/2019   • Seizure ([facility])    • Shoulder pain     right-sided-likely muscle strain      • Shoulder tendinitis    • Spider bite wound    • Staphylococcal infection of skin    • Stroke ([facility])    • Swollen feet    • Tinea cruris    • Tobacco dependence syndrome    • Upper respiratory infection    • Urinary tract infectious disease    • Vertigo    • Visit for gynecologic examination    • Vulvovaginitis      Past Surgical History:   Procedure Laterality Date   •  SECTION     • DILATATION AND CURETTAGE      Secondary to incomplete spontaneous    • INCISION AND DRAINAGE ABSCESS  2012   • INJECTION OF MEDICATION  2015    Phenergan (1)     • INJECTION OF MEDICATION  10/10/2013    Toradol (2)      • INJECTION OF MEDICATION  2014    Zofran (1)        Family History   Problem Relation Age of Onset   • Breast cancer Mother    • Thyroid disease Mother    • Hypertension Mother    • Heart disease Mother    • Arthritis  Mother    • Liver disease Father    • Endometrial cancer Sister    • Anxiety disorder Brother    • Mental illness Daughter    • Depression Daughter    • Diabetes Maternal Aunt    • Diabetes Maternal Uncle    • Stroke Maternal Grandmother    • Breast cancer Maternal Grandmother    • Cancer Maternal Grandfather    • Stroke Paternal Grandmother    • Depression Other    • Cancer Other         Colorectal Cancer   • Endometrial cancer Other    • Lung cancer Other      Social History     Tobacco Use   • Smoking status: Every Day     Packs/day: 0.50     Types: Cigarettes   • Smokeless tobacco: Never   • Tobacco comments:     19*800*quit*NOW   Vaping Use   • Vaping Use: Some days   • Substances: Nicotine   • Devices: Refillable tank   • Passive vaping exposure: Yes   Substance Use Topics   • Alcohol use: No   • Drug use: No     Medications Prior to Admission   Medication Sig Dispense Refill Last Dose   • aspirin 325 MG tablet Take 1 tablet by mouth Daily.      • atorvastatin (LIPITOR) 80 MG tablet Take 1 tablet by mouth Every Night.      • busPIRone (BUSPAR) 15 MG tablet Take 1 tablet by mouth 3 (Three) Times a Day.      • butalbital-acetaminophen-caffeine (FIORICET, ESGIC) -40 MG per tablet Take 1 tablet by mouth Daily As Needed for Headache.      • cloNIDine (CATAPRES) 0.1 MG tablet Take 1 tablet by mouth Every 30 (Thirty) Minutes As Needed for High Blood Pressure. Give one tap po every 30 mins when BP is over 160      • diclofenac (VOLTAREN) 75 MG EC tablet 1 tablet 2 (Two) Times a Day.      • escitalopram (LEXAPRO) 10 MG tablet Take 2 tablets by mouth Daily.      • fluticasone (FLONASE) 50 MCG/ACT nasal spray 2 sprays into the nostril(s) as directed by provider Daily.      • furosemide (LASIX) 20 MG tablet Take 1 tablet by mouth Daily.      • hydrOXYzine pamoate (VISTARIL) 50 MG capsule Take 1 capsule by mouth 2 (Two) Times a Day As Needed.      • ketorolac (TORADOL) 10 MG tablet Take 1 tablet by mouth Every 6  (Six) Hours As Needed for Moderate Pain.      • levETIRAcetam (KEPPRA) 1000 MG tablet Take 1 tablet by mouth 2 (Two) Times a Day.      • losartan (COZAAR) 50 MG tablet Take 1 tablet by mouth Daily.      • meclizine (ANTIVERT) 12.5 MG tablet Take 1 tablet by mouth 3 (Three) Times a Day As Needed for Dizziness.      • melatonin 5 MG tablet tablet Take 2 tablets by mouth every night at bedtime.      • metoclopramide (REGLAN) 5 MG tablet Take 1 tablet by mouth 3 (Three) Times a Day As Needed (Nausea). 9 tablet 0    • nabumetone (RELAFEN) 750 MG tablet Take 1 tablet by mouth 2 (Two) Times a Day As Needed for Mild Pain. 6 tablet 0    • ondansetron ODT (ZOFRAN-ODT) 4 MG disintegrating tablet Place 1 tablet on the tongue 4 (Four) Times a Day As Needed for Nausea or Vomiting. 12 tablet 0    • orphenadrine (NORFLEX) 100 MG 12 hr tablet Take 1 tablet by mouth 2 (Two) Times a Day As Needed for Muscle Spasms or Mild Pain . 10 tablet 0    • pantoprazole (Protonix) 40 MG EC tablet Take 1 tablet by mouth Daily. 30 tablet 0    • potassium chloride (K-DUR,KLOR-CON) 10 MEQ CR tablet Take 1 tablet by mouth Daily.      • promethazine (PHENERGAN) 25 MG suppository Insert 1 suppository into the rectum Every 6 (Six) Hours As Needed for Nausea or Vomiting. 12 suppository 0    • traZODone (DESYREL) 50 MG tablet Take 1 tablet by mouth Every Night.      • acetaminophen (TYLENOL) 500 MG tablet Take 2 tablets by mouth Every 8 (Eight) Hours As Needed for Mild Pain.      • escitalopram (LEXAPRO) 20 MG tablet Take 1.5 tablets by mouth Daily. 30 mg      • solifenacin (VESICARE) 10 MG tablet Take 1 tablet by mouth Daily.        Allergies:  Abilify [aripiprazole], Buspirone, and Penicillins    I have reviewed the patient's medical history, surgical history and family history in the available medical record system.     Current Medications:     Current Facility-Administered Medications   Medication Dose Route Frequency Provider Last Rate Last Admin   •  aztreonam (AZACTAM) 2 g/100 mL 0.9% NS (mbp)  2 g Intravenous Q8H Jonn Jimenes MD 0 mL/hr at 03/06/23 0541 Currently Infusing at 03/06/23 1122   • HYDROmorphone (DILAUDID) injection 0.25 mg  0.25 mg Intravenous Q4H PRN Jonn Jimenes MD   0.25 mg at 03/06/23 1255   • lactated ringers infusion  100 mL/hr Intravenous Continuous Jonn Jimenes  mL/hr at 03/06/23 1257 100 mL/hr at 03/06/23 1257   • levETIRAcetam in NaCl 0.75% (KEPPRA) IVPB 1,000 mg  1,000 mg Intravenous Q12H Jonn Jimenes MD   Stopped at 03/06/23 1122   • ondansetron (ZOFRAN) injection 4 mg  4 mg Intravenous Q6H PRN Erick Chisholm MD       • pantoprazole (PROTONIX) injection 40 mg  40 mg Intravenous Q12H Jonn Jimenes MD   40 mg at 03/06/23 0832   • sodium chloride 0.9 % flush 10 mL  10 mL Intravenous PRN Jonn Jimenes MD       • sodium chloride 0.9 % flush 10 mL  10 mL Intravenous Q12H Jonn Jimenes MD   10 mL at 03/06/23 0835   • sodium chloride 0.9 % flush 10 mL  10 mL Intravenous PRN Jonn Jimenes MD       • sodium chloride 0.9 % infusion 40 mL  40 mL Intravenous PRN Jonn Jimenes MD           Objective     Physical Exam:   Temp:  [98 °F (36.7 °C)-98.4 °F (36.9 °C)] 98 °F (36.7 °C)  Heart Rate:  [48-77] 55  Resp:  [16-20] 18  BP: (116-163)/(59-90) 121/69    Physical Exam:  General Appearance:    Alert, cooperative, in no acute distress   Head:    Normocephalic, without obvious abnormality, atraumatic   Eyes:            Lids and lashes normal, conjunctivae and sclerae normal, no   icterus, no pallor, corneas clear, PERRLA   Ears:    Ears appear intact with no abnormalities noted   Throat:   No oral lesions, no thrush, oral mucosa moist   Neck:   No adenopathy, supple, trachea midline, no thyromegaly, no     carotid bruit, no JVD   Back:     No kyphosis present, no scoliosis present, no skin lesions,       erythema or scars, no tenderness to percussion or                   palpation,   range of motion normal    Lungs:     Clear to auscultation,respirations regular, even and                   unlabored    Heart:    Regular rhythm and normal rate, normal S1 and S2, no            murmur, no gallop, no rub, no click   Breast Exam:    Deferred   Abdomen:     Normal bowel sounds, no masses, no organomegaly, soft        tender, nondistended, no guarding, no rebound                 tenderness   Genitalia:    Deferred   Extremities:   Moves all extremities well, no edema, no cyanosis, no              redness   Pulses:   Pulses palpable and equal bilaterally   Skin:   No bleeding, bruising or rash   Lymph nodes:   No palpable adenopathy   Neurologic:   Cranial nerves 2 - 12 grossly intact, sensation intact, DTR        present and equal bilaterally      Results Review:     Lab Results   Component Value Date    WBC 4.84 03/05/2023    WBC 4.78 03/04/2023    WBC 5.43 01/19/2023    HGB 11.4 (L) 03/05/2023    HGB 12.2 03/04/2023    HGB 13.6 01/19/2023    HCT 33.8 (L) 03/05/2023    HCT 37.2 03/04/2023    HCT 40.7 01/19/2023     03/05/2023     03/04/2023     01/19/2023     Results from last 7 days   Lab Units 03/06/23  0707 03/05/23  0546 03/04/23 1958   ALK PHOS U/L 363* 344* 392*   ALT (SGPT) U/L 366* 374* 449*   AST (SGOT) U/L 223* 198* 218*     Results from last 7 days   Lab Units 03/06/23  0707 03/05/23  0546 03/04/23 1958   BILIRUBIN mg/dL 0.9 1.7* 2.1*   ALK PHOS U/L 363* 344* 392*     Lipase   Date Value Ref Range Status   03/05/2023 72 (H) 13 - 60 U/L Final   03/04/2023 95 (H) 13 - 60 U/L Final     Lab Results   Component Value Date    INR 0.98 03/05/2023    INR 1.06 12/10/2022    INR 0.99 10/16/2022         Radiology Review:  Imaging Results (Last 72 Hours)     Procedure Component Value Units Date/Time    FL C Arm During Surgery [[id number]] Resulted: 03/06/23 0754     Updated: 03/06/23 0754    US Liver [[id number]] Collected: 03/04/23 2104     Updated: 03/04/23 2248    Narrative:      US LIVER  RPID: US  LIVER    CLINICAL HISTORY:  47 years Female, elevated liver enzymes    COMPARISON:  CT on this date    TECHNIQUE:  Grayscale and color Doppler flow imaging was performed, with  spectral Doppler if needed    FINDINGS:    Multiple small stones are present in the gallbladder lumen. The  gallbladder wall is 4 mm which is slightly thickened. There is no  surrounding pericholecystic fluid. The common bile duct is 8.4 mm  which is slightly dilated.    No masses observed within the liver. The gallbladder is reported  as tender when directly scanned.    The pancreas is obscured. The aorta is obscured..    No masses seen within the hepatic parenchyma. The liver  echogenicity is elevated, this could be seen with steatosis.            Impression:        1. Gallstones with thickening of the gallbladder wall and  tenderness of the gallbladder reported when directly scanned.  Findings are suspicious for cholecystitis.  2. 8.4 mm common bile duct correlate for the potential  choledocholithiasis.    Electronically signed by:  Jaziel Benjamin MD  3/4/2023 10:46 PM CST  Workstation: [id number]    CT Abdomen Pelvis Without Contrast [[id number]] Collected: 03/04/23 1928     Updated: 03/04/23 2036    Narrative:      EXAM:  CT Abdomen and Pelvis Without Intravenous Contrast    CLINICAL HISTORY:  rt flank pain    TECHNIQUE:  Axial computed tomography images of the abdomen and pelvis  without intravenous contrast.  Sagittal and coronal reformatted  images were created and reviewed.  This CT exam was performed  using one or more of the following dose reduction techniques:   automated exposure control, adjustment of the mA and/or kV  according to patient size, and/or use of iterative reconstruction  technique.    COMPARISON:  CT Abdomen Pelvis dated 9/21/2022    FINDINGS:  Lung bases:  Unremarkable.  No mass.  No consolidation.    ABDOMEN:  Liver:  Unremarkable.  Gallbladder and bile ducts:  Unremarkable.  No calcified stones.   No ductal  dilation.  Pancreas:  Unremarkable.  No ductal dilation.  Spleen:  Unremarkable.  No splenomegaly.  Adrenals:  Unremarkable.  No mass.  Kidneys and ureters:  Lobulated renal contour.  Bilateral renal  calculi.  No hydronephrosis.  Stomach and bowel:  Moderate stool.  No bowel obstruction.  No  appreciable mucosal thickening.    PELVIS:  Appendix:  Normal caliber appendix.  No findings to suggest acute  appendicitis.  Bladder:  The urinary bladder is decompressed.  No stones.  Reproductive:  Unremarkable as visualized.    ABDOMEN and PELVIS:  Intraperitoneal space:  Unremarkable.  No free air.  No  significant fluid collection.  Bones/joints:  Multilevel spondylosis.  No acute fracture.  No  dislocation.  Soft tissues:  Tiny fat-containing umbilical hernia.  Vasculature:  Unremarkable.  No abdominal aortic aneurysm.  Lymph nodes:  Unremarkable.  No enlarged lymph nodes.      Impression:      1.  Bilateral nonobstructive renal calculi.  2.  Other findings as above.    Electronically signed by:  Ryder Marino MD  3/4/2023 8:34 PM CST  Workstation: [id number]          I reviewed the patient's new clinical results.    I reviewed the patient's new imaging results and agree with the interpretation.     ASSESSMENT/PLAN:   ASSESSMENT: 1.  Right-sided abdominal pain with nausea and vomiting, likely due to choledocholithiasis with cholecystitis.  Patient underwent cholecystectomy yesterday.  2.  Elevated liver enzymes, likely due to choledocholithiasis.  3.  History of recurrent UTIs  4.  Chronic renal failure  PLAN: 1.  Continue antibiotics, antiemetics, pain management and current supportive care  2.  Follow LFTs closely and avoid hypotension hepatotoxic medications  3.  Proceed with ERCP today for further evaluation management  The risks, benefits, and alternatives of this procedure have been discussed with the patient or the responsible party. The patient understands and agrees to proceed.         Venkat Robb,  MD  03/06/23  13:38 CST

## 2023-03-06 NOTE — PLAN OF CARE
Goal Outcome Evaluation:  Plan of Care Reviewed With: patient        Progress: improving  Outcome Evaluation: Patient resting between care, awaiting GI consult for possible ERCP, VSS.

## 2023-03-06 NOTE — NURSING NOTE
Procedure cancelled per Dr oRbb after OR tenative time of 1830 given per anesthesia. Floor nurse Edilma BURNETTE notified and advised her to call Dr Robb for diet/furhter orders.

## 2023-03-07 ENCOUNTER — APPOINTMENT (OUTPATIENT)
Dept: GENERAL RADIOLOGY | Facility: HOSPITAL | Age: 48
DRG: 418 | End: 2023-03-07
Payer: COMMERCIAL

## 2023-03-07 LAB
ALBUMIN SERPL-MCNC: 3.4 G/DL (ref 3.5–5.2)
ALBUMIN/GLOB SERPL: 1.4 G/DL
ALP SERPL-CCNC: 336 U/L (ref 39–117)
ALT SERPL W P-5'-P-CCNC: 295 U/L (ref 1–33)
ANION GAP SERPL CALCULATED.3IONS-SCNC: 10 MMOL/L (ref 5–15)
AST SERPL-CCNC: 203 U/L (ref 1–32)
BASOPHILS # BLD AUTO: 0.02 10*3/MM3 (ref 0–0.2)
BASOPHILS NFR BLD AUTO: 0.4 % (ref 0–1.5)
BILIRUB SERPL-MCNC: 0.8 MG/DL (ref 0–1.2)
BUN SERPL-MCNC: 9 MG/DL (ref 6–20)
BUN/CREAT SERPL: 11.7 (ref 7–25)
CALCIUM SPEC-SCNC: 9.3 MG/DL (ref 8.6–10.5)
CHLORIDE SERPL-SCNC: 104 MMOL/L (ref 98–107)
CO2 SERPL-SCNC: 24 MMOL/L (ref 22–29)
CREAT SERPL-MCNC: 0.77 MG/DL (ref 0.57–1)
DEPRECATED RDW RBC AUTO: 43.5 FL (ref 37–54)
EGFRCR SERPLBLD CKD-EPI 2021: 95.9 ML/MIN/1.73
EOSINOPHIL # BLD AUTO: 0.1 10*3/MM3 (ref 0–0.4)
EOSINOPHIL NFR BLD AUTO: 2.1 % (ref 0.3–6.2)
ERYTHROCYTE [DISTWIDTH] IN BLOOD BY AUTOMATED COUNT: 13.2 % (ref 12.3–15.4)
GLOBULIN UR ELPH-MCNC: 2.5 GM/DL
GLUCOSE BLDC GLUCOMTR-MCNC: 101 MG/DL (ref 70–130)
GLUCOSE BLDC GLUCOMTR-MCNC: 104 MG/DL (ref 70–130)
GLUCOSE BLDC GLUCOMTR-MCNC: 97 MG/DL (ref 70–130)
GLUCOSE SERPL-MCNC: 96 MG/DL (ref 65–99)
HCT VFR BLD AUTO: 32.9 % (ref 34–46.6)
HGB BLD-MCNC: 10.8 G/DL (ref 12–15.9)
IMM GRANULOCYTES # BLD AUTO: 0.02 10*3/MM3 (ref 0–0.05)
IMM GRANULOCYTES NFR BLD AUTO: 0.4 % (ref 0–0.5)
LYMPHOCYTES # BLD AUTO: 1.34 10*3/MM3 (ref 0.7–3.1)
LYMPHOCYTES NFR BLD AUTO: 28.6 % (ref 19.6–45.3)
MCH RBC QN AUTO: 29.6 PG (ref 26.6–33)
MCHC RBC AUTO-ENTMCNC: 32.8 G/DL (ref 31.5–35.7)
MCV RBC AUTO: 90.1 FL (ref 79–97)
MONOCYTES # BLD AUTO: 0.45 10*3/MM3 (ref 0.1–0.9)
MONOCYTES NFR BLD AUTO: 9.6 % (ref 5–12)
NEUTROPHILS NFR BLD AUTO: 2.76 10*3/MM3 (ref 1.7–7)
NEUTROPHILS NFR BLD AUTO: 58.9 % (ref 42.7–76)
NRBC BLD AUTO-RTO: 0 /100 WBC (ref 0–0.2)
PLATELET # BLD AUTO: 160 10*3/MM3 (ref 140–450)
PMV BLD AUTO: 12.6 FL (ref 6–12)
POTASSIUM SERPL-SCNC: 3.9 MMOL/L (ref 3.5–5.2)
PROT SERPL-MCNC: 5.9 G/DL (ref 6–8.5)
RBC # BLD AUTO: 3.65 10*6/MM3 (ref 3.77–5.28)
SODIUM SERPL-SCNC: 138 MMOL/L (ref 136–145)
WBC NRBC COR # BLD: 4.69 10*3/MM3 (ref 3.4–10.8)

## 2023-03-07 PROCEDURE — 25010000002 GLUCAGON (HUMAN RECOMBINANT) 1 MG RECONSTITUTED SOLUTION: Performed by: NURSE ANESTHETIST, CERTIFIED REGISTERED

## 2023-03-07 PROCEDURE — 25010000002 HYDRALAZINE PER 20 MG: Performed by: PHYSICIAN ASSISTANT

## 2023-03-07 PROCEDURE — 74328 X-RAY BILE DUCT ENDOSCOPY: CPT

## 2023-03-07 PROCEDURE — C2625 STENT, NON-COR, TEM W/DEL SY: HCPCS | Performed by: INTERNAL MEDICINE

## 2023-03-07 PROCEDURE — 80053 COMPREHEN METABOLIC PANEL: CPT | Performed by: INTERNAL MEDICINE

## 2023-03-07 PROCEDURE — 82962 GLUCOSE BLOOD TEST: CPT

## 2023-03-07 PROCEDURE — 25010000002 PROPOFOL 10 MG/ML EMULSION: Performed by: NURSE ANESTHETIST, CERTIFIED REGISTERED

## 2023-03-07 PROCEDURE — 43273 ENDOSCOPIC PANCREATOSCOPY: CPT | Performed by: INTERNAL MEDICINE

## 2023-03-07 PROCEDURE — C1769 GUIDE WIRE: HCPCS | Performed by: INTERNAL MEDICINE

## 2023-03-07 PROCEDURE — 43274 ERCP DUCT STENT PLACEMENT: CPT | Performed by: INTERNAL MEDICINE

## 2023-03-07 PROCEDURE — 0F798DZ DILATION OF COMMON BILE DUCT WITH INTRALUMINAL DEVICE, VIA NATURAL OR ARTIFICIAL OPENING ENDOSCOPIC: ICD-10-PCS | Performed by: INTERNAL MEDICINE

## 2023-03-07 PROCEDURE — 85025 COMPLETE CBC W/AUTO DIFF WBC: CPT | Performed by: INTERNAL MEDICINE

## 2023-03-07 PROCEDURE — G0378 HOSPITAL OBSERVATION PER HR: HCPCS

## 2023-03-07 PROCEDURE — 0 LEVETIRACETAM IN NACL 0.75% 1000 MG/100ML SOLUTION: Performed by: INTERNAL MEDICINE

## 2023-03-07 PROCEDURE — 43264 ERCP REMOVE DUCT CALCULI: CPT | Performed by: INTERNAL MEDICINE

## 2023-03-07 PROCEDURE — 74328 X-RAY BILE DUCT ENDOSCOPY: CPT | Performed by: INTERNAL MEDICINE

## 2023-03-07 PROCEDURE — 25510000001 IOPAMIDOL 61 % SOLUTION: Performed by: INTERNAL MEDICINE

## 2023-03-07 PROCEDURE — 25010000002 PHENYLEPHRINE 10 MG/ML SOLUTION: Performed by: NURSE ANESTHETIST, CERTIFIED REGISTERED

## 2023-03-07 PROCEDURE — 25010000002 FENTANYL CITRATE (PF) 100 MCG/2ML SOLUTION: Performed by: NURSE ANESTHETIST, CERTIFIED REGISTERED

## 2023-03-07 PROCEDURE — 25010000002 SUCCINYLCHOLINE PER 20 MG: Performed by: NURSE ANESTHETIST, CERTIFIED REGISTERED

## 2023-03-07 PROCEDURE — 0 LIDOCAINE 1 % SOLUTION: Performed by: NURSE ANESTHETIST, CERTIFIED REGISTERED

## 2023-03-07 PROCEDURE — 25010000002 ATROPINE PER 0.01 MG: Performed by: NURSE ANESTHETIST, CERTIFIED REGISTERED

## 2023-03-07 PROCEDURE — 0 LEVETIRACETAM IN NACL 0.75% 1000 MG/100ML SOLUTION: Performed by: STUDENT IN AN ORGANIZED HEALTH CARE EDUCATION/TRAINING PROGRAM

## 2023-03-07 DEVICE — BILIARY STENT WITH DELIVERY SYSTEM
Type: IMPLANTABLE DEVICE | Site: BILE DUCT | Status: FUNCTIONAL
Brand: FLEXIMA™ BILIARY

## 2023-03-07 RX ORDER — HYDRALAZINE HYDROCHLORIDE 20 MG/ML
10 INJECTION INTRAMUSCULAR; INTRAVENOUS EVERY 6 HOURS PRN
Status: DISCONTINUED | OUTPATIENT
Start: 2023-03-07 | End: 2023-03-09 | Stop reason: HOSPADM

## 2023-03-07 RX ORDER — FLUMAZENIL 0.1 MG/ML
0.2 INJECTION INTRAVENOUS AS NEEDED
Status: DISCONTINUED | OUTPATIENT
Start: 2023-03-07 | End: 2023-03-07 | Stop reason: HOSPADM

## 2023-03-07 RX ORDER — PROPOFOL 10 MG/ML
VIAL (ML) INTRAVENOUS AS NEEDED
Status: DISCONTINUED | OUTPATIENT
Start: 2023-03-07 | End: 2023-03-07 | Stop reason: SURG

## 2023-03-07 RX ORDER — ACETAMINOPHEN 650 MG/1
650 SUPPOSITORY RECTAL ONCE AS NEEDED
Status: DISCONTINUED | OUTPATIENT
Start: 2023-03-07 | End: 2023-03-07 | Stop reason: HOSPADM

## 2023-03-07 RX ORDER — NALOXONE HCL 0.4 MG/ML
0.4 VIAL (ML) INJECTION AS NEEDED
Status: DISCONTINUED | OUTPATIENT
Start: 2023-03-07 | End: 2023-03-07 | Stop reason: HOSPADM

## 2023-03-07 RX ORDER — PROMETHAZINE HYDROCHLORIDE 25 MG/1
25 TABLET ORAL ONCE AS NEEDED
Status: DISCONTINUED | OUTPATIENT
Start: 2023-03-07 | End: 2023-03-07 | Stop reason: HOSPADM

## 2023-03-07 RX ORDER — LIDOCAINE HYDROCHLORIDE 10 MG/ML
INJECTION, SOLUTION INFILTRATION; PERINEURAL AS NEEDED
Status: DISCONTINUED | OUTPATIENT
Start: 2023-03-07 | End: 2023-03-07 | Stop reason: SURG

## 2023-03-07 RX ORDER — PROMETHAZINE HYDROCHLORIDE 25 MG/1
25 SUPPOSITORY RECTAL ONCE AS NEEDED
Status: DISCONTINUED | OUTPATIENT
Start: 2023-03-07 | End: 2023-03-07 | Stop reason: HOSPADM

## 2023-03-07 RX ORDER — ACETAMINOPHEN 325 MG/1
650 TABLET ORAL ONCE AS NEEDED
Status: DISCONTINUED | OUTPATIENT
Start: 2023-03-07 | End: 2023-03-07 | Stop reason: HOSPADM

## 2023-03-07 RX ORDER — MEPERIDINE HYDROCHLORIDE 50 MG/ML
12.5 INJECTION INTRAMUSCULAR; INTRAVENOUS; SUBCUTANEOUS
Status: DISCONTINUED | OUTPATIENT
Start: 2023-03-07 | End: 2023-03-07 | Stop reason: HOSPADM

## 2023-03-07 RX ORDER — ONDANSETRON 2 MG/ML
4 INJECTION INTRAMUSCULAR; INTRAVENOUS ONCE AS NEEDED
Status: DISCONTINUED | OUTPATIENT
Start: 2023-03-07 | End: 2023-03-07 | Stop reason: HOSPADM

## 2023-03-07 RX ORDER — PHENYLEPHRINE HYDROCHLORIDE 10 MG/ML
INJECTION INTRAVENOUS AS NEEDED
Status: DISCONTINUED | OUTPATIENT
Start: 2023-03-07 | End: 2023-03-07 | Stop reason: SURG

## 2023-03-07 RX ORDER — FENTANYL CITRATE 50 UG/ML
INJECTION, SOLUTION INTRAMUSCULAR; INTRAVENOUS AS NEEDED
Status: DISCONTINUED | OUTPATIENT
Start: 2023-03-07 | End: 2023-03-07 | Stop reason: SURG

## 2023-03-07 RX ORDER — DIPHENHYDRAMINE HYDROCHLORIDE 50 MG/ML
12.5 INJECTION INTRAMUSCULAR; INTRAVENOUS
Status: DISCONTINUED | OUTPATIENT
Start: 2023-03-07 | End: 2023-03-07 | Stop reason: HOSPADM

## 2023-03-07 RX ORDER — ATROPINE SULFATE 1 MG/ML
INJECTION, SOLUTION INTRAMUSCULAR; INTRAVENOUS; SUBCUTANEOUS AS NEEDED
Status: DISCONTINUED | OUTPATIENT
Start: 2023-03-07 | End: 2023-03-07 | Stop reason: SURG

## 2023-03-07 RX ORDER — SUCCINYLCHOLINE CHLORIDE 20 MG/ML
INJECTION INTRAMUSCULAR; INTRAVENOUS AS NEEDED
Status: DISCONTINUED | OUTPATIENT
Start: 2023-03-07 | End: 2023-03-07 | Stop reason: SURG

## 2023-03-07 RX ORDER — EPHEDRINE SULFATE 50 MG/ML
5 INJECTION, SOLUTION INTRAVENOUS ONCE AS NEEDED
Status: DISCONTINUED | OUTPATIENT
Start: 2023-03-07 | End: 2023-03-07 | Stop reason: HOSPADM

## 2023-03-07 RX ADMIN — SODIUM CHLORIDE, POTASSIUM CHLORIDE, SODIUM LACTATE AND CALCIUM CHLORIDE 100 ML/HR: 600; 310; 30; 20 INJECTION, SOLUTION INTRAVENOUS at 14:02

## 2023-03-07 RX ADMIN — SODIUM CHLORIDE, POTASSIUM CHLORIDE, SODIUM LACTATE AND CALCIUM CHLORIDE 100 ML/HR: 600; 310; 30; 20 INJECTION, SOLUTION INTRAVENOUS at 02:24

## 2023-03-07 RX ADMIN — PROPOFOL 50 MG: 10 INJECTION, EMULSION INTRAVENOUS at 18:20

## 2023-03-07 RX ADMIN — PANTOPRAZOLE SODIUM 40 MG: 40 INJECTION, POWDER, FOR SOLUTION INTRAVENOUS at 10:19

## 2023-03-07 RX ADMIN — AZTREONAM 2 G: 2 INJECTION, POWDER, LYOPHILIZED, FOR SOLUTION INTRAMUSCULAR; INTRAVENOUS at 10:19

## 2023-03-07 RX ADMIN — PROPOFOL 150 MG: 10 INJECTION, EMULSION INTRAVENOUS at 18:15

## 2023-03-07 RX ADMIN — LEVETIRACETAM 1000 MG: 10 INJECTION INTRAVENOUS at 10:18

## 2023-03-07 RX ADMIN — AZTREONAM 2 G: 2 INJECTION, POWDER, LYOPHILIZED, FOR SOLUTION INTRAMUSCULAR; INTRAVENOUS at 15:46

## 2023-03-07 RX ADMIN — ATROPINE SULFATE 1 MG: 1 INJECTION, SOLUTION INTRAMUSCULAR; INTRAVENOUS; SUBCUTANEOUS at 18:35

## 2023-03-07 RX ADMIN — SUCCINYLCHOLINE CHLORIDE 160 MG: 20 INJECTION, SOLUTION INTRAMUSCULAR; INTRAVENOUS at 18:16

## 2023-03-07 RX ADMIN — PHENYLEPHRINE HYDROCHLORIDE 200 MCG: 10 INJECTION, SOLUTION INTRAVENOUS at 18:36

## 2023-03-07 RX ADMIN — HYDRALAZINE HYDROCHLORIDE 10 MG: 20 INJECTION INTRAMUSCULAR; INTRAVENOUS at 20:59

## 2023-03-07 RX ADMIN — PANTOPRAZOLE SODIUM 40 MG: 40 INJECTION, POWDER, FOR SOLUTION INTRAVENOUS at 21:02

## 2023-03-07 RX ADMIN — IOPAMIDOL 7 ML: 612 INJECTION, SOLUTION INTRAVENOUS at 18:56

## 2023-03-07 RX ADMIN — FENTANYL CITRATE 100 MCG: 50 INJECTION, SOLUTION INTRAMUSCULAR; INTRAVENOUS at 18:13

## 2023-03-07 RX ADMIN — Medication 10 ML: at 21:11

## 2023-03-07 RX ADMIN — LEVETIRACETAM 1000 MG: 10 INJECTION INTRAVENOUS at 21:07

## 2023-03-07 RX ADMIN — LIDOCAINE HYDROCHLORIDE 50 MG: 10 INJECTION, SOLUTION INFILTRATION; PERINEURAL at 18:14

## 2023-03-07 RX ADMIN — GLYCOPYRROLATE 0.2 MCG: 0.2 INJECTION, SOLUTION INTRAMUSCULAR; INTRAVITREAL at 18:33

## 2023-03-07 RX ADMIN — GLUCAGON HYDROCHLORIDE 0.5 MG: KIT at 18:28

## 2023-03-07 RX ADMIN — AZTREONAM 2 G: 2 INJECTION, POWDER, LYOPHILIZED, FOR SOLUTION INTRAMUSCULAR; INTRAVENOUS at 00:28

## 2023-03-07 NOTE — ANESTHESIA PREPROCEDURE EVALUATION
Anesthesia Evaluation     Patient summary reviewed and Nursing notes reviewed   no history of anesthetic complications:  NPO Solid Status: > 8 hours  NPO Liquid Status: > 8 hours           Airway   Mallampati: I  TM distance: >3 FB  Neck ROM: full  Dental    (+) edentulous    Pulmonary     breath sounds clear to auscultation  (+) a smoker Current Abstained day of surgery,   (-) asthma, recent URI, sleep apnea, rhonchi, decreased breath sounds, wheezes, rales  Cardiovascular   Exercise tolerance: poor (<4 METS)    ECG reviewed  Rhythm: regular  Rate: normal    (+) hypertension 2 medications or greater, valvular problems/murmurs MR, dysrhythmias Bradycardia, hyperlipidemia,   (-) past MI, angina, murmur, cardiac stents, DVT    ROS comment: [id number]  Date/Time: 2022 6:43 PM   Performed by: Asher Grande MD   Authorized by: Asher Grande MD   Interpreted by physician   Rhythm: sinus bradycardia   BPM: 57   ST Segments: ST segments normal     Interpreted by Asher Grande MD on 2022  6:43 PM CST    TTE: 22  Interpretation Summary       •  Left ventricular systolic function is normal. Left ventricular ejection fraction appears to be 56 - 60%.  •  Left ventricular diastolic function was normal.  •  Saline test results are negative for right to left atrial level shunt at baseline state. Valsalva maneuver was not performed.  •  Estimated right ventricular systolic pressure from tricuspid regurgitation is normal (<35 mmHg).  •  The right ventricle appears dilated in limited views.  •  There is no evidence of pericardial effusion.      Neuro/Psych  (+) seizures, CVA residual symptoms, headaches (Migraines), psychiatric history Bipolar,    (-) dizziness/light headedness, weakness  GI/Hepatic/Renal/Endo    (+) obesity,   renal disease (creatinine 1.05) stones,   (-) GERD, liver disease    Musculoskeletal     (-) neck pain  Abdominal   (+) obese,    Substance History   (-) alcohol  use, drug use     OB/GYN    (-)  Pregnant        Other   arthritis,      ROS/Med Hx Other: Hx: seziures: last seizure was 2 months ago. Takes Keppra daily.     CVA:12/19/21  Per pt had to relearn how to walk and talk. Pt she has some aphasia and right sided weakness.     Had AMBER after stroke in 2021.    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                       Anesthesia Plan    ASA 3     general     (  )  intravenous induction     Anesthetic plan, risks, benefits, and alternatives have been provided, discussed and informed consent has been obtained with: patient.    Plan discussed with CRNA.        CODE STATUS:

## 2023-03-07 NOTE — PLAN OF CARE
Problem: Bowel Motility Impaired (Surgery Nonspecified)  Goal: Effective Bowel Elimination  Outcome: Ongoing, Not Progressing     Problem: Adult Inpatient Plan of Care  Goal: Plan of Care Review  Outcome: Ongoing, Progressing  Goal: Patient-Specific Goal (Individualized)  Outcome: Ongoing, Progressing  Goal: Absence of Hospital-Acquired Illness or Injury  Outcome: Ongoing, Progressing  Intervention: Identify and Manage Fall Risk  Recent Flowsheet Documentation  Taken 3/7/2023 0000 by Katiana Sutton RN  Safety Promotion/Fall Prevention: safety round/check completed  Taken 3/6/2023 2200 by Katiana Sutton RN  Safety Promotion/Fall Prevention: safety round/check completed  Taken 3/6/2023 2000 by Katiana Sutton RN  Safety Promotion/Fall Prevention: safety round/check completed  Intervention: Prevent Skin Injury  Recent Flowsheet Documentation  Taken 3/7/2023 0000 by Katiana Sutton RN  Body Position: position changed independently  Taken 3/6/2023 2200 by Katiana Sutton RN  Body Position: position changed independently  Taken 3/6/2023 2000 by Katiana Sutton RN  Body Position: position changed independently  Intervention: Prevent and Manage VTE (Venous Thromboembolism) Risk  Recent Flowsheet Documentation  Taken 3/6/2023 2200 by Katiana Sutton RN  Activity Management: activity adjusted per tolerance  Taken 3/6/2023 2000 by Katiana Sutton RN  Activity Management: activity adjusted per tolerance  VTE Prevention/Management:   bilateral   sequential compression devices on  Goal: Optimal Comfort and Wellbeing  Outcome: Ongoing, Progressing  Intervention: Monitor Pain and Promote Comfort  Recent Flowsheet Documentation  Taken 3/6/2023 2200 by Katiana Sutton RN  Pain Management Interventions: see MAR  Intervention: Provide Person-Centered Care  Recent Flowsheet Documentation  Taken 3/6/2023 2000 by Katiana Sutton RN  Trust Relationship/Rapport:   care explained   emotional support provided    questions answered   thoughts/feelings acknowledged  Goal: Readiness for Transition of Care  Outcome: Ongoing, Progressing     Problem: Fall Injury Risk  Goal: Absence of Fall and Fall-Related Injury  Outcome: Ongoing, Progressing  Intervention: Promote Injury-Free Environment  Recent Flowsheet Documentation  Taken 3/7/2023 0000 by Katiana Sutton RN  Safety Promotion/Fall Prevention: safety round/check completed  Taken 3/6/2023 2200 by Katiana Sutton RN  Safety Promotion/Fall Prevention: safety round/check completed  Taken 3/6/2023 2000 by Katiana Sutton RN  Safety Promotion/Fall Prevention: safety round/check completed     Problem: Bleeding (Surgery Nonspecified)  Goal: Absence of Bleeding  Outcome: Ongoing, Progressing     Problem: Fluid and Electrolyte Imbalance (Surgery Nonspecified)  Goal: Fluid and Electrolyte Balance  Outcome: Ongoing, Progressing     Problem: Glycemic Control Impaired (Surgery Nonspecified)  Goal: Blood Glucose Level Within Targeted Range  Outcome: Ongoing, Progressing     Problem: Infection (Surgery Nonspecified)  Goal: Absence of Infection Signs and Symptoms  Outcome: Ongoing, Progressing     Problem: Ongoing Anesthesia Effects (Surgery Nonspecified)  Goal: Anesthesia/Sedation Recovery  Outcome: Ongoing, Progressing  Intervention: Optimize Anesthesia Recovery  Recent Flowsheet Documentation  Taken 3/7/2023 0000 by Katiana Sutton RN  Safety Promotion/Fall Prevention: safety round/check completed  Taken 3/6/2023 2200 by Katiana Sutton RN  Safety Promotion/Fall Prevention: safety round/check completed  Taken 3/6/2023 2000 by Katiana Sutton RN  Safety Promotion/Fall Prevention: safety round/check completed     Problem: Pain (Surgery Nonspecified)  Goal: Acceptable Pain Control  Outcome: Ongoing, Progressing  Intervention: Prevent or Manage Pain  Recent Flowsheet Documentation  Taken 3/6/2023 2200 by Katiana Sutton RN  Pain Management Interventions: see MAR  Taken 3/6/2023  2000 by Katiana Sutton, RN  Diversional Activities: television     Problem: Postoperative Nausea and Vomiting (Surgery Nonspecified)  Goal: Nausea and Vomiting Relief  Outcome: Ongoing, Progressing     Problem: Postoperative Urinary Retention (Surgery Nonspecified)  Goal: Effective Urinary Elimination  Outcome: Ongoing, Progressing     Problem: Respiratory Compromise (Surgery Nonspecified)  Goal: Effective Oxygenation and Ventilation  Outcome: Ongoing, Progressing   Goal Outcome Evaluation:      Pain well managed with PRN pain medication. Pt NPO since midnight. No s/s of infection around incision areas. No c/o n/v. Iv access maintained. Iv fluids continued.

## 2023-03-07 NOTE — PROGRESS NOTES
[facility] Medicine Services  INPATIENT PROGRESS NOTE    Length of Stay: 0  Date of Admission: 3/4/2023  Primary Care Physician: Sonya Crooks APRN    Subjective   (S) Admitted for right flank pain and no abdominal tenderness; US positive for Gifford positive and possible choledocholithiasis  Lap cholecystectomy done 2 days ago and a solitary stone on the ampulla unable to extract it; discussed today with Dr Robb  Still with abdominal pain in the RUQ    Review of Systems   All other systems reviewed and are negative.       All pertinent negatives and positives are as above. All other systems have been reviewed and are negative unless otherwise stated.     Prior to Admission medications    Medication Sig Start Date End Date Taking? Authorizing Provider   aspirin 325 MG tablet Take 1 tablet by mouth Daily.   Yes Radha Zaragoza MD   atorvastatin (LIPITOR) 80 MG tablet Take 1 tablet by mouth Every Night. 12/18/21  Yes Juan Conroy MD   busPIRone (BUSPAR) 15 MG tablet Take 1 tablet by mouth 3 (Three) Times a Day.   Yes ProviderRadha MD   butalbital-acetaminophen-caffeine (FIORICET, ESGIC) -40 MG per tablet Take 1 tablet by mouth Daily As Needed for Headache.   Yes ProviderRadha MD   cloNIDine (CATAPRES) 0.1 MG tablet Take 1 tablet by mouth Every 30 (Thirty) Minutes As Needed for High Blood Pressure. Give one tap po every 30 mins when BP is over 160   Yes ProviderRadha MD   diclofenac (VOLTAREN) 75 MG EC tablet 1 tablet 2 (Two) Times a Day. 10/1/22  Yes Radha Zaragoza MD   escitalopram (LEXAPRO) 10 MG tablet Take 2 tablets by mouth Daily. 10/1/22  Yes aRdha Zaragoza MD   fluticasone (FLONASE) 50 MCG/ACT nasal spray 2 sprays into the nostril(s) as directed by provider Daily.   Yes Radha Zaragoza MD   furosemide (LASIX) 20 MG tablet Take 1 tablet by mouth Daily.   Yes Radha Zaragoza MD   hydrOXYzine pamoate  (VISTARIL) 50 MG capsule Take 1 capsule by mouth 2 (Two) Times a Day As Needed. 4/5/22  Yes Sonya Crooks APRN   ketorolac (TORADOL) 10 MG tablet Take 1 tablet by mouth Every 6 (Six) Hours As Needed for Moderate Pain.   Yes ProviderRadha MD   levETIRAcetam (KEPPRA) 1000 MG tablet Take 1 tablet by mouth 2 (Two) Times a Day.   Yes ProviderRadha MD   losartan (COZAAR) 50 MG tablet Take 1 tablet by mouth Daily.   Yes ProviderRadha MD   meclizine (ANTIVERT) 12.5 MG tablet Take 1 tablet by mouth 3 (Three) Times a Day As Needed for Dizziness. 4/19/22  Yes Sonya Crooks APRN   melatonin 5 MG tablet tablet Take 2 tablets by mouth every night at bedtime.   Yes ProviderRadha MD   metoclopramide (REGLAN) 5 MG tablet Take 1 tablet by mouth 3 (Three) Times a Day As Needed (Nausea). 1/19/23  Yes Juvenal Chand MD   nabumetone (RELAFEN) 750 MG tablet Take 1 tablet by mouth 2 (Two) Times a Day As Needed for Mild Pain. 11/24/22  Yes Juvenal Chand MD   ondansetron ODT (ZOFRAN-ODT) 4 MG disintegrating tablet Place 1 tablet on the tongue 4 (Four) Times a Day As Needed for Nausea or Vomiting. 1/19/23  Yes Juvenal Chand MD   orphenadrine (NORFLEX) 100 MG 12 hr tablet Take 1 tablet by mouth 2 (Two) Times a Day As Needed for Muscle Spasms or Mild Pain . 4/13/22  Yes Nanette Mai APRN   pantoprazole (Protonix) 40 MG EC tablet Take 1 tablet by mouth Daily. 12/22/21  Yes Buzz Vasquez APRN   potassium chloride (K-DUR,KLOR-CON) 10 MEQ CR tablet Take 1 tablet by mouth Daily. 3/1/22  Yes Sonya Crooks APRN   promethazine (PHENERGAN) 25 MG suppository Insert 1 suppository into the rectum Every 6 (Six) Hours As Needed for Nausea or Vomiting. 1/19/23  Yes Juvenal Chand MD   traZODone (DESYREL) 50 MG tablet Take 1 tablet by mouth Every Night.   Yes Provider, MD Radha   medroxyPROGESTERone (DEPO-PROVERA) 150 MG/ML injection Inject 1 mL into the appropriate  muscle as directed by prescriber Every 3 (Three) Months.  3/5/23 Yes Radha Zaragoza MD   acetaminophen (TYLENOL) 500 MG tablet Take 2 tablets by mouth Every 8 (Eight) Hours As Needed for Mild Pain.    Radha Zaragoza MD   escitalopram (LEXAPRO) 20 MG tablet Take 1.5 tablets by mouth Daily. 30 mg    Radha Zaragoza MD   solifenacin (VESICARE) 10 MG tablet Take 1 tablet by mouth Daily.    Radha Zaragoza MD       aztreonam, 2 g, Intravenous, Q8H  levETIRAcetam, 1,000 mg, Intravenous, Q12H  pantoprazole, 40 mg, Intravenous, Q12H  sodium chloride, 10 mL, Intravenous, Q12H      lactated ringers, 100 mL/hr, Last Rate: 100 mL/hr (03/07/23 0653)        Objective    Temp:  [98 °F (36.7 °C)-98.6 °F (37 °C)] 98.4 °F (36.9 °C)  Heart Rate:  [55-60] 55  Resp:  [16-18] 16  BP: (103-137)/(57-77) 128/77    Physical Exam  Constitutional:       Appearance: She is obese.      Comments: Despite the pain she is in good spirit   HENT:      Head: Normocephalic.      Nose: Nose normal.      Mouth/Throat:      Mouth: Mucous membranes are moist.   Eyes:      Extraocular Movements: Extraocular movements intact.   Cardiovascular:      Rate and Rhythm: Regular rhythm.      Heart sounds: Normal heart sounds.   Pulmonary:      Breath sounds: Normal breath sounds.   Abdominal:      Palpations: Abdomen is soft.      Tenderness: There is abdominal tenderness.   Musculoskeletal:         General: Normal range of motion.      Cervical back: Normal range of motion and neck supple.   Skin:     General: Skin is warm.   Neurological:      General: No focal deficit present.      Mental Status: She is alert. Mental status is at baseline.           Results Review:  I have reviewed the labs, radiology results, and diagnostic studies.    Laboratory Data:   Results from last 7 days   Lab Units 03/07/23  0529 03/06/23  0707 03/05/23  0546   SODIUM mmol/L 138 139 139   POTASSIUM mmol/L 3.9 4.5 4.1   CHLORIDE mmol/L 104 105 108*   CO2  mmol/L 24.0 23.0 21.0*   BUN mg/dL 9 14 13   CREATININE mg/dL 0.77 0.91 0.96   GLUCOSE mg/dL 96 87 82   CALCIUM mg/dL 9.3 9.2 8.7   BILIRUBIN mg/dL 0.8 0.9 1.7*   ALK PHOS U/L 336* 363* 344*   ALT (SGPT) U/L 295* 366* 374*   AST (SGOT) U/L 203* 223* 198*   ANION GAP mmol/L 10.0 11.0 10.0     Estimated Creatinine Clearance: 101.8 mL/min (by C-G formula based on SCr of 0.77 mg/dL).  Results from last 7 days   Lab Units 03/05/23  0546   MAGNESIUM mg/dL 2.3         Results from last 7 days   Lab Units 03/07/23 0529 03/05/23  0546 03/04/23 1958   WBC 10*3/mm3 4.69 4.84 4.78   HEMOGLOBIN g/dL 10.8* 11.4* 12.2   HEMATOCRIT % 32.9* 33.8* 37.2   PLATELETS 10*3/mm3 160 168 196     Results from last 7 days   Lab Units 03/05/23  0546   INR  0.98       Culture Data:   No results found for: BLOODCX  Urine Culture   Date Value Ref Range Status   03/04/2023 <10,000 CFU/mL Mixed Lety Isolated  Final     No results found for: RESPCX  No results found for: WOUNDCX  No results found for: STOOLCX  No components found for: BODYFLD    Radiology Data:   Imaging Results (Last 24 Hours)     ** No results found for the last 24 hours. **          I have reviewed the patient's current medications.     Assessment/Plan   Acute cholecystitis     With acute choledocholithiasis as shown intra op cholangiogram done by Dr Jimenes; ERCP cancelled yesterday due to lab issues; discussed with Dr Robb to be done today     On aztreonam     Chronic medical management     Hx of CVA, ESBL      Seizure disorder     CKD stage III     Left kidney stones    Medical Decision Making  Number and Complexity of problems: two major  Differential Diagnosis: kidney stone    Conditions and Status:        Condition is unchanged.     MDM Data  External documents reviewed: previous medical records  My EKG interpretation:   My plain film interpretation: left kidney stones      Decision rules/scores evaluated (example [id number], Wells, etc): n/a     Discussed with:  patient     Treatment Plan   Will be taken to ERCP    Care Planning  Shared decision making: patient  Code status and discussions: full    Disposition  Social Determinants of Health that impact treatment or disposition: none  I expect the patient to be discharged to home in 1 to 2 days.     I confirmed that the patient's Advance Care Plan is present, code status is documented, or surrogate decision maker is listed in the patient's medical record.     Erick Chisholm MD

## 2023-03-08 LAB
ALBUMIN SERPL-MCNC: 3.7 G/DL (ref 3.5–5.2)
ALBUMIN/GLOB SERPL: 1.4 G/DL
ALP SERPL-CCNC: 376 U/L (ref 39–117)
ALT SERPL W P-5'-P-CCNC: 282 U/L (ref 1–33)
ANION GAP SERPL CALCULATED.3IONS-SCNC: 15 MMOL/L (ref 5–15)
AST SERPL-CCNC: 167 U/L (ref 1–32)
BASOPHILS # BLD AUTO: 0.04 10*3/MM3 (ref 0–0.2)
BASOPHILS NFR BLD AUTO: 0.5 % (ref 0–1.5)
BILIRUB SERPL-MCNC: 0.9 MG/DL (ref 0–1.2)
BUN SERPL-MCNC: 10 MG/DL (ref 6–20)
BUN/CREAT SERPL: 15.4 (ref 7–25)
CALCIUM SPEC-SCNC: 9.3 MG/DL (ref 8.6–10.5)
CHLORIDE SERPL-SCNC: 103 MMOL/L (ref 98–107)
CO2 SERPL-SCNC: 21 MMOL/L (ref 22–29)
CREAT SERPL-MCNC: 0.65 MG/DL (ref 0.57–1)
DEPRECATED RDW RBC AUTO: 42.3 FL (ref 37–54)
EGFRCR SERPLBLD CKD-EPI 2021: 109.4 ML/MIN/1.73
EOSINOPHIL # BLD AUTO: 0.16 10*3/MM3 (ref 0–0.4)
EOSINOPHIL NFR BLD AUTO: 1.9 % (ref 0.3–6.2)
ERYTHROCYTE [DISTWIDTH] IN BLOOD BY AUTOMATED COUNT: 13.1 % (ref 12.3–15.4)
GLOBULIN UR ELPH-MCNC: 2.7 GM/DL
GLUCOSE BLDC GLUCOMTR-MCNC: 168 MG/DL (ref 70–130)
GLUCOSE BLDC GLUCOMTR-MCNC: 78 MG/DL (ref 70–130)
GLUCOSE SERPL-MCNC: 76 MG/DL (ref 65–99)
HCT VFR BLD AUTO: 35.6 % (ref 34–46.6)
HGB BLD-MCNC: 11.7 G/DL (ref 12–15.9)
IMM GRANULOCYTES # BLD AUTO: 0.03 10*3/MM3 (ref 0–0.05)
IMM GRANULOCYTES NFR BLD AUTO: 0.4 % (ref 0–0.5)
LYMPHOCYTES # BLD AUTO: 1.29 10*3/MM3 (ref 0.7–3.1)
LYMPHOCYTES NFR BLD AUTO: 15.4 % (ref 19.6–45.3)
MCH RBC QN AUTO: 29 PG (ref 26.6–33)
MCHC RBC AUTO-ENTMCNC: 32.9 G/DL (ref 31.5–35.7)
MCV RBC AUTO: 88.3 FL (ref 79–97)
MONOCYTES # BLD AUTO: 0.58 10*3/MM3 (ref 0.1–0.9)
MONOCYTES NFR BLD AUTO: 6.9 % (ref 5–12)
NEUTROPHILS NFR BLD AUTO: 6.3 10*3/MM3 (ref 1.7–7)
NEUTROPHILS NFR BLD AUTO: 74.9 % (ref 42.7–76)
NRBC BLD AUTO-RTO: 0 /100 WBC (ref 0–0.2)
PLATELET # BLD AUTO: 182 10*3/MM3 (ref 140–450)
PMV BLD AUTO: 12 FL (ref 6–12)
POTASSIUM SERPL-SCNC: 3.7 MMOL/L (ref 3.5–5.2)
PROT SERPL-MCNC: 6.4 G/DL (ref 6–8.5)
RBC # BLD AUTO: 4.03 10*6/MM3 (ref 3.77–5.28)
REF LAB TEST METHOD: NORMAL
SODIUM SERPL-SCNC: 139 MMOL/L (ref 136–145)
WBC NRBC COR # BLD: 8.4 10*3/MM3 (ref 3.4–10.8)

## 2023-03-08 PROCEDURE — 82962 GLUCOSE BLOOD TEST: CPT

## 2023-03-08 PROCEDURE — 99232 SBSQ HOSP IP/OBS MODERATE 35: CPT | Performed by: INTERNAL MEDICINE

## 2023-03-08 PROCEDURE — 0 LEVETIRACETAM IN NACL 0.75% 1000 MG/100ML SOLUTION: Performed by: INTERNAL MEDICINE

## 2023-03-08 PROCEDURE — 25010000002 HYDROMORPHONE 1 MG/ML SOLUTION: Performed by: INTERNAL MEDICINE

## 2023-03-08 PROCEDURE — 80053 COMPREHEN METABOLIC PANEL: CPT | Performed by: INTERNAL MEDICINE

## 2023-03-08 PROCEDURE — 85025 COMPLETE CBC W/AUTO DIFF WBC: CPT | Performed by: INTERNAL MEDICINE

## 2023-03-08 RX ORDER — SODIUM CHLORIDE 9 MG/ML
INJECTION, SOLUTION INTRAVENOUS
Status: COMPLETED
Start: 2023-03-08 | End: 2023-03-08

## 2023-03-08 RX ADMIN — AZTREONAM 2 G: 2 INJECTION, POWDER, LYOPHILIZED, FOR SOLUTION INTRAMUSCULAR; INTRAVENOUS at 10:05

## 2023-03-08 RX ADMIN — HYDROMORPHONE HYDROCHLORIDE 0.25 MG: 1 INJECTION, SOLUTION INTRAMUSCULAR; INTRAVENOUS; SUBCUTANEOUS at 19:45

## 2023-03-08 RX ADMIN — LEVETIRACETAM 1000 MG: 10 INJECTION INTRAVENOUS at 20:12

## 2023-03-08 RX ADMIN — PANTOPRAZOLE SODIUM 40 MG: 40 INJECTION, POWDER, FOR SOLUTION INTRAVENOUS at 19:44

## 2023-03-08 RX ADMIN — LEVETIRACETAM 1000 MG: 10 INJECTION INTRAVENOUS at 10:05

## 2023-03-08 RX ADMIN — SODIUM CHLORIDE 40 ML: 9 INJECTION, SOLUTION INTRAVENOUS at 20:12

## 2023-03-08 RX ADMIN — AZTREONAM 2 G: 2 INJECTION, POWDER, LYOPHILIZED, FOR SOLUTION INTRAMUSCULAR; INTRAVENOUS at 23:17

## 2023-03-08 RX ADMIN — Medication 10 ML: at 19:44

## 2023-03-08 RX ADMIN — AZTREONAM 2 G: 2 INJECTION, POWDER, LYOPHILIZED, FOR SOLUTION INTRAMUSCULAR; INTRAVENOUS at 17:08

## 2023-03-08 RX ADMIN — PANTOPRAZOLE SODIUM 40 MG: 40 INJECTION, POWDER, FOR SOLUTION INTRAVENOUS at 10:05

## 2023-03-08 RX ADMIN — SODIUM CHLORIDE, POTASSIUM CHLORIDE, SODIUM LACTATE AND CALCIUM CHLORIDE 100 ML/HR: 600; 310; 30; 20 INJECTION, SOLUTION INTRAVENOUS at 00:09

## 2023-03-08 RX ADMIN — AZTREONAM 2 G: 2 INJECTION, POWDER, LYOPHILIZED, FOR SOLUTION INTRAMUSCULAR; INTRAVENOUS at 00:06

## 2023-03-08 NOTE — ANESTHESIA POSTPROCEDURE EVALUATION
Patient: Myles Shannon    Procedure Summary     Date: 03/07/23 Room / Location: [facility] ENDOSCOPY 2 / [facility] ENDOSCOPY    Anesthesia Start: 1805 Anesthesia Stop: 1927    Procedure: ENDOSCOPIC RETROGRADE CHOLANGIOPANCREATOGRAPHY Diagnosis:       Calculus of bile duct with acute on chronic cholecystitis with obstruction      (Calculus of bile duct with acute on chronic cholecystitis with obstruction [K80.47])    Surgeons: Venkat Robb MD Provider: Demian Groves CRNA    Anesthesia Type: general ASA Status: 3          Anesthesia Type: general    Vitals  No vitals data found for the desired time range.          Post Anesthesia Care and Evaluation    Patient location during evaluation: bedside  Patient participation: complete - patient participated  Level of consciousness: awake and alert  Pain score: 0  Pain management: adequate    Airway patency: patent  Anesthetic complications: No anesthetic complications  PONV Status: none  Cardiovascular status: acceptable  Respiratory status: acceptable  Hydration status: acceptable    Comments: ---------------------------               03/07/23 1928         ---------------------------   BP:          141/71         Pulse:       82         Resp:          18           Temp:   97.7 °F (36.4 °C)   SpO2:          96%         ---------------------------

## 2023-03-08 NOTE — PAYOR COMM NOTE
"Adrianne Blackwood  [facility]  Case Management Extender  [phone] phone  [phone] fax        Observation 3/4/2023 to Inpatient 3/8/2023      Myles Torres (47 y.o. Female)     Date of Birth   1975    Social Security Number       Address   [address] [place] [address]    Home Phone   [phone]    MRN   [id number]       Rastafari   Advent    Marital Status   Single                            Admission Date   3/4/23    Admission Type   Emergency    Admitting Provider   Erick Chisholm MD    Attending Provider   Erick Chisholm MD    Department, Room/Bed   [address], 407/1       Discharge Date       Discharge Disposition       Discharge Destination                               Attending Provider: Erick Chisholm MD    Allergies: Abilify [Aripiprazole], Buspirone, Penicillins    Isolation: Contact   Infection: None   Code Status: CPR    Ht: 165.1 cm (65\")   Wt: 93.1 kg (205 lb 4.8 oz)    Admission Cmt: None   Principal Problem: Acute cholecystitis [K81.0]                 Active Insurance as of 3/4/2023     Primary Coverage     Payor Plan Insurance Group Employer/Plan Group    [employer] [place] [employer] [place]ICA[place]      Payor Plan Address Payor Plan Phone Number Payor Plan Fax Number Effective Dates    [address] [address] [phone]  10/17/2016 - None Entered    [facility] [id number]       Subscriber Name Subscriber Birth Date Member ID       MYLES TORRES 1975 [id number]                 Emergency Contacts      (Rel.) Home Phone Work Phone Mobile Phone    Arleen Torres (Mother) [phone] -- [phone]    Blake Perry (Daughter) [phone] -- [phone]    Chris Suarez (Significant Other) [phone] -- [phone]    manager,case () [phone]            Problem List           Codes Noted - Resolved       Hospital    * (Principal) " Acute cholecystitis ICD-10-CM: K81.0  ICD-9-CM: 575.0 3/4/2023 - Present    Cholelithiasis (Chronic) ICD-10-CM: K80.20  ICD-9-CM: 574.20 3/7/2021 - Present       Non-Hospital    Stroke-like symptoms ICD-10-CM: R29.90  ICD-9-CM: 781.99 12/10/2022 - Present    Encephalopathy ICD-10-CM: G93.40  ICD-9-CM: 348.30 10/18/2022 - Present    Bradycardia ICD-10-CM: R00.1  ICD-9-CM: 427.89 10/18/2022 - Present    Urinary tract infection in female ICD-10-CM: N39.0  ICD-9-CM: 599.0 10/16/2022 - Present    Dysarthria ICD-10-CM: R47.1  ICD-9-CM: 784.51 5/3/2022 - Present    Weakness ICD-10-CM: R53.1  ICD-9-CM: 780.79 3/10/2022 - Present    Nonintractable headache ICD-10-CM: R51.9  ICD-9-CM: 784.0 3/4/2022 - Present    Midline shift of brain due to stroke ICD-10-CM: R90.89  ICD-9-CM: 793.0 12/21/2021 - Present    Metabolic acidosis, NAG, bicarbonate losses ICD-10-CM: E87.20  ICD-9-CM: 276.2 12/19/2021 - Present    Dysphagia due to recent stroke ICD-10-CM: I69.391  ICD-9-CM: 438.82 12/18/2021 - Present    Slurred speech ICD-10-CM: R47.81  ICD-9-CM: 784.59 12/18/2021 - Present    Cerebrovascular accident (CVA) (HCC) ICD-10-CM: I63.9  ICD-9-CM: 434.91 12/18/2021 - Present    Aphasia due to acute cerebrovascular accident (CVA) (HCC) ICD-10-CM: I63.9, R47.01  ICD-9-CM: 434.91, 784.3 12/18/2021 - Present    Methamphetamine abuse (HCC) ICD-10-CM: F15.10  ICD-9-CM: 305.70 12/18/2021 - Present    Seizure disorder (HCC) ICD-10-CM: G40.909  ICD-9-CM: 345.90 12/18/2021 - Present    UTI (urinary tract infection) due to urinary indwelling catheter (HCC) ICD-10-CM: T83.511A, N39.0  ICD-9-CM: 996.64, 599.0 12/18/2021 - Present    Urinary tract infection due to extended-spectrum beta lactamase (ESBL) producing Escherichia coli ICD-10-CM: N39.0, B96.29, Z16.12  ICD-9-CM: 599.0, 041.49, V09.1 12/18/2021 - Present    Urinary tract infection due to extended-spectrum beta lactamase (ESBL) producing Escherichia coli ICD-10-CM: N39.0, B96.29,  Z16.12  ICD-9-CM: 599.0, 041.49, V09.1 12/18/2021 - Present    CVA (cerebral vascular accident) ([facility]) ICD-10-CM: I63.9  ICD-9-CM: 434.91 12/15/2021 - Present    Infection due to ESBL-producing Escherichia coli ICD-10-CM: A49.8, Z16.12  ICD-9-CM: 041.49, V09.1 3/10/2021 - Present    ELBERT (acute kidney injury) ([facility]) ICD-10-CM: N17.9  ICD-9-CM: 584.9 3/9/2021 - Present    Nephrolithiasis (Chronic) ICD-10-CM: N20.0  ICD-9-CM: 592.0 3/8/2021 - Present    Intractable vomiting ICD-10-CM: R11.10  ICD-9-CM: 536.2 3/7/2021 - Present    Acute UTI (urinary tract infection) ICD-10-CM: N39.0  ICD-9-CM: 599.0 3/7/2021 - Present    GI bleed ICD-10-CM: K92.2  ICD-9-CM: 578.9 3/7/2021 - Present    Kidney stone ICD-10-CM: N20.0  ICD-9-CM: 592.0 Unknown - Present    Carpal tunnel syndrome of right wrist ICD-10-CM: G56.01  ICD-9-CM: 354.0 8/15/2019 - Present    Seizure (HCC) ICD-10-CM: R56.9  ICD-9-CM: 780.39 8/15/2019 - Present    High cholesterol ICD-10-CM: E78.00  ICD-9-CM: 272.0 5/29/2019 - Present    Migraine without status migrainosus, not intractable ICD-10-CM: G43.909  ICD-9-CM: 346.90 5/29/2019 - Present    Chronic pain (Chronic) ICD-10-CM: G89.29  ICD-9-CM: 338.29 5/29/2019 - Present    Seasonal allergies ICD-10-CM: J30.2  ICD-9-CM: 477.9 5/29/2019 - Present    Vitamin D deficiency ICD-10-CM: E55.9  ICD-9-CM: 268.9 5/29/2019 - Present    Nicotine abuse ICD-10-CM: Z72.0  ICD-9-CM: 305.1 9/13/2018 - Present    Other chest pain ICD-10-CM: R07.89  ICD-9-CM: 786.59 9/13/2018 - Present    Flank pain ICD-10-CM: R10.9  ICD-9-CM: 789.09 5/7/2018 - Present    Nephrolithiasis ICD-10-CM: N20.0  ICD-9-CM: 592.0 5/7/2018 - Present    Neck pain ICD-10-CM: M54.2  ICD-9-CM: 723.1 6/27/2017 - Present    Acute maxillary sinusitis ICD-10-CM: J01.00  ICD-9-CM: 461.0 3/22/2017 - Present    Hematochezia ICD-10-CM: K92.1  ICD-9-CM: 578.1 3/22/2017 - Present    Sciatic leg pain ICD-10-CM: M54.30  ICD-9-CM: 724.3 3/22/2017 - Present    Screening for  diabetes mellitus (DM) ICD-10-CM: Z13.1  ICD-9-CM: V77.1 3/22/2017 - Present    Urinary incontinence without sensory awareness ICD-10-CM: N39.42  ICD-9-CM: 788.34 3/22/2017 - Present    Stress ICD-10-CM: F43.9  ICD-9-CM: V62.89 8/17/2016 - Present    Neuropathy ICD-10-CM: G62.9  ICD-9-CM: 355.9 6/29/2016 - Present    Decreased pulses in feet ICD-10-CM: R09.89  ICD-9-CM: 785.9 9/9/2015 - Present    Migraine without aura and with status migrainosus, not intractable ICD-10-CM: G43.001  ICD-9-CM: 346.12 9/9/2015 - Present    Primary osteoarthritis involving multiple joints ICD-10-CM: M15.9  ICD-9-CM: 715.98 9/9/2015 - Present    Bipolar affective disorder (HCC) (Chronic) ICD-10-CM: F31.9  ICD-9-CM: 296.80 9/17/2014 - Present    Tobacco use disorder ICD-10-CM: F17.200  ICD-9-CM: 305.1 9/17/2014 - Present        Emergency Department Notes      Asher Grande MD at 03/04/23 2131          Subjective   History of Present Illness  Patient presents to the ER with c/o right flank pain that radiates around to her bladder region. Patient with history of recurrent UTI and see's Dr. Deshpande. She is currently on prophylactic antibiotics. Denies fever but states she just doesn't feel good. Report urine is dark in color.         Review of Systems   Constitutional: Positive for fatigue. Negative for chills and fever.   HENT: Negative.    Respiratory: Negative for shortness of breath.    Cardiovascular: Negative for chest pain.   Gastrointestinal: Positive for abdominal pain. Negative for diarrhea, nausea and vomiting.   Genitourinary: Positive for flank pain. Negative for decreased urine volume, difficulty urinating, dysuria and frequency.        Urine dark   Skin: Negative.    Neurological: Negative.    Psychiatric/Behavioral: Negative.        Past Medical History:   Diagnosis Date   • Abdominal pain     Chronic RLQ, RUQ, R flank comes and goes.    • Abdominal pain, right lower quadrant    • Acute bilateral otitis media    •  Allergic rhinitis    • Backache     Upper back.   • Candidiasis of skin    • Cellulitis of neck    • Chest pain    • Contraception    • Cough    • Dysfunction of eustachian tube    • Dyspareunia, female    • Elevated cholesterol    • Excessive or frequent menstruation    • Flank pain    • Generalized aches and pains    • Headache    • Health maintenance examination    • Hypertension    • Infection of skin and subcutaneous tissue    • Irregular periods    • Kidney stone     Poss   • Menorrhagia    • Nausea vomiting and diarrhea    • Obesity    • Open wound of abdominal wall    • Otalgia    • Pain in left foot    • Pain in unspecified hand    • Removed Impacted cerumen 2012   • Rhinitis    • Seizure ([facility]) 08/15/2019   • Seizure ([facility])    • Shoulder pain     right-sided-likely muscle strain      • Shoulder tendinitis    • Spider bite wound    • Staphylococcal infection of skin    • Stroke ([facility])    • Swollen feet    • Tinea cruris    • Tobacco dependence syndrome    • Upper respiratory infection    • Urinary tract infectious disease    • Vertigo    • Visit for gynecologic examination    • Vulvovaginitis        Allergies   Allergen Reactions   • Abilify [Aripiprazole] Nausea Only   • Buspirone Rash   • Penicillins Hives and Nausea And Vomiting       Past Surgical History:   Procedure Laterality Date   •  SECTION     • DILATATION AND CURETTAGE      Secondary to incomplete spontaneous    • INCISION AND DRAINAGE ABSCESS  2012   • INJECTION OF MEDICATION  2015    Phenergan (1)     • INJECTION OF MEDICATION  10/10/2013    Toradol (2)      • INJECTION OF MEDICATION  2014    Zofran (1)          Family History   Problem Relation Age of Onset   • Breast cancer Mother    • Thyroid disease Mother    • Hypertension Mother    • Heart disease Mother    • Arthritis Mother    • Liver disease Father    • Endometrial cancer Sister    • Anxiety disorder Brother    • Mental illness Daughter    •  "Depression Daughter    • Diabetes Maternal Aunt    • Diabetes Maternal Uncle    • Stroke Maternal Grandmother    • Breast cancer Maternal Grandmother    • Cancer Maternal Grandfather    • Stroke Paternal Grandmother    • Depression Other    • Cancer Other         Colorectal Cancer   • Endometrial cancer Other    • Lung cancer Other        Social History     Socioeconomic History   • Marital status: Single   Tobacco Use   • Smoking status: Every Day     Packs/day: 0.50     Types: Cigarettes   • Smokeless tobacco: Never   • Tobacco comments:     19*800*quit*NOW   Vaping Use   • Vaping Use: Some days   • Substances: Nicotine   • Devices: Refillable tank   • Passive vaping exposure: Yes   Substance and Sexual Activity   • Alcohol use: No   • Drug use: No   • Sexual activity: Not Currently           Objective    /60 (BP Location: Right arm, Patient Position: Lying)   Pulse 56   Temp 98.4 °F (36.9 °C)   Resp 18   Ht 165.1 cm (65\")   Wt 91.6 kg (202 lb)   LMP  (LMP Unknown)   SpO2 97%   BMI 33.61 kg/m²     Physical Exam  Vitals and nursing note reviewed.   Constitutional:       Appearance: She is not ill-appearing.   HENT:      Head: Normocephalic and atraumatic.   Cardiovascular:      Rate and Rhythm: Normal rate and regular rhythm.      Pulses: Normal pulses.   Pulmonary:      Effort: Pulmonary effort is normal.      Breath sounds: Normal breath sounds.   Abdominal:      General: Bowel sounds are normal. There is no distension.      Palpations: Abdomen is soft.      Tenderness: There is abdominal tenderness (generalized and to lower abdomen). There is right CVA tenderness.   Musculoskeletal:         General: Normal range of motion.   Skin:     General: Skin is warm and dry.      Capillary Refill: Capillary refill takes less than 2 seconds.   Neurological:      Mental Status: She is alert and oriented to person, place, and time.   Psychiatric:         Mood and Affect: Mood normal.         Behavior: Behavior " normal.         Procedures  Results for orders placed or performed during the hospital encounter of 03/04/23   Urinalysis With Microscopic If Indicated (No Culture) - Urine, Clean Catch    Specimen: Urine, Clean Catch   Result Value Ref Range    Color, UA Dark Yellow Yellow, Straw, Dark Yellow, Kavita    Appearance, UA Cloudy (A) Clear    pH, UA <=5.0 5.0 - 9.0    Specific Gravity, UA 1.029 1.003 - 1.030    Glucose, UA Negative Negative    Ketones, UA Trace (A) Negative    Bilirubin, UA Moderate (2+) (A) Negative    Blood, UA Trace (A) Negative    Protein, UA 30 mg/dL (1+) (A) Negative    Leuk Esterase, UA Moderate (2+) (A) Negative    Nitrite, UA Positive (A) Negative    Urobilinogen, UA 1.0 E.U./dL 0.2 - 1.0 E.U./dL   Pregnancy, Urine - Urine, Clean Catch    Specimen: Urine, Clean Catch   Result Value Ref Range    HCG, Urine QL Negative Negative   Urine Drug Screen - Urine, Clean Catch    Specimen: Urine, Clean Catch   Result Value Ref Range    THC, Screen, Urine Negative Negative    Phencyclidine (PCP), Urine Negative Negative    Cocaine Screen, Urine Negative Negative    Methamphetamine, Ur Negative Negative    Opiate Screen Negative Negative    Amphetamine Screen, Urine Negative Negative    Benzodiazepine Screen, Urine Positive (A) Negative    Tricyclic Antidepressants Screen Negative Negative    Methadone Screen, Urine Negative Negative    Barbiturates Screen, Urine Negative Negative    Oxycodone Screen, Urine Negative Negative    Propoxyphene Screen Negative Negative    Buprenorphine, Screen, Urine Negative Negative   Comprehensive Metabolic Panel    Specimen: Blood   Result Value Ref Range    Glucose 107 (H) 65 - 99 mg/dL    BUN 16 6 - 20 mg/dL    Creatinine 1.23 (H) 0.57 - 1.00 mg/dL    Sodium 139 136 - 145 mmol/L    Potassium 3.8 3.5 - 5.2 mmol/L    Chloride 103 98 - 107 mmol/L    CO2 23.0 22.0 - 29.0 mmol/L    Calcium 9.3 8.6 - 10.5 mg/dL    Total Protein 6.7 6.0 - 8.5 g/dL    Albumin 4.1 3.5 - 5.2 g/dL     ALT (SGPT) 449 (H) 1 - 33 U/L    AST (SGOT) 218 (H) 1 - 32 U/L    Alkaline Phosphatase 392 (H) 39 - 117 U/L    Total Bilirubin 2.1 (H) 0.0 - 1.2 mg/dL    Globulin 2.6 gm/dL    A/G Ratio 1.6 g/dL    BUN/Creatinine Ratio 13.0 7.0 - 25.0    Anion Gap 13.0 5.0 - 15.0 mmol/L    eGFR 54.7 (L) >60.0 mL/min/1.73   Lipase    Specimen: Blood   Result Value Ref Range    Lipase 95 (H) 13 - 60 U/L   Lactic Acid, Plasma    Specimen: Blood   Result Value Ref Range    Lactate 0.8 0.5 - 2.0 mmol/L   CBC Auto Differential    Specimen: Arm, Right; Blood   Result Value Ref Range    WBC 4.78 3.40 - 10.80 10*3/mm3    RBC 4.18 3.77 - 5.28 10*6/mm3    Hemoglobin 12.2 12.0 - 15.9 g/dL    Hematocrit 37.2 34.0 - 46.6 %    MCV 89.0 79.0 - 97.0 fL    MCH 29.2 26.6 - 33.0 pg    MCHC 32.8 31.5 - 35.7 g/dL    RDW 13.2 12.3 - 15.4 %    RDW-SD 43.0 37.0 - 54.0 fl    MPV 11.9 6.0 - 12.0 fL    Platelets 196 140 - 450 10*3/mm3    Neutrophil % 58.8 42.7 - 76.0 %    Lymphocyte % 24.3 19.6 - 45.3 %    Monocyte % 10.7 5.0 - 12.0 %    Eosinophil % 5.4 0.3 - 6.2 %    Basophil % 0.6 0.0 - 1.5 %    Immature Grans % 0.2 0.0 - 0.5 %    Neutrophils, Absolute 2.81 1.70 - 7.00 10*3/mm3    Lymphocytes, Absolute 1.16 0.70 - 3.10 10*3/mm3    Monocytes, Absolute 0.51 0.10 - 0.90 10*3/mm3    Eosinophils, Absolute 0.26 0.00 - 0.40 10*3/mm3    Basophils, Absolute 0.03 0.00 - 0.20 10*3/mm3    Immature Grans, Absolute 0.01 0.00 - 0.05 10*3/mm3    nRBC 0.0 0.0 - 0.2 /100 WBC   Urinalysis, Microscopic Only - Urine, Clean Catch    Specimen: Urine, Clean Catch   Result Value Ref Range    RBC, UA 0-2 (A) None Seen /HPF    WBC, UA Too Numerous to Count (A) None Seen, 0-2, 3-5 /HPF    Bacteria, UA 2+ (A) None Seen /HPF    Squamous Epithelial Cells, UA 31-50 (A) None Seen, 0-2 /HPF    Yeast, UA Small/1+ Budding Yeast None Seen /HPF    Hyaline Casts, UA None Seen None Seen /LPF    Methodology Manual Light Microscopy    Hepatitis Panel, Acute    Specimen: Blood   Result Value Ref  Range    Hepatitis B Surface Ag Non-Reactive Non-Reactive    Hep A IgM Non-Reactive Non-Reactive    Hep B C IgM Non-Reactive Non-Reactive    Hepatitis C Ab Non-Reactive Non-Reactive   Amylase    Specimen: Blood   Result Value Ref Range    Amylase 81 28 - 100 U/L   Gold Top - SST   Result Value Ref Range    Extra Tube Hold for add-ons.    Green Top (Gel)   Result Value Ref Range    Extra Tube Hold for add-ons.    Light Blue Top   Result Value Ref Range    Extra Tube Hold for add-ons.              ED Course  ED Course as of 03/04/23 2356   Sat Mar 04, 2023   2352 Findings discussed with the on-call surgeon, Dr. Jimenes, who has presented to the emergency department and is evaluating the patient and will make plans for surgery. [CB]      ED Course User Index  [CB] Asher Grande MD              Labs Reviewed   URINALYSIS W/ MICROSCOPIC IF INDICATED (NO CULTURE) - Abnormal; Notable for the following components:       Result Value    Appearance, UA Cloudy (*)     Ketones, UA Trace (*)     Bilirubin, UA Moderate (2+) (*)     Blood, UA Trace (*)     Protein, UA 30 mg/dL (1+) (*)     Leuk Esterase, UA Moderate (2+) (*)     Nitrite, UA Positive (*)     All other components within normal limits   URINE DRUG SCREEN - Abnormal; Notable for the following components:    Benzodiazepine Screen, Urine Positive (*)     All other components within normal limits    Narrative:     Cutoff For Drugs Screened:    Amphetamines               500 ng/ml  Barbiturates               200 ng/ml  Benzodiazepines            150 ng/ml  Cocaine                    150 ng/ml  Methadone                  200 ng/ml  Opiates                    100 ng/ml  Phencyclidine               25 ng/ml  THC                            50 ng/ml  Methamphetamine            500 ng/ml  Tricyclic Antidepressants  300 ng/ml  Oxycodone                  100 ng/ml  Propoxyphene               300 ng/ml  Buprenorphine               10 ng/ml    The normal value for all drugs  tested is negative. This report includes unconfirmed screening results, with the cutoff values listed, to be used for medical treatment purposes only.  Unconfirmed results must not be used for non-medical purposes such as employment or legal testing.  Clinical consideration should be applied to any drug of abuse test, particularly when unconfirmed results are used.     COMPREHENSIVE METABOLIC PANEL - Abnormal; Notable for the following components:    Glucose 107 (*)     Creatinine 1.23 (*)     ALT (SGPT) 449 (*)     AST (SGOT) 218 (*)     Alkaline Phosphatase 392 (*)     Total Bilirubin 2.1 (*)     eGFR 54.7 (*)     All other components within normal limits    Narrative:     GFR Normal >60  Chronic Kidney Disease <60  Kidney Failure <15     LIPASE - Abnormal; Notable for the following components:    Lipase 95 (*)     All other components within normal limits   URINALYSIS, MICROSCOPIC ONLY - Abnormal; Notable for the following components:    RBC, UA 0-2 (*)     WBC, UA Too Numerous to Count (*)     Bacteria, UA 2+ (*)     Squamous Epithelial Cells, UA 31-50 (*)     All other components within normal limits   PREGNANCY, URINE - Normal   LACTIC ACID, PLASMA - Normal   CBC WITH AUTO DIFFERENTIAL - Normal   HEPATITIS PANEL, ACUTE - Normal    Narrative:     Results may be falsely decreased if patient taking Biotin.    AMYLASE - Normal   CBC AND DIFFERENTIAL    Narrative:     The following orders were created for panel order CBC & Differential.  Procedure                               Abnormality         Status                     ---------                               -----------         ------                     CBC Auto Differential[[id number]]        Normal              Final result                 Please view results for these tests on the individual orders.   EXTRA TUBES    Narrative:     The following orders were created for panel order Extra Tubes.  Procedure                               Abnormality          Status                     ---------                               -----------         ------                     Gold Top - SST[[id number]]                                   Final result               Green Top (Gel)[[id number]]                                  Final result               Light Blue Top[[id number]]                                   Final result                 Please view results for these tests on the individual orders.   GOLD TOP - SST   GREEN TOP   LIGHT BLUE TOP       US Liver   Final Result      1. Gallstones with thickening of the gallbladder wall and   tenderness of the gallbladder reported when directly scanned.   Findings are suspicious for cholecystitis.   2. 8.4 mm common bile duct correlate for the potential   choledocholithiasis.      Electronically signed by:  Jaziel Benjamin MD  3/4/2023 10:46 PM CST   Workstation: [email or web address]      CT Abdomen Pelvis Without Contrast   Final Result   1.  Bilateral nonobstructive renal calculi.   2.  Other findings as above.      Electronically signed by:  Ryder Marino MD  3/4/2023 8:34 PM CST   Workstation: [email or web address]                                            [facility]    Final diagnoses:   Acute cholecystitis   Urinary tract infection in female       ED Disposition  ED Disposition     ED Disposition   Decision to Admit    Condition   --    Comment   Level of Care: Med/Surg [1]   Diagnosis: Acute cholecystitis [575.0.ICD-9-CM]   Admitting Physician: BEHROOZI, SAEID [[id number]]   Attending Physician: BEHROOZI, SAEID [[id number]]               No follow-up provider specified.       Medication List      No changes were made to your prescriptions during this visit.          Asher Grande MD  03/04/23 2356       Asher Grande MD  03/04/23 6892      Electronically signed by Asher Grande MD at 03/04/23 2357     Coco Titus, YOMAIRA at 03/04/23 235          Nursing report ED to floor  Mylse Nonieduardo Brynjun  47 y.o.  female    HPI:   Chief Complaint  "  Patient presents with   • Painful urination       Admitting doctor:   Saeid Behroozi, MD    Consulting provider(s):  Consults       Date and Time Order Name Status Description    3/4/2023 11:55 PM Hospitalist (on-call MD unless specified)      3/4/2023 11:27 PM Surgery (on-call MD unless specified)               Admitting diagnosis:   The primary encounter diagnosis was Acute cholecystitis. A diagnosis of Urinary tract infection in female was also pertinent to this visit.    Code status:   Current Code Status       Date Active Code Status Order ID Comments User Context       Prior            Allergies:   Abilify [aripiprazole], Buspirone, and Penicillins    Intake and Output    Intake/Output Summary (Last 24 hours) at 3/4/2023 2519  Last data filed at 3/4/2023 2311  Gross per 24 hour   Intake 1000 ml   Output --   Net 1000 ml       Weight:       03/04/23 2006   Weight: 91.6 kg (202 lb)       Most recent vitals:   Vitals:    03/04/23 2006 03/04/23 2106 03/04/23 2129 03/04/23 2249   BP: 94/53 126/64  142/60   BP Location:    Right arm   Patient Position:    Lying   Pulse: 58   56   Resp: 18  18 18   Temp:       SpO2: 95% 96%  97%   Weight: 91.6 kg (202 lb)      Height: 165.1 cm (65\")        Oxygen Therapy: RA    Active LDAs/IV Access:   Lines, Drains & Airways       Active LDAs       Name Placement date Placement time Site Days    Peripheral IV 03/04/23 1957 Right Antecubital 03/04/23 1957  Antecubital  less than 1                    Labs (abnormal labs have a star):   Labs Reviewed   URINALYSIS W/ MICROSCOPIC IF INDICATED (NO CULTURE) - Abnormal; Notable for the following components:       Result Value    Appearance, UA Cloudy (*)     Ketones, UA Trace (*)     Bilirubin, UA Moderate (2+) (*)     Blood, UA Trace (*)     Protein, UA 30 mg/dL (1+) (*)     Leuk Esterase, UA Moderate (2+) (*)     Nitrite, UA Positive (*)     All other components within normal limits   URINE DRUG SCREEN - Abnormal; Notable for the " following components:    Benzodiazepine Screen, Urine Positive (*)     All other components within normal limits    Narrative:     Cutoff For Drugs Screened:    Amphetamines               500 ng/ml  Barbiturates               200 ng/ml  Benzodiazepines            150 ng/ml  Cocaine                    150 ng/ml  Methadone                  200 ng/ml  Opiates                    100 ng/ml  Phencyclidine               25 ng/ml  THC                            50 ng/ml  Methamphetamine            500 ng/ml  Tricyclic Antidepressants  300 ng/ml  Oxycodone                  100 ng/ml  Propoxyphene               300 ng/ml  Buprenorphine               10 ng/ml    The normal value for all drugs tested is negative. This report includes unconfirmed screening results, with the cutoff values listed, to be used for medical treatment purposes only.  Unconfirmed results must not be used for non-medical purposes such as employment or legal testing.  Clinical consideration should be applied to any drug of abuse test, particularly when unconfirmed results are used.     COMPREHENSIVE METABOLIC PANEL - Abnormal; Notable for the following components:    Glucose 107 (*)     Creatinine 1.23 (*)     ALT (SGPT) 449 (*)     AST (SGOT) 218 (*)     Alkaline Phosphatase 392 (*)     Total Bilirubin 2.1 (*)     eGFR 54.7 (*)     All other components within normal limits    Narrative:     GFR Normal >60  Chronic Kidney Disease <60  Kidney Failure <15     LIPASE - Abnormal; Notable for the following components:    Lipase 95 (*)     All other components within normal limits   URINALYSIS, MICROSCOPIC ONLY - Abnormal; Notable for the following components:    RBC, UA 0-2 (*)     WBC, UA Too Numerous to Count (*)     Bacteria, UA 2+ (*)     Squamous Epithelial Cells, UA 31-50 (*)     All other components within normal limits   PREGNANCY, URINE - Normal   LACTIC ACID, PLASMA - Normal   CBC WITH AUTO DIFFERENTIAL - Normal   HEPATITIS PANEL, ACUTE - Normal     Narrative:     Results may be falsely decreased if patient taking Biotin.    AMYLASE - Normal   CBC AND DIFFERENTIAL    Narrative:     The following orders were created for panel order CBC & Differential.  Procedure                               Abnormality         Status                     ---------                               -----------         ------                     CBC Auto Differential[[id number]]        Normal              Final result                 Please view results for these tests on the individual orders.   EXTRA TUBES    Narrative:     The following orders were created for panel order Extra Tubes.  Procedure                               Abnormality         Status                     ---------                               -----------         ------                     Gold Top - SST[[id number]]                                   Final result               Green Top (Gel)[[id number]]                                  Final result               Light Blue Top[[id number]]                                   Final result                 Please view results for these tests on the individual orders.   GOLD TOP - SST   GREEN TOP   LIGHT BLUE TOP       Meds given in ED:   Medications   sodium chloride 0.9 % flush 10 mL (has no administration in time range)   aztreonam (AZACTAM) 2 g/100 mL 0.9% NS (mbp) (2 g Intravenous New Bag 3/4/23 2336)   metroNIDAZOLE (FLAGYL) IVPB 500 mg (has no administration in time range)   sodium chloride 0.9 % infusion (has no administration in time range)   sodium chloride 0.9 % bolus 1,000 mL (0 mL Intravenous Stopped 3/4/23 2311)   ondansetron (ZOFRAN) injection 4 mg (4 mg Intravenous Given 3/4/23 2003)     sodium chloride, 125 mL/hr         NIH Stroke Scale:       Isolation/Infection(s):  No active isolations   No active infections     COVID Testing  Collected NA  Resulted NA    Nursing report ED to floor:  Mental status: A&O  Ambulatory status: ad keya  Precautions: NA    ED  nurse phone extentbyrh- [phone]    Electronically signed by Coco Titus RN at 23 0002       Lab Results (last 48 hours)     Procedure Component Value Units Date/Time    TISSUE EXAM, [facility] [[id number]] Collected: 23 1342    Specimen: Tissue from Gallbladder Updated: 23 1406     Reference Lab Report --     [facility]  [address] [place] [address]  Phone: [phone] or [phone]  Fax: [phone]  Tremaine Dixon M.D., Medical Director    PATIENT NAME                           LABORATORY NO.  [id number]  [address] TORRES.               [id number]  [id number]                         AGE              SEX  SSN           CLIENT REF #  [facility]           47      1975  F    [id number]   [id number]    [place]                       REQUESTING M.D.     ATTENDING M.D.     COPY TO[address]                 [place] [address]             DATE COLLECTED      DATE RECEIVED      DATE REPORTED  2023    DIAGNOSIS:  GALLBLADDER:  Chronic cholecystitis  Cholelithiasis    GJK    CLINICAL HISTORY:  Acute cholecystitis      SPECIMENS RECEIVED:  GALLBLADDER    MICROSCOPIC DESCRIPTION:  Tissue blocks are prepared and slides are examined microscopically on all  specimens. See diagnosis for details.    Professional interpretation rendered by Julio Ann M.D., F.C.A.P. at [facility], [facility], [address] [place] [address].    GROSS DESCRIPTION:  Received in formalin labeled gallbladder is a 5.4 x 3.5 x 1.4 cm intact  gallbladder. The serosa is tan-pink and smooth with a roughened hepatic bed.  The cystic duct margin is inked black. The gallbladder is opened to reveal  multiple tan-yellow stones measuring 3.5 x 2.4 x 0.5 cm in aggregate. The  mucosal surface is tan-green and trabeculated. No polyps or lesions are grossly  identified. The  cystic duct is probe patent and the average wall thickness is 0.3  cm.  No lymph nodes are grossly identified. Representative sections of the  specimen are submitted as follows:  A1 cystic duct margin (en face), neck, body, and fundus.  [place]    REVIEWED, DIAGNOSED AND ELECTRONICALLY  SIGNED BY:    Julio Ann M.D., F.C.A.P.  CPT CODES:  19229      POC Glucose Once [[id number]]  (Abnormal) Collected: 03/08/23 1113    Specimen: Blood Updated: 03/08/23 1128     Glucose 168 mg/dL      Comment: RN NotifiedOperator: [id number] TenfootAMeter ID: [id number]       POC Glucose Once [[id number]]  (Normal) Collected: 03/08/23 0721    Specimen: Blood Updated: 03/08/23 0738     Glucose 78 mg/dL      Comment: : [id number] TenfootAMeter ID: [id number]       Comprehensive Metabolic Panel [[id number]]  (Abnormal) Collected: 03/08/23 0636    Specimen: Blood Updated: 03/08/23 0716     Glucose 76 mg/dL      BUN 10 mg/dL      Creatinine 0.65 mg/dL      Sodium 139 mmol/L      Potassium 3.7 mmol/L      Chloride 103 mmol/L      CO2 21.0 mmol/L      Calcium 9.3 mg/dL      Total Protein 6.4 g/dL      Albumin 3.7 g/dL      ALT (SGPT) 282 U/L      AST (SGOT) 167 U/L      Alkaline Phosphatase 376 U/L      Total Bilirubin 0.9 mg/dL      Globulin 2.7 gm/dL      A/G Ratio 1.4 g/dL      BUN/Creatinine Ratio 15.4     Anion Gap 15.0 mmol/L      eGFR 109.4 mL/min/1.73     Narrative:      GFR Normal >60  Chronic Kidney Disease <60  Kidney Failure <15      CBC & Differential [[id number]]  (Abnormal) Collected: 03/08/23 0636    Specimen: Blood Updated: 03/08/23 0658    Narrative:      The following orders were created for panel order CBC & Differential.  Procedure                               Abnormality         Status                     ---------                               -----------         ------                     CBC Auto Differential[[id number]]        Abnormal            Final result                 Please view results  for these tests on the individual orders.    CBC Auto Differential [[id number]]  (Abnormal) Collected: 03/08/23 0636    Specimen: Blood Updated: 03/08/23 0658     WBC 8.40 10*3/mm3      RBC 4.03 10*6/mm3      Hemoglobin 11.7 g/dL      Hematocrit 35.6 %      MCV 88.3 fL      MCH 29.0 pg      MCHC 32.9 g/dL      RDW 13.1 %      RDW-SD 42.3 fl      MPV 12.0 fL      Platelets 182 10*3/mm3      Neutrophil % 74.9 %      Lymphocyte % 15.4 %      Monocyte % 6.9 %      Eosinophil % 1.9 %      Basophil % 0.5 %      Immature Grans % 0.4 %      Neutrophils, Absolute 6.30 10*3/mm3      Lymphocytes, Absolute 1.29 10*3/mm3      Monocytes, Absolute 0.58 10*3/mm3      Eosinophils, Absolute 0.16 10*3/mm3      Basophils, Absolute 0.04 10*3/mm3      Immature Grans, Absolute 0.03 10*3/mm3      nRBC 0.0 /100 WBC     POC Glucose Once [[id number]]  (Normal) Collected: 03/07/23 1617    Specimen: Blood Updated: 03/07/23 1644     Glucose 97 mg/dL      Comment: RN NotifiedOperator: [id number] JERRICAMARK LARSONMeter ID: [id number]       POC Glucose Once [[id number]]  (Normal) Collected: 03/07/23 1047    Specimen: Blood Updated: 03/07/23 1113     Glucose 101 mg/dL      Comment: RN NotifiedOperator: [id number] JERRICA LARSONMeter ID: [id number]       POC Glucose Once [[id number]]  (Normal) Collected: 03/07/23 0658    Specimen: Blood Updated: 03/07/23 0752     Glucose 104 mg/dL      Comment: RN NotifiedOperator: [id number] JERRICA LARSONMeter ID: [id number]       Comprehensive Metabolic Panel [[id number]]  (Abnormal) Collected: 03/07/23 0529    Specimen: Blood Updated: 03/07/23 0616     Glucose 96 mg/dL      BUN 9 mg/dL      Creatinine 0.77 mg/dL      Sodium 138 mmol/L      Potassium 3.9 mmol/L      Chloride 104 mmol/L      CO2 24.0 mmol/L      Calcium 9.3 mg/dL      Total Protein 5.9 g/dL      Albumin 3.4 g/dL      ALT (SGPT) 295 U/L      AST (SGOT) 203 U/L      Alkaline Phosphatase 336 U/L      Total Bilirubin 0.8 mg/dL      Globulin 2.5  gm/dL      A/G Ratio 1.4 g/dL      BUN/Creatinine Ratio 11.7     Anion Gap 10.0 mmol/L      eGFR 95.9 mL/min/1.73     Narrative:      GFR Normal >60  Chronic Kidney Disease <60  Kidney Failure <15      CBC & Differential [[id number]]  (Abnormal) Collected: 03/07/23 0529    Specimen: Blood Updated: 03/07/23 0559    Narrative:      The following orders were created for panel order CBC & Differential.  Procedure                               Abnormality         Status                     ---------                               -----------         ------                     CBC Auto Differential[[id number]]        Abnormal            Final result                 Please view results for these tests on the individual orders.    CBC Auto Differential [[id number]]  (Abnormal) Collected: 03/07/23 0529    Specimen: Blood Updated: 03/07/23 0559     WBC 4.69 10*3/mm3      RBC 3.65 10*6/mm3      Hemoglobin 10.8 g/dL      Hematocrit 32.9 %      MCV 90.1 fL      MCH 29.6 pg      MCHC 32.8 g/dL      RDW 13.2 %      RDW-SD 43.5 fl      MPV 12.6 fL      Platelets 160 10*3/mm3      Neutrophil % 58.9 %      Lymphocyte % 28.6 %      Monocyte % 9.6 %      Eosinophil % 2.1 %      Basophil % 0.4 %      Immature Grans % 0.4 %      Neutrophils, Absolute 2.76 10*3/mm3      Lymphocytes, Absolute 1.34 10*3/mm3      Monocytes, Absolute 0.45 10*3/mm3      Eosinophils, Absolute 0.10 10*3/mm3      Basophils, Absolute 0.02 10*3/mm3      Immature Grans, Absolute 0.02 10*3/mm3      nRBC 0.0 /100 WBC     POC Glucose Once [[id number]]  (Normal) Collected: 03/06/23 1930    Specimen: Blood Updated: 03/06/23 1943     Glucose 93 mg/dL      Comment: RN NotifiedOperator: [id number] FLORENTIN Wolf ID: [id number]             Imaging Results (Last 48 Hours)     Procedure Component Value Units Date/Time    FL ercp biliary duct only [[id number]] Collected: 03/07/23 1826     Updated: 03/08/23 0659    Narrative:      EXAM DESCRIPTION:   FL ERCP BILIARY DUCT  ONLY    CLINICAL HISTORY:   47 years  Female  ercp, K81.0 Acute cholecystitis N39.0 Urinary  tract infection, site not specified K80.47 Calculus of bile duct  with acute and chronic cholecystitis with obstruction    NUMBER IMAGES: 3 images    FLUOROSCOPY TIME: 1 minute 39 seconds    TECHNIQUE:   Fluoroscopic images were obtained during an ERCP.    COMPARISON:   None    FINDINGS:   Multiple intraoperative images show biliary ductal dilation and  biliary stent placement.       Impression:      Later ductal dilation and biliary stent placement.  Please also  see operative report for further details.    Electronically signed by:  Juvenal Boothe MD  3/8/2023 6:56  AM CST Workstation: [id number]        ECG/EMG Results (last 72 hours)     Procedure Component Value Units Date/Time    SCANNED - TELEMETRY   [[id number]] Resulted: 03/04/23     Updated: 03/06/23 1449    SCANNED - TELEMETRY   [[id number]] Resulted: 03/04/23     Updated: 03/06/23 1502    SCANNED - TELEMETRY   [[id number]] Resulted: 03/04/23     Updated: 03/06/23 1519    SCANNED - TELEMETRY   [[id number]] Resulted: 03/04/23     Updated: 03/07/23 1813             Physician Progress Notes (last 48 hours)      Erick Chisholm MD at 03/07/23 0913              [facility] Medicine Services  INPATIENT PROGRESS NOTE    Length of Stay: 0  Date of Admission: 3/4/2023  Primary Care Physician: Sonya Crooks APRN    Subjective   (S) Admitted for right flank pain and no abdominal tenderness; US positive for Gifford positive and possible choledocholithiasis  Lap cholecystectomy done 2 days ago and a solitary stone on the ampulla unable to extract it; discussed today with Dr Robb  Still with abdominal pain in the RUQ    Review of Systems   All other systems reviewed and are negative.       All pertinent negatives and positives are as above. All other systems have been reviewed and are negative unless otherwise stated.      Prior to Admission medications    Medication Sig Start Date End Date Taking? Authorizing Provider   aspirin 325 MG tablet Take 1 tablet by mouth Daily.   Yes Radha Zaragoza MD   atorvastatin (LIPITOR) 80 MG tablet Take 1 tablet by mouth Every Night. 12/18/21  Yes Juan Conroy MD   busPIRone (BUSPAR) 15 MG tablet Take 1 tablet by mouth 3 (Three) Times a Day.   Yes Radha Zaragoza MD   butalbital-acetaminophen-caffeine (FIORICET, ESGIC) -40 MG per tablet Take 1 tablet by mouth Daily As Needed for Headache.   Yes Radha Zaragoza MD   cloNIDine (CATAPRES) 0.1 MG tablet Take 1 tablet by mouth Every 30 (Thirty) Minutes As Needed for High Blood Pressure. Give one tap po every 30 mins when BP is over 160   Yes Radah Zaragoza MD   diclofenac (VOLTAREN) 75 MG EC tablet 1 tablet 2 (Two) Times a Day. 10/1/22  Yes Radha Zaragoza MD   escitalopram (LEXAPRO) 10 MG tablet Take 2 tablets by mouth Daily. 10/1/22  Yes Radha Zaragoza MD   fluticasone (FLONASE) 50 MCG/ACT nasal spray 2 sprays into the nostril(s) as directed by provider Daily.   Yes Radha Zaragoza MD   furosemide (LASIX) 20 MG tablet Take 1 tablet by mouth Daily.   Yes ProviderRadha MD   hydrOXYzine pamoate (VISTARIL) 50 MG capsule Take 1 capsule by mouth 2 (Two) Times a Day As Needed. 4/5/22  Yes Sonya Crooks APRN   ketorolac (TORADOL) 10 MG tablet Take 1 tablet by mouth Every 6 (Six) Hours As Needed for Moderate Pain.   Yes Radha Zaragoza MD   levETIRAcetam (KEPPRA) 1000 MG tablet Take 1 tablet by mouth 2 (Two) Times a Day.   Yes Radha Zaragoza MD   losartan (COZAAR) 50 MG tablet Take 1 tablet by mouth Daily.   Yes Radha Zaragoza MD   meclizine (ANTIVERT) 12.5 MG tablet Take 1 tablet by mouth 3 (Three) Times a Day As Needed for Dizziness. 4/19/22  Yes Sonya Crooks APRN   melatonin 5 MG tablet tablet Take 2 tablets by mouth every night at bedtime.   Yes Nik  MD Radha   metoclopramide (REGLAN) 5 MG tablet Take 1 tablet by mouth 3 (Three) Times a Day As Needed (Nausea). 1/19/23  Yes Juvenal Chand MD   nabumetone (RELAFEN) 750 MG tablet Take 1 tablet by mouth 2 (Two) Times a Day As Needed for Mild Pain. 11/24/22  Yes Juvenal Chand MD   ondansetron ODT (ZOFRAN-ODT) 4 MG disintegrating tablet Place 1 tablet on the tongue 4 (Four) Times a Day As Needed for Nausea or Vomiting. 1/19/23  Yes Juvenal Chand MD   orphenadrine (NORFLEX) 100 MG 12 hr tablet Take 1 tablet by mouth 2 (Two) Times a Day As Needed for Muscle Spasms or Mild Pain . 4/13/22  Yes Nanette Mai APRN   pantoprazole (Protonix) 40 MG EC tablet Take 1 tablet by mouth Daily. 12/22/21  Yes Buzz Vasquez APRN   potassium chloride (K-DUR,KLOR-CON) 10 MEQ CR tablet Take 1 tablet by mouth Daily. 3/1/22  Yes Sonya Crooks APRN   promethazine (PHENERGAN) 25 MG suppository Insert 1 suppository into the rectum Every 6 (Six) Hours As Needed for Nausea or Vomiting. 1/19/23  Yes Juvenal Chand MD   traZODone (DESYREL) 50 MG tablet Take 1 tablet by mouth Every Night.   Yes Radha Zaragoza MD   medroxyPROGESTERone (DEPO-PROVERA) 150 MG/ML injection Inject 1 mL into the appropriate muscle as directed by prescriber Every 3 (Three) Months.  3/5/23 Yes Radha Zaragoza MD   acetaminophen (TYLENOL) 500 MG tablet Take 2 tablets by mouth Every 8 (Eight) Hours As Needed for Mild Pain.    Radha Zaragoza MD   escitalopram (LEXAPRO) 20 MG tablet Take 1.5 tablets by mouth Daily. 30 mg    Radha Zaragoza MD   solifenacin (VESICARE) 10 MG tablet Take 1 tablet by mouth Daily.    Radha Zaragoza MD       aztreonam, 2 g, Intravenous, Q8H  levETIRAcetam, 1,000 mg, Intravenous, Q12H  pantoprazole, 40 mg, Intravenous, Q12H  sodium chloride, 10 mL, Intravenous, Q12H      lactated ringers, 100 mL/hr, Last Rate: 100 mL/hr (03/07/23 0653)        Objective    Temp:  [98 °F (36.7  °C)-98.6 °F (37 °C)] 98.4 °F (36.9 °C)  Heart Rate:  [55-60] 55  Resp:  [16-18] 16  BP: (103-137)/(57-77) 128/77    Physical Exam  Constitutional:       Appearance: She is obese.      Comments: Despite the pain she is in good spirit   HENT:      Head: Normocephalic.      Nose: Nose normal.      Mouth/Throat:      Mouth: Mucous membranes are moist.   Eyes:      Extraocular Movements: Extraocular movements intact.   Cardiovascular:      Rate and Rhythm: Regular rhythm.      Heart sounds: Normal heart sounds.   Pulmonary:      Breath sounds: Normal breath sounds.   Abdominal:      Palpations: Abdomen is soft.      Tenderness: There is abdominal tenderness.   Musculoskeletal:         General: Normal range of motion.      Cervical back: Normal range of motion and neck supple.   Skin:     General: Skin is warm.   Neurological:      General: No focal deficit present.      Mental Status: She is alert. Mental status is at baseline.           Results Review:  I have reviewed the labs, radiology results, and diagnostic studies.    Laboratory Data:   Results from last 7 days   Lab Units 03/07/23  0529 03/06/23  0707 03/05/23  0546   SODIUM mmol/L 138 139 139   POTASSIUM mmol/L 3.9 4.5 4.1   CHLORIDE mmol/L 104 105 108*   CO2 mmol/L 24.0 23.0 21.0*   BUN mg/dL 9 14 13   CREATININE mg/dL 0.77 0.91 0.96   GLUCOSE mg/dL 96 87 82   CALCIUM mg/dL 9.3 9.2 8.7   BILIRUBIN mg/dL 0.8 0.9 1.7*   ALK PHOS U/L 336* 363* 344*   ALT (SGPT) U/L 295* 366* 374*   AST (SGOT) U/L 203* 223* 198*   ANION GAP mmol/L 10.0 11.0 10.0     Estimated Creatinine Clearance: 101.8 mL/min (by C-G formula based on SCr of 0.77 mg/dL).  Results from last 7 days   Lab Units 03/05/23  0546   MAGNESIUM mg/dL 2.3         Results from last 7 days   Lab Units 03/07/23  0529 03/05/23  0546 03/04/23 1958   WBC 10*3/mm3 4.69 4.84 4.78   HEMOGLOBIN g/dL 10.8* 11.4* 12.2   HEMATOCRIT % 32.9* 33.8* 37.2   PLATELETS 10*3/mm3 160 168 196     Results from last 7 days   Lab  Units 03/05/23  0546   INR  0.98       Culture Data:   No results found for: BLOODCX  Urine Culture   Date Value Ref Range Status   03/04/2023 <10,000 CFU/mL Mixed Lety Isolated  Final     No results found for: RESPCX  No results found for: WOUNDCX  No results found for: STOOLCX  No components found for: BODYFLD    Radiology Data:   Imaging Results (Last 24 Hours)     ** No results found for the last 24 hours. **          I have reviewed the patient's current medications.     Assessment/Plan   Acute cholecystitis     With acute choledocholithiasis as shown intra op cholangiogram done by Dr Jimenes; ERCP cancelled yesterday due to lab issues; discussed with Dr Robb to be done today     On aztreonam     Chronic medical management     Hx of CVA, ESBL      Seizure disorder     CKD stage III     Left kidney stones    Medical Decision Making  Number and Complexity of problems: two major  Differential Diagnosis: kidney stone    Conditions and Status:        Condition is unchanged.     MDM Data  External documents reviewed: previous medical records  My EKG interpretation:   My plain film interpretation: left kidney stones      Decision rules/scores evaluated (example [id number], Wells, etc): n/a     Discussed with: patient     Treatment Plan   Will be taken to ERCP    Care Planning  Shared decision making: patient  Code status and discussions: full    Disposition  Social Determinants of Health that impact treatment or disposition: none  I expect the patient to be discharged to home in 1 to 2 days.     I confirmed that the patient's Advance Care Plan is present, code status is documented, or surrogate decision maker is listed in the patient's medical record.     Erick Chisholm MD    Electronically signed by Erick Chisholm MD at 03/07/23 0966       Medical Student Notes (last 48 hours)  Notes from 03/06/23 1509 through 03/08/23 1509   No notes of this type exist for this encounter.       Consult Notes (last 48  hours)  Notes from 03/06/23 1509 through 03/08/23 1509   No notes of this type exist for this encounter.

## 2023-03-08 NOTE — ANESTHESIA PROCEDURE NOTES
Airway  Urgency: elective    Date/Time: 3/7/2023 6:17 PM  Airway not difficult    General Information and Staff    Patient location during procedure: radiology  CRNA/CAA: Demian Groves CRNA  SRNA: Yesika Wick SRNA  Indications and Patient Condition  Indications for airway management: airway protection    Preoxygenated: yes  MILS maintained throughout  Mask difficulty assessment: 0 - not attempted    Final Airway Details  Final airway type: endotracheal airway      Successful airway: ETT  Cuffed: yes   Successful intubation technique: video laryngoscopy  Facilitating devices/methods: intubating stylet  Endotracheal tube insertion site: oral  Blade: Farias  Blade size: 3  ETT size (mm): 7.0  Cormack-Lehane Classification: grade IIa - partial view of glottis  Placement verified by: chest auscultation and capnometry   Cuff volume (mL): 8  Measured from: gums  ETT/EBT to gums (cm): 21  Number of attempts at approach: 2  Assessment: lips, teeth, and gum same as pre-op and atraumatic intubation

## 2023-03-08 NOTE — PLAN OF CARE
Goal Outcome Evaluation:  Plan of Care Reviewed With: patient           Outcome Evaluation: Pt remains NPO after ERCP per order, has rested through most of the night. IVFs infusing as ordered, purewick remains in place. No c/o pain or n/v. Pt received IV hydralazine once with desired effect, BP WNL now. VSS. All needs have been met. No distress noted.

## 2023-03-08 NOTE — PROGRESS NOTES
[facility] Medicine Services  INPATIENT PROGRESS NOTE    Length of Stay: 0  Date of Admission: 3/4/2023  Primary Care Physician: Sonya Crooks APRN    Subjective   (S) Admitted for right flank pain and no abdominal tenderness; US positive for Gifford positive and possible choledocholithiasis  Lap cholecystectomy done on 3/5/23  ERCP on 3/7/23; NPO now; will start on clear liquid diet    Review of Systems   All other systems reviewed and are negative.       All pertinent negatives and positives are as above. All other systems have been reviewed and are negative unless otherwise stated.     Prior to Admission medications    Medication Sig Start Date End Date Taking? Authorizing Provider   aspirin 325 MG tablet Take 1 tablet by mouth Daily.   Yes Radha Zaragoza MD   atorvastatin (LIPITOR) 80 MG tablet Take 1 tablet by mouth Every Night. 12/18/21  Yes Juan Conroy MD   busPIRone (BUSPAR) 15 MG tablet Take 1 tablet by mouth 3 (Three) Times a Day.   Yes Radha Zaragoza MD   butalbital-acetaminophen-caffeine (FIORICET, ESGIC) -40 MG per tablet Take 1 tablet by mouth Daily As Needed for Headache.   Yes Radha Zaragoza MD   cloNIDine (CATAPRES) 0.1 MG tablet Take 1 tablet by mouth Every 30 (Thirty) Minutes As Needed for High Blood Pressure. Give one tap po every 30 mins when BP is over 160   Yes Radha Zaragoza MD   diclofenac (VOLTAREN) 75 MG EC tablet 1 tablet 2 (Two) Times a Day. 10/1/22  Yes Radha Zaragoza MD   escitalopram (LEXAPRO) 10 MG tablet Take 2 tablets by mouth Daily. 10/1/22  Yes Radha Zaragoza MD   fluticasone (FLONASE) 50 MCG/ACT nasal spray 2 sprays into the nostril(s) as directed by provider Daily.   Yes Radha Zaragoza MD   furosemide (LASIX) 20 MG tablet Take 1 tablet by mouth Daily.   Yes Radha Zaragoza MD   hydrOXYzine pamoate (VISTARIL) 50 MG capsule Take 1 capsule by mouth 2 (Two) Times a Day  As Needed. 4/5/22  Yes Sonya Crooks APRN   ketorolac (TORADOL) 10 MG tablet Take 1 tablet by mouth Every 6 (Six) Hours As Needed for Moderate Pain.   Yes Radha Zaragoza MD   levETIRAcetam (KEPPRA) 1000 MG tablet Take 1 tablet by mouth 2 (Two) Times a Day.   Yes Radha Zaragoza MD   losartan (COZAAR) 50 MG tablet Take 1 tablet by mouth Daily.   Yes ProviderRadha MD   meclizine (ANTIVERT) 12.5 MG tablet Take 1 tablet by mouth 3 (Three) Times a Day As Needed for Dizziness. 4/19/22  Yes Sonya Crooks APRN   melatonin 5 MG tablet tablet Take 2 tablets by mouth every night at bedtime.   Yes Radha Zaragoza MD   metoclopramide (REGLAN) 5 MG tablet Take 1 tablet by mouth 3 (Three) Times a Day As Needed (Nausea). 1/19/23  Yes Juvenal Chand MD   nabumetone (RELAFEN) 750 MG tablet Take 1 tablet by mouth 2 (Two) Times a Day As Needed for Mild Pain. 11/24/22  Yes Juvenal Chand MD   ondansetron ODT (ZOFRAN-ODT) 4 MG disintegrating tablet Place 1 tablet on the tongue 4 (Four) Times a Day As Needed for Nausea or Vomiting. 1/19/23  Yes Juvenal Chand MD   orphenadrine (NORFLEX) 100 MG 12 hr tablet Take 1 tablet by mouth 2 (Two) Times a Day As Needed for Muscle Spasms or Mild Pain . 4/13/22  Yes Nanette Mai APRN   pantoprazole (Protonix) 40 MG EC tablet Take 1 tablet by mouth Daily. 12/22/21  Yes Buzz Vasquez APRN   potassium chloride (K-DUR,KLOR-CON) 10 MEQ CR tablet Take 1 tablet by mouth Daily. 3/1/22  Yes Sonya Crooks APRN   promethazine (PHENERGAN) 25 MG suppository Insert 1 suppository into the rectum Every 6 (Six) Hours As Needed for Nausea or Vomiting. 1/19/23  Yes Juvenal Chand MD   traZODone (DESYREL) 50 MG tablet Take 1 tablet by mouth Every Night.   Yes ProviderRadha MD   medroxyPROGESTERone (DEPO-PROVERA) 150 MG/ML injection Inject 1 mL into the appropriate muscle as directed by prescriber Every 3 (Three) Months.  3/5/23 Yes  Radha Zaragoza MD   acetaminophen (TYLENOL) 500 MG tablet Take 2 tablets by mouth Every 8 (Eight) Hours As Needed for Mild Pain.    Radha Zaragoza MD   escitalopram (LEXAPRO) 20 MG tablet Take 1.5 tablets by mouth Daily. 30 mg    Radha Zaragoza MD   solifenacin (VESICARE) 10 MG tablet Take 1 tablet by mouth Daily.    Radha Zaragoza MD       aztreonam, 2 g, Intravenous, Q8H  levETIRAcetam, 1,000 mg, Intravenous, Q12H  pantoprazole, 40 mg, Intravenous, Q12H  sodium chloride, 10 mL, Intravenous, Q12H      lactated ringers, 100 mL/hr, Last Rate: 100 mL/hr (03/08/23 0009)        Objective    Temp:  [97.6 °F (36.4 °C)-99.3 °F (37.4 °C)] 99.1 °F (37.3 °C)  Heart Rate:  [47-80] 62  Resp:  [12-18] 18  BP: (124-184)/() 126/70    Physical Exam  Constitutional:       Appearance: She is obese.      Comments: Abdominal pain has markedly decreased   HENT:      Head: Normocephalic.      Nose: Nose normal.      Mouth/Throat:      Mouth: Mucous membranes are moist.   Eyes:      Extraocular Movements: Extraocular movements intact.   Cardiovascular:      Rate and Rhythm: Regular rhythm.      Heart sounds: Normal heart sounds.   Pulmonary:      Breath sounds: Normal breath sounds.   Abdominal:      Palpations: Abdomen is soft.      Tenderness: There is abdominal tenderness.   Musculoskeletal:         General: Normal range of motion.      Cervical back: Normal range of motion and neck supple.   Skin:     General: Skin is warm.   Neurological:      General: No focal deficit present.      Mental Status: She is alert. Mental status is at baseline.           Results Review:  I have reviewed the labs, radiology results, and diagnostic studies.    Laboratory Data:   Results from last 7 days   Lab Units 03/08/23  0636 03/07/23  0529 03/06/23  0707   SODIUM mmol/L 139 138 139   POTASSIUM mmol/L 3.7 3.9 4.5   CHLORIDE mmol/L 103 104 105   CO2 mmol/L 21.0* 24.0 23.0   BUN mg/dL 10 9 14   CREATININE mg/dL 0.65 0.77  0.91   GLUCOSE mg/dL 76 96 87   CALCIUM mg/dL 9.3 9.3 9.2   BILIRUBIN mg/dL 0.9 0.8 0.9   ALK PHOS U/L 376* 336* 363*   ALT (SGPT) U/L 282* 295* 366*   AST (SGOT) U/L 167* 203* 223*   ANION GAP mmol/L 15.0 10.0 11.0     Estimated Creatinine Clearance: 120.6 mL/min (by C-G formula based on SCr of 0.65 mg/dL).  Results from last 7 days   Lab Units 03/05/23  0546   MAGNESIUM mg/dL 2.3         Results from last 7 days   Lab Units 03/08/23  0636 03/07/23  0529 03/05/23  0546 03/04/23  1958   WBC 10*3/mm3 8.40 4.69 4.84 4.78   HEMOGLOBIN g/dL 11.7* 10.8* 11.4* 12.2   HEMATOCRIT % 35.6 32.9* 33.8* 37.2   PLATELETS 10*3/mm3 182 160 168 196     Results from last 7 days   Lab Units 03/05/23  0546   INR  0.98       Culture Data:   No results found for: BLOODCX  No results found for: URINECX  No results found for: RESPCX  No results found for: WOUNDCX  No results found for: STOOLCX  No components found for: BODYFLD    Radiology Data:   Imaging Results (Last 24 Hours)     Procedure Component Value Units Date/Time    FL ercp biliary duct only [[id number]] Collected: 03/07/23 1826     Updated: 03/08/23 0659    Narrative:      EXAM DESCRIPTION:   FL ERCP BILIARY DUCT ONLY    CLINICAL HISTORY:   47 years  Female  ercp, K81.0 Acute cholecystitis N39.0 Urinary  tract infection, site not specified K80.47 Calculus of bile duct  with acute and chronic cholecystitis with obstruction    NUMBER IMAGES: 3 images    FLUOROSCOPY TIME: 1 minute 39 seconds    TECHNIQUE:   Fluoroscopic images were obtained during an ERCP.    COMPARISON:   None    FINDINGS:   Multiple intraoperative images show biliary ductal dilation and  biliary stent placement.       Impression:      Later ductal dilation and biliary stent placement.  Please also  see operative report for further details.    Electronically signed by:  Juvenal Boothe MD  3/8/2023 6:56  AM CST Workstation: [id number]          I have reviewed the patient's current medications.      Assessment/Plan   Acute cholecystitis acute choledocholithiasis      s/p lap cholecystectomy on 3/5/23     sp ERCP on 3/7/23; biliary sphincterotomy; plastic stent placed on CBD     On aztreonam 4/5    Abdominal pain     Due to above; resolving    Transaminitis     Due to main insult; trending down    Chronic medical management     Hx of CVA, ESBL      Seizure disorder     CKD stage III     Left kidney stones    Medical Decision Making  Number and Complexity of problems: two major  Differential Diagnosis: kidney stone    Conditions and Status:        Condition is unchanged.     MDM Data  External documents reviewed: previous medical records  My EKG interpretation:   My plain film interpretation: left kidney stones      Decision rules/scores evaluated (example [id number], Wells, etc): n/a     Discussed with: patient     Treatment Plan   Clear liquid diet and monitor response    Care Planning  Shared decision making: patient  Code status and discussions: full    Disposition  Social Determinants of Health that impact treatment or disposition: none  I expect the patient to be discharged to home tomorrow    I confirmed that the patient's Advance Care Plan is present, code status is documented, or surrogate decision maker is listed in the patient's medical record.     Erick Chisholm MD

## 2023-03-09 ENCOUNTER — READMISSION MANAGEMENT (OUTPATIENT)
Dept: CALL CENTER | Facility: HOSPITAL | Age: 48
End: 2023-03-09
Payer: COMMERCIAL

## 2023-03-09 VITALS
DIASTOLIC BLOOD PRESSURE: 61 MMHG | TEMPERATURE: 98.6 F | RESPIRATION RATE: 18 BRPM | WEIGHT: 200 LBS | SYSTOLIC BLOOD PRESSURE: 114 MMHG | HEIGHT: 65 IN | OXYGEN SATURATION: 98 % | HEART RATE: 55 BPM | BODY MASS INDEX: 33.32 KG/M2

## 2023-03-09 LAB
ALBUMIN SERPL-MCNC: 3.4 G/DL (ref 3.5–5.2)
ALBUMIN/GLOB SERPL: 1.4 G/DL
ALP SERPL-CCNC: 296 U/L (ref 39–117)
ALT SERPL W P-5'-P-CCNC: 195 U/L (ref 1–33)
ANION GAP SERPL CALCULATED.3IONS-SCNC: 12 MMOL/L (ref 5–15)
AST SERPL-CCNC: 89 U/L (ref 1–32)
BILIRUB SERPL-MCNC: 0.7 MG/DL (ref 0–1.2)
BUN SERPL-MCNC: 7 MG/DL (ref 6–20)
BUN/CREAT SERPL: 10.9 (ref 7–25)
CALCIUM SPEC-SCNC: 8.9 MG/DL (ref 8.6–10.5)
CHLORIDE SERPL-SCNC: 105 MMOL/L (ref 98–107)
CO2 SERPL-SCNC: 22 MMOL/L (ref 22–29)
CREAT SERPL-MCNC: 0.64 MG/DL (ref 0.57–1)
EGFRCR SERPLBLD CKD-EPI 2021: 109.8 ML/MIN/1.73
GLOBULIN UR ELPH-MCNC: 2.5 GM/DL
GLUCOSE SERPL-MCNC: 102 MG/DL (ref 65–99)
POTASSIUM SERPL-SCNC: 4 MMOL/L (ref 3.5–5.2)
PROT SERPL-MCNC: 5.9 G/DL (ref 6–8.5)
SODIUM SERPL-SCNC: 139 MMOL/L (ref 136–145)

## 2023-03-09 PROCEDURE — 80053 COMPREHEN METABOLIC PANEL: CPT | Performed by: INTERNAL MEDICINE

## 2023-03-09 PROCEDURE — 97161 PT EVAL LOW COMPLEX 20 MIN: CPT

## 2023-03-09 RX ORDER — FUROSEMIDE 20 MG/1
20 TABLET ORAL DAILY
Status: DISCONTINUED | OUTPATIENT
Start: 2023-03-09 | End: 2023-03-09 | Stop reason: HOSPADM

## 2023-03-09 RX ORDER — CHOLECALCIFEROL (VITAMIN D3) 125 MCG
10 CAPSULE ORAL NIGHTLY
Status: DISCONTINUED | OUTPATIENT
Start: 2023-03-09 | End: 2023-03-09 | Stop reason: HOSPADM

## 2023-03-09 RX ORDER — ACETAMINOPHEN 325 MG/1
650 TABLET ORAL ONCE
Status: COMPLETED | OUTPATIENT
Start: 2023-03-09 | End: 2023-03-09

## 2023-03-09 RX ORDER — ATORVASTATIN CALCIUM 80 MG/1
40 TABLET, FILM COATED ORAL NIGHTLY
Qty: 90 TABLET
Start: 2023-03-09

## 2023-03-09 RX ORDER — LOSARTAN POTASSIUM 50 MG/1
50 TABLET ORAL DAILY
Status: DISCONTINUED | OUTPATIENT
Start: 2023-03-09 | End: 2023-03-09 | Stop reason: HOSPADM

## 2023-03-09 RX ORDER — LEVETIRACETAM 500 MG/1
1000 TABLET ORAL 2 TIMES DAILY
Status: DISCONTINUED | OUTPATIENT
Start: 2023-03-09 | End: 2023-03-09 | Stop reason: HOSPADM

## 2023-03-09 RX ORDER — ESCITALOPRAM OXALATE 10 MG/1
20 TABLET ORAL DAILY
Status: DISCONTINUED | OUTPATIENT
Start: 2023-03-09 | End: 2023-03-09 | Stop reason: HOSPADM

## 2023-03-09 RX ADMIN — ESCITALOPRAM OXALATE 20 MG: 10 TABLET ORAL at 09:16

## 2023-03-09 RX ADMIN — ACETAMINOPHEN 650 MG: 325 TABLET ORAL at 09:16

## 2023-03-09 RX ADMIN — FUROSEMIDE 20 MG: 20 TABLET ORAL at 09:16

## 2023-03-09 RX ADMIN — LEVETIRACETAM 1000 MG: 500 TABLET, FILM COATED ORAL at 09:16

## 2023-03-09 RX ADMIN — LOSARTAN POTASSIUM 50 MG: 50 TABLET, FILM COATED ORAL at 09:16

## 2023-03-09 RX ADMIN — Medication 10 ML: at 09:18

## 2023-03-09 NOTE — PROGRESS NOTES
SUBJECTIVE:   3/8/2023  Chief Complaint:     Subjective      Patient feels better today.  Abdominal pain is improving.  Denies nausea or vomiting.  Tolerating diet.  Bilirubin is normal and LFTs remain elevated.    History:  Past Medical History:   Diagnosis Date   • Abdominal pain     Chronic RLQ, RUQ, R flank comes and goes.    • Abdominal pain, right lower quadrant    • Acute bilateral otitis media    • Allergic rhinitis    • Backache     Upper back.   • Candidiasis of skin    • Cellulitis of neck    • Chest pain    • Contraception    • Cough    • Dysfunction of eustachian tube    • Dyspareunia, female    • Elevated cholesterol    • Excessive or frequent menstruation    • Flank pain    • Generalized aches and pains    • Headache    • Health maintenance examination    • Hypertension    • Infection of skin and subcutaneous tissue    • Irregular periods    • Kidney stone     Poss   • Menorrhagia    • Nausea vomiting and diarrhea    • Obesity    • Open wound of abdominal wall    • Otalgia    • Pain in left foot    • Pain in unspecified hand    • Removed Impacted cerumen 2012   • Rhinitis    • Seizure ([facility]) 08/15/2019   • Seizure ([facility])    • Shoulder pain     right-sided-likely muscle strain      • Shoulder tendinitis    • Spider bite wound    • Staphylococcal infection of skin    • Stroke ([facility])    • Swollen feet    • Tinea cruris    • Tobacco dependence syndrome    • Upper respiratory infection    • Urinary tract infectious disease    • Vertigo    • Visit for gynecologic examination    • Vulvovaginitis      Past Surgical History:   Procedure Laterality Date   •  SECTION     • CYSTOSCOPY N/A 2023    Procedure: CYSTOSCOPY;  Surgeon: Chris Deshpande MD;  Location: [facility];  Service: Urology;  Laterality: N/A;   • DILATATION AND CURETTAGE      Secondary to incomplete spontaneous    • INCISION AND DRAINAGE ABSCESS  2012   • INJECTION OF MEDICATION  2015    Phenergan  (1)     • INJECTION OF MEDICATION  10/10/2013    Toradol (2)      • INJECTION OF MEDICATION  01/16/2014    Zofran (1)        Family History   Problem Relation Age of Onset   • Breast cancer Mother    • Thyroid disease Mother    • Hypertension Mother    • Heart disease Mother    • Arthritis Mother    • Liver disease Father    • Endometrial cancer Sister    • Anxiety disorder Brother    • Mental illness Daughter    • Depression Daughter    • Diabetes Maternal Aunt    • Diabetes Maternal Uncle    • Stroke Maternal Grandmother    • Breast cancer Maternal Grandmother    • Cancer Maternal Grandfather    • Stroke Paternal Grandmother    • Depression Other    • Cancer Other         Colorectal Cancer   • Endometrial cancer Other    • Lung cancer Other      Social History     Tobacco Use   • Smoking status: Every Day     Packs/day: 0.50     Types: Cigarettes   • Smokeless tobacco: Never   • Tobacco comments:     19*800*quit*NOW   Vaping Use   • Vaping Use: Some days   • Substances: Nicotine   • Devices: Refillable tank   • Passive vaping exposure: Yes   Substance Use Topics   • Alcohol use: No   • Drug use: No     Medications Prior to Admission   Medication Sig Dispense Refill Last Dose   • aspirin 325 MG tablet Take 1 tablet by mouth Daily.      • atorvastatin (LIPITOR) 80 MG tablet Take 1 tablet by mouth Every Night.      • busPIRone (BUSPAR) 15 MG tablet Take 1 tablet by mouth 3 (Three) Times a Day.      • butalbital-acetaminophen-caffeine (FIORICET, ESGIC) -40 MG per tablet Take 1 tablet by mouth Daily As Needed for Headache.      • cloNIDine (CATAPRES) 0.1 MG tablet Take 1 tablet by mouth Every 30 (Thirty) Minutes As Needed for High Blood Pressure. Give one tap po every 30 mins when BP is over 160      • diclofenac (VOLTAREN) 75 MG EC tablet 1 tablet 2 (Two) Times a Day.      • escitalopram (LEXAPRO) 10 MG tablet Take 2 tablets by mouth Daily.      • fluticasone (FLONASE) 50 MCG/ACT nasal spray 2 sprays into the  nostril(s) as directed by provider Daily.      • furosemide (LASIX) 20 MG tablet Take 1 tablet by mouth Daily.      • hydrOXYzine pamoate (VISTARIL) 50 MG capsule Take 1 capsule by mouth 2 (Two) Times a Day As Needed.      • ketorolac (TORADOL) 10 MG tablet Take 1 tablet by mouth Every 6 (Six) Hours As Needed for Moderate Pain.      • levETIRAcetam (KEPPRA) 1000 MG tablet Take 1 tablet by mouth 2 (Two) Times a Day.      • losartan (COZAAR) 50 MG tablet Take 1 tablet by mouth Daily.      • meclizine (ANTIVERT) 12.5 MG tablet Take 1 tablet by mouth 3 (Three) Times a Day As Needed for Dizziness.      • melatonin 5 MG tablet tablet Take 2 tablets by mouth every night at bedtime.      • metoclopramide (REGLAN) 5 MG tablet Take 1 tablet by mouth 3 (Three) Times a Day As Needed (Nausea). 9 tablet 0    • nabumetone (RELAFEN) 750 MG tablet Take 1 tablet by mouth 2 (Two) Times a Day As Needed for Mild Pain. 6 tablet 0    • ondansetron ODT (ZOFRAN-ODT) 4 MG disintegrating tablet Place 1 tablet on the tongue 4 (Four) Times a Day As Needed for Nausea or Vomiting. 12 tablet 0    • orphenadrine (NORFLEX) 100 MG 12 hr tablet Take 1 tablet by mouth 2 (Two) Times a Day As Needed for Muscle Spasms or Mild Pain . 10 tablet 0    • pantoprazole (Protonix) 40 MG EC tablet Take 1 tablet by mouth Daily. 30 tablet 0    • potassium chloride (K-DUR,KLOR-CON) 10 MEQ CR tablet Take 1 tablet by mouth Daily.      • promethazine (PHENERGAN) 25 MG suppository Insert 1 suppository into the rectum Every 6 (Six) Hours As Needed for Nausea or Vomiting. 12 suppository 0    • traZODone (DESYREL) 50 MG tablet Take 1 tablet by mouth Every Night.      • acetaminophen (TYLENOL) 500 MG tablet Take 2 tablets by mouth Every 8 (Eight) Hours As Needed for Mild Pain.      • escitalopram (LEXAPRO) 20 MG tablet Take 1.5 tablets by mouth Daily. 30 mg      • solifenacin (VESICARE) 10 MG tablet Take 1 tablet by mouth Daily.        Allergies:  Abilify [aripiprazole],  Buspirone, and Penicillins     CURRENT MEDICATIONS/OBJECTIVE/VS/PE:     Current Medications:     Current Facility-Administered Medications   Medication Dose Route Frequency Provider Last Rate Last Admin   • aztreonam (AZACTAM) 2 g/100 mL 0.9% NS (mbp)  2 g Intravenous Q8H Venkat Robb MD 0 mL/hr at 03/06/23 1349 2 g at 03/08/23 1708   • hydrALAZINE (APRESOLINE) injection 10 mg  10 mg Intravenous Q6H PRN Yessy Hargrove PA-C   10 mg at 03/07/23 2059   • HYDROmorphone (DILAUDID) injection 0.25 mg  0.25 mg Intravenous Q4H PRN Venkat Robb MD   0.25 mg at 03/08/23 1945   • lactated ringers infusion  100 mL/hr Intravenous Continuous Venkat Robb  mL/hr at 03/08/23 0009 100 mL/hr at 03/08/23 0009   • levETIRAcetam in NaCl 0.75% (KEPPRA) IVPB 1,000 mg  1,000 mg Intravenous Q12H Venkat Robb MD 0 mL/hr at 03/06/23 1122 1,000 mg at 03/08/23 2012   • ondansetron (ZOFRAN) injection 4 mg  4 mg Intravenous Q6H PRN Venkat Robb MD       • pantoprazole (PROTONIX) injection 40 mg  40 mg Intravenous Q12H Venkat Robb MD   40 mg at 03/08/23 1944   • sodium chloride 0.9 % flush 10 mL  10 mL Intravenous PRN Venkat Robb MD       • sodium chloride 0.9 % flush 10 mL  10 mL Intravenous Q12H Venkat Robb MD   10 mL at 03/08/23 1944   • sodium chloride 0.9 % flush 10 mL  10 mL Intravenous PRN Venkat Robb MD       • sodium chloride 0.9 % infusion 40 mL  40 mL Intravenous PRN Venkat Robb MD   40 mL at 03/08/23 2012       Objective     Review of Systems:   Review of Systems    Physical Exam:   Temp:  [98 °F (36.7 °C)-99.1 °F (37.3 °C)] 98.7 °F (37.1 °C)  Heart Rate:  [56-73] 73  Resp:  [18-20] 20  BP: ()/(50-78) 119/71     Physical Exam:  General Appearance:    Alert, cooperative, in no acute distress   Head:    Normocephalic, without obvious abnormality, atraumatic   Eyes:            Lids and lashes normal, conjunctivae and sclerae normal, no   icterus, no pallor,  corneas clear, PERRLA   Ears:    Ears appear intact with no abnormalities noted   Throat:   No oral lesions, no thrush, oral mucosa moist   Neck:   No adenopathy, supple, trachea midline, no thyromegaly, no     carotid bruit, no JVD   Back:     No kyphosis present, no scoliosis present, no skin lesions,       erythema or scars, no tenderness to percussion or                   palpation,   range of motion normal   Lungs:     Clear to auscultation,respirations regular, even and                   unlabored    Heart:    Regular rhythm and normal rate, normal S1 and S2, no            murmur, no gallop, no rub, no click   Breast Exam:    Deferred   Abdomen:     Normal bowel sounds, no masses, no organomegaly, soft        nontender, nondistended, no guarding, no rebound                 tenderness   Genitalia:    Deferred   Extremities:   Moves all extremities well, no edema, no cyanosis, no              redness   Pulses:   Pulses palpable and equal bilaterally   Skin:   No bleeding, bruising or rash   Lymph nodes:   No palpable adenopathy   Neurologic:   Cranial nerves 2 - 12 grossly intact, sensation intact, DTR        present and equal bilaterally      Results Review:     Lab Results (last 24 hours)     Procedure Component Value Units Date/Time    TISSUE EXAM, [facility]) [[id number]] Collected: 23 1342    Specimen: Tissue from Gallbladder Updated: 23 1406     Reference Lab Report --     [facility]  [address] [place] [address]  Phone: [phone] or [phone]  Fax: [phone]  Tremaine Dixon M.D., Medical Director    PATIENT NAME                           [facility]               [id number]  [id number]                         AGE              SEX  SSN           CLIENT REF #  [place] [id number]      1975  F    [id number]   [id number]    [place]                       ADITIING M.D.      ATTENDING M.D.     COPY TO.  [address] [place] [address]                 [place] [address]             DATE COLLECTED      DATE RECEIVED      DATE REPORTED  03/05/2023 03/06/2023 03/08/2023    DIAGNOSIS:  GALLBLADDER:  Chronic cholecystitis  Cholelithiasis    GJK    CLINICAL HISTORY:  Acute cholecystitis      SPECIMENS RECEIVED:  GALLBLADDER    MICROSCOPIC DESCRIPTION:  Tissue blocks are prepared and slides are examined microscopically on all  specimens. See diagnosis for details.    Professional interpretation rendered by Julio Ann M.D., KATHYAAUDREY at [facility], [address] [place] [address].    GROSS DESCRIPTION:  Received in formalin labeled gallbladder is a 5.4 x 3.5 x 1.4 cm intact  gallbladder. The serosa is tan-pink and smooth with a roughened hepatic bed.  The cystic duct margin is inked black. The gallbladder is opened to reveal  multiple tan-yellow stones measuring 3.5 x 2.4 x 0.5 cm in aggregate. The  mucosal surface is tan-green and trabeculated. No polyps or lesions are grossly  identified. The cystic duct is probe patent and the average wall thickness is 0.3  cm.  No lymph nodes are grossly identified. Representative sections of the  specimen are submitted as follows:  A1 cystic duct margin (en face), neck, body, and fundus.  [place]    REVIEWED, DIAGNOSED AND ELECTRONICALLY  SIGNED BY:    Julio Ann M.D., F.C.A.P.  CPT CODES:  80193      POC Glucose Once [[id number]]  (Abnormal) Collected: 03/08/23 1113    Specimen: Blood Updated: 03/08/23 1128     Glucose 168 mg/dL      Comment: RN NotifiedOperator: [id number] ARACELY BAUMANKeyideas Infotech (P) Limitedtony ID: [id number]       POC Glucose Once [[id number]]  (Normal) Collected: 03/08/23 0721    Specimen: Blood Updated: 03/08/23 0738     Glucose 78 mg/dL      Comment: : [id number] ARACELY Duarte ID: [id number]       Comprehensive Metabolic Panel [[id number]]  (Abnormal) Collected: 03/08/23 0636     Specimen: Blood Updated: 03/08/23 0716     Glucose 76 mg/dL      BUN 10 mg/dL      Creatinine 0.65 mg/dL      Sodium 139 mmol/L      Potassium 3.7 mmol/L      Chloride 103 mmol/L      CO2 21.0 mmol/L      Calcium 9.3 mg/dL      Total Protein 6.4 g/dL      Albumin 3.7 g/dL      ALT (SGPT) 282 U/L      AST (SGOT) 167 U/L      Alkaline Phosphatase 376 U/L      Total Bilirubin 0.9 mg/dL      Globulin 2.7 gm/dL      A/G Ratio 1.4 g/dL      BUN/Creatinine Ratio 15.4     Anion Gap 15.0 mmol/L      eGFR 109.4 mL/min/1.73     Narrative:      GFR Normal >60  Chronic Kidney Disease <60  Kidney Failure <15      CBC & Differential [[id number]]  (Abnormal) Collected: 03/08/23 0636    Specimen: Blood Updated: 03/08/23 0658    Narrative:      The following orders were created for panel order CBC & Differential.  Procedure                               Abnormality         Status                     ---------                               -----------         ------                     CBC Auto Differential[[id number]]        Abnormal            Final result                 Please view results for these tests on the individual orders.    CBC Auto Differential [[id number]]  (Abnormal) Collected: 03/08/23 0636    Specimen: Blood Updated: 03/08/23 0658     WBC 8.40 10*3/mm3      RBC 4.03 10*6/mm3      Hemoglobin 11.7 g/dL      Hematocrit 35.6 %      MCV 88.3 fL      MCH 29.0 pg      MCHC 32.9 g/dL      RDW 13.1 %      RDW-SD 42.3 fl      MPV 12.0 fL      Platelets 182 10*3/mm3      Neutrophil % 74.9 %      Lymphocyte % 15.4 %      Monocyte % 6.9 %      Eosinophil % 1.9 %      Basophil % 0.5 %      Immature Grans % 0.4 %      Neutrophils, Absolute 6.30 10*3/mm3      Lymphocytes, Absolute 1.29 10*3/mm3      Monocytes, Absolute 0.58 10*3/mm3      Eosinophils, Absolute 0.16 10*3/mm3      Basophils, Absolute 0.04 10*3/mm3      Immature Grans, Absolute 0.03 10*3/mm3      nRBC 0.0 /100 WBC            I reviewed the patient's new clinical  results.  I reviewed the patient's new imaging results and agree with the interpretation.     ASSESSMENT/PLAN:   ASSESSMENT: 1.  Biliary colic s/p cholecystectomy, ERCP with stent placement for choledocholithiasis, improving clinically.    PLAN: 1.  Advance diet as tolerated  2.  Okay to DC in a.m. if she continues to improve from GI standpoint  3.  Follow-up in clinic in 1 to 2 weeks  4.  Follow LFTs closely  5.  Repeat ERCP in 6 to 8 weeks  The risks, benefits, and alternatives of this procedure have been discussed with the patient or the responsible party- the patient understands and agrees to proceed.         Venkat Robb MD  03/08/23  22:05 CST

## 2023-03-09 NOTE — THERAPY DISCHARGE NOTE
Patient Name: Myles Shannon  : 1975    MRN: [id number]                              Today's Date: 3/9/2023       Admit Date: 3/4/2023    Visit Dx:     ICD-10-CM ICD-9-CM   1. Acute cholecystitis  K81.0 575.0   2. Urinary tract infection in female  N39.0 599.0   3. Calculus of bile duct with acute on chronic cholecystitis with obstruction  K80.47 574.31     574.41   4. Impaired functional mobility, balance, gait, and endurance  Z74.09 V49.89     Patient Active Problem List   Diagnosis   • Nicotine abuse   • Other chest pain   • Acute maxillary sinusitis   • Bipolar affective disorder ([facility])   • Decreased pulses in feet   • Flank pain   • Hematochezia   • High cholesterol   • Migraine without aura and with status migrainosus, not intractable   • Migraine without status migrainosus, not intractable   • Neck pain   • Nephrolithiasis   • Neuropathy   • Chronic pain   • Primary osteoarthritis involving multiple joints   • Sciatic leg pain   • Screening for diabetes mellitus (DM)   • Seasonal allergies   • Stress   • Tobacco use disorder   • Urinary incontinence without sensory awareness   • Vitamin D deficiency   • Carpal tunnel syndrome of right wrist   • Seizure ([facility])   • Intractable vomiting   • Acute UTI (urinary tract infection)   • Cholelithiasis   • GI bleed   • Kidney stone   • Nephrolithiasis   • ELBERT (acute kidney injury) ([facility])   • Infection due to ESBL-producing Escherichia coli   • CVA (cerebral vascular accident) ([facility])   • Dysphagia due to recent stroke   • Slurred speech   • Cerebrovascular accident (CVA) ([facility])   • Aphasia due to acute cerebrovascular accident (CVA) ([facility])   • Methamphetamine abuse ([facility])   • Seizure disorder ([facility])   • UTI (urinary tract infection) due to urinary indwelling catheter ([facility])   • Urinary tract infection due to extended-spectrum beta lactamase (ESBL) producing Escherichia coli   • Urinary tract infection due to extended-spectrum beta lactamase (ESBL) producing  Escherichia coli   • Metabolic acidosis, NAG, bicarbonate losses   • Midline shift of brain due to stroke   • Nonintractable headache   • Weakness   • Dysarthria   • Urinary tract infection in female   • Encephalopathy   • Bradycardia   • Stroke-like symptoms   • Acute cholecystitis     Past Medical History:   Diagnosis Date   • Abdominal pain     Chronic RLQ, RUQ, R flank comes and goes.    • Abdominal pain, right lower quadrant    • Acute bilateral otitis media    • Allergic rhinitis    • Backache     Upper back.   • Candidiasis of skin    • Cellulitis of neck    • Chest pain    • Contraception    • Cough    • Dysfunction of eustachian tube    • Dyspareunia, female    • Elevated cholesterol    • Excessive or frequent menstruation    • Flank pain    • Generalized aches and pains    • Headache    • Health maintenance examination    • Hypertension    • Infection of skin and subcutaneous tissue    • Irregular periods    • Kidney stone     Poss   • Menorrhagia    • Nausea vomiting and diarrhea    • Obesity    • Open wound of abdominal wall    • Otalgia    • Pain in left foot    • Pain in unspecified hand    • Removed Impacted cerumen 2012   • Rhinitis    • Seizure ([facility]) 08/15/2019   • Seizure ([facility])    • Shoulder pain     right-sided-likely muscle strain      • Shoulder tendinitis    • Spider bite wound    • Staphylococcal infection of skin    • Stroke ([facility])    • Swollen feet    • Tinea cruris    • Tobacco dependence syndrome    • Upper respiratory infection    • Urinary tract infectious disease    • Vertigo    • Visit for gynecologic examination    • Vulvovaginitis      Past Surgical History:   Procedure Laterality Date   •  SECTION     • CYSTOSCOPY N/A 2023    Procedure: CYSTOSCOPY;  Surgeon: Chris Deshpande MD;  Location: [facility];  Service: Urology;  Laterality: N/A;   • DILATATION AND CURETTAGE      Secondary to incomplete spontaneous    • INCISION AND DRAINAGE ABSCESS  2012    • INJECTION OF MEDICATION  05/08/2015    Phenergan (1)     • INJECTION OF MEDICATION  10/10/2013    Toradol (2)      • INJECTION OF MEDICATION  01/16/2014    Zofran (1)         General Information     Row Name 03/09/23 0913          Physical Therapy Time and Intention    Document Type evaluation  -CZ     Mode of Treatment physical therapy  -CZ     Row Name 03/09/23 0913          General Information    Patient Profile Reviewed yes  -CZ     Prior Level of Function independent:;all household mobility  -CZ     Row Name 03/09/23 0913          Living Environment    People in Home facility resident  -CZ     Row Name 03/09/23 0913          Home Main Entrance    Number of Stairs, Main Entrance none  -CZ     Row Name 03/09/23 0913          Stairs Within Home, Primary    Stairs, Within Home, Primary [facility].  -CZ     Number of Stairs, Within Home, Primary none  -CZ     Row Name 03/09/23 0913          Cognition    Orientation Status (Cognition) oriented x 4;verbal cues/prompts needed for orientation  -CZ     Row Name 03/09/23 0913          Safety Issues, Functional Mobility    Impairments Affecting Function (Mobility) pain  -CZ           User Key  (r) = Recorded By, (t) = Taken By, (c) = Cosigned By    Initials Name Provider Type    CZ Mitchel Vasquez, PT Physical Therapist               Mobility     Row Name 03/09/23 0913          Bed Mobility    Bed Mobility supine-sit;sit-supine  -CZ     Supine-Sit [place] (Bed Mobility) independent  -CZ     Sit-Supine [place] (Bed Mobility) independent  -CZ     Row Name 03/09/23 0913          Sit-Stand Transfer    Sit-Stand [place] (Transfers) independent  -CZ     Row Name 03/09/23 0913          Gait/Stairs (Locomotion)    [place] Level (Gait) independent  -CZ     Distance in Feet (Gait) 30'x1.  -CZ     Comment, (Gait/Stairs) Slow sammie, no LOB, good gait mechanics.  -CZ           User Key  (r) = Recorded By, (t) = Taken By, (c) = Cosigned By    Initials  Name Provider Type    CZ Mitchel Vasquez, PT Physical Therapist               Obj/Interventions     Row Name 03/09/23 0913          Range of Motion Comprehensive    General Range of Motion bilateral lower extremity ROM WFL  -     Row Name 03/09/23 0913          Strength Comprehensive (MMT)    General Manual Muscle Testing (MMT) Assessment no strength deficits identified  -     Row Name 03/09/23 0913          Sensory Assessment (Somatosensory)    Sensory Assessment (Somatosensory) LE sensation intact  -           User Key  (r) = Recorded By, (t) = Taken By, (c) = Cosigned By    Initials Name Provider Type    CZ Mitchel Vasquez, PT Physical Therapist               Goals/Plan    No documentation.                Clinical Impression     Row Name 03/09/23 0913          Pain    Pretreatment Pain Rating 5/10  -CZ     Posttreatment Pain Rating 5/10  -CZ     Pain Location - Side/Orientation Left  -CZ     Pain Location - abdomen  -CZ     Pain Intervention(s) Medication (See MAR);Repositioned;Ambulation/increased activity;Distraction  -     Row Name 03/09/23 0913          Plan of Care Review    Plan of Care Reviewed With patient  -CZ     Outcome Evaluation Initial PT evaluation complete.  Patient is alert and cooperative.  She demonstrates (I) with bed mobility, transfers and gait, ambulating 30'x1 without an AD, no LOB, slow sammie.  Tinetti assessed: 25/28 or low fall risk, FWW not necessary.  Patient is at PLOF, demonstrates low fall risk and (I) with functional mobility.  No further PT indicated, patient is ready for discharge from a PT perspective.  Physician updated.  -     Row Name 03/09/23 0913          Therapy Assessment/Plan (PT)    Criteria for Skilled Interventions Met (PT) no;no problems identified which require skilled intervention  -     Therapy Frequency (PT) evaluation only  -     Row Name 03/09/23 0913          Vital Signs    Pre Systolic BP Rehab 119  -CZ     Pre Treatment Diastolic BP 70   "-CZ     Pretreatment Heart Rate (beats/min) 66  -CZ     Pre SpO2 (%) 98  -CZ     Pre Patient Position Sitting  -CZ     Row Name 03/09/23 0913          Positioning and Restraints    Pre-Treatment Position in bed  -CZ     Post Treatment Position bed  -CZ     In Bed sitting;call light within reach;encouraged to call for assist  -CZ           User Key  (r) = Recorded By, (t) = Taken By, (c) = Cosigned By    Initials Name Provider Type    CZ Mitchel Vasquez, PT Physical Therapist               Outcome Measures     Row Name 03/09/23 0913 03/09/23 0900       How much help from another person do you currently need...    Turning from your back to your side while in flat bed without using bedrails? 4  -CZ 4  -MM    Moving from lying on back to sitting on the side of a flat bed without bedrails? 4  -CZ 4  -MM    Moving to and from a bed to a chair (including a wheelchair)? 4  -CZ 4  -MM    Standing up from a chair using your arms (e.g., wheelchair, bedside chair)? 4  -CZ 4  -MM    Climbing 3-5 steps with a railing? 3  -CZ 3  -MM    To walk in hospital room? 4  -CZ 4  -MM    AM-PAC 6 Clicks Score (PT) 23  -CZ 23  -MM    Highest level of mobility 7 --> Walked 25 feet or more  -CZ 7 --> Walked 25 feet or more  -MM    Row Name 03/09/23 0913          Tinetti Assessment    Tinetti Assessment yes  -CZ     Sitting Balance 1  -CZ     Arises 2  -CZ     Attempts to Rise 2  -CZ     Immediate Standing Balance (first 5 sec) 2  -CZ     Standing Balance 1  -CZ     Sternal Nudge (feet close together) 1  -CZ     Eyes Closed (feet close together) 1  -CZ     Turning 360 Degrees- Steps 1  -CZ     Turning 360 Degrees- Steadiness 1  -CZ     Sitting Down 2  -CZ     Tinetti Balance Score 14  -CZ     Gait Initiation (immediate after told \"go\") 1  -CZ     Step Length- Right Swing 1  -CZ     Step Length- Left Swing 1  -CZ     Foot Clearance- Right Foot 1  -CZ     Foot Clearance- Left Foot 1  -CZ     Step Symmetry 1  -CZ     Step Continuity 1  -CZ     " Path (excursion) 1  -CZ     Trunk 2  -CZ     Base of Support 1  -CZ     Gait Score 11  -CZ     Tinetti Total Score 25  -CZ     Tinetti Assessment Comments No AD.  -CZ     Row Name 03/09/23 0913          Functional Assessment    Outcome Measure Options AM-PAC 6 Clicks Basic Mobility (PT);Tinetti  -CZ           User Key  (r) = Recorded By, (t) = Taken By, (c) = Cosigned By    Initials Name Provider Type    CZ Mitchel Vasquez, PT Physical Therapist    Darleen Smith RN Registered Nurse              Physical Therapy Education     Title: PT OT SLP Therapies (In Progress)     Topic: Physical Therapy (In Progress)     Point: Mobility training (Not Started)     Learner Progress:  Not documented in this visit.          Point: Home exercise program (Not Started)     Learner Progress:  Not documented in this visit.          Point: Body mechanics (Not Started)     Learner Progress:  Not documented in this visit.          Point: Precautions (Done)     Learning Progress Summary           Patient Acceptance, E, VU by  at 3/9/2023 0936    Comment: Tinetti assessed: 25/28 or low fall risk, FWW not necessary.                               User Key     Initials Effective Dates Name Provider Type Discipline     09/18/22 -  Mitchel Vasquez, PT Physical Therapist PT              PT Recommendation and Plan     Plan of Care Reviewed With: patient  Outcome Evaluation: Initial PT evaluation complete.  Patient is alert and cooperative.  She demonstrates (I) with bed mobility, transfers and gait, ambulating 30'x1 without an AD, no LOB, slow sammie.  Tinetti assessed: 25/28 or low fall risk, FWW not necessary.  Patient is at PLOF, demonstrates low fall risk and (I) with functional mobility.  No further PT indicated, patient is ready for discharge from a PT perspective.  Physician updated.     Time Calculation:    PT Charges     Row Name 03/09/23 1217             Time Calculation    Start Time 0913  -CZ      Stop Time 0940  -CZ       Time Calculation (min) 27 min  -CZ      PT Received On 03/09/23  -CZ      PT Goal Re-Cert Due Date 03/09/23  -CZ         Untimed Charges    PT Eval/Re-eval Minutes 27  -CZ         Total Minutes    Untimed Charges Total Minutes 27  -CZ       Total Minutes 27  -CZ            User Key  (r) = Recorded By, (t) = Taken By, (c) = Cosigned By    Initials Name Provider Type    CZ Mitchel Vasquez, PT Physical Therapist              Therapy Charges for Today     Code Description Service Date Service Provider Modifiers Qty    01048727553 HC PT EVAL LOW COMPLEXITY 2 3/9/2023 Mitchel Vasquez, PT GP 1          PT G-Codes  Outcome Measure Options: AM-PAC 6 Clicks Basic Mobility (PT), Tinetti  AM-PAC 6 Clicks Score (PT): 23  Tinetti Total Score: 25    PT Discharge Summary  Anticipated Discharge Disposition (PT): home with assist    Mitchel Vasquez, MYKEL  3/9/2023

## 2023-03-09 NOTE — PLAN OF CARE
Goal Outcome Evaluation:  Plan of Care Reviewed With: patient        Progress: improving  Outcome Evaluation: VSS. A&Ox4. c/o pain x1 this shift. Punctures to abdomen cdi. Having bms. Tolerating full liquids. Will advance to regular for breakfast.

## 2023-03-09 NOTE — PROGRESS NOTES
Enter Query Response Below      Query Response:   ELBERT on CKD             If applicable, please update the problem list.     Patient: Myles Shannon        : 1975  Account: [id number]           Admit Date: 23        How to Respond to this query:       a. Click New Note     b. Answer query within the yellow box.                c. Update the Problem List, if applicable.      If you have any questions about this query contact me at: [email or web address]     ,     48 y/o noted with Acute Cholecystitis/Acute Choledocholithiasis status post Laparoscopic Cholecystectomy and ERCP, Seizure Disorder, Chronic Kidney disease stage III and left Kidney Stones. On admit, creatinine 1.23. On  creatinine improved to 0.65. Labs with eGFR 54.7 to 109.4. Patient treated with monitoring and IV fluids.     After study, can the patient's condition be specified as:     >>Acute Kidney Injury on CKD  >>Chronic Kidney Disease only   >>Other (please specify):________  >>Unable to determine     By submitting this query, we are merely seeking further clarification of documentation to accurately reflect all conditions that you are monitoring, evaluating, treating or that extend the hospitalization or utilize additional resources of care. Please utilize your independent clinical judgment when addressing the question(s) above.     This query and your response, once completed, will be entered into the legal medical record.    Sincerely,  Yessy Allan RN CCDS   Clinical Documentation Integrity Program

## 2023-03-09 NOTE — PLAN OF CARE
Goal Outcome Evaluation:  Plan of Care Reviewed With: patient           Outcome Evaluation: Initial PT evaluation complete.  Patient is alert and cooperative.  She demonstrates (I) with bed mobility, transfers and gait, ambulating 30'x1 without an AD, no LOB, slow sammie.  Tinetti assessed: 25/28 or low fall risk, FWW not necessary.  Patient is at PLOF, demonstrates low fall risk and (I) with functional mobility.  No further PT indicated, patient is ready for discharge from a PT perspective.  Physician updated.

## 2023-03-09 NOTE — PLAN OF CARE
Goal Outcome Evaluation:  Plan of Care Reviewed With: patient        Progress: improving  Outcome Evaluation: Pt resting in bed at this time, no complaints of pain, surgery incisions free of infection, no falls noted, no nwe skin issues, no bleeding noted

## 2023-03-09 NOTE — PROGRESS NOTES
Enter Query Response Below      Query Response:   Ruled out           If applicable, please update the problem list.     Patient: Myles Shannon        : 1975  Account: [id number]           Admit Date: 23        How to Respond to this query:       a. Click New Note     b. Answer query within the yellow box.                c. Update the Problem List, if applicable.      If you have any questions about this query contact me at: [email or web address]     ,     46 y/o noted with Acute Cholecystitis/Acute Choledocholithiasis status post Laparoscopic Cholecystectomy and ERCP, Seizure Disorder, Chronic Kidney disease stage III and left Kidney Stones. Surgical consult noted likely Choledocholithiasis and Gallstone Pancreatitis. On admit, Lipase 95. On , Lipase improved to 72. Patient treated with monitoring, Cholecystectomy and ERCP with stent placed.    After study, can the Gallstone Pancreatitis be specified as:     >>Ruled out  >>Ruled in   >>Other (please specify):_____________  >>Unable to determine      By submitting this query, we are merely seeking further clarification of documentation to accurately reflect all conditions that you are monitoring, evaluating, treating or that extend the hospitalization or utilize additional resources of care. Please utilize your independent clinical judgment when addressing the question(s) above.     This query and your response, once completed, will be entered into the legal medical record.    Sincerely,  Yessy Allan RN CCDS   Clinical Documentation Integrity Program

## 2023-03-09 NOTE — DISCHARGE SUMMARY
[facility] Medicine Services  DISCHARGE SUMMARY       Date of Admission: 3/4/2023  Date of Discharge:  3/9/2023  Primary Care Physician: Sonya Crooks APRN    Presenting Problem/History of Present Illness:  Acute cholecystitis [K81.0]  Urinary tract infection in female [N39.0]       Final Discharge Diagnoses:  Acute cholecystitis acute choledocholithiasis      s/p lap cholecystectomy on 3/5/23     sp ERCP on 3/7/23; biliary sphincterotomy; plastic stent placed on CBD     received aztreonan for 5 days     Abdominal pain     Due to above; resolving     Transaminitis     Due to main insult; keep trending down     Chronic medical management     Hx of CVA, ESBL      Seizure disorder     CKD stage III     Left kidney stones       Consults:   Consults     Date and Time Order Name Status Description    3/6/2023  8:39 AM Inpatient Gastroenterology Consult Completed     3/4/2023 11:55 PM Hospitalist (on-call MD unless specified)      3/4/2023 11:27 PM Surgery (on-call MD unless specified) Completed           Procedures Performed: Procedure(s):  ENDOSCOPIC RETROGRADE CHOLANGIOPANCREATOGRAPHY           03/07 1824 ERCP    Pertinent Test Results:   Lab Results (most recent)     Procedure Component Value Units Date/Time    Comprehensive Metabolic Panel [[id number]]  (Abnormal) Collected: 03/09/23 0554    Specimen: Blood Updated: 03/09/23 0736     Glucose 102 mg/dL      BUN 7 mg/dL      Creatinine 0.64 mg/dL      Sodium 139 mmol/L      Potassium 4.0 mmol/L      Comment: Slight hemolysis detected by analyzer. Results may be affected.        Chloride 105 mmol/L      CO2 22.0 mmol/L      Calcium 8.9 mg/dL      Total Protein 5.9 g/dL      Albumin 3.4 g/dL      ALT (SGPT) 195 U/L      AST (SGOT) 89 U/L      Alkaline Phosphatase 296 U/L      Total Bilirubin 0.7 mg/dL      Globulin 2.5 gm/dL      A/G Ratio 1.4 g/dL      BUN/Creatinine Ratio 10.9     Anion Gap 12.0 mmol/L      eGFR 109.8  mL/min/1.73     Narrative:      GFR Normal >60  Chronic Kidney Disease <60  Kidney Failure <15      TISSUE EXAM, [facility]) [[id number]] Collected: 23 1342    Specimen: Tissue from Gallbladder Updated: 23 1406     Reference Lab Report --     [facility]  [address] [place] [address]  Phone: [phone] or [phone]  Fax: [phone]  Tremaine Dixon M.D., Medical Director    PATIENT NAME                           LABORATORY NO.  [id number]               [id number]  [id number]                         AGE              SEX  SSN           CLIENT REF #  [facility]           47      1975  F    [id number]   [id number]    [place]                       REQUESTING M.D.     ATTENDING M.D.     COPY TO[address]                 [place] [address]             DATE COLLECTED      DATE RECEIVED      DATE REPORTED  2023    DIAGNOSIS:  GALLBLADDER:  Chronic cholecystitis  Cholelithiasis    GJK    CLINICAL HISTORY:  Acute cholecystitis      SPECIMENS RECEIVED:  GALLBLADDER    MICROSCOPIC DESCRIPTION:  Tissue blocks are prepared and slides are examined microscopically on all  specimens. See diagnosis for details.    Professional interpretation rendered by Julio Ann M.D., F.C.A.P. at [facility]  [employer], [address] [place] [address].    GROSS DESCRIPTION:  Received in formalin labeled gallbladder is a 5.4 x 3.5 x 1.4 cm intact  gallbladder. The serosa is tan-pink and smooth with a roughened hepatic bed.  The cystic duct margin is inked black. The gallbladder is opened to reveal  multiple tan-yellow stones measuring 3.5 x 2.4 x 0.5 cm in aggregate. The  mucosal surface is tan-green and trabeculated. No polyps or lesions are grossly  identified. The cystic duct is probe patent and the average wall thickness is 0.3  cm.  No  lymph nodes are grossly identified. Representative sections of the  specimen are submitted as follows:  A1 cystic duct margin (en face), neck, body, and fundus.  [place]    REVIEWED, DIAGNOSED AND ELECTRONICALLY  SIGNED BY:    Julio Ann M.D., F.C.A.P.  CPT CODES:  24470      POC Glucose Once [[id number]]  (Abnormal) Collected: 03/08/23 1113    Specimen: Blood Updated: 03/08/23 1128     Glucose 168 mg/dL      Comment: RN NotifiedOperator: [id number] [employer] ID: [id number]       POC Glucose Once [[id number]]  (Normal) Collected: 03/08/23 0721    Specimen: Blood Updated: 03/08/23 0738     Glucose 78 mg/dL      Comment: : [id number] [employer] ID: [id number]       Comprehensive Metabolic Panel [[id number]]  (Abnormal) Collected: 03/08/23 0636    Specimen: Blood Updated: 03/08/23 0716     Glucose 76 mg/dL      BUN 10 mg/dL      Creatinine 0.65 mg/dL      Sodium 139 mmol/L      Potassium 3.7 mmol/L      Chloride 103 mmol/L      CO2 21.0 mmol/L      Calcium 9.3 mg/dL      Total Protein 6.4 g/dL      Albumin 3.7 g/dL      ALT (SGPT) 282 U/L      AST (SGOT) 167 U/L      Alkaline Phosphatase 376 U/L      Total Bilirubin 0.9 mg/dL      Globulin 2.7 gm/dL      A/G Ratio 1.4 g/dL      BUN/Creatinine Ratio 15.4     Anion Gap 15.0 mmol/L      eGFR 109.4 mL/min/1.73     Narrative:      GFR Normal >60  Chronic Kidney Disease <60  Kidney Failure <15      CBC & Differential [[id number]]  (Abnormal) Collected: 03/08/23 0636    Specimen: Blood Updated: 03/08/23 0658    Narrative:      The following orders were created for panel order CBC & Differential.  Procedure                               Abnormality         Status                     ---------                               -----------         ------                     CBC Auto Differential[[id number]]        Abnormal            Final result                 Please view results for these tests on the individual orders.    CBC Auto Differential  [[id number]]  (Abnormal) Collected: 03/08/23 0636    Specimen: Blood Updated: 03/08/23 0658     WBC 8.40 10*3/mm3      RBC 4.03 10*6/mm3      Hemoglobin 11.7 g/dL      Hematocrit 35.6 %      MCV 88.3 fL      MCH 29.0 pg      MCHC 32.9 g/dL      RDW 13.1 %      RDW-SD 42.3 fl      MPV 12.0 fL      Platelets 182 10*3/mm3      Neutrophil % 74.9 %      Lymphocyte % 15.4 %      Monocyte % 6.9 %      Eosinophil % 1.9 %      Basophil % 0.5 %      Immature Grans % 0.4 %      Neutrophils, Absolute 6.30 10*3/mm3      Lymphocytes, Absolute 1.29 10*3/mm3      Monocytes, Absolute 0.58 10*3/mm3      Eosinophils, Absolute 0.16 10*3/mm3      Basophils, Absolute 0.04 10*3/mm3      Immature Grans, Absolute 0.03 10*3/mm3      nRBC 0.0 /100 WBC     CBC & Differential [[id number]]  (Abnormal) Collected: 03/07/23 0529    Specimen: Blood Updated: 03/07/23 0559    Narrative:      The following orders were created for panel order CBC & Differential.  Procedure                               Abnormality         Status                     ---------                               -----------         ------                     CBC Auto Differential[[id number]]        Abnormal            Final result                 Please view results for these tests on the individual orders.    CBC Auto Differential [[id number]]  (Abnormal) Collected: 03/07/23 0529    Specimen: Blood Updated: 03/07/23 0559     WBC 4.69 10*3/mm3      RBC 3.65 10*6/mm3      Hemoglobin 10.8 g/dL      Hematocrit 32.9 %      MCV 90.1 fL      MCH 29.6 pg      MCHC 32.8 g/dL      RDW 13.2 %      RDW-SD 43.5 fl      MPV 12.6 fL      Platelets 160 10*3/mm3      Neutrophil % 58.9 %      Lymphocyte % 28.6 %      Monocyte % 9.6 %      Eosinophil % 2.1 %      Basophil % 0.4 %      Immature Grans % 0.4 %      Neutrophils, Absolute 2.76 10*3/mm3      Lymphocytes, Absolute 1.34 10*3/mm3      Monocytes, Absolute 0.45 10*3/mm3      Eosinophils, Absolute 0.10 10*3/mm3      Basophils, Absolute 0.02  10*3/mm3      Immature Grans, Absolute 0.02 10*3/mm3      nRBC 0.0 /100 WBC     Urine Culture - Urine, Urine, Clean Catch [[id number]] Collected: 03/04/23 1928    Specimen: Urine, Clean Catch Updated: 03/06/23 0935     Urine Culture <10,000 CFU/mL Mixed Lety Isolated    Narrative:      Specimen contains mixed organisms of questionable pathogenicity suggestive of contamination. If symptoms persist, suggest recollection.  Colonization of the urinary tract without infection is common. Treatment is discouraged unless the patient is symptomatic, pregnant, or undergoing an invasive urologic procedure.    Extra Tubes [[id number]] Collected: 03/06/23 0707    Specimen: Blood, Venous Line Updated: 03/06/23 0815    Narrative:      The following orders were created for panel order Extra Tubes.  Procedure                               Abnormality         Status                     ---------                               -----------         ------                     Lavender Top[[id number]]                                     Final result                 Please view results for these tests on the individual orders.    Lavender Top [[id number]] Collected: 03/06/23 0707    Specimen: Blood Updated: 03/06/23 0815     Extra Tube hold for add-on     Comment: Auto resulted       Procalcitonin [[id number]]  (Normal) Collected: 03/05/23 0546    Specimen: Blood Updated: 03/05/23 0716     Procalcitonin 0.07 ng/mL     Narrative:      As a Marker for Sepsis (Non-Neonates):    1. <0.5 ng/mL represents a low risk of severe sepsis and/or septic shock.  2. >2 ng/mL represents a high risk of severe sepsis and/or septic shock.    As a Marker for Lower Respiratory Tract Infections that require antibiotic therapy:    PCT on Admission    Antibiotic Therapy       6-12 Hrs later    >0.5                Strongly Recommended  >0.25 - <0.5        Recommended   0.1 - 0.25          Discouraged              Remeasure/reassess PCT  <0.1                Strongly  "Discouraged     Remeasure/reassess PCT    As 28 day mortality risk marker: \"Change in Procalcitonin Result\" (>80% or <=80%) if Day 0 (or Day 1) and Day 4 values are available. Refer to http://[email or web address]-pct-calculator.com    Change in PCT <=80%  A decrease of PCT levels below or equal to 80% defines a positive change in PCT test result representing a higher risk for 28-day all-cause mortality of patients diagnosed with severe sepsis for septic shock.    Change in PCT >80%  A decrease of PCT levels of more than 80% defines a negative change in PCT result representing a lower risk for 28-day all-cause mortality of patients diagnosed with severe sepsis or septic shock.       TSH [[id number]]  (Normal) Collected: 03/05/23 0546    Specimen: Blood Updated: 03/05/23 0715     TSH 0.328 uIU/mL     Lipase [[id number]]  (Abnormal) Collected: 03/05/23 0546    Specimen: Blood Updated: 03/05/23 0714     Lipase 72 U/L     Amylase [[id number]]  (Normal) Collected: 03/05/23 0546    Specimen: Blood Updated: 03/05/23 0714     Amylase 65 U/L     Magnesium [[id number]]  (Normal) Collected: 03/05/23 0546    Specimen: Blood Updated: 03/05/23 0714     Magnesium 2.3 mg/dL     Protime-INR [[id number]]  (Normal) Collected: 03/05/23 0546    Specimen: Blood Updated: 03/05/23 0713     Protime 12.9 Seconds      INR 0.98    Narrative:      Therapeutic range for most indications is 2.0-3.0 INR,  or 2.5-3.5 for mechanical heart valves.    Lactic Acid, Plasma [[id number]]  (Normal) Collected: 03/05/23 0546    Specimen: Blood Updated: 03/05/23 0703     Lactate 0.8 mmol/L     Hemoglobin A1c [[id number]]  (Normal) Collected: 03/05/23 0546    Specimen: Blood Updated: 03/05/23 0703     Hemoglobin A1C 5.30 %     Narrative:      Hemoglobin A1C Ranges:    Increased Risk for Diabetes  5.7% to 6.4%  Diabetes                     >= 6.5%  Diabetic Goal                < 7.0%    CBC (No Diff) [[id number]]  (Abnormal) Collected: 03/05/23 0546    Specimen: Blood Updated: " 03/05/23 0651     WBC 4.84 10*3/mm3      RBC 3.79 10*6/mm3      Hemoglobin 11.4 g/dL      Hematocrit 33.8 %      MCV 89.2 fL      MCH 30.1 pg      MCHC 33.7 g/dL      RDW 13.4 %      RDW-SD 44.0 fl      MPV 12.5 fL      Platelets 168 10*3/mm3     Amylase [[id number]]  (Normal) Collected: 03/04/23 1958    Specimen: Blood Updated: 03/04/23 2148     Amylase 81 U/L     Hepatitis Panel, Acute [[id number]]  (Normal) Collected: 03/04/23 1958    Specimen: Blood Updated: 03/04/23 2139     Hepatitis B Surface Ag Non-Reactive     Hep A IgM Non-Reactive     Hep B C IgM Non-Reactive     Hepatitis C Ab Non-Reactive    Narrative:      Results may be falsely decreased if patient taking Biotin.     Extra Tubes [[id number]] Collected: 03/04/23 1958    Specimen: Blood Updated: 03/04/23 2100    Narrative:      The following orders were created for panel order Extra Tubes.  Procedure                               Abnormality         Status                     ---------                               -----------         ------                     Gold Top - SST[[id number]]                                   Final result               Green Top (Gel)[[id number]]                                  Final result               Light Blue Top[[id number]]                                   Final result                 Please view results for these tests on the individual orders.    Gold Top - SST [[id number]] Collected: 03/04/23 1958    Specimen: Blood Updated: 03/04/23 2100     Extra Tube Hold for add-ons.     Comment: Auto resulted.       Green Top (Gel) [[id number]] Collected: 03/04/23 1958    Specimen: Blood Updated: 03/04/23 2100     Extra Tube Hold for add-ons.     Comment: Auto resulted.       Light Blue Top [[id number]] Collected: 03/04/23 1958    Specimen: Blood Updated: 03/04/23 2100     Extra Tube Hold for add-ons.     Comment: Auto resulted       Lipase [[id number]]  (Abnormal) Collected: 03/04/23 1958    Specimen: Blood Updated: 03/04/23 2028      Lipase 95 U/L     Lactic Acid, Plasma [[id number]]  (Normal) Collected: 03/04/23 1952    Specimen: Blood Updated: 03/04/23 2007     Lactate 0.8 mmol/L     Urine Drug Screen - Urine, Clean Catch [[id number]]  (Abnormal) Collected: 03/04/23 1928    Specimen: Urine, Clean Catch Updated: 03/04/23 1955     THC, Screen, Urine Negative     Phencyclidine (PCP), Urine Negative     Cocaine Screen, Urine Negative     Methamphetamine, Ur Negative     Opiate Screen Negative     Amphetamine Screen, Urine Negative     Benzodiazepine Screen, Urine Positive     Tricyclic Antidepressants Screen Negative     Methadone Screen, Urine Negative     Barbiturates Screen, Urine Negative     Oxycodone Screen, Urine Negative     Propoxyphene Screen Negative     Buprenorphine, Screen, Urine Negative    Narrative:      Cutoff For Drugs Screened:    Amphetamines               500 ng/ml  Barbiturates               200 ng/ml  Benzodiazepines            150 ng/ml  Cocaine                    150 ng/ml  Methadone                  200 ng/ml  Opiates                    100 ng/ml  Phencyclidine               25 ng/ml  THC                            50 ng/ml  Methamphetamine            500 ng/ml  Tricyclic Antidepressants  300 ng/ml  Oxycodone                  100 ng/ml  Propoxyphene               300 ng/ml  Buprenorphine               10 ng/ml    The normal value for all drugs tested is negative. This report includes unconfirmed screening results, with the cutoff values listed, to be used for medical treatment purposes only.  Unconfirmed results must not be used for non-medical purposes such as employment or legal testing.  Clinical consideration should be applied to any drug of abuse test, particularly when unconfirmed results are used.      Urinalysis, Microscopic Only - Urine, Clean Catch [[id number]]  (Abnormal) Collected: 03/04/23 1928    Specimen: Urine, Clean Catch Updated: 03/04/23 1953     RBC, UA 0-2 /HPF      WBC, UA Too Numerous to Count /HPF       Bacteria, UA 2+ /HPF      Squamous Epithelial Cells, UA 31-50 /HPF      Yeast, UA Small/1+ Budding Yeast /HPF      Hyaline Casts, UA None Seen /LPF      Methodology Manual Light Microscopy    Urinalysis With Microscopic If Indicated (No Culture) - Urine, Clean Catch [[id number]]  (Abnormal) Collected: 03/04/23 1928    Specimen: Urine, Clean Catch Updated: 03/04/23 1941     Color, UA Dark Yellow     Appearance, UA Cloudy     pH, UA <=5.0     Specific Gravity, UA 1.029     Glucose, UA Negative     Ketones, UA Trace     Bilirubin, UA Moderate (2+)     Blood, UA Trace     Protein, UA 30 mg/dL (1+)     Leuk Esterase, UA Moderate (2+)     Nitrite, UA Positive     Urobilinogen, UA 1.0 E.U./dL    Pregnancy, Urine - Urine, Clean Catch [[id number]]  (Normal) Collected: 03/04/23 1928    Specimen: Urine, Clean Catch Updated: 03/04/23 1939     HCG, Urine QL Negative        Imaging Results (Most Recent)     Procedure Component Value Units Date/Time    FL ercp biliary duct only [[id number]] Collected: 03/07/23 1826     Updated: 03/08/23 0659    Narrative:      EXAM DESCRIPTION:   FL ERCP BILIARY DUCT ONLY    CLINICAL HISTORY:   47 years  Female  ercp, K81.0 Acute cholecystitis N39.0 Urinary  tract infection, site not specified K80.47 Calculus of bile duct  with acute and chronic cholecystitis with obstruction    NUMBER IMAGES: 3 images    FLUOROSCOPY TIME: 1 minute 39 seconds    TECHNIQUE:   Fluoroscopic images were obtained during an ERCP.    COMPARISON:   None    FINDINGS:   Multiple intraoperative images show biliary ductal dilation and  biliary stent placement.       Impression:      Later ductal dilation and biliary stent placement.  Please also  see operative report for further details.    Electronically signed by:  Juvenal Boothe MD  3/8/2023 6:56  AM CST Workstation: [id number]    FL C Arm During Surgery [[id number]] Resulted: 03/06/23 0754     Updated: 03/06/23 0754     Liver [[id number]] Collected: 03/04/23 2104      Updated: 03/04/23 2248    Narrative:      US LIVER  RPID: US LIVER    CLINICAL HISTORY:  47 years Female, elevated liver enzymes    COMPARISON:  CT on this date    TECHNIQUE:  Grayscale and color Doppler flow imaging was performed, with  spectral Doppler if needed    FINDINGS:    Multiple small stones are present in the gallbladder lumen. The  gallbladder wall is 4 mm which is slightly thickened. There is no  surrounding pericholecystic fluid. The common bile duct is 8.4 mm  which is slightly dilated.    No masses observed within the liver. The gallbladder is reported  as tender when directly scanned.    The pancreas is obscured. The aorta is obscured..    No masses seen within the hepatic parenchyma. The liver  echogenicity is elevated, this could be seen with steatosis.            Impression:        1. Gallstones with thickening of the gallbladder wall and  tenderness of the gallbladder reported when directly scanned.  Findings are suspicious for cholecystitis.  2. 8.4 mm common bile duct correlate for the potential  choledocholithiasis.    Electronically signed by:  Jaziel Benjamin MD  3/4/2023 10:46 PM CST  Workstation: [id number]    CT Abdomen Pelvis Without Contrast [[id number]] Collected: 03/04/23 1928     Updated: 03/04/23 2036    Narrative:      EXAM:  CT Abdomen and Pelvis Without Intravenous Contrast    CLINICAL HISTORY:  rt flank pain    TECHNIQUE:  Axial computed tomography images of the abdomen and pelvis  without intravenous contrast.  Sagittal and coronal reformatted  images were created and reviewed.  This CT exam was performed  using one or more of the following dose reduction techniques:   automated exposure control, adjustment of the mA and/or kV  according to patient size, and/or use of iterative reconstruction  technique.    COMPARISON:  CT Abdomen Pelvis dated 9/21/2022    FINDINGS:  Lung bases:  Unremarkable.  No mass.  No consolidation.    ABDOMEN:  Liver:  Unremarkable.  Gallbladder and bile  "ducts:  Unremarkable.  No calcified stones.   No ductal dilation.  Pancreas:  Unremarkable.  No ductal dilation.  Spleen:  Unremarkable.  No splenomegaly.  Adrenals:  Unremarkable.  No mass.  Kidneys and ureters:  Lobulated renal contour.  Bilateral renal  calculi.  No hydronephrosis.  Stomach and bowel:  Moderate stool.  No bowel obstruction.  No  appreciable mucosal thickening.    PELVIS:  Appendix:  Normal caliber appendix.  No findings to suggest acute  appendicitis.  Bladder:  The urinary bladder is decompressed.  No stones.  Reproductive:  Unremarkable as visualized.    ABDOMEN and PELVIS:  Intraperitoneal space:  Unremarkable.  No free air.  No  significant fluid collection.  Bones/joints:  Multilevel spondylosis.  No acute fracture.  No  dislocation.  Soft tissues:  Tiny fat-containing umbilical hernia.  Vasculature:  Unremarkable.  No abdominal aortic aneurysm.  Lymph nodes:  Unremarkable.  No enlarged lymph nodes.      Impression:      1.  Bilateral nonobstructive renal calculi.  2.  Other findings as above.    Electronically signed by:  Ryder Marino MD  3/4/2023 8:34 PM CST  Workstation: [id number]          Chief Complaint on Day of Discharge: None    Hospital Course:  The patient is a 47 y.o. female who presented to [facility] with abdominal pain due to choledocholithiasis. She was taken to the OR and lap cholecystectomy was done and a couple days later an ERCP. Her liver enzymes are trending down now, and her abdominal pain is recolved    Condition on Discharge:  stable    Physical Exam on Discharge:  /67 (BP Location: Left arm, Patient Position: Lying)   Pulse 53   Temp 98.2 °F (36.8 °C) (Temporal)   Resp 18   Ht 165.1 cm (65\")   Wt 90.7 kg (200 lb)   LMP 01/05/2023 (Approximate)   SpO2 98%   BMI 33.28 kg/m²   Physical Exam  Vitals reviewed.   HENT:      Head: Normocephalic.      Nose: Nose normal.      Mouth/Throat:      Mouth: Mucous membranes are moist.   Eyes:      " Extraocular Movements: Extraocular movements intact.   Cardiovascular:      Rate and Rhythm: Normal rate and regular rhythm.      Heart sounds: Normal heart sounds.   Pulmonary:      Breath sounds: Normal breath sounds.   Abdominal:      Palpations: Abdomen is soft.      Comments: Minimal tenderness on surgical areas   Musculoskeletal:         General: Normal range of motion.      Cervical back: Normal range of motion.   Skin:     General: Skin is warm.   Neurological:      General: No focal deficit present.      Mental Status: She is alert.   Psychiatric:         Mood and Affect: Mood normal.         Discharge Disposition:  Home    Discharge Medications:     Your medication list      CHANGE how you take these medications      Instructions Last Dose Given Next Dose Due   atorvastatin 80 MG tablet  Commonly known as: LIPITOR  What changed: how much to take      Take 0.5 tablets by mouth Every Night.          CONTINUE taking these medications      Instructions Last Dose Given Next Dose Due   aspirin 325 MG tablet      Take 1 tablet by mouth Daily.       escitalopram 10 MG tablet  Commonly known as: LEXAPRO      Take 2 tablets by mouth Daily.       hydrOXYzine pamoate 50 MG capsule  Commonly known as: VISTARIL      Take 1 capsule by mouth 2 (Two) Times a Day As Needed.       levETIRAcetam 1000 MG tablet  Commonly known as: KEPPRA      Take 1 tablet by mouth 2 (Two) Times a Day.       losartan 50 MG tablet  Commonly known as: COZAAR      Take 1 tablet by mouth Daily.       melatonin 5 MG tablet tablet      Take 2 tablets by mouth every night at bedtime.          STOP taking these medications    acetaminophen 500 MG tablet  Commonly known as: TYLENOL        busPIRone 15 MG tablet  Commonly known as: BUSPAR        butalbital-acetaminophen-caffeine -40 MG per tablet  Commonly known as: FIORICET, ESGIC        fluticasone 50 MCG/ACT nasal spray  Commonly known as: FLONASE        furosemide 20 MG tablet  Commonly known  as: LASIX        meclizine 12.5 MG tablet  Commonly known as: ANTIVERT        metoclopramide 5 MG tablet  Commonly known as: REGLAN        nabumetone 750 MG tablet  Commonly known as: RELAFEN        ondansetron ODT 4 MG disintegrating tablet  Commonly known as: ZOFRAN-ODT        orphenadrine 100 MG 12 hr tablet  Commonly known as: NORFLEX        pantoprazole 40 MG EC tablet  Commonly known as: Protonix        potassium chloride 10 MEQ CR tablet  Commonly known as: K-DUR,KLOR-CON        promethazine 25 MG suppository  Commonly known as: PHENERGAN        traZODone 50 MG tablet  Commonly known as: DESYREL              Where to Get Your Medications      Information about where to get these medications is not yet available    Ask your nurse or doctor about these medications  · atorvastatin 80 MG tablet          Discharge Diet: heart healthy diet    Activity at Discharge: as tolerated     Discharge Care Plan/Instructions: see chart     Follow-up Appointments:   No future appointments.    Test Results Pending at Discharge:     Erick Chisholm MD    Time: 27 min

## 2023-03-10 NOTE — OUTREACH NOTE
Prep Survey    Flowsheet Row Responses   Scientology facility patient discharged from? [facility]   Is LACE score < 7 ? No   Eligibility Readm Mgmt   Discharge diagnosis Acute cholecystitis acute choledocholithiasis    Does the patient have one of the following disease processes/diagnoses(primary or secondary)? General Surgery   Does the patient have Home health ordered? No   Is there a DME ordered? No   Prep survey completed? Yes          Brooke MÉNDEZ - Registered Nurse

## 2023-03-10 NOTE — PAYOR COMM NOTE
"[facility]  Case Managment Extender   Georgiana Groves  (P) [phone]  (F) [phone]          Myles Torres (47 y.o. Female)     Date of Birth   1975    Social Security Number       Address   [address] [place] [address]    Home Phone   [phone]    MRN   [id number]       Denominational   [facility]    Marital Status   Single                            Admission Date   3/4/23    Admission Type   Emergency    Admitting Provider   Erick Chisholm MD    Attending Provider       Department, Room/Bed   [address], 407/1       Discharge Date   3/9/2023    Discharge Disposition   Home or Self Care    Discharge Destination                               Attending Provider: (none)   Allergies: Abilify [Aripiprazole], Buspirone, Penicillins    Isolation: None   Infection: None   Code Status: Prior    Ht: 165.1 cm (65\")   Wt: 90.7 kg (200 lb)    Admission Cmt: None   Principal Problem: Acute cholecystitis [K81.0]                 Active Insurance as of 3/4/2023     Primary Coverage     Payor Plan Insurance Group Employer/Plan Group    [employer] [place] [employer] [place]ICA[place]      Payor Plan Address Payor Plan Phone Number Payor Plan Fax Number Effective Dates    [address] [address] [phone]  10/17/2016 - None Entered    [facility] [id number]       Subscriber Name Subscriber Birth Date Member ID       MYLES TORRES 1975 [id number]                 Emergency Contacts      (Rel.) Home Phone Work Phone Mobile Phone    Arleen Torres (Mother) [phone] -- [phone]    Blake Perry (Daughter) [phone] -- [phone]    Chris Suarez (Significant Other) [phone] -- [phone]    manager,case () [phone]               Discharge Summary      Erick Chisholm MD at 03/09/23 0818              [facility] Medicine " Services  DISCHARGE SUMMARY       Date of Admission: 3/4/2023  Date of Discharge:  3/9/2023  Primary Care Physician: Sonya Crooks APRN    Presenting Problem/History of Present Illness:  Acute cholecystitis [K81.0]  Urinary tract infection in female [N39.0]       Final Discharge Diagnoses:  Acute cholecystitis acute choledocholithiasis      s/p lap cholecystectomy on 3/5/23     sp ERCP on 3/7/23; biliary sphincterotomy; plastic stent placed on CBD     received aztreonan for 5 days     Abdominal pain     Due to above; resolving     Transaminitis     Due to main insult; keep trending down     Chronic medical management     Hx of CVA, ESBL      Seizure disorder     CKD stage III     Left kidney stones       Consults:   Consults     Date and Time Order Name Status Description    3/6/2023  8:39 AM Inpatient Gastroenterology Consult Completed     3/4/2023 11:55 PM Hospitalist (on-call MD unless specified)      3/4/2023 11:27 PM Surgery (on-call MD unless specified) Completed           Procedures Performed: Procedure(s):  ENDOSCOPIC RETROGRADE CHOLANGIOPANCREATOGRAPHY           03/07 1824 ERCP    Pertinent Test Results:   Lab Results (most recent)     Procedure Component Value Units Date/Time    Comprehensive Metabolic Panel [[id number]]  (Abnormal) Collected: 03/09/23 0554    Specimen: Blood Updated: 03/09/23 0736     Glucose 102 mg/dL      BUN 7 mg/dL      Creatinine 0.64 mg/dL      Sodium 139 mmol/L      Potassium 4.0 mmol/L      Comment: Slight hemolysis detected by analyzer. Results may be affected.        Chloride 105 mmol/L      CO2 22.0 mmol/L      Calcium 8.9 mg/dL      Total Protein 5.9 g/dL      Albumin 3.4 g/dL      ALT (SGPT) 195 U/L      AST (SGOT) 89 U/L      Alkaline Phosphatase 296 U/L      Total Bilirubin 0.7 mg/dL      Globulin 2.5 gm/dL      A/G Ratio 1.4 g/dL      BUN/Creatinine Ratio 10.9     Anion Gap 12.0 mmol/L      eGFR 109.8 mL/min/1.73     Narrative:      GFR Normal >60  Chronic Kidney  Disease <60  Kidney Failure <15      TISSUE EXAM, [facility]) [[id number]] Collected: 23 1342    Specimen: Tissue from Gallbladder Updated: 23 1406     Reference Lab Report --     [facility]  [address] [place] [address]  Phone: [phone] or [phone]  Fax: [phone]  Tremaine Dixon M.D., Medical Director    PATIENT NAME                           LABORATORY NO.  KEAGAN ALEX.               [id number]  [id number]                         AGE              SEX  SSN           CLIENT REF #  [facility]           47      1975  F    [id number]   [id number]    [place]                       REQUESTING M.D.     ATTENDING M.D.     COPY TO[address]                 ANTONIO [place] [address]             DATE COLLECTED      DATE RECEIVED      DATE REPORTED  2023    DIAGNOSIS:  GALLBLADDER:  Chronic cholecystitis  Cholelithiasis    GJK    CLINICAL HISTORY:  Acute cholecystitis      SPECIMENS RECEIVED:  GALLBLADDER    MICROSCOPIC DESCRIPTION:  Tissue blocks are prepared and slides are examined microscopically on all  specimens. See diagnosis for details.    Professional interpretation rendered by Julio nAn M.D., F.C.A.P. at [facility] [employer], [address] [place] [address].    GROSS DESCRIPTION:  Received in formalin labeled gallbladder is a 5.4 x 3.5 x 1.4 cm intact  gallbladder. The serosa is tan-pink and smooth with a roughened hepatic bed.  The cystic duct margin is inked black. The gallbladder is opened to reveal  multiple tan-yellow stones measuring 3.5 x 2.4 x 0.5 cm in aggregate. The  mucosal surface is tan-green and trabeculated. No polyps or lesions are grossly  identified. The cystic duct is probe patent and the average wall thickness is 0.3  cm.  No lymph nodes are grossly identified. Representative sections of  the  specimen are submitted as follows:  A1 cystic duct margin (en face), neck, body, and fundus.  [place]    REVIEWED, DIAGNOSED AND ELECTRONICALLY  SIGNED BY:    Julio Ann M.D., F.C.A.P.  CPT CODES:  69504      POC Glucose Once [[id number]]  (Abnormal) Collected: 03/08/23 1113    Specimen: Blood Updated: 03/08/23 1128     Glucose 168 mg/dL      Comment: RN NotifiedOperator: [id number] AquaGen[employer]ister ID: [id number]       POC Glucose Once [[id number]]  (Normal) Collected: 03/08/23 0721    Specimen: Blood Updated: 03/08/23 0738     Glucose 78 mg/dL      Comment: : [id number] Platial ID: [id number]       Comprehensive Metabolic Panel [[id number]]  (Abnormal) Collected: 03/08/23 0636    Specimen: Blood Updated: 03/08/23 0716     Glucose 76 mg/dL      BUN 10 mg/dL      Creatinine 0.65 mg/dL      Sodium 139 mmol/L      Potassium 3.7 mmol/L      Chloride 103 mmol/L      CO2 21.0 mmol/L      Calcium 9.3 mg/dL      Total Protein 6.4 g/dL      Albumin 3.7 g/dL      ALT (SGPT) 282 U/L      AST (SGOT) 167 U/L      Alkaline Phosphatase 376 U/L      Total Bilirubin 0.9 mg/dL      Globulin 2.7 gm/dL      A/G Ratio 1.4 g/dL      BUN/Creatinine Ratio 15.4     Anion Gap 15.0 mmol/L      eGFR 109.4 mL/min/1.73     Narrative:      GFR Normal >60  Chronic Kidney Disease <60  Kidney Failure <15      CBC & Differential [[id number]]  (Abnormal) Collected: 03/08/23 0636    Specimen: Blood Updated: 03/08/23 0658    Narrative:      The following orders were created for panel order CBC & Differential.  Procedure                               Abnormality         Status                     ---------                               -----------         ------                     CBC Auto Differential[[id number]]        Abnormal            Final result                 Please view results for these tests on the individual orders.    CBC Auto Differential [[id number]]  (Abnormal) Collected: 03/08/23 0636    Specimen: Blood  Updated: 03/08/23 0658     WBC 8.40 10*3/mm3      RBC 4.03 10*6/mm3      Hemoglobin 11.7 g/dL      Hematocrit 35.6 %      MCV 88.3 fL      MCH 29.0 pg      MCHC 32.9 g/dL      RDW 13.1 %      RDW-SD 42.3 fl      MPV 12.0 fL      Platelets 182 10*3/mm3      Neutrophil % 74.9 %      Lymphocyte % 15.4 %      Monocyte % 6.9 %      Eosinophil % 1.9 %      Basophil % 0.5 %      Immature Grans % 0.4 %      Neutrophils, Absolute 6.30 10*3/mm3      Lymphocytes, Absolute 1.29 10*3/mm3      Monocytes, Absolute 0.58 10*3/mm3      Eosinophils, Absolute 0.16 10*3/mm3      Basophils, Absolute 0.04 10*3/mm3      Immature Grans, Absolute 0.03 10*3/mm3      nRBC 0.0 /100 WBC     CBC & Differential [[id number]]  (Abnormal) Collected: 03/07/23 0529    Specimen: Blood Updated: 03/07/23 0559    Narrative:      The following orders were created for panel order CBC & Differential.  Procedure                               Abnormality         Status                     ---------                               -----------         ------                     CBC Auto Differential[[id number]]        Abnormal            Final result                 Please view results for these tests on the individual orders.    CBC Auto Differential [[id number]]  (Abnormal) Collected: 03/07/23 0529    Specimen: Blood Updated: 03/07/23 0559     WBC 4.69 10*3/mm3      RBC 3.65 10*6/mm3      Hemoglobin 10.8 g/dL      Hematocrit 32.9 %      MCV 90.1 fL      MCH 29.6 pg      MCHC 32.8 g/dL      RDW 13.2 %      RDW-SD 43.5 fl      MPV 12.6 fL      Platelets 160 10*3/mm3      Neutrophil % 58.9 %      Lymphocyte % 28.6 %      Monocyte % 9.6 %      Eosinophil % 2.1 %      Basophil % 0.4 %      Immature Grans % 0.4 %      Neutrophils, Absolute 2.76 10*3/mm3      Lymphocytes, Absolute 1.34 10*3/mm3      Monocytes, Absolute 0.45 10*3/mm3      Eosinophils, Absolute 0.10 10*3/mm3      Basophils, Absolute 0.02 10*3/mm3      Immature Grans, Absolute 0.02 10*3/mm3      nRBC 0.0 /100  WBC     Urine Culture - Urine, Urine, Clean Catch [[id number]] Collected: 03/04/23 1928    Specimen: Urine, Clean Catch Updated: 03/06/23 0935     Urine Culture <10,000 CFU/mL Mixed Lety Isolated    Narrative:      Specimen contains mixed organisms of questionable pathogenicity suggestive of contamination. If symptoms persist, suggest recollection.  Colonization of the urinary tract without infection is common. Treatment is discouraged unless the patient is symptomatic, pregnant, or undergoing an invasive urologic procedure.    Extra Tubes [[id number]] Collected: 03/06/23 0707    Specimen: Blood, Venous Line Updated: 03/06/23 0815    Narrative:      The following orders were created for panel order Extra Tubes.  Procedure                               Abnormality         Status                     ---------                               -----------         ------                     Lavender Top[[id number]]                                     Final result                 Please view results for these tests on the individual orders.    Lavender Top [[id number]] Collected: 03/06/23 0707    Specimen: Blood Updated: 03/06/23 0815     Extra Tube hold for add-on     Comment: Auto resulted       Procalcitonin [[id number]]  (Normal) Collected: 03/05/23 0546    Specimen: Blood Updated: 03/05/23 0716     Procalcitonin 0.07 ng/mL     Narrative:      As a Marker for Sepsis (Non-Neonates):    1. <0.5 ng/mL represents a low risk of severe sepsis and/or septic shock.  2. >2 ng/mL represents a high risk of severe sepsis and/or septic shock.    As a Marker for Lower Respiratory Tract Infections that require antibiotic therapy:    PCT on Admission    Antibiotic Therapy       6-12 Hrs later    >0.5                Strongly Recommended  >0.25 - <0.5        Recommended   0.1 - 0.25          Discouraged              Remeasure/reassess PCT  <0.1                Strongly Discouraged     Remeasure/reassess PCT    As 28 day mortality risk marker:  "\"Change in Procalcitonin Result\" (>80% or <=80%) if Day 0 (or Day 1) and Day 4 values are available. Refer to http://www.[facility]-pct-calculator.com    Change in PCT <=80%  A decrease of PCT levels below or equal to 80% defines a positive change in PCT test result representing a higher risk for 28-day all-cause mortality of patients diagnosed with severe sepsis for septic shock.    Change in PCT >80%  A decrease of PCT levels of more than 80% defines a negative change in PCT result representing a lower risk for 28-day all-cause mortality of patients diagnosed with severe sepsis or septic shock.       TSH [[id number]]  (Normal) Collected: 03/05/23 0546    Specimen: Blood Updated: 03/05/23 0715     TSH 0.328 uIU/mL     Lipase [[id number]]  (Abnormal) Collected: 03/05/23 0546    Specimen: Blood Updated: 03/05/23 0714     Lipase 72 U/L     Amylase [[id number]]  (Normal) Collected: 03/05/23 0546    Specimen: Blood Updated: 03/05/23 0714     Amylase 65 U/L     Magnesium [[id number]]  (Normal) Collected: 03/05/23 0546    Specimen: Blood Updated: 03/05/23 0714     Magnesium 2.3 mg/dL     Protime-INR [[id number]]  (Normal) Collected: 03/05/23 0546    Specimen: Blood Updated: 03/05/23 0713     Protime 12.9 Seconds      INR 0.98    Narrative:      Therapeutic range for most indications is 2.0-3.0 INR,  or 2.5-3.5 for mechanical heart valves.    Lactic Acid, Plasma [[id number]]  (Normal) Collected: 03/05/23 0546    Specimen: Blood Updated: 03/05/23 0703     Lactate 0.8 mmol/L     Hemoglobin A1c [[id number]]  (Normal) Collected: 03/05/23 0546    Specimen: Blood Updated: 03/05/23 0703     Hemoglobin A1C 5.30 %     Narrative:      Hemoglobin A1C Ranges:    Increased Risk for Diabetes  5.7% to 6.4%  Diabetes                     >= 6.5%  Diabetic Goal                < 7.0%    CBC (No Diff) [[id number]]  (Abnormal) Collected: 03/05/23 0546    Specimen: Blood Updated: 03/05/23 0651     WBC 4.84 10*3/mm3      RBC 3.79 10*6/mm3      Hemoglobin " 11.4 g/dL      Hematocrit 33.8 %      MCV 89.2 fL      MCH 30.1 pg      MCHC 33.7 g/dL      RDW 13.4 %      RDW-SD 44.0 fl      MPV 12.5 fL      Platelets 168 10*3/mm3     Amylase [[id number]]  (Normal) Collected: 03/04/23 1958    Specimen: Blood Updated: 03/04/23 2148     Amylase 81 U/L     Hepatitis Panel, Acute [[id number]]  (Normal) Collected: 03/04/23 1958    Specimen: Blood Updated: 03/04/23 2139     Hepatitis B Surface Ag Non-Reactive     Hep A IgM Non-Reactive     Hep B C IgM Non-Reactive     Hepatitis C Ab Non-Reactive    Narrative:      Results may be falsely decreased if patient taking Biotin.     Extra Tubes [[id number]] Collected: 03/04/23 1958    Specimen: Blood Updated: 03/04/23 2100    Narrative:      The following orders were created for panel order Extra Tubes.  Procedure                               Abnormality         Status                     ---------                               -----------         ------                     Gold Top - SST[[id number]]                                   Final result               Green Top (Gel)[[id number]]                                  Final result               Light Blue Top[[id number]]                                   Final result                 Please view results for these tests on the individual orders.    Gold Top - SST [[id number]] Collected: 03/04/23 1958    Specimen: Blood Updated: 03/04/23 2100     Extra Tube Hold for add-ons.     Comment: Auto resulted.       Green Top (Gel) [[id number]] Collected: 03/04/23 1958    Specimen: Blood Updated: 03/04/23 2100     Extra Tube Hold for add-ons.     Comment: Auto resulted.       Light Blue Top [[id number]] Collected: 03/04/23 1958    Specimen: Blood Updated: 03/04/23 2100     Extra Tube Hold for add-ons.     Comment: Auto resulted       Lipase [[id number]]  (Abnormal) Collected: 03/04/23 1958    Specimen: Blood Updated: 03/04/23 2028     Lipase 95 U/L     Lactic Acid, Plasma [[id number]]  (Normal) Collected:  03/04/23 1952    Specimen: Blood Updated: 03/04/23 2007     Lactate 0.8 mmol/L     Urine Drug Screen - Urine, Clean Catch [[id number]]  (Abnormal) Collected: 03/04/23 1928    Specimen: Urine, Clean Catch Updated: 03/04/23 1955     THC, Screen, Urine Negative     Phencyclidine (PCP), Urine Negative     Cocaine Screen, Urine Negative     Methamphetamine, Ur Negative     Opiate Screen Negative     Amphetamine Screen, Urine Negative     Benzodiazepine Screen, Urine Positive     Tricyclic Antidepressants Screen Negative     Methadone Screen, Urine Negative     Barbiturates Screen, Urine Negative     Oxycodone Screen, Urine Negative     Propoxyphene Screen Negative     Buprenorphine, Screen, Urine Negative    Narrative:      Cutoff For Drugs Screened:    Amphetamines               500 ng/ml  Barbiturates               200 ng/ml  Benzodiazepines            150 ng/ml  Cocaine                    150 ng/ml  Methadone                  200 ng/ml  Opiates                    100 ng/ml  Phencyclidine               25 ng/ml  THC                            50 ng/ml  Methamphetamine            500 ng/ml  Tricyclic Antidepressants  300 ng/ml  Oxycodone                  100 ng/ml  Propoxyphene               300 ng/ml  Buprenorphine               10 ng/ml    The normal value for all drugs tested is negative. This report includes unconfirmed screening results, with the cutoff values listed, to be used for medical treatment purposes only.  Unconfirmed results must not be used for non-medical purposes such as employment or legal testing.  Clinical consideration should be applied to any drug of abuse test, particularly when unconfirmed results are used.      Urinalysis, Microscopic Only - Urine, Clean Catch [[id number]]  (Abnormal) Collected: 03/04/23 1928    Specimen: Urine, Clean Catch Updated: 03/04/23 1953     RBC, UA 0-2 /HPF      WBC, UA Too Numerous to Count /HPF      Bacteria, UA 2+ /HPF      Squamous Epithelial Cells, UA 31-50 /HPF       Yeast, UA Small/1+ Budding Yeast /HPF      Hyaline Casts, UA None Seen /LPF      Methodology Manual Light Microscopy    Urinalysis With Microscopic If Indicated (No Culture) - Urine, Clean Catch [[id number]]  (Abnormal) Collected: 03/04/23 1928    Specimen: Urine, Clean Catch Updated: 03/04/23 1941     Color, UA Dark Yellow     Appearance, UA Cloudy     pH, UA <=5.0     Specific Gravity, UA 1.029     Glucose, UA Negative     Ketones, UA Trace     Bilirubin, UA Moderate (2+)     Blood, UA Trace     Protein, UA 30 mg/dL (1+)     Leuk Esterase, UA Moderate (2+)     Nitrite, UA Positive     Urobilinogen, UA 1.0 E.U./dL    Pregnancy, Urine - Urine, Clean Catch [[id number]]  (Normal) Collected: 03/04/23 1928    Specimen: Urine, Clean Catch Updated: 03/04/23 1939     HCG, Urine QL Negative        Imaging Results (Most Recent)     Procedure Component Value Units Date/Time    FL ercp biliary duct only [[id number]] Collected: 03/07/23 1826     Updated: 03/08/23 0659    Narrative:      EXAM DESCRIPTION:   FL ERCP BILIARY DUCT ONLY    CLINICAL HISTORY:   47 years  Female  ercp, K81.0 Acute cholecystitis N39.0 Urinary  tract infection, site not specified K80.47 Calculus of bile duct  with acute and chronic cholecystitis with obstruction    NUMBER IMAGES: 3 images    FLUOROSCOPY TIME: 1 minute 39 seconds    TECHNIQUE:   Fluoroscopic images were obtained during an ERCP.    COMPARISON:   None    FINDINGS:   Multiple intraoperative images show biliary ductal dilation and  biliary stent placement.       Impression:      Later ductal dilation and biliary stent placement.  Please also  see operative report for further details.    Electronically signed by:  Juvenal Boothe MD  3/8/2023 6:56  AM CST Workstation: [id number]    FL C Arm During Surgery [[id number]] Resulted: 03/06/23 0754     Updated: 03/06/23 0754    US Liver [[id number]] Collected: 03/04/23 2104     Updated: 03/04/23 2248    Narrative:      US LIVER  RPID: US  LIVER    CLINICAL HISTORY:  47 years Female, elevated liver enzymes    COMPARISON:  CT on this date    TECHNIQUE:  Grayscale and color Doppler flow imaging was performed, with  spectral Doppler if needed    FINDINGS:    Multiple small stones are present in the gallbladder lumen. The  gallbladder wall is 4 mm which is slightly thickened. There is no  surrounding pericholecystic fluid. The common bile duct is 8.4 mm  which is slightly dilated.    No masses observed within the liver. The gallbladder is reported  as tender when directly scanned.    The pancreas is obscured. The aorta is obscured..    No masses seen within the hepatic parenchyma. The liver  echogenicity is elevated, this could be seen with steatosis.            Impression:        1. Gallstones with thickening of the gallbladder wall and  tenderness of the gallbladder reported when directly scanned.  Findings are suspicious for cholecystitis.  2. 8.4 mm common bile duct correlate for the potential  choledocholithiasis.    Electronically signed by:  Jaziel Benjamin MD  3/4/2023 10:46 PM CST  Workstation: [id number]    CT Abdomen Pelvis Without Contrast [[id number]] Collected: 03/04/23 1928     Updated: 03/04/23 2036    Narrative:      EXAM:  CT Abdomen and Pelvis Without Intravenous Contrast    CLINICAL HISTORY:  rt flank pain    TECHNIQUE:  Axial computed tomography images of the abdomen and pelvis  without intravenous contrast.  Sagittal and coronal reformatted  images were created and reviewed.  This CT exam was performed  using one or more of the following dose reduction techniques:   automated exposure control, adjustment of the mA and/or kV  according to patient size, and/or use of iterative reconstruction  technique.    COMPARISON:  CT Abdomen Pelvis dated 9/21/2022    FINDINGS:  Lung bases:  Unremarkable.  No mass.  No consolidation.    ABDOMEN:  Liver:  Unremarkable.  Gallbladder and bile ducts:  Unremarkable.  No calcified stones.   No ductal  "dilation.  Pancreas:  Unremarkable.  No ductal dilation.  Spleen:  Unremarkable.  No splenomegaly.  Adrenals:  Unremarkable.  No mass.  Kidneys and ureters:  Lobulated renal contour.  Bilateral renal  calculi.  No hydronephrosis.  Stomach and bowel:  Moderate stool.  No bowel obstruction.  No  appreciable mucosal thickening.    PELVIS:  Appendix:  Normal caliber appendix.  No findings to suggest acute  appendicitis.  Bladder:  The urinary bladder is decompressed.  No stones.  Reproductive:  Unremarkable as visualized.    ABDOMEN and PELVIS:  Intraperitoneal space:  Unremarkable.  No free air.  No  significant fluid collection.  Bones/joints:  Multilevel spondylosis.  No acute fracture.  No  dislocation.  Soft tissues:  Tiny fat-containing umbilical hernia.  Vasculature:  Unremarkable.  No abdominal aortic aneurysm.  Lymph nodes:  Unremarkable.  No enlarged lymph nodes.      Impression:      1.  Bilateral nonobstructive renal calculi.  2.  Other findings as above.    Electronically signed by:  Ryder Marino MD  3/4/2023 8:34 PM CST  Workstation: [id number]          Chief Complaint on Day of Discharge: None    Hospital Course:  The patient is a 47 y.o. female who presented to [facility] with abdominal pain due to choledocholithiasis. She was taken to the OR and lap cholecystectomy was done and a couple days later an ERCP. Her liver enzymes are trending down now, and her abdominal pain is recolved    Condition on Discharge:  stable    Physical Exam on Discharge:  /67 (BP Location: Left arm, Patient Position: Lying)   Pulse 53   Temp 98.2 °F (36.8 °C) (Temporal)   Resp 18   Ht 165.1 cm (65\")   Wt 90.7 kg (200 lb)   LMP 01/05/2023 (Approximate)   SpO2 98%   BMI 33.28 kg/m²   Physical Exam  Vitals reviewed.   HENT:      Head: Normocephalic.      Nose: Nose normal.      Mouth/Throat:      Mouth: Mucous membranes are moist.   Eyes:      Extraocular Movements: Extraocular movements intact. "   Cardiovascular:      Rate and Rhythm: Normal rate and regular rhythm.      Heart sounds: Normal heart sounds.   Pulmonary:      Breath sounds: Normal breath sounds.   Abdominal:      Palpations: Abdomen is soft.      Comments: Minimal tenderness on surgical areas   Musculoskeletal:         General: Normal range of motion.      Cervical back: Normal range of motion.   Skin:     General: Skin is warm.   Neurological:      General: No focal deficit present.      Mental Status: She is alert.   Psychiatric:         Mood and Affect: Mood normal.         Discharge Disposition:  Home    Discharge Medications:     Your medication list      CHANGE how you take these medications      Instructions Last Dose Given Next Dose Due   atorvastatin 80 MG tablet  Commonly known as: LIPITOR  What changed: how much to take      Take 0.5 tablets by mouth Every Night.          CONTINUE taking these medications      Instructions Last Dose Given Next Dose Due   aspirin 325 MG tablet      Take 1 tablet by mouth Daily.       escitalopram 10 MG tablet  Commonly known as: LEXAPRO      Take 2 tablets by mouth Daily.       hydrOXYzine pamoate 50 MG capsule  Commonly known as: VISTARIL      Take 1 capsule by mouth 2 (Two) Times a Day As Needed.       levETIRAcetam 1000 MG tablet  Commonly known as: KEPPRA      Take 1 tablet by mouth 2 (Two) Times a Day.       losartan 50 MG tablet  Commonly known as: COZAAR      Take 1 tablet by mouth Daily.       melatonin 5 MG tablet tablet      Take 2 tablets by mouth every night at bedtime.          STOP taking these medications    acetaminophen 500 MG tablet  Commonly known as: TYLENOL        busPIRone 15 MG tablet  Commonly known as: BUSPAR        butalbital-acetaminophen-caffeine -40 MG per tablet  Commonly known as: FIORICET, ESGIC        fluticasone 50 MCG/ACT nasal spray  Commonly known as: FLONASE        furosemide 20 MG tablet  Commonly known as: LASIX        meclizine 12.5 MG tablet  Commonly  known as: ANTIVERT        metoclopramide 5 MG tablet  Commonly known as: REGLAN        nabumetone 750 MG tablet  Commonly known as: RELAFEN        ondansetron ODT 4 MG disintegrating tablet  Commonly known as: ZOFRAN-ODT        orphenadrine 100 MG 12 hr tablet  Commonly known as: NORFLEX        pantoprazole 40 MG EC tablet  Commonly known as: Protonix        potassium chloride 10 MEQ CR tablet  Commonly known as: K-DUR,KLOR-CON        promethazine 25 MG suppository  Commonly known as: PHENERGAN        traZODone 50 MG tablet  Commonly known as: DESYREL              Where to Get Your Medications      Information about where to get these medications is not yet available    Ask your nurse or doctor about these medications  · atorvastatin 80 MG tablet          Discharge Diet: heart healthy diet    Activity at Discharge: as tolerated     Discharge Care Plan/Instructions: see chart     Follow-up Appointments:   No future appointments.    Test Results Pending at Discharge:     Erick Chisholm MD    Time: 27 min                Electronically signed by Erick Chisholm MD at 03/09/23 [id number]

## 2023-03-13 ENCOUNTER — READMISSION MANAGEMENT (OUTPATIENT)
Dept: CALL CENTER | Facility: HOSPITAL | Age: 48
End: 2023-03-13
Payer: COMMERCIAL

## 2023-03-13 NOTE — OUTREACH NOTE
General Surgery Week 1 Survey    Flowsheet Row Responses   [facility] facility patient discharged from? [place]   Does the patient have one of the following disease processes/diagnoses(primary or secondary)? General Surgery   Week 1 attempt successful? No   Unsuccessful attempts Attempt 1          LESLYE Hdz Registered Nurse

## 2023-03-21 ENCOUNTER — READMISSION MANAGEMENT (OUTPATIENT)
Dept: CALL CENTER | Facility: HOSPITAL | Age: 48
End: 2023-03-21
Payer: COMMERCIAL

## 2023-03-21 NOTE — OUTREACH NOTE
General Surgery Week 2 Survey    Flowsheet Row Responses   [facility] patient discharged from? [place]   Does the patient have one of the following disease processes/diagnoses(primary or secondary)? General Surgery   Week 2 attempt successful? Yes   Call start time 1158   Call end time 1202   Discharge diagnosis Acute cholecystitis acute choledocholithiasis    Person spoke with today (if not patient) and relationship Jonel health care tech   Meds reviewed with patient/caregiver? Yes   Is the patient having any side effects they believe may be caused by any medication additions or changes? No   Does the patient have all medications related to this admission filled (includes all antibiotics, pain medications, etc.) Yes   Is the patient taking all medications as directed (includes completed medication regime)? Yes   Does the patient have a follow up appointment scheduled with their surgeon? Yes   Has the patient kept scheduled appointments due by today? N/A   Has home health visited the patient within 72 hours of discharge? N/A   Psychosocial issues? No   Comments lives in [facility]   Did the patient receive a copy of their discharge instructions? Yes   Nursing interventions Reviewed instructions with patient   What is the patient's perception of their health status since discharge? Improving   Is the patient /caregiver able to teach back basic post-op care? Keep incision areas clean,dry and protected, Do not remove steri-strips, Lifting as instructed by MD in discharge instructions, No tub bath, swimming, or hot tub until instructed by MD, Take showers only when approved by MD-sponge bathe until then   Is the patient/caregiver able to teach back signs and symptoms of incisional infection? Fever, Pus or odor from incision, Incisional warmth, Increased drainage or bleeding, Increased redness, swelling or pain at the incisonal site   Is the patient/caregiver able to teach back steps to recovery at home?  Set small, achievable goals for return to baseline health, Rest and rebuild strength, gradually increase activity, Eat a well-balance diet   If the patient is a current smoker, are they able to teach back resources for cessation? --  [smokes occasionally]   Is the patient/caregiver able to teach back the hierarchy of who to call/visit for symptoms/problems? PCP, Specialist, Home health nurse, Urgent Care, ED, 911 Yes   Additional teach back comments states good appetite, incision healing well   Week 2 call completed? Yes          Aura JIMENEZ - Registered Nurse

## 2023-03-29 ENCOUNTER — READMISSION MANAGEMENT (OUTPATIENT)
Dept: CALL CENTER | Facility: HOSPITAL | Age: 48
End: 2023-03-29
Payer: COMMERCIAL

## 2023-03-29 NOTE — OUTREACH NOTE
General Surgery Week 3 Survey    Flowsheet Row Responses   [facility] facility patient discharged from? [place]   Does the patient have one of the following disease processes/diagnoses(primary or secondary)? General Surgery   Week 3 attempt successful? No   Unsuccessful attempts Attempt 1          Claudia BLEVINS - Licensed Nurse

## 2023-03-31 ENCOUNTER — READMISSION MANAGEMENT (OUTPATIENT)
Dept: CALL CENTER | Facility: HOSPITAL | Age: 48
End: 2023-03-31
Payer: COMMERCIAL

## 2023-03-31 NOTE — OUTREACH NOTE
General Surgery Week 3 Survey    Flowsheet Row Responses   [facility] facility patient discharged from? [place]   Does the patient have one of the following disease processes/diagnoses(primary or secondary)? General Surgery   Week 3 attempt successful? No   Unsuccessful attempts Attempt 2          Jil MÉNDEZ - Registered Nurse

## 2023-04-10 ENCOUNTER — OFFICE VISIT (OUTPATIENT)
Dept: GASTROENTEROLOGY | Facility: CLINIC | Age: 48
End: 2023-04-10
Payer: COMMERCIAL

## 2023-04-10 ENCOUNTER — OFFICE VISIT (OUTPATIENT)
Dept: SURGERY | Facility: CLINIC | Age: 48
End: 2023-04-10
Payer: COMMERCIAL

## 2023-04-10 VITALS
OXYGEN SATURATION: 98 % | HEIGHT: 64 IN | DIASTOLIC BLOOD PRESSURE: 82 MMHG | SYSTOLIC BLOOD PRESSURE: 115 MMHG | BODY MASS INDEX: 33.12 KG/M2 | TEMPERATURE: 98.4 F | HEART RATE: 82 BPM | WEIGHT: 194 LBS

## 2023-04-10 VITALS
HEIGHT: 65 IN | HEART RATE: 72 BPM | WEIGHT: 194 LBS | BODY MASS INDEX: 32.32 KG/M2 | DIASTOLIC BLOOD PRESSURE: 82 MMHG | SYSTOLIC BLOOD PRESSURE: 115 MMHG

## 2023-04-10 DIAGNOSIS — K80.51 CALCULUS OF BILE DUCT WITHOUT CHOLANGITIS WITH OBSTRUCTION: Primary | Chronic | ICD-10-CM

## 2023-04-10 DIAGNOSIS — K81.9 CHOLECYSTITIS: Primary | ICD-10-CM

## 2023-04-10 PROCEDURE — 99024 POSTOP FOLLOW-UP VISIT: CPT | Performed by: STUDENT IN AN ORGANIZED HEALTH CARE EDUCATION/TRAINING PROGRAM

## 2023-04-10 PROCEDURE — 1160F RVW MEDS BY RX/DR IN RCRD: CPT | Performed by: STUDENT IN AN ORGANIZED HEALTH CARE EDUCATION/TRAINING PROGRAM

## 2023-04-10 PROCEDURE — 1159F MED LIST DOCD IN RCRD: CPT | Performed by: STUDENT IN AN ORGANIZED HEALTH CARE EDUCATION/TRAINING PROGRAM

## 2023-04-10 RX ORDER — BUSPIRONE HYDROCHLORIDE 15 MG/1
15 TABLET ORAL 2 TIMES DAILY
COMMUNITY
Start: 2023-03-10

## 2023-04-10 RX ORDER — DEXTROSE AND SODIUM CHLORIDE 5; .45 G/100ML; G/100ML
30 INJECTION, SOLUTION INTRAVENOUS CONTINUOUS PRN
Status: DISCONTINUED | OUTPATIENT
Start: 2023-04-10 | End: 2023-05-09 | Stop reason: HOSPADM

## 2023-04-10 RX ORDER — FUROSEMIDE 20 MG/1
20 TABLET ORAL 2 TIMES DAILY
COMMUNITY

## 2023-04-10 RX ORDER — DEXTROSE AND SODIUM CHLORIDE 5; .45 G/100ML; G/100ML
30 INJECTION, SOLUTION INTRAVENOUS CONTINUOUS PRN
Status: CANCELLED | OUTPATIENT
Start: 2023-04-10

## 2023-04-10 RX ORDER — NITROFURANTOIN MACROCRYSTALS 100 MG/1
100 CAPSULE ORAL DAILY
COMMUNITY
Start: 2023-03-01

## 2023-04-10 RX ORDER — URSODIOL 300 MG/1
300 CAPSULE ORAL 2 TIMES DAILY WITH MEALS
Qty: 180 CAPSULE | Refills: 3 | Status: SHIPPED | OUTPATIENT
Start: 2023-04-10

## 2023-04-10 RX ORDER — SODIUM CHLORIDE 9 MG/ML
40 INJECTION, SOLUTION INTRAVENOUS AS NEEDED
Status: CANCELLED | OUTPATIENT
Start: 2023-04-10

## 2023-04-10 RX ORDER — ACETAMINOPHEN 500 MG
2 TABLET ORAL 2 TIMES DAILY
COMMUNITY
Start: 2023-03-10

## 2023-04-10 RX ORDER — SODIUM CHLORIDE 9 MG/ML
40 INJECTION, SOLUTION INTRAVENOUS AS NEEDED
Status: DISCONTINUED | OUTPATIENT
Start: 2023-04-10 | End: 2023-05-09 | Stop reason: HOSPADM

## 2023-04-10 NOTE — PROGRESS NOTES
CHIEF COMPLAINT:   Chief Complaint   Patient presents with   • Post-op Follow-up     Lap adia       HPI: This patient presents for a post-operative visit after undergoing a laparoscopic  cholecystectomy.  Patient reports no problems. Eating well without any significant nausea. Having good bowel function. No problems with constipation or diarrhea. No urinary complaints. Denies fever. Ambulating well and slowly returning to normal activities.    PATHOLOGY:     PHYSICAL EXAM:    ABD: Incisions are healing well some opening of her upper abdominal incision, but no evidence of infection    ASSESSMENT:    Diagnoses and all orders for this visit:    1. Cholecystitis (Primary)        PLAN:    1.The patient will follow-up on a prn basis unless there are any problems.  2. May shower.   3. May return to normal activity without restrictions.  4. BMI is >= 30 and <35. (Class 1 Obesity). The following options were offered after discussion;: weight loss educational material (shared in after visit summary)          This document has been electronically signed by Jonn Jimenes MD on April 10, 2023 10:53 CDT

## 2023-04-23 NOTE — H&P (VIEW-ONLY)
Chief Complaint   Patient presents with   • [facility] f/u      Hosp endo f/u ERCP       Subjective    Myles Carito Shannon is a 47 y.o. female.    History of Present Illness  Patient presented to GI clinic for follow-up visit today.  Feels better currently.  Denied abdominal pain, nausea or vomiting.  S/p ERCP with CBD stent placement for choledocholithiasis.  Currently on Juan Antonio.       The following portions of the patient's history were reviewed and updated as appropriate:   Past Medical History:   Diagnosis Date   • Abdominal pain     Chronic RLQ, RUQ, R flank comes and goes.    • Abdominal pain, right lower quadrant    • Acute bilateral otitis media    • Allergic rhinitis    • Backache     Upper back.   • Candidiasis of skin    • Cellulitis of neck    • Chest pain    • Contraception    • Cough    • Dysfunction of eustachian tube    • Dyspareunia, female    • Elevated cholesterol    • Excessive or frequent menstruation    • Flank pain    • Generalized aches and pains    • Headache    • Health maintenance examination    • Hypertension    • Infection of skin and subcutaneous tissue    • Irregular periods    • Kidney stone     Poss   • Menorrhagia    • Nausea vomiting and diarrhea    • Obesity    • Open wound of abdominal wall    • Otalgia    • Pain in left foot    • Pain in unspecified hand    • Removed Impacted cerumen 2012   • Rhinitis    • Seizure 08/15/2019   • Seizure    • Shoulder pain     right-sided-likely muscle strain      • Shoulder tendinitis    • Spider bite wound    • Staphylococcal infection of skin    • Stroke    • Swollen feet    • Tinea cruris    • Tobacco dependence syndrome    • Upper respiratory infection    • Urinary tract infectious disease    • Vertigo    • Visit for gynecologic examination    • Vulvovaginitis      Past Surgical History:   Procedure Laterality Date   •  SECTION     • CHOLECYSTECTOMY WITH INTRAOPERATIVE CHOLANGIOGRAM N/A 3/5/2023    Procedure: CHOLECYSTECTOMY  LAPAROSCOPIC INTRAOPERATIVE CHOLANGIOGRAM AND LAPAROSCOPIC COMMON BILE DUCT;  Surgeon: Jonn Jimenes MD;  Location: [facility] OR;  Service: General;  Laterality: N/A;   • CYSTOSCOPY N/A 2023    Procedure: CYSTOSCOPY;  Surgeon: Chris Deshpande MD;  Location: [facility] OR;  Service: Urology;  Laterality: N/A;   • DILATATION AND CURETTAGE      Secondary to incomplete spontaneous    • ERCP N/A 3/7/2023    Procedure: ENDOSCOPIC RETROGRADE CHOLANGIOPANCREATOGRAPHY;  Surgeon: Venkat Robb MD;  Location: [facility] ENDOSCOPY;  Service: Gastroenterology;  Laterality: N/A;   • INCISION AND DRAINAGE ABSCESS  2012   • INJECTION OF MEDICATION  2015    Phenergan (1)     • INJECTION OF MEDICATION  10/10/2013    Toradol (2)      • INJECTION OF MEDICATION  2014    Zofran (1)        Family History   Problem Relation Age of Onset   • Breast cancer Mother    • Thyroid disease Mother    • Hypertension Mother    • Heart disease Mother    • Arthritis Mother    • Liver disease Father    • Endometrial cancer Sister    • Anxiety disorder Brother    • Mental illness Daughter    • Depression Daughter    • Diabetes Maternal Aunt    • Diabetes Maternal Uncle    • Stroke Maternal Grandmother    • Breast cancer Maternal Grandmother    • Cancer Maternal Grandfather    • Stroke Paternal Grandmother    • Depression Other    • Cancer Other         Colorectal Cancer   • Endometrial cancer Other    • Lung cancer Other      OB History        1    Para   1    Term   1            AB        Living           SAB        IAB        Ectopic        Molar        Multiple        Live Births                  Prior to Admission medications    Medication Sig Start Date End Date Taking? Authorizing Provider   acetaminophen (TYLENOL) 500 MG tablet Take 2 tablets by mouth 2 (Two) Times a Day. 3/10/23  Yes Radha Zaragoza MD   aspirin 325 MG tablet Take 1 tablet by mouth Daily.   Yes Radha Zaragoza MD    atorvastatin (LIPITOR) 80 MG tablet Take 0.5 tablets by mouth Every Night. 3/9/23  Yes Erick Chisholm MD   busPIRone (BUSPAR) 15 MG tablet Take 1 tablet by mouth 2 (Two) Times a Day. 3/10/23  Yes Radha Zaragoza MD   escitalopram (LEXAPRO) 10 MG tablet Take 2 tablets by mouth Daily. 10/1/22  Yes Radha Zaragoza MD   furosemide (LASIX) 20 MG tablet Take 1 tablet by mouth 2 (Two) Times a Day.   Yes ProviderRadha MD   hydrOXYzine pamoate (VISTARIL) 50 MG capsule Take 1 capsule by mouth 2 (Two) Times a Day As Needed. 4/5/22  Yes Sonya Crooks APRN   levETIRAcetam (KEPPRA) 1000 MG tablet Take 1 tablet by mouth 2 (Two) Times a Day.   Yes Radha Zaragoza MD   losartan (COZAAR) 50 MG tablet Take 1 tablet by mouth Daily.   Yes ProviderRadha MD   melatonin 5 MG tablet tablet Take 2 tablets by mouth every night at bedtime.   Yes ProviderRadha MD   nitrofurantoin (MACRODANTIN) 100 MG capsule Take 1 capsule by mouth Daily. 3/1/23  Yes Radha Zaragoza MD   ursodiol (ACTIGALL) 300 MG capsule Take 1 capsule by mouth 2 (Two) Times a Day With Meals. 4/10/23   Venkat Robb MD     Allergies   Allergen Reactions   • Abilify [Aripiprazole] Nausea Only   • Buspirone Rash   • Penicillins Hives and Nausea And Vomiting     Social History     Socioeconomic History   • Marital status: Single   Tobacco Use   • Smoking status: Every Day     Packs/day: 0.50     Types: Cigarettes   • Smokeless tobacco: Never   • Tobacco comments:     19*800*quit*NOW   Vaping Use   • Vaping Use: Some days   • Substances: Nicotine   • Devices: Refillable tank   • Passive vaping exposure: Yes   Substance and Sexual Activity   • Alcohol use: No   • Drug use: No   • Sexual activity: Not Currently       Review of Systems  Review of Systems   Constitutional: Negative for chills, fatigue, fever and unexpected weight change.   HENT: Negative for congestion, ear discharge, hearing loss, nosebleeds and  "sore throat.    Eyes: Negative for pain, discharge and redness.   Respiratory: Negative for cough, chest tightness, shortness of breath and wheezing.    Cardiovascular: Negative for chest pain and palpitations.   Gastrointestinal: Negative for abdominal distention, abdominal pain, blood in stool, constipation, diarrhea, nausea and vomiting.   Endocrine: Negative for cold intolerance, polydipsia, polyphagia and polyuria.   Genitourinary: Negative for dysuria, flank pain, frequency, hematuria and urgency.   Musculoskeletal: Negative for arthralgias, back pain, joint swelling and myalgias.   Skin: Negative for color change, pallor and rash.   Neurological: Negative for tremors, seizures, syncope, weakness and headaches.   Hematological: Negative for adenopathy. Does not bruise/bleed easily.   Psychiatric/Behavioral: Negative for behavioral problems, confusion, dysphoric mood, hallucinations and suicidal ideas. The patient is not nervous/anxious.         /82 (BP Location: Right arm)   Pulse 72   Ht 165.1 cm (65\")   Wt 88 kg (194 lb)   BMI 32.28 kg/m²     Objective    Physical Exam  Constitutional:       Appearance: She is well-developed.   HENT:      Head: Normocephalic and atraumatic.   Eyes:      Conjunctiva/sclera: Conjunctivae normal.      Pupils: Pupils are equal, round, and reactive to light.   Neck:      Thyroid: No thyromegaly.   Cardiovascular:      Rate and Rhythm: Normal rate and regular rhythm.      Heart sounds: Normal heart sounds. No murmur heard.  Pulmonary:      Effort: Pulmonary effort is normal.      Breath sounds: Normal breath sounds. No wheezing.   Abdominal:      General: Bowel sounds are normal. There is no distension.      Palpations: Abdomen is soft. There is no mass.      Tenderness: There is no abdominal tenderness.      Hernia: No hernia is present.   Genitourinary:     Comments: No lesions noted  Musculoskeletal:         General: No tenderness. Normal range of motion.      " Cervical back: Normal range of motion and neck supple.   Lymphadenopathy:      Cervical: No cervical adenopathy.   Skin:     General: Skin is warm and dry.      Findings: No rash.   Neurological:      Mental Status: She is alert and oriented to person, place, and time.      Cranial Nerves: No cranial nerve deficit.   Psychiatric:         Thought Content: Thought content normal.       No results displayed because visit has over 200 results.        Assessment & Plan      1. Calculus of bile duct without cholangitis with obstruction    1.  Biliary colic with choledocholithiasis s/p ERCP with CBD stent placement and cholecystectomy, repeat ERCP next month.  Continue Juan Antonio.  2.  Anemia, continue iron supplements.  Repeat CBC next visit.  EGD and colonoscopy in next visit.    3.  Obesity, recommend exercise and diet control.  4.  Tobacco usage, recommend cessation.      Orders placed during this encounter include:  Orders Placed This Encounter   Procedures   • Obtain Informed Consent     Standing Status:   Future     Order Specific Question:   Informed Consent Given For     Answer:   ERCP       ENDOSCOPIC RETROGRADE CHOLANGIOPANCREATOGRAPHY (N/A)    Review and/or summary of lab tests, radiology, procedures, medications. Review and summary of old records and obtaining of history. The risks and benefits of my recommendations, as well as other treatment options were discussed with the patient today. Questions were answered.    New Medications Ordered This Visit   Medications   • ursodiol (ACTIGALL) 300 MG capsule     Sig: Take 1 capsule by mouth 2 (Two) Times a Day With Meals.     Dispense:  180 capsule     Refill:  3       Follow-up: Return in about 1 month (around 5/10/2023).               Results for orders placed or performed during the hospital encounter of 03/04/23   Gold Top - SST   Result Value Ref Range    Extra Tube Hold for add-ons.    Green Top (Gel)   Result Value Ref Range    Extra Tube Hold for add-ons.     TISSUE EXAM, [facility] ([facility])    Specimen: Gallbladder; Tissue   Result Value Ref Range    Reference Lab Report       [facility]  [address] [place] [address]  Phone: [phone] or [phone]  Fax: [phone]  Tremaine Dixon M.D., Medical Director    PATIENT NAME                           LABORATORY NO.  KEAGAN ALEX.               [id number]  [id number]                         AGE              SEX  SSN           CLIENT REF #  [facility]           47      1975  F    [id number]   [id number]    [place]                       REQUESTING M.D.     ATTENDING M.D.     COPY TO[address]                 ANTONIO CUNNINGHAM DEBORAH  [place] [id number]             DATE COLLECTED      DATE RECEIVED      DATE REPORTED  2023    DIAGNOSIS:  GALLBLADDER:  Chronic cholecystitis  Cholelithiasis    GJK    CLINICAL HISTORY:  Acute cholecystitis      SPECIMENS RECEIVED:  GALLBLADDER    MICROSCOPIC DESCRIPTION:  Tissue blocks are prepared and slides are examined  microscopically on all  specimens. See diagnosis for details.    Professional interpretation rendered by Julio Ann M.D., F.C.A.P. at [facility], [address] [place] [address].    GROSS DESCRIPTION:  Received in formalin labeled gallbladder is a 5.4 x 3.5 x 1.4 cm intact  gallbladder. The serosa is tan-pink and smooth with a roughened hepatic bed.  The cystic duct margin is inked black. The gallbladder is opened to reveal  multiple tan-yellow stones measuring 3.5 x 2.4 x 0.5 cm in aggregate. The  mucosal surface is tan-green and trabeculated. No polyps or lesions are grossly  identified. The cystic duct is probe patent and the average wall thickness is 0.3  cm.  No lymph nodes are grossly identified. Representative sections of the  specimen are submitted as follows:  A1 cystic duct margin (en face),  neck, body, and fundus.  [place]    REVIEWED, DIAGNOSED AND ELECTRONICALLY  SIGNED BY:    Julio Ann M.D., TOYA  CPT CODES:  03608     Urinalysis, Microscopic Only - Urine, Clean Catch    Specimen: Urine, Clean Catch   Result Value Ref Range    RBC, UA 0-2 (A) None Seen /HPF    WBC, UA Too Numerous to Count (A) None Seen, 0-2, 3-5 /HPF    Bacteria, UA 2+ (A) None Seen /HPF    Squamous Epithelial Cells, UA 31-50 (A) None Seen, 0-2 /HPF    Yeast, UA Small/1+ Budding Yeast None Seen /HPF    Hyaline Casts, UA None Seen None Seen /LPF    Methodology Manual Light Microscopy    Urinalysis With Microscopic If Indicated (No Culture) - Urine, Clean Catch    Specimen: Urine, Clean Catch   Result Value Ref Range    Color, UA Dark Yellow Yellow, Straw, Dark Yellow, Kavita    Appearance, UA Cloudy (A) Clear    pH, UA <=5.0 5.0 - 9.0    Specific Gravity, UA 1.029 1.003 - 1.030    Glucose, UA Negative Negative    Ketones, UA Trace (A) Negative    Bilirubin, UA Moderate (2+) (A) Negative    Blood, UA Trace (A) Negative    Protein, UA 30 mg/dL (1+) (A) Negative    Leuk Esterase, UA Moderate (2+) (A) Negative    Nitrite, UA Positive (A) Negative    Urobilinogen, UA 1.0 E.U./dL 0.2 - 1.0 E.U./dL   Procalcitonin    Specimen: Blood   Result Value Ref Range    Procalcitonin 0.07 0.00 - 0.25 ng/mL   CBC Auto Differential    Specimen: Blood   Result Value Ref Range    WBC 8.40 3.40 - 10.80 10*3/mm3    RBC 4.03 3.77 - 5.28 10*6/mm3    Hemoglobin 11.7 (L) 12.0 - 15.9 g/dL    Hematocrit 35.6 34.0 - 46.6 %    MCV 88.3 79.0 - 97.0 fL    MCH 29.0 26.6 - 33.0 pg    MCHC 32.9 31.5 - 35.7 g/dL    RDW 13.1 12.3 - 15.4 %    RDW-SD 42.3 37.0 - 54.0 fl    MPV 12.0 6.0 - 12.0 fL    Platelets 182 140 - 450 10*3/mm3    Neutrophil % 74.9 42.7 - 76.0 %    Lymphocyte % 15.4 (L) 19.6 - 45.3 %    Monocyte % 6.9 5.0 - 12.0 %    Eosinophil % 1.9 0.3 - 6.2 %    Basophil % 0.5 0.0 - 1.5 %    Immature Grans % 0.4 0.0 - 0.5 %    Neutrophils, Absolute 6.30 1.70 -  7.00 10*3/mm3    Lymphocytes, Absolute 1.29 0.70 - 3.10 10*3/mm3    Monocytes, Absolute 0.58 0.10 - 0.90 10*3/mm3    Eosinophils, Absolute 0.16 0.00 - 0.40 10*3/mm3    Basophils, Absolute 0.04 0.00 - 0.20 10*3/mm3    Immature Grans, Absolute 0.03 0.00 - 0.05 10*3/mm3    nRBC 0.0 0.0 - 0.2 /100 WBC   CBC Auto Differential    Specimen: Blood   Result Value Ref Range    WBC 4.69 3.40 - 10.80 10*3/mm3    RBC 3.65 (L) 3.77 - 5.28 10*6/mm3    Hemoglobin 10.8 (L) 12.0 - 15.9 g/dL    Hematocrit 32.9 (L) 34.0 - 46.6 %    MCV 90.1 79.0 - 97.0 fL    MCH 29.6 26.6 - 33.0 pg    MCHC 32.8 31.5 - 35.7 g/dL    RDW 13.2 12.3 - 15.4 %    RDW-SD 43.5 37.0 - 54.0 fl    MPV 12.6 (H) 6.0 - 12.0 fL    Platelets 160 140 - 450 10*3/mm3    Neutrophil % 58.9 42.7 - 76.0 %    Lymphocyte % 28.6 19.6 - 45.3 %    Monocyte % 9.6 5.0 - 12.0 %    Eosinophil % 2.1 0.3 - 6.2 %    Basophil % 0.4 0.0 - 1.5 %    Immature Grans % 0.4 0.0 - 0.5 %    Neutrophils, Absolute 2.76 1.70 - 7.00 10*3/mm3    Lymphocytes, Absolute 1.34 0.70 - 3.10 10*3/mm3    Monocytes, Absolute 0.45 0.10 - 0.90 10*3/mm3    Eosinophils, Absolute 0.10 0.00 - 0.40 10*3/mm3    Basophils, Absolute 0.02 0.00 - 0.20 10*3/mm3    Immature Grans, Absolute 0.02 0.00 - 0.05 10*3/mm3    nRBC 0.0 0.0 - 0.2 /100 WBC   CBC Auto Differential    Specimen: Arm, Right; Blood   Result Value Ref Range    WBC 4.78 3.40 - 10.80 10*3/mm3    RBC 4.18 3.77 - 5.28 10*6/mm3    Hemoglobin 12.2 12.0 - 15.9 g/dL    Hematocrit 37.2 34.0 - 46.6 %    MCV 89.0 79.0 - 97.0 fL    MCH 29.2 26.6 - 33.0 pg    MCHC 32.8 31.5 - 35.7 g/dL    RDW 13.2 12.3 - 15.4 %    RDW-SD 43.0 37.0 - 54.0 fl    MPV 11.9 6.0 - 12.0 fL    Platelets 196 140 - 450 10*3/mm3    Neutrophil % 58.8 42.7 - 76.0 %    Lymphocyte % 24.3 19.6 - 45.3 %    Monocyte % 10.7 5.0 - 12.0 %    Eosinophil % 5.4 0.3 - 6.2 %    Basophil % 0.6 0.0 - 1.5 %    Immature Grans % 0.2 0.0 - 0.5 %    Neutrophils, Absolute 2.81 1.70 - 7.00 10*3/mm3    Lymphocytes, Absolute  1.16 0.70 - 3.10 10*3/mm3    Monocytes, Absolute 0.51 0.10 - 0.90 10*3/mm3    Eosinophils, Absolute 0.26 0.00 - 0.40 10*3/mm3    Basophils, Absolute 0.03 0.00 - 0.20 10*3/mm3    Immature Grans, Absolute 0.01 0.00 - 0.05 10*3/mm3    nRBC 0.0 0.0 - 0.2 /100 WBC   Lavender Top   Result Value Ref Range    Extra Tube hold for add-on    Light Blue Top   Result Value Ref Range    Extra Tube Hold for add-ons.    Urine Drug Screen - Urine, Clean Catch    Specimen: Urine, Clean Catch   Result Value Ref Range    THC, Screen, Urine Negative Negative    Phencyclidine (PCP), Urine Negative Negative    Cocaine Screen, Urine Negative Negative    Methamphetamine, Ur Negative Negative    Opiate Screen Negative Negative    Amphetamine Screen, Urine Negative Negative    Benzodiazepine Screen, Urine Positive (A) Negative    Tricyclic Antidepressants Screen Negative Negative    Methadone Screen, Urine Negative Negative    Barbiturates Screen, Urine Negative Negative    Oxycodone Screen, Urine Negative Negative    Propoxyphene Screen Negative Negative    Buprenorphine, Screen, Urine Negative Negative     *Note: Due to a large number of results and/or encounters for the requested time period, some results have not been displayed. A complete set of results can be found in Results Review.         This document has been electronically signed by Venkat Robb MD on April 23, 2023 16:22 CDT

## 2023-04-23 NOTE — PROGRESS NOTES
Chief Complaint   Patient presents with   • [facility] f/u      Hosp endo f/u ERCP       Subjective    Myles Carito Shannon is a 47 y.o. female.    History of Present Illness  Patient presented to GI clinic for follow-up visit today.  Feels better currently.  Denied abdominal pain, nausea or vomiting.  S/p ERCP with CBD stent placement for choledocholithiasis.  Currently on Juan Antonio.       The following portions of the patient's history were reviewed and updated as appropriate:   Past Medical History:   Diagnosis Date   • Abdominal pain     Chronic RLQ, RUQ, R flank comes and goes.    • Abdominal pain, right lower quadrant    • Acute bilateral otitis media    • Allergic rhinitis    • Backache     Upper back.   • Candidiasis of skin    • Cellulitis of neck    • Chest pain    • Contraception    • Cough    • Dysfunction of eustachian tube    • Dyspareunia, female    • Elevated cholesterol    • Excessive or frequent menstruation    • Flank pain    • Generalized aches and pains    • Headache    • Health maintenance examination    • Hypertension    • Infection of skin and subcutaneous tissue    • Irregular periods    • Kidney stone     Poss   • Menorrhagia    • Nausea vomiting and diarrhea    • Obesity    • Open wound of abdominal wall    • Otalgia    • Pain in left foot    • Pain in unspecified hand    • Removed Impacted cerumen 2012   • Rhinitis    • Seizure 08/15/2019   • Seizure    • Shoulder pain     right-sided-likely muscle strain      • Shoulder tendinitis    • Spider bite wound    • Staphylococcal infection of skin    • Stroke    • Swollen feet    • Tinea cruris    • Tobacco dependence syndrome    • Upper respiratory infection    • Urinary tract infectious disease    • Vertigo    • Visit for gynecologic examination    • Vulvovaginitis      Past Surgical History:   Procedure Laterality Date   •  SECTION     • CHOLECYSTECTOMY WITH INTRAOPERATIVE CHOLANGIOGRAM N/A 3/5/2023    Procedure: CHOLECYSTECTOMY  LAPAROSCOPIC INTRAOPERATIVE CHOLANGIOGRAM AND LAPAROSCOPIC COMMON BILE DUCT;  Surgeon: Jonn Jimenes MD;  Location: [facility] OR;  Service: General;  Laterality: N/A;   • CYSTOSCOPY N/A 2023    Procedure: CYSTOSCOPY;  Surgeon: Chris Deshpande MD;  Location: [facility] OR;  Service: Urology;  Laterality: N/A;   • DILATATION AND CURETTAGE      Secondary to incomplete spontaneous    • ERCP N/A 3/7/2023    Procedure: ENDOSCOPIC RETROGRADE CHOLANGIOPANCREATOGRAPHY;  Surgeon: Venkat oRbb MD;  Location: [facility] ENDOSCOPY;  Service: Gastroenterology;  Laterality: N/A;   • INCISION AND DRAINAGE ABSCESS  2012   • INJECTION OF MEDICATION  2015    Phenergan (1)     • INJECTION OF MEDICATION  10/10/2013    Toradol (2)      • INJECTION OF MEDICATION  2014    Zofran (1)        Family History   Problem Relation Age of Onset   • Breast cancer Mother    • Thyroid disease Mother    • Hypertension Mother    • Heart disease Mother    • Arthritis Mother    • Liver disease Father    • Endometrial cancer Sister    • Anxiety disorder Brother    • Mental illness Daughter    • Depression Daughter    • Diabetes Maternal Aunt    • Diabetes Maternal Uncle    • Stroke Maternal Grandmother    • Breast cancer Maternal Grandmother    • Cancer Maternal Grandfather    • Stroke Paternal Grandmother    • Depression Other    • Cancer Other         Colorectal Cancer   • Endometrial cancer Other    • Lung cancer Other      OB History        1    Para   1    Term   1            AB        Living           SAB        IAB        Ectopic        Molar        Multiple        Live Births                  Prior to Admission medications    Medication Sig Start Date End Date Taking? Authorizing Provider   acetaminophen (TYLENOL) 500 MG tablet Take 2 tablets by mouth 2 (Two) Times a Day. 3/10/23  Yes Radha Zaragoza MD   aspirin 325 MG tablet Take 1 tablet by mouth Daily.   Yes Radha Zaragoza MD    atorvastatin (LIPITOR) 80 MG tablet Take 0.5 tablets by mouth Every Night. 3/9/23  Yes Erick Chisholm MD   busPIRone (BUSPAR) 15 MG tablet Take 1 tablet by mouth 2 (Two) Times a Day. 3/10/23  Yes Radha Zaragoza MD   escitalopram (LEXAPRO) 10 MG tablet Take 2 tablets by mouth Daily. 10/1/22  Yes Radha Zaragoza MD   furosemide (LASIX) 20 MG tablet Take 1 tablet by mouth 2 (Two) Times a Day.   Yes ProviderRadha MD   hydrOXYzine pamoate (VISTARIL) 50 MG capsule Take 1 capsule by mouth 2 (Two) Times a Day As Needed. 4/5/22  Yes Sonya Crooks APRN   levETIRAcetam (KEPPRA) 1000 MG tablet Take 1 tablet by mouth 2 (Two) Times a Day.   Yes Radha Zaragoza MD   losartan (COZAAR) 50 MG tablet Take 1 tablet by mouth Daily.   Yes ProviderRadha MD   melatonin 5 MG tablet tablet Take 2 tablets by mouth every night at bedtime.   Yes ProviderRadha MD   nitrofurantoin (MACRODANTIN) 100 MG capsule Take 1 capsule by mouth Daily. 3/1/23  Yes Radha Zaragoza MD   ursodiol (ACTIGALL) 300 MG capsule Take 1 capsule by mouth 2 (Two) Times a Day With Meals. 4/10/23   Venkat Robb MD     Allergies   Allergen Reactions   • Abilify [Aripiprazole] Nausea Only   • Buspirone Rash   • Penicillins Hives and Nausea And Vomiting     Social History     Socioeconomic History   • Marital status: Single   Tobacco Use   • Smoking status: Every Day     Packs/day: 0.50     Types: Cigarettes   • Smokeless tobacco: Never   • Tobacco comments:     19*800*quit*NOW   Vaping Use   • Vaping Use: Some days   • Substances: Nicotine   • Devices: Refillable tank   • Passive vaping exposure: Yes   Substance and Sexual Activity   • Alcohol use: No   • Drug use: No   • Sexual activity: Not Currently       Review of Systems  Review of Systems   Constitutional: Negative for chills, fatigue, fever and unexpected weight change.   HENT: Negative for congestion, ear discharge, hearing loss, nosebleeds and  "sore throat.    Eyes: Negative for pain, discharge and redness.   Respiratory: Negative for cough, chest tightness, shortness of breath and wheezing.    Cardiovascular: Negative for chest pain and palpitations.   Gastrointestinal: Negative for abdominal distention, abdominal pain, blood in stool, constipation, diarrhea, nausea and vomiting.   Endocrine: Negative for cold intolerance, polydipsia, polyphagia and polyuria.   Genitourinary: Negative for dysuria, flank pain, frequency, hematuria and urgency.   Musculoskeletal: Negative for arthralgias, back pain, joint swelling and myalgias.   Skin: Negative for color change, pallor and rash.   Neurological: Negative for tremors, seizures, syncope, weakness and headaches.   Hematological: Negative for adenopathy. Does not bruise/bleed easily.   Psychiatric/Behavioral: Negative for behavioral problems, confusion, dysphoric mood, hallucinations and suicidal ideas. The patient is not nervous/anxious.         /82 (BP Location: Right arm)   Pulse 72   Ht 165.1 cm (65\")   Wt 88 kg (194 lb)   BMI 32.28 kg/m²     Objective    Physical Exam  Constitutional:       Appearance: She is well-developed.   HENT:      Head: Normocephalic and atraumatic.   Eyes:      Conjunctiva/sclera: Conjunctivae normal.      Pupils: Pupils are equal, round, and reactive to light.   Neck:      Thyroid: No thyromegaly.   Cardiovascular:      Rate and Rhythm: Normal rate and regular rhythm.      Heart sounds: Normal heart sounds. No murmur heard.  Pulmonary:      Effort: Pulmonary effort is normal.      Breath sounds: Normal breath sounds. No wheezing.   Abdominal:      General: Bowel sounds are normal. There is no distension.      Palpations: Abdomen is soft. There is no mass.      Tenderness: There is no abdominal tenderness.      Hernia: No hernia is present.   Genitourinary:     Comments: No lesions noted  Musculoskeletal:         General: No tenderness. Normal range of motion.      " Cervical back: Normal range of motion and neck supple.   Lymphadenopathy:      Cervical: No cervical adenopathy.   Skin:     General: Skin is warm and dry.      Findings: No rash.   Neurological:      Mental Status: She is alert and oriented to person, place, and time.      Cranial Nerves: No cranial nerve deficit.   Psychiatric:         Thought Content: Thought content normal.       No results displayed because visit has over 200 results.        Assessment & Plan      1. Calculus of bile duct without cholangitis with obstruction    1.  Biliary colic with choledocholithiasis s/p ERCP with CBD stent placement and cholecystectomy, repeat ERCP next month.  Continue Juan Antonio.  2.  Anemia, continue iron supplements.  Repeat CBC next visit.  EGD and colonoscopy in next visit.    3.  Obesity, recommend exercise and diet control.  4.  Tobacco usage, recommend cessation.      Orders placed during this encounter include:  Orders Placed This Encounter   Procedures   • Obtain Informed Consent     Standing Status:   Future     Order Specific Question:   Informed Consent Given For     Answer:   ERCP       ENDOSCOPIC RETROGRADE CHOLANGIOPANCREATOGRAPHY (N/A)    Review and/or summary of lab tests, radiology, procedures, medications. Review and summary of old records and obtaining of history. The risks and benefits of my recommendations, as well as other treatment options were discussed with the patient today. Questions were answered.    New Medications Ordered This Visit   Medications   • ursodiol (ACTIGALL) 300 MG capsule     Sig: Take 1 capsule by mouth 2 (Two) Times a Day With Meals.     Dispense:  180 capsule     Refill:  3       Follow-up: Return in about 1 month (around 5/10/2023).               Results for orders placed or performed during the hospital encounter of 03/04/23   Gold Top - SST   Result Value Ref Range    Extra Tube Hold for add-ons.    Green Top (Gel)   Result Value Ref Range    Extra Tube Hold for add-ons.     TISSUE EXAM, [facility] ([facility])    Specimen: Gallbladder; Tissue   Result Value Ref Range    Reference Lab Report       [facility]  [address] [place] [address]  Phone: [phone] or [phone]  Fax: [phone]  Tremaine Dixon M.D., Medical Director    PATIENT NAME                           LABORATORY NO.  KEAGAN ALEX.               [id number]  [id number]                         AGE              SEX  SSN           CLIENT REF #  [facility]           47      1975  F    [id number]   [id number]    [place]                       REQUESTING M.D.     ATTENDING M.D.     COPY TO[address]                 ANTONIO CUNNINGHAM DEBORAH  [place] [id number]             DATE COLLECTED      DATE RECEIVED      DATE REPORTED  2023    DIAGNOSIS:  GALLBLADDER:  Chronic cholecystitis  Cholelithiasis    GJK    CLINICAL HISTORY:  Acute cholecystitis      SPECIMENS RECEIVED:  GALLBLADDER    MICROSCOPIC DESCRIPTION:  Tissue blocks are prepared and slides are examined  microscopically on all  specimens. See diagnosis for details.    Professional interpretation rendered by Julio Ann M.D., F.C.A.P. at [facility], [address] [place] [address].    GROSS DESCRIPTION:  Received in formalin labeled gallbladder is a 5.4 x 3.5 x 1.4 cm intact  gallbladder. The serosa is tan-pink and smooth with a roughened hepatic bed.  The cystic duct margin is inked black. The gallbladder is opened to reveal  multiple tan-yellow stones measuring 3.5 x 2.4 x 0.5 cm in aggregate. The  mucosal surface is tan-green and trabeculated. No polyps or lesions are grossly  identified. The cystic duct is probe patent and the average wall thickness is 0.3  cm.  No lymph nodes are grossly identified. Representative sections of the  specimen are submitted as follows:  A1 cystic duct margin (en face),  neck, body, and fundus.  [place]    REVIEWED, DIAGNOSED AND ELECTRONICALLY  SIGNED BY:    Julio Ann M.D., TOYA  CPT CODES:  43077     Urinalysis, Microscopic Only - Urine, Clean Catch    Specimen: Urine, Clean Catch   Result Value Ref Range    RBC, UA 0-2 (A) None Seen /HPF    WBC, UA Too Numerous to Count (A) None Seen, 0-2, 3-5 /HPF    Bacteria, UA 2+ (A) None Seen /HPF    Squamous Epithelial Cells, UA 31-50 (A) None Seen, 0-2 /HPF    Yeast, UA Small/1+ Budding Yeast None Seen /HPF    Hyaline Casts, UA None Seen None Seen /LPF    Methodology Manual Light Microscopy    Urinalysis With Microscopic If Indicated (No Culture) - Urine, Clean Catch    Specimen: Urine, Clean Catch   Result Value Ref Range    Color, UA Dark Yellow Yellow, Straw, Dark Yellow, Kavita    Appearance, UA Cloudy (A) Clear    pH, UA <=5.0 5.0 - 9.0    Specific Gravity, UA 1.029 1.003 - 1.030    Glucose, UA Negative Negative    Ketones, UA Trace (A) Negative    Bilirubin, UA Moderate (2+) (A) Negative    Blood, UA Trace (A) Negative    Protein, UA 30 mg/dL (1+) (A) Negative    Leuk Esterase, UA Moderate (2+) (A) Negative    Nitrite, UA Positive (A) Negative    Urobilinogen, UA 1.0 E.U./dL 0.2 - 1.0 E.U./dL   Procalcitonin    Specimen: Blood   Result Value Ref Range    Procalcitonin 0.07 0.00 - 0.25 ng/mL   CBC Auto Differential    Specimen: Blood   Result Value Ref Range    WBC 8.40 3.40 - 10.80 10*3/mm3    RBC 4.03 3.77 - 5.28 10*6/mm3    Hemoglobin 11.7 (L) 12.0 - 15.9 g/dL    Hematocrit 35.6 34.0 - 46.6 %    MCV 88.3 79.0 - 97.0 fL    MCH 29.0 26.6 - 33.0 pg    MCHC 32.9 31.5 - 35.7 g/dL    RDW 13.1 12.3 - 15.4 %    RDW-SD 42.3 37.0 - 54.0 fl    MPV 12.0 6.0 - 12.0 fL    Platelets 182 140 - 450 10*3/mm3    Neutrophil % 74.9 42.7 - 76.0 %    Lymphocyte % 15.4 (L) 19.6 - 45.3 %    Monocyte % 6.9 5.0 - 12.0 %    Eosinophil % 1.9 0.3 - 6.2 %    Basophil % 0.5 0.0 - 1.5 %    Immature Grans % 0.4 0.0 - 0.5 %    Neutrophils, Absolute 6.30 1.70 -  7.00 10*3/mm3    Lymphocytes, Absolute 1.29 0.70 - 3.10 10*3/mm3    Monocytes, Absolute 0.58 0.10 - 0.90 10*3/mm3    Eosinophils, Absolute 0.16 0.00 - 0.40 10*3/mm3    Basophils, Absolute 0.04 0.00 - 0.20 10*3/mm3    Immature Grans, Absolute 0.03 0.00 - 0.05 10*3/mm3    nRBC 0.0 0.0 - 0.2 /100 WBC   CBC Auto Differential    Specimen: Blood   Result Value Ref Range    WBC 4.69 3.40 - 10.80 10*3/mm3    RBC 3.65 (L) 3.77 - 5.28 10*6/mm3    Hemoglobin 10.8 (L) 12.0 - 15.9 g/dL    Hematocrit 32.9 (L) 34.0 - 46.6 %    MCV 90.1 79.0 - 97.0 fL    MCH 29.6 26.6 - 33.0 pg    MCHC 32.8 31.5 - 35.7 g/dL    RDW 13.2 12.3 - 15.4 %    RDW-SD 43.5 37.0 - 54.0 fl    MPV 12.6 (H) 6.0 - 12.0 fL    Platelets 160 140 - 450 10*3/mm3    Neutrophil % 58.9 42.7 - 76.0 %    Lymphocyte % 28.6 19.6 - 45.3 %    Monocyte % 9.6 5.0 - 12.0 %    Eosinophil % 2.1 0.3 - 6.2 %    Basophil % 0.4 0.0 - 1.5 %    Immature Grans % 0.4 0.0 - 0.5 %    Neutrophils, Absolute 2.76 1.70 - 7.00 10*3/mm3    Lymphocytes, Absolute 1.34 0.70 - 3.10 10*3/mm3    Monocytes, Absolute 0.45 0.10 - 0.90 10*3/mm3    Eosinophils, Absolute 0.10 0.00 - 0.40 10*3/mm3    Basophils, Absolute 0.02 0.00 - 0.20 10*3/mm3    Immature Grans, Absolute 0.02 0.00 - 0.05 10*3/mm3    nRBC 0.0 0.0 - 0.2 /100 WBC   CBC Auto Differential    Specimen: Arm, Right; Blood   Result Value Ref Range    WBC 4.78 3.40 - 10.80 10*3/mm3    RBC 4.18 3.77 - 5.28 10*6/mm3    Hemoglobin 12.2 12.0 - 15.9 g/dL    Hematocrit 37.2 34.0 - 46.6 %    MCV 89.0 79.0 - 97.0 fL    MCH 29.2 26.6 - 33.0 pg    MCHC 32.8 31.5 - 35.7 g/dL    RDW 13.2 12.3 - 15.4 %    RDW-SD 43.0 37.0 - 54.0 fl    MPV 11.9 6.0 - 12.0 fL    Platelets 196 140 - 450 10*3/mm3    Neutrophil % 58.8 42.7 - 76.0 %    Lymphocyte % 24.3 19.6 - 45.3 %    Monocyte % 10.7 5.0 - 12.0 %    Eosinophil % 5.4 0.3 - 6.2 %    Basophil % 0.6 0.0 - 1.5 %    Immature Grans % 0.2 0.0 - 0.5 %    Neutrophils, Absolute 2.81 1.70 - 7.00 10*3/mm3    Lymphocytes, Absolute  1.16 0.70 - 3.10 10*3/mm3    Monocytes, Absolute 0.51 0.10 - 0.90 10*3/mm3    Eosinophils, Absolute 0.26 0.00 - 0.40 10*3/mm3    Basophils, Absolute 0.03 0.00 - 0.20 10*3/mm3    Immature Grans, Absolute 0.01 0.00 - 0.05 10*3/mm3    nRBC 0.0 0.0 - 0.2 /100 WBC   Lavender Top   Result Value Ref Range    Extra Tube hold for add-on    Light Blue Top   Result Value Ref Range    Extra Tube Hold for add-ons.    Urine Drug Screen - Urine, Clean Catch    Specimen: Urine, Clean Catch   Result Value Ref Range    THC, Screen, Urine Negative Negative    Phencyclidine (PCP), Urine Negative Negative    Cocaine Screen, Urine Negative Negative    Methamphetamine, Ur Negative Negative    Opiate Screen Negative Negative    Amphetamine Screen, Urine Negative Negative    Benzodiazepine Screen, Urine Positive (A) Negative    Tricyclic Antidepressants Screen Negative Negative    Methadone Screen, Urine Negative Negative    Barbiturates Screen, Urine Negative Negative    Oxycodone Screen, Urine Negative Negative    Propoxyphene Screen Negative Negative    Buprenorphine, Screen, Urine Negative Negative     *Note: Due to a large number of results and/or encounters for the requested time period, some results have not been displayed. A complete set of results can be found in Results Review.         This document has been electronically signed by Venkat Robb MD on April 23, 2023 16:22 CDT

## 2023-05-03 ENCOUNTER — APPOINTMENT (OUTPATIENT)
Dept: ULTRASOUND IMAGING | Facility: HOSPITAL | Age: 48
End: 2023-05-03
Payer: COMMERCIAL

## 2023-05-03 ENCOUNTER — APPOINTMENT (OUTPATIENT)
Dept: GENERAL RADIOLOGY | Facility: HOSPITAL | Age: 48
End: 2023-05-03
Payer: COMMERCIAL

## 2023-05-03 ENCOUNTER — HOSPITAL ENCOUNTER (EMERGENCY)
Facility: HOSPITAL | Age: 48
Discharge: HOME | End: 2023-05-03
Attending: EMERGENCY MEDICINE
Payer: COMMERCIAL

## 2023-05-03 VITALS
OXYGEN SATURATION: 97 % | HEIGHT: 64 IN | BODY MASS INDEX: 33.12 KG/M2 | SYSTOLIC BLOOD PRESSURE: 124 MMHG | DIASTOLIC BLOOD PRESSURE: 65 MMHG | WEIGHT: 194 LBS | HEART RATE: 70 BPM | RESPIRATION RATE: 20 BRPM | TEMPERATURE: 97.5 F

## 2023-05-03 DIAGNOSIS — R10.31 RIGHT LOWER QUADRANT ABDOMINAL PAIN: Primary | ICD-10-CM

## 2023-05-03 LAB
ALBUMIN SERPL-MCNC: 4.3 G/DL (ref 3.5–5.2)
ALBUMIN/GLOB SERPL: 1.9 G/DL
ALP SERPL-CCNC: 109 U/L (ref 39–117)
ALT SERPL W P-5'-P-CCNC: 31 U/L (ref 1–33)
ANION GAP SERPL CALCULATED.3IONS-SCNC: 12 MMOL/L (ref 5–15)
AST SERPL-CCNC: 29 U/L (ref 1–32)
BACTERIA UR QL AUTO: ABNORMAL /HPF
BASOPHILS # BLD AUTO: 0.05 10*3/MM3 (ref 0–0.2)
BASOPHILS NFR BLD AUTO: 0.8 % (ref 0–1.5)
BILIRUB SERPL-MCNC: 0.3 MG/DL (ref 0–1.2)
BILIRUB UR QL STRIP: NEGATIVE
BUN SERPL-MCNC: 17 MG/DL (ref 6–20)
BUN/CREAT SERPL: 16.5 (ref 7–25)
CALCIUM SPEC-SCNC: 9.1 MG/DL (ref 8.6–10.5)
CHLORIDE SERPL-SCNC: 104 MMOL/L (ref 98–107)
CLARITY UR: ABNORMAL
CO2 SERPL-SCNC: 23 MMOL/L (ref 22–29)
COLOR UR: YELLOW
CREAT SERPL-MCNC: 1.03 MG/DL (ref 0.57–1)
CRP SERPL-MCNC: <0.3 MG/DL (ref 0–0.5)
DEPRECATED RDW RBC AUTO: 45.2 FL (ref 37–54)
EGFRCR SERPLBLD CKD-EPI 2021: 67.6 ML/MIN/1.73
EOSINOPHIL # BLD AUTO: 0.42 10*3/MM3 (ref 0–0.4)
EOSINOPHIL NFR BLD AUTO: 6.6 % (ref 0.3–6.2)
ERYTHROCYTE [DISTWIDTH] IN BLOOD BY AUTOMATED COUNT: 13.7 % (ref 12.3–15.4)
GLOBULIN UR ELPH-MCNC: 2.3 GM/DL
GLUCOSE SERPL-MCNC: 95 MG/DL (ref 65–99)
GLUCOSE UR STRIP-MCNC: NEGATIVE MG/DL
HCG SERPL QL: NEGATIVE
HCT VFR BLD AUTO: 35 % (ref 34–46.6)
HGB BLD-MCNC: 11.5 G/DL (ref 12–15.9)
HGB UR QL STRIP.AUTO: ABNORMAL
HOLD SPECIMEN: NORMAL
HYALINE CASTS UR QL AUTO: ABNORMAL /LPF
IMM GRANULOCYTES # BLD AUTO: 0.01 10*3/MM3 (ref 0–0.05)
IMM GRANULOCYTES NFR BLD AUTO: 0.2 % (ref 0–0.5)
KETONES UR QL STRIP: ABNORMAL
LEUKOCYTE ESTERASE UR QL STRIP.AUTO: ABNORMAL
LIPASE SERPL-CCNC: 34 U/L (ref 13–60)
LYMPHOCYTES # BLD AUTO: 2.27 10*3/MM3 (ref 0.7–3.1)
LYMPHOCYTES NFR BLD AUTO: 35.7 % (ref 19.6–45.3)
MCH RBC QN AUTO: 29.6 PG (ref 26.6–33)
MCHC RBC AUTO-ENTMCNC: 32.9 G/DL (ref 31.5–35.7)
MCV RBC AUTO: 90.2 FL (ref 79–97)
MONOCYTES # BLD AUTO: 0.43 10*3/MM3 (ref 0.1–0.9)
MONOCYTES NFR BLD AUTO: 6.8 % (ref 5–12)
NEUTROPHILS NFR BLD AUTO: 3.18 10*3/MM3 (ref 1.7–7)
NEUTROPHILS NFR BLD AUTO: 49.9 % (ref 42.7–76)
NITRITE UR QL STRIP: NEGATIVE
NRBC BLD AUTO-RTO: 0 /100 WBC (ref 0–0.2)
PH UR STRIP.AUTO: 5.5 [PH] (ref 5–9)
PLATELET # BLD AUTO: 246 10*3/MM3 (ref 140–450)
PMV BLD AUTO: 11.8 FL (ref 6–12)
POTASSIUM SERPL-SCNC: 4.2 MMOL/L (ref 3.5–5.2)
PROT SERPL-MCNC: 6.6 G/DL (ref 6–8.5)
PROT UR QL STRIP: NEGATIVE
RBC # BLD AUTO: 3.88 10*6/MM3 (ref 3.77–5.28)
RBC # UR STRIP: ABNORMAL /HPF
REF LAB TEST METHOD: ABNORMAL
SODIUM SERPL-SCNC: 139 MMOL/L (ref 136–145)
SP GR UR STRIP: 1.02 (ref 1–1.03)
SQUAMOUS #/AREA URNS HPF: ABNORMAL /HPF
UROBILINOGEN UR QL STRIP: ABNORMAL
WBC # UR STRIP: ABNORMAL /HPF
WBC NRBC COR # BLD: 6.36 10*3/MM3 (ref 3.4–10.8)
WHOLE BLOOD HOLD COAG: NORMAL
WHOLE BLOOD HOLD SPECIMEN: NORMAL

## 2023-05-03 PROCEDURE — 99284 EMERGENCY DEPT VISIT MOD MDM: CPT

## 2023-05-03 PROCEDURE — 85025 COMPLETE CBC W/AUTO DIFF WBC: CPT

## 2023-05-03 PROCEDURE — 81001 URINALYSIS AUTO W/SCOPE: CPT

## 2023-05-03 PROCEDURE — 80053 COMPREHEN METABOLIC PANEL: CPT

## 2023-05-03 PROCEDURE — 83690 ASSAY OF LIPASE: CPT

## 2023-05-03 PROCEDURE — 25010000002 KETOROLAC TROMETHAMINE PER 15 MG: Performed by: EMERGENCY MEDICINE

## 2023-05-03 PROCEDURE — 96361 HYDRATE IV INFUSION ADD-ON: CPT

## 2023-05-03 PROCEDURE — 86140 C-REACTIVE PROTEIN: CPT | Performed by: EMERGENCY MEDICINE

## 2023-05-03 PROCEDURE — 76830 TRANSVAGINAL US NON-OB: CPT

## 2023-05-03 PROCEDURE — 84703 CHORIONIC GONADOTROPIN ASSAY: CPT | Performed by: EMERGENCY MEDICINE

## 2023-05-03 PROCEDURE — 74022 RADEX COMPL AQT ABD SERIES: CPT

## 2023-05-03 PROCEDURE — 96374 THER/PROPH/DIAG INJ IV PUSH: CPT

## 2023-05-03 RX ORDER — SODIUM CHLORIDE 0.9 % (FLUSH) 0.9 %
10 SYRINGE (ML) INJECTION AS NEEDED
Status: DISCONTINUED | OUTPATIENT
Start: 2023-05-03 | End: 2023-05-04 | Stop reason: HOSPADM

## 2023-05-03 RX ORDER — SODIUM CHLORIDE 9 MG/ML
125 INJECTION, SOLUTION INTRAVENOUS CONTINUOUS
Status: DISCONTINUED | OUTPATIENT
Start: 2023-05-03 | End: 2023-05-04 | Stop reason: HOSPADM

## 2023-05-03 RX ORDER — KETOROLAC TROMETHAMINE 30 MG/ML
30 INJECTION, SOLUTION INTRAMUSCULAR; INTRAVENOUS ONCE
Status: COMPLETED | OUTPATIENT
Start: 2023-05-03 | End: 2023-05-03

## 2023-05-03 RX ADMIN — KETOROLAC TROMETHAMINE 30 MG: 30 INJECTION, SOLUTION INTRAMUSCULAR at 20:39

## 2023-05-03 RX ADMIN — SODIUM CHLORIDE 125 ML/HR: 9 INJECTION, SOLUTION INTRAVENOUS at 20:39

## 2023-05-03 NOTE — ED NOTES
Patient presents to the ED from [place] via EMS with c/o RLQ pain and nausea. Patient reports she has not has a period in 5 months and pregnancy is a possibility.

## 2023-05-04 ENCOUNTER — LAB (OUTPATIENT)
Dept: LAB | Facility: HOSPITAL | Age: 48
End: 2023-05-04
Payer: COMMERCIAL

## 2023-05-04 ENCOUNTER — OFFICE VISIT (OUTPATIENT)
Dept: OBSTETRICS AND GYNECOLOGY | Facility: CLINIC | Age: 48
End: 2023-05-04
Payer: COMMERCIAL

## 2023-05-04 VITALS
SYSTOLIC BLOOD PRESSURE: 104 MMHG | BODY MASS INDEX: 33.46 KG/M2 | WEIGHT: 196 LBS | DIASTOLIC BLOOD PRESSURE: 64 MMHG | HEIGHT: 64 IN

## 2023-05-04 DIAGNOSIS — Z01.419 ENCOUNTER FOR WELL WOMAN EXAM WITH ROUTINE GYNECOLOGICAL EXAM: Primary | ICD-10-CM

## 2023-05-04 DIAGNOSIS — Z12.31 ENCOUNTER FOR SCREENING MAMMOGRAM FOR MALIGNANT NEOPLASM OF BREAST: Primary | ICD-10-CM

## 2023-05-04 DIAGNOSIS — N91.2 AMENORRHEA: ICD-10-CM

## 2023-05-04 DIAGNOSIS — Z11.3 SCREENING FOR STDS (SEXUALLY TRANSMITTED DISEASES): ICD-10-CM

## 2023-05-04 DIAGNOSIS — N76.0 ACUTE VAGINITIS: ICD-10-CM

## 2023-05-04 LAB
CANDIDA ALBICANS: NEGATIVE
GARDNERELLA VAGINALIS: NEGATIVE
HBV SURFACE AG SERPL QL IA: NORMAL
HCV AB SER DONR QL: NORMAL
HIV1+2 AB SER QL: NORMAL
T VAGINALIS DNA VAG QL PROBE+SIG AMP: NEGATIVE

## 2023-05-04 PROCEDURE — G0432 EIA HIV-1/HIV-2 SCREEN: HCPCS

## 2023-05-04 PROCEDURE — 87340 HEPATITIS B SURFACE AG IA: CPT

## 2023-05-04 PROCEDURE — 86803 HEPATITIS C AB TEST: CPT

## 2023-05-04 PROCEDURE — 87660 TRICHOMONAS VAGIN DIR PROBE: CPT

## 2023-05-04 PROCEDURE — 87661 TRICHOMONAS VAGINALIS AMPLIF: CPT

## 2023-05-04 PROCEDURE — 87591 N.GONORRHOEAE DNA AMP PROB: CPT

## 2023-05-04 PROCEDURE — 86592 SYPHILIS TEST NON-TREP QUAL: CPT

## 2023-05-04 PROCEDURE — 87480 CANDIDA DNA DIR PROBE: CPT

## 2023-05-04 PROCEDURE — 36415 COLL VENOUS BLD VENIPUNCTURE: CPT

## 2023-05-04 PROCEDURE — 87491 CHLMYD TRACH DNA AMP PROBE: CPT

## 2023-05-04 PROCEDURE — 87510 GARDNER VAG DNA DIR PROBE: CPT

## 2023-05-04 RX ORDER — MEDROXYPROGESTERONE ACETATE 10 MG/1
10 TABLET ORAL DAILY
Qty: 16 TABLET | Refills: 0 | Status: SHIPPED | OUTPATIENT
Start: 2023-05-04

## 2023-05-04 NOTE — ED PROVIDER NOTES
"Subjective   History of Present Illness  46yo female pmh significant htn/hyperlipidemia/ckd/cva/seizure/nephrolithiasis/cholecystectomy presents ED c/o acute onset RLQ abdominal pain 1600hrs characterized as \"sharp\"/nonradiating/neg exac or relieve factors/neg associated symptoms.  ROS neg fever/chills/cough/chest pain/n/v/d/dysuria/melena/hematochoezia/hematemesis/anorexia.    History provided by:  Patient  Abdominal Pain  Pain location:  RLQ  Associated symptoms: no diarrhea, no dysuria, no nausea, no vaginal bleeding and no vomiting        Review of Systems   Constitutional: Negative.    HENT: Negative.    Eyes: Negative.    Respiratory: Negative.    Cardiovascular: Negative.    Gastrointestinal: Positive for abdominal pain. Negative for blood in stool, diarrhea, nausea and vomiting.   Genitourinary: Negative for dysuria and vaginal bleeding.   Musculoskeletal: Negative.    Skin: Negative.    All other systems reviewed and are negative.      Past Medical History:   Diagnosis Date   • Abdominal pain     Chronic RLQ, RUQ, R flank comes and goes.    • Abdominal pain, right lower quadrant    • Acute bilateral otitis media    • Allergic rhinitis    • Backache     Upper back.   • Candidiasis of skin    • Cellulitis of neck    • Chest pain    • Contraception    • Cough    • Dysfunction of eustachian tube    • Dyspareunia, female    • Elevated cholesterol    • Excessive or frequent menstruation    • Flank pain    • Generalized aches and pains    • Headache    • Health maintenance examination    • Hypertension    • Infection of skin and subcutaneous tissue    • Irregular periods    • Kidney stone     Poss   • Menorrhagia    • Nausea vomiting and diarrhea    • Obesity    • Open wound of abdominal wall    • Otalgia    • Pain in left foot    • Pain in unspecified hand    • Removed Impacted cerumen 06/05/2012   • Rhinitis    • Seizure 08/15/2019   • Seizure    • Shoulder pain     right-sided-likely muscle strain      • " Shoulder tendinitis    • Spider bite wound    • Staphylococcal infection of skin    • Stroke    • Swollen feet    • Tinea cruris    • Tobacco dependence syndrome    • Upper respiratory infection    • Urinary tract infectious disease    • Vertigo    • Visit for gynecologic examination    • Vulvovaginitis        Allergies   Allergen Reactions   • Abilify [Aripiprazole] Nausea Only   • Buspirone Rash   • Penicillins Hives and Nausea And Vomiting       Past Surgical History:   Procedure Laterality Date   •  SECTION     • CHOLECYSTECTOMY WITH INTRAOPERATIVE CHOLANGIOGRAM N/A 3/5/2023    Procedure: CHOLECYSTECTOMY LAPAROSCOPIC INTRAOPERATIVE CHOLANGIOGRAM AND LAPAROSCOPIC COMMON BILE DUCT;  Surgeon: Jonn Jimenes MD;  Location: [facility] OR;  Service: General;  Laterality: N/A;   • CYSTOSCOPY N/A 2023    Procedure: CYSTOSCOPY;  Surgeon: Chris Deshpande MD;  Location: [facility] OR;  Service: Urology;  Laterality: N/A;   • DILATATION AND CURETTAGE      Secondary to incomplete spontaneous    • ERCP N/A 3/7/2023    Procedure: ENDOSCOPIC RETROGRADE CHOLANGIOPANCREATOGRAPHY;  Surgeon: Venkat Robb MD;  Location: [facility] ENDOSCOPY;  Service: Gastroenterology;  Laterality: N/A;   • INCISION AND DRAINAGE ABSCESS  2012   • INJECTION OF MEDICATION  2015    Phenergan (1)     • INJECTION OF MEDICATION  10/10/2013    Toradol (2)      • INJECTION OF MEDICATION  2014    Zofran (1)          Family History   Problem Relation Age of Onset   • Breast cancer Mother    • Thyroid disease Mother    • Hypertension Mother    • Heart disease Mother    • Arthritis Mother    • Liver disease Father    • Endometrial cancer Sister    • Anxiety disorder Brother    • Mental illness Daughter    • Depression Daughter    • Diabetes Maternal Aunt    • Diabetes Maternal Uncle    • Stroke Maternal Grandmother    • Breast cancer Maternal Grandmother    • Cancer Maternal Grandfather    • Stroke Paternal Grandmother     • Depression Other    • Cancer Other         Colorectal Cancer   • Endometrial cancer Other    • Lung cancer Other        Social History     Socioeconomic History   • Marital status: Single   Tobacco Use   • Smoking status: Every Day     Packs/day: 0.50     Types: Cigarettes   • Smokeless tobacco: Never   • Tobacco comments:     19*800*quit*NOW   Vaping Use   • Vaping Use: Some days   • Substances: Nicotine   • Devices: Refillable tank   • Passive vaping exposure: Yes   Substance and Sexual Activity   • Alcohol use: No   • Drug use: No   • Sexual activity: Not Currently           Objective   Physical Exam  Vitals and nursing note reviewed.   Constitutional:       Appearance: Normal appearance.   HENT:      Head: Normocephalic and atraumatic.      Right Ear: External ear normal.      Left Ear: External ear normal.      Nose: Nose normal.      Mouth/Throat:      Mouth: Mucous membranes are moist.      Pharynx: Oropharynx is clear. No oropharyngeal exudate or posterior oropharyngeal erythema.   Eyes:      Conjunctiva/sclera: Conjunctivae normal.      Pupils: Pupils are equal, round, and reactive to light.   Cardiovascular:      Rate and Rhythm: Normal rate and regular rhythm.      Pulses: Normal pulses.      Heart sounds: Normal heart sounds. No murmur heard.    No friction rub. No gallop.   Pulmonary:      Effort: Pulmonary effort is normal. No respiratory distress.      Breath sounds: Normal breath sounds. No wheezing, rhonchi or rales.   Abdominal:      General: Abdomen is flat. Bowel sounds are normal.      Palpations: Abdomen is soft.      Tenderness: There is abdominal tenderness in the right lower quadrant. There is no guarding or rebound. Negative signs include Gifford's sign, Rovsing's sign and McBurney's sign.      Hernia: There is no hernia in the umbilical area or ventral area.       Musculoskeletal:         General: No swelling or deformity. Normal range of motion.      Cervical back: Normal range of  motion and neck supple. No rigidity.   Lymphadenopathy:      Cervical: No cervical adenopathy.   Skin:     General: Skin is warm and dry.   Neurological:      General: No focal deficit present.      Mental Status: She is alert and oriented to person, place, and time.      GCS: GCS eye subscore is 4. GCS verbal subscore is 5. GCS motor subscore is 6.         Procedures           ED Course      Labs Reviewed   COMPREHENSIVE METABOLIC PANEL - Abnormal; Notable for the following components:       Result Value    Creatinine 1.03 (*)     All other components within normal limits    Narrative:     GFR Normal >60  Chronic Kidney Disease <60  Kidney Failure <15     URINALYSIS W/ MICROSCOPIC IF INDICATED (NO CULTURE) - Abnormal; Notable for the following components:    Appearance, UA Cloudy (*)     Ketones, UA Trace (*)     Blood, UA Trace (*)     Leuk Esterase, UA Small (1+) (*)     All other components within normal limits   CBC WITH AUTO DIFFERENTIAL - Abnormal; Notable for the following components:    Hemoglobin 11.5 (*)     Eosinophil % 6.6 (*)     Eosinophils, Absolute 0.42 (*)     All other components within normal limits   URINALYSIS, MICROSCOPIC ONLY - Abnormal; Notable for the following components:    RBC, UA 3-5 (*)     WBC, UA 6-12 (*)     Bacteria, UA 1+ (*)     Squamous Epithelial Cells, UA 13-20 (*)     All other components within normal limits   LIPASE - Normal   HCG, SERUM, QUALITATIVE - Normal   C-REACTIVE PROTEIN - Normal   RAINBOW DRAW    Narrative:     The following orders were created for panel order [place] Draw.  Procedure                               Abnormality         Status                     ---------                               -----------         ------                     Green Top (Gel)[[id number]]                                  Final result               Lavender Top[[id number]]                                     Final result               Gold Top - [facility][[id number]]                                    Final result               Light Blue Top[[id number]]                                   Final result                 Please view results for these tests on the individual orders.   CBC AND DIFFERENTIAL    Narrative:     The following orders were created for panel order CBC & Differential.  Procedure                               Abnormality         Status                     ---------                               -----------         ------                     CBC Auto Differential[[id number]]        Abnormal            Final result                 Please view results for these tests on the individual orders.   GREEN TOP   LAVENDER TOP   GOLD TOP - SST   LIGHT BLUE TOP   EXTRA TUBES    Narrative:     The following orders were created for panel order Extra Tubes.  Procedure                               Abnormality         Status                     ---------                               -----------         ------                     Verdin Top[[id number]]                                         In process                   Please view results for these tests on the individual orders.   GRAY TOP     XR Abdomen 2+ VW with Chest 1 VW    Result Date: 5/3/2023  Narrative: EXAM: Acute abdominal series including upright chest, upright and supine views of the abdomen. COMPARISON: Abdomen January 19, 2023, chest and abdomen 4/2/2022 HISTORY: Right sided abdominal pain. FINDINGS: CHEST: Heart size is within normal limits.  Lungs are clear and well-expanded.  No pneumothorax.  No subdiaphragmatic free air. ABDOMEN: A stable 4 mm calcific density projected over the left hemiabdomen which could potentially represent a nonobstructing left renal stone.  Cholecystectomy clips noted in the right upper quadrant.  Multiple phleboliths in the pelvis. Normal, nonobstructive bowel gas pattern.  Normal stool burden.  No free air or worrisome air-fluid levels.     Impression: 1.  No acute cardiopulmonary  process. 2.  Normal,  nonobstructive bowel gas pattern. 3.  4 mm calcific density projected over the left hemiabdomen which may represent a nonobstructing left renal stone.    US Non-ob Transvaginal    Result Date: 5/3/2023  Narrative: ULTRASOUND PELVIS, TRANSVAGINAL COMPARISON: Pelvic ultrasound 3/7/2019. HISTORY: Pelvic pain. TECHNIQUE: High-resolution endovaginal gray scale images were performed of the uterus, ovaries, endometrium and adnexa.  Color Doppler of the ovaries was performed. FINDINGS: Uterus is anteverted and measures 8.2 x 4.3 x 3.4 cm.  Suspected nabothian cysts at the level of the cervix.  Endometrium is normal in thickness measuring 3.2 mm.  No free fluid identified.  Ovaries are not visualized. Additional transabdominal imaging was performed in an attempt to visualize the ovaries.  The ovaries are not seen via transvaginal or transabdominal approach.     Impression: 1.  Normal appearance of the uterus and endometrium. 2.  Nonvisualization of the ovaries.         Stanley Score for Acute Appendicitis - MDCalc  Calculated on May 03 2023 11:55 PM  2 points -> Unlikely appendicitis by the Stanley Score.                                Medical Decision Making  Labs/radiographic/ultrasound findings reviewed.  CXR: no active disease.  abd XR: nonobstructive bowel gas pattern.  TVUS: normal study/nonvisualization ovaries.  CBC/CMP/lipase: normal.  HCG: neg.  UA: contaminated specimen. CRP: normal.  Stanley score appendicitis=2 (low risk).  Repeat exam at time of discharge: patient resting comfortably w/o complaints. Abdomen soft nontender. Neg r/r/g/hsm/mcburney tenderness.  Stable discharge w/precautions.    Right lower quadrant abdominal pain: acute illness or injury  Amount and/or Complexity of Data Reviewed  Labs: ordered.  Radiology: ordered.      Risk  Prescription drug management.          Final diagnoses:   Right lower quadrant abdominal pain       ED Disposition  ED Disposition     ED Disposition   Discharge     Condition   Good    Comment   --             Sonya Crooks, APRN  [address] [facility] [address]  [phone]    In 1 day           Medication List      No changes were made to your prescriptions during this visit.          Chano Flynn MD  05/03/23 [id number]

## 2023-05-04 NOTE — PROGRESS NOTES
Paulo Shannon is a 47 y.o. Annual gynecological exam     History of Present Illness  LMP: 23  Pap: 19, neg  Mammo: 10/12/15, Bi-rads 1  Sexually active     Patient presents today for an annual gynecological exam. Reports not having a period for 5 months. She reports some weight loss and increase stress d/t a boyfriend.   Reports from [id number] that she had several sexual partners and is unsure if they had anything. She would like to have all STD testing completed today.       Gynecologic Exam  The patient's pertinent negatives include no genital itching, genital lesions, genital odor, genital rash, missed menses, pelvic pain, vaginal bleeding or vaginal discharge. Pertinent negatives include no abdominal pain, chills, constipation, diarrhea, dysuria, fever, flank pain, frequency, headaches, hematuria, nausea, urgency or vomiting. Her menstrual history has been irregular. Her past medical history is significant for an abdominal surgery, a  section, ovarian cysts, an STD and vaginosis. There is no history of endometriosis, a gynecological surgery or herpes simplex.   Amenorrhea  This is a new problem. The current episode started more than 1 month ago. The problem occurs intermittently. The problem has been unchanged. Pertinent negatives include no abdominal pain, chest pain, chills, coughing, fatigue, fever, headaches, nausea, urinary symptoms or vomiting. Nothing aggravates the symptoms. She has tried nothing for the symptoms.       The following portions of the patient's history were reviewed and updated as appropriate: allergies, current medications, past family history, past medical history, past social history, past surgical history and problem list.    Review of Systems   Constitutional: Negative for appetite change, chills, fatigue and fever.   Respiratory: Negative for apnea, cough, choking, chest tightness, shortness of breath, wheezing and stridor.    Cardiovascular:  Negative for chest pain, palpitations and leg swelling.   Gastrointestinal: Negative for abdominal pain, constipation, diarrhea, nausea and vomiting.   Genitourinary: Positive for amenorrhea. Negative for breast discharge, breast lump, breast pain, decreased libido, decreased urine volume, difficulty urinating, dyspareunia, dysuria, flank pain, frequency, genital sores, hematuria, menstrual problem, missed menses, pelvic pain, pelvic pressure, urgency, urinary incontinence, vaginal bleeding, vaginal discharge and vaginal pain.       Objective   Physical Exam  Vitals reviewed. Exam conducted with a chaperone present.   Constitutional:       General: She is awake. She is not in acute distress.     Appearance: Normal appearance. She is well-developed. She is obese. She is not ill-appearing, toxic-appearing or diaphoretic.   Cardiovascular:      Rate and Rhythm: Normal rate and regular rhythm.      Pulses: Normal pulses.      Heart sounds: Normal heart sounds.   Pulmonary:      Effort: Pulmonary effort is normal.      Breath sounds: Normal breath sounds.   Chest:   Breasts:     Antonio Score is 5.      Breasts are symmetrical.      Right: Normal. No swelling, bleeding, inverted nipple, mass, nipple discharge, skin change or tenderness.      Left: Normal. No swelling, bleeding, inverted nipple, mass, nipple discharge, skin change or tenderness.       Abdominal:      General: Bowel sounds are normal.      Hernia: There is no hernia in the left inguinal area or right inguinal area.   Genitourinary:     General: Normal vulva.      Exam position: Lithotomy position.      Pubic Area: No rash or pubic lice.       Antonio stage (genital): 5.      Labia:         Right: No rash, tenderness, lesion or injury.         Left: No rash, tenderness, lesion or injury.       Vagina: Normal.      Cervix: Normal.      Uterus: Normal.       Adnexa: Right adnexa normal and left adnexa normal.      Comments: Pap collected  Vag panel  collected  GCT collected   Musculoskeletal:      Comments: Walks with a walker    Lymphadenopathy:      Lower Body: No right inguinal adenopathy. No left inguinal adenopathy.   Skin:     General: Skin is warm and dry.   Neurological:      Mental Status: She is alert and easily aroused.      Comments: HX of stroke    Psychiatric:         Behavior: Behavior is cooperative.           Assessment & Plan   Diagnoses and all orders for this visit:    1. Encounter for well woman exam with routine gynecological exam (Primary)  -     LIQUID-BASED PAP SMEAR WITH HPV GENOTYPING REGARDLESS OF INTERPRETATION (SHOLA,COR,MAD)    2. Acute vaginitis  -     Gardnerella vaginalis, Trichomonas vaginalis, Candida albicans, DNA - Swab, Vagina; Future  -     Chlamydia trachomatis, Neisseria gonorrhoeae, Trichomonas vaginalis, PCR - Swab, Cervix; Future  -     Gardnerella vaginalis, Trichomonas vaginalis, Candida albicans, DNA - Swab, Vagina  -     Chlamydia trachomatis, Neisseria gonorrhoeae, Trichomonas vaginalis, PCR - Swab, Cervix    3. Screening for STDs (sexually transmitted diseases)  -     HIV-1 / O / 2 Ag / Antibody 4th Generation; Future  -     Hepatitis C Antibody; Future  -     Hepatitis B Surface Antigen; Future  -     RPR; Future    4. Amenorrhea    Other orders  -     medroxyPROGESTERone (Provera) 10 MG tablet; Take 1 tablet by mouth Daily.  Dispense: 16 tablet; Refill: 0      Reviewed preventative screening recommendations. Patient educated and encouraged to do monthly self breast exams. Mammogram order placed for pt to schedule. If pap smear is normal patient will receive a letter in the mail in about two weeks. If pap smear is abnormal we will call the patient and follow up with a plan. If pap smear is normal recommend to repeat pap in 5 years.    Will notify pt of lab results when available  Start Provera to have a withdrawal bleed. Previous US reviewed and endometrial lining normal.         This document has been  electronically signed by ALPHONSE Martell on May 4, 2023 15:42 CDT

## 2023-05-04 NOTE — DISCHARGE INSTRUCTIONS
Return ED fever, abdominal pain, vomiting, bleeding, loss of appetite, worse condition, any other concerns  Clear liquid diet  Followup primary MD 24hrs

## 2023-05-05 LAB
C TRACH RRNA CVX QL NAA+PROBE: NEGATIVE
N GONORRHOEA RRNA SPEC QL NAA+PROBE: NEGATIVE
RPR SER QL: NORMAL
TRICHOMONAS VAGINALIS PCR: NEGATIVE

## 2023-05-07 ENCOUNTER — APPOINTMENT (OUTPATIENT)
Dept: CT IMAGING | Facility: HOSPITAL | Age: 48
End: 2023-05-07
Payer: COMMERCIAL

## 2023-05-07 ENCOUNTER — APPOINTMENT (OUTPATIENT)
Dept: ULTRASOUND IMAGING | Facility: HOSPITAL | Age: 48
End: 2023-05-07
Payer: COMMERCIAL

## 2023-05-07 ENCOUNTER — HOSPITAL ENCOUNTER (EMERGENCY)
Facility: HOSPITAL | Age: 48
Discharge: HOME | End: 2023-05-07
Attending: EMERGENCY MEDICINE | Admitting: EMERGENCY MEDICINE
Payer: COMMERCIAL

## 2023-05-07 VITALS
RESPIRATION RATE: 20 BRPM | DIASTOLIC BLOOD PRESSURE: 80 MMHG | HEIGHT: 64 IN | BODY MASS INDEX: 33.46 KG/M2 | OXYGEN SATURATION: 98 % | SYSTOLIC BLOOD PRESSURE: 127 MMHG | HEART RATE: 62 BPM | WEIGHT: 196 LBS | TEMPERATURE: 98.9 F

## 2023-05-07 DIAGNOSIS — S76.112A QUADRICEPS MUSCLE STRAIN, LEFT, INITIAL ENCOUNTER: Primary | ICD-10-CM

## 2023-05-07 LAB
ALBUMIN SERPL-MCNC: 3.9 G/DL (ref 3.5–5.2)
ALBUMIN/GLOB SERPL: 1.6 G/DL
ALP SERPL-CCNC: 96 U/L (ref 39–117)
ALT SERPL W P-5'-P-CCNC: 25 U/L (ref 1–33)
ANION GAP SERPL CALCULATED.3IONS-SCNC: 11 MMOL/L (ref 5–15)
AST SERPL-CCNC: 25 U/L (ref 1–32)
BACTERIA UR QL AUTO: ABNORMAL /HPF
BASOPHILS # BLD AUTO: 0.04 10*3/MM3 (ref 0–0.2)
BASOPHILS NFR BLD AUTO: 0.6 % (ref 0–1.5)
BILIRUB SERPL-MCNC: 0.2 MG/DL (ref 0–1.2)
BILIRUB UR QL STRIP: NEGATIVE
BUN SERPL-MCNC: 12 MG/DL (ref 6–20)
BUN/CREAT SERPL: 12.6 (ref 7–25)
CALCIUM SPEC-SCNC: 9.4 MG/DL (ref 8.6–10.5)
CHLORIDE SERPL-SCNC: 106 MMOL/L (ref 98–107)
CLARITY UR: CLEAR
CO2 SERPL-SCNC: 22 MMOL/L (ref 22–29)
COLOR UR: ABNORMAL
CREAT SERPL-MCNC: 0.95 MG/DL (ref 0.57–1)
DEPRECATED RDW RBC AUTO: 47.2 FL (ref 37–54)
EGFRCR SERPLBLD CKD-EPI 2021: 74.5 ML/MIN/1.73
EOSINOPHIL # BLD AUTO: 0.44 10*3/MM3 (ref 0–0.4)
EOSINOPHIL NFR BLD AUTO: 6.8 % (ref 0.3–6.2)
ERYTHROCYTE [DISTWIDTH] IN BLOOD BY AUTOMATED COUNT: 13.9 % (ref 12.3–15.4)
GLOBULIN UR ELPH-MCNC: 2.4 GM/DL
GLUCOSE SERPL-MCNC: 101 MG/DL (ref 65–99)
GLUCOSE UR STRIP-MCNC: NEGATIVE MG/DL
HCT VFR BLD AUTO: 36.3 % (ref 34–46.6)
HGB BLD-MCNC: 11.7 G/DL (ref 12–15.9)
HGB UR QL STRIP.AUTO: NEGATIVE
HYALINE CASTS UR QL AUTO: ABNORMAL /LPF
IMM GRANULOCYTES # BLD AUTO: 0.01 10*3/MM3 (ref 0–0.05)
IMM GRANULOCYTES NFR BLD AUTO: 0.2 % (ref 0–0.5)
KETONES UR QL STRIP: ABNORMAL
LEUKOCYTE ESTERASE UR QL STRIP.AUTO: ABNORMAL
LYMPHOCYTES # BLD AUTO: 2.11 10*3/MM3 (ref 0.7–3.1)
LYMPHOCYTES NFR BLD AUTO: 32.8 % (ref 19.6–45.3)
MCH RBC QN AUTO: 29.5 PG (ref 26.6–33)
MCHC RBC AUTO-ENTMCNC: 32.2 G/DL (ref 31.5–35.7)
MCV RBC AUTO: 91.7 FL (ref 79–97)
MONOCYTES # BLD AUTO: 0.38 10*3/MM3 (ref 0.1–0.9)
MONOCYTES NFR BLD AUTO: 5.9 % (ref 5–12)
NEUTROPHILS NFR BLD AUTO: 3.46 10*3/MM3 (ref 1.7–7)
NEUTROPHILS NFR BLD AUTO: 53.7 % (ref 42.7–76)
NITRITE UR QL STRIP: NEGATIVE
NRBC BLD AUTO-RTO: 0 /100 WBC (ref 0–0.2)
PH UR STRIP.AUTO: 5.5 [PH] (ref 5–9)
PLATELET # BLD AUTO: 231 10*3/MM3 (ref 140–450)
PMV BLD AUTO: 11.9 FL (ref 6–12)
POTASSIUM SERPL-SCNC: 4.3 MMOL/L (ref 3.5–5.2)
PROT SERPL-MCNC: 6.3 G/DL (ref 6–8.5)
PROT UR QL STRIP: NEGATIVE
RBC # BLD AUTO: 3.96 10*6/MM3 (ref 3.77–5.28)
RBC # UR STRIP: ABNORMAL /HPF
REF LAB TEST METHOD: ABNORMAL
SODIUM SERPL-SCNC: 139 MMOL/L (ref 136–145)
SP GR UR STRIP: 1.03 (ref 1–1.03)
SQUAMOUS #/AREA URNS HPF: ABNORMAL /HPF
UROBILINOGEN UR QL STRIP: ABNORMAL
WBC # UR STRIP: ABNORMAL /HPF
WBC NRBC COR # BLD: 6.44 10*3/MM3 (ref 3.4–10.8)

## 2023-05-07 PROCEDURE — 81001 URINALYSIS AUTO W/SCOPE: CPT | Performed by: EMERGENCY MEDICINE

## 2023-05-07 PROCEDURE — 93971 EXTREMITY STUDY: CPT

## 2023-05-07 PROCEDURE — 85025 COMPLETE CBC W/AUTO DIFF WBC: CPT | Performed by: EMERGENCY MEDICINE

## 2023-05-07 PROCEDURE — 80053 COMPREHEN METABOLIC PANEL: CPT | Performed by: EMERGENCY MEDICINE

## 2023-05-07 PROCEDURE — 70450 CT HEAD/BRAIN W/O DYE: CPT

## 2023-05-07 PROCEDURE — 99284 EMERGENCY DEPT VISIT MOD MDM: CPT

## 2023-05-07 RX ORDER — IBUPROFEN 600 MG/1
600 TABLET ORAL ONCE
Status: COMPLETED | OUTPATIENT
Start: 2023-05-07 | End: 2023-05-07

## 2023-05-07 RX ORDER — SODIUM CHLORIDE 0.9 % (FLUSH) 0.9 %
10 SYRINGE (ML) INJECTION AS NEEDED
Status: DISCONTINUED | OUTPATIENT
Start: 2023-05-07 | End: 2023-05-07 | Stop reason: HOSPADM

## 2023-05-07 RX ADMIN — IBUPROFEN 600 MG: 600 TABLET, FILM COATED ORAL at 20:31

## 2023-05-07 NOTE — ED PROVIDER NOTES
Subjective   History of Present Illness  Patient presents emergency department complaint of a left lower extremity pain and a generalized weakness as well.  Patient noted walking to the store today and on the way home having to take a break because of left mid thigh pain.  Patient noted that she rested for quite a while try to get back up on the leg and the leg still hurt.  Patient had a hard time explaining to her caregivers that her leg was hurting for a moment, the patient has a patent Speech deficit after prior stroke.  Caregiver thought perhaps she was having another stroke and sent her to the ER for further evaluation.  Patient has had no weakness in the leg.  Patient states she was not feeling good yesterday though is unable to describe fully exactly what she was feeling other than fatigue.  Patient currently is in a group home for her medical issues.  But no fevers, no dysuria, no new confusion or altered mental status.        Review of Systems   Constitutional: Positive for activity change and fatigue. Negative for appetite change, chills and fever.   HENT: Negative.  Negative for congestion.    Eyes: Negative.  Negative for photophobia and visual disturbance.   Respiratory: Negative.  Negative for cough, chest tightness and shortness of breath.    Cardiovascular: Negative.  Negative for chest pain and palpitations.   Gastrointestinal: Negative.  Negative for abdominal pain, constipation, diarrhea, nausea and vomiting.   Endocrine: Negative.    Genitourinary: Negative.  Negative for decreased urine volume, dysuria, flank pain and hematuria.   Musculoskeletal: Negative.  Negative for arthralgias, back pain, myalgias, neck pain and neck stiffness.   Skin: Negative.  Negative for pallor.   Neurological: Positive for weakness. Negative for dizziness, syncope, light-headedness, numbness and headaches.   Psychiatric/Behavioral: Negative.  Negative for confusion and suicidal ideas. The patient is not  nervous/anxious.    All other systems reviewed and are negative.      Past Medical History:   Diagnosis Date   • Abdominal pain     Chronic RLQ, RUQ, R flank comes and goes.    • Abdominal pain, right lower quadrant    • Acute bilateral otitis media    • Allergic rhinitis    • Backache     Upper back.   • Candidiasis of skin    • Cellulitis of neck    • Chest pain    • Contraception    • Cough    • Dysfunction of eustachian tube    • Dyspareunia, female    • Elevated cholesterol    • Excessive or frequent menstruation    • Flank pain    • Generalized aches and pains    • Headache    • Health maintenance examination    • Hypertension    • Infection of skin and subcutaneous tissue    • Irregular periods    • Kidney stone     Poss   • Menorrhagia    • Nausea vomiting and diarrhea    • Obesity    • Open wound of abdominal wall    • Otalgia    • Pain in left foot    • Pain in unspecified hand    • Removed Impacted cerumen 2012   • Rhinitis    • Seizure 08/15/2019   • Seizure    • Shoulder pain     right-sided-likely muscle strain      • Shoulder tendinitis    • Spider bite wound    • Staphylococcal infection of skin    • Stroke    • Swollen feet    • Tinea cruris    • Tobacco dependence syndrome    • Upper respiratory infection    • Urinary tract infectious disease    • Vertigo    • Visit for gynecologic examination    • Vulvovaginitis        Allergies   Allergen Reactions   • Abilify [Aripiprazole] Nausea Only   • Buspirone Rash   • Penicillins Hives and Nausea And Vomiting       Past Surgical History:   Procedure Laterality Date   •  SECTION     • CHOLECYSTECTOMY WITH INTRAOPERATIVE CHOLANGIOGRAM N/A 3/5/2023    Procedure: CHOLECYSTECTOMY LAPAROSCOPIC INTRAOPERATIVE CHOLANGIOGRAM AND LAPAROSCOPIC COMMON BILE DUCT;  Surgeon: Jonn Jimenes MD;  Location: [facility] OR;  Service: General;  Laterality: N/A;   • CYSTOSCOPY N/A 2023    Procedure: CYSTOSCOPY;  Surgeon: Chris Deshpande MD;  Location: [facility]  OR;  Service: Urology;  Laterality: N/A;   • DILATATION AND CURETTAGE      Secondary to incomplete spontaneous    • ERCP N/A 3/7/2023    Procedure: ENDOSCOPIC RETROGRADE CHOLANGIOPANCREATOGRAPHY;  Surgeon: Venkat Robb MD;  Location: [facility] ENDOSCOPY;  Service: Gastroenterology;  Laterality: N/A;   • INCISION AND DRAINAGE ABSCESS  2012   • INJECTION OF MEDICATION  2015    Phenergan (1)     • INJECTION OF MEDICATION  10/10/2013    Toradol (2)      • INJECTION OF MEDICATION  2014    Zofran (1)          Family History   Problem Relation Age of Onset   • Breast cancer Mother    • Thyroid disease Mother    • Hypertension Mother    • Heart disease Mother    • Arthritis Mother    • Liver disease Father    • Endometrial cancer Sister    • Anxiety disorder Brother    • Mental illness Daughter    • Depression Daughter    • Diabetes Maternal Aunt    • Diabetes Maternal Uncle    • Stroke Maternal Grandmother    • Breast cancer Maternal Grandmother    • Cancer Maternal Grandfather    • Stroke Paternal Grandmother    • Depression Other    • Cancer Other         Colorectal Cancer   • Endometrial cancer Other    • Lung cancer Other        Social History     Socioeconomic History   • Marital status: Single   Tobacco Use   • Smoking status: Every Day     Packs/day: 0.50     Types: Cigarettes   • Smokeless tobacco: Never   • Tobacco comments:     19*800*quit*NOW   Vaping Use   • Vaping Use: Some days   • Substances: Nicotine   • Devices: Refillable tank   • Passive vaping exposure: Yes   Substance and Sexual Activity   • Alcohol use: No   • Drug use: No   • Sexual activity: Not Currently     Birth control/protection: None           Objective   Physical Exam  Vitals and nursing note reviewed.   Constitutional:       General: She is not in acute distress.     Appearance: She is well-developed. She is not ill-appearing or diaphoretic.   HENT:      Head: Normocephalic and atraumatic.      Nose: Nose  normal.      Mouth/Throat:      Mouth: Mucous membranes are moist.   Eyes:      General: No scleral icterus.     Extraocular Movements: Extraocular movements intact.      Conjunctiva/sclera: Conjunctivae normal.   Neck:      Vascular: No JVD.   Cardiovascular:      Rate and Rhythm: Normal rate and regular rhythm.      Heart sounds: Normal heart sounds. No murmur heard.    No friction rub. No gallop.   Pulmonary:      Effort: Pulmonary effort is normal. No respiratory distress.      Breath sounds: No wheezing or rales.   Chest:      Chest wall: No tenderness.   Abdominal:      General: There is no distension.      Palpations: Abdomen is soft. There is no mass.      Tenderness: There is no abdominal tenderness. There is no guarding or rebound.   Musculoskeletal:         General: Swelling and tenderness present. No deformity. Normal range of motion.      Cervical back: Normal range of motion and neck supple. No rigidity or tenderness.      Comments: Patient with symmetric strength bilateral lower extremities 5/5.  No sensory deficits are noted.  No significant asymmetry is noted in terms of size of the legs.  2+ distal pulses with less than 2-second capillary refill   Lymphadenopathy:      Cervical: No cervical adenopathy.   Skin:     General: Skin is warm and dry.      Capillary Refill: Capillary refill takes less than 2 seconds.      Coloration: Skin is not pale.      Findings: No erythema or rash.   Neurological:      General: No focal deficit present.      Mental Status: She is alert and oriented to person, place, and time.      Cranial Nerves: No cranial nerve deficit.      Motor: No abnormal muscle tone.      Comments: Patient does have intermittent stuttering speech though this is the baseline for the patient.   Psychiatric:         Behavior: Behavior normal.         Thought Content: Thought content normal.         Judgment: Judgment normal.         Procedures           ED Course                                           Labs Reviewed   COMPREHENSIVE METABOLIC PANEL - Abnormal; Notable for the following components:       Result Value    Glucose 101 (*)     All other components within normal limits    Narrative:     GFR Normal >60  Chronic Kidney Disease <60  Kidney Failure <15     URINALYSIS W/ MICROSCOPIC IF INDICATED (NO CULTURE) - Abnormal; Notable for the following components:    Ketones, UA Trace (*)     Leuk Esterase, UA Small (1+) (*)     All other components within normal limits   CBC WITH AUTO DIFFERENTIAL - Abnormal; Notable for the following components:    Hemoglobin 11.7 (*)     Eosinophil % 6.8 (*)     Eosinophils, Absolute 0.44 (*)     All other components within normal limits   URINALYSIS, MICROSCOPIC ONLY - Abnormal; Notable for the following components:    RBC, UA 3-5 (*)     Squamous Epithelial Cells, UA 6-12 (*)     All other components within normal limits   CBC AND DIFFERENTIAL    Narrative:     The following orders were created for panel order CBC & Differential.  Procedure                               Abnormality         Status                     ---------                               -----------         ------                     CBC Auto Differential[[id number]]        Abnormal            Final result                 Please view results for these tests on the individual orders.       CT Head Without Contrast   Final Result   1. No acute intracranial process.      2.  Chronic area of encephalomalacia in the left frontal lobe from prior infarct   is unchanged.      US Venous Doppler Lower Extremity Left (duplex)   Final Result   No evidence of deep venous thrombosis.                  Medical Decision Making  No significant findings are noted.  Negative clinical evaluation for stroke, no new CT findings no neurologic deficits.  Signs and symptoms consistent with muscle strain in the leg.  Negative DVT studies.  Symptomatic care.    Amount and/or Complexity of Data Reviewed  Labs:  ordered.  Radiology: ordered.      Risk  Prescription drug management.          Final diagnoses:   Quadriceps muscle strain, left, initial encounter       ED Disposition  ED Disposition     ED Disposition   Discharge    Condition   Stable    Comment   --             Sonya Crooks, APRN  [address] [facility] [address]  [phone]    In 1 week  If not improved for further evaluation and care         Medication List      No changes were made to your prescriptions during this visit.          Vinay Hanna MD  05/07/23 2010

## 2023-05-08 LAB — REF LAB TEST METHOD: NORMAL

## 2023-05-09 ENCOUNTER — ANESTHESIA EVENT (OUTPATIENT)
Dept: GASTROENTEROLOGY | Facility: HOSPITAL | Age: 48
End: 2023-05-09
Payer: COMMERCIAL

## 2023-05-09 ENCOUNTER — ANESTHESIA (OUTPATIENT)
Dept: GASTROENTEROLOGY | Facility: HOSPITAL | Age: 48
End: 2023-05-09
Payer: COMMERCIAL

## 2023-05-09 ENCOUNTER — HOSPITAL ENCOUNTER (OUTPATIENT)
Facility: HOSPITAL | Age: 48
Setting detail: HOSPITAL OUTPATIENT SURGERY
Discharge: HOME | End: 2023-05-09
Attending: INTERNAL MEDICINE | Admitting: INTERNAL MEDICINE
Payer: COMMERCIAL

## 2023-05-09 ENCOUNTER — APPOINTMENT (OUTPATIENT)
Dept: GENERAL RADIOLOGY | Facility: HOSPITAL | Age: 48
End: 2023-05-09
Payer: COMMERCIAL

## 2023-05-09 VITALS
WEIGHT: 191.58 LBS | TEMPERATURE: 97.3 F | OXYGEN SATURATION: 98 % | BODY MASS INDEX: 37.61 KG/M2 | SYSTOLIC BLOOD PRESSURE: 101 MMHG | HEIGHT: 60 IN | HEART RATE: 62 BPM | DIASTOLIC BLOOD PRESSURE: 76 MMHG | RESPIRATION RATE: 16 BRPM

## 2023-05-09 DIAGNOSIS — K80.51 CALCULUS OF BILE DUCT WITHOUT CHOLANGITIS WITH OBSTRUCTION: ICD-10-CM

## 2023-05-09 PROCEDURE — 25510000001 IOPAMIDOL 61 % SOLUTION: Performed by: INTERNAL MEDICINE

## 2023-05-09 PROCEDURE — 25010000002 MIDAZOLAM PER 1 MG: Performed by: NURSE ANESTHETIST, CERTIFIED REGISTERED

## 2023-05-09 PROCEDURE — 25010000002 FENTANYL CITRATE (PF) 100 MCG/2ML SOLUTION: Performed by: NURSE ANESTHETIST, CERTIFIED REGISTERED

## 2023-05-09 PROCEDURE — 25010000002 DEXAMETHASONE PER 1 MG: Performed by: NURSE ANESTHETIST, CERTIFIED REGISTERED

## 2023-05-09 PROCEDURE — C1769 GUIDE WIRE: HCPCS | Performed by: INTERNAL MEDICINE

## 2023-05-09 PROCEDURE — 25010000002 ONDANSETRON PER 1 MG: Performed by: NURSE ANESTHETIST, CERTIFIED REGISTERED

## 2023-05-09 PROCEDURE — 25010000002 SUCCINYLCHOLINE PER 20 MG: Performed by: NURSE ANESTHETIST, CERTIFIED REGISTERED

## 2023-05-09 PROCEDURE — 74328 X-RAY BILE DUCT ENDOSCOPY: CPT

## 2023-05-09 PROCEDURE — 25010000002 PROPOFOL 10 MG/ML EMULSION: Performed by: NURSE ANESTHETIST, CERTIFIED REGISTERED

## 2023-05-09 RX ORDER — ONDANSETRON 2 MG/ML
INJECTION INTRAMUSCULAR; INTRAVENOUS AS NEEDED
Status: DISCONTINUED | OUTPATIENT
Start: 2023-05-09 | End: 2023-05-09 | Stop reason: SURG

## 2023-05-09 RX ORDER — DEXTROSE AND SODIUM CHLORIDE 5; .45 G/100ML; G/100ML
30 INJECTION, SOLUTION INTRAVENOUS CONTINUOUS
Status: DISCONTINUED | OUTPATIENT
Start: 2023-05-09 | End: 2023-05-09 | Stop reason: HOSPADM

## 2023-05-09 RX ORDER — PROPOFOL 10 MG/ML
VIAL (ML) INTRAVENOUS AS NEEDED
Status: DISCONTINUED | OUTPATIENT
Start: 2023-05-09 | End: 2023-05-09 | Stop reason: SURG

## 2023-05-09 RX ORDER — DEXAMETHASONE SODIUM PHOSPHATE 4 MG/ML
INJECTION, SOLUTION INTRA-ARTICULAR; INTRALESIONAL; INTRAMUSCULAR; INTRAVENOUS; SOFT TISSUE AS NEEDED
Status: DISCONTINUED | OUTPATIENT
Start: 2023-05-09 | End: 2023-05-09 | Stop reason: SURG

## 2023-05-09 RX ORDER — LIDOCAINE HYDROCHLORIDE 20 MG/ML
INJECTION, SOLUTION EPIDURAL; INFILTRATION; INTRACAUDAL; PERINEURAL AS NEEDED
Status: DISCONTINUED | OUTPATIENT
Start: 2023-05-09 | End: 2023-05-09 | Stop reason: SURG

## 2023-05-09 RX ORDER — MIDAZOLAM HYDROCHLORIDE 1 MG/ML
INJECTION INTRAMUSCULAR; INTRAVENOUS AS NEEDED
Status: DISCONTINUED | OUTPATIENT
Start: 2023-05-09 | End: 2023-05-09 | Stop reason: SURG

## 2023-05-09 RX ORDER — SUCCINYLCHOLINE CHLORIDE 20 MG/ML
INJECTION INTRAMUSCULAR; INTRAVENOUS AS NEEDED
Status: DISCONTINUED | OUTPATIENT
Start: 2023-05-09 | End: 2023-05-09 | Stop reason: SURG

## 2023-05-09 RX ORDER — FENTANYL CITRATE 50 UG/ML
INJECTION, SOLUTION INTRAMUSCULAR; INTRAVENOUS AS NEEDED
Status: DISCONTINUED | OUTPATIENT
Start: 2023-05-09 | End: 2023-05-09 | Stop reason: SURG

## 2023-05-09 RX ADMIN — DEXTROSE AND SODIUM CHLORIDE 30 ML/HR: 5; 450 INJECTION, SOLUTION INTRAVENOUS at 15:57

## 2023-05-09 RX ADMIN — LIDOCAINE HYDROCHLORIDE 100 MG: 20 INJECTION, SOLUTION EPIDURAL; INFILTRATION; INTRACAUDAL; PERINEURAL at 16:25

## 2023-05-09 RX ADMIN — PROPOFOL 150 MG: 10 INJECTION, EMULSION INTRAVENOUS at 16:25

## 2023-05-09 RX ADMIN — MIDAZOLAM HYDROCHLORIDE 1 MG: 1 INJECTION, SOLUTION INTRAMUSCULAR; INTRAVENOUS at 16:25

## 2023-05-09 RX ADMIN — ONDANSETRON 4 MG: 2 INJECTION INTRAMUSCULAR; INTRAVENOUS at 16:36

## 2023-05-09 RX ADMIN — FENTANYL CITRATE 50 MCG: 50 INJECTION, SOLUTION INTRAMUSCULAR; INTRAVENOUS at 16:25

## 2023-05-09 RX ADMIN — SUCCINYLCHOLINE CHLORIDE 160 MG: 20 INJECTION, SOLUTION INTRAMUSCULAR; INTRAVENOUS at 16:25

## 2023-05-09 RX ADMIN — DEXAMETHASONE SODIUM PHOSPHATE 4 MG: 4 INJECTION, SOLUTION INTRAMUSCULAR; INTRAVENOUS at 16:36

## 2023-05-09 RX ADMIN — IOPAMIDOL 5 ML: 612 INJECTION, SOLUTION INTRAVENOUS at 16:39

## 2023-05-09 NOTE — ANESTHESIA PROCEDURE NOTES
Airway  Urgency: elective    Date/Time: 5/9/2023 4:25 PM  End Time:5/9/2023 4:25 PM  Airway not difficult    General Information and Staff    Patient location during procedure: radiology  CRNA/CAA: Griffin Melton CRNA  SRNA: Adrianne Linares SRNA  Indications and Patient Condition  Indications for airway management: airway protection    Preoxygenated: yes  MILS maintained throughout  Mask difficulty assessment: 0 - not attempted    Final Airway Details  Final airway type: endotracheal airway      Successful airway: ETT  Cuffed: yes   Successful intubation technique: video laryngoscopy and RSI  Facilitating devices/methods: intubating stylet and cricoid pressure  Endotracheal tube insertion site: oral  Blade: Farias  Blade size: 3  ETT size (mm): 7.0  Cormack-Lehane Classification: grade IIa - partial view of glottis  Placement verified by: chest auscultation and capnometry   Measured from: lips  ETT/EBT  to lips (cm): 22  Number of attempts at approach: 1  Assessment: lips, teeth, and gum same as pre-op and atraumatic intubation

## 2023-05-09 NOTE — ANESTHESIA POSTPROCEDURE EVALUATION
Patient: Myles Shannon    Procedure Summary     Date: 05/09/23 Room / Location: [facility] ENDOSCOPY 2 / [facility] ENDOSCOPY    Anesthesia Start: 1617 Anesthesia Stop: 1658    Procedure: ENDOSCOPIC RETROGRADE CHOLANGIOPANCREATOGRAPHY Diagnosis:       Calculus of bile duct without cholangitis with obstruction      (Calculus of bile duct without cholangitis with obstruction [K80.51])    Surgeons: Venkat Robb MD Provider: Griffin Melton CRNA    Anesthesia Type: general ASA Status: 3          Anesthesia Type: general    Vitals  Vitals Value Taken Time   /73 05/09/23 1652   Temp 98 °F (36.7 °C) 05/09/23 1652   Pulse 67 05/09/23 1652   Resp 20 05/09/23 1652   SpO2 97 % 05/09/23 1652           Post Anesthesia Care and Evaluation    Patient location during evaluation: PACU  Patient participation: complete - patient participated  Level of consciousness: sleepy but conscious  Pain score: 0  Pain management: adequate  Anesthetic complications: No anesthetic complications  PONV Status: none  Cardiovascular status: acceptable  Respiratory status: acceptable, spontaneous ventilation and room air    Comments: HE 69  /73  Temp 98.0  SPO2 98%

## 2023-05-24 ENCOUNTER — APPOINTMENT (OUTPATIENT)
Dept: CT IMAGING | Facility: HOSPITAL | Age: 48
End: 2023-05-24
Payer: COMMERCIAL

## 2023-05-24 ENCOUNTER — HOSPITAL ENCOUNTER (EMERGENCY)
Facility: HOSPITAL | Age: 48
Discharge: HOME | End: 2023-05-24
Attending: EMERGENCY MEDICINE | Admitting: EMERGENCY MEDICINE
Payer: COMMERCIAL

## 2023-05-24 VITALS
OXYGEN SATURATION: 99 % | RESPIRATION RATE: 18 BRPM | HEART RATE: 69 BPM | WEIGHT: 191 LBS | SYSTOLIC BLOOD PRESSURE: 122 MMHG | BODY MASS INDEX: 37.5 KG/M2 | DIASTOLIC BLOOD PRESSURE: 76 MMHG | HEIGHT: 60 IN | TEMPERATURE: 98.1 F

## 2023-05-24 DIAGNOSIS — R10.31 RIGHT LOWER QUADRANT ABDOMINAL PAIN: Primary | ICD-10-CM

## 2023-05-24 LAB
ALBUMIN SERPL-MCNC: 4.2 G/DL (ref 3.5–5.2)
ALBUMIN/GLOB SERPL: 1.8 G/DL
ALP SERPL-CCNC: 103 U/L (ref 39–117)
ALT SERPL W P-5'-P-CCNC: 21 U/L (ref 1–33)
ANION GAP SERPL CALCULATED.3IONS-SCNC: 14 MMOL/L (ref 5–15)
AST SERPL-CCNC: 19 U/L (ref 1–32)
BACTERIA UR QL AUTO: ABNORMAL /HPF
BASOPHILS # BLD AUTO: 0.07 10*3/MM3 (ref 0–0.2)
BASOPHILS NFR BLD AUTO: 0.8 % (ref 0–1.5)
BILIRUB SERPL-MCNC: <0.2 MG/DL (ref 0–1.2)
BILIRUB UR QL STRIP: NEGATIVE
BUN SERPL-MCNC: 15 MG/DL (ref 6–20)
BUN/CREAT SERPL: 14.7 (ref 7–25)
CALCIUM SPEC-SCNC: 9.1 MG/DL (ref 8.6–10.5)
CHLORIDE SERPL-SCNC: 105 MMOL/L (ref 98–107)
CLARITY UR: ABNORMAL
CO2 SERPL-SCNC: 20 MMOL/L (ref 22–29)
COLOR UR: YELLOW
CREAT SERPL-MCNC: 1.02 MG/DL (ref 0.57–1)
DEPRECATED RDW RBC AUTO: 45 FL (ref 37–54)
EGFRCR SERPLBLD CKD-EPI 2021: 68.4 ML/MIN/1.73
EOSINOPHIL # BLD AUTO: 0.38 10*3/MM3 (ref 0–0.4)
EOSINOPHIL NFR BLD AUTO: 4.4 % (ref 0.3–6.2)
ERYTHROCYTE [DISTWIDTH] IN BLOOD BY AUTOMATED COUNT: 13.6 % (ref 12.3–15.4)
GLOBULIN UR ELPH-MCNC: 2.3 GM/DL
GLUCOSE SERPL-MCNC: 87 MG/DL (ref 65–99)
GLUCOSE UR STRIP-MCNC: NEGATIVE MG/DL
HCG SERPL QL: NEGATIVE
HCT VFR BLD AUTO: 32.7 % (ref 34–46.6)
HGB BLD-MCNC: 10.9 G/DL (ref 12–15.9)
HGB UR QL STRIP.AUTO: ABNORMAL
HYALINE CASTS UR QL AUTO: ABNORMAL /LPF
IMM GRANULOCYTES # BLD AUTO: 0.01 10*3/MM3 (ref 0–0.05)
IMM GRANULOCYTES NFR BLD AUTO: 0.1 % (ref 0–0.5)
KETONES UR QL STRIP: ABNORMAL
LEUKOCYTE ESTERASE UR QL STRIP.AUTO: ABNORMAL
LIPASE SERPL-CCNC: 44 U/L (ref 13–60)
LYMPHOCYTES # BLD AUTO: 2.65 10*3/MM3 (ref 0.7–3.1)
LYMPHOCYTES NFR BLD AUTO: 31 % (ref 19.6–45.3)
MCH RBC QN AUTO: 30.2 PG (ref 26.6–33)
MCHC RBC AUTO-ENTMCNC: 33.3 G/DL (ref 31.5–35.7)
MCV RBC AUTO: 90.6 FL (ref 79–97)
MONOCYTES # BLD AUTO: 0.48 10*3/MM3 (ref 0.1–0.9)
MONOCYTES NFR BLD AUTO: 5.6 % (ref 5–12)
NEUTROPHILS NFR BLD AUTO: 4.97 10*3/MM3 (ref 1.7–7)
NEUTROPHILS NFR BLD AUTO: 58.1 % (ref 42.7–76)
NITRITE UR QL STRIP: NEGATIVE
NRBC BLD AUTO-RTO: 0 /100 WBC (ref 0–0.2)
PH UR STRIP.AUTO: 5.5 [PH] (ref 5–9)
PLATELET # BLD AUTO: 225 10*3/MM3 (ref 140–450)
PMV BLD AUTO: 12.6 FL (ref 6–12)
POTASSIUM SERPL-SCNC: 4 MMOL/L (ref 3.5–5.2)
PROT SERPL-MCNC: 6.5 G/DL (ref 6–8.5)
PROT UR QL STRIP: ABNORMAL
RBC # BLD AUTO: 3.61 10*6/MM3 (ref 3.77–5.28)
RBC # UR STRIP: ABNORMAL /HPF
REF LAB TEST METHOD: ABNORMAL
SODIUM SERPL-SCNC: 139 MMOL/L (ref 136–145)
SP GR UR STRIP: 1.02 (ref 1–1.03)
SQUAMOUS #/AREA URNS HPF: ABNORMAL /HPF
UROBILINOGEN UR QL STRIP: ABNORMAL
WBC # UR STRIP: ABNORMAL /HPF
WBC NRBC COR # BLD: 8.56 10*3/MM3 (ref 3.4–10.8)
WHOLE BLOOD HOLD COAG: NORMAL

## 2023-05-24 PROCEDURE — 84703 CHORIONIC GONADOTROPIN ASSAY: CPT

## 2023-05-24 PROCEDURE — 96374 THER/PROPH/DIAG INJ IV PUSH: CPT

## 2023-05-24 PROCEDURE — 80053 COMPREHEN METABOLIC PANEL: CPT

## 2023-05-24 PROCEDURE — 25510000001 IOPAMIDOL 61 % SOLUTION: Performed by: EMERGENCY MEDICINE

## 2023-05-24 PROCEDURE — 85025 COMPLETE CBC W/AUTO DIFF WBC: CPT

## 2023-05-24 PROCEDURE — 74177 CT ABD & PELVIS W/CONTRAST: CPT

## 2023-05-24 PROCEDURE — 25010000002 KETOROLAC TROMETHAMINE PER 15 MG

## 2023-05-24 PROCEDURE — 81001 URINALYSIS AUTO W/SCOPE: CPT | Performed by: EMERGENCY MEDICINE

## 2023-05-24 PROCEDURE — 99284 EMERGENCY DEPT VISIT MOD MDM: CPT

## 2023-05-24 PROCEDURE — 83690 ASSAY OF LIPASE: CPT

## 2023-05-24 RX ORDER — KETOROLAC TROMETHAMINE 15 MG/ML
15 INJECTION, SOLUTION INTRAMUSCULAR; INTRAVENOUS ONCE
Status: COMPLETED | OUTPATIENT
Start: 2023-05-24 | End: 2023-05-24

## 2023-05-24 RX ADMIN — IOPAMIDOL 90 ML: 612 INJECTION, SOLUTION INTRAVENOUS at 21:32

## 2023-05-24 RX ADMIN — KETOROLAC TROMETHAMINE 15 MG: 15 INJECTION, SOLUTION INTRAMUSCULAR; INTRAVENOUS at 19:49

## 2023-05-25 NOTE — ED PROVIDER NOTES
Subjective   History of Present Illness  Pt presents to the ED for abdominal pain. She reports it started today around 3pm. She describes it as a constant, throbbing pain, 10/10, radiating to her right groin. Movement makes the pain worse. Laying down makes it bearable. She took Tylenol; no improvement.        Review of Systems   Constitutional: Negative for appetite change, chills, diaphoresis, fatigue, fever and unexpected weight change.   HENT: Negative for congestion and sore throat.    Respiratory: Negative for cough and shortness of breath.    Cardiovascular: Negative for chest pain.   Gastrointestinal: Positive for abdominal pain. Negative for constipation, diarrhea, nausea and vomiting.   Genitourinary: Positive for dysuria. Negative for difficulty urinating and dyspareunia.   Neurological: Negative for dizziness and weakness.       Past Medical History:   Diagnosis Date   • Abdominal pain     Chronic RLQ, RUQ, R flank comes and goes.    • Abdominal pain, right lower quadrant    • Acute bilateral otitis media    • Allergic rhinitis    • Backache     Upper back.   • Candidiasis of skin    • Cellulitis of neck    • Chest pain    • Contraception    • Cough    • Dysfunction of eustachian tube    • Dyspareunia, female    • Elevated cholesterol    • Excessive or frequent menstruation    • Flank pain    • Generalized aches and pains    • Headache    • Health maintenance examination    • Hypertension    • Infection of skin and subcutaneous tissue    • Irregular periods    • Kidney stone     Poss   • Menorrhagia    • Nausea vomiting and diarrhea    • Obesity    • Open wound of abdominal wall    • Otalgia    • Pain in left foot    • Pain in unspecified hand    • Removed Impacted cerumen 06/05/2012   • Rhinitis    • Seizure 08/15/2019   • Seizure    • Shoulder pain     right-sided-likely muscle strain      • Shoulder tendinitis    • Spider bite wound    • Staphylococcal infection of skin    • Stroke    • Swollen feet     • Tinea cruris    • Tobacco dependence syndrome    • Upper respiratory infection    • Urinary tract infectious disease    • Vertigo    • Visit for gynecologic examination    • Vulvovaginitis        Allergies   Allergen Reactions   • Abilify [Aripiprazole] Nausea Only   • Buspirone Rash   • Penicillins Hives and Nausea And Vomiting       Past Surgical History:   Procedure Laterality Date   •  SECTION     • CHOLECYSTECTOMY WITH INTRAOPERATIVE CHOLANGIOGRAM N/A 3/5/2023    Procedure: CHOLECYSTECTOMY LAPAROSCOPIC INTRAOPERATIVE CHOLANGIOGRAM AND LAPAROSCOPIC COMMON BILE DUCT;  Surgeon: Jonn Jimenes MD;  Location: [facility] OR;  Service: General;  Laterality: N/A;   • CYSTOSCOPY N/A 2023    Procedure: CYSTOSCOPY;  Surgeon: Chris Deshpande MD;  Location: [facility] OR;  Service: Urology;  Laterality: N/A;   • DILATATION AND CURETTAGE      Secondary to incomplete spontaneous    • ERCP N/A 3/7/2023    Procedure: ENDOSCOPIC RETROGRADE CHOLANGIOPANCREATOGRAPHY;  Surgeon: Venkat Robb MD;  Location: [facility] ENDOSCOPY;  Service: Gastroenterology;  Laterality: N/A;   • ERCP N/A 2023    Procedure: ENDOSCOPIC RETROGRADE CHOLANGIOPANCREATOGRAPHY;  Surgeon: Venkat Robb MD;  Location: [facility] ENDOSCOPY;  Service: Gastroenterology;  Laterality: N/A;   • INCISION AND DRAINAGE ABSCESS  2012   • INJECTION OF MEDICATION  2015    Phenergan (1)     • INJECTION OF MEDICATION  10/10/2013    Toradol (2)      • INJECTION OF MEDICATION  2014    Zofran (1)          Family History   Problem Relation Age of Onset   • Breast cancer Mother    • Thyroid disease Mother    • Hypertension Mother    • Heart disease Mother    • Arthritis Mother    • Liver disease Father    • Endometrial cancer Sister    • Anxiety disorder Brother    • Mental illness Daughter    • Depression Daughter    • Diabetes Maternal Aunt    • Diabetes Maternal Uncle    • Stroke Maternal Grandmother    • Breast cancer Maternal  Grandmother    • Cancer Maternal Grandfather    • Stroke Paternal Grandmother    • Depression Other    • Cancer Other         Colorectal Cancer   • Endometrial cancer Other    • Lung cancer Other        Social History     Socioeconomic History   • Marital status: Single   Tobacco Use   • Smoking status: Every Day     Packs/day: 0.50     Types: Cigarettes   • Smokeless tobacco: Never   • Tobacco comments:     19*800*quit*NOW   Vaping Use   • Vaping Use: Some days   • Substances: Nicotine   • Devices: Refillable tank   • Passive vaping exposure: Yes   Substance and Sexual Activity   • Alcohol use: No   • Drug use: No   • Sexual activity: Not Currently     Birth control/protection: None           Objective   Physical Exam  Vitals reviewed.   Constitutional:       General: She is not in acute distress.     Appearance: Normal appearance.   HENT:      Head: Normocephalic and atraumatic.   Eyes:      General: Lids are normal.      Extraocular Movements: Extraocular movements intact.      Conjunctiva/sclera: Conjunctivae normal.   Cardiovascular:      Rate and Rhythm: Normal rate and regular rhythm.      Pulses: Normal pulses.      Heart sounds: Normal heart sounds.   Pulmonary:      Effort: Pulmonary effort is normal.      Breath sounds: Normal breath sounds.   Abdominal:      General: Bowel sounds are normal.      Palpations: Abdomen is soft.      Tenderness: There is abdominal tenderness.   Neurological:      General: No focal deficit present.      Mental Status: She is alert and oriented to person, place, and time.      Gait: Gait is intact.         Procedures           ED Course                                           Medical Decision Making  47 y.o F presents to the ED with abdominal pain. Labs and imaging reviewed. CT abdomen and pelvis showed Bilateral nonobstructing renal calculi. Advised patient to speak to PCP if pain continues. Vitals stable; afebrile. Return to ED precautions provided. Follow up with PCP in  one week.       Amount and/or Complexity of Data Reviewed  Labs: ordered.  Radiology: ordered.      Risk  Prescription drug management.          Final diagnoses:   Right lower quadrant abdominal pain       ED Disposition  ED Disposition     ED Disposition   Discharge    Condition   Stable    Comment   --             Sonya Crooks, APRN  [address] [facility] [address]  [phone]    Call in 1 week           Medication List      No changes were made to your prescriptions during this visit.          Daniela Bhakta MD  Resident  05/24/23 [id number]

## 2023-06-03 ENCOUNTER — HOSPITAL ENCOUNTER (EMERGENCY)
Facility: HOSPITAL | Age: 48
Discharge: HOME | End: 2023-06-03
Attending: FAMILY MEDICINE
Payer: COMMERCIAL

## 2023-06-03 ENCOUNTER — APPOINTMENT (OUTPATIENT)
Dept: CT IMAGING | Facility: HOSPITAL | Age: 48
End: 2023-06-03
Payer: COMMERCIAL

## 2023-06-03 VITALS
HEART RATE: 64 BPM | OXYGEN SATURATION: 98 % | DIASTOLIC BLOOD PRESSURE: 55 MMHG | SYSTOLIC BLOOD PRESSURE: 107 MMHG | TEMPERATURE: 98.4 F | RESPIRATION RATE: 18 BRPM

## 2023-06-03 DIAGNOSIS — T67.5XXA HEAT EXHAUSTION, INITIAL ENCOUNTER: Primary | ICD-10-CM

## 2023-06-03 DIAGNOSIS — T67.1XXA HEAT SYNCOPE, INITIAL ENCOUNTER: ICD-10-CM

## 2023-06-03 LAB
ALBUMIN SERPL-MCNC: 4.3 G/DL (ref 3.5–5.2)
ALBUMIN/GLOB SERPL: 2 G/DL
ALP SERPL-CCNC: 105 U/L (ref 39–117)
ALT SERPL W P-5'-P-CCNC: 24 U/L (ref 1–33)
ANION GAP SERPL CALCULATED.3IONS-SCNC: 13 MMOL/L (ref 5–15)
AST SERPL-CCNC: 24 U/L (ref 1–32)
BACTERIA UR QL AUTO: ABNORMAL /HPF
BASOPHILS # BLD AUTO: 0.06 10*3/MM3 (ref 0–0.2)
BASOPHILS NFR BLD AUTO: 1 % (ref 0–1.5)
BILIRUB SERPL-MCNC: <0.2 MG/DL (ref 0–1.2)
BILIRUB UR QL STRIP: NEGATIVE
BUN SERPL-MCNC: 19 MG/DL (ref 6–20)
BUN/CREAT SERPL: 16.7 (ref 7–25)
CALCIUM SPEC-SCNC: 9.8 MG/DL (ref 8.6–10.5)
CHLORIDE SERPL-SCNC: 104 MMOL/L (ref 98–107)
CK SERPL-CCNC: 113 U/L (ref 20–180)
CLARITY UR: CLEAR
CO2 SERPL-SCNC: 23 MMOL/L (ref 22–29)
COLOR UR: YELLOW
CREAT SERPL-MCNC: 1.14 MG/DL (ref 0.57–1)
DEPRECATED RDW RBC AUTO: 45.3 FL (ref 37–54)
EGFRCR SERPLBLD CKD-EPI 2021: 59.9 ML/MIN/1.73
EOSINOPHIL # BLD AUTO: 0.24 10*3/MM3 (ref 0–0.4)
EOSINOPHIL NFR BLD AUTO: 4.2 % (ref 0.3–6.2)
ERYTHROCYTE [DISTWIDTH] IN BLOOD BY AUTOMATED COUNT: 13.5 % (ref 12.3–15.4)
GLOBULIN UR ELPH-MCNC: 2.2 GM/DL
GLUCOSE SERPL-MCNC: 90 MG/DL (ref 65–99)
GLUCOSE UR STRIP-MCNC: NEGATIVE MG/DL
HCG SERPL QL: NEGATIVE
HCT VFR BLD AUTO: 33.7 % (ref 34–46.6)
HGB BLD-MCNC: 11.2 G/DL (ref 12–15.9)
HGB UR QL STRIP.AUTO: NEGATIVE
HYALINE CASTS UR QL AUTO: ABNORMAL /LPF
IMM GRANULOCYTES # BLD AUTO: 0.01 10*3/MM3 (ref 0–0.05)
IMM GRANULOCYTES NFR BLD AUTO: 0.2 % (ref 0–0.5)
KETONES UR QL STRIP: ABNORMAL
LEUKOCYTE ESTERASE UR QL STRIP.AUTO: ABNORMAL
LIPASE SERPL-CCNC: 48 U/L (ref 13–60)
LYMPHOCYTES # BLD AUTO: 2.28 10*3/MM3 (ref 0.7–3.1)
LYMPHOCYTES NFR BLD AUTO: 39.6 % (ref 19.6–45.3)
MAGNESIUM SERPL-MCNC: 2.1 MG/DL (ref 1.6–2.6)
MCH RBC QN AUTO: 30.4 PG (ref 26.6–33)
MCHC RBC AUTO-ENTMCNC: 33.2 G/DL (ref 31.5–35.7)
MCV RBC AUTO: 91.3 FL (ref 79–97)
MONOCYTES # BLD AUTO: 0.38 10*3/MM3 (ref 0.1–0.9)
MONOCYTES NFR BLD AUTO: 6.6 % (ref 5–12)
NEUTROPHILS NFR BLD AUTO: 2.79 10*3/MM3 (ref 1.7–7)
NEUTROPHILS NFR BLD AUTO: 48.4 % (ref 42.7–76)
NITRITE UR QL STRIP: NEGATIVE
NRBC BLD AUTO-RTO: 0 /100 WBC (ref 0–0.2)
PH UR STRIP.AUTO: 6.5 [PH] (ref 5–9)
PLATELET # BLD AUTO: 201 10*3/MM3 (ref 140–450)
PMV BLD AUTO: 12.5 FL (ref 6–12)
POTASSIUM SERPL-SCNC: 4.9 MMOL/L (ref 3.5–5.2)
PROT SERPL-MCNC: 6.5 G/DL (ref 6–8.5)
PROT UR QL STRIP: NEGATIVE
RBC # BLD AUTO: 3.69 10*6/MM3 (ref 3.77–5.28)
RBC # UR STRIP: ABNORMAL /HPF
REF LAB TEST METHOD: ABNORMAL
SODIUM SERPL-SCNC: 140 MMOL/L (ref 136–145)
SP GR UR STRIP: 1.02 (ref 1–1.03)
SQUAMOUS #/AREA URNS HPF: ABNORMAL /HPF
UROBILINOGEN UR QL STRIP: ABNORMAL
WBC # UR STRIP: ABNORMAL /HPF
WBC NRBC COR # BLD: 5.76 10*3/MM3 (ref 3.4–10.8)
WHOLE BLOOD HOLD COAG: NORMAL

## 2023-06-03 PROCEDURE — 25010000002 METOCLOPRAMIDE PER 10 MG

## 2023-06-03 PROCEDURE — 81001 URINALYSIS AUTO W/SCOPE: CPT

## 2023-06-03 PROCEDURE — 70450 CT HEAD/BRAIN W/O DYE: CPT

## 2023-06-03 PROCEDURE — 82550 ASSAY OF CK (CPK): CPT

## 2023-06-03 PROCEDURE — 84703 CHORIONIC GONADOTROPIN ASSAY: CPT

## 2023-06-03 PROCEDURE — 25010000002 DIPHENHYDRAMINE PER 50 MG

## 2023-06-03 PROCEDURE — 25010000002 KETOROLAC TROMETHAMINE PER 15 MG

## 2023-06-03 PROCEDURE — 93005 ELECTROCARDIOGRAM TRACING: CPT

## 2023-06-03 PROCEDURE — 99284 EMERGENCY DEPT VISIT MOD MDM: CPT

## 2023-06-03 PROCEDURE — 83690 ASSAY OF LIPASE: CPT

## 2023-06-03 PROCEDURE — 85025 COMPLETE CBC W/AUTO DIFF WBC: CPT

## 2023-06-03 PROCEDURE — 80053 COMPREHEN METABOLIC PANEL: CPT

## 2023-06-03 PROCEDURE — 83735 ASSAY OF MAGNESIUM: CPT

## 2023-06-03 PROCEDURE — 96375 TX/PRO/DX INJ NEW DRUG ADDON: CPT

## 2023-06-03 PROCEDURE — 96374 THER/PROPH/DIAG INJ IV PUSH: CPT

## 2023-06-03 RX ORDER — SODIUM CHLORIDE 0.9 % (FLUSH) 0.9 %
10 SYRINGE (ML) INJECTION AS NEEDED
Status: DISCONTINUED | OUTPATIENT
Start: 2023-06-03 | End: 2023-06-03 | Stop reason: HOSPADM

## 2023-06-03 RX ORDER — METOCLOPRAMIDE HYDROCHLORIDE 5 MG/ML
10 INJECTION INTRAMUSCULAR; INTRAVENOUS ONCE
Status: COMPLETED | OUTPATIENT
Start: 2023-06-03 | End: 2023-06-03

## 2023-06-03 RX ORDER — DIPHENHYDRAMINE HYDROCHLORIDE 50 MG/ML
25 INJECTION INTRAMUSCULAR; INTRAVENOUS ONCE
Status: COMPLETED | OUTPATIENT
Start: 2023-06-03 | End: 2023-06-03

## 2023-06-03 RX ORDER — KETOROLAC TROMETHAMINE 30 MG/ML
30 INJECTION, SOLUTION INTRAMUSCULAR; INTRAVENOUS ONCE
Status: COMPLETED | OUTPATIENT
Start: 2023-06-03 | End: 2023-06-03

## 2023-06-03 RX ADMIN — DIPHENHYDRAMINE HYDROCHLORIDE 25 MG: 50 INJECTION INTRAMUSCULAR; INTRAVENOUS at 18:38

## 2023-06-03 RX ADMIN — KETOROLAC TROMETHAMINE 30 MG: 30 INJECTION, SOLUTION INTRAMUSCULAR; INTRAVENOUS at 18:39

## 2023-06-03 RX ADMIN — METOCLOPRAMIDE 10 MG: 5 INJECTION, SOLUTION INTRAMUSCULAR; INTRAVENOUS at 18:41

## 2023-06-03 RX ADMIN — SODIUM CHLORIDE 1000 ML: 9 INJECTION, SOLUTION INTRAVENOUS at 18:36

## 2023-06-03 NOTE — ED PROVIDER NOTES
Subjective   History of Present Illness  47 year old female patient presents to ER for complaint of HA, nausea, and cramping of left leg s/p syncope vs seizure today after being out in the heat. She resides at the [facility] and walked to the dollar store with her boyfriend. They then walked home. She reports that she got a drink and was outside smoking when someone came to help her. She does not known if she had a syncopal episode or seizure. She is complaining of 10/10 headache at present. She smokes 1.5 packs of cigarettes a day, denies ETOH or illicit drug use. She also has difficulty speaking which she reports happens after her stroke when she is stressed.     Review of Systems   Constitutional: Negative.  Negative for fever.   Eyes: Negative.    Respiratory: Negative.     Cardiovascular: Negative.    Gastrointestinal:  Positive for nausea. Negative for diarrhea and vomiting.   Endocrine: Negative.    Genitourinary: Negative.    Musculoskeletal:  Positive for myalgias.   Skin: Negative.    Allergic/Immunologic: Negative.    Neurological:  Positive for syncope, speech difficulty and headaches.   Hematological: Negative.    Psychiatric/Behavioral: Negative.       Past Medical History:   Diagnosis Date    Abdominal pain     Chronic RLQ, RUQ, R flank comes and goes.     Abdominal pain, right lower quadrant     Acute bilateral otitis media     Allergic rhinitis     Backache     Upper back.    Candidiasis of skin     Cellulitis of neck     Chest pain     Contraception     Cough     Dysfunction of eustachian tube     Dyspareunia, female     Elevated cholesterol     Excessive or frequent menstruation     Flank pain     Generalized aches and pains     Headache     Health maintenance examination     Hypertension     Infection of skin and subcutaneous tissue     Irregular periods     Kidney stone     Poss    Menorrhagia     Nausea vomiting and diarrhea     Obesity     Open wound of abdominal wall     Otalgia     Pain in  left foot     Pain in unspecified hand     Removed Impacted cerumen 2012    Rhinitis     Seizure 08/15/2019    Seizure     Shoulder pain     right-sided-likely muscle strain       Shoulder tendinitis     Spider bite wound     Staphylococcal infection of skin     Stroke     Swollen feet     Tinea cruris     Tobacco dependence syndrome     Upper respiratory infection     Urinary tract infectious disease     Vertigo     Visit for gynecologic examination     Vulvovaginitis        Allergies   Allergen Reactions    Abilify [Aripiprazole] Nausea Only    Buspirone Rash    Penicillins Hives and Nausea And Vomiting       Past Surgical History:   Procedure Laterality Date     SECTION      CHOLECYSTECTOMY WITH INTRAOPERATIVE CHOLANGIOGRAM N/A 3/5/2023    Procedure: CHOLECYSTECTOMY LAPAROSCOPIC INTRAOPERATIVE CHOLANGIOGRAM AND LAPAROSCOPIC COMMON BILE DUCT;  Surgeon: Jonn Jimenes MD;  Location: [facility] OR;  Service: General;  Laterality: N/A;    CYSTOSCOPY N/A 2023    Procedure: CYSTOSCOPY;  Surgeon: Chris Deshpande MD;  Location: [facility] OR;  Service: Urology;  Laterality: N/A;    DILATATION AND CURETTAGE      Secondary to incomplete spontaneous     ERCP N/A 3/7/2023    Procedure: ENDOSCOPIC RETROGRADE CHOLANGIOPANCREATOGRAPHY;  Surgeon: Venkat Robb MD;  Location: [facility] ENDOSCOPY;  Service: Gastroenterology;  Laterality: N/A;    ERCP N/A 2023    Procedure: ENDOSCOPIC RETROGRADE CHOLANGIOPANCREATOGRAPHY;  Surgeon: Venkat Robb MD;  Location: [facility] ENDOSCOPY;  Service: Gastroenterology;  Laterality: N/A;    INCISION AND DRAINAGE ABSCESS  2012    INJECTION OF MEDICATION  2015    Phenergan (1)      INJECTION OF MEDICATION  10/10/2013    Toradol (2)       INJECTION OF MEDICATION  2014    Zofran (1)          Family History   Problem Relation Age of Onset    Breast cancer Mother     Thyroid disease Mother     Hypertension Mother     Heart disease Mother      Arthritis Mother     Liver disease Father     Endometrial cancer Sister     Anxiety disorder Brother     Mental illness Daughter     Depression Daughter     Diabetes Maternal Aunt     Diabetes Maternal Uncle     Stroke Maternal Grandmother     Breast cancer Maternal Grandmother     Cancer Maternal Grandfather     Stroke Paternal Grandmother     Depression Other     Cancer Other         Colorectal Cancer    Endometrial cancer Other     Lung cancer Other        Social History     Socioeconomic History    Marital status: Single   Tobacco Use    Smoking status: Every Day     Packs/day: 1.50     Types: Cigarettes    Smokeless tobacco: Never    Tobacco comments:     19*800*quit*NOW   Vaping Use    Vaping Use: Some days    Substances: Nicotine    Devices: Refillable tank    Passive vaping exposure: Yes   Substance and Sexual Activity    Alcohol use: No    Drug use: No    Sexual activity: Not Currently     Birth control/protection: None           Objective   /67 (BP Location: Right arm, Patient Position: Lying)   Pulse 62   Temp 98.4 °F (36.9 °C) (Oral)   Resp 18   LMP 05/21/2023   SpO2 97%     Physical Exam  Vitals and nursing note reviewed.   Constitutional:       General: She is not in acute distress.     Appearance: Normal appearance. She is not ill-appearing, toxic-appearing or diaphoretic.   HENT:      Head: Normocephalic.      Nose: Nose normal.      Mouth/Throat:      Mouth: Mucous membranes are moist.   Eyes:      Pupils: Pupils are equal, round, and reactive to light.   Cardiovascular:      Rate and Rhythm: Normal rate and regular rhythm.      Pulses: Normal pulses.      Heart sounds: Normal heart sounds.   Pulmonary:      Effort: Pulmonary effort is normal. No respiratory distress.      Breath sounds: Normal breath sounds. No wheezing.   Abdominal:      General: Bowel sounds are normal. There is no distension.      Palpations: Abdomen is soft.      Tenderness: There is no abdominal tenderness.    Musculoskeletal:         General: Normal range of motion.      Cervical back: Normal range of motion.      Comments: Cramping in left leg intermittently.    Skin:     General: Skin is warm and dry.      Capillary Refill: Capillary refill takes less than 2 seconds.   Neurological:      Mental Status: She is alert and oriented to person, place, and time.   Psychiatric:         Mood and Affect: Mood normal.         Behavior: Behavior normal.         Thought Content: Thought content normal.         Judgment: Judgment normal.       ECG 12 Lead      Date/Time: 6/3/2023 7:30 PM  Performed by: Ana Rosa Michele APRN  Authorized by: Vinay Hanna MD   Interpreted by physician  Rhythm: sinus bradycardia  BPM: 55  Clinical impression: non-specific ECG             ED Course  ED Course as of 06/03/23 1930   Sat Jun 03, 2023 1910 Patient reports improvement of symptoms. Discussed results and plan for discharge including follow up with PCP if symptoms persist and staying out of the heat for next few days. She verbalizes understanding and is agreeable with plan.  [HS]      ED Course User Index  [HS] Ana Rosa Michele APRN           Results for orders placed or performed during the hospital encounter of 06/03/23   Comprehensive Metabolic Panel    Specimen: Blood   Result Value Ref Range    Glucose 90 65 - 99 mg/dL    BUN 19 6 - 20 mg/dL    Creatinine 1.14 (H) 0.57 - 1.00 mg/dL    Sodium 140 136 - 145 mmol/L    Potassium 4.9 3.5 - 5.2 mmol/L    Chloride 104 98 - 107 mmol/L    CO2 23.0 22.0 - 29.0 mmol/L    Calcium 9.8 8.6 - 10.5 mg/dL    Total Protein 6.5 6.0 - 8.5 g/dL    Albumin 4.3 3.5 - 5.2 g/dL    ALT (SGPT) 24 1 - 33 U/L    AST (SGOT) 24 1 - 32 U/L    Alkaline Phosphatase 105 39 - 117 U/L    Total Bilirubin <0.2 0.0 - 1.2 mg/dL    Globulin 2.2 gm/dL    A/G Ratio 2.0 g/dL    BUN/Creatinine Ratio 16.7 7.0 - 25.0    Anion Gap 13.0 5.0 - 15.0 mmol/L    eGFR 59.9 (L) >60.0 mL/min/1.73   Lipase    Specimen: Blood   Result  Value Ref Range    Lipase 48 13 - 60 U/L   hCG, Serum, Qualitative    Specimen: Blood   Result Value Ref Range    HCG Qualitative Negative Negative   Urinalysis With Microscopic If Indicated (No Culture) - Urine, Clean Catch    Specimen: Urine, Clean Catch   Result Value Ref Range    Color, UA Yellow Yellow, Straw, Dark Yellow, Kavita    Appearance, UA Clear Clear    pH, UA 6.5 5.0 - 9.0    Specific Gravity, UA 1.022 1.003 - 1.030    Glucose, UA Negative Negative    Ketones, UA Trace (A) Negative    Bilirubin, UA Negative Negative    Blood, UA Negative Negative    Protein, UA Negative Negative    Leuk Esterase, UA Small (1+) (A) Negative    Nitrite, UA Negative Negative    Urobilinogen, UA 0.2 E.U./dL 0.2 - 1.0 E.U./dL   CK    Specimen: Blood   Result Value Ref Range    Creatine Kinase 113 20 - 180 U/L   CBC Auto Differential    Specimen: Blood   Result Value Ref Range    WBC 5.76 3.40 - 10.80 10*3/mm3    RBC 3.69 (L) 3.77 - 5.28 10*6/mm3    Hemoglobin 11.2 (L) 12.0 - 15.9 g/dL    Hematocrit 33.7 (L) 34.0 - 46.6 %    MCV 91.3 79.0 - 97.0 fL    MCH 30.4 26.6 - 33.0 pg    MCHC 33.2 31.5 - 35.7 g/dL    RDW 13.5 12.3 - 15.4 %    RDW-SD 45.3 37.0 - 54.0 fl    MPV 12.5 (H) 6.0 - 12.0 fL    Platelets 201 140 - 450 10*3/mm3    Neutrophil % 48.4 42.7 - 76.0 %    Lymphocyte % 39.6 19.6 - 45.3 %    Monocyte % 6.6 5.0 - 12.0 %    Eosinophil % 4.2 0.3 - 6.2 %    Basophil % 1.0 0.0 - 1.5 %    Immature Grans % 0.2 0.0 - 0.5 %    Neutrophils, Absolute 2.79 1.70 - 7.00 10*3/mm3    Lymphocytes, Absolute 2.28 0.70 - 3.10 10*3/mm3    Monocytes, Absolute 0.38 0.10 - 0.90 10*3/mm3    Eosinophils, Absolute 0.24 0.00 - 0.40 10*3/mm3    Basophils, Absolute 0.06 0.00 - 0.20 10*3/mm3    Immature Grans, Absolute 0.01 0.00 - 0.05 10*3/mm3    nRBC 0.0 0.0 - 0.2 /100 WBC   Magnesium    Specimen: Blood   Result Value Ref Range    Magnesium 2.1 1.6 - 2.6 mg/dL   Urinalysis, Microscopic Only - Urine, Clean Catch    Specimen: Urine, Clean Catch    Result Value Ref Range    RBC, UA None Seen None Seen /HPF    WBC, UA 3-5 None Seen, 0-2, 3-5 /HPF    Bacteria, UA Trace (A) None Seen /HPF    Squamous Epithelial Cells, UA 31-50 (A) None Seen, 0-2 /HPF    Hyaline Casts, UA 0-2 None Seen /LPF    Methodology Manual Light Microscopy    ECG 12 Lead Syncope   Result Value Ref Range    QT Interval 470 ms    QTC Interval 449 ms   Light Blue Top   Result Value Ref Range    Extra Tube Hold for add-ons.      CT Head Without Contrast    Result Date: 6/3/2023  CT head HISTORY: Headache and syncope. COMPARISON: CT head 5/7/2023. TECHNIQUE: Axial images were performed from the calvarium through the base of the skull followed by 2D multiplanar reformats. FINDINGS: The calvarium is intact.  Trace air-fluid level in the sphenoid sinus which could potentially represent acute sinusitis. Stable encephalomalacia in the left cerebral hemisphere.  No acute intracranial hemorrhage, midline shift, mass effect or hydrocephalus.     1.  No acute intracranial abnormality.  No significant interval change. 2.  Stable encephalomalacia, left cerebral hemisphere. 3.  Air-fluid level in the sphenoid sinus which could potentially represent acute sinusitis.                                     Medical Decision Making  Amount and/or Complexity of Data Reviewed  Labs: ordered.  Radiology: ordered.  ECG/medicine tests: ordered.    Risk  Prescription drug management.        Final diagnoses:   Heat exhaustion, initial encounter   Heat syncope, initial encounter       ED Disposition  ED Disposition       ED Disposition   Discharge    Condition   Stable    Comment   --               Sonya Crooks, APRN  [address] [facility] [address]  [phone]    Call   ER follow up if symptoms persist         Medication List      No changes were made to your prescriptions during this visit.            Ana Rosa Michele, APRN  06/03/23 1930

## 2023-06-04 LAB
QT INTERVAL: 470 MS
QTC INTERVAL: 449 MS

## 2023-06-04 NOTE — DISCHARGE INSTRUCTIONS
Home to rest. Continue your home medications. Drink plenty of fluids and stay out of the heat for next few days. Follow up with your primary care provider if symptoms persist. Return for worsening symptoms.

## 2023-06-06 NOTE — CASE COMMUNICATION
Home Health Physical Therapy Discipline Discharge (6/9/22)    Reason for Referral: PATIENT WAS AT THE HOSPITAL FROM 5/3/2022 - 5/52022 SECONDARY TO CVA LIKE SYMPTSOMS. HER FINAL DIAGNOSIS WERE ACUTE CONFUSION STATE, SEIZURE DISORDER, EXPRESSIVE APHASIA, BIPOLAR DISORDER. PATIENT WAS HAVING SOME CONFUSION AND FEELING LIKE SHE WAS HAVING CVA TYPE SYMPTOMS ONCE SHE WAS TREATED SHE STARTED DOING BETTER AND MOST SYMPTOMS RESOLVED. SHE IS FEE LING BETTER BUT FEELS WEAK AND IS HAVING DIFFICULTY WITH HER RIGHT LEG AND HER WALKING      Prior vs Current Level of Function:    Bed mobility: Indep at [facility]; indep at [place]    Transfers: supervision at [facility]; mod i at [place]    Gait: 100 ft w/ use of RW and supervision at [facility]; 250 ft w/ use of RW and indep on even and uneven surfaces at [place]    ADLs: min A at [facility]; SBA at [place]; pateint has returned to bathing self indep but still requires food p rep and medication assistance    Strength: 4-/5 hip and knee, 2-/5  R ankle at eval; 4/5 hip and knee strength, 3/5 ankle strength w/ heavy cuing for proper MMT particiaption     Condition: fair    Reason: goals met    Discharged to: care of [facility]     Next MD Visit: 6/17/22 with [facility]

## 2023-07-28 ENCOUNTER — HOSPITAL ENCOUNTER (EMERGENCY)
Facility: HOSPITAL | Age: 48
Discharge: HOME | End: 2023-07-28
Attending: EMERGENCY MEDICINE
Payer: COMMERCIAL

## 2023-07-28 ENCOUNTER — APPOINTMENT (OUTPATIENT)
Dept: CT IMAGING | Facility: HOSPITAL | Age: 48
End: 2023-07-28
Payer: COMMERCIAL

## 2023-07-28 VITALS
BODY MASS INDEX: 36.32 KG/M2 | TEMPERATURE: 98.5 F | OXYGEN SATURATION: 95 % | RESPIRATION RATE: 18 BRPM | WEIGHT: 185 LBS | HEIGHT: 60 IN | HEART RATE: 57 BPM | SYSTOLIC BLOOD PRESSURE: 116 MMHG | DIASTOLIC BLOOD PRESSURE: 65 MMHG

## 2023-07-28 DIAGNOSIS — N39.0 URINARY TRACT INFECTION WITHOUT HEMATURIA, SITE UNSPECIFIED: Primary | ICD-10-CM

## 2023-07-28 LAB
ALBUMIN SERPL-MCNC: 3.8 G/DL (ref 3.5–5.2)
ALBUMIN/GLOB SERPL: 1.4 G/DL
ALP SERPL-CCNC: 91 U/L (ref 39–117)
ALT SERPL W P-5'-P-CCNC: 21 U/L (ref 1–33)
ANION GAP SERPL CALCULATED.3IONS-SCNC: 10 MMOL/L (ref 5–15)
AST SERPL-CCNC: 21 U/L (ref 1–32)
BACTERIA UR QL AUTO: ABNORMAL /HPF
BASOPHILS # BLD AUTO: 0.04 10*3/MM3 (ref 0–0.2)
BASOPHILS NFR BLD AUTO: 0.6 % (ref 0–1.5)
BILIRUB SERPL-MCNC: <0.2 MG/DL (ref 0–1.2)
BILIRUB UR QL STRIP: NEGATIVE
BUN SERPL-MCNC: 22 MG/DL (ref 6–20)
BUN/CREAT SERPL: 17.5 (ref 7–25)
CALCIUM SPEC-SCNC: 9.1 MG/DL (ref 8.6–10.5)
CHLORIDE SERPL-SCNC: 103 MMOL/L (ref 98–107)
CLARITY UR: ABNORMAL
CO2 SERPL-SCNC: 24 MMOL/L (ref 22–29)
COLOR UR: YELLOW
CREAT SERPL-MCNC: 1.26 MG/DL (ref 0.57–1)
DEPRECATED RDW RBC AUTO: 42.5 FL (ref 37–54)
EGFRCR SERPLBLD CKD-EPI 2021: 53.1 ML/MIN/1.73
EOSINOPHIL # BLD AUTO: 0.28 10*3/MM3 (ref 0–0.4)
EOSINOPHIL NFR BLD AUTO: 4.3 % (ref 0.3–6.2)
ERYTHROCYTE [DISTWIDTH] IN BLOOD BY AUTOMATED COUNT: 12.9 % (ref 12.3–15.4)
GLOBULIN UR ELPH-MCNC: 2.8 GM/DL
GLUCOSE SERPL-MCNC: 88 MG/DL (ref 65–99)
GLUCOSE UR STRIP-MCNC: NEGATIVE MG/DL
HCG SERPL QL: NEGATIVE
HCT VFR BLD AUTO: 32.9 % (ref 34–46.6)
HGB BLD-MCNC: 11.2 G/DL (ref 12–15.9)
HGB UR QL STRIP.AUTO: NEGATIVE
HYALINE CASTS UR QL AUTO: ABNORMAL /LPF
IMM GRANULOCYTES # BLD AUTO: 0.01 10*3/MM3 (ref 0–0.05)
IMM GRANULOCYTES NFR BLD AUTO: 0.2 % (ref 0–0.5)
KETONES UR QL STRIP: NEGATIVE
LEUKOCYTE ESTERASE UR QL STRIP.AUTO: ABNORMAL
LIPASE SERPL-CCNC: 38 U/L (ref 13–60)
LYMPHOCYTES # BLD AUTO: 2.21 10*3/MM3 (ref 0.7–3.1)
LYMPHOCYTES NFR BLD AUTO: 34.3 % (ref 19.6–45.3)
MCH RBC QN AUTO: 30.6 PG (ref 26.6–33)
MCHC RBC AUTO-ENTMCNC: 34 G/DL (ref 31.5–35.7)
MCV RBC AUTO: 89.9 FL (ref 79–97)
MONOCYTES # BLD AUTO: 0.44 10*3/MM3 (ref 0.1–0.9)
MONOCYTES NFR BLD AUTO: 6.8 % (ref 5–12)
NEUTROPHILS NFR BLD AUTO: 3.47 10*3/MM3 (ref 1.7–7)
NEUTROPHILS NFR BLD AUTO: 53.8 % (ref 42.7–76)
NITRITE UR QL STRIP: NEGATIVE
NRBC BLD AUTO-RTO: 0 /100 WBC (ref 0–0.2)
PH UR STRIP.AUTO: 5.5 [PH] (ref 5–9)
PLATELET # BLD AUTO: 172 10*3/MM3 (ref 140–450)
PMV BLD AUTO: 11.6 FL (ref 6–12)
POTASSIUM SERPL-SCNC: 4.3 MMOL/L (ref 3.5–5.2)
PROT SERPL-MCNC: 6.6 G/DL (ref 6–8.5)
PROT UR QL STRIP: NEGATIVE
RBC # BLD AUTO: 3.66 10*6/MM3 (ref 3.77–5.28)
RBC # UR STRIP: ABNORMAL /HPF
REF LAB TEST METHOD: ABNORMAL
SODIUM SERPL-SCNC: 137 MMOL/L (ref 136–145)
SP GR UR STRIP: 1.01 (ref 1–1.03)
SQUAMOUS #/AREA URNS HPF: ABNORMAL /HPF
UROBILINOGEN UR QL STRIP: ABNORMAL
WBC # UR STRIP: ABNORMAL /HPF
WBC NRBC COR # BLD: 6.45 10*3/MM3 (ref 3.4–10.8)
WHOLE BLOOD HOLD COAG: NORMAL

## 2023-07-28 PROCEDURE — 25010000002 FENTANYL CITRATE (PF) 50 MCG/ML SOLUTION: Performed by: EMERGENCY MEDICINE

## 2023-07-28 PROCEDURE — 25010000002 KETOROLAC TROMETHAMINE PER 15 MG: Performed by: EMERGENCY MEDICINE

## 2023-07-28 PROCEDURE — 36415 COLL VENOUS BLD VENIPUNCTURE: CPT

## 2023-07-28 PROCEDURE — 25510000001 IOPAMIDOL 61 % SOLUTION: Performed by: EMERGENCY MEDICINE

## 2023-07-28 PROCEDURE — 83690 ASSAY OF LIPASE: CPT | Performed by: EMERGENCY MEDICINE

## 2023-07-28 PROCEDURE — 85025 COMPLETE CBC W/AUTO DIFF WBC: CPT | Performed by: EMERGENCY MEDICINE

## 2023-07-28 PROCEDURE — 25010000002 ONDANSETRON PER 1 MG: Performed by: EMERGENCY MEDICINE

## 2023-07-28 PROCEDURE — 99284 EMERGENCY DEPT VISIT MOD MDM: CPT

## 2023-07-28 PROCEDURE — 80053 COMPREHEN METABOLIC PANEL: CPT | Performed by: EMERGENCY MEDICINE

## 2023-07-28 PROCEDURE — 96375 TX/PRO/DX INJ NEW DRUG ADDON: CPT

## 2023-07-28 PROCEDURE — 81001 URINALYSIS AUTO W/SCOPE: CPT | Performed by: EMERGENCY MEDICINE

## 2023-07-28 PROCEDURE — 74177 CT ABD & PELVIS W/CONTRAST: CPT

## 2023-07-28 PROCEDURE — 84703 CHORIONIC GONADOTROPIN ASSAY: CPT | Performed by: EMERGENCY MEDICINE

## 2023-07-28 PROCEDURE — 96374 THER/PROPH/DIAG INJ IV PUSH: CPT

## 2023-07-28 RX ORDER — GRANULES FOR ORAL 3 G/1
3 POWDER ORAL ONCE
Status: COMPLETED | OUTPATIENT
Start: 2023-07-28 | End: 2023-07-28

## 2023-07-28 RX ORDER — ONDANSETRON 4 MG/1
4 TABLET, ORALLY DISINTEGRATING ORAL EVERY 8 HOURS PRN
Qty: 9 TABLET | Refills: 0 | Status: SHIPPED | OUTPATIENT
Start: 2023-07-28

## 2023-07-28 RX ORDER — KETOROLAC TROMETHAMINE 15 MG/ML
15 INJECTION, SOLUTION INTRAMUSCULAR; INTRAVENOUS ONCE
Status: COMPLETED | OUTPATIENT
Start: 2023-07-28 | End: 2023-07-28

## 2023-07-28 RX ORDER — FENTANYL CITRATE 50 UG/ML
50 INJECTION, SOLUTION INTRAMUSCULAR; INTRAVENOUS ONCE
Status: COMPLETED | OUTPATIENT
Start: 2023-07-28 | End: 2023-07-28

## 2023-07-28 RX ORDER — PHENAZOPYRIDINE HYDROCHLORIDE 100 MG/1
200 TABLET, FILM COATED ORAL 3 TIMES DAILY PRN
Qty: 6 TABLET | Refills: 0 | Status: SHIPPED | OUTPATIENT
Start: 2023-07-28

## 2023-07-28 RX ORDER — ONDANSETRON 2 MG/ML
4 INJECTION INTRAMUSCULAR; INTRAVENOUS ONCE
Status: COMPLETED | OUTPATIENT
Start: 2023-07-28 | End: 2023-07-28

## 2023-07-28 RX ADMIN — KETOROLAC TROMETHAMINE 15 MG: 15 INJECTION, SOLUTION INTRAMUSCULAR; INTRAVENOUS at 19:12

## 2023-07-28 RX ADMIN — FENTANYL CITRATE 50 MCG: 50 INJECTION, SOLUTION INTRAMUSCULAR; INTRAVENOUS at 19:13

## 2023-07-28 RX ADMIN — ONDANSETRON 4 MG: 2 INJECTION INTRAMUSCULAR; INTRAVENOUS at 19:12

## 2023-07-28 RX ADMIN — SODIUM CHLORIDE 1000 ML: 9 INJECTION, SOLUTION INTRAVENOUS at 19:11

## 2023-07-28 RX ADMIN — GRANULES FOR ORAL SOLUTION 3 G: 3 POWDER ORAL at 20:25

## 2023-07-28 RX ADMIN — IOPAMIDOL 90 ML: 612 INJECTION, SOLUTION INTRAVENOUS at 19:28

## 2023-07-28 NOTE — ED PROVIDER NOTES
Subjective   History of Present Illness  Pain.  Abdominal.  Right lower quadrant.  Nonradiating.  Moderate.  Aching and cramping.  Nothing seems to make better or worse.  No fevers or chills.  Nausea but no vomiting.  No diarrhea or constipation.  No vaginal discharge or bleeding.  No urinary frequency or hematuria.  Denies injury.      Review of Systems   All other systems reviewed and are negative.    Past Medical History:   Diagnosis Date    Abdominal pain     Chronic RLQ, RUQ, R flank comes and goes.     Abdominal pain, right lower quadrant     Acute bilateral otitis media     Allergic rhinitis     Backache     Upper back.    Candidiasis of skin     Cellulitis of neck     Chest pain     Contraception     Cough     Dysfunction of eustachian tube     Dyspareunia, female     Elevated cholesterol     Excessive or frequent menstruation     Flank pain     Generalized aches and pains     Headache     Health maintenance examination     Hypertension     Infection of skin and subcutaneous tissue     Irregular periods     Kidney stone     Poss    Menorrhagia     Nausea vomiting and diarrhea     Obesity     Open wound of abdominal wall     Otalgia     Pain in left foot     Pain in unspecified hand     Removed Impacted cerumen 2012    Rhinitis     Seizure 08/15/2019    Seizure     Shoulder pain     right-sided-likely muscle strain       Shoulder tendinitis     Spider bite wound     Staphylococcal infection of skin     Stroke     Swollen feet     Tinea cruris     Tobacco dependence syndrome     Upper respiratory infection     Urinary tract infectious disease     Vertigo     Visit for gynecologic examination     Vulvovaginitis        Allergies   Allergen Reactions    Abilify [Aripiprazole] Nausea Only    Buspirone Rash    Penicillins Hives and Nausea And Vomiting       Past Surgical History:   Procedure Laterality Date     SECTION      CHOLECYSTECTOMY WITH INTRAOPERATIVE CHOLANGIOGRAM N/A 3/5/2023    Procedure:  CHOLECYSTECTOMY LAPAROSCOPIC INTRAOPERATIVE CHOLANGIOGRAM AND LAPAROSCOPIC COMMON BILE DUCT;  Surgeon: Jonn Jimenes MD;  Location: [facility] OR;  Service: General;  Laterality: N/A;    CYSTOSCOPY N/A 2023    Procedure: CYSTOSCOPY;  Surgeon: Chris Deshpande MD;  Location: [facility] OR;  Service: Urology;  Laterality: N/A;    DILATATION AND CURETTAGE      Secondary to incomplete spontaneous     ERCP N/A 3/7/2023    Procedure: ENDOSCOPIC RETROGRADE CHOLANGIOPANCREATOGRAPHY;  Surgeon: Venkat Robb MD;  Location: [facility] ENDOSCOPY;  Service: Gastroenterology;  Laterality: N/A;    ERCP N/A 2023    Procedure: ENDOSCOPIC RETROGRADE CHOLANGIOPANCREATOGRAPHY;  Surgeon: Venkat Robb MD;  Location: [facility] ENDOSCOPY;  Service: Gastroenterology;  Laterality: N/A;    INCISION AND DRAINAGE ABSCESS  2012    INJECTION OF MEDICATION  2015    Phenergan (1)      INJECTION OF MEDICATION  10/10/2013    Toradol (2)       INJECTION OF MEDICATION  2014    Zofran (1)          Family History   Problem Relation Age of Onset    Breast cancer Mother     Thyroid disease Mother     Hypertension Mother     Heart disease Mother     Arthritis Mother     Liver disease Father     Endometrial cancer Sister     Anxiety disorder Brother     Mental illness Daughter     Depression Daughter     Diabetes Maternal Aunt     Diabetes Maternal Uncle     Stroke Maternal Grandmother     Breast cancer Maternal Grandmother     Cancer Maternal Grandfather     Stroke Paternal Grandmother     Depression Other     Cancer Other         Colorectal Cancer    Endometrial cancer Other     Lung cancer Other        Social History     Socioeconomic History    Marital status: Single   Tobacco Use    Smoking status: Every Day     Packs/day: 1.50     Types: Cigarettes    Smokeless tobacco: Never    Tobacco comments:     19*800*quit*NOW   Vaping Use    Vaping Use: Some days    Substances: Nicotine    Devices: Refillable tank     "Passive vaping exposure: Yes   Substance and Sexual Activity    Alcohol use: No    Drug use: No    Sexual activity: Not Currently     Birth control/protection: None           Objective   Physical Exam  Vitals and nursing note reviewed.   Constitutional:       Appearance: She is not ill-appearing.   HENT:      Head: Normocephalic.   Eyes:      General: No scleral icterus.  Cardiovascular:      Rate and Rhythm: Normal rate and regular rhythm.   Pulmonary:      Effort: Pulmonary effort is normal.      Breath sounds: Normal breath sounds.   Abdominal:      Palpations: Abdomen is soft.      Tenderness: There is abdominal tenderness in the right lower quadrant. There is no guarding or rebound.   Skin:     General: Skin is warm and dry.   Neurological:      Mental Status: She is alert and oriented to person, place, and time.   Psychiatric:         Mood and Affect: Mood is anxious.       Procedures           ED Course  ED Course as of 07/28/23 2303 Fri Jul 28, 2023 1942 Urinalysis, Microscopic Only - Urine, Clean Catch(!)  UTI likely [JV]   1942 CBC & Differential(!)  Chronic stable anemia [JV]      ED Course User Index  [JV] Oh Linares, DO                                           Medical Decision Making  The patient's recent past medical charts for this facility as well as outside facilities via the \"care everywhere\" application of epic reviewed.  The patient has history of a prior laparoscopic cholecystectomy complicated by a retained bile duct stone requiring stenting earlier this year.  She follows with the local GI group.  She was evaluated earlier this month for a potential stroke.    The differential diagnosis includes pancreatitis, hepatitis, bowel obstruction, and UTI, among others.    On final reevaluation the patient is in stable condition.  We discussed discharge instructions and need for follow-up.  We discussed reasons for early return to the emergency department.        Problems Addressed:  Urinary " tract infection without hematuria, site unspecified: complicated acute illness or injury    Amount and/or Complexity of Data Reviewed  Labs: ordered. Decision-making details documented in ED Course.  Radiology: ordered.    Risk  OTC drugs.  Prescription drug management.        Final diagnoses:   Urinary tract infection without hematuria, site unspecified       ED Disposition  ED Disposition       ED Disposition   Discharge    Condition   Stable    Comment   --               Jillian Crooksoramimi Nicole, APRN  [address] [place] [address]  [phone]    Call in 3 days  To be reevaluated after emergency department visit with a UTI and abdominal pain    [facility] EMERGENCY DEPARTMENT  [address]  [facility] [address] [phone]    As needed, If symptoms worsen at any time         Medication List        New Prescriptions      ondansetron ODT 4 MG disintegrating tablet  Commonly known as: ZOFRAN-ODT  Place 1 tablet on the tongue Every 8 (Eight) Hours As Needed for Nausea or Vomiting.     phenazopyridine 100 MG tablet  Commonly known as: PYRIDIUM  Take 2 tablets by mouth 3 (Three) Times a Day As Needed for Bladder Spasms.               Where to Get Your Medications        These medications were sent to [facility] - [place], [place] - [address] [place] [address] - [phone]  - [phone] [address] [address]      Phone: [phone]   ondansetron ODT 4 MG disintegrating tablet  phenazopyridine 100 MG tablet            Oh Linares DO  07/28/23 2048

## 2023-08-21 ENCOUNTER — OFFICE VISIT (OUTPATIENT)
Dept: GASTROENTEROLOGY | Facility: CLINIC | Age: 48
End: 2023-08-21
Payer: COMMERCIAL

## 2023-08-21 VITALS
DIASTOLIC BLOOD PRESSURE: 62 MMHG | BODY MASS INDEX: 36.16 KG/M2 | SYSTOLIC BLOOD PRESSURE: 94 MMHG | HEIGHT: 60 IN | WEIGHT: 184.2 LBS | HEART RATE: 80 BPM

## 2023-08-21 DIAGNOSIS — R10.30 LOWER ABDOMINAL PAIN: Primary | ICD-10-CM

## 2023-08-21 DIAGNOSIS — R19.7 DIARRHEA, UNSPECIFIED TYPE: ICD-10-CM

## 2023-08-21 DIAGNOSIS — K62.5 HEMORRHAGE OF ANUS AND RECTUM: ICD-10-CM

## 2023-08-21 RX ORDER — DICYCLOMINE HCL 20 MG
20 TABLET ORAL EVERY 6 HOURS
Qty: 120 TABLET | Refills: 5 | Status: SHIPPED | OUTPATIENT
Start: 2023-08-21 | End: 2023-09-20

## 2023-08-21 RX ORDER — FLUOXETINE HYDROCHLORIDE 20 MG/1
20 CAPSULE ORAL DAILY
COMMUNITY
Start: 2023-07-14

## 2023-08-21 RX ORDER — HYDROCHLOROTHIAZIDE 25 MG/1
25 TABLET ORAL DAILY
COMMUNITY
Start: 2023-07-26

## 2023-08-21 RX ORDER — DEXTROSE AND SODIUM CHLORIDE 5; .45 G/100ML; G/100ML
30 INJECTION, SOLUTION INTRAVENOUS CONTINUOUS PRN
OUTPATIENT
Start: 2023-08-21

## 2023-08-21 RX ORDER — HYDROCORTISONE ACETATE 25 MG/1
25 SUPPOSITORY RECTAL 2 TIMES DAILY PRN
Qty: 30 SUPPOSITORY | Refills: 0 | Status: SHIPPED | OUTPATIENT
Start: 2023-08-21

## 2023-08-25 ENCOUNTER — HOSPITAL ENCOUNTER (EMERGENCY)
Facility: HOSPITAL | Age: 48
Discharge: HOME | End: 2023-08-25
Attending: FAMILY MEDICINE
Payer: COMMERCIAL

## 2023-08-25 ENCOUNTER — APPOINTMENT (OUTPATIENT)
Dept: GENERAL RADIOLOGY | Facility: HOSPITAL | Age: 48
End: 2023-08-25
Payer: COMMERCIAL

## 2023-08-25 VITALS
HEIGHT: 60 IN | HEART RATE: 61 BPM | OXYGEN SATURATION: 96 % | SYSTOLIC BLOOD PRESSURE: 97 MMHG | BODY MASS INDEX: 36.12 KG/M2 | WEIGHT: 184 LBS | DIASTOLIC BLOOD PRESSURE: 68 MMHG | RESPIRATION RATE: 16 BRPM | TEMPERATURE: 98.7 F

## 2023-08-25 DIAGNOSIS — N39.0 URINARY TRACT INFECTION WITH HEMATURIA, SITE UNSPECIFIED: Primary | ICD-10-CM

## 2023-08-25 DIAGNOSIS — B37.9 CANDIDA INFECTION: ICD-10-CM

## 2023-08-25 DIAGNOSIS — R31.9 URINARY TRACT INFECTION WITH HEMATURIA, SITE UNSPECIFIED: Primary | ICD-10-CM

## 2023-08-25 LAB
B-HCG UR QL: NEGATIVE
BACTERIA UR QL AUTO: ABNORMAL /HPF
BACTERIAL VAGINOSIS VAG-IMP: NEGATIVE
BILIRUB UR QL STRIP: NEGATIVE
CANDIDA DNA VAG QL NAA+PROBE: DETECTED
CANDIDA DNA VAG QL NAA+PROBE: NOT DETECTED
CLARITY UR: ABNORMAL
COLOR UR: YELLOW
GLUCOSE UR STRIP-MCNC: NEGATIVE MG/DL
HGB UR QL STRIP.AUTO: ABNORMAL
HYALINE CASTS UR QL AUTO: ABNORMAL /LPF
KETONES UR QL STRIP: ABNORMAL
LEUKOCYTE ESTERASE UR QL STRIP.AUTO: ABNORMAL
NITRITE UR QL STRIP: NEGATIVE
PH UR STRIP.AUTO: 5.5 [PH] (ref 5–9)
PROT UR QL STRIP: NEGATIVE
RBC # UR STRIP: ABNORMAL /HPF
REF LAB TEST METHOD: ABNORMAL
SP GR UR STRIP: 1.02 (ref 1–1.03)
SQUAMOUS #/AREA URNS HPF: ABNORMAL /HPF
T VAGINALIS DNA VAG QL NAA+PROBE: NOT DETECTED
UROBILINOGEN UR QL STRIP: ABNORMAL
WBC # UR STRIP: ABNORMAL /HPF

## 2023-08-25 PROCEDURE — 74018 RADEX ABDOMEN 1 VIEW: CPT

## 2023-08-25 PROCEDURE — 81001 URINALYSIS AUTO W/SCOPE: CPT | Performed by: STUDENT IN AN ORGANIZED HEALTH CARE EDUCATION/TRAINING PROGRAM

## 2023-08-25 PROCEDURE — 81025 URINE PREGNANCY TEST: CPT

## 2023-08-25 PROCEDURE — 99283 EMERGENCY DEPT VISIT LOW MDM: CPT

## 2023-08-25 PROCEDURE — 0352U HC INF DIS BACTERIAL VAGINOSIS AND VAGINITIS AMPLIFIED PROBE TECHNIQUE: CPT

## 2023-08-25 RX ORDER — FLUCONAZOLE 150 MG/1
150 TABLET ORAL ONCE
Status: COMPLETED | OUTPATIENT
Start: 2023-08-25 | End: 2023-08-25

## 2023-08-25 RX ORDER — NITROFURANTOIN 25; 75 MG/1; MG/1
100 CAPSULE ORAL 2 TIMES DAILY
Qty: 10 CAPSULE | Refills: 0 | Status: SHIPPED | OUTPATIENT
Start: 2023-08-25 | End: 2023-08-30

## 2023-08-25 RX ORDER — NITROFURANTOIN 25; 75 MG/1; MG/1
100 CAPSULE ORAL ONCE
Status: COMPLETED | OUTPATIENT
Start: 2023-08-25 | End: 2023-08-25

## 2023-08-25 RX ADMIN — NITROFURANTOIN MONOHYDRATE/MACROCRYSTALLINE 100 MG: 25; 75 CAPSULE ORAL at 18:04

## 2023-08-25 RX ADMIN — FLUCONAZOLE 150 MG: 150 TABLET ORAL at 18:04

## 2023-08-25 NOTE — DISCHARGE INSTRUCTIONS
Home to rest. Drink plenty of fluids. Take antibiotics as prescribed. Follow up with your primary care provider if symptoms persist. Return to ER if symptoms worsen or you develop fever.

## 2023-08-25 NOTE — PROGRESS NOTES
Chief Complaint   Patient presents with    Abdominal Pain     2 month f/u        Subjective    Mylesjuan manuel Shannon is a 48 y.o. female.    History of Present Illness  Patient presented to GI clinic for follow-up visit today.  Has intermittent symptoms with left lower quadrant pain with diarrhea and rectal bleeding.  Denies nausea, vomiting, constipation or weight loss.       The following portions of the patient's history were reviewed and updated as appropriate:   Past Medical History:   Diagnosis Date    Abdominal pain     Chronic RLQ, RUQ, R flank comes and goes.     Abdominal pain, right lower quadrant     Acute bilateral otitis media     Allergic rhinitis     Backache     Upper back.    Candidiasis of skin     Cellulitis of neck     Chest pain     Contraception     Cough     Dysfunction of eustachian tube     Dyspareunia, female     Elevated cholesterol     Excessive or frequent menstruation     Flank pain     Generalized aches and pains     Headache     Health maintenance examination     Hypertension     Infection of skin and subcutaneous tissue     Irregular periods     Kidney stone     Poss    Menorrhagia     Nausea vomiting and diarrhea     Obesity     Open wound of abdominal wall     Otalgia     Pain in left foot     Pain in unspecified hand     Removed Impacted cerumen 2012    Rhinitis     Seizure 08/15/2019    Seizure     Shoulder pain     right-sided-likely muscle strain       Shoulder tendinitis     Spider bite wound     Staphylococcal infection of skin     Stroke     Swollen feet     Tinea cruris     Tobacco dependence syndrome     Upper respiratory infection     Urinary tract infectious disease     Vertigo     Visit for gynecologic examination     Vulvovaginitis      Past Surgical History:   Procedure Laterality Date     SECTION      CHOLECYSTECTOMY WITH INTRAOPERATIVE CHOLANGIOGRAM N/A 3/5/2023    Procedure: CHOLECYSTECTOMY LAPAROSCOPIC INTRAOPERATIVE CHOLANGIOGRAM AND LAPAROSCOPIC  COMMON BILE DUCT;  Surgeon: Jonn Jimenes MD;  Location: [facility] OR;  Service: General;  Laterality: N/A;    CYSTOSCOPY N/A 2023    Procedure: CYSTOSCOPY;  Surgeon: Chris Deshpande MD;  Location: [facility] OR;  Service: Urology;  Laterality: N/A;    DILATATION AND CURETTAGE      Secondary to incomplete spontaneous     ERCP N/A 3/7/2023    Procedure: ENDOSCOPIC RETROGRADE CHOLANGIOPANCREATOGRAPHY;  Surgeon: Venkat Robb MD;  Location: [facility] ENDOSCOPY;  Service: Gastroenterology;  Laterality: N/A;    ERCP N/A 2023    Procedure: ENDOSCOPIC RETROGRADE CHOLANGIOPANCREATOGRAPHY;  Surgeon: Venkat Robb MD;  Location: [facility] ENDOSCOPY;  Service: Gastroenterology;  Laterality: N/A;    INCISION AND DRAINAGE ABSCESS  2012    INJECTION OF MEDICATION  2015    Phenergan (1)      INJECTION OF MEDICATION  10/10/2013    Toradol (2)       INJECTION OF MEDICATION  2014    Zofran (1)        Family History   Problem Relation Age of Onset    Breast cancer Mother     Thyroid disease Mother     Hypertension Mother     Heart disease Mother     Arthritis Mother     Liver disease Father     Endometrial cancer Sister     Anxiety disorder Brother     Mental illness Daughter     Depression Daughter     Diabetes Maternal Aunt     Diabetes Maternal Uncle     Stroke Maternal Grandmother     Breast cancer Maternal Grandmother     Cancer Maternal Grandfather     Stroke Paternal Grandmother     Depression Other     Cancer Other         Colorectal Cancer    Endometrial cancer Other     Lung cancer Other      OB History          3    Para   1    Term   1            AB   2    Living   1         SAB   2    IAB        Ectopic        Molar        Multiple        Live Births   1              Prior to Admission medications    Medication Sig Start Date End Date Taking? Authorizing Provider   acetaminophen (TYLENOL) 500 MG tablet Take 2 tablets by mouth 2 (Two) Times a Day. 3/10/23  Yes  ProviderRadha MD   aspirin 325 MG tablet Take 1 tablet by mouth Daily.   Yes Radha Zaragoza MD   atorvastatin (LIPITOR) 80 MG tablet Take 0.5 tablets by mouth Every Night. 3/9/23  Yes Erick Chisholm MD   diazePAM (VALIUM) 5 MG tablet Take 1 tablet by mouth Every 12 (Twelve) Hours As Needed for Anxiety.   Yes Radha Zaragoza MD   escitalopram (LEXAPRO) 10 MG tablet Take 2 tablets by mouth Daily. 10/1/22  Yes Radha Zaragoza MD   FLUoxetine (PROzac) 20 MG capsule Take 1 capsule by mouth Daily. 7/14/23  Yes Radha Zaragoza MD   furosemide (LASIX) 20 MG tablet Take 1 tablet by mouth 2 (Two) Times a Day.   Yes Radha Zaragoza MD   hydroCHLOROthiazide (HYDRODIURIL) 25 MG tablet Take 1 tablet by mouth Daily. 7/26/23  Yes Radha Zaragoza MD   hydrOXYzine pamoate (VISTARIL) 50 MG capsule Take 1 capsule by mouth 2 (Two) Times a Day As Needed. 4/5/22  Yes Sonya Crooks APRN   levETIRAcetam (KEPPRA) 1000 MG tablet Take 1 tablet by mouth 2 (Two) Times a Day.   Yes Radha Zaragoza MD   losartan (COZAAR) 50 MG tablet Take 1 tablet by mouth Daily.   Yes Radha Zaragoza MD   medroxyPROGESTERone (Provera) 10 MG tablet Take 1 tablet by mouth Daily. 5/4/23  Yes Annika Leiva APRN   melatonin 5 MG tablet tablet Take 2 tablets by mouth every night at bedtime.   Yes Radha Zaragoza MD   ondansetron ODT (ZOFRAN-ODT) 4 MG disintegrating tablet Place 1 tablet on the tongue Every 8 (Eight) Hours As Needed for Nausea or Vomiting. 7/28/23  Yes Oh Linares DO   pantoprazole (PROTONIX) 40 MG EC tablet Take 1 tablet by mouth Daily. 6/1/23  Yes Radha Zaragoza MD   phenazopyridine (PYRIDIUM) 100 MG tablet Take 2 tablets by mouth 3 (Three) Times a Day As Needed for Bladder Spasms. 7/28/23  Yes hO Linares DO   potassium chloride (K-DUR,KLOR-CON) 10 MEQ CR tablet Take 1 tablet by mouth Daily. 6/1/23  Yes Radha Zaragoza, MD   risperiDONE (risperDAL) 1 MG  tablet Take 1 tablet by mouth Every Night.   Yes ProviderRadha MD   solifenacin (VESICARE) 10 MG tablet Take 1 tablet by mouth Daily.   Yes Radha Zaragoza MD   traZODone (DESYREL) 100 MG tablet Take 1 tablet by mouth Every Night. 6/8/23  Yes Radha Zaragoza MD   ursodiol (ACTIGALL) 300 MG capsule Take 1 capsule by mouth 2 (Two) Times a Day With Meals. 4/10/23  Yes Venkat Robb MD   dicyclomine (BENTYL) 20 MG tablet Take 1 tablet by mouth Every 6 (Six) Hours for 30 days. 8/21/23 9/20/23  Venkat Robb MD   hydrocortisone (ANUSOL-HC) 25 MG suppository Insert 1 suppository into the rectum 2 (Two) Times a Day As Needed for Hemorrhoids (rectal discomfort). 8/21/23   Venkat Robb MD   polyethylene glycol (GoLYTELY) 236 g solution Please use the instructions given in office 8/21/23   Venkat Robb MD     Allergies   Allergen Reactions    Abilify [Aripiprazole] Nausea Only    Buspirone Rash    Penicillins Hives and Nausea And Vomiting     Social History     Socioeconomic History    Marital status: Single   Tobacco Use    Smoking status: Every Day     Packs/day: 1.50     Types: Cigarettes    Smokeless tobacco: Never    Tobacco comments:     [phone]*quit*NOW   Vaping Use    Vaping Use: Some days    Substances: Nicotine    Devices: Refillable tank    Passive vaping exposure: Yes   Substance and Sexual Activity    Alcohol use: No    Drug use: No    Sexual activity: Not Currently     Birth control/protection: None       Review of Systems  Review of Systems   Constitutional:  Negative for chills, fatigue, fever and unexpected weight change.   HENT:  Negative for congestion, ear discharge, hearing loss, nosebleeds and sore throat.    Eyes:  Negative for pain, discharge and redness.   Respiratory:  Negative for cough, chest tightness, shortness of breath and wheezing.    Cardiovascular:  Negative for chest pain and palpitations.   Gastrointestinal:  Positive for abdominal pain, anal  "bleeding, blood in stool and diarrhea. Negative for abdominal distention, constipation, nausea and vomiting.   Endocrine: Negative for cold intolerance, polydipsia, polyphagia and polyuria.   Genitourinary:  Negative for dysuria, flank pain, frequency, hematuria and urgency.   Musculoskeletal:  Negative for arthralgias, back pain, joint swelling and myalgias.   Skin:  Negative for color change, pallor and rash.   Neurological:  Negative for tremors, seizures, syncope, weakness and headaches.   Hematological:  Negative for adenopathy. Does not bruise/bleed easily.   Psychiatric/Behavioral:  Negative for behavioral problems, confusion, dysphoric mood, hallucinations and suicidal ideas. The patient is not nervous/anxious.       BP 94/62 (BP Location: Right arm)   Pulse 80   Ht 152.4 cm (60\")   Wt 83.6 kg (184 lb 3.2 oz)   BMI 35.97 kg/mý     Objective    Physical Exam  Constitutional:       Appearance: She is well-developed.   HENT:      Head: Normocephalic and atraumatic.   Eyes:      Conjunctiva/sclera: Conjunctivae normal.      Pupils: Pupils are equal, round, and reactive to light.   Neck:      Thyroid: No thyromegaly.   Cardiovascular:      Rate and Rhythm: Normal rate and regular rhythm.      Heart sounds: Normal heart sounds. No murmur heard.  Pulmonary:      Effort: Pulmonary effort is normal.      Breath sounds: Normal breath sounds. No wheezing.   Abdominal:      General: Bowel sounds are normal. There is no distension.      Palpations: Abdomen is soft. There is no mass.      Tenderness: There is no abdominal tenderness.      Hernia: No hernia is present.   Genitourinary:     Comments: No lesions noted  Musculoskeletal:         General: No tenderness. Normal range of motion.      Cervical back: Normal range of motion and neck supple.   Lymphadenopathy:      Cervical: No cervical adenopathy.   Skin:     General: Skin is warm and dry.      Findings: No rash.   Neurological:      Mental Status: She is alert " and oriented to person, place, and time.      Cranial Nerves: No cranial nerve deficit.   Psychiatric:         Thought Content: Thought content normal.     Admission on 07/28/2023, Discharged on 07/28/2023   Component Date Value Ref Range Status    Glucose 07/28/2023 88  65 - 99 mg/dL Final    BUN 07/28/2023 22 (H)  6 - 20 mg/dL Final    Creatinine 07/28/2023 1.26 (H)  0.57 - 1.00 mg/dL Final    Sodium 07/28/2023 137  136 - 145 mmol/L Final    Potassium 07/28/2023 4.3  3.5 - 5.2 mmol/L Final    Chloride 07/28/2023 103  98 - 107 mmol/L Final    CO2 07/28/2023 24.0  22.0 - 29.0 mmol/L Final    Calcium 07/28/2023 9.1  8.6 - 10.5 mg/dL Final    Total Protein 07/28/2023 6.6  6.0 - 8.5 g/dL Final    Albumin 07/28/2023 3.8  3.5 - 5.2 g/dL Final    ALT (SGPT) 07/28/2023 21  1 - 33 U/L Final    AST (SGOT) 07/28/2023 21  1 - 32 U/L Final    Alkaline Phosphatase 07/28/2023 91  39 - 117 U/L Final    Total Bilirubin 07/28/2023 <0.2  0.0 - 1.2 mg/dL Final    Globulin 07/28/2023 2.8  gm/dL Final    A/G Ratio 07/28/2023 1.4  g/dL Final    BUN/Creatinine Ratio 07/28/2023 17.5  7.0 - 25.0 Final    Anion Gap 07/28/2023 10.0  5.0 - 15.0 mmol/L Final    eGFR 07/28/2023 53.1 (L)  >60.0 mL/min/1.73 Final    Lipase 07/28/2023 38  13 - 60 U/L Final    HCG Qualitative 07/28/2023 Negative  Negative Final    Color, UA 07/28/2023 Yellow  Yellow, Straw, Dark Yellow, [place]av[place] Final    Appearance, UA 07/28/2023 Cloudy (A)  Clear Final    pH, UA 07/28/2023 5.5  5.0 - 9.0 Final    Specific Gravity, UA 07/28/2023 1.015  1.003 - 1.030 Final    Glucose, UA 07/28/2023 Negative  Negative Final    Ketones, UA 07/28/2023 Negative  Negative Final    Bilirubin, UA 07/28/2023 Negative  Negative Final    Blood, UA 07/28/2023 Negative  Negative Final    Protein, UA 07/28/2023 Negative  Negative Final    Leuk Esterase, UA 07/28/2023 Large (3+) (A)  Negative Final    Nitrite, UA 07/28/2023 Negative  Negative Final    Urobilinogen, UA 07/28/2023 0.2 E.U./dL  0.2 -  1.0 E.U./dL Final    WBC 07/28/2023 6.45  3.40 - 10.80 10*3/mm3 Final    RBC 07/28/2023 3.66 (L)  3.77 - 5.28 10*6/mm3 Final    Hemoglobin 07/28/2023 11.2 (L)  12.0 - 15.9 g/dL Final    Hematocrit 07/28/2023 32.9 (L)  34.0 - 46.6 % Final    MCV 07/28/2023 89.9  79.0 - 97.0 fL Final    MCH 07/28/2023 30.6  26.6 - 33.0 pg Final    MCHC 07/28/2023 34.0  31.5 - 35.7 g/dL Final    RDW 07/28/2023 12.9  12.3 - 15.4 % Final    RDW-SD 07/28/2023 42.5  37.0 - 54.0 fl Final    MPV 07/28/2023 11.6  6.0 - 12.0 fL Final    Platelets 07/28/2023 172  140 - 450 10*3/mm3 Final    Neutrophil % 07/28/2023 53.8  42.7 - 76.0 % Final    Lymphocyte % 07/28/2023 34.3  19.6 - 45.3 % Final    Monocyte % 07/28/2023 6.8  5.0 - 12.0 % Final    Eosinophil % 07/28/2023 4.3  0.3 - 6.2 % Final    Basophil % 07/28/2023 0.6  0.0 - 1.5 % Final    Immature Grans % 07/28/2023 0.2  0.0 - 0.5 % Final    Neutrophils, Absolute 07/28/2023 3.47  1.70 - 7.00 10*3/mm3 Final    Lymphocytes, Absolute 07/28/2023 2.21  0.70 - 3.10 10*3/mm3 Final    Monocytes, Absolute 07/28/2023 0.44  0.10 - 0.90 10*3/mm3 Final    Eosinophils, Absolute 07/28/2023 0.28  0.00 - 0.40 10*3/mm3 Final    Basophils, Absolute 07/28/2023 0.04  0.00 - 0.20 10*3/mm3 Final    Immature Grans, Absolute 07/28/2023 0.01  0.00 - 0.05 10*3/mm3 Final    nRBC 07/28/2023 0.0  0.0 - 0.2 /100 WBC Final    Extra Tube 07/28/2023 Hold for add-ons.   Final    Auto resulted    RBC, UA 07/28/2023 3-5 (A)  None Seen /HPF Final    WBC, UA 07/28/2023 Too Numerous to Count (A)  None Seen, 0-2, 3-5 /HPF Final    Bacteria, UA 07/28/2023 3+ (A)  None Seen /HPF Final    Squamous Epithelial Cells,  07/28/2023 6-12 (A)  None Seen, 0-2 /HPF Final    Hyaline Casts, UA 07/28/2023 None Seen  None Seen /LPF Final    Methodology 07/28/2023 Manual Light Microscopy   Final     Assessment & Plan      1. Lower abdominal pain    2. Hemorrhage of anus and rectum    3. Diarrhea, unspecified type    1.  Biliary colic with  choledocholithiasis s/p ERCP with CBD stent removal and stone removal, patient is recovering well.    2.  Left lower quadrant pain with diarrhea and rectal bleeding rule out IBD and neoplasia.  Add Bentyl and high-fiber diet.  Schedule colonoscopy for further evaluation.  3.  Obesity, recommend exercise and diet control.  4.  Tobacco usage, recommend cessation.       Orders placed during this encounter include:  Orders Placed This Encounter   Procedures    Obtain Informed Consent     Standing Status:   Future     Order Specific Question:   Informed Consent Given For     Answer:   colonoscopy       COLONOSCOPY (N/A)    Review and/or summary of lab tests, radiology, procedures, medications. Review and summary of old records and obtaining of history. The risks and benefits of my recommendations, as well as other treatment options were discussed with the patient today. Questions were answered.    New Medications Ordered This Visit   Medications    hydrocortisone (ANUSOL-HC) 25 MG suppository     Sig: Insert 1 suppository into the rectum 2 (Two) Times a Day As Needed for Hemorrhoids (rectal discomfort).     Dispense:  30 suppository     Refill:  0    dicyclomine (BENTYL) 20 MG tablet     Sig: Take 1 tablet by mouth Every 6 (Six) Hours for 30 days.     Dispense:  120 tablet     Refill:  5    polyethylene glycol (GoLYTELY) 236 g solution     Sig: Please use the instructions given in office     Dispense:  4000 mL     Refill:  0       Follow-up: Return in about 1 month (around 9/21/2023).               Results for orders placed or performed during the hospital encounter of 07/28/23   Urinalysis, Microscopic Only - Urine, Clean Catch    Specimen: Urine, Clean Catch   Result Value Ref Range    RBC, UA 3-5 (A) None Seen /HPF    WBC, UA Too Numerous to Count (A) None Seen, 0-2, 3-5 /HPF    Bacteria, UA 3+ (A) None Seen /HPF    Squamous Epithelial Cells, UA 6-12 (A) None Seen, 0-2 /HPF    Hyaline Casts, UA None Seen None Seen  /LPF    Methodology Manual Light Microscopy    Urinalysis With Microscopic If Indicated (No Culture) - Urine, Clean Catch    Specimen: Urine, Clean Catch   Result Value Ref Range    Color, UA Yellow Yellow, Straw, Dark Yellow, Kavita    Appearance, UA Cloudy (A) Clear    pH, UA 5.5 5.0 - 9.0    Specific [place], UA 1.015 1.003 - 1.030    Glucose, UA Negative Negative    Ketones, UA Negative Negative    Bilirubin, UA Negative Negative    Blood, UA Negative Negative    Protein, UA Negative Negative    Leuk Esterase, UA Large (3+) (A) Negative    Nitrite, UA Negative Negative    Urobilinogen, UA 0.2 E.U./dL 0.2 - 1.0 E.U./dL   CBC Auto Differential    Specimen: Blood   Result Value Ref Range    WBC 6.45 3.40 - 10.80 10*3/mm3    RBC 3.66 (L) 3.77 - 5.28 10*6/mm3    Hemoglobin 11.2 (L) 12.0 - 15.9 g/dL    Hematocrit 32.9 (L) 34.0 - 46.6 %    MCV 89.9 79.0 - 97.0 fL    MCH 30.6 26.6 - 33.0 pg    MCHC 34.0 31.5 - 35.7 g/dL    RDW 12.9 12.3 - 15.4 %    RDW-SD 42.5 37.0 - 54.0 fl    MPV 11.6 6.0 - 12.0 fL    Platelets 172 140 - 450 10*3/mm3    Neutrophil % 53.8 42.7 - 76.0 %    Lymphocyte % 34.3 19.6 - 45.3 %    Monocyte % 6.8 5.0 - 12.0 %    Eosinophil % 4.3 0.3 - 6.2 %    Basophil % 0.6 0.0 - 1.5 %    Immature Grans % 0.2 0.0 - 0.5 %    Neutrophils, Absolute 3.47 1.70 - 7.00 10*3/mm3    Lymphocytes, Absolute 2.21 0.70 - 3.10 10*3/mm3    Monocytes, Absolute 0.44 0.10 - 0.90 10*3/mm3    Eosinophils, Absolute 0.28 0.00 - 0.40 10*3/mm3    Basophils, Absolute 0.04 0.00 - 0.20 10*3/mm3    Immature Grans, Absolute 0.01 0.00 - 0.05 10*3/mm3    nRBC 0.0 0.0 - 0.2 /100 WBC   Light Blue Top   Result Value Ref Range    Extra Tube Hold for add-ons.    hCG, Serum, Qualitative    Specimen: Blood   Result Value Ref Range    HCG Qualitative Negative Negative   Lipase    Specimen: Blood   Result Value Ref Range    Lipase 38 13 - 60 U/L   Comprehensive Metabolic Panel    Specimen: Blood   Result Value Ref Range    Glucose 88 65 - 99 mg/dL     BUN 22 (H) 6 - 20 mg/dL    Creatinine 1.26 (H) 0.57 - 1.00 mg/dL    Sodium 137 136 - 145 mmol/L    Potassium 4.3 3.5 - 5.2 mmol/L    Chloride 103 98 - 107 mmol/L    CO2 24.0 22.0 - 29.0 mmol/L    Calcium 9.1 8.6 - 10.5 mg/dL    Total Protein 6.6 6.0 - 8.5 g/dL    Albumin 3.8 3.5 - 5.2 g/dL    ALT (SGPT) 21 1 - 33 U/L    AST (SGOT) 21 1 - 32 U/L    Alkaline Phosphatase 91 39 - 117 U/L    Total Bilirubin <0.2 0.0 - 1.2 mg/dL    Globulin 2.8 gm/dL    A/G Ratio 1.4 g/dL    BUN/Creatinine Ratio 17.5 7.0 - 25.0    Anion Gap 10.0 5.0 - 15.0 mmol/L    eGFR 53.1 (L) >60.0 mL/min/1.73   Results for orders placed or performed during the hospital encounter of 07/05/23   PREVIOUS HISTORY    Specimen: Blood   Result Value Ref Range    Previous History Previous Record on File    Gray Top   Result Value Ref Range    Extra Tube Hold for add-ons.    Single High Sensitivity Troponin T    Specimen: Blood   Result Value Ref Range    HS Troponin T <6 <10 ng/L   Gold Top - SST   Result Value Ref Range    Extra Tube Hold for add-ons.    Green Top (Gel)   Result Value Ref Range    Extra Tube Hold for add-ons.    Urinalysis, Microscopic Only - Urine, Clean Catch    Specimen: Urine, Clean Catch   Result Value Ref Range    RBC, UA 0-2 (A) None Seen /HPF    WBC, UA 21-30 (A) None Seen, 0-2, 3-5 /HPF    Bacteria, UA 4+ (A) None Seen /HPF    Squamous Epithelial Cells, UA 0-2 None Seen, 0-2 /HPF    Hyaline Casts, UA None Seen None Seen /LPF    Methodology Automated Microscopy    Urinalysis With Microscopic If Indicated (No Culture) - Urine, Clean Catch    Specimen: Urine, Clean Catch   Result Value Ref Range    Color, UA Yellow Yellow, Straw, Dark Yellow, Kavita    Appearance, UA Clear Clear    pH, UA 6.0 5.0 - 9.0    Specific [place], UA 1.026 1.003 - 1.030    Glucose, UA Negative Negative    Ketones, UA Negative Negative    Bilirubin, UA Negative Negative    Blood, UA Negative Negative    Protein, UA Negative Negative    Leuk Esterase, UA Small  (1+) (A) Negative    Nitrite, UA Positive (A) Negative    Urobilinogen, UA 0.2 E.U./dL 0.2 - 1.0 E.U./dL   CBC Auto Differential    Specimen: Blood   Result Value Ref Range    WBC 6.46 3.40 - 10.80 10*3/mm3    RBC 3.92 3.77 - 5.28 10*6/mm3    Hemoglobin 11.7 (L) 12.0 - 15.9 g/dL    Hematocrit 35.7 34.0 - 46.6 %    MCV 91.1 79.0 - 97.0 fL    MCH 29.8 26.6 - 33.0 pg    MCHC 32.8 31.5 - 35.7 g/dL    RDW 13.1 12.3 - 15.4 %    RDW-SD 43.8 37.0 - 54.0 fl    MPV 11.9 6.0 - 12.0 fL    Platelets 211 140 - 450 10*3/mm3    Neutrophil % 57.9 42.7 - 76.0 %    Lymphocyte % 31.3 19.6 - 45.3 %    Monocyte % 5.4 5.0 - 12.0 %    Eosinophil % 4.6 0.3 - 6.2 %    Basophil % 0.5 0.0 - 1.5 %    Immature Grans % 0.3 0.0 - 0.5 %    Neutrophils, Absolute 3.74 1.70 - 7.00 10*3/mm3    Lymphocytes, Absolute 2.02 0.70 - 3.10 10*3/mm3    Monocytes, Absolute 0.35 0.10 - 0.90 10*3/mm3    Eosinophils, Absolute 0.30 0.00 - 0.40 10*3/mm3    Basophils, Absolute 0.03 0.00 - 0.20 10*3/mm3    Immature Grans, Absolute 0.02 0.00 - 0.05 10*3/mm3    nRBC 0.0 0.0 - 0.2 /100 WBC     *Note: Due to a large number of results and/or encounters for the requested time period, some results have not been displayed. A complete set of results can be found in Results Review.         This document has been electronically signed by Venkat Robb MD on August 24, 2023 21:16 CDT

## 2023-08-25 NOTE — ED PROVIDER NOTES
Subjective   History of Present Illness  48 year old female patient presents to ER for complaint of RLQ pain, suprapubic pain, and urinary symptoms for 2 weeks. She reports that she was seen by Dr. Deshpande yesterday and was prescribed an antibiotic that she has not started yet. Her pharmacy was called and they do not have a new script for an antibiotic. She denies fever. She has had intermittent vomiting. She has a history significant for recurrent UTIs. She smokes a pack of cigarettes a day, denies ETOH or illicit drug use.    Review of Systems   Constitutional: Negative.  Negative for fever.   HENT: Negative.     Eyes: Negative.    Respiratory: Negative.     Cardiovascular: Negative.    Gastrointestinal:  Positive for abdominal pain, nausea and vomiting.   Endocrine: Negative.    Genitourinary:  Positive for dysuria and urgency.   Musculoskeletal: Negative.    Skin: Negative.    Allergic/Immunologic: Negative.    Neurological: Negative.    Hematological: Negative.    Psychiatric/Behavioral: Negative.       Past Medical History:   Diagnosis Date    Abdominal pain     Chronic RLQ, RUQ, R flank comes and goes.     Abdominal pain, right lower quadrant     Acute bilateral otitis media     Allergic rhinitis     Backache     Upper back.    Candidiasis of skin     Cellulitis of neck     Chest pain     Contraception     Cough     Dysfunction of eustachian tube     Dyspareunia, female     Elevated cholesterol     Excessive or frequent menstruation     Flank pain     Generalized aches and pains     Headache     Health maintenance examination     Hypertension     Infection of skin and subcutaneous tissue     Irregular periods     Kidney stone     Poss    Menorrhagia     Nausea vomiting and diarrhea     Obesity     Open wound of abdominal wall     Otalgia     Pain in left foot     Pain in unspecified hand     Removed Impacted cerumen 06/05/2012    Rhinitis     Seizure 08/15/2019    Seizure     Shoulder pain      right-sided-likely muscle strain       Shoulder tendinitis     Spider bite wound     Staphylococcal infection of skin     Stroke     Swollen feet     Tinea cruris     Tobacco dependence syndrome     Upper respiratory infection     Urinary tract infectious disease     Vertigo     Visit for gynecologic examination     Vulvovaginitis        Allergies   Allergen Reactions    Abilify [Aripiprazole] Nausea Only    Buspirone Rash    Penicillins Hives and Nausea And Vomiting       Past Surgical History:   Procedure Laterality Date     SECTION      CHOLECYSTECTOMY WITH INTRAOPERATIVE CHOLANGIOGRAM N/A 3/5/2023    Procedure: CHOLECYSTECTOMY LAPAROSCOPIC INTRAOPERATIVE CHOLANGIOGRAM AND LAPAROSCOPIC COMMON BILE DUCT;  Surgeon: Jonn Jimenes MD;  Location: [facility] OR;  Service: General;  Laterality: N/A;    CYSTOSCOPY N/A 2023    Procedure: CYSTOSCOPY;  Surgeon: Chris Deshpande MD;  Location: [facility] OR;  Service: Urology;  Laterality: N/A;    DILATATION AND CURETTAGE      Secondary to incomplete spontaneous     ERCP N/A 3/7/2023    Procedure: ENDOSCOPIC RETROGRADE CHOLANGIOPANCREATOGRAPHY;  Surgeon: Venkat Robb MD;  Location: [facility] ENDOSCOPY;  Service: Gastroenterology;  Laterality: N/A;    ERCP N/A 2023    Procedure: ENDOSCOPIC RETROGRADE CHOLANGIOPANCREATOGRAPHY;  Surgeon: Venkat Robb MD;  Location: [facility] ENDOSCOPY;  Service: Gastroenterology;  Laterality: N/A;    INCISION AND DRAINAGE ABSCESS  2012    INJECTION OF MEDICATION  2015    Phenergan (1)      INJECTION OF MEDICATION  10/10/2013    Toradol (2)       INJECTION OF MEDICATION  2014    Zofran (1)          Family History   Problem Relation Age of Onset    Breast cancer Mother     Thyroid disease Mother     Hypertension Mother     Heart disease Mother     Arthritis Mother     Liver disease Father     Endometrial cancer Sister     Anxiety disorder Brother     Mental illness Daughter     Depression Daughter   "   Diabetes Maternal Aunt     Diabetes Maternal Uncle     Stroke Maternal Grandmother     Breast cancer Maternal Grandmother     Cancer Maternal Grandfather     Stroke Paternal Grandmother     Depression Other     Cancer Other         Colorectal Cancer    Endometrial cancer Other     Lung cancer Other        Social History     Socioeconomic History    Marital status: Single   Tobacco Use    Smoking status: Every Day     Packs/day: 1.50     Types: Cigarettes    Smokeless tobacco: Never    Tobacco comments:     19*800*quit*NOW   Vaping Use    Vaping Use: Some days    Substances: Nicotine    Devices: Refillable tank    Passive vaping exposure: Yes   Substance and Sexual Activity    Alcohol use: No    Drug use: No    Sexual activity: Not Currently     Birth control/protection: None           Objective   /76 (BP Location: Right arm, Patient Position: Sitting)   Pulse 66   Temp 98.7 øF (37.1 øC) (Oral)   Resp 20   Ht 152.4 cm (60\")   Wt 83.5 kg (184 lb)   LMP  (LMP Unknown)   SpO2 98%   BMI 35.94 kg/mý     Physical Exam  Vitals and nursing note reviewed.   Constitutional:       General: She is not in acute distress.     Appearance: Normal appearance. She is not ill-appearing, toxic-appearing or diaphoretic.   HENT:      Head: Normocephalic.      Nose: Nose normal.      Mouth/Throat:      Mouth: Mucous membranes are moist.   Eyes:      Pupils: Pupils are equal, round, and reactive to light.   Cardiovascular:      Rate and Rhythm: Normal rate and regular rhythm.      Pulses: Normal pulses.      Heart sounds: Normal heart sounds.   Pulmonary:      Effort: Pulmonary effort is normal.      Breath sounds: Normal breath sounds.   Abdominal:      General: Bowel sounds are normal.      Palpations: Abdomen is soft.      Tenderness: There is abdominal tenderness in the right lower quadrant and suprapubic area. There is no guarding or rebound.   Musculoskeletal:         General: Normal range of motion.      Cervical " back: Normal range of motion.   Skin:     General: Skin is warm and dry.      Capillary Refill: Capillary refill takes less than 2 seconds.   Neurological:      Mental Status: She is alert and oriented to person, place, and time.   Psychiatric:         Mood and Affect: Mood normal.         Behavior: Behavior normal.         Thought Content: Thought content normal.         Judgment: Judgment normal.       Procedures           ED Course  ED Course as of 08/25/23 1754   Fri Aug 25, 2023   5303 Spoke with patient about her results and plan for discharge including antibiotics and treatment for candida. She verbalizes understanding and is agreeable with plan. [HS]      ED Course User Index  [HS] Ana Rosa Michele, ALPHONSE           Results for orders placed or performed during the hospital encounter of 08/25/23   MVP Vaginosis Panel - Swab, Vagina    Specimen: Vagina; Swab   Result Value Ref Range    Bacterial vaginosis Negative Negative    Candida glabrata/krusei Detected (A) Not Detected    CANDIDA GROUP Not Detected Not Detected    Trichomonas vaginalis Not Detected Not Detected   Urinalysis With Microscopic If Indicated (No Culture) - Urine, Clean Catch    Specimen: Urine, Clean Catch   Result Value Ref Range    Color, UA Yellow Yellow, Straw, Dark Yellow, Kavita    Appearance, UA Cloudy (A) Clear    pH, UA 5.5 5.0 - 9.0    Specific Gravity, UA 1.021 1.003 - 1.030    Glucose, UA Negative Negative    Ketones, UA Trace (A) Negative    Bilirubin, UA Negative Negative    Blood, UA Trace (A) Negative    Protein, UA Negative Negative    Leuk Esterase, UA Moderate (2+) (A) Negative    Nitrite, UA Negative Negative    Urobilinogen, UA 1.0 E.U./dL 0.2 - 1.0 E.U./dL   Pregnancy, Urine - Urine, Clean Catch    Specimen: Urine, Clean Catch   Result Value Ref Range    HCG, Urine QL Negative Negative   Urinalysis, Microscopic Only - Urine, Clean Catch    Specimen: Urine, Clean Catch   Result Value Ref Range    RBC, UA 6-12 (A) None  Seen /HPF    WBC, UA Too Numerous to Count (A) None Seen, 0-2, 3-5 /HPF    Bacteria, UA 1+ (A) None Seen /HPF    Squamous Epithelial Cells, UA 6-12 (A) None Seen, 0-2 /HPF    Hyaline Casts, UA 0-2 None Seen /LPF    Methodology Automated Microscopy      XR Abdomen KUB    Result Date: 8/25/2023  FINDINGS: Single view of the abdomen demonstrates a normal bowel gas pattern.  Surgical clips seen in the right upper quadrant.  No significant constipation.                                     Medical Decision Making  Problems Addressed:  Candida infection: complicated acute illness or injury  Urinary tract infection with hematuria, site unspecified: complicated acute illness or injury    Amount and/or Complexity of Data Reviewed  Radiology: ordered.    Risk  Prescription drug management.        Final diagnoses:   Urinary tract infection with hematuria, site unspecified   Candida infection       ED Disposition  ED Disposition       ED Disposition   Discharge    Condition   Stable    Comment   --               Sonya Crooks, APRN  [address] [place] [address]  [phone]    Call   ER follow up if symptoms persist         Medication List        New Prescriptions      nitrofurantoin (macrocrystal-monohydrate) 100 MG capsule  Commonly known as: MACROBID  Take 1 capsule by mouth 2 (Two) Times a Day for 5 days.               Where to Get Your Medications        These medications were sent to [facility] - [place], [place] - [address] [place] [address] - [phone]  - [phone] [place] [id number]      Phone: [phone]   nitrofurantoin (macrocrystal-monohydrate) 100 MG capsule            Ana Rosa Michele, APRN  08/25/23 5330

## 2023-09-28 ENCOUNTER — ANESTHESIA EVENT (OUTPATIENT)
Dept: GASTROENTEROLOGY | Facility: HOSPITAL | Age: 48
End: 2023-09-28
Payer: COMMERCIAL

## 2023-09-28 ENCOUNTER — HOSPITAL ENCOUNTER (OUTPATIENT)
Facility: HOSPITAL | Age: 48
Setting detail: HOSPITAL OUTPATIENT SURGERY
Discharge: HOME | End: 2023-09-28
Attending: INTERNAL MEDICINE | Admitting: INTERNAL MEDICINE
Payer: COMMERCIAL

## 2023-09-28 ENCOUNTER — ANESTHESIA (OUTPATIENT)
Dept: GASTROENTEROLOGY | Facility: HOSPITAL | Age: 48
End: 2023-09-28
Payer: COMMERCIAL

## 2023-09-28 VITALS
OXYGEN SATURATION: 94 % | WEIGHT: 178 LBS | RESPIRATION RATE: 18 BRPM | BODY MASS INDEX: 34.95 KG/M2 | HEART RATE: 52 BPM | SYSTOLIC BLOOD PRESSURE: 101 MMHG | TEMPERATURE: 97.2 F | DIASTOLIC BLOOD PRESSURE: 56 MMHG | HEIGHT: 60 IN

## 2023-09-28 DIAGNOSIS — R10.30 LOWER ABDOMINAL PAIN: ICD-10-CM

## 2023-09-28 DIAGNOSIS — R19.7 DIARRHEA, UNSPECIFIED TYPE: ICD-10-CM

## 2023-09-28 DIAGNOSIS — K62.5 HEMORRHAGE OF ANUS AND RECTUM: ICD-10-CM

## 2023-09-28 PROCEDURE — 45380 COLONOSCOPY AND BIOPSY: CPT | Performed by: INTERNAL MEDICINE

## 2023-09-28 PROCEDURE — 25010000002 PROPOFOL 10 MG/ML EMULSION: Performed by: NURSE ANESTHETIST, CERTIFIED REGISTERED

## 2023-09-28 RX ORDER — ONDANSETRON 2 MG/ML
4 INJECTION INTRAMUSCULAR; INTRAVENOUS ONCE AS NEEDED
Status: DISCONTINUED | OUTPATIENT
Start: 2023-09-28 | End: 2023-09-28 | Stop reason: HOSPADM

## 2023-09-28 RX ORDER — DEXTROSE AND SODIUM CHLORIDE 5; .45 G/100ML; G/100ML
30 INJECTION, SOLUTION INTRAVENOUS CONTINUOUS PRN
Status: DISCONTINUED | OUTPATIENT
Start: 2023-09-28 | End: 2023-09-28 | Stop reason: HOSPADM

## 2023-09-28 RX ORDER — PROPOFOL 10 MG/ML
VIAL (ML) INTRAVENOUS AS NEEDED
Status: DISCONTINUED | OUTPATIENT
Start: 2023-09-28 | End: 2023-09-28 | Stop reason: SURG

## 2023-09-28 RX ADMIN — PROPOFOL 20 MG: 10 INJECTION, EMULSION INTRAVENOUS at 14:05

## 2023-09-28 RX ADMIN — PROPOFOL 80 MG: 10 INJECTION, EMULSION INTRAVENOUS at 14:02

## 2023-09-28 RX ADMIN — PROPOFOL 30 MG: 10 INJECTION, EMULSION INTRAVENOUS at 14:13

## 2023-09-28 RX ADMIN — PROPOFOL 20 MG: 10 INJECTION, EMULSION INTRAVENOUS at 14:08

## 2023-09-28 RX ADMIN — PROPOFOL 20 MG: 10 INJECTION, EMULSION INTRAVENOUS at 14:11

## 2023-09-28 RX ADMIN — DEXTROSE AND SODIUM CHLORIDE 30 ML/HR: 5; 450 INJECTION, SOLUTION INTRAVENOUS at 12:46

## 2023-09-28 NOTE — ANESTHESIA POSTPROCEDURE EVALUATION
Patient: Myles Shannon    Procedure Summary       Date: 09/28/23 Room / Location: [facility] ENDOSCOPY 1 / [facility] ENDOSCOPY    Anesthesia Start: 1355 Anesthesia Stop: 1419    Procedure: COLONOSCOPY Diagnosis:       Lower abdominal pain      Hemorrhage of anus and rectum      Diarrhea, unspecified type      (Lower abdominal pain [R10.30])      (Hemorrhage of anus and rectum [K62.5])      (Diarrhea, unspecified type [R19.7])    Surgeons: Venkat Robb MD Provider: Katalina Peña CRNA    Anesthesia Type: general ASA Status: 3            Anesthesia Type: general    Vitals  No vitals data found for the desired time range.          Post Anesthesia Care and Evaluation    Patient location during evaluation: bedside  Patient participation: waiting for patient participation  Level of consciousness: sleepy but conscious and responsive to light touch  Pain management: adequate    Airway patency: patent  Anesthetic complications: No anesthetic complications  PONV Status: none  Cardiovascular status: hemodynamically stable  Respiratory status: room air  Hydration status: acceptable  No anesthesia care post op

## 2023-09-28 NOTE — H&P
Chief Complaint   Patient presents with    Abdominal Pain     2 month f/u        Subjective    Mylesjuan manuel Shannon is a 48 y.o. female.    History of Present Illness  Patient presented to GI clinic for follow-up visit today.  Has intermittent symptoms with left lower quadrant pain with diarrhea and rectal bleeding.  Denies nausea, vomiting, constipation or weight loss.       The following portions of the patient's history were reviewed and updated as appropriate:   Past Medical History:   Diagnosis Date    Abdominal pain     Chronic RLQ, RUQ, R flank comes and goes.     Abdominal pain, right lower quadrant     Acute bilateral otitis media     Allergic rhinitis     Backache     Upper back.    Candidiasis of skin     Cellulitis of neck     Chest pain     Contraception     Cough     Dysfunction of eustachian tube     Dyspareunia, female     Elevated cholesterol     Excessive or frequent menstruation     Flank pain     Generalized aches and pains     Headache     Health maintenance examination     Hypertension     Infection of skin and subcutaneous tissue     Irregular periods     Kidney stone     Poss    Menorrhagia     Nausea vomiting and diarrhea     Obesity     Open wound of abdominal wall     Otalgia     Pain in left foot     Pain in unspecified hand     Removed Impacted cerumen 2012    Rhinitis     Seizure 08/15/2019    Seizure     Shoulder pain     right-sided-likely muscle strain       Shoulder tendinitis     Spider bite wound     Staphylococcal infection of skin     Stroke     Swollen feet     Tinea cruris     Tobacco dependence syndrome     Upper respiratory infection     Urinary tract infectious disease     Vertigo     Visit for gynecologic examination     Vulvovaginitis      Past Surgical History:   Procedure Laterality Date     SECTION      CHOLECYSTECTOMY WITH INTRAOPERATIVE CHOLANGIOGRAM N/A 3/5/2023    Procedure: CHOLECYSTECTOMY LAPAROSCOPIC INTRAOPERATIVE CHOLANGIOGRAM AND LAPAROSCOPIC  COMMON BILE DUCT;  Surgeon: Jonn Jimenes MD;  Location: [facility] OR;  Service: General;  Laterality: N/A;    CYSTOSCOPY N/A 2023    Procedure: CYSTOSCOPY;  Surgeon: Chris Deshpande MD;  Location: [facility] OR;  Service: Urology;  Laterality: N/A;    DILATATION AND CURETTAGE      Secondary to incomplete spontaneous     ERCP N/A 3/7/2023    Procedure: ENDOSCOPIC RETROGRADE CHOLANGIOPANCREATOGRAPHY;  Surgeon: Venkat Robb MD;  Location: [facility] ENDOSCOPY;  Service: Gastroenterology;  Laterality: N/A;    ERCP N/A 2023    Procedure: ENDOSCOPIC RETROGRADE CHOLANGIOPANCREATOGRAPHY;  Surgeon: Venkat Robb MD;  Location: [facility] ENDOSCOPY;  Service: Gastroenterology;  Laterality: N/A;    INCISION AND DRAINAGE ABSCESS  2012    INJECTION OF MEDICATION  2015    Phenergan (1)      INJECTION OF MEDICATION  10/10/2013    Toradol (2)       INJECTION OF MEDICATION  2014    Zofran (1)        Family History   Problem Relation Age of Onset    Breast cancer Mother     Thyroid disease Mother     Hypertension Mother     Heart disease Mother     Arthritis Mother     Liver disease Father     Endometrial cancer Sister     Anxiety disorder Brother     Mental illness Daughter     Depression Daughter     Diabetes Maternal Aunt     Diabetes Maternal Uncle     Stroke Maternal Grandmother     Breast cancer Maternal Grandmother     Cancer Maternal Grandfather     Stroke Paternal Grandmother     Depression Other     Cancer Other         Colorectal Cancer    Endometrial cancer Other     Lung cancer Other      OB History          3    Para   1    Term   1            AB   2    Living   1         SAB   2    IAB        Ectopic        Molar        Multiple        Live Births   1              Prior to Admission medications    Medication Sig Start Date End Date Taking? Authorizing Provider   acetaminophen (TYLENOL) 500 MG tablet Take 2 tablets by mouth 2 (Two) Times a Day. 3/10/23  Yes  ProviderRadha MD   aspirin 325 MG tablet Take 1 tablet by mouth Daily.   Yes Radha Zaragoza MD   atorvastatin (LIPITOR) 80 MG tablet Take 0.5 tablets by mouth Every Night. 3/9/23  Yes Erick Chisholm MD   diazePAM (VALIUM) 5 MG tablet Take 1 tablet by mouth Every 12 (Twelve) Hours As Needed for Anxiety.   Yes Radha Zaragoza MD   escitalopram (LEXAPRO) 10 MG tablet Take 2 tablets by mouth Daily. 10/1/22  Yes Radha Zaragoza MD   FLUoxetine (PROzac) 20 MG capsule Take 1 capsule by mouth Daily. 7/14/23  Yes Radha Zaragoza MD   furosemide (LASIX) 20 MG tablet Take 1 tablet by mouth 2 (Two) Times a Day.   Yes Radha Zaragoza MD   hydroCHLOROthiazide (HYDRODIURIL) 25 MG tablet Take 1 tablet by mouth Daily. 7/26/23  Yes Radha Zaragoza MD   hydrOXYzine pamoate (VISTARIL) 50 MG capsule Take 1 capsule by mouth 2 (Two) Times a Day As Needed. 4/5/22  Yes Sonya Crooks APRN   levETIRAcetam (KEPPRA) 1000 MG tablet Take 1 tablet by mouth 2 (Two) Times a Day.   Yes Radha Zaragoza MD   losartan (COZAAR) 50 MG tablet Take 1 tablet by mouth Daily.   Yes Radha Zaragoza MD   medroxyPROGESTERone (Provera) 10 MG tablet Take 1 tablet by mouth Daily. 5/4/23  Yes Annika Leiva APRN   melatonin 5 MG tablet tablet Take 2 tablets by mouth every night at bedtime.   Yes Radha Zaragoza MD   ondansetron ODT (ZOFRAN-ODT) 4 MG disintegrating tablet Place 1 tablet on the tongue Every 8 (Eight) Hours As Needed for Nausea or Vomiting. 7/28/23  Yes Oh Linares DO   pantoprazole (PROTONIX) 40 MG EC tablet Take 1 tablet by mouth Daily. 6/1/23  Yes Radha Zaragoza MD   phenazopyridine (PYRIDIUM) 100 MG tablet Take 2 tablets by mouth 3 (Three) Times a Day As Needed for Bladder Spasms. 7/28/23  Yes Oh Linares DO   potassium chloride (K-DUR,KLOR-CON) 10 MEQ CR tablet Take 1 tablet by mouth Daily. 6/1/23  Yes Radha Zaragoza, MD   risperiDONE (risperDAL) 1 MG  tablet Take 1 tablet by mouth Every Night.   Yes ProviderRadha MD   solifenacin (VESICARE) 10 MG tablet Take 1 tablet by mouth Daily.   Yes Radha Zaragoza MD   traZODone (DESYREL) 100 MG tablet Take 1 tablet by mouth Every Night. 6/8/23  Yes Radha Zaragoza MD   ursodiol (ACTIGALL) 300 MG capsule Take 1 capsule by mouth 2 (Two) Times a Day With Meals. 4/10/23  Yes Venkat Robb MD   dicyclomine (BENTYL) 20 MG tablet Take 1 tablet by mouth Every 6 (Six) Hours for 30 days. 8/21/23 9/20/23  Venkat Robb MD   hydrocortisone (ANUSOL-HC) 25 MG suppository Insert 1 suppository into the rectum 2 (Two) Times a Day As Needed for Hemorrhoids (rectal discomfort). 8/21/23   Venkat Robb MD   polyethylene glycol (GoLYTELY) 236 g solution Please use the instructions given in office 8/21/23   Venkat Robb MD     Allergies   Allergen Reactions    Abilify [Aripiprazole] Nausea Only    Buspirone Rash    Penicillins Hives and Nausea And Vomiting     Social History     Socioeconomic History    Marital status: Single   Tobacco Use    Smoking status: Every Day     Packs/day: 1.50     Types: Cigarettes    Smokeless tobacco: Never    Tobacco comments:     [phone]*quit*NOW   Vaping Use    Vaping Use: Some days    Substances: Nicotine    Devices: Refillable tank    Passive vaping exposure: Yes   Substance and Sexual Activity    Alcohol use: No    Drug use: No    Sexual activity: Not Currently     Birth control/protection: None       Review of Systems  Review of Systems   Constitutional:  Negative for chills, fatigue, fever and unexpected weight change.   HENT:  Negative for congestion, ear discharge, hearing loss, nosebleeds and sore throat.    Eyes:  Negative for pain, discharge and redness.   Respiratory:  Negative for cough, chest tightness, shortness of breath and wheezing.    Cardiovascular:  Negative for chest pain and palpitations.   Gastrointestinal:  Positive for abdominal pain, anal  "bleeding, blood in stool and diarrhea. Negative for abdominal distention, constipation, nausea and vomiting.   Endocrine: Negative for cold intolerance, polydipsia, polyphagia and polyuria.   Genitourinary:  Negative for dysuria, flank pain, frequency, hematuria and urgency.   Musculoskeletal:  Negative for arthralgias, back pain, joint swelling and myalgias.   Skin:  Negative for color change, pallor and rash.   Neurological:  Negative for tremors, seizures, syncope, weakness and headaches.   Hematological:  Negative for adenopathy. Does not bruise/bleed easily.   Psychiatric/Behavioral:  Negative for behavioral problems, confusion, dysphoric mood, hallucinations and suicidal ideas. The patient is not nervous/anxious.       BP 94/62 (BP Location: Right arm)   Pulse 80   Ht 152.4 cm (60\")   Wt 83.6 kg (184 lb 3.2 oz)   BMI 35.97 kg/m²     Objective    Physical Exam  Constitutional:       Appearance: She is well-developed.   HENT:      Head: Normocephalic and atraumatic.   Eyes:      Conjunctiva/sclera: Conjunctivae normal.      Pupils: Pupils are equal, round, and reactive to light.   Neck:      Thyroid: No thyromegaly.   Cardiovascular:      Rate and Rhythm: Normal rate and regular rhythm.      Heart sounds: Normal heart sounds. No murmur heard.  Pulmonary:      Effort: Pulmonary effort is normal.      Breath sounds: Normal breath sounds. No wheezing.   Abdominal:      General: Bowel sounds are normal. There is no distension.      Palpations: Abdomen is soft. There is no mass.      Tenderness: There is no abdominal tenderness.      Hernia: No hernia is present.   Genitourinary:     Comments: No lesions noted  Musculoskeletal:         General: No tenderness. Normal range of motion.      Cervical back: Normal range of motion and neck supple.   Lymphadenopathy:      Cervical: No cervical adenopathy.   Skin:     General: Skin is warm and dry.      Findings: No rash.   Neurological:      Mental Status: She is alert " and oriented to person, place, and time.      Cranial Nerves: No cranial nerve deficit.   Psychiatric:         Thought Content: Thought content normal.     Admission on 07/28/2023, Discharged on 07/28/2023   Component Date Value Ref Range Status    Glucose 07/28/2023 88  65 - 99 mg/dL Final    BUN 07/28/2023 22 (H)  6 - 20 mg/dL Final    Creatinine 07/28/2023 1.26 (H)  0.57 - 1.00 mg/dL Final    Sodium 07/28/2023 137  136 - 145 mmol/L Final    Potassium 07/28/2023 4.3  3.5 - 5.2 mmol/L Final    Chloride 07/28/2023 103  98 - 107 mmol/L Final    CO2 07/28/2023 24.0  22.0 - 29.0 mmol/L Final    Calcium 07/28/2023 9.1  8.6 - 10.5 mg/dL Final    Total Protein 07/28/2023 6.6  6.0 - 8.5 g/dL Final    Albumin 07/28/2023 3.8  3.5 - 5.2 g/dL Final    ALT (SGPT) 07/28/2023 21  1 - 33 U/L Final    AST (SGOT) 07/28/2023 21  1 - 32 U/L Final    Alkaline Phosphatase 07/28/2023 91  39 - 117 U/L Final    Total Bilirubin 07/28/2023 <0.2  0.0 - 1.2 mg/dL Final    Globulin 07/28/2023 2.8  gm/dL Final    A/G Ratio 07/28/2023 1.4  g/dL Final    BUN/Creatinine Ratio 07/28/2023 17.5  7.0 - 25.0 Final    Anion Gap 07/28/2023 10.0  5.0 - 15.0 mmol/L Final    eGFR 07/28/2023 53.1 (L)  >60.0 mL/min/1.73 Final    Lipase 07/28/2023 38  13 - 60 U/L Final    HCG Qualitative 07/28/2023 Negative  Negative Final    Color, UA 07/28/2023 Yellow  Yellow, Straw, Dark Yellow, Kavita Final    Appearance, UA 07/28/2023 Cloudy (A)  Clear Final    pH, UA 07/28/2023 5.5  5.0 - 9.0 Final    Specific Gravity, UA 07/28/2023 1.015  1.003 - 1.030 Final    Glucose, UA 07/28/2023 Negative  Negative Final    Ketones, UA 07/28/2023 Negative  Negative Final    Bilirubin, UA 07/28/2023 Negative  Negative Final    Blood, UA 07/28/2023 Negative  Negative Final    Protein, UA 07/28/2023 Negative  Negative Final    Leuk Esterase, UA 07/28/2023 Large (3+) (A)  Negative Final    Nitrite, UA 07/28/2023 Negative  Negative Final    Urobilinogen, UA 07/28/2023 0.2 E.U./dL  0.2 -  1.0 E.U./dL Final    WBC 07/28/2023 6.45  3.40 - 10.80 10*3/mm3 Final    RBC 07/28/2023 3.66 (L)  3.77 - 5.28 10*6/mm3 Final    Hemoglobin 07/28/2023 11.2 (L)  12.0 - 15.9 g/dL Final    Hematocrit 07/28/2023 32.9 (L)  34.0 - 46.6 % Final    MCV 07/28/2023 89.9  79.0 - 97.0 fL Final    MCH 07/28/2023 30.6  26.6 - 33.0 pg Final    MCHC 07/28/2023 34.0  31.5 - 35.7 g/dL Final    RDW 07/28/2023 12.9  12.3 - 15.4 % Final    RDW-SD 07/28/2023 42.5  37.0 - 54.0 fl Final    MPV 07/28/2023 11.6  6.0 - 12.0 fL Final    Platelets 07/28/2023 172  140 - 450 10*3/mm3 Final    Neutrophil % 07/28/2023 53.8  42.7 - 76.0 % Final    Lymphocyte % 07/28/2023 34.3  19.6 - 45.3 % Final    Monocyte % 07/28/2023 6.8  5.0 - 12.0 % Final    Eosinophil % 07/28/2023 4.3  0.3 - 6.2 % Final    Basophil % 07/28/2023 0.6  0.0 - 1.5 % Final    Immature Grans % 07/28/2023 0.2  0.0 - 0.5 % Final    Neutrophils, Absolute 07/28/2023 3.47  1.70 - 7.00 10*3/mm3 Final    Lymphocytes, Absolute 07/28/2023 2.21  0.70 - 3.10 10*3/mm3 Final    Monocytes, Absolute 07/28/2023 0.44  0.10 - 0.90 10*3/mm3 Final    Eosinophils, Absolute 07/28/2023 0.28  0.00 - 0.40 10*3/mm3 Final    Basophils, Absolute 07/28/2023 0.04  0.00 - 0.20 10*3/mm3 Final    Immature Grans, Absolute 07/28/2023 0.01  0.00 - 0.05 10*3/mm3 Final    nRBC 07/28/2023 0.0  0.0 - 0.2 /100 WBC Final    Extra Tube 07/28/2023 Hold for add-ons.   Final    Auto resulted    RBC, UA 07/28/2023 3-5 (A)  None Seen /HPF Final    WBC, UA 07/28/2023 Too Numerous to Count (A)  None Seen, 0-2, 3-5 /HPF Final    Bacteria, UA 07/28/2023 3+ (A)  None Seen /HPF Final    Squamous Epithelial Cells,  07/28/2023 6-12 (A)  None Seen, 0-2 /HPF Final    Hyaline Casts, UA 07/28/2023 None Seen  None Seen /LPF Final    Methodology 07/28/2023 Manual Light Microscopy   Final     Assessment & Plan      1. Lower abdominal pain    2. Hemorrhage of anus and rectum    3. Diarrhea, unspecified type    1.  Biliary colic with  choledocholithiasis s/p ERCP with CBD stent removal and stone removal, patient is recovering well.    2.  Left lower quadrant pain with diarrhea and rectal bleeding rule out IBD and neoplasia.  Add Bentyl and high-fiber diet.  Schedule colonoscopy for further evaluation.  3.  Obesity, recommend exercise and diet control.  4.  Tobacco usage, recommend cessation.       Orders placed during this encounter include:  Orders Placed This Encounter   Procedures    Obtain Informed Consent     Standing Status:   Future     Order Specific Question:   Informed Consent Given For     Answer:   colonoscopy       COLONOSCOPY (N/A)    Review and/or summary of lab tests, radiology, procedures, medications. Review and summary of old records and obtaining of history. The risks and benefits of my recommendations, as well as other treatment options were discussed with the patient today. Questions were answered.    New Medications Ordered This Visit   Medications    hydrocortisone (ANUSOL-HC) 25 MG suppository     Sig: Insert 1 suppository into the rectum 2 (Two) Times a Day As Needed for Hemorrhoids (rectal discomfort).     Dispense:  30 suppository     Refill:  0    dicyclomine (BENTYL) 20 MG tablet     Sig: Take 1 tablet by mouth Every 6 (Six) Hours for 30 days.     Dispense:  120 tablet     Refill:  5    polyethylene glycol (GoLYTELY) 236 g solution     Sig: Please use the instructions given in office     Dispense:  4000 mL     Refill:  0       Follow-up: Return in about 1 month (around 9/21/2023).               Results for orders placed or performed during the hospital encounter of 07/28/23   Urinalysis, Microscopic Only - Urine, Clean Catch    Specimen: Urine, Clean Catch   Result Value Ref Range    RBC, UA 3-5 (A) None Seen /HPF    WBC, UA Too Numerous to Count (A) None Seen, 0-2, 3-5 /HPF    Bacteria, UA 3+ (A) None Seen /HPF    Squamous Epithelial Cells, UA 6-12 (A) None Seen, 0-2 /HPF    Hyaline Casts, UA None Seen None Seen  /LPF    Methodology Manual Light Microscopy    Urinalysis With Microscopic If Indicated (No Culture) - Urine, Clean Catch    Specimen: Urine, Clean Catch   Result Value Ref Range    Color, UA Yellow Yellow, Straw, Dark Yellow, Kavita    Appearance, UA Cloudy (A) Clear    pH, UA 5.5 5.0 - 9.0    Specific [place], UA 1.015 1.003 - 1.030    Glucose, UA Negative Negative    Ketones, UA Negative Negative    Bilirubin, UA Negative Negative    Blood, UA Negative Negative    Protein, UA Negative Negative    Leuk Esterase, UA Large (3+) (A) Negative    Nitrite, UA Negative Negative    Urobilinogen, UA 0.2 E.U./dL 0.2 - 1.0 E.U./dL   CBC Auto Differential    Specimen: Blood   Result Value Ref Range    WBC 6.45 3.40 - 10.80 10*3/mm3    RBC 3.66 (L) 3.77 - 5.28 10*6/mm3    Hemoglobin 11.2 (L) 12.0 - 15.9 g/dL    Hematocrit 32.9 (L) 34.0 - 46.6 %    MCV 89.9 79.0 - 97.0 fL    MCH 30.6 26.6 - 33.0 pg    MCHC 34.0 31.5 - 35.7 g/dL    RDW 12.9 12.3 - 15.4 %    RDW-SD 42.5 37.0 - 54.0 fl    MPV 11.6 6.0 - 12.0 fL    Platelets 172 140 - 450 10*3/mm3    Neutrophil % 53.8 42.7 - 76.0 %    Lymphocyte % 34.3 19.6 - 45.3 %    Monocyte % 6.8 5.0 - 12.0 %    Eosinophil % 4.3 0.3 - 6.2 %    Basophil % 0.6 0.0 - 1.5 %    Immature Grans % 0.2 0.0 - 0.5 %    Neutrophils, Absolute 3.47 1.70 - 7.00 10*3/mm3    Lymphocytes, Absolute 2.21 0.70 - 3.10 10*3/mm3    Monocytes, Absolute 0.44 0.10 - 0.90 10*3/mm3    Eosinophils, Absolute 0.28 0.00 - 0.40 10*3/mm3    Basophils, Absolute 0.04 0.00 - 0.20 10*3/mm3    Immature Grans, Absolute 0.01 0.00 - 0.05 10*3/mm3    nRBC 0.0 0.0 - 0.2 /100 WBC   Light Blue Top   Result Value Ref Range    Extra Tube Hold for add-ons.    hCG, Serum, Qualitative    Specimen: Blood   Result Value Ref Range    HCG Qualitative Negative Negative   Lipase    Specimen: Blood   Result Value Ref Range    Lipase 38 13 - 60 U/L   Comprehensive Metabolic Panel    Specimen: Blood   Result Value Ref Range    Glucose 88 65 - 99 mg/dL     BUN 22 (H) 6 - 20 mg/dL    Creatinine 1.26 (H) 0.57 - 1.00 mg/dL    Sodium 137 136 - 145 mmol/L    Potassium 4.3 3.5 - 5.2 mmol/L    Chloride 103 98 - 107 mmol/L    CO2 24.0 22.0 - 29.0 mmol/L    Calcium 9.1 8.6 - 10.5 mg/dL    Total Protein 6.6 6.0 - 8.5 g/dL    Albumin 3.8 3.5 - 5.2 g/dL    ALT (SGPT) 21 1 - 33 U/L    AST (SGOT) 21 1 - 32 U/L    Alkaline Phosphatase 91 39 - 117 U/L    Total Bilirubin <0.2 0.0 - 1.2 mg/dL    Globulin 2.8 gm/dL    A/G Ratio 1.4 g/dL    BUN/Creatinine Ratio 17.5 7.0 - 25.0    Anion Gap 10.0 5.0 - 15.0 mmol/L    eGFR 53.1 (L) >60.0 mL/min/1.73   Results for orders placed or performed during the hospital encounter of 07/05/23   PREVIOUS HISTORY    Specimen: Blood   Result Value Ref Range    Previous History Previous Record on File    Gray Top   Result Value Ref Range    Extra Tube Hold for add-ons.    Single High Sensitivity Troponin T    Specimen: Blood   Result Value Ref Range    HS Troponin T <6 <10 ng/L   Gold Top - SST   Result Value Ref Range    Extra Tube Hold for add-ons.    Green Top (Gel)   Result Value Ref Range    Extra Tube Hold for add-ons.    Urinalysis, Microscopic Only - Urine, Clean Catch    Specimen: Urine, Clean Catch   Result Value Ref Range    RBC, UA 0-2 (A) None Seen /HPF    WBC, UA 21-30 (A) None Seen, 0-2, 3-5 /HPF    Bacteria, UA 4+ (A) None Seen /HPF    Squamous Epithelial Cells, UA 0-2 None Seen, 0-2 /HPF    Hyaline Casts, UA None Seen None Seen /LPF    Methodology Automated Microscopy    Urinalysis With Microscopic If Indicated (No Culture) - Urine, Clean Catch    Specimen: Urine, Clean Catch   Result Value Ref Range    Color, UA Yellow Yellow, Straw, Dark Yellow, Kavita    Appearance, UA Clear Clear    pH, UA 6.0 5.0 - 9.0    Specific [place], UA 1.026 1.003 - 1.030    Glucose, UA Negative Negative    Ketones, UA Negative Negative    Bilirubin, UA Negative Negative    Blood, UA Negative Negative    Protein, UA Negative Negative    Leuk Esterase, UA Small  (1+) (A) Negative    Nitrite, UA Positive (A) Negative    Urobilinogen, UA 0.2 E.U./dL 0.2 - 1.0 E.U./dL   CBC Auto Differential    Specimen: Blood   Result Value Ref Range    WBC 6.46 3.40 - 10.80 10*3/mm3    RBC 3.92 3.77 - 5.28 10*6/mm3    Hemoglobin 11.7 (L) 12.0 - 15.9 g/dL    Hematocrit 35.7 34.0 - 46.6 %    MCV 91.1 79.0 - 97.0 fL    MCH 29.8 26.6 - 33.0 pg    MCHC 32.8 31.5 - 35.7 g/dL    RDW 13.1 12.3 - 15.4 %    RDW-SD 43.8 37.0 - 54.0 fl    MPV 11.9 6.0 - 12.0 fL    Platelets 211 140 - 450 10*3/mm3    Neutrophil % 57.9 42.7 - 76.0 %    Lymphocyte % 31.3 19.6 - 45.3 %    Monocyte % 5.4 5.0 - 12.0 %    Eosinophil % 4.6 0.3 - 6.2 %    Basophil % 0.5 0.0 - 1.5 %    Immature Grans % 0.3 0.0 - 0.5 %    Neutrophils, Absolute 3.74 1.70 - 7.00 10*3/mm3    Lymphocytes, Absolute 2.02 0.70 - 3.10 10*3/mm3    Monocytes, Absolute 0.35 0.10 - 0.90 10*3/mm3    Eosinophils, Absolute 0.30 0.00 - 0.40 10*3/mm3    Basophils, Absolute 0.03 0.00 - 0.20 10*3/mm3    Immature Grans, Absolute 0.02 0.00 - 0.05 10*3/mm3    nRBC 0.0 0.0 - 0.2 /100 WBC     *Note: Due to a large number of results and/or encounters for the requested time period, some results have not been displayed. A complete set of results can be found in Results Review.         This document has been electronically signed by Venkat Robb MD on August 24, 2023 21:16 CDT

## 2023-09-28 NOTE — ANESTHESIA PREPROCEDURE EVALUATION
Anesthesia Evaluation     Patient summary reviewed and Nursing notes reviewed   no history of anesthetic complications:   NPO Solid Status: > 8 hours  NPO Liquid Status: > 2 hours           Airway   Mallampati: I  TM distance: >3 FB  Neck ROM: full  Dental    (+) edentulous    Pulmonary     breath sounds clear to auscultation  (+) a smoker Current Abstained day of surgery,  (-) asthma, recent URI, sleep apnea, rhonchi, decreased breath sounds, wheezes, rales  Cardiovascular   Exercise tolerance: poor (<4 METS)    ECG reviewed  Rhythm: regular  Rate: normal    (+) hypertension 2 medications or greater, valvular problems/murmurs MR, dysrhythmias Bradycardia, hyperlipidemia  (-) past MI, angina, murmur, cardiac stents, DVT    ROS comment: Date/Time: 7/5/2023 1:36 PM  Performed by: Lalito Allison DO  Authorized by: Lalito Allison DO  Interpreted by physician  Comments: EKG July 5, 2023 at 1336 reveals normal sinus rhythm rate of 62  bpm.  T wave flattening in 1 and aVL without inversion.  No ST ovation  depression.  No evidence of acute ischemia.  QTc 454.  Interpreted by Lalito Allison DO on 7/5/2023  4:30 PM CDT        TTE: 12/11/22  Interpretation Summary       ·  Left ventricular systolic function is normal. Left ventricular ejection fraction appears to be 56 - 60%.  ·  Left ventricular diastolic function was normal.  ·  Saline test results are negative for right to left atrial level shunt at baseline state. Valsalva maneuver was not performed.  ·  Estimated right ventricular systolic pressure from tricuspid regurgitation is normal (<35 mmHg).  ·  The right ventricle appears dilated in limited views.  ·  There is no evidence of pericardial effusion.      Neuro/Psych  (+) seizures well controlled, CVA residual symptoms, headaches (Migraines), psychiatric history Bipolar  (-) dizziness/light headedness, weakness  GI/Hepatic/Renal/Endo    (+) obesity, renal disease (creatinine 1.05) stones and CRI  (-)  GERD, liver disease    Musculoskeletal     (-) neck pain  Abdominal   (+) obese   Substance History   (-) alcohol use, drug use     OB/GYN    (-)  Pregnant        Other   arthritis,     ROS/Med Hx Other: Hx: seziures: last seizure was 2 months ago. Takes Keppra daily.     CVA:12/19/21  Per pt had to relearn how to walk and talk. Pt she has some aphasia and right sided weakness.     Had AMBER after stroke in 2021.    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                     Anesthesia Plan    ASA 3     general   total IV anesthesia  (  )  intravenous induction     Anesthetic plan, risks, benefits, and alternatives have been provided, discussed and informed consent has been obtained with: patient.    Plan discussed with CRNA.      CODE STATUS:

## 2023-10-02 LAB — REF LAB TEST METHOD: NORMAL

## (undated) DEVICE — ENDOSCOPIC SEAL URO 1 SIZE FITS ALL: Brand: ENDOSCOPIC SEAL

## (undated) DEVICE — EMBOLECTOMY/THROMBECTOMY BALLOON CATHETER: Brand: SYNTEL EMBOLECTOMY CATHETER (SPRING TIP)

## (undated) DEVICE — MONOPOLAR METZENBAUM SCISSOR TIP, DISPOSABLE: Brand: MONOPOLAR METZENBAUM SCISSOR TIP, DISPOSABLE

## (undated) DEVICE — STERILE POLYISOPRENE POWDER-FREE SURGICAL GLOVES: Brand: PROTEXIS

## (undated) DEVICE — INTENDED FOR TISSUE SEPARATION, AND OTHER PROCEDURES THAT REQUIRE A SHARP SURGICAL BLADE TO PUNCTURE OR CUT.: Brand: BARD-PARKER ® CARBON RIB-BACK BLADES

## (undated) DEVICE — PDS II VLT 0 107CM AG ST3: Brand: ENDOLOOP

## (undated) DEVICE — SUCTION IRRIGATOR: Brand: ENDOWRIST

## (undated) DEVICE — CVR SURG EQUIP BND RECTG 36X28

## (undated) DEVICE — CATH GUIDE BERN .038 5FR 100CM

## (undated) DEVICE — ENDOPOUCH RETRIEVER SPECIMEN RETRIEVAL BAGS: Brand: ENDOPOUCH RETRIEVER

## (undated) DEVICE — CATH IV CATHLON FEP POLY NON/RO STR 14G 2IN CLR

## (undated) DEVICE — SOL PVPI SPRY BETADINE 3OZ

## (undated) DEVICE — SUT VIC 0/0 UR6 27IN DYED J603H

## (undated) DEVICE — ENDOPATH XCEL BLADELESS TROCARS WITH STABILITY SLEEVES: Brand: ENDOPATH XCEL

## (undated) DEVICE — STERILE POLYISOPRENE POWDER-FREE SURGICAL GLOVES WITH EMOLLIENT COATING: Brand: PROTEXIS

## (undated) DEVICE — SYR LUERLOK 50ML

## (undated) DEVICE — DEV BIL POSITION RX LK OLYMPUS/FUJINON CAP SYS

## (undated) DEVICE — PK LAP CHOLE LF 60

## (undated) DEVICE — SOL IRRIG NACL 1000ML

## (undated) DEVICE — SOL IRR H2O BTL 1000ML STRL

## (undated) DEVICE — SPHINCTEROTOME: Brand: HYDRATOME RX 44

## (undated) DEVICE — ADHS SKIN PREMIERPRO EXOFIN TOPICAL HI/VISC .5ML

## (undated) DEVICE — BITE BLOCKS

## (undated) DEVICE — PK CYSTO LF 60

## (undated) DEVICE — SUT MONOCRYL 4/0 PS2 27IN Y426H ETY426H

## (undated) DEVICE — SUREFIT, DUAL DISPERSIVE ELECTRODE, CONTACT QUALITY MONITOR: Brand: SUREFIT

## (undated) DEVICE — SYS CLS PORTSITE CT CLOSESURE 5AND10/12

## (undated) DEVICE — NITINOL STONE RETRIEVAL BASKET: Brand: ZERO TIP

## (undated) DEVICE — RETRIEVAL BALLOON CATHETER: Brand: EXTRACTOR™ PRO RX

## (undated) DEVICE — OPEN END URETERAL CATHETER: Brand: URETERAL CATHETER

## (undated) DEVICE — SINGLE-USE BIOPSY FORCEPS: Brand: RADIAL JAW 4

## (undated) DEVICE — THE KUMAR CATHETER®, USED IN CONJUNCTION WITH KUMAR CHOLANGIOGRAPHY® CLAMP, IS MEANT TO PROVIDE A MEANS OF LAPAROSCOPIC CHOLANGIOGRAPHY. IT COMPRISES A TRANSLUCENT TUBING ( 76 CM. LENGTH AND 16 GA. ) THAT CARRIES A 19 GA., 1.25 CM LONG NEEDLE AT THE END. THE KUMAR CATHETER® IS USED TO PUNCTURE THE HARTMANN'S POUCH OF THE GALLBLADDER FOR BILIARY ACCESS AND / OR ASPIRATION. PRODUCT IS LATEX FREE.: Brand: KUMAR CATHETER®

## (undated) DEVICE — GLV SURG SIGNATURE ESSENTIAL PF LTX SZ6

## (undated) DEVICE — CORE TRUMPET FOR SINGLE SOLUTION BAG: Brand: CORE DYNAMICS

## (undated) DEVICE — EMBOLECTOMY/THROMBECTOMY BALLOON CATHETER: Brand: SYNTEL THROMBECTOMY CATHETER